# Patient Record
Sex: MALE | Race: WHITE | NOT HISPANIC OR LATINO | URBAN - METROPOLITAN AREA
[De-identification: names, ages, dates, MRNs, and addresses within clinical notes are randomized per-mention and may not be internally consistent; named-entity substitution may affect disease eponyms.]

---

## 2020-03-14 ENCOUNTER — EMERGENCY (EMERGENCY)
Facility: HOSPITAL | Age: 65
LOS: 0 days | Discharge: HOME | End: 2020-03-14
Attending: EMERGENCY MEDICINE | Admitting: EMERGENCY MEDICINE
Payer: MEDICARE

## 2020-03-14 VITALS
OXYGEN SATURATION: 97 % | WEIGHT: 199.96 LBS | DIASTOLIC BLOOD PRESSURE: 94 MMHG | HEIGHT: 69 IN | TEMPERATURE: 98 F | HEART RATE: 89 BPM | RESPIRATION RATE: 18 BRPM | SYSTOLIC BLOOD PRESSURE: 205 MMHG

## 2020-03-14 DIAGNOSIS — F17.200 NICOTINE DEPENDENCE, UNSPECIFIED, UNCOMPLICATED: ICD-10-CM

## 2020-03-14 DIAGNOSIS — I10 ESSENTIAL (PRIMARY) HYPERTENSION: ICD-10-CM

## 2020-03-14 DIAGNOSIS — I50.9 HEART FAILURE, UNSPECIFIED: ICD-10-CM

## 2020-03-14 DIAGNOSIS — F10.129 ALCOHOL ABUSE WITH INTOXICATION, UNSPECIFIED: ICD-10-CM

## 2020-03-14 DIAGNOSIS — I11.0 HYPERTENSIVE HEART DISEASE WITH HEART FAILURE: ICD-10-CM

## 2020-03-14 DIAGNOSIS — R74.8 ABNORMAL LEVELS OF OTHER SERUM ENZYMES: ICD-10-CM

## 2020-03-14 LAB
ALBUMIN SERPL ELPH-MCNC: 4.9 G/DL — SIGNIFICANT CHANGE UP (ref 3.5–5.2)
ALP SERPL-CCNC: 97 U/L — SIGNIFICANT CHANGE UP (ref 30–115)
ALT FLD-CCNC: 133 U/L — HIGH (ref 0–41)
ANION GAP SERPL CALC-SCNC: 18 MMOL/L — HIGH (ref 7–14)
APTT BLD: 31 SEC — SIGNIFICANT CHANGE UP (ref 27–39.2)
AST SERPL-CCNC: 152 U/L — HIGH (ref 0–41)
BASOPHILS # BLD AUTO: 0.06 K/UL — SIGNIFICANT CHANGE UP (ref 0–0.2)
BASOPHILS NFR BLD AUTO: 1 % — SIGNIFICANT CHANGE UP (ref 0–1)
BILIRUB SERPL-MCNC: 0.5 MG/DL — SIGNIFICANT CHANGE UP (ref 0.2–1.2)
BUN SERPL-MCNC: 9 MG/DL — LOW (ref 10–20)
CALCIUM SERPL-MCNC: 9.2 MG/DL — SIGNIFICANT CHANGE UP (ref 8.5–10.1)
CHLORIDE SERPL-SCNC: 93 MMOL/L — LOW (ref 98–110)
CO2 SERPL-SCNC: 27 MMOL/L — SIGNIFICANT CHANGE UP (ref 17–32)
CREAT SERPL-MCNC: 0.7 MG/DL — SIGNIFICANT CHANGE UP (ref 0.7–1.5)
EOSINOPHIL # BLD AUTO: 0.05 K/UL — SIGNIFICANT CHANGE UP (ref 0–0.7)
EOSINOPHIL NFR BLD AUTO: 0.8 % — SIGNIFICANT CHANGE UP (ref 0–8)
GLUCOSE SERPL-MCNC: 106 MG/DL — HIGH (ref 70–99)
HCT VFR BLD CALC: 46.5 % — SIGNIFICANT CHANGE UP (ref 42–52)
HGB BLD-MCNC: 15.8 G/DL — SIGNIFICANT CHANGE UP (ref 14–18)
IMM GRANULOCYTES NFR BLD AUTO: 0.8 % — HIGH (ref 0.1–0.3)
INR BLD: 0.97 RATIO — SIGNIFICANT CHANGE UP (ref 0.65–1.3)
LYMPHOCYTES # BLD AUTO: 1.34 K/UL — SIGNIFICANT CHANGE UP (ref 1.2–3.4)
LYMPHOCYTES # BLD AUTO: 22.3 % — SIGNIFICANT CHANGE UP (ref 20.5–51.1)
MCHC RBC-ENTMCNC: 31 PG — SIGNIFICANT CHANGE UP (ref 27–31)
MCHC RBC-ENTMCNC: 34 G/DL — SIGNIFICANT CHANGE UP (ref 32–37)
MCV RBC AUTO: 91.4 FL — SIGNIFICANT CHANGE UP (ref 80–94)
MONOCYTES # BLD AUTO: 0.46 K/UL — SIGNIFICANT CHANGE UP (ref 0.1–0.6)
MONOCYTES NFR BLD AUTO: 7.7 % — SIGNIFICANT CHANGE UP (ref 1.7–9.3)
NEUTROPHILS # BLD AUTO: 4.05 K/UL — SIGNIFICANT CHANGE UP (ref 1.4–6.5)
NEUTROPHILS NFR BLD AUTO: 67.4 % — SIGNIFICANT CHANGE UP (ref 42.2–75.2)
NRBC # BLD: 0 /100 WBCS — SIGNIFICANT CHANGE UP (ref 0–0)
NT-PROBNP SERPL-SCNC: 82 PG/ML — SIGNIFICANT CHANGE UP (ref 0–300)
PLATELET # BLD AUTO: 123 K/UL — LOW (ref 130–400)
POTASSIUM SERPL-MCNC: 3.4 MMOL/L — LOW (ref 3.5–5)
POTASSIUM SERPL-SCNC: 3.4 MMOL/L — LOW (ref 3.5–5)
PROT SERPL-MCNC: 7.9 G/DL — SIGNIFICANT CHANGE UP (ref 6–8)
PROTHROM AB SERPL-ACNC: 11.2 SEC — SIGNIFICANT CHANGE UP (ref 9.95–12.87)
RBC # BLD: 5.09 M/UL — SIGNIFICANT CHANGE UP (ref 4.7–6.1)
RBC # FLD: 13.1 % — SIGNIFICANT CHANGE UP (ref 11.5–14.5)
SODIUM SERPL-SCNC: 138 MMOL/L — SIGNIFICANT CHANGE UP (ref 135–146)
TROPONIN T SERPL-MCNC: 0.03 NG/ML — CRITICAL HIGH
WBC # BLD: 6.01 K/UL — SIGNIFICANT CHANGE UP (ref 4.8–10.8)
WBC # FLD AUTO: 6.01 K/UL — SIGNIFICANT CHANGE UP (ref 4.8–10.8)

## 2020-03-14 PROCEDURE — 71045 X-RAY EXAM CHEST 1 VIEW: CPT | Mod: 26

## 2020-03-14 PROCEDURE — 70450 CT HEAD/BRAIN W/O DYE: CPT | Mod: 26

## 2020-03-14 PROCEDURE — 99285 EMERGENCY DEPT VISIT HI MDM: CPT

## 2020-03-14 RX ORDER — LOSARTAN POTASSIUM 100 MG/1
50 TABLET, FILM COATED ORAL ONCE
Refills: 0 | Status: COMPLETED | OUTPATIENT
Start: 2020-03-14 | End: 2020-03-14

## 2020-03-14 RX ORDER — FUROSEMIDE 40 MG
20 TABLET ORAL ONCE
Refills: 0 | Status: COMPLETED | OUTPATIENT
Start: 2020-03-14 | End: 2020-03-14

## 2020-03-14 RX ORDER — METOPROLOL TARTRATE 50 MG
25 TABLET ORAL ONCE
Refills: 0 | Status: COMPLETED | OUTPATIENT
Start: 2020-03-14 | End: 2020-03-14

## 2020-03-14 RX ORDER — LISINOPRIL 2.5 MG/1
20 TABLET ORAL ONCE
Refills: 0 | Status: COMPLETED | OUTPATIENT
Start: 2020-03-14 | End: 2020-03-14

## 2020-03-14 RX ADMIN — LISINOPRIL 20 MILLIGRAM(S): 2.5 TABLET ORAL at 19:51

## 2020-03-14 RX ADMIN — Medication 20 MILLIGRAM(S): at 19:51

## 2020-03-14 RX ADMIN — Medication 25 MILLIGRAM(S): at 19:51

## 2020-03-14 RX ADMIN — LOSARTAN POTASSIUM 50 MILLIGRAM(S): 100 TABLET, FILM COATED ORAL at 19:51

## 2020-03-14 NOTE — ED PROVIDER NOTE - NSFOLLOWUPCLINICS_GEN_ALL_ED_FT
[facility]  Detox Mgmt  [address] [place] [address]  Phone: [phone]  Fax:   Follow Up Time: 1-3 Days

## 2020-03-14 NOTE — ED PROVIDER NOTE - PHYSICAL EXAMINATION
VITALS:  I have reviewed the initial vital signs.  GENERAL: Well-developed, well-nourished, in no acute distress. Nontoxic. Disheveled appearing, intoxicated.  HEENT: NC/AT. Sclera clear. No conjunctival pallor or injection. EOMI, PERRLA. Mucous membranes moist.  NECK: supple w FROM. No cervical adenopathy. No JVD.  CARDIO: RRR, nl S1 and S2. No murmurs, rubs, or gallops. 2+ b/l lower extremity pitting edema.2+ radial pulses bilaterally.  PULM: Normal effort. No tachypnea or retractions. CTA b/l without wheezes, rales, or rhonchi.  MSK: FROM to extremities x4.  GI: Normal bowel sounds. Abdomen soft and non-distended. +soft, easily reducible umbilical hernia. Nontender in all four quadrants without rebound or guarding. No pulsatile masses.  : No CVA tenderness b/l.  SKIN: Warm, dry. No pallor, jaundice, or rashes.  NEURO: A&Ox3. Speech clear. CN II-XII intact. 5/5 strength to upper and lower extremities b/l. Sensation intact and equal throughout. No pronator drift. Finger to nose intact. Normal heel to shin.   PSYCH: Calm and cooperative.

## 2020-03-14 NOTE — ED PROVIDER NOTE - NS ED ROS FT
CONSTITUTIONAL: (-) fevers, (-) chills, (-) decreased appetite, (-) diaphoresis  EYES: (-) vision changes, (-) blurry vision, (-) double vision  NECK: (-) neck pain, (-) neck stiffness  CARDIO: (-) chest pain, (-) palpitations, (+) b/l lower extremity edema  PULM: (-) cough, (-) sputum, (-) shortness of breath, (-) wheezing  GI: (-) nausea, (-) vomiting, (-) diarrhea, (-) constipation, (-) abdominal pain, (-) melena, (-) hematochezia, (-) rectal bleeding  : (-) dysuria, (-) hematuria, (-) frequency, (-) urgency, (-) incontinence, (-) difficulty urinating, (-) urinary retention, (-) flank pain  HEME: (-) easy bruising, (-) easy bleeding  MSK: (-) back pain,  (-) joint pain, (-) deformity, (-) joint swelling, (+) gait difficulty  SKIN: (-) rashes, (-) wounds, (-) pallor, (-) jaundice  NEURO: (-) headache, (-) head injury, (-) LOC, (-) dizziness, (-) lightheadedness, (-) syncope, (-) speech changes, (-) confusion, (-) numbness, (-) weakness, (-) paresthesias, (-) seizures  PSYCH: (-) suicidal ideation, (-) homicidal ideation, (-) depression, (-) anxiety, (-) visual hallucinations, (-) auditory hallucinations, (-) agitation    *all other systems negative except as documented above and in the HPI*

## 2020-03-14 NOTE — ED PROVIDER NOTE - PATIENT PORTAL LINK FT
You can access the FollowMyHealth Patient Portal offered by [facility] by registering at the following website: [email or web address]. By joining [employer]’s FollowMyHealth portal, you will also be able to view your health information using other applications (apps) compatible with our system.

## 2020-03-14 NOTE — ED PROVIDER NOTE - OBJECTIVE STATEMENT
64 year old male w hx of alcohol abuse, HTN, HLD, CHF noncompliant with meds presents to the ED for 64 year old male w hx of alcohol abuse, TIA, HTN, HLD, CHF noncompliant with meds presents to the ED for elevated blood pressure. History provided by patient's nephew as he is a poor historian. Pt recently relapsed after his wife passed away 2 months ago, admits to drinking 1-2 pints of vodka/day and has been noncompliant with his meds, not going to his doctor's appointments, not taking care of himself. Nephew states today he went to check on him and patient was too intoxicated to walk, BP was 200s/100s prompting him to call EMS. Pt denies falls, head trauma, headache, vision changes, chest pain, shortness of breath, syncope, abd pain, vomiting, black/bloody stools, decreased urination, numbness, paresthesias, weakness, SI/HI, seizures. States he takes 81 mg ASA but is unable to confirm if he is taking it.

## 2020-03-14 NOTE — ED PROVIDER NOTE - CARE PLAN
Principal Discharge DX:	Hypertension  Secondary Diagnosis:	Elevated troponin  Secondary Diagnosis:	Alcohol abuse

## 2020-03-14 NOTE — ED PROVIDER NOTE - CARE PROVIDER_API CALL
Hao Quigley)  Cardiovascular Disease; Internal Medicine  [address] [place] [id number]  Phone: [phone]  Fax: [phone]  Follow Up Time: 1-3 Days

## 2020-03-14 NOTE — ED PROVIDER NOTE - PROGRESS NOTE DETAILS
Patient's nephew Armando can be reached at [phone] The patient wishes to leave against medical advice.  I have discussed the risks, benefits and alternatives (including the possibility of worsening of disease, pain, permanent disability, and/or death) with the patient and his/her family (if available).  The patient voices understanding of these risks, benefits, and alternatives and still wishes to sign out against medical advice.  The patient is awake, alert, oriented  x 3 and has demonstrated capacity to refuse/direct care.  I have advised the patient that they can and should return immediately should they develop any worse/different/additional symptoms, or if they change their mind and want to continue their care.

## 2020-03-14 NOTE — ED PROVIDER NOTE - ATTENDING CONTRIBUTION TO CARE
I personally evaluated patient. I agree with the findings and plan with all documentation on chart except as documented  in my note.    63 y/o M PMHx ETOH Abuse and Dependence, TIA, HTN, HLD, CHF who is non-compliant with medications who presents to the ED for elevated blood pressure. History provided by patient's nephew as he is a poor historian. Pt recently relapsed after his wife passed away 2 months ago, admits to drinking 1-2 pints of vodka/day and has been noncompliant with his meds, not going to his doctor's appointments, not taking care of himself. Nephew states today he went to check on him and patient was too intoxicated to walk, BP was 200s/100s prompting him to call EMS. Pt denies falls, head trauma, headache, vision changes, chest pain, shortness of breath, syncope, abd pain, vomiting, black/bloody stools, decreased urination, numbness, paresthesias, weakness, SI/HI, seizures. States he takes 81 mg ASA but is unable to confirm if he is taking it.    On exam, patient appears mildly disheveled but denies HI or SI and is alert and oriented. Gross neuro exam normal. Patient answers questions appropriately. Patient talked into getting blood work and a CT scan done.  Work up discussed and results discussed and advised patient admission for further work up and BP control.  Patient electing to sign out AMA.    The patient wishes to leave against medical advice.  I have discussed the risks, benefits and alternatives (including the possibility of worsening of disease, pain, permanent disability, and/or death) with the patient and his/her family (if available).  The patient voices understanding of these risks, benefits, and alternatives and still wishes to sign out against medical advice.  The patient is awake, alert, oriented  x 3 and has demonstrated capacity to refuse/direct care.  I have advised the patient that they can and should return immediately should they develop any worse/different/additional symptoms, or if they change their mind and want to continue their care.    Patient has medications at home and compliance stressed.

## 2020-03-14 NOTE — ED PROVIDER NOTE - CLINICAL SUMMARY MEDICAL DECISION MAKING FREE TEXT BOX
On exam, patient appears mildly disheveled but denies HI or SI and is alert and oriented. Gross neuro exam normal. Patient answers questions appropriately. Patient talked into getting blood work and a CT scan done.  Work up discussed and results discussed and advised patient admission for further work up and BP control.  Patient electing to sign out AMA.    The patient wishes to leave against medical advice.  I have discussed the risks, benefits and alternatives (including the possibility of worsening of disease, pain, permanent disability, and/or death) with the patient and his/her family (if available).  The patient voices understanding of these risks, benefits, and alternatives and still wishes to sign out against medical advice.  The patient is awake, alert, oriented  x 3 and has demonstrated capacity to refuse/direct care.  I have advised the patient that they can and should return immediately should they develop any worse/different/additional symptoms, or if they change their mind and want to continue their care.    Patient has medications at home and compliance stressed.

## 2020-03-14 NOTE — ED ADULT TRIAGE NOTE - CHIEF COMPLAINT QUOTE
EMS was called by paula who states that he went to check on pt today and took his blood pressure and it was high. Pt has history of htn but has not been taking his medications for unknown amount of time. Nephew states that pt has hx of tia- Pt does not know names of meds. pt states "they are bull shit meds"Py has also been having a nose bleed the last 2 nights and pt also started drinking vodka after being sober for many years

## 2020-03-17 ENCOUNTER — EMERGENCY (EMERGENCY)
Facility: HOSPITAL | Age: 65
LOS: 0 days | Discharge: HOME | End: 2020-03-17
Attending: EMERGENCY MEDICINE | Admitting: EMERGENCY MEDICINE
Payer: MEDICARE

## 2020-03-17 VITALS
DIASTOLIC BLOOD PRESSURE: 66 MMHG | SYSTOLIC BLOOD PRESSURE: 136 MMHG | RESPIRATION RATE: 18 BRPM | HEIGHT: 69 IN | WEIGHT: 199.96 LBS | OXYGEN SATURATION: 96 % | TEMPERATURE: 98 F | HEART RATE: 89 BPM

## 2020-03-17 DIAGNOSIS — I11.0 HYPERTENSIVE HEART DISEASE WITH HEART FAILURE: ICD-10-CM

## 2020-03-17 DIAGNOSIS — E78.5 HYPERLIPIDEMIA, UNSPECIFIED: ICD-10-CM

## 2020-03-17 DIAGNOSIS — F17.210 NICOTINE DEPENDENCE, CIGARETTES, UNCOMPLICATED: ICD-10-CM

## 2020-03-17 DIAGNOSIS — F10.129 ALCOHOL ABUSE WITH INTOXICATION, UNSPECIFIED: ICD-10-CM

## 2020-03-17 DIAGNOSIS — I50.9 HEART FAILURE, UNSPECIFIED: ICD-10-CM

## 2020-03-17 PROCEDURE — 99284 EMERGENCY DEPT VISIT MOD MDM: CPT

## 2020-03-17 NOTE — ED PROVIDER NOTE - PROGRESS NOTE DETAILS
MQ: Patient does not know why he is here, was taken by , but he does not want to be here, is alert and oriented x 3, able to ambulate okay without loss of balance, will d/c with PMD and detox clinic f/u

## 2020-03-17 NOTE — ED PROVIDER NOTE - PHYSICAL EXAMINATION
CONSTITUTIONAL: Well-developed; well-nourished; in no acute distress.   SKIN: warm, dry  HEAD: Normocephalic; atraumatic.  EYES: no conjunctival injection. PERRL.   ENT: No nasal discharge; airway clear.  NECK: Supple; non tender.  CARD: S1, S2 normal; no murmurs, gallops, or rubs. Regular rate and rhythm.   RESP: No wheezes, rales or rhonchi.  ABD: soft ntnd, no rebound tender or guarding, no CVAT b/l  EXT: Normal ROM.  No clubbing, cyanosis or edema.   LYMPH: No acute cervical adenopathy.  NEURO: Alert, oriented, grossly unremarkable. Ambulates normally without loss of balance.  PSYCH: Cooperative, appropriate.

## 2020-03-17 NOTE — ED PROVIDER NOTE - PMH
CHF (congestive heart failure)    HLD (hyperlipidemia)    HTN (hypertension)    TIA (transient ischemic attack)

## 2020-03-17 NOTE — ED ADULT TRIAGE NOTE - CHIEF COMPLAINT QUOTE
BIBA from home.  As per EMS " we got call that there is somebody in a house who is suicidal and has gun, he is intoxicated" as per pt " I was watching TV in my house and then  and EMS showed up; I don't' know why and who called them, I don't have gun and I don't want to hurt myself"

## 2020-03-17 NOTE — ED PROVIDER NOTE - NSFOLLOWUPCLINICS_GEN_ALL_ED_FT
A Family Medicine Doctor  Family Medicine  .  [place]   Phone:   Fax:     [facility]  Detox Mgmt  [address] [place] [address]  Phone: [phone]  Fax:   Follow Up Time:

## 2020-03-17 NOTE — ED PROVIDER NOTE - OBJECTIVE STATEMENT
The patient is a 63 yo male with PMHx of alcohol abuse, TIA, HTN, HLD, CHF presenting after EMS took him to ED for alcohol detox. The patient is a 65 yo male with PMHx of alcohol abuse, TIA, HTN, HLD, CHF presenting after EMS took him to ED for alcohol detox. Patient was at home by himself, watching TV, did not call EMS or police, but police and EMS came to his house, bringing him to the hospital for alcohol detox. Patient drinks 2 to 3 shots of vodka a day, last drink at 10 AM today. Patient does have shakes when he stops drinking, but reports no hx of seizures or hospitalization for alcohol withdrawal. Denies fever, chills, chest pain, SOB, cough, abdominal pain, n/v/d. Patient does not want to stay for detox. No auditory/visual hallucations, no SI/HI.

## 2020-03-17 NOTE — ED PROVIDER NOTE - PATIENT PORTAL LINK FT
You can access the FollowMyHealth Patient Portal offered by [facility] by registering at the following website: [email or web address]. By joining [employer]’s FollowMyHealth portal, you will also be able to view your health information using other applications (apps) compatible with our system.

## 2020-03-17 NOTE — ED PROVIDER NOTE - NSFOLLOWUPINSTRUCTIONS_ED_ALL_ED_FT
Substance Use Disorder  Substance use disorder occurs when a person's repeated use of drugs or alcohol interferes with his or her ability to be productive. This disorder can cause problems with mental and physical health. It can affect your ability to have healthy relationships, and it can keep you from being able to meet your responsibilities at work, home, or school. It can also lead to addiction, which is a condition in which the person cannot stop using the substance consistently for a period of time.  Addiction changes the way the brain works. Because of these changes, addiction is a chronic condition. Substance use disorder can be mild, moderate, or severe.  The most commonly abused substances include:  Alcohol.Tobacco.Marijuana.Stimulants, such as cocaine and methamphetamine.Hallucinogens, such as LSD and PCP.Opioids, such as some prescription pain medicines and heroin.What are the causes?  This condition may develop due to many complex social, psychological, or physical reasons, such as:  Stress.Abuse.Peer pressure.Anxiety or depression.What increases the risk?  This condition is more likely to develop in people who:  Use substances to cope with stress.Have been abused.Have a mental health disorder, such as depression.Have a family history of substance use disorder.What are the signs or symptoms?  Symptoms of this condition include:  Using the substance for longer periods of time or at a higher dosage than what is normal or intended.Having a lasting desire to use the substance.Being unable to slow down or stop the use of the substance.Spending an abnormal amount of time getting the substance, using the substance, or recovering from using the substance.Using the substance in a way that interferes with work, school, social activities, and personal relationships.Using the substance even after having negative consequences, such as:  Health problems.Legal or financial troubles.Job loss.Relationship problems.Needing more and more of the substance to get the same effect (developing tolerance).Experiencing unpleasant symptoms if you do not use the substance (withdrawal).Using the substance to avoid withdrawal symptoms.How is this diagnosed?  This condition may be diagnosed based on:  A physical exam.Your history of substance use.Your symptoms. This includes:  How substance use affects your life.Changes in personality, behaviors, and mood.Having at least two symptoms of substance use disorder within a 12-month period.Health issues related to substance use, such as liver damage, shortness of breath, fatigue, cough, or heart problems.Blood or urine tests to screen for alcohol and drugs.How is this treated?  This condition may be treated by:  Stopping substance use safely. This may require taking medicines and being closely monitored for several days.Taking part in group and individual counseling from mental health providers who help people with substance use disorder.Staying at a live-in (residential) treatment center for several days or weeks.Attending daily counseling sessions at a treatment center.Taking medicine as told by your health care provider:  To ease symptoms and prevent complications during withdrawal.To treat other mental health issues, such as depression or anxiety.To block cravings by causing the same effects as the substance.To block the effects of the substance or replace good sensations with unpleasant ones.Participating in a support group to share your experience with others who are going through the same thing. These groups are an important part of long-term recovery for many people.Recovery can be a long process. Many people who undergo treatment start using the substance again after stopping (relapse). If you relapse, that does not mean that treatment will not work.  Follow these instructions at home:     Take over-the-counter and prescription medicines only as told by your health care provider.Do not use any drugs or alcohol.Avoid temptations or triggers that you associate with your use of the substance.Learn and practice techniques for managing stress.Have a plan for vulnerable moments. Get phone numbers of people who are willing to help and who are committed to your recovery.Attend support groups on a regular basis. These groups include 12-step programs like Alcoholics Anonymous and Narcotics Anonymous.Keep all follow-up visits as told by your health care providers. This is important. This includes continuing to work with therapists and support groups.Contact a health care provider if:  You cannot take your medicines as told.Your symptoms get worse.You have trouble resisting the urge to use drugs or alcohol.Get help right away if you:  Relapse.Think that you may have taken too much of a drug. The hotline of the National Poison Control Center is (358) 521-4554.Have signs of an overdose. Symptoms include:  Chest pain.Confusion.Sleepiness or difficulty staying awake.Slowed breathing.Nausea or vomiting.A seizure.Have serious thoughts about hurting yourself or someone else.Drug overdose is an emergency. Do not wait to see if the symptoms will go away. Get medical help right away. Call your local emergency services (911 in the [place] Do not drive yourself to the hospital.   If you ever feel like you may hurt yourself or others, or have thoughts about taking your own life, get help right away. You can go to your nearest emergency department or call:   Your local emergency services (911 in the [place] A suicide crisis helpline, such as the National Suicide Prevention Lifeline at 1-467.256.8702. This is open 24 hours a day. Summary  Substance use disorder occurs when a person's repeated use of drugs or alcohol interferes with his or her ability to be productive.Taking part in group and individual counseling from mental health providers is a common treatment for people with substance use disorder.Recovery can be a long process. Many people who undergo treatment start using the substance again after stopping (relapse). A relapse does not mean that treatment will not work.Attend support groups such as Alcoholics Anonymous and Narcotics Anonymous. These groups are an important part of long-term recovery for many people.This information is not intended to replace advice given to you by your health care provider. Make sure you discuss any questions you have with your health care provider.    Please follow up with your primary care doctor and detox clinic as routine.

## 2020-03-17 NOTE — ED PROVIDER NOTE - ATTENDING CONTRIBUTION TO CARE
63yo M history of ETOH abuse presenting initially for detox but now does not want detox. Last drink 10am. No complaints. No suicidal ideation, no homicidal ideation, no auditory or visual hallucinations   Constitutional: disheveled. Non toxic.   Eyes: PERRLA. Extraocular movements intact, no entrapment. Conjunctiva normal.   ENT: No nasal discharge. Moist mucus membranes.  Neck: Supple, non tender, full range of motion.  CV: RRR no murmurs, rubs, or gallops. +S1S2.   Pulm: Clear to auscultation bilaterally. Normal work of breathing.  Abd: soft NT ND +BS.   Ext: Warm and well perfused x4, moving all extremities, no edema.   Psy: Cooperative, appropriate.   Skin: Warm, dry, no rash  Neuro: CN2-12 grossly intact no sensory or motor deficits throughout, no drift, no ataxia

## 2020-03-17 NOTE — ED PROVIDER NOTE - CLINICAL SUMMARY MEDICAL DECISION MAKING FREE TEXT BOX
63yo M history of ETOH abuse presenting initially for detox but now does not want detox. Last drink 10am. No complaints. No suicidal ideation, no homicidal ideation, no auditory or visual hallucinations Pt awake, alert, no slurring of speech, ambulatory without difficulty, no ataxia, PERRLA, RRR no MRG, lungs CTA b/l, abd S NT ND. No complaints at this time, denies SI, HI, AV hallucinations, wishes to be discharged, pt clinically sober and displays capacity- no further questions or concerns at this time.

## 2020-03-19 ENCOUNTER — INPATIENT (INPATIENT)
Facility: HOSPITAL | Age: 65
LOS: 6 days | Discharge: NURSING, REHAB OR LONG-TERM CARE | End: 2020-03-26
Attending: INTERNAL MEDICINE | Admitting: INTERNAL MEDICINE
Payer: MEDICARE

## 2020-03-19 VITALS
DIASTOLIC BLOOD PRESSURE: 88 MMHG | RESPIRATION RATE: 20 BRPM | HEIGHT: 69 IN | OXYGEN SATURATION: 97 % | HEART RATE: 90 BPM | SYSTOLIC BLOOD PRESSURE: 167 MMHG | WEIGHT: 199.96 LBS | TEMPERATURE: 99 F

## 2020-03-19 DIAGNOSIS — I26.99 OTHER PULMONARY EMBOLISM WITHOUT ACUTE COR PULMONALE: ICD-10-CM

## 2020-03-19 DIAGNOSIS — Z90.49 ACQUIRED ABSENCE OF OTHER SPECIFIED PARTS OF DIGESTIVE TRACT: Chronic | ICD-10-CM

## 2020-03-19 DIAGNOSIS — I10 ESSENTIAL (PRIMARY) HYPERTENSION: ICD-10-CM
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
## 2020-03-19 DIAGNOSIS — F10.20 ALCOHOL DEPENDENCE, UNCOMPLICATED: ICD-10-CM

## 2020-03-19 DIAGNOSIS — R60.0 LOCALIZED EDEMA: ICD-10-CM

## 2020-03-19 PROBLEM — G45.9 TRANSIENT CEREBRAL ISCHEMIC ATTACK, UNSPECIFIED: Chronic | Status: ACTIVE | Noted: 2020-03-17

## 2020-03-19 PROBLEM — E78.5 HYPERLIPIDEMIA, UNSPECIFIED: Chronic | Status: ACTIVE | Noted: 2020-03-17

## 2020-03-19 PROBLEM — I50.9 HEART FAILURE, UNSPECIFIED: Chronic | Status: ACTIVE | Noted: 2020-03-17

## 2020-03-19 LAB
ANION GAP SERPL CALC-SCNC: 18 MMOL/L — HIGH (ref 7–14)
APAP SERPL-MCNC: <5 UG/ML — LOW (ref 10–30)
BASOPHILS # BLD AUTO: 0.03 K/UL — SIGNIFICANT CHANGE UP (ref 0–0.2)
BASOPHILS NFR BLD AUTO: 0.3 % — SIGNIFICANT CHANGE UP (ref 0–1)
BUN SERPL-MCNC: 26 MG/DL — HIGH (ref 10–20)
CALCIUM SERPL-MCNC: 9.1 MG/DL — SIGNIFICANT CHANGE UP (ref 8.5–10.1)
CHLORIDE SERPL-SCNC: 93 MMOL/L — LOW (ref 98–110)
CO2 SERPL-SCNC: 26 MMOL/L — SIGNIFICANT CHANGE UP (ref 17–32)
CREAT SERPL-MCNC: 1.1 MG/DL — SIGNIFICANT CHANGE UP (ref 0.7–1.5)
EOSINOPHIL # BLD AUTO: 0.01 K/UL — SIGNIFICANT CHANGE UP (ref 0–0.7)
EOSINOPHIL NFR BLD AUTO: 0.1 % — SIGNIFICANT CHANGE UP (ref 0–8)
GLUCOSE SERPL-MCNC: 161 MG/DL — HIGH (ref 70–99)
HCT VFR BLD CALC: 42.9 % — SIGNIFICANT CHANGE UP (ref 42–52)
HGB BLD-MCNC: 14.6 G/DL — SIGNIFICANT CHANGE UP (ref 14–18)
IMM GRANULOCYTES NFR BLD AUTO: 0.8 % — HIGH (ref 0.1–0.3)
LYMPHOCYTES # BLD AUTO: 0.76 K/UL — LOW (ref 1.2–3.4)
LYMPHOCYTES # BLD AUTO: 7.5 % — LOW (ref 20.5–51.1)
MCHC RBC-ENTMCNC: 31.4 PG — HIGH (ref 27–31)
MCHC RBC-ENTMCNC: 34 G/DL — SIGNIFICANT CHANGE UP (ref 32–37)
MCV RBC AUTO: 92.3 FL — SIGNIFICANT CHANGE UP (ref 80–94)
MONOCYTES # BLD AUTO: 0.63 K/UL — HIGH (ref 0.1–0.6)
MONOCYTES NFR BLD AUTO: 6.3 % — SIGNIFICANT CHANGE UP (ref 1.7–9.3)
NEUTROPHILS # BLD AUTO: 8.57 K/UL — HIGH (ref 1.4–6.5)
NEUTROPHILS NFR BLD AUTO: 85 % — HIGH (ref 42.2–75.2)
NRBC # BLD: 0 /100 WBCS — SIGNIFICANT CHANGE UP (ref 0–0)
PLATELET # BLD AUTO: 86 K/UL — LOW (ref 130–400)
POTASSIUM SERPL-MCNC: 4.1 MMOL/L — SIGNIFICANT CHANGE UP (ref 3.5–5)
POTASSIUM SERPL-SCNC: 4.1 MMOL/L — SIGNIFICANT CHANGE UP (ref 3.5–5)
RBC # BLD: 4.65 M/UL — LOW (ref 4.7–6.1)
RBC # FLD: 13.2 % — SIGNIFICANT CHANGE UP (ref 11.5–14.5)
SALICYLATES SERPL-MCNC: <0.3 MG/DL — LOW (ref 4–30)
SODIUM SERPL-SCNC: 137 MMOL/L — SIGNIFICANT CHANGE UP (ref 135–146)
WBC # BLD: 10.09 K/UL — SIGNIFICANT CHANGE UP (ref 4.8–10.8)
WBC # FLD AUTO: 10.09 K/UL — SIGNIFICANT CHANGE UP (ref 4.8–10.8)

## 2020-03-19 PROCEDURE — 99285 EMERGENCY DEPT VISIT HI MDM: CPT

## 2020-03-19 PROCEDURE — 99223 1ST HOSP IP/OBS HIGH 75: CPT

## 2020-03-19 PROCEDURE — 93970 EXTREMITY STUDY: CPT | Mod: 26

## 2020-03-19 PROCEDURE — 70450 CT HEAD/BRAIN W/O DYE: CPT | Mod: 26

## 2020-03-19 PROCEDURE — 71045 X-RAY EXAM CHEST 1 VIEW: CPT | Mod: 26

## 2020-03-19 RX ORDER — FUROSEMIDE 40 MG
1 TABLET ORAL
Qty: 0 | Refills: 0 | DISCHARGE

## 2020-03-19 RX ORDER — LOSARTAN POTASSIUM 100 MG/1
50 TABLET, FILM COATED ORAL ONCE
Refills: 0 | Status: COMPLETED | OUTPATIENT
Start: 2020-03-19 | End: 2020-03-19

## 2020-03-19 RX ORDER — METOPROLOL TARTRATE 50 MG
25 TABLET ORAL
Refills: 0 | Status: DISCONTINUED | OUTPATIENT
Start: 2020-03-19 | End: 2020-03-21

## 2020-03-19 RX ORDER — INFLUENZA VIRUS VACCINE 15; 15; 15; 15 UG/.5ML; UG/.5ML; UG/.5ML; UG/.5ML
0.5 SUSPENSION INTRAMUSCULAR ONCE
Refills: 0 | Status: COMPLETED | OUTPATIENT
Start: 2020-03-19 | End: 2020-03-19

## 2020-03-19 RX ORDER — ASPIRIN/CALCIUM CARB/MAGNESIUM 324 MG
81 TABLET ORAL DAILY
Refills: 0 | Status: DISCONTINUED | OUTPATIENT
Start: 2020-03-19 | End: 2020-03-26

## 2020-03-19 RX ORDER — CHLORHEXIDINE GLUCONATE 213 G/1000ML
1 SOLUTION TOPICAL
Refills: 0 | Status: DISCONTINUED | OUTPATIENT
Start: 2020-03-19 | End: 2020-03-26

## 2020-03-19 RX ORDER — APIXABAN 2.5 MG/1
5 TABLET, FILM COATED ORAL EVERY 12 HOURS
Refills: 0 | Status: DISCONTINUED | OUTPATIENT
Start: 2020-03-19 | End: 2020-03-26

## 2020-03-19 RX ORDER — LOSARTAN POTASSIUM 100 MG/1
50 TABLET, FILM COATED ORAL DAILY
Refills: 0 | Status: DISCONTINUED | OUTPATIENT
Start: 2020-03-19 | End: 2020-03-26

## 2020-03-19 RX ADMIN — LOSARTAN POTASSIUM 50 MILLIGRAM(S): 100 TABLET, FILM COATED ORAL at 17:55

## 2020-03-19 RX ADMIN — Medication 25 MILLIGRAM(S): at 17:54

## 2020-03-19 RX ADMIN — Medication 25 MILLIGRAM(S): at 10:59

## 2020-03-19 RX ADMIN — Medication 25 MILLIGRAM(S): at 17:24

## 2020-03-19 RX ADMIN — APIXABAN 5 MILLIGRAM(S): 2.5 TABLET, FILM COATED ORAL at 17:54

## 2020-03-19 NOTE — PATIENT PROFILE ADULT - HAS THE PATIENT RECEIVED THE INFLUENZA VACCINE THIS SEASON?
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  Plan:  Concurrent chemotherapy and radiation therapy  Discussion and summary:  Discuss treatment course with the patient and her daughter of 35 treatments total dose 59 4 Gy  We inform her of the side effects of skin reaction, fatigue, low blood count and discomfort or pain swallowing  Late sequela of lung scarring with minimal or no symptoms  We explained to the patient that medical oncologist Dr Mat Christensen may opt for induction chemotherapy followed by the chemoradiation therapy  He has appointment to see him August 16  We gave her appointment for CT simulation at [address] [place] [address] for August 22 and she can start treatment the following week should there be no induction chemotherapy  Patient expresses preference to be treated at [facility]  Physical examination:  Patient looks her stated age, alert and oriented, cooperative without any complaints  There are no palpable nodes  Lungs are clear  Heart tones are normal with regular rate and rhythm  Abdomen is soft without areas of tenderness and no masses  No edema lower extremities  Brief neurologic examination shows no focal findings  Cancer Staging  No matching staging information was found for the patient  Oncology History    Patient presents today for RT consult for adenocarcinoma of the left lung  Referred by Dr Onesimo Mcclellan  72year old female with a descending thoracic aorta and abdominal aorta aneurysm, maximum diameter is 6 1 cm at the supra celiac level  She also has large amount of atheroma in the aortic wall  CT also reveals a left upper lobe mass with some mediastinal adenopathy concerning for malignancy   She was referred to cardiothoracic surgery In June 2018 and she has an indication for surgery for her thoracic aorta aneurysm but in the light of a possible malignancy, should be worked up first   If she has a malignancy, surgery for the aneurysm is not recommended  8/1/18 she underwent endoscopic ultrasound by Dr Sonam Bianchi for evaluation of mediastinal lymphadenopathy  She was found to have a 22 mm x 19 mm hypoechoic lymph node in the aortopulmonary window  Fine-needle aspiration x3 was performed using a 25 gauge [employer] needle  Cytology was positive for non-small cell carcinoma  8/7/18 Dr Raheem Robins, cardiothoracic surgery  Patient has a pathologic stage IIIA lung cancer and going to order a PET-CT scan  Referral to Medical Oncology and Radiation Oncology for combined modality therapy  Future appointments:  8/16/18 Dr Juhi Mills, Med Onc consult  8/17/18 PET         Nonsquamous nonsmall cell neoplasm of lung ([facility] )    8/2018 Initial Diagnosis     Nonsquamous nonsmall cell neoplasm of lung ([facility] )         8/1/2018 Biopsy     Lymph node, mediastinal (fine needle aspiration with cell block preparation):     - Involvement by non-small cell carcinoma, most compatible with pulmonary adenocarcinoma     - Positive: Emmett-Ep4, MOC-31, TTF1, CKAE1/3     - Negative: Bronston-8, CDX2, synaptophysin, CD56, HMB-45, P40            Clinical Trial: No    Screening  Tobacco  Current tobacco user: no  If yes, brief counseling provided: No    Hypertension  Hypertension screening performed: yes  Normotensive:  no  If no, referred to PCP: yes    Depression Screening  Screened for depression using PHQ-2: yes    Screened for depression using PHQ-9:  no  Screening positive or negative:  negative  If score >4, was any of the following actions taken?    Additional evaluation for depression, suicide risk assesment, referral to PCP or psychiatry, medication started:  no    Advanced Care Planning for Patients >65 years  Advanced Care Planning Discussed:  yes  Patient named surrogate decision maker or care plan in chart: no        Health Maintenance   Topic Date Due    DTaP,Tdap,and Td Vaccines (1 - Tdap) 06/05/1968    PNEUMOCOCCAL POLYSACCHARIDE VACCINE AGE 72 AND OVER  06/05/2012    GLAUCOMA SCREENING 72 + YR  06/05/2014    MAMMOGRAM  06/28/2018    INFLUENZA VACCINE  09/01/2018    Fall Risk  05/31/2019    Urinary Incontinence Screening  05/31/2019    CRC Screening: Colonoscopy  09/27/2027    Hepatitis C Screening  Completed       Patient Active Problem List   Diagnosis    Chronic obstructive pulmonary disease ([facility] )    Hypothyroidism    Pure hypercholesterolemia    Hypertension, uncontrolled    Anxiety    ASCVD (arteriosclerotic cardiovascular disease)    Mediastinal adenopathy    Aneurysm of infrarenal abdominal aorta (HCC)    Aneurysm, thoracoabdominal aortic (HCC)    Atheroscler of native artery of right leg with intermit claudication (HCC)    Benign hypertension    Minor depression    Left breast lump    Muscle pain, myofacial    Chronic bilateral low back pain with right-sided sciatica    Spinal stenosis of lumbar region with neurogenic claudication    Lumbar radiculopathy    Chronic pain syndrome    PAD (peripheral artery disease) ([address] )    Bilateral carotid artery stenosis    Stage 3 chronic kidney disease    Preoperative cardiovascular examination    Nonsquamous nonsmall cell neoplasm of lung (HCC)     Past Medical History:   Diagnosis Date    Aortic aneurysm ([address] )     Arterial embolism of right leg (HCC)     ASCVD (arteriosclerotic cardiovascular disease)     Atheroscler of native artery of right leg with intermit claudication ([address] )     Back problem     Blood type B-     Cataract     COPD [facility]chronic obstructive pulmonary disease) ([facility])     Coronary artery disease     CVA (cerebral vascular accident) ([address] ) 2012    Depression     Disease of thyroid gland     History of cataract     Hyperlipidemia     Hypertension     Lung mass     Mediastinal adenopathy     Muscle pain     Prediabetes     PVD (peripheral vascular disease) ([facility] )     Thoracic aortic aneurysm (HCC)     Transient insomnia      Past Surgical History:   Procedure Laterality Date    CAROTID STENT Left     ME COLONOSCOPY FLX DX W/COLLJ SPEC WHEN PFRMD N/A 9/27/2017    Procedure: EGD AND COLONOSCOPY;  Surgeon: Morro Morales MD;  Location: [facility];  Service: Gastroenterology    ME EDG US EXAM SURGICAL ALTER STOM DUODENUM/JEJUNUM N/A 8/1/2018    Procedure: LINEAR ENDOSCOPIC U/S;  Surgeon: Nayla Jiang MD;  Location: [facility]; Service: Gastroenterology    TUBAL LIGATION       Family History   Problem Relation Age of Onset    Hypertension Mother     Heart disease Mother     Diabetes Father     Liver disease Father         hepatic failure     Breast cancer Daughter     Substance Abuse Neg Hx     Mental illness Neg Hx      Social History     Social History    Marital status:      Spouse name: N/A    Number of children: N/A    Years of education: N/A     Occupational History    Not on file  Social History Main Topics    Smoking status: Former Smoker     Packs/day: 0 50     Years: 45 00     Quit date: 3/6/2018    Smokeless tobacco: Never Used    Alcohol use No      Comment: rare    Drug use: No    Sexual activity: Not Currently     Other Topics Concern    Not on file     Social History Narrative    Lives with family  Feels safe at home  Sees dentist occas  No living will  Dental care, occasionally     Does not exercise     Living alone    Living situation: supportive and safe    No advance directives     No caffeine use        Current Outpatient Prescriptions:     aspirin (ECOTRIN) 325 mg EC tablet, Take 325 mg by mouth daily  , Disp: , Rfl:     atorvastatin (LIPITOR) 40 mg tablet, Take 1 tablet (40 mg total) by mouth daily, Disp: 90 tablet, Rfl: 3    co-enzyme Q-10 30 MG capsule, Take 30 mg by mouth 3 (three) times a day, Disp: , Rfl:     gabapentin (NEURONTIN) 100 mg capsule, Take 200 mg by mouth 3 (three) times a day, Disp: , Rfl:     levothyroxine 100 mcg tablet, Take 1 tablet (100 mcg total) by mouth daily, Disp: 30 tablet, Rfl: 3    lisinopril (ZESTRIL) 10 mg tablet, Take 1 tablet (10 mg total) by mouth daily, Disp: 90 tablet, Rfl: 1    Methylcobalamin (B12-ACTIVE PO), Take by mouth, Disp: , Rfl:     metoprolol succinate (TOPROL-XL) 50 mg 24 hr tablet, Take 1 tablet (50 mg total) by mouth 2 (two) times a day, Disp: 60 tablet, Rfl: 5    Omega-3 Fatty Acids (FISH OIL PO), Take by mouth, Disp: , Rfl:     prednisoLONE acetate (PRED FORTE) 1 % ophthalmic suspension, , Disp: , Rfl:     Allergies   Allergen Reactions    Penicillins Rash       Review of Systems:  Review of Systems   Constitutional: Positive for unexpected weight change (lost 15 lbs in early 2018 unintentionally, has gained this back )  HENT: Negative  Eyes:        Recent B/l cataract surgery June/July 2018   Respiratory: Negative  Cardiovascular: Negative  Gastrointestinal: Negative  Endocrine: Negative  Genitourinary: Negative  Musculoskeletal:        Right leg pain currently taking gabapentin with improvement    Skin: Negative  Allergic/Immunologic: Negative  Neurological: Negative  Hematological: Negative  Psychiatric/Behavioral: The patient is nervous/anxious (at times)          Vitals:    08/09/18 0900   BP: 134/88   BP Location: Right arm   Pulse: 93   Resp: 18   Temp: 97 5 °F (36 4 °C)   TempSrc: Temporal   SpO2: 93%   Weight: 59 1 kg (130 lb 3 2 oz)        Teaching RT education packet provided and discussed no...

## 2020-03-19 NOTE — ED PROVIDER NOTE - ATTENDING CONTRIBUTION TO CARE
I was present for and supervised the key and critical aspects of the procedures performed during the care of the patient. ATTENDING NOTE: I personally evaluated the patient. I reviewed the Physician Assistant’s note (as assigned above), and agree with the findings and plan except as documented in my note.   63 y/o M sent in for evaluation after drinking alcohol and being found to have confusion, which has since resolved. Pt denies trauma, f/c, CP, SOB, and cough. Additionally denies coingestants and SI/HI. Pt currently asymptomatic in ED.   On exam: NCAT. PERRLA, EOMI. OP clear. Lungs CTAB. RRR, S1S2 noted. Radial pulses 2+ and equal, pedal pulses 2+ and equal. Abdomen soft, NT/ND, no rebound or guarding. FROM x4 extremities. No focal neuro deficits.  Plan: Reobtain labs and, given alcohol HX, obtain CT head- negative. Pt symptomatic in ED, will offer detox.

## 2020-03-19 NOTE — H&P ADULT - PROBLEM SELECTOR PLAN 2
Hx of PE, restarted by PMD Dr Naga Patterson at patient's request due to concern over possible recurrence of PE, This patient is high risk for falls. Will need to reacess need for Rx if there is no current PE present.

## 2020-03-19 NOTE — ED PROVIDER NOTE - CLINICAL SUMMARY MEDICAL DECISION MAKING FREE TEXT BOX
patient unable to stand and walk with steady gait I will admit for further management. He denies any other co-ingestion. pateint agrees with the plan of care

## 2020-03-19 NOTE — ED PROVIDER NOTE - PHYSICAL EXAMINATION
GEN: Alert & Oriented x 3, No acute distress. Calm, appropriate. No tremors.  Head and Neck: Normocephalic, atraumatic.  Trachea midline.   Eyes: PERRL. EOMI. No conjunctival injection. No scleral icterus.   RESP:  (+) wheezes bilaterally, No rhonchi or rales. No retractions. Equal air entry.  CARDIO: regular rate and rhythm, no murmurs, rubs or gallops. Normal S1, S2. Radial pulses 2+ bilaterally. No lower extremity edema.  ABD: Soft, distended. No rebound tenderness/guarding. No organomegaly. No pulsatile mass. No tenderness with palpation  MS: No midline tenderness throughout. grossly moving all ext.   SKIN: no rashes/lesions, no petechiae, (+) small ecchymosis to left flank. Abrasion noted to top of head.  NEURO: CN II-XII grossly intact. grossly moving all ext. sensation intact. Speech and cognition normal.  PSYCH: Appropriate affect. No signs of depression, anxiety.

## 2020-03-19 NOTE — ED PROVIDER NOTE - NS ED ROS FT
GEN: (-) fever, (-) night sweats, (-) malaise  HEENT: (-) vision changes, (-) HA  CV: (-) chest pain, (-) palpitations, (-) edema  PULM: (-) cough, (+) wheezing, (-) dyspnea, (-) orthopnea, (-) hemoptysis   GI: (-) abdominal pain,(-) Nausea, (-) Vomiting, (+) Diarrhea, (-) Melena  NEURO: (+) weakness, (-) paresthesias, (-) syncope  : (-) dysuria, (-) frequency, (-) urgency, (-) incontinence   MS: (-) back pain, (-) joint pain, (-)myalgias, (-) swelling  SKIN: (-) rashes, (-) new lesions  HEME: (-) bleeding, (-) ecchymosis  PSYCH: (-) anxiety, (-) depression, (-) hallucinations, (-) suicidal ideation

## 2020-03-19 NOTE — ED PROVIDER NOTE - PROGRESS NOTE DETAILS
ATTENDING NOTE: I personally evaluated the patient. I reviewed the Physician Assistant’s note (as assigned above), and agree with the findings and plan except as documented in my note.   63 y/o M sent in for evaluation after drinking alcohol and being found to have confusion, which has since resolved. Pt denies trauma, f/c, CP, SOB, and cough. Additionally denies coingestants and SI/HI. Pt currently asymptomatic in ED.   On exam: NCAT. PERRLA, EOMI. OP clear. Lungs CTAB. RRR, S1S2 noted. Radial pulses 2+ and equal, pedal pulses 2+ and equal. Abdomen soft, NT/ND, no rebound or guarding. FROM x4 extremities. No focal neuro deficits.  Plan: Reobtain labs and, given alcohol HX, obtain CT head- negative. Pt symptomatic in ED, will offer detox. Offered pt detox. pt refusing detox and states he has family at home that can help take care of him. Spoke with pt about lab and imaging results. Pt endorses understanding.

## 2020-03-19 NOTE — H&P ADULT - HISTORY OF PRESENT ILLNESS
The pt is a 64y Male with PMH CHF, HLD, HTN, TIA, hx ETOH abuse is presenting to ED for fall last night. Pt states his legs gave out and he fell and hit the back of his head. He denies anticoagulant use, HA, LOC, neck pain, back pain, abd pain, paresthesias, saddle anesthesias, n/v/f, seizures, syncope, sob, chest pain, urinary changes. Pt states he has had nonbloody diarrhea x 2 days. Pt has hx of tobacco and etoh use. Pt has been noncompliant with meds >2mo. Pt endorses taking 81mg asa, but states he has been noncompliant. The patient is a 64y Male with PMH: CHF, HLD, HTN, TIA, PE, prior  ETOH abuse is presenting to ED for fall last night. Pt states his legs gave out and he fell and hit the back of his head. Claims he has fallen X 2 over the past week. Usually uses a walker but now is unable to ambulate even with the walker. Ambulatory ability is worsening. -Lives alone: wife passed away 1 year ago and his wife's friend will stop in to help him.   -not fully compliant with taking his meds regularly.

## 2020-03-19 NOTE — H&P ADULT - NSICDXPASTMEDICALHX_GEN_ALL_CORE_FT
PAST MEDICAL HISTORY:  Alcohol dependence     CHF (congestive heart failure)     Chronic back pain     HLD (hyperlipidemia)     HTN (hypertension)     Pulmonary embolism 2015    TIA (transient ischemic attack)

## 2020-03-19 NOTE — ED PROVIDER NOTE - OBJECTIVE STATEMENT
The pt is a 64y Male with PMH CHF, HLD, HTN, TIA, hx ETOH abuse is presenting to ED for fall last night. Pt states his legs gave out and he fell and hit the back of his head. He denies HA, LOC, neck pain, back pain, abd pain, paresthesias, saddle anesthesias, n/v. Pt states he has had diarrhea x 2 days. Pt has hx of tobacco and etoh use. Pt has been noncompliant with meds >2mo. The pt is a 64y Male with PMH CHF, HLD, HTN, TIA, hx ETOH abuse is presenting to ED for fall last night. Pt states his legs gave out and he fell and hit the back of his head. He denies anticoagulant use, HA, LOC, neck pain, back pain, abd pain, paresthesias, saddle anesthesias, n/v, seizures. Pt states he has had nonbloody diarrhea x 2 days. Pt has hx of tobacco and etoh use. Pt has been noncompliant with meds >2mo. Pt endorses taking 81mg asa, but states he has been noncompliant. The pt is a 64y Male with PMH CHF, HLD, HTN, TIA, hx ETOH abuse is presenting to ED for fall last night. Pt states his legs gave out and he fell and hit the back of his head. He denies anticoagulant use, HA, LOC, neck pain, back pain, abd pain, paresthesias, saddle anesthesias, n/v/f, seizures, sob, chest pain, urinary changes. Pt states he has had nonbloody diarrhea x 2 days. Pt has hx of tobacco and etoh use. Pt has been noncompliant with meds >2mo. Pt endorses taking 81mg asa, but states he has been noncompliant. The pt is a 64y Male with PMH CHF, HLD, HTN, TIA, hx ETOH abuse is presenting to ED for fall last night. Pt states his legs gave out and he fell and hit the back of his head. He denies anticoagulant use, HA, LOC, neck pain, back pain, abd pain, paresthesias, saddle anesthesias, n/v/f, seizures, syncope, sob, chest pain, urinary changes. Pt states he has had nonbloody diarrhea x 2 days. Pt has hx of tobacco and etoh use. Pt has been noncompliant with meds >2mo. Pt endorses taking 81mg asa, but states he has been noncompliant.

## 2020-03-19 NOTE — ED PROVIDER NOTE - PMH
CHF (congestive heart failure)    HLD (hyperlipidemia)    HTN (hypertension)    TIA (transient ischemic attack) Alcohol dependence    CHF (congestive heart failure)    Chronic back pain    HLD (hyperlipidemia)    HTN (hypertension)    Pulmonary embolism  2015  TIA (transient ischemic attack)

## 2020-03-19 NOTE — ED PROVIDER NOTE - PATIENT PORTAL LINK FT
You can access the FollowMyHealth Patient Portal offered by  by registering at the following website: [email or web address]. By joining [employer]’s FollowMyHealth portal, you will also be able to view your health information using other applications (apps) compatible with our system.

## 2020-03-19 NOTE — H&P ADULT - NSHPPHYSICALEXAM_GEN_ALL_CORE
Vital Signs Last 24 Hrs    T(F): 99 (03-19-20 @ 09:20), Max: 99 (03-19-20 @ 09:20)  HR: 93 (03-19-20 @ 09:20) (90 - 93)  BP: 190/100 (03-19-20 @ 09:20)  RR: 18 (03-19-20 @ 09:20) (18 - 20)  SpO2: 96% (03-19-20 @ 09:20) (96% - 97%)    PHYSICAL EXAM:      Constitutional: NAD, A&O x3    Eyes: PERRLA    Respiratory: +air entry, no rales, no rhonchi, no wheezes    Cardiovascular: +S1 and S2, regular rate and rhythm    Gastrointestinal: +BS, soft, non-tender, not distended    Extremities:  no edema, no calf tenderness    Vascular: +dorsal pedis and radial pulses, no extremity cyanosis    Neurological: sensation intact, ROM equal B/L, CN II-XII intact    Skin: no rashes, normal turgor Vital Signs Last 24 Hrs    T(F): 99 (03-19-20 @ 09:20), Max: 99 (03-19-20 @ 09:20)  HR: 93 (03-19-20 @ 09:20) (90 - 93)  BP: 190/100 (03-19-20 @ 09:20)  RR: 18 (03-19-20 @ 09:20) (18 - 20)  SpO2: 96% (03-19-20 @ 09:20) (96% - 97%)    PHYSICAL EXAM:      Constitutional: NAD, Alert, oriented, ** Patient appears unkept overall    Eyes: PERRLA    Respiratory: +air entry, no rales, no rhonchi, no wheezes    Cardiovascular: +S1 and S2, regular rate and rhythm    Gastrointestinal: ** Umbilical Hernia, +BS, soft, non-tender, not distended    Extremities:  ** + pitting pedal edema, no calf tenderness    Vascular: +dorsal pedis and radial pulses, no extremity cyanosis    Neurological: sensation intact, ROM equal B/L, CN II-XII intact    Skin: ** Multiple scattered ecchymosis, no rashes, normal turgor

## 2020-03-19 NOTE — ED PROVIDER NOTE - FAMILY HISTORY
Father  Still living? Unknown  FH: MI (myocardial infarction), Age at diagnosis: Age Unknown     Mother  Still living? Unknown  FH: MI (myocardial infarction), Age at diagnosis: Age Unknown

## 2020-03-19 NOTE — H&P ADULT - NSHPLABSRESULTS_GEN_ALL_CORE
14.6   10.09 )-----------( 86       ( 19 Mar 2020 10:18 )             42.9       03-19    137  |  93<L>  |  26<H>  ----------------------------<  161<H>  4.1   |  26  |  1.1    Ca    9.1      19 Mar 2020 10:18       Xray Chest 1 View- PORTABLE-Urgent (03.19.20 @ 10:30) >      Impression:      Left lower lobe focal atelectasis

## 2020-03-19 NOTE — ED PROVIDER NOTE - NSFOLLOWUPCLINICS_GEN_ALL_ED_FT
[facility]  Detox Mgmt  [address] [place] [address]  Phone: [phone]  Fax:   Follow Up Time: 1-3 Days

## 2020-03-19 NOTE — H&P ADULT - NSHPSOCIALHISTORY_GEN_ALL_CORE
Tobacco use:   EtOH use:  Illicit drug use:  Marital Status: Tobacco use: Yes X 20 yrs, 2 PPD  EtOH use: Drank many yrs, was sober 16 yrs: resumed 5 weeks ago: but sober again X 3 weeks.  Illicit drug use: No  Marital Status:

## 2020-03-20 LAB
HCT VFR BLD CALC: 40.1 % — LOW (ref 42–52)
HCV AB S/CO SERPL IA: 0.49 S/CO — SIGNIFICANT CHANGE UP (ref 0–0.99)
HCV AB SERPL-IMP: SIGNIFICANT CHANGE UP
HGB BLD-MCNC: 13.5 G/DL — LOW (ref 14–18)
MCHC RBC-ENTMCNC: 31.7 PG — HIGH (ref 27–31)
MCHC RBC-ENTMCNC: 33.7 G/DL — SIGNIFICANT CHANGE UP (ref 32–37)
MCV RBC AUTO: 94.1 FL — HIGH (ref 80–94)
NRBC # BLD: 0 /100 WBCS — SIGNIFICANT CHANGE UP (ref 0–0)
PLATELET # BLD AUTO: 82 K/UL — LOW (ref 130–400)
RBC # BLD: 4.26 M/UL — LOW (ref 4.7–6.1)
RBC # FLD: 13.2 % — SIGNIFICANT CHANGE UP (ref 11.5–14.5)
WBC # BLD: 8.18 K/UL — SIGNIFICANT CHANGE UP (ref 4.8–10.8)
WBC # FLD AUTO: 8.18 K/UL — SIGNIFICANT CHANGE UP (ref 4.8–10.8)

## 2020-03-20 PROCEDURE — 99233 SBSQ HOSP IP/OBS HIGH 50: CPT

## 2020-03-20 RX ORDER — GUAIFENESIN/DEXTROMETHORPHAN 600MG-30MG
10 TABLET, EXTENDED RELEASE 12 HR ORAL
Refills: 0 | Status: DISCONTINUED | OUTPATIENT
Start: 2020-03-20 | End: 2020-03-26

## 2020-03-20 RX ORDER — FOLIC ACID 0.8 MG
1 TABLET ORAL DAILY
Refills: 0 | Status: DISCONTINUED | OUTPATIENT
Start: 2020-03-20 | End: 2020-03-21

## 2020-03-20 RX ORDER — ONDANSETRON 8 MG/1
4 TABLET, FILM COATED ORAL EVERY 6 HOURS
Refills: 0 | Status: DISCONTINUED | OUTPATIENT
Start: 2020-03-20 | End: 2020-03-26

## 2020-03-20 RX ORDER — THIAMINE MONONITRATE (VIT B1) 100 MG
100 TABLET ORAL DAILY
Refills: 0 | Status: DISCONTINUED | OUTPATIENT
Start: 2020-03-20 | End: 2020-03-21

## 2020-03-20 RX ORDER — MULTIVIT-MIN/FERROUS GLUCONATE 9 MG/15 ML
1 LIQUID (ML) ORAL DAILY
Refills: 0 | Status: DISCONTINUED | OUTPATIENT
Start: 2020-03-20 | End: 2020-03-26

## 2020-03-20 RX ADMIN — Medication 100 MILLIGRAM(S): at 12:47

## 2020-03-20 RX ADMIN — APIXABAN 5 MILLIGRAM(S): 2.5 TABLET, FILM COATED ORAL at 17:11

## 2020-03-20 RX ADMIN — Medication 1 MILLIGRAM(S): at 12:47

## 2020-03-20 RX ADMIN — Medication 10 MILLILITER(S): at 13:15

## 2020-03-20 RX ADMIN — Medication 81 MILLIGRAM(S): at 12:48

## 2020-03-20 RX ADMIN — Medication 25 MILLIGRAM(S): at 17:11

## 2020-03-20 RX ADMIN — LOSARTAN POTASSIUM 50 MILLIGRAM(S): 100 TABLET, FILM COATED ORAL at 06:00

## 2020-03-20 RX ADMIN — CHLORHEXIDINE GLUCONATE 1 APPLICATION(S): 213 SOLUTION TOPICAL at 06:00

## 2020-03-20 RX ADMIN — Medication 25 MILLIGRAM(S): at 21:59

## 2020-03-20 RX ADMIN — Medication 25 MILLIGRAM(S): at 13:15

## 2020-03-20 RX ADMIN — APIXABAN 5 MILLIGRAM(S): 2.5 TABLET, FILM COATED ORAL at 06:00

## 2020-03-20 RX ADMIN — Medication 10 MILLILITER(S): at 06:32

## 2020-03-20 RX ADMIN — Medication 25 MILLIGRAM(S): at 06:00

## 2020-03-20 RX ADMIN — Medication 25 MILLIGRAM(S): at 06:32

## 2020-03-20 RX ADMIN — Medication 25 MILLIGRAM(S): at 18:18

## 2020-03-20 RX ADMIN — Medication 1 TABLET(S): at 12:47

## 2020-03-20 NOTE — PROGRESS NOTE ADULT - ASSESSMENT
The patient is a 64y Male with PMH: CHF, HLD, HTN, TIA, PE, prior  ETOH abuse is presenting to ED for fall last night. Pt states his legs gave out and he fell and hit the back of his head. Claims he has fallen X 2 over the past week. Usually uses a walker but now is unable to ambulate even with the walker. Ambulatory ability is worsening.  -Lives alone: wife passed away 1 year ago and his wife's friend will stop in to help him.     -not fully compliant with taking his meds regularly.      1) Mechanical Fall  -rehab/pt as tolerated  -seen by Physiatry -- STR recommended     2) Hx of chronic Pulmonary Embolism  -on eliquis   -LE duplex negative for DVT     3) ETOH dependence/withdrawal syndrome  -now on librium protocol  -MVI, thiamine, folic acid     4) Thrombocytopenia --- likely underlying ETOH hx   -hold ASA  -monitor CBC     5) Tobacco use/dependence  -smoking cessation and counseling done     6) folic acid and Thiamine def  -continue with the supplement     oob to chair with Assistance     # Progress Note Handoff  PENDING as follows  consults: rehab  Test: platelets  Family discussion: discussed with patient; agreeable for above plan of care   Disposition: STR once done with librium protocol     Attending Physician Dr. Leila Trujillo # [id number] .

## 2020-03-20 NOTE — PHYSICAL THERAPY INITIAL EVALUATION ADULT - GAIT DEVIATIONS NOTED, PT EVAL
decreased fish/decreased step length/incomplete foot clearance , forward flexed posture/increased time in double stance/decreased weight-shifting ability

## 2020-03-20 NOTE — CONSULT NOTE ADULT - SUBJECTIVE AND OBJECTIVE BOX
HPI: a 64 y /o  Male with PMH: CHF, HLD, HTN, TIA, PE, prior  ETOH abuse is presenting to ED for fall last night. Pt states his legs gave out and he fell and hit the back of his head. Claims he has fallen X 2 over the past week. Usually uses a walker but now is unable to ambulate even with the walker. Ambulatory ability is worsening. -Lives alone: wife passed away 1 year ago and his wife's friend will stop in to help him.   -not fully compliant with taking his meds regularly. ptn  seen and  examen  at  bed  side      PTN  REFERRED TO ACUTE  REHAB  FOR  EVAL AND  TX   PAST MEDICAL & SURGICAL HISTORY:  Pulmonary embolism: 2015  Chronic back pain  Alcohol dependence  HLD (hyperlipidemia)  HTN (hypertension)  CHF (congestive heart failure)  TIA (transient ischemic attack)  History of appendectomy      Hospital Course:    TODAY'S SUBJECTIVE & REVIEW OF SYMPTOMS:     Constitutional WNL   Cardio WNL   Resp WNL   GI WNL  Heme WNL  Endo WNL  Skin WNL  MSK WNL  Neuro WNL  Cognitive WNL  Psych WNL      MEDICATIONS  (STANDING):  apixaban 5 milliGRAM(s) Oral every 12 hours  aspirin  chewable 81 milliGRAM(s) Oral daily  chlorhexidine 4% Liquid 1 Application(s) Topical <User Schedule>  influenza   Vaccine 0.5 milliLiter(s) IntraMuscular once  losartan 50 milliGRAM(s) Oral daily  metoprolol tartrate 25 milliGRAM(s) Oral two times a day    MEDICATIONS  (PRN):  guaifenesin/dextromethorphan  Syrup 10 milliLiter(s) Oral four times a day PRN Cough      FAMILY HISTORY:  FH: MI (myocardial infarction) (Father, Mother): Dad, Mom      Allergies    No Known Allergies    Intolerances        SOCIAL HISTORY:    [  ] Etoh  [  ] Smoking  [  ] Substance abuse     Home Environment:  [ x ] Home Alone  [  ] Lives with Family  [  ] Home Health Aid    Dwelling:  [ x ] Apartment  [x  ] Private House  [  ] Adult Home  [  ] Skilled Nursing Facility      [  ] Short Term  [  ] Long Term  [  ] Stairs       Elevator [  ]    FUNCTIONAL STATUS PTA: (Check all that apply)  Ambulation: [x   ]Independent    [  ] Dependent     [  ] Non-Ambulatory  Assistive Device: [ x ] SA Cane  [  ]  Q Cane  [x  ] Walker  [  ]  Wheelchair  ADL : [  x] Independent  [  ]  Dependent       Vital Signs Last 24 Hrs  T(C): 36.1 (20 Mar 2020 05:33), Max: 37.3 (19 Mar 2020 17:14)  T(F): 96.9 (20 Mar 2020 05:33), Max: 99.1 (19 Mar 2020 17:14)  HR: 74 (20 Mar 2020 05:33) (72 - 93)  BP: 161/70 (20 Mar 2020 05:33) (161/70 - 190/101)  BP(mean): --  RR: 16 (20 Mar 2020 05:33) (16 - 18)  SpO2: 95% (19 Mar 2020 17:14) (95% - 96%)      PHYSICAL EXAM: Alert & Oriented X3  GENERAL: NAD, well-groomed, well-developed  HEAD:  Atraumatic, Normocephalic  EYES: EOMI, PERRLA, conjunctiva and sclera clear  NECK: Supple, No JVD, Normal thyroid  CHEST/LUNG: Clear to percussion bilaterally; No rales, rhonchi, wheezing, or rubs  HEART: Regular rate and rhythm; No murmurs, rubs, or gallops  ABDOMEN: Soft, Nontender, Nondistended; Bowel sounds present  EXTREMITIES:  2+ Peripheral Pulses, No clubbing, cyanosis, or edema    NERVOUS SYSTEM:  Cranial Nerves 2-12 intact [ x ] Abnormal  [  ]  ROM: WFL all extremities [  ]  Abnormal [passive  ]  Motor Strength: WFL all extremities  [  ]  Abnormal [4-5/5  all  ext   ]  Sensation: intact to light touch [x  ] Abnormal [  ]  Reflexes: Symmetric [x  ]  Abnormal [  ]    FUNCTIONAL STATUS:  Bed Mobility: Independent [  ]  Supervision [  ]  Needs Assistance [ x ]  N/A [  ]  Transfers: Independent [  ]  Supervision [  ]  Needs Assistance [ x ]  N/A [  ]   Ambulation: Independent [  ]  Supervision [  ]  Needs Assistance [x ]  N/A [  ]  ADL: Independent [  ] Requires Assistance [  ] N/A [x  ]  SEE PT/OT IE NOTES    LABS:                        13.5   8.18  )-----------( 82       ( 20 Mar 2020 05:37 )             40.1     03-19    137  |  93<L>  |  26<H>  ----------------------------<  161<H>  4.1   |  26  |  1.1    Ca    9.1      19 Mar 2020 10:18            RADIOLOGY & ADDITIONAL STUDIES:    Assesment:

## 2020-03-20 NOTE — PROGRESS NOTE ADULT - SUBJECTIVE AND OBJECTIVE BOX
CHAPARRO ROBISON  64y  Male      Patient is a 64y old  Male who presents with a chief complaint of S/P Fall X 2 over the past week (20 Mar 2020 09:10)      INTERVAL HPI/OVERNIGHT EVENTS:    pt seen and examined at bedside this morning  -shaking, prob in alcohol withdrawal -- added librium protocol   -seen by rehab/pt; would need STR  -fall risk  -LE duplex negative for DVT  -on long term anticoagulant as per his PMD (high risk to falls and bleeding; but pt insists continuing it as hx of pulmonary embolism)    REVIEW OF SYSTEMS:  unsteady gait       Vital Signs Last 24 Hrs  T(C): 36.1 (20 Mar 2020 05:33), Max: 37.3 (19 Mar 2020 17:14)  T(F): 96.9 (20 Mar 2020 05:33), Max: 99.1 (19 Mar 2020 17:14)  HR: 74 (20 Mar 2020 05:33) (72 - 81)  BP: 161/70 (20 Mar 2020 05:33) (161/70 - 190/101)  RR: 16 (20 Mar 2020 05:33) (16 - 18)  SpO2: 95% (19 Mar 2020 17:14) (95% - 95%)    PHYSICAL EXAM:  GENERAL: shaking, speaking in full sentences   HEAD:  Atraumatic, Normocephalic  EYES: EOMI, PERRLA, conjunctiva and sclera clear  NERVOUS SYSTEM: AAO x 3  CHEST/LUNG: Clear to percussion bilaterally; No rales, rhonchi, wheezing, or rubs  CV/HEART: Regular rate and rhythm; No murmurs, rubs, or gallops  GI/ABDOMEN: Soft, Nontender, Nondistended; Bowel sounds present  EXTREMITIES:  2+ Peripheral Pulses, No clubbing, cyanosis, or edema  SKIN: No rashes or lesions    LAB:                        13.5   8.18  )-----------( 82       ( 20 Mar 2020 05:37 )             40.1     03-19    137  |  93<L>  |  26<H>  ----------------------------<  161<H>  4.1   |  26  |  1.1    Ca    9.1      19 Mar 2020 10:18    Daily Height in cm: 175.26 (19 Mar 2020 20:01)    Daily   CAPILLARY BLOOD GLUCOSE    RADIOLOGY:    Imaging Personally Reviewed:  [ Y ] YES  [ ] NO    HEALTH ISSUES - PROBLEM Dx:  Alcohol dependence: Alcohol dependence  Pulmonary thromboembolism  Pedal edema: Pedal edema  HTN (hypertension): HTN (hypertension)  Pulmonary embolism: Pulmonary embolism  Fall: Fall    MEDS:  apixaban 5 milliGRAM(s) Oral every 12 hours  aspirin  chewable 81 milliGRAM(s) Oral daily  bismuth subsalicylate Liquid 30 milliLiter(s) Oral every 6 hours PRN  chlordiazePOXIDE   Oral   chlordiazePOXIDE 25 milliGRAM(s) Oral every 4 hours PRN  chlordiazePOXIDE 25 milliGRAM(s) Oral every 4 hours  chlorhexidine 4% Liquid 1 Application(s) Topical <User Schedule>  folic acid 1 milliGRAM(s) Oral daily  guaifenesin/dextromethorphan  Syrup 10 milliLiter(s) Oral four times a day PRN  influenza   Vaccine 0.5 milliLiter(s) IntraMuscular once  losartan 50 milliGRAM(s) Oral daily  metoprolol tartrate 25 milliGRAM(s) Oral two times a day  multivitamin/minerals 1 Tablet(s) Oral daily  ondansetron   Disintegrating Tablet 4 milliGRAM(s) Oral every 6 hours PRN  thiamine 100 milliGRAM(s) Oral daily

## 2020-03-20 NOTE — PHYSICAL THERAPY INITIAL EVALUATION ADULT - GENERAL OBSERVATIONS, REHAB EVAL
9:12 - 9:32. Chart reviewed. Patient available to be seen for physical therapy. Patient encountered semi-reclined in bed. Denies pain at rest, but says he has been falling frequently at home. Agreeable for PT evaluation.

## 2020-03-20 NOTE — PHYSICAL THERAPY INITIAL EVALUATION ADULT - PASSIVE RANGE OF MOTION EXAMINATION, REHAB EVAL
BLE AAROM / PROM WFL grossly throughout , assessed in sitting/no Passive ROM deficits were identified

## 2020-03-20 NOTE — CONSULT NOTE ADULT - ASSESSMENT
IMPRESSION: Rehab of64  y/o m  rehab  for  GD sp  fall  hx  of tia     PRECAUTIONS: [  ] Cardiac  [  ] Respiratory  [  ] Seizures [  ] Contact Isolation  [  ] Droplet Isolation  [ FALL ] Other    Weight Bearing Status:     RECOMMENDATION:    Out of Bed to Chair     DVT/Decubiti Prophylaxis    REHAB PLAN:     [xx   ] Bedside P/T 3-5 times a week   [   ]   Bedside O/T  2-3 times a week             [   ] No Rehab Therapy Indicated                   [   ]  Speech Therapy   Conditioning/ROM                                    ADL  Bed Mobility                                               Conditioning/ROM  Transfers                                                     Bed Mobility  Sitting /Standing Balance                         Transfers                                        Gait Training                                               Sitting/Standing Balance  Stair Training [   ]Applicable                    Home equipment Eval                                                                        Splinting  [   ] Only      GOALS:   ADL   [ x  ]   Independent                    Transfers  [ x  ] Independent                          Ambulation  [x   ] Independent     [x  ] With device                            [ x  ]  CG                                                         [x   ]  CG                                                                  [  x ] CG                            [    ] Min A                                                   [   ] Min A                                                              [   ] Min  A          DISCHARGE PLAN:   [   ]  Good candidate for Intensive Rehabilitation/Hospital based-[facility]                                             Will tolerate 3hrs Intensive Rehab Daily                                       [   xx ]  Short Term Rehab in Skilled Nursing Facility                                       [    ]  Home with Outpatient or VN services                                         [    ]  Possible Candidate for Intensive Hospital based Rehab

## 2020-03-21 LAB
ALBUMIN SERPL ELPH-MCNC: 3.9 G/DL — SIGNIFICANT CHANGE UP (ref 3.5–5.2)
ALBUMIN SERPL ELPH-MCNC: 4.5 G/DL — SIGNIFICANT CHANGE UP (ref 3.5–5.2)
ALP SERPL-CCNC: 65 U/L — SIGNIFICANT CHANGE UP (ref 30–115)
ALP SERPL-CCNC: 84 U/L — SIGNIFICANT CHANGE UP (ref 30–115)
ALT FLD-CCNC: 117 U/L — HIGH (ref 0–41)
ALT FLD-CCNC: 138 U/L — HIGH (ref 0–41)
ANION GAP SERPL CALC-SCNC: 12 MMOL/L — SIGNIFICANT CHANGE UP (ref 7–14)
ANION GAP SERPL CALC-SCNC: 17 MMOL/L — HIGH (ref 7–14)
APTT BLD: 28.8 SEC — SIGNIFICANT CHANGE UP (ref 27–39.2)
AST SERPL-CCNC: 100 U/L — HIGH (ref 0–41)
AST SERPL-CCNC: 143 U/L — HIGH (ref 0–41)
BASOPHILS # BLD AUTO: 0.03 K/UL — SIGNIFICANT CHANGE UP (ref 0–0.2)
BASOPHILS NFR BLD AUTO: 0.5 % — SIGNIFICANT CHANGE UP (ref 0–1)
BILIRUB DIRECT SERPL-MCNC: 0.4 MG/DL — HIGH (ref 0–0.2)
BILIRUB INDIRECT FLD-MCNC: 0.7 MG/DL — SIGNIFICANT CHANGE UP (ref 0.2–1.2)
BILIRUB SERPL-MCNC: 0.6 MG/DL — SIGNIFICANT CHANGE UP (ref 0.2–1.2)
BILIRUB SERPL-MCNC: 1.1 MG/DL — SIGNIFICANT CHANGE UP (ref 0.2–1.2)
BUN SERPL-MCNC: 24 MG/DL — HIGH (ref 10–20)
BUN SERPL-MCNC: 27 MG/DL — HIGH (ref 10–20)
CALCIUM SERPL-MCNC: 8.6 MG/DL — SIGNIFICANT CHANGE UP (ref 8.5–10.1)
CALCIUM SERPL-MCNC: 9.7 MG/DL — SIGNIFICANT CHANGE UP (ref 8.5–10.1)
CHLORIDE SERPL-SCNC: 106 MMOL/L — SIGNIFICANT CHANGE UP (ref 98–110)
CHLORIDE SERPL-SCNC: 99 MMOL/L — SIGNIFICANT CHANGE UP (ref 98–110)
CO2 SERPL-SCNC: 27 MMOL/L — SIGNIFICANT CHANGE UP (ref 17–32)
CO2 SERPL-SCNC: 28 MMOL/L — SIGNIFICANT CHANGE UP (ref 17–32)
CREAT SERPL-MCNC: 1.1 MG/DL — SIGNIFICANT CHANGE UP (ref 0.7–1.5)
CREAT SERPL-MCNC: 1.2 MG/DL — SIGNIFICANT CHANGE UP (ref 0.7–1.5)
EOSINOPHIL # BLD AUTO: 0.04 K/UL — SIGNIFICANT CHANGE UP (ref 0–0.7)
EOSINOPHIL NFR BLD AUTO: 0.6 % — SIGNIFICANT CHANGE UP (ref 0–8)
GLUCOSE SERPL-MCNC: 115 MG/DL — HIGH (ref 70–99)
GLUCOSE SERPL-MCNC: 146 MG/DL — HIGH (ref 70–99)
HCT VFR BLD CALC: 38.4 % — LOW (ref 42–52)
HCT VFR BLD CALC: 45.1 % — SIGNIFICANT CHANGE UP (ref 42–52)
HGB BLD-MCNC: 12.5 G/DL — LOW (ref 14–18)
HGB BLD-MCNC: 15 G/DL — SIGNIFICANT CHANGE UP (ref 14–18)
IMM GRANULOCYTES NFR BLD AUTO: 0.9 % — HIGH (ref 0.1–0.3)
INR BLD: 1.18 RATIO — SIGNIFICANT CHANGE UP (ref 0.65–1.3)
LACTATE SERPL-SCNC: 1.1 MMOL/L — SIGNIFICANT CHANGE UP (ref 0.7–2)
LIDOCAIN IGE QN: 48 U/L — SIGNIFICANT CHANGE UP (ref 7–60)
LYMPHOCYTES # BLD AUTO: 1.19 K/UL — LOW (ref 1.2–3.4)
LYMPHOCYTES # BLD AUTO: 18.4 % — LOW (ref 20.5–51.1)
MAGNESIUM SERPL-MCNC: 2.1 MG/DL — SIGNIFICANT CHANGE UP (ref 1.8–2.4)
MCHC RBC-ENTMCNC: 31.5 PG — HIGH (ref 27–31)
MCHC RBC-ENTMCNC: 31.6 PG — HIGH (ref 27–31)
MCHC RBC-ENTMCNC: 32.6 G/DL — SIGNIFICANT CHANGE UP (ref 32–37)
MCHC RBC-ENTMCNC: 33.3 G/DL — SIGNIFICANT CHANGE UP (ref 32–37)
MCV RBC AUTO: 95.1 FL — HIGH (ref 80–94)
MCV RBC AUTO: 96.7 FL — HIGH (ref 80–94)
MONOCYTES # BLD AUTO: 0.54 K/UL — SIGNIFICANT CHANGE UP (ref 0.1–0.6)
MONOCYTES NFR BLD AUTO: 8.4 % — SIGNIFICANT CHANGE UP (ref 1.7–9.3)
NEUTROPHILS # BLD AUTO: 4.6 K/UL — SIGNIFICANT CHANGE UP (ref 1.4–6.5)
NEUTROPHILS NFR BLD AUTO: 71.2 % — SIGNIFICANT CHANGE UP (ref 42.2–75.2)
NRBC # BLD: 0 /100 WBCS — SIGNIFICANT CHANGE UP (ref 0–0)
NRBC # BLD: 0 /100 WBCS — SIGNIFICANT CHANGE UP (ref 0–0)
PHOSPHATE SERPL-MCNC: 3.1 MG/DL — SIGNIFICANT CHANGE UP (ref 2.1–4.9)
PLATELET # BLD AUTO: 107 K/UL — LOW (ref 130–400)
PLATELET # BLD AUTO: 98 K/UL — LOW (ref 130–400)
POTASSIUM SERPL-MCNC: 3.5 MMOL/L — SIGNIFICANT CHANGE UP (ref 3.5–5)
POTASSIUM SERPL-MCNC: 3.7 MMOL/L — SIGNIFICANT CHANGE UP (ref 3.5–5)
POTASSIUM SERPL-SCNC: 3.5 MMOL/L — SIGNIFICANT CHANGE UP (ref 3.5–5)
POTASSIUM SERPL-SCNC: 3.7 MMOL/L — SIGNIFICANT CHANGE UP (ref 3.5–5)
PROT SERPL-MCNC: 6.3 G/DL — SIGNIFICANT CHANGE UP (ref 6–8)
PROT SERPL-MCNC: 7.2 G/DL — SIGNIFICANT CHANGE UP (ref 6–8)
PROTHROM AB SERPL-ACNC: 13.6 SEC — HIGH (ref 9.95–12.87)
RBC # BLD: 3.97 M/UL — LOW (ref 4.7–6.1)
RBC # BLD: 4.74 M/UL — SIGNIFICANT CHANGE UP (ref 4.7–6.1)
RBC # FLD: 13 % — SIGNIFICANT CHANGE UP (ref 11.5–14.5)
RBC # FLD: 13.2 % — SIGNIFICANT CHANGE UP (ref 11.5–14.5)
SODIUM SERPL-SCNC: 144 MMOL/L — SIGNIFICANT CHANGE UP (ref 135–146)
SODIUM SERPL-SCNC: 145 MMOL/L — SIGNIFICANT CHANGE UP (ref 135–146)
WBC # BLD: 6.46 K/UL — SIGNIFICANT CHANGE UP (ref 4.8–10.8)
WBC # BLD: 9.36 K/UL — SIGNIFICANT CHANGE UP (ref 4.8–10.8)
WBC # FLD AUTO: 6.46 K/UL — SIGNIFICANT CHANGE UP (ref 4.8–10.8)
WBC # FLD AUTO: 9.36 K/UL — SIGNIFICANT CHANGE UP (ref 4.8–10.8)

## 2020-03-21 PROCEDURE — 99233 SBSQ HOSP IP/OBS HIGH 50: CPT

## 2020-03-21 PROCEDURE — 71045 X-RAY EXAM CHEST 1 VIEW: CPT | Mod: 26

## 2020-03-21 PROCEDURE — 99291 CRITICAL CARE FIRST HOUR: CPT

## 2020-03-21 RX ORDER — DEXMEDETOMIDINE HYDROCHLORIDE IN 0.9% SODIUM CHLORIDE 4 UG/ML
0.2 INJECTION INTRAVENOUS
Qty: 200 | Refills: 0 | Status: DISCONTINUED | OUTPATIENT
Start: 2020-03-21 | End: 2020-03-22

## 2020-03-21 RX ORDER — THIAMINE MONONITRATE (VIT B1) 100 MG
100 TABLET ORAL DAILY
Refills: 0 | Status: DISCONTINUED | OUTPATIENT
Start: 2020-03-21 | End: 2020-03-24

## 2020-03-21 RX ORDER — AZITHROMYCIN 500 MG/1
TABLET, FILM COATED ORAL
Refills: 0 | Status: DISCONTINUED | OUTPATIENT
Start: 2020-03-21 | End: 2020-03-22

## 2020-03-21 RX ORDER — CEFTRIAXONE 500 MG/1
INJECTION, POWDER, FOR SOLUTION INTRAMUSCULAR; INTRAVENOUS
Refills: 0 | Status: DISCONTINUED | OUTPATIENT
Start: 2020-03-21 | End: 2020-03-22

## 2020-03-21 RX ORDER — POTASSIUM CHLORIDE 20 MEQ
20 PACKET (EA) ORAL
Refills: 0 | Status: COMPLETED | OUTPATIENT
Start: 2020-03-21 | End: 2020-03-21

## 2020-03-21 RX ORDER — HYDROXYCHLOROQUINE SULFATE 200 MG
400 TABLET ORAL EVERY 12 HOURS
Refills: 0 | Status: DISCONTINUED | OUTPATIENT
Start: 2020-03-21 | End: 2020-03-22

## 2020-03-21 RX ORDER — CEFTRIAXONE 500 MG/1
1000 INJECTION, POWDER, FOR SOLUTION INTRAMUSCULAR; INTRAVENOUS ONCE
Refills: 0 | Status: COMPLETED | OUTPATIENT
Start: 2020-03-21 | End: 2020-03-21

## 2020-03-21 RX ORDER — METOPROLOL TARTRATE 50 MG
5 TABLET ORAL EVERY 8 HOURS
Refills: 0 | Status: DISCONTINUED | OUTPATIENT
Start: 2020-03-21 | End: 2020-03-22

## 2020-03-21 RX ORDER — AZITHROMYCIN 500 MG/1
500 TABLET, FILM COATED ORAL ONCE
Refills: 0 | Status: COMPLETED | OUTPATIENT
Start: 2020-03-21 | End: 2020-03-21

## 2020-03-21 RX ORDER — HYDROXYCHLOROQUINE SULFATE 200 MG
200 TABLET ORAL EVERY 12 HOURS
Refills: 0 | Status: DISCONTINUED | OUTPATIENT
Start: 2020-03-22 | End: 2020-03-22

## 2020-03-21 RX ORDER — ACETAMINOPHEN 500 MG
650 TABLET ORAL EVERY 4 HOURS
Refills: 0 | Status: DISCONTINUED | OUTPATIENT
Start: 2020-03-21 | End: 2020-03-26

## 2020-03-21 RX ORDER — CEFTRIAXONE 500 MG/1
1000 INJECTION, POWDER, FOR SOLUTION INTRAMUSCULAR; INTRAVENOUS EVERY 24 HOURS
Refills: 0 | Status: DISCONTINUED | OUTPATIENT
Start: 2020-03-22 | End: 2020-03-22

## 2020-03-21 RX ORDER — AZITHROMYCIN 500 MG/1
500 TABLET, FILM COATED ORAL EVERY 24 HOURS
Refills: 0 | Status: DISCONTINUED | OUTPATIENT
Start: 2020-03-22 | End: 2020-03-22

## 2020-03-21 RX ORDER — SODIUM CHLORIDE 9 MG/ML
1000 INJECTION INTRAMUSCULAR; INTRAVENOUS; SUBCUTANEOUS
Refills: 0 | Status: DISCONTINUED | OUTPATIENT
Start: 2020-03-21 | End: 2020-03-22

## 2020-03-21 RX ORDER — FOLIC ACID 0.8 MG
1 TABLET ORAL DAILY
Refills: 0 | Status: DISCONTINUED | OUTPATIENT
Start: 2020-03-21 | End: 2020-03-26

## 2020-03-21 RX ADMIN — Medication 2 MILLIGRAM(S): at 04:35

## 2020-03-21 RX ADMIN — Medication 0.1 MILLIGRAM(S): at 04:26

## 2020-03-21 RX ADMIN — AZITHROMYCIN 255 MILLIGRAM(S): 500 TABLET, FILM COATED ORAL at 08:52

## 2020-03-21 RX ADMIN — Medication 25 MILLIGRAM(S): at 01:11

## 2020-03-21 RX ADMIN — Medication 2 MILLIGRAM(S): at 05:10

## 2020-03-21 RX ADMIN — SODIUM CHLORIDE 100 MILLILITER(S): 9 INJECTION INTRAMUSCULAR; INTRAVENOUS; SUBCUTANEOUS at 07:16

## 2020-03-21 RX ADMIN — DEXMEDETOMIDINE HYDROCHLORIDE IN 0.9% SODIUM CHLORIDE 4.72 MICROGRAM(S)/KG/HR: 4 INJECTION INTRAVENOUS at 11:36

## 2020-03-21 RX ADMIN — DEXMEDETOMIDINE HYDROCHLORIDE IN 0.9% SODIUM CHLORIDE 4.72 MICROGRAM(S)/KG/HR: 4 INJECTION INTRAVENOUS at 18:37

## 2020-03-21 RX ADMIN — CEFTRIAXONE 100 MILLIGRAM(S): 500 INJECTION, POWDER, FOR SOLUTION INTRAMUSCULAR; INTRAVENOUS at 08:53

## 2020-03-21 RX ADMIN — DEXMEDETOMIDINE HYDROCHLORIDE IN 0.9% SODIUM CHLORIDE 4.72 MICROGRAM(S)/KG/HR: 4 INJECTION INTRAVENOUS at 07:04

## 2020-03-21 RX ADMIN — Medication 25 MILLIGRAM(S): at 04:09

## 2020-03-21 RX ADMIN — Medication 50 MILLIEQUIVALENT(S): at 11:37

## 2020-03-21 RX ADMIN — Medication 50 MILLIEQUIVALENT(S): at 08:54

## 2020-03-21 NOTE — CONSULT NOTE ADULT - SUBJECTIVE AND OBJECTIVE BOX
Patient is a 64y old  Male who presents with a chief complaint of S/P Fall X 2 over the past week (20 Mar 2020 10:31)      HPI:  The patient is a 64y Male with PMH: CHF, HLD, HTN, TIA, PE, prior  ETOH abuse is presenting to ED for fall last night. Pt states his legs gave out and he fell and hit the back of his head. Claims he has fallen X 2 over the past week. Usually uses a walker but now is unable to ambulate even with the walker. Ambulatory ability is worsening. -Lives alone: wife passed away 1 year ago and his wife's friend will stop in to help him.   -not fully compliant with taking his meds regularly. (19 Mar 2020 16:29).  On floors pt became confused, tremulous, combatative . w/ fever 101. care advanced to ICU       PAST MEDICAL & SURGICAL HISTORY:  Pulmonary embolism: 2015  Chronic back pain  Alcohol dependence  HLD (hyperlipidemia)  HTN (hypertension)  CHF (congestive heart failure)  TIA (transient ischemic attack)  History of appendectomy    Allergies    No Known Allergies    Intolerances      FAMILY HISTORY:  FH: MI (myocardial infarction) (Father, Mother): Dad, Mom    Home Medications:  aspirin 81 mg oral tablet: 1 tab(s) orally once a day (19 Mar 2020 17:01)  Eliquis 5 mg oral tablet: 1 tab(s) orally 2 times a day (19 Mar 2020 17:01)  losartan 50 mg oral tablet: 1 tab(s) orally once a day (19 Mar 2020 17:01)  Metoprolol Tartrate 25 mg oral tablet: 1 tab(s) orally 2 times a day (19 Mar 2020 17:01)    Occupation:  Alochol: Denied  Smoking: Denied  Drug Use: Denied  Marital Status:         ROS: as in HPI; All other systems reviewed are negative    ICU Vital Signs Last 24 Hrs  T(C): 38.6 (21 Mar 2020 05:03), Max: 38.6 (21 Mar 2020 05:03)  T(F): 101.4 (21 Mar 2020 05:03), Max: 101.4 (21 Mar 2020 05:03)  HR: 77 (21 Mar 2020 05:03) (75 - 86)  BP: 145/75 (20 Mar 2020 21:49) (145/75 - 175/77)  BP(mean): --  ABP: --  ABP(mean): --  RR: 16 (20 Mar 2020 23:15) (16 - 16)  SpO2: --        Physical Examination:    General: elderly white male pt's confused, tremulous, combative     HEENT: Pupils equal, reactive to light.  Symmetric.    PULM: Clear to auscultation bilaterally, no significant sputum production    CVS: Regular rate and rhythm, no murmurs, rubs, or gallops    ABD: Soft obese w umbilical hernia , nondistended, nontender, normoactive bowel sounds, no masses    EXT: No edema, nontender    SKIN: Warm and well perfused, no rashes noted.          ABG - ( 21 Mar 2020 05:23 )  pH, Arterial: 7.45  pH, Blood: x     /  pCO2: 41    /  pO2: 85    / HCO3: 28    / Base Excess: 4.0   /  SaO2: 97                  I&O's Detail    19 Mar 2020 07:01  -  20 Mar 2020 07:00  --------------------------------------------------------  IN:  Total IN: 0 mL    OUT:    Voided: 175 mL  Total OUT: 175 mL    Total NET: -175 mL            LABS:                        15.0   9.36  )-----------( 107      ( 21 Mar 2020 05:00 )             45.1     21 Mar 2020 05:00    144    |  99     |  24     ----------------------------<  146    3.5     |  28     |  1.1      Ca    9.7        21 Mar 2020 05:00  Phos  3.1       21 Mar 2020 05:00  Mg     2.1       21 Mar 2020 05:00    TPro  7.2    /  Alb  4.5    /  TBili  1.1    /  DBili  0.4    /  AST  143    /  ALT  138    /  AlkPhos  84     21 Mar 2020 05:00  Amylase x     lipase x              CAPILLARY BLOOD GLUCOSE        PT/INR - ( 21 Mar 2020 05:00 )   PT: 13.60 sec;   INR: 1.18 ratio         PTT - ( 21 Mar 2020 05:00 )  PTT:28.8 sec    Culture        MEDICATIONS  (STANDING):  apixaban 5 milliGRAM(s) Oral every 12 hours  aspirin  chewable 81 milliGRAM(s) Oral daily  azithromycin  IVPB      azithromycin  IVPB 500 milliGRAM(s) IV Intermittent once  cefTRIAXone   IVPB      cefTRIAXone   IVPB 1000 milliGRAM(s) IV Intermittent once  chlordiazePOXIDE   Oral   chlordiazePOXIDE 25 milliGRAM(s) Oral every 4 hours  chlordiazePOXIDE 20 milliGRAM(s) Oral every 4 hours  chlorhexidine 4% Liquid 1 Application(s) Topical <User Schedule>  dexMEDEtomidine Infusion 0.2 MICROgram(s)/kG/Hr (4.72 mL/Hr) IV Continuous <Continuous>  folic acid 1 milliGRAM(s) Oral daily  influenza   Vaccine 0.5 milliLiter(s) IntraMuscular once  losartan 50 milliGRAM(s) Oral daily  metoprolol tartrate 25 milliGRAM(s) Oral two times a day  multivitamin/minerals 1 Tablet(s) Oral daily  potassium chloride  20 mEq/100 mL IVPB 20 milliEquivalent(s) IV Intermittent every 2 hours  sodium chloride 0.9%. 1000 milliLiter(s) (100 mL/Hr) IV Continuous <Continuous>  thiamine 100 milliGRAM(s) Oral daily    MEDICATIONS  (PRN):  acetaminophen   Tablet .. 650 milliGRAM(s) Oral every 4 hours PRN Temp greater or equal to 38C (100.4F), Mild Pain (1 - 3)  bismuth subsalicylate Liquid 30 milliLiter(s) Oral every 6 hours PRN Diarrhea  chlordiazePOXIDE 25 milliGRAM(s) Oral every 4 hours PRN Withdrawal  guaifenesin/dextromethorphan  Syrup 10 milliLiter(s) Oral four times a day PRN Cough  LORazepam   Injectable 2 milliGRAM(s) IV Push every 4 hours PRN Agitation/ s & s alcohol wd  ondansetron   Disintegrating Tablet 4 milliGRAM(s) Oral every 6 hours PRN Nausea and/or Vomiting        RADIOLOGY: ***     CXR:- on adm was clear  TLC:  OG:  ET tube:          ECHO:      IMPRESSION:  acute delerium tremins   alcohol abuser  Hx- CHF, HTN            PLAN:    as discussed w/ attending  adm to ICU  IV sedation w/ precedex, , iv ativan prn  supplement low electrolytes  swab for possible COVID-infection/ RVP  start- zithro. rocephin, plaquenil  1:1 observation          CRITICAL CARE TIME SPENT: *** Patient is a 64y old  Male who presents with a chief complaint of S/P Fall X 2 over the past week (20 Mar 2020 10:31)      HPI:  The patient is a 64y Male with PMH: CHF, HLD, HTN, TIA, PE, prior  ETOH abuse presented to ED for fall. Pt states his legs gave out and he fell and hit the back of his head. Claims he has fallen X 2 over the past week. Usually uses a walker but now is unable to ambulate even with the walker. Ambulatory ability is worsening. -Lives alone: wife passed away 1 year ago and his wife's friend will stop in to help him.   -not fully compliant with taking his meds regularly. (19 Mar 2020 16:29).  On floors pt became confused, tremulous, combatative . w/ fever 101. was given 4 mg of ativan ( already was on librium protocol), called for MICU, arrive at bedside, diffuse tremor, still following commands, cough +, fever      PAST MEDICAL & SURGICAL HISTORY:  Pulmonary embolism: 2015 chest ct reviewed  Chronic back pain  Alcohol dependence  HLD (hyperlipidemia)  HTN (hypertension)  CHF (congestive heart failure)  TIA (transient ischemic attack)  History of appendectomy    Allergies    No Known Allergies    Intolerances      FAMILY HISTORY:  FH: MI (myocardial infarction) (Father, Mother): Dad, Mom    Home Medications:  aspirin 81 mg oral tablet: 1 tab(s) orally once a day (19 Mar 2020 17:01)  Eliquis 5 mg oral tablet: 1 tab(s) orally 2 times a day (19 Mar 2020 17:01)  losartan 50 mg oral tablet: 1 tab(s) orally once a day (19 Mar 2020 17:01)  Metoprolol Tartrate 25 mg oral tablet: 1 tab(s) orally 2 times a day (19 Mar 2020 17:01)        ROS: as in HPI; All other systems reviewed are negative    ICU Vital Signs Last 24 Hrs  T(C): 38.6 (21 Mar 2020 05:03), Max: 38.6 (21 Mar 2020 05:03)  T(F): 101.4 (21 Mar 2020 05:03), Max: 101.4 (21 Mar 2020 05:03)  HR: 77 (21 Mar 2020 05:03) (75 - 86)  BP: 145/75 (20 Mar 2020 21:49) (145/75 - 175/77)-  RR: 16 (20 Mar 2020 23:15) (16 - 16)  SpO2: 94% on 3 L NC        Physical Examination:    General: elderly white male , confused    HEENT: Pupils equal, reactive to light.  Symmetric.    PULM: DEC BS BOTH BASES    CVS: S1S2 R IRASEMA 3/6    ABD: Soft obese w umbilical hernia , nondistended, nontender, normoactive bowel sounds, no masses    EXT: No edema, nontender    SKIN: Warm and well perfused, no rashes noted.          ABG - ( 21 Mar 2020 05:23 )  pH, Arterial: 7.45  pH, Blood: x     /  pCO2: 41    /  pO2: 85    / HCO3: 28    / Base Excess: 4.0   /  SaO2: 97                  I&O's Detail    19 Mar 2020 07:01  -  20 Mar 2020 07:00  --------------------------------------------------------  IN:  Total IN: 0 mL    OUT:    Voided: 175 mL  Total OUT: 175 mL    Total NET: -175 mL            LABS:                        15.0   9.36  )-----------( 107      ( 21 Mar 2020 05:00 )             45.1     21 Mar 2020 05:00    144    |  99     |  24     ----------------------------<  146    3.5     |  28     |  1.1      Ca    9.7        21 Mar 2020 05:00  Phos  3.1       21 Mar 2020 05:00  Mg     2.1       21 Mar 2020 05:00    TPro  7.2    /  Alb  4.5    /  TBili  1.1    /  DBili  0.4    /  AST  143    /  ALT  138    /  AlkPhos  84     21 Mar 2020 05:00  Amylase x     lipase x              CAPILLARY BLOOD GLUCOSE        PT/INR - ( 21 Mar 2020 05:00 )   PT: 13.60 sec;   INR: 1.18 ratio         PTT - ( 21 Mar 2020 05:00 )  PTT:28.8 sec    Culture        MEDICATIONS  (STANDING):  apixaban 5 milliGRAM(s) Oral every 12 hours  aspirin  chewable 81 milliGRAM(s) Oral daily  azithromycin  IVPB      azithromycin  IVPB 500 milliGRAM(s) IV Intermittent once  cefTRIAXone   IVPB      cefTRIAXone   IVPB 1000 milliGRAM(s) IV Intermittent once  chlordiazePOXIDE   Oral   chlordiazePOXIDE 25 milliGRAM(s) Oral every 4 hours  chlordiazePOXIDE 20 milliGRAM(s) Oral every 4 hours  chlorhexidine 4% Liquid 1 Application(s) Topical <User Schedule>  dexMEDEtomidine Infusion 0.2 MICROgram(s)/kG/Hr (4.72 mL/Hr) IV Continuous <Continuous>  folic acid 1 milliGRAM(s) Oral daily  influenza   Vaccine 0.5 milliLiter(s) IntraMuscular once  losartan 50 milliGRAM(s) Oral daily  metoprolol tartrate 25 milliGRAM(s) Oral two times a day  multivitamin/minerals 1 Tablet(s) Oral daily  potassium chloride  20 mEq/100 mL IVPB 20 milliEquivalent(s) IV Intermittent every 2 hours  sodium chloride 0.9%. 1000 milliLiter(s) (100 mL/Hr) IV Continuous <Continuous>  thiamine 100 milliGRAM(s) Oral daily    MEDICATIONS  (PRN):  acetaminophen   Tablet .. 650 milliGRAM(s) Oral every 4 hours PRN Temp greater or equal to 38C (100.4F), Mild Pain (1 - 3)  bismuth subsalicylate Liquid 30 milliLiter(s) Oral every 6 hours PRN Diarrhea  chlordiazePOXIDE 25 milliGRAM(s) Oral every 4 hours PRN Withdrawal  guaifenesin/dextromethorphan  Syrup 10 milliLiter(s) Oral four times a day PRN Cough  LORazepam   Injectable 2 milliGRAM(s) IV Push every 4 hours PRN Agitation/ s & s alcohol wd  ondansetron   Disintegrating Tablet 4 milliGRAM(s) Oral every 6 hours PRN Nausea and/or Vomiting        CXR/ HEAD CT reviewed

## 2020-03-21 NOTE — CONSULT NOTE ADULT - ATTENDING COMMENTS
Patient seen and examined, agree with above, alcohol withdrawal, DT, fever, A-a gradient, will treat for withdrawal and evaluate for infection/ covid 19

## 2020-03-21 NOTE — PROGRESS NOTE ADULT - ASSESSMENT
64y Male with PMH: CHF, HLD, HTN, TIA, PE, prior  ETOH abuse is presenting to ED for fall was upgraded to ICU due to worsening DTs.     # Delirium Tremens  ETOH dependence/withdrawal syndrome   Suspected thiamine/folate deficiency  -MVI, thiamine, folic acid   Remains at ICU level of care on O2 by NC, on CIWA protocol  on precedex gtt  and IVF    # Mechanical Fall  -rehab/pt as tolerated  -seen by Physiatry -- STR recommended     # Hx of chronic Pulmonary Embolism  -on eliquis   -LE duplex negative for DVT      #) Thrombocytopenia --- likely underlying ETOH hx   -hold ASA  -monitor CBC     # Tobacco use/dependence  -smoking cessation and counseling done           # Progress Note Handoff  PENDING:  improvement in DTs in coming days  consults: rehab  Test:    Family discussion: NA  Disposition: STR once medically ready  , still acute ICU level of care today

## 2020-03-21 NOTE — CONSULT NOTE ADULT - ASSESSMENT
IMPRESSION:    RO delirium tremens ( alcoholic however no prior hx of DT/ prior hx of detox) active drinker/ smoker  need to RO infection process ( high fever, nl WBC, increase A-a gradient)  PE  CHF  Not complaint    PLAN:    CNS: Ativan per CIWA protocol, thiamine, folic acid, precedex    HEENT:  Oral care    PULMONARY:  HOB @ 45 degrees, aspiration precaution, Neb as needed, CXR, ABG stat    CARDIOVASCULAR: IVF, CE, ECHO    GI: GI prophylaxis                                          Feeding as tolerated    RENAL:  F/u  lytes.  Correct as needed. accurate I/O    INFECTIOUS DISEASE: swab for RVP/ COVID 19/ empiric ABX, pancx    HEMATOLOGICAL:  DVT prophylaxis.    ENDOCRINE:  Follow up FS.  Insulin protocol if needed.    CODE STATUS: FULL CODE    DISPOSITION: Pt require monitoring in the CCU

## 2020-03-21 NOTE — CHART NOTE - NSCHARTNOTEFT_GEN_A_CORE
Accepted to unit. Accepted to unit. I called patient's spouse, Angela at [phone] but ringing didn't stop. I then called his "emergency contact", Armando Morin and left phone message for call back

## 2020-03-21 NOTE — PROGRESS NOTE ADULT - SUBJECTIVE AND OBJECTIVE BOX
INTERVAL HPI/OVERNIGHT EVENTS:   Patient was transferred to ICU overnight as he was exhibiting worsening delirium tremens.  He is currently on CIWA protocol and on supplement O2 for worsening respiratory status    pt seen and examined at bedside this morning  -shaking, patient felt to be in alcohol withdrawal    -LE duplex negative for DVT  -on long term anticoagulant as per his PMD (high risk to falls and bleeding; but pt insists continuing it as hx of pulmonary embolism)    REVIEW OF SYSTEMS:  unable to obtain as pt w delirium tremens      Vital Signs Last 24 Hrs  T(C): 36.6 (21 Mar 2020 07:00), Max: 38.6 (21 Mar 2020 05:03)  T(F): 97.8 (21 Mar 2020 07:00), Max: 101.4 (21 Mar 2020 05:03)  HR: 81 (21 Mar 2020 10:00) (71 - 94)  BP: 129/78 (21 Mar 2020 10:00) (74/48 - 175/77)  BP(mean): 96 (21 Mar 2020 10:00) (57 - 96)  RR: 33 (21 Mar 2020 10:00) (16 - 38)  SpO2: 98% (21 Mar 2020 10:00) (97% - 99%)        PHYSICAL EXAM:  GENERAL:  Sedated, tremulous  HEAD:  Atraumatic, Normocephalic  EYES: eyes closed, able to open to noxious stimuli or verbal stimuli  NERVOUS SYSTEM: AAO x 0  CHEST/LUNG: Clear to percussion bilaterally; No rales, rhonchi, wheezing, or rubs  CV/HEART: Regular rate and rhythm; No murmurs, rubs, or gallops  GI/ABDOMEN: Soft, Nontender, Nondistended; Bowel sounds present  EXTREMITIES:  2+ Peripheral Pulses, No clubbing, cyanosis, or edema  SKIN: No rashes or lesions        LAB:                15.0   9.36  )-----------( 107      ( 21 Mar 2020 05:00 )             45.1     03-21    144  |  99  |  24<H>  ----------------------------<  146<H>  3.5   |  28  |  1.1    Ca    9.7      21 Mar 2020 05:00  Phos  3.1     03-21  Mg     2.1     03-21    TPro  7.2  /  Alb  4.5  /  TBili  1.1  /  DBili  0.4<H>  /  AST  143<H>  /  ALT  138<H>  /  AlkPhos  84  03-21        < from: Xray Chest 1 View-PORTABLE IMMEDIATE (03.21.20 @ 07:11) >  Impression:      Left basilar opacity which may be secondary to technique.    Bones as above.    < end of copied text >        MEDICATIONS  (STANDING):  apixaban 5 milliGRAM(s) Oral every 12 hours  aspirin  chewable 81 milliGRAM(s) Oral daily  azithromycin  IVPB      cefTRIAXone   IVPB      chlordiazePOXIDE   Oral   chlordiazePOXIDE 25 milliGRAM(s) Oral every 4 hours  chlordiazePOXIDE 20 milliGRAM(s) Oral every 4 hours  chlorhexidine 4% Liquid 1 Application(s) Topical <User Schedule>  dexMEDEtomidine Infusion 0.2 MICROgram(s)/kG/Hr (4.72 mL/Hr) IV Continuous <Continuous>  folic acid 1 milliGRAM(s) Oral daily  hydroxychloroquine 400 milliGRAM(s) Oral every 12 hours  influenza   Vaccine 0.5 milliLiter(s) IntraMuscular once  losartan 50 milliGRAM(s) Oral daily  metoprolol tartrate 25 milliGRAM(s) Oral two times a day  multivitamin/minerals 1 Tablet(s) Oral daily  potassium chloride  20 mEq/100 mL IVPB 20 milliEquivalent(s) IV Intermittent every 2 hours  sodium chloride 0.9%. 1000 milliLiter(s) (100 mL/Hr) IV Continuous <Continuous>  thiamine 100 milliGRAM(s) Oral daily    MEDICATIONS  (PRN):  acetaminophen   Tablet .. 650 milliGRAM(s) Oral every 4 hours PRN Temp greater or equal to 38C (100.4F), Mild Pain (1 - 3)  bismuth subsalicylate Liquid 30 milliLiter(s) Oral every 6 hours PRN Diarrhea  chlordiazePOXIDE 25 milliGRAM(s) Oral every 4 hours PRN Withdrawal  guaifenesin/dextromethorphan  Syrup 10 milliLiter(s) Oral four times a day PRN Cough  LORazepam   Injectable 2 milliGRAM(s) IV Push every 4 hours PRN Agitation/ s & s alcohol wd  ondansetron   Disintegrating Tablet 4 milliGRAM(s) Oral every 6 hours PRN Nausea and/or Vomiting

## 2020-03-21 NOTE — CHART NOTE - NSCHARTNOTEFT_GEN_A_CORE
Patient has now become very agitated in bed, restless and hallucinating demanding that "the baby be moved away" Patient has now become very agitated in bed, restless and hallucinating demanding that "the baby be moved away" and "my bed is burning".

## 2020-03-22 LAB
ALBUMIN SERPL ELPH-MCNC: 3.3 G/DL — LOW (ref 3.5–5.2)
ALP SERPL-CCNC: 64 U/L — SIGNIFICANT CHANGE UP (ref 30–115)
ALT FLD-CCNC: 111 U/L — HIGH (ref 0–41)
ANION GAP SERPL CALC-SCNC: 14 MMOL/L — SIGNIFICANT CHANGE UP (ref 7–14)
AST SERPL-CCNC: 92 U/L — HIGH (ref 0–41)
BILIRUB SERPL-MCNC: 0.6 MG/DL — SIGNIFICANT CHANGE UP (ref 0.2–1.2)
BUN SERPL-MCNC: 25 MG/DL — HIGH (ref 10–20)
CALCIUM SERPL-MCNC: 7.9 MG/DL — LOW (ref 8.5–10.1)
CHLORIDE SERPL-SCNC: 105 MMOL/L — SIGNIFICANT CHANGE UP (ref 98–110)
CO2 SERPL-SCNC: 24 MMOL/L — SIGNIFICANT CHANGE UP (ref 17–32)
CREAT SERPL-MCNC: 1.1 MG/DL — SIGNIFICANT CHANGE UP (ref 0.7–1.5)
GLUCOSE SERPL-MCNC: 155 MG/DL — HIGH (ref 70–99)
HCT VFR BLD CALC: 36.6 % — LOW (ref 42–52)
HGB BLD-MCNC: 11.9 G/DL — LOW (ref 14–18)
MCHC RBC-ENTMCNC: 31.6 PG — HIGH (ref 27–31)
MCHC RBC-ENTMCNC: 32.5 G/DL — SIGNIFICANT CHANGE UP (ref 32–37)
MCV RBC AUTO: 97.1 FL — HIGH (ref 80–94)
NRBC # BLD: 0 /100 WBCS — SIGNIFICANT CHANGE UP (ref 0–0)
PLATELET # BLD AUTO: 91 K/UL — LOW (ref 130–400)
POTASSIUM SERPL-MCNC: 3.3 MMOL/L — LOW (ref 3.5–5)
POTASSIUM SERPL-SCNC: 3.3 MMOL/L — LOW (ref 3.5–5)
PROT SERPL-MCNC: 5.7 G/DL — LOW (ref 6–8)
RBC # BLD: 3.77 M/UL — LOW (ref 4.7–6.1)
RBC # FLD: 12.8 % — SIGNIFICANT CHANGE UP (ref 11.5–14.5)
SARS-COV-2 RNA SPEC QL NAA+PROBE: SIGNIFICANT CHANGE UP
SODIUM SERPL-SCNC: 143 MMOL/L — SIGNIFICANT CHANGE UP (ref 135–146)
WBC # BLD: 6.39 K/UL — SIGNIFICANT CHANGE UP (ref 4.8–10.8)
WBC # FLD AUTO: 6.39 K/UL — SIGNIFICANT CHANGE UP (ref 4.8–10.8)

## 2020-03-22 PROCEDURE — 99233 SBSQ HOSP IP/OBS HIGH 50: CPT

## 2020-03-22 RX ORDER — POTASSIUM CHLORIDE 20 MEQ
40 PACKET (EA) ORAL ONCE
Refills: 0 | Status: COMPLETED | OUTPATIENT
Start: 2020-03-22 | End: 2020-03-22

## 2020-03-22 RX ORDER — HYDROXYZINE HCL 10 MG
100 TABLET ORAL ONCE
Refills: 0 | Status: COMPLETED | OUTPATIENT
Start: 2020-03-22 | End: 2020-03-22

## 2020-03-22 RX ORDER — CEFTRIAXONE 500 MG/1
2000 INJECTION, POWDER, FOR SOLUTION INTRAMUSCULAR; INTRAVENOUS EVERY 24 HOURS
Refills: 0 | Status: DISCONTINUED | OUTPATIENT
Start: 2020-03-22 | End: 2020-03-24

## 2020-03-22 RX ORDER — HYDROXYCHLOROQUINE SULFATE 200 MG
400 TABLET ORAL ONCE
Refills: 0 | Status: COMPLETED | OUTPATIENT
Start: 2020-03-22 | End: 2020-03-22

## 2020-03-22 RX ORDER — METOPROLOL TARTRATE 50 MG
25 TABLET ORAL DAILY
Refills: 0 | Status: DISCONTINUED | OUTPATIENT
Start: 2020-03-22 | End: 2020-03-26

## 2020-03-22 RX ADMIN — Medication 1 MILLIGRAM(S): at 12:09

## 2020-03-22 RX ADMIN — LOSARTAN POTASSIUM 50 MILLIGRAM(S): 100 TABLET, FILM COATED ORAL at 05:04

## 2020-03-22 RX ADMIN — Medication 400 MILLIGRAM(S): at 05:04

## 2020-03-22 RX ADMIN — Medication 100 MILLIGRAM(S): at 13:38

## 2020-03-22 RX ADMIN — Medication 1 TABLET(S): at 12:09

## 2020-03-22 RX ADMIN — AZITHROMYCIN 255 MILLIGRAM(S): 500 TABLET, FILM COATED ORAL at 06:19

## 2020-03-22 RX ADMIN — APIXABAN 5 MILLIGRAM(S): 2.5 TABLET, FILM COATED ORAL at 17:52

## 2020-03-22 RX ADMIN — CHLORHEXIDINE GLUCONATE 1 APPLICATION(S): 213 SOLUTION TOPICAL at 05:07

## 2020-03-22 RX ADMIN — APIXABAN 5 MILLIGRAM(S): 2.5 TABLET, FILM COATED ORAL at 05:04

## 2020-03-22 RX ADMIN — Medication 100 MILLIGRAM(S): at 22:08

## 2020-03-22 RX ADMIN — CEFTRIAXONE 100 MILLIGRAM(S): 500 INJECTION, POWDER, FOR SOLUTION INTRAMUSCULAR; INTRAVENOUS at 06:20

## 2020-03-22 RX ADMIN — Medication 40 MILLIEQUIVALENT(S): at 17:51

## 2020-03-22 RX ADMIN — SODIUM CHLORIDE 100 MILLILITER(S): 9 INJECTION INTRAMUSCULAR; INTRAVENOUS; SUBCUTANEOUS at 05:26

## 2020-03-22 RX ADMIN — Medication 5 MILLIGRAM(S): at 05:05

## 2020-03-22 RX ADMIN — Medication 81 MILLIGRAM(S): at 12:09

## 2020-03-22 RX ADMIN — Medication 25 MILLIGRAM(S): at 22:09

## 2020-03-22 RX ADMIN — Medication 5 MILLIGRAM(S): at 13:38

## 2020-03-22 NOTE — PROGRESS NOTE ADULT - ASSESSMENT
64y Male with PMH: CHF, HLD, HTN, TIA, PE, prior  ETOH abuse is presenting to ED for fall was upgraded to ICU due to worsening DTs.     # Delirium Tremens  ETOH dependence/withdrawal syndrome   Suspected thiamine/folate deficiency  -MVI, thiamine, folic acid   Remains at ICU level of care on O2 by NC, on CIWA protocol  on precedex gtt  and IVF    # Mechanical Fall  -rehab/pt as tolerated  -seen by Physiatry -- STR recommended     # Hx of chronic Pulmonary Embolism  -on eliquis   -LE duplex negative for DVT      #) Thrombocytopenia --- likely underlying ETOH hx   -hold ASA  -monitor CBC     # Tobacco use/dependence  -smoking cessation and counseling done           # Progress Note Handoff  PENDING:  improvement in DTs in coming days  consults: rehab  Test:    Family discussion: NA  Disposition: STR once medically ready  , still acute ICU level of care today 64y Male with PMH: CHF, HLD, HTN, TIA, PE, prior  ETOH abuse is presenting to ED for fall was upgraded to ICU due to worsening DTs.     # Delirium Tremens  ETOH dependence/withdrawal syndrome   Suspected thiamine/folate deficiency  -MVI, thiamine, folic acid   no off precedex gtt, continues on CIWA protocol  currently on O2 by NC, doing well  transferred from CCU to medical floor today    # LLL PNA  off levo  ID consult following, indicates  cont  rocephin 2 gm iv q24h. Could change to po vantin 200 mg q12h for 6 days if BCx NG  f/u blood cx       # Mechanical Fall  -rehab/pt as tolerated  -seen by Physiatry -- STR recommended     # Hx of chronic Pulmonary Embolism  -on eliquis   -LE duplex negative for DVT      #) Thrombocytopenia --- likely underlying ETOH hx   -hold ASA  -monitor CBC     # Tobacco use/dependence  -smoking cessation and counseling done           # Progress Note Handoff  PENDING:  improvement in DTs in coming days, titrate off o2  consults: rehab    Family discussion: NA  Disposition: STR once medically ready,  possible transfer to detox if does well on medical floor in next 24hrs and taken off O2

## 2020-03-22 NOTE — CONSULT NOTE ADULT - ASSESSMENT
IMPRESSION:  # No clear indication of an infectious etiology  -possible PNA LLL  -no COVID19  -WBC 6.3  -fevers related to alcohol withdrawal. DTs    RECOMMENDATIONS:  -deep ET cultures  -BCX  -d/c levo  -rocephin 2 gm iv q24h. Could change to po vantin 200 mg q12h for 6 days if BCx NG

## 2020-03-22 NOTE — CONSULT NOTE ADULT - ASSESSMENT
ETOH dependency  Pneumonia  s/p DT's        On precedex, gradual taper  Ativan prn  Other management per CCU team  eventual rehab ETOH dependency  Pneumonia  s/p DT's        D/C precedex   Ativan prn/CIWA     Other management per CCU team  to detox ,  once all IV meds are discontinued.  eventual rehab ETOH dependency  Pneumonia  s/p DT's  Covid  ruled out      D/C precedex   Ativan prn/CIWA     Other management per CCU team  to detox ,  once all IV meds are discontinued.  eventual rehab

## 2020-03-22 NOTE — CONSULT NOTE ADULT - SUBJECTIVE AND OBJECTIVE BOX
Detox consult    Pt seen  chart reviewed  admitted with etoh  and DT's initially to floor, then to ICU  placed on Precedex drip  and Librium/ativan prn  Also had pneumonia on IV Abx r/o Covid 19        SOCIAL HISTORY: etoh      MEDICATIONS  (STANDING):  apixaban 5 milliGRAM(s) Oral every 12 hours  aspirin  chewable 81 milliGRAM(s) Oral daily  azithromycin  IVPB      azithromycin  IVPB 500 milliGRAM(s) IV Intermittent every 24 hours  cefTRIAXone   IVPB      cefTRIAXone   IVPB 1000 milliGRAM(s) IV Intermittent every 24 hours  chlorhexidine 4% Liquid 1 Application(s) Topical <User Schedule>  dexMEDEtomidine Infusion 0.2 MICROgram(s)/kG/Hr (4.72 mL/Hr) IV Continuous <Continuous>  folic acid 1 milliGRAM(s) Oral daily  influenza   Vaccine 0.5 milliLiter(s) IntraMuscular once  losartan 50 milliGRAM(s) Oral daily  metoprolol tartrate Injectable 5 milliGRAM(s) IV Push every 8 hours  multivitamin/minerals 1 Tablet(s) Oral daily  sodium chloride 0.9%. 1000 milliLiter(s) (100 mL/Hr) IV Continuous <Continuous>  thiamine Injectable 100 milliGRAM(s) IV Push daily    MEDICATIONS  (PRN):  acetaminophen   Tablet .. 650 milliGRAM(s) Oral every 4 hours PRN Temp greater or equal to 38C (100.4F), Mild Pain (1 - 3)  bismuth subsalicylate Liquid 30 milliLiter(s) Oral every 6 hours PRN Diarrhea  guaifenesin/dextromethorphan  Syrup 10 milliLiter(s) Oral four times a day PRN Cough  LORazepam   Injectable 2 milliGRAM(s) IV Push every 4 hours PRN Agitation/ s & s alcohol wd  LORazepam   Injectable 2 milliGRAM(s) IV Push every 1 hour PRN Symptom-triggered: each CIWA -Ar score 8 or GREATER  ondansetron   Disintegrating Tablet 4 milliGRAM(s) Oral every 6 hours PRN Nausea and/or Vomiting      Vital Signs Last 24 Hrs  T(C): 36.3 (22 Mar 2020 08:13), Max: 37.8 (21 Mar 2020 12:00)  T(F): 97.4 (22 Mar 2020 08:13), Max: 100 (21 Mar 2020 12:00)  HR: 56 (22 Mar 2020 07:00) (56 - 99)  BP: 127/64 (22 Mar 2020 07:00) (100/70 - 201/93)  BP(mean): 89 (22 Mar 2020 07:00) (81 - 133)  RR: 27 (22 Mar 2020 07:00) (24 - 46)  SpO2: 98% (22 Mar 2020 07:00) (97% - 99%)      LABS:                        11.9   6.39  )-----------( 91       ( 22 Mar 2020 06:24 )             36.6     03-22    143  |  105  |  25<H>  ----------------------------<  155<H>  3.3<L>   |  24  |  1.1    Ca    7.9<L>      22 Mar 2020 06:24  Phos  3.1     03-21  Mg     2.1     03-21    TPro  5.7<L>  /  Alb  3.3<L>  /  TBili  0.6  /  DBili  x   /  AST  92<H>  /  ALT  111<H>  /  AlkPhos  64  03-22    PT/INR - ( 21 Mar 2020 05:00 )   PT: 13.60 sec;   INR: 1.18 ratio         PTT - ( 21 Mar 2020 05:00 )  PTT:28.8 sec    Drug Screen Urine:  Alcohol Level etoh        RADIOLOGY & ADDITIONAL STUDIES:

## 2020-03-22 NOTE — PROGRESS NOTE ADULT - SUBJECTIVE AND OBJECTIVE BOX
INTERVAL HPI/OVERNIGHT EVENTS:   Patient was transferred out of ICU today and back to floor for ongoing CIWA monitoring and to titrate off NC oxygen.    pt seen and examined at bedside this morning.  He is improved.  no changes in complaints.  less shaky       ICU Vital Signs Last 24 Hrs  T(C): 36.4 (22 Mar 2020 11:43), Max: 37.6 (21 Mar 2020 16:00)  T(F): 97.5 (22 Mar 2020 11:43), Max: 99.7 (21 Mar 2020 16:00)  HR: 79 (22 Mar 2020 13:00) (56 - 84)  BP: 173/74 (22 Mar 2020 13:00) (100/70 - 175/74)  BP(mean): 107 (22 Mar 2020 13:00) (81 - 130)  ABP: --  ABP(mean): --  RR: 33 (22 Mar 2020 13:00) (24 - 42)  SpO2: 98% (22 Mar 2020 13:00) (97% - 99%)        PHYSICAL EXAM:  GENERAL:  Awake, able to answer Qs, NAD, less tremulous  HEAD:  Atraumatic, Normocephalic  EYES: EOMI  NERVOUS SYSTEM: AAO x 1/3  CHEST/LUNG:   BL rhonchi  CV/HEART: Regular rate and rhythm; No murmurs, rubs, or gallops  GI/ABDOMEN: Soft, Nontender, Nondistended; Bowel sounds present  EXTREMITIES:  2+ Peripheral Pulses, No clubbing, cyanosis, or edema  SKIN: No rashes or lesions        LAB:                         11.9   6.39  )-----------( 91       ( 22 Mar 2020 06:24 )             36.6     03-22    143  |  105  |  25<H>  ----------------------------<  155<H>  3.3<L>   |  24  |  1.1    Ca    7.9<L>      22 Mar 2020 06:24  Phos  3.1     03-21  Mg     2.1     03-21    TPro  5.7<L>  /  Alb  3.3<L>  /  TBili  0.6  /  DBili  x   /  AST  92<H>  /  ALT  111<H>  /  AlkPhos  64  03-22             < from: Xray Chest 1 View-PORTABLE IMMEDIATE (03.21.20 @ 07:11) >  Impression:      Left basilar opacity which may be secondary to technique.    Bones as above.    < end of copied text > INTERVAL HPI/OVERNIGHT EVENTS:   Patient was transferred out of ICU today and back to floor for ongoing CIWA monitoring and to titrate off NC oxygen.    pt seen and examined at bedside this morning.  He is improved.  no changes in complaints.  less shaky       ICU Vital Signs Last 24 Hrs  T(C): 36.4 (22 Mar 2020 11:43), Max: 37.6 (21 Mar 2020 16:00)  T(F): 97.5 (22 Mar 2020 11:43), Max: 99.7 (21 Mar 2020 16:00)  HR: 79 (22 Mar 2020 13:00) (56 - 84)  BP: 173/74 (22 Mar 2020 13:00) (100/70 - 175/74)  BP(mean): 107 (22 Mar 2020 13:00) (81 - 130)  ABP: --  ABP(mean): --  RR: 33 (22 Mar 2020 13:00) (24 - 42)  SpO2: 98% (22 Mar 2020 13:00) (97% - 99%)        PHYSICAL EXAM:  GENERAL:  Awake, able to answer Qs, NAD, less tremulous  HEAD:  Atraumatic, Normocephalic  EYES: EOMI  NERVOUS SYSTEM: AAO x 1/3  CHEST/LUNG:   BL rhonchi  CV/HEART: Regular rate and rhythm; No murmurs, rubs, or gallops  GI/ABDOMEN: Soft, Nontender, Nondistended; Bowel sounds present  EXTREMITIES:  2+ Peripheral Pulses, No clubbing, cyanosis, or edema  SKIN: No rashes or lesions        LAB:                         11.9   6.39  )-----------( 91       ( 22 Mar 2020 06:24 )             36.6     03-22    143  |  105  |  25<H>  ----------------------------<  155<H>  3.3<L>   |  24  |  1.1    Ca    7.9<L>      22 Mar 2020 06:24  Phos  3.1     03-21  Mg     2.1     03-21    TPro  5.7<L>  /  Alb  3.3<L>  /  TBili  0.6  /  DBili  x   /  AST  92<H>  /  ALT  111<H>  /  AlkPhos  64  03-22       COVID-19 PCR . (03.21.20 @ 07:30)    COVID-19 PCR: NotDetec: LDT - Laboratory Developed Test As with all clinical testing, results  should be correlated with clinical findings.  This test has been validated by [employer] [facility] to be accurate;  though it has not been FDA cleared/approved by the usual pathway.  As with all laboratory tests, results should be correlated with clinical  findings.           < from: Xray Chest 1 View-PORTABLE IMMEDIATE (03.21.20 @ 07:11) >  Impression:      Left basilar opacity which may be secondary to technique.    Bones as above.    < end of copied text >

## 2020-03-22 NOTE — CONSULT NOTE ADULT - SUBJECTIVE AND OBJECTIVE BOX
CHAPARRO ROBISON  64y, Male  Allergy: No Known Allergies      All historical available data reviewed.    HPI:  The patient is a 64y Male with PMH: CHF, HLD, HTN, TIA, PE, prior  ETOH abuse is presenting to ED for fall last night. Pt states his legs gave out and he fell and hit the back of his head. Claims he has fallen X 2 over the past week. Usually uses a walker but now is unable to ambulate even with the walker. Ambulatory ability is worsening. -Lives alone: wife passed away 1 year ago and his wife's friend will stop in to help him.   -not fully compliant with taking his meds regularly. (19 Mar 2020 16:29)  Upgraded to ICU due to worsening DTs.  FAMILY HISTORY:  FH: MI (myocardial infarction) (Father, Mother): Dad, Mom    PAST MEDICAL & SURGICAL HISTORY:  Pulmonary embolism: 2015  Chronic back pain  Alcohol dependence  HLD (hyperlipidemia)  HTN (hypertension)  CHF (congestive heart failure)  TIA (transient ischemic attack)  History of appendectomy        VITALS:  T(F): 97.5, Max: 99.7 (03-21-20 @ 16:00)  HR: 70  BP: 175/74  RR: 32Vital Signs Last 24 Hrs  T(C): 36.4 (22 Mar 2020 11:43), Max: 37.6 (21 Mar 2020 14:00)  T(F): 97.5 (22 Mar 2020 11:43), Max: 99.7 (21 Mar 2020 16:00)  HR: 70 (22 Mar 2020 11:00) (56 - 88)  BP: 175/74 (22 Mar 2020 11:00) (100/70 - 201/93)  BP(mean): 107 (22 Mar 2020 11:00) (81 - 133)  RR: 32 (22 Mar 2020 11:00) (24 - 46)  SpO2: 97% (22 Mar 2020 11:00) (97% - 99%)    TESTS & MEASUREMENTS:                        11.9   6.39  )-----------( 91       ( 22 Mar 2020 06:24 )             36.6     03-22    143  |  105  |  25<H>  ----------------------------<  155<H>  3.3<L>   |  24  |  1.1    Ca    7.9<L>      22 Mar 2020 06:24  Phos  3.1     03-21  Mg     2.1     03-21    TPro  5.7<L>  /  Alb  3.3<L>  /  TBili  0.6  /  DBili  x   /  AST  92<H>  /  ALT  111<H>  /  AlkPhos  64  03-22    LIVER FUNCTIONS - ( 22 Mar 2020 06:24 )  Alb: 3.3 g/dL / Pro: 5.7 g/dL / ALK PHOS: 64 U/L / ALT: 111 U/L / AST: 92 U/L / GGT: x                   RADIOLOGY & ADDITIONAL TESTS:  Personal review of radiological diagnostics performed  Echo and EKG results noted when applicable.     MEDICATIONS:  acetaminophen   Tablet .. 650 milliGRAM(s) Oral every 4 hours PRN  apixaban 5 milliGRAM(s) Oral every 12 hours  aspirin  chewable 81 milliGRAM(s) Oral daily  bismuth subsalicylate Liquid 30 milliLiter(s) Oral every 6 hours PRN  chlorhexidine 4% Liquid 1 Application(s) Topical <User Schedule>  folic acid 1 milliGRAM(s) Oral daily  guaifenesin/dextromethorphan  Syrup 10 milliLiter(s) Oral four times a day PRN  influenza   Vaccine 0.5 milliLiter(s) IntraMuscular once  LORazepam   Injectable 2 milliGRAM(s) IV Push every 1 hour PRN  LORazepam   Injectable 2 milliGRAM(s) IV Push every 4 hours PRN  losartan 50 milliGRAM(s) Oral daily  metoprolol tartrate Injectable 5 milliGRAM(s) IV Push every 8 hours  multivitamin/minerals 1 Tablet(s) Oral daily  ondansetron   Disintegrating Tablet 4 milliGRAM(s) Oral every 6 hours PRN  thiamine Injectable 100 milliGRAM(s) IV Push daily      ANTIBIOTICS:

## 2020-03-23 ENCOUNTER — TRANSCRIPTION ENCOUNTER (OUTPATIENT)
Age: 65
End: 2020-03-23

## 2020-03-23 LAB
ANION GAP SERPL CALC-SCNC: 13 MMOL/L — SIGNIFICANT CHANGE UP (ref 7–14)
BASOPHILS # BLD AUTO: 0.04 K/UL — SIGNIFICANT CHANGE UP (ref 0–0.2)
BASOPHILS NFR BLD AUTO: 0.6 % — SIGNIFICANT CHANGE UP (ref 0–1)
BUN SERPL-MCNC: 14 MG/DL — SIGNIFICANT CHANGE UP (ref 10–20)
CALCIUM SERPL-MCNC: 8.6 MG/DL — SIGNIFICANT CHANGE UP (ref 8.5–10.1)
CHLORIDE SERPL-SCNC: 104 MMOL/L — SIGNIFICANT CHANGE UP (ref 98–110)
CO2 SERPL-SCNC: 27 MMOL/L — SIGNIFICANT CHANGE UP (ref 17–32)
CREAT SERPL-MCNC: 0.8 MG/DL — SIGNIFICANT CHANGE UP (ref 0.7–1.5)
EOSINOPHIL # BLD AUTO: 0.17 K/UL — SIGNIFICANT CHANGE UP (ref 0–0.7)
EOSINOPHIL NFR BLD AUTO: 2.6 % — SIGNIFICANT CHANGE UP (ref 0–8)
GLUCOSE SERPL-MCNC: 91 MG/DL — SIGNIFICANT CHANGE UP (ref 70–99)
HCT VFR BLD CALC: 40 % — LOW (ref 42–52)
HGB BLD-MCNC: 13.4 G/DL — LOW (ref 14–18)
IMM GRANULOCYTES NFR BLD AUTO: 1.5 % — HIGH (ref 0.1–0.3)
LYMPHOCYTES # BLD AUTO: 1.2 K/UL — SIGNIFICANT CHANGE UP (ref 1.2–3.4)
LYMPHOCYTES # BLD AUTO: 18.4 % — LOW (ref 20.5–51.1)
MAGNESIUM SERPL-MCNC: 2 MG/DL — SIGNIFICANT CHANGE UP (ref 1.8–2.4)
MCHC RBC-ENTMCNC: 31.9 PG — HIGH (ref 27–31)
MCHC RBC-ENTMCNC: 33.5 G/DL — SIGNIFICANT CHANGE UP (ref 32–37)
MCV RBC AUTO: 95.2 FL — HIGH (ref 80–94)
MONOCYTES # BLD AUTO: 0.68 K/UL — HIGH (ref 0.1–0.6)
MONOCYTES NFR BLD AUTO: 10.4 % — HIGH (ref 1.7–9.3)
NEUTROPHILS # BLD AUTO: 4.32 K/UL — SIGNIFICANT CHANGE UP (ref 1.4–6.5)
NEUTROPHILS NFR BLD AUTO: 66.5 % — SIGNIFICANT CHANGE UP (ref 42.2–75.2)
NRBC # BLD: 0 /100 WBCS — SIGNIFICANT CHANGE UP (ref 0–0)
PLATELET # BLD AUTO: 108 K/UL — LOW (ref 130–400)
POTASSIUM SERPL-MCNC: 3.4 MMOL/L — LOW (ref 3.5–5)
POTASSIUM SERPL-SCNC: 3.4 MMOL/L — LOW (ref 3.5–5)
RBC # BLD: 4.2 M/UL — LOW (ref 4.7–6.1)
RBC # FLD: 12.4 % — SIGNIFICANT CHANGE UP (ref 11.5–14.5)
SODIUM SERPL-SCNC: 144 MMOL/L — SIGNIFICANT CHANGE UP (ref 135–146)
WBC # BLD: 6.51 K/UL — SIGNIFICANT CHANGE UP (ref 4.8–10.8)
WBC # FLD AUTO: 6.51 K/UL — SIGNIFICANT CHANGE UP (ref 4.8–10.8)

## 2020-03-23 PROCEDURE — 99233 SBSQ HOSP IP/OBS HIGH 50: CPT

## 2020-03-23 PROCEDURE — 99221 1ST HOSP IP/OBS SF/LOW 40: CPT

## 2020-03-23 RX ORDER — POTASSIUM CHLORIDE 20 MEQ
40 PACKET (EA) ORAL EVERY 4 HOURS
Refills: 0 | Status: COMPLETED | OUTPATIENT
Start: 2020-03-23 | End: 2020-03-23

## 2020-03-23 RX ADMIN — Medication 81 MILLIGRAM(S): at 12:27

## 2020-03-23 RX ADMIN — Medication 0.2 MILLIGRAM(S): at 07:07

## 2020-03-23 RX ADMIN — LOSARTAN POTASSIUM 50 MILLIGRAM(S): 100 TABLET, FILM COATED ORAL at 05:16

## 2020-03-23 RX ADMIN — APIXABAN 5 MILLIGRAM(S): 2.5 TABLET, FILM COATED ORAL at 17:19

## 2020-03-23 RX ADMIN — Medication 40 MILLIEQUIVALENT(S): at 21:28

## 2020-03-23 RX ADMIN — CEFTRIAXONE 100 MILLIGRAM(S): 500 INJECTION, POWDER, FOR SOLUTION INTRAMUSCULAR; INTRAVENOUS at 05:17

## 2020-03-23 RX ADMIN — APIXABAN 5 MILLIGRAM(S): 2.5 TABLET, FILM COATED ORAL at 05:17

## 2020-03-23 RX ADMIN — Medication 1 MILLIGRAM(S): at 12:27

## 2020-03-23 RX ADMIN — Medication 25 MILLIGRAM(S): at 17:20

## 2020-03-23 RX ADMIN — Medication 100 MILLIGRAM(S): at 15:17

## 2020-03-23 RX ADMIN — Medication 1 TABLET(S): at 12:28

## 2020-03-23 RX ADMIN — Medication 40 MILLIEQUIVALENT(S): at 15:18

## 2020-03-23 NOTE — PROGRESS NOTE ADULT - ASSESSMENT
IMPRESSION:  # No clear indication of an infectious etiology  -possible PNA LLL  -no COVID19  -WBC 6.3  -fevers related to alcohol withdrawal. DTs    RECOMMENDATIONS:  -deep ET cultures  -BCX  -d/c levo  -rocephin 2 gm iv q24h. Could change to po vantin 200 mg q12h for 6 days if BCx NG      Attending Attestation:   70 minutes spent on total encounter; more than 50% of the visit was spent counseling and/or coordinating care by the attending physician.     Plan discussed with team. IMPRESSION:  # No clear indication of an infectious etiology  -possible PNA LLL  -no COVID19  -WBC 6.3  -fevers related to alcohol withdrawal. DTs    would recommend:    1. Aspiration precaution  2. Continue current Abx to complete the course  3. Supplemental oxygenation as needed      Attending Attestation:     35 minutes spent on total encounter; more than 50% of the visit was spent counseling and/or coordinating care by the attending physician.

## 2020-03-23 NOTE — DISCHARGE NOTE PROVIDER - NSDCFUADDINST_GEN_ALL_CORE_FT
- Return to Emergency room if develop any persistent fever > 101, chills, tremors, persistent nausea/vomiting, severe pain not relieved by pain medication, inability to urinate, chest pains, difficulty breathing or any bleeding.  -Please follow up with your PCP in 1 week

## 2020-03-23 NOTE — DISCHARGE NOTE PROVIDER - NSDCCPCAREPLAN_GEN_ALL_CORE_FT
PRINCIPAL DISCHARGE DIAGNOSIS  Diagnosis: Fall  Assessment and Plan of Treatment: -rehab/pt as tolerated  -you were seen by Physiatry who recommended short term rehab      SECONDARY DISCHARGE DIAGNOSES  Diagnosis: Alcohol abuse  Assessment and Plan of Treatment: - you were treated with CIWA  - detox and rehab recommended

## 2020-03-23 NOTE — DISCHARGE NOTE PROVIDER - NSDCMRMEDTOKEN_GEN_ALL_CORE_FT
aspirin 81 mg oral tablet: 1 tab(s) orally once a day  Eliquis 5 mg oral tablet: 1 tab(s) orally 2 times a day  losartan 50 mg oral tablet: 1 tab(s) orally once a day  Metoprolol Tartrate 25 mg oral tablet: 1 tab(s) orally 2 times a day

## 2020-03-23 NOTE — DISCHARGE NOTE PROVIDER - HOSPITAL COURSE
The patient is a 64y Male with PMH: CHF, HLD, HTN, TIA, PE, prior  ETOH abuse is presenting to ED for fall last night. Pt states his legs gave out and he fell and hit the back of his head. Claims he has fallen X 2 over the past week. Usually uses a walker but now is unable to ambulate even with the walker. Ambulatory ability is worsening.     Pt was admitted to medicine for further management. Pt hospital course was complicated by worsening DTs, pt was upgraded to ICU and started on CIWA. Pt improved and he was  downgraded to the floor. Pt is medically stable to be discharged.

## 2020-03-23 NOTE — PROGRESS NOTE ADULT - SUBJECTIVE AND OBJECTIVE BOX
CHAPARRO ROBISON  64y  Male      Patient is a 64y old  Male who presents with a chief complaint of S/P Fall X 2 over the past week (23 Mar 2020 12:23)      INTERVAL HPI/OVERNIGHT EVENTS:  Patient seen and examined earlier this morning.  Sitting in his recliner.  Medically stable for d/c today.  Pt refusing to go to [facility]      REVIEW OF SYSTEMS:  CONSTITUTIONAL: No fever, weight loss, or fatigue  EYES: No eye pain, visual disturbances, or discharge  ENMT:  No difficulty hearing, tinnitus, vertigo; No sinus or throat pain  NECK: No pain or stiffness  RESPIRATORY: No cough, wheezing, chills or hemoptysis; No shortness of breath  CARDIOVASCULAR: No chest pain, palpitations, dizziness, or leg swelling  GASTROINTESTINAL: No abdominal or epigastric pain. No nausea, vomiting, or hematemesis; No diarrhea or constipation. No melena or hematochezia.  GENITOURINARY: No dysuria, frequency, hematuria, or incontinence  NEUROLOGICAL: No headaches, memory loss, loss of strength, numbness, or tremors  SKIN: No itching, burning, rashes, or lesions   LYMPH NODES: No enlarged glands  ENDOCRINE: No heat or cold intolerance; No hair loss  MUSCULOSKELETAL: No joint pain or swelling; No muscle, back, or extremity pain  PSYCHIATRIC: No depression, anxiety, mood swings, or difficulty sleeping  HEME/LYMPH: No easy bruising, or bleeding gums  ALLERY AND IMMUNOLOGIC: No hives or eczema      T(C): 35.9 (03-23-20 @ 05:00), Max: 36.1 (03-22-20 @ 21:00)  HR: 75 (03-23-20 @ 10:05) (64 - 77)  BP: 125/64 (03-23-20 @ 10:05) (125/64 - 196/86)  RR: 16 (03-23-20 @ 10:05) (16 - 22)  SpO2: 93% (03-23-20 @ 09:07) (93% - 100%) on ra      PHYSICAL EXAM:  GENERAL: NAD, well-groomed, well-developed  HEAD:  Atraumatic, Normocephalic  EYES: EOMI, PERRLA, conjunctiva and sclera clear  ENMT: No tonsillar erythema, exudates, or enlargement; Moist mucous membranes, Good dentition, No lesions  NECK: Supple, No JVD, Normal thyroid  NERVOUS SYSTEM:  Alert & Oriented X3, Good concentration; Motor Strength 5/5 B/L upper and lower extremities; DTRs 2+ intact and symmetric  CHEST/LUNG: Clear to percussion bilaterally; No rales, rhonchi, wheezing, or rubs  HEART: Regular rate and rhythm; No murmurs, rubs, or gallops  ABDOMEN: Soft, Nontender, Nondistended; Bowel sounds present; cherry  EXTREMITIES:  2+ Peripheral Pulses, No clubbing, cyanosis, or edema  LYMPH: No lymphadenopathy noted  SKIN: No rashes or lesions    Consultant(s) Notes Reviewed:  [x ] YES  [ ] NO  Care Discussed with Consultants/Other Providers [ x] YES  [ ] NO    LAB:                        13.4   6.51  )-----------( 108      ( 23 Mar 2020 07:18 )             40.0       03-23    144  |  104  |  14  ----------------------------<  91  3.4<L>   |  27  |  0.8    Ca    8.6      23 Mar 2020 07:18  Mg     2.0     03-23    TPro  5.7<L>  /  Alb  3.3<L>  /  TBili  0.6  /  DBili  x   /  AST  92<H>  /  ALT  111<H>  /  AlkPhos  64  03-22    LIVER FUNCTIONS - ( 22 Mar 2020 06:24 )  Alb: 3.3 g/dL / Pro: 5.7 g/dL / ALK PHOS: 64 U/L / ALT: 111 U/L / AST: 92 U/L / GGT: x                 Drug Dosing Weight  Height (cm): 175.3 (21 Mar 2020 05:56)  Weight (kg): 94.3 (19 Mar 2020 20:01)  BMI (kg/m2): 30.7 (21 Mar 2020 05:56)  BSA (m2): 2.1 (21 Mar 2020 05:56)        I&O's Summary  22 Mar 2020 07:01  -  23 Mar 2020 07:00  --------------------------------------------------------  IN: 487 mL / OUT: 1100 mL / NET: -613 mL          RADIOLOGY & ADDITIONAL TESTS:  Imaging Personally Reviewed:  [x] YES  [ ] NO    HEALTH ISSUES - PROBLEM Dx:  Pulmonary thromboembolism  Pedal edema: Pedal edema  HTN (hypertension): HTN (hypertension)  Pulmonary embolism: Pulmonary embolism  Fall: Fall          MEDS:  acetaminophen   Tablet .. 650 milliGRAM(s) Oral every 4 hours PRN  apixaban 5 milliGRAM(s) Oral every 12 hours  aspirin  chewable 81 milliGRAM(s) Oral daily  bismuth subsalicylate Liquid 30 milliLiter(s) Oral every 6 hours PRN  cefTRIAXone   IVPB 2000 milliGRAM(s) IV Intermittent every 24 hours  chlorhexidine 4% Liquid 1 Application(s) Topical <User Schedule>  folic acid 1 milliGRAM(s) Oral daily  guaifenesin/dextromethorphan  Syrup 10 milliLiter(s) Oral four times a day PRN  influenza   Vaccine 0.5 milliLiter(s) IntraMuscular once  LORazepam   Injectable 2 milliGRAM(s) IV Push every 1 hour PRN  LORazepam   Injectable 2 milliGRAM(s) IV Push every 4 hours PRN  losartan 50 milliGRAM(s) Oral daily  metoprolol succinate ER 25 milliGRAM(s) Oral daily  multivitamin/minerals 1 Tablet(s) Oral daily  ondansetron   Disintegrating Tablet 4 milliGRAM(s) Oral every 6 hours PRN  thiamine Injectable 100 milliGRAM(s) IV Push daily

## 2020-03-23 NOTE — BEHAVIORAL HEALTH ASSESSMENT NOTE - HPI (INCLUDE ILLNESS QUALITY, SEVERITY, DURATION, TIMING, CONTEXT, MODIFYING FACTORS, ASSOCIATED SIGNS AND SYMPTOMS)
Reviewed and Referred to chart    The patient is a 64y Male with PMH: CHF, HLD, HTN, TIA, PE, prior  ETOH abuse presented to ED for fall. Pt states his legs gave out and he fell and hit the back of his head. Claims he has fallen X 2 over the past week. Usually uses a walker but now is unable to ambulate even with the walker. Ambulatory ability is worsening. -Lives alone: wife passed away 1 year ago and his wife's friend will stop in to help him.   -not fully compliant with taking his meds regularly. (19 Mar 2020 16:29)    Upgraded to ICU due to worsening DTs.    Patient was transferred out of ICU today and back to floor.    Psych consult: Capacity regarding discharge    Patient states "Im going home". Patient states he does not want to go to a Rehabilitation Center. Patient states when his wife was sick she had home care and that's what he wants. Patient admits that he needs physical therapy but states he can get that at home. When NP questioned patients ability to get out of bed and ambulate on his own, patient states he has a walker and wheel chair at home that he can use. States he can call an ambulance if he needs too.  Patient denies depression and anxiety. Denies suicidal/homicidal ideation. Denies any s/s of bethany. Denies hallucinations. Patient admits to hx of ETOH use and smoking cigarette and states " I know I shouldn't do that". Denies all other drugs. States appetite is good and states no issues with sleeping.    Patient alert and oriented x 3, calm and cooperative. Mood euthymic. Speech coherent, rate and volume normal. Thought process logical and linear. No psychosis noted during visit. No evidence of delusional thinking noted during visit. No s/s of bethany noted during visit. Does not appear to be a danger to himself or others at this time. Insight/judgement fair.    Case Discussed with MD Hall  Case discussed with treatment team-PA, RN and RN manager

## 2020-03-23 NOTE — PROGRESS NOTE ADULT - SUBJECTIVE AND OBJECTIVE BOX
Patient is a 64y old  Male who presents with a chief complaint of S/P Fall X 2 over the past week (23 Mar 2020 13:03)          REVIEW OF SYSTEMS: All other review systems are negative        Vital Signs Last 24 Hrs  T(C): 36.6 (23 Mar 2020 13:36), Max: 36.6 (23 Mar 2020 13:36)  T(F): 97.8 (23 Mar 2020 13:36), Max: 97.8 (23 Mar 2020 13:36)  HR: 75 (23 Mar 2020 13:36) (64 - 77)  BP: 142/79 (23 Mar 2020 13:36) (125/64 - 196/86)  BP(mean): --  RR: 16 (23 Mar 2020 13:36) (16 - 22)  SpO2: 93% (23 Mar 2020 09:07) (93% - 100%)      PHYSICAL EXAM:  GENERAL: NAD, well-groomed, well-developed  HEAD:  Atraumatic, Normocephalic  EYES: EOMI, PERRLA, conjunctiva and sclera clear  ENMT: No tonsillar erythema, exudates, or enlargement; Moist mucous membranes, Good dentition, No lesions  NECK: Supple, No JVD, Normal thyroid  NERVOUS SYSTEM:  Alert & Oriented X3, Good concentration; Motor Strength 5/5 B/L upper and lower extremities; DTRs 2+ intact and symmetric  CHEST/LUNG: Clear to percussion bilaterally; No rales, rhonchi, wheezing, or rubs  HEART: Regular rate and rhythm; No murmurs, rubs, or gallops  ABDOMEN: Soft, Nontender, Nondistended; Bowel sounds present  EXTREMITIES:  2+ Peripheral Pulses, No clubbing, cyanosis, or edema  LYMPH: No lymphadenopathy noted  SKIN: No rashes or lesions      MEDICATIONS  (STANDING):  apixaban 5 milliGRAM(s) Oral every 12 hours  aspirin  chewable 81 milliGRAM(s) Oral daily  cefTRIAXone   IVPB 2000 milliGRAM(s) IV Intermittent every 24 hours  chlorhexidine 4% Liquid 1 Application(s) Topical <User Schedule>  folic acid 1 milliGRAM(s) Oral daily  influenza   Vaccine 0.5 milliLiter(s) IntraMuscular once  losartan 50 milliGRAM(s) Oral daily  metoprolol succinate ER 25 milliGRAM(s) Oral daily  multivitamin/minerals 1 Tablet(s) Oral daily  potassium chloride    Tablet ER 40 milliEquivalent(s) Oral every 4 hours  thiamine Injectable 100 milliGRAM(s) IV Push daily    MEDICATIONS  (PRN):  acetaminophen   Tablet .. 650 milliGRAM(s) Oral every 4 hours PRN Temp greater or equal to 38C (100.4F), Mild Pain (1 - 3)  bismuth subsalicylate Liquid 30 milliLiter(s) Oral every 6 hours PRN Diarrhea  guaifenesin/dextromethorphan  Syrup 10 milliLiter(s) Oral four times a day PRN Cough  LORazepam   Injectable 2 milliGRAM(s) IV Push every 1 hour PRN Symptom-triggered: each CIWA -Ar score 8 or GREATER  LORazepam   Injectable 2 milliGRAM(s) IV Push every 4 hours PRN Agitation/ s & s alcohol wd  ondansetron   Disintegrating Tablet 4 milliGRAM(s) Oral every 6 hours PRN Nausea and/or Vomiting        Allergies    No Known Allergies        LABS:                        13.4   6.51  )-----------( 108      ( 23 Mar 2020 07:18 )             40.0     03-23    144  |  104  |  14  ----------------------------<  91  3.4<L>   |  27  |  0.8    Ca    8.6      23 Mar 2020 07:18  Mg     2.0     03-23    TPro  5.7<L>  /  Alb  3.3<L>  /  TBili  0.6  /  DBili  x   /  AST  92<H>  /  ALT  111<H>  /  AlkPhos  64  03-22        RADIOLOGY & ADDITIONAL TESTS:    < from: Xray Chest 1 View-PORTABLE IMMEDIATE (03.21.20 @ 07:11) >    Left basilar opacity which may be secondary to technique.    Bones as above.    < end of copied text > Patient is a 64y old  Male who presents with a chief complaint of S/P Fall X 2 over the past week (23 Mar 2020 13:03)          REVIEW OF SYSTEMS: All other review systems are negative        Vital Signs Last 24 Hrs  T(C): 36.6 (23 Mar 2020 13:36), Max: 36.6 (23 Mar 2020 13:36)  T(F): 97.8 (23 Mar 2020 13:36), Max: 97.8 (23 Mar 2020 13:36)  HR: 75 (23 Mar 2020 13:36) (64 - 77)  BP: 142/79 (23 Mar 2020 13:36) (125/64 - 196/86)  BP(mean): --  RR: 16 (23 Mar 2020 13:36) (16 - 22)  SpO2: 93% (23 Mar 2020 09:07) (93% - 100%)      PHYSICAL EXAM:  GENERAL: Not in distress   CHEST/LUNG: Air entry B/L  HEART:s1 and s2 present  ABDOMEN: Nontender, Nondistended  EXTREMITIES: No pedal  edema  CNS: Awake and alert        MEDICATIONS  (STANDING):  apixaban 5 milliGRAM(s) Oral every 12 hours  aspirin  chewable 81 milliGRAM(s) Oral daily  cefTRIAXone   IVPB 2000 milliGRAM(s) IV Intermittent every 24 hours  chlorhexidine 4% Liquid 1 Application(s) Topical <User Schedule>  folic acid 1 milliGRAM(s) Oral daily  influenza   Vaccine 0.5 milliLiter(s) IntraMuscular once  losartan 50 milliGRAM(s) Oral daily  metoprolol succinate ER 25 milliGRAM(s) Oral daily  multivitamin/minerals 1 Tablet(s) Oral daily  potassium chloride    Tablet ER 40 milliEquivalent(s) Oral every 4 hours  thiamine Injectable 100 milliGRAM(s) IV Push daily    MEDICATIONS  (PRN):  acetaminophen   Tablet .. 650 milliGRAM(s) Oral every 4 hours PRN Temp greater or equal to 38C (100.4F), Mild Pain (1 - 3)  bismuth subsalicylate Liquid 30 milliLiter(s) Oral every 6 hours PRN Diarrhea  guaifenesin/dextromethorphan  Syrup 10 milliLiter(s) Oral four times a day PRN Cough  LORazepam   Injectable 2 milliGRAM(s) IV Push every 1 hour PRN Symptom-triggered: each CIWA -Ar score 8 or GREATER  LORazepam   Injectable 2 milliGRAM(s) IV Push every 4 hours PRN Agitation/ s & s alcohol wd  ondansetron   Disintegrating Tablet 4 milliGRAM(s) Oral every 6 hours PRN Nausea and/or Vomiting        Allergies    No Known Allergies        LABS:                        13.4   6.51  )-----------( 108      ( 23 Mar 2020 07:18 )             40.0     03-23    144  |  104  |  14  ----------------------------<  91  3.4<L>   |  27  |  0.8    Ca    8.6      23 Mar 2020 07:18  Mg     2.0     03-23    TPro  5.7<L>  /  Alb  3.3<L>  /  TBili  0.6  /  DBili  x   /  AST  92<H>  /  ALT  111<H>  /  AlkPhos  64  03-22        RADIOLOGY & ADDITIONAL TESTS:    < from: Xray Chest 1 View-PORTABLE IMMEDIATE (03.21.20 @ 07:11) >    Left basilar opacity which may be secondary to technique.    Bones as above.    < end of copied text >

## 2020-03-23 NOTE — BEHAVIORAL HEALTH ASSESSMENT NOTE - DIFFERENTIAL
Alcohol Use Disorder    In my opinion patient appears to be capable of making informed decisions regarding treatment and disposition at this time.  There does not appear to be any psychiatric contraindications to discharge at this time, once medically cleared for discharge and a safe discharge plan is put in place.

## 2020-03-23 NOTE — PROGRESS NOTE ADULT - ASSESSMENT
64y Male with PMH: CHF, HLD, HTN, TIA, PE, prior  ETOH abuse is presenting to ED for fall was upgraded to ICU due to worsening DTs.     # Delirium Tremens  #ETOH dependence/withdrawal syndrome   #Suspected thiamine/folate deficiency  -MVI, thiamine, folic acid   -completed ciwa protocol  -off oxygen  -transferred from CCU to medical floor on 3/22    # LLL PNA  off levo  ID consult following, indicates;    -cont rocephin 2 gm iv q24h. Could change to po vantin 200 mg q12h for 6 days if BCx NG  f/u blood cx       # Mechanical Fall  -rehab/pt as tolerated  -seen by Physiatry -- STR recommended but pt presently refusing    # Hx of chronic Pulmonary Embolism  -on eliquis   -LE duplex negative for DVT      #) Thrombocytopenia --- likely underlying ETOH hx   -hold ASA  -monitor CBC as outpt    # Tobacco use/dependence  -smoking cessation and counseling done   -spent over 15 min    #hypokalemia  -replete po     Progress Note Handoff  Pending Consults: none  Pending Tests: none  Pending Results: none  Family Discussion: d/c to snf when bed available - CM aware that pt is refusing - psych eval deemed pt competent to make medical decisions - pt aware   Disposition: Home_____/SNF______/Other_____/Unknown at this time_x____    Please call me with any questions at extension [phone]

## 2020-03-23 NOTE — BEHAVIORAL HEALTH ASSESSMENT NOTE - NSBHCONSULTFOLLOWAFTERCARE_PSY_A_CORE FT
In my opinion patient appears to be capable of making informed decisions regarding treatment and disposition at this time.  There does not appear to be any psychiatric contraindications to discharge at this time, once medically cleared for discharge and a safe discharge plan is put in place.

## 2020-03-24 DIAGNOSIS — F10.10 ALCOHOL ABUSE, UNCOMPLICATED: ICD-10-CM
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
## 2020-03-24 PROCEDURE — 99233 SBSQ HOSP IP/OBS HIGH 50: CPT

## 2020-03-24 RX ORDER — THIAMINE MONONITRATE (VIT B1) 100 MG
100 TABLET ORAL DAILY
Refills: 0 | Status: DISCONTINUED | OUTPATIENT
Start: 2020-03-24 | End: 2020-03-26

## 2020-03-24 RX ORDER — CEFPODOXIME PROXETIL 100 MG
200 TABLET ORAL EVERY 12 HOURS
Refills: 0 | Status: DISCONTINUED | OUTPATIENT
Start: 2020-03-24 | End: 2020-03-25

## 2020-03-24 RX ORDER — CEFPODOXIME PROXETIL 100 MG
200 TABLET ORAL EVERY 12 HOURS
Refills: 0 | Status: DISCONTINUED | OUTPATIENT
Start: 2020-03-24 | End: 2020-03-24

## 2020-03-24 RX ADMIN — Medication 1 TABLET(S): at 15:44

## 2020-03-24 RX ADMIN — Medication 200 MILLIGRAM(S): at 18:08

## 2020-03-24 RX ADMIN — Medication 100 MILLIGRAM(S): at 15:39

## 2020-03-24 RX ADMIN — CEFTRIAXONE 100 MILLIGRAM(S): 500 INJECTION, POWDER, FOR SOLUTION INTRAMUSCULAR; INTRAVENOUS at 05:53

## 2020-03-24 RX ADMIN — Medication 81 MILLIGRAM(S): at 15:44

## 2020-03-24 RX ADMIN — Medication 1 MILLIGRAM(S): at 15:44

## 2020-03-24 RX ADMIN — LOSARTAN POTASSIUM 50 MILLIGRAM(S): 100 TABLET, FILM COATED ORAL at 05:53

## 2020-03-24 RX ADMIN — APIXABAN 5 MILLIGRAM(S): 2.5 TABLET, FILM COATED ORAL at 17:33

## 2020-03-24 RX ADMIN — Medication 650 MILLIGRAM(S): at 15:38

## 2020-03-24 RX ADMIN — APIXABAN 5 MILLIGRAM(S): 2.5 TABLET, FILM COATED ORAL at 05:53

## 2020-03-24 RX ADMIN — Medication 25 MILLIGRAM(S): at 17:33

## 2020-03-24 RX ADMIN — CHLORHEXIDINE GLUCONATE 1 APPLICATION(S): 213 SOLUTION TOPICAL at 05:54

## 2020-03-24 NOTE — PROGRESS NOTE ADULT - SUBJECTIVE AND OBJECTIVE BOX
LENOALL CHAPARRO  64y  Male      Patient is a 64y old  Male who presents with a chief complaint of S/P Fall X 2 over the past week (23 Mar 2020 12:23)      INTERVAL HPI/OVERNIGHT EVENTS:  Patient seen and examined earlier this morning.  Sitting in his recliner.  Medically stable for d/c as of 3/23.  Pt refusing to go to SNF/4A/alcohol rehab.  He ambulated 2 feet with PT the last two days.  Unsafe to d/c home. He is very unstable, ETOH dependent and on apixaban.  CM working on safe d/c plan.       REVIEW OF SYSTEMS:  CONSTITUTIONAL: No fever, weight loss, or fatigue  EYES: No eye pain, visual disturbances, or discharge  ENMT:  No difficulty hearing, tinnitus, vertigo; No sinus or throat pain  NECK: No pain or stiffness  RESPIRATORY: No cough, wheezing, chills or hemoptysis; No shortness of breath  CARDIOVASCULAR: No chest pain, palpitations, dizziness, or leg swelling  GASTROINTESTINAL: No abdominal or epigastric pain. No nausea, vomiting, or hematemesis; No diarrhea or constipation. No melena or hematochezia.  GENITOURINARY: No dysuria, frequency, hematuria, or incontinence  NEUROLOGICAL: No headaches, memory loss, loss of strength, numbness, or tremors  SKIN: No itching, burning, rashes, or lesions   LYMPH NODES: No enlarged glands  ENDOCRINE: No heat or cold intolerance; No hair loss  MUSCULOSKELETAL: No joint pain or swelling; No muscle, back, or extremity pain  PSYCHIATRIC: No depression, anxiety, mood swings, or difficulty sleeping  HEME/LYMPH: No easy bruising, or bleeding gums  ALLERY AND IMMUNOLOGIC: No hives or eczema      Vital Signs Last 24 Hrs  T(C): 36.9 (24 Mar 2020 05:15), Max: 36.9 (24 Mar 2020 05:15)  T(F): 98.4 (24 Mar 2020 05:15), Max: 98.4 (24 Mar 2020 05:15)  HR: 68 (24 Mar 2020 05:15) (68 - 68)  BP: 186/95 (24 Mar 2020 05:15) (166/78 - 186/95)  BP(mean): --  RR: 16 (24 Mar 2020 05:15) (16 - 16)  SpO2: 95% (24 Mar 2020 03:20) (95% - 95%) on ra      PHYSICAL EXAM:  GENERAL: NAD, well-groomed, well-developed  HEAD:  Atraumatic, Normocephalic  EYES: EOMI, PERRLA, conjunctiva and sclera clear  ENMT: No tonsillar erythema, exudates, or enlargement; Moist mucous membranes, Good dentition, No lesions  NECK: Supple, No JVD, Normal thyroid  NERVOUS SYSTEM:  Alert & Oriented X3, Good concentration; Motor Strength 5/5 B/L upper and lower extremities; DTRs 2+ intact and symmetric  CHEST/LUNG: Clear to percussion bilaterally; No rales, rhonchi, wheezing, or rubs  HEART: Regular rate and rhythm; No murmurs, rubs, or gallops  ABDOMEN: Soft, Nontender, Nondistended; Bowel sounds present; obese  EXTREMITIES:  2+ Peripheral Pulses, No clubbing, cyanosis, or edema  LYMPH: No lymphadenopathy noted  SKIN: No rashes or lesions    Consultant(s) Notes Reviewed:  [x ] YES  [ ] NO  Care Discussed with Consultants/Other Providers [ x] YES  [ ] NO    LAB:                        13.4   6.51  )-----------( 108      ( 23 Mar 2020 07:18 )             40.0       03-23    144  |  104  |  14  ----------------------------<  91  3.4<L>   |  27  |  0.8    Ca    8.6      23 Mar 2020 07:18  Mg     2.0     03-23    TPro  5.7<L>  /  Alb  3.3<L>  /  TBili  0.6  /  DBili  x   /  AST  92<H>  /  ALT  111<H>  /  AlkPhos  64  03-22    LIVER FUNCTIONS - ( 22 Mar 2020 06:24 )  Alb: 3.3 g/dL / Pro: 5.7 g/dL / ALK PHOS: 64 U/L / ALT: 111 U/L / AST: 92 U/L / GGT: x                 Drug Dosing Weight  Height (cm): 175.3 (21 Mar 2020 05:56)  Weight (kg): 94.3 (19 Mar 2020 20:01)  BMI (kg/m2): 30.7 (21 Mar 2020 05:56)  BSA (m2): 2.1 (21 Mar 2020 05:56)        RADIOLOGY & ADDITIONAL TESTS:  Imaging Personally Reviewed:  [x] YES  [ ] NO    HEALTH ISSUES - PROBLEM Dx:  Pulmonary thromboembolism  Pedal edema: Pedal edema  HTN (hypertension): HTN (hypertension)  Pulmonary embolism: Pulmonary embolism  Fall: Fall          MEDICATIONS  (STANDING):  apixaban 5 milliGRAM(s) Oral every 12 hours  aspirin  chewable 81 milliGRAM(s) Oral daily  chlorhexidine 4% Liquid 1 Application(s) Topical <User Schedule>  folic acid 1 milliGRAM(s) Oral daily  influenza   Vaccine 0.5 milliLiter(s) IntraMuscular once  losartan 50 milliGRAM(s) Oral daily  metoprolol succinate ER 25 milliGRAM(s) Oral daily  multivitamin/minerals 1 Tablet(s) Oral daily  thiamine 100 milliGRAM(s) Oral daily    MEDICATIONS  (PRN):  acetaminophen   Tablet .. 650 milliGRAM(s) Oral every 4 hours PRN Temp greater or equal to 38C (100.4F), Mild Pain (1 - 3)  bismuth subsalicylate Liquid 30 milliLiter(s) Oral every 6 hours PRN Diarrhea  guaifenesin/dextromethorphan  Syrup 10 milliLiter(s) Oral four times a day PRN Cough  LORazepam   Injectable 2 milliGRAM(s) IV Push every 1 hour PRN Symptom-triggered: each CIWA -Ar score 8 or GREATER  LORazepam   Injectable 2 milliGRAM(s) IV Push every 4 hours PRN Agitation/ s & s alcohol wd  ondansetron   Disintegrating Tablet 4 milliGRAM(s) Oral every 6 hours PRN Nausea and/or Vomiting

## 2020-03-24 NOTE — PROGRESS NOTE ADULT - ASSESSMENT
64y Male with PMH: CHF, HLD, HTN, TIA, PE, prior  ETOH abuse is presenting to ED for fall was upgraded to ICU due to worsening DTs.     # Delirium Tremens  #ETOH dependence/withdrawal syndrome   #Suspected thiamine/folate deficiency  -MVI, thiamine, folic acid   -completed ciwa protocol  -off oxygen  -transferred from CCU to medical floor on 3/22  -medically stable for d/c    # LLL PNA  - po vantin 200 mg q12h for 5 more days    # Mechanical Fall  -rehab/pt as tolerated  -seen by Physiatry -- STR recommended but pt presently refusing - He ambulated 2 feet with PT the last two days.  Unsafe to d/c home. He is very unstable, ETOH dependent and on apixaban.  CM working on safe d/c plan.     # Hx of chronic Pulmonary Embolism  -on eliquis   -LE duplex negative for DVT      #) Thrombocytopenia --- likely underlying ETOH hx   -hold ASA  -monitor CBC as outpt    # Tobacco use/dependence  -smoking cessation and counseling done   -spent over 15 min    #hypokalemia  -replete po     Progress Note Handoff  Pending Consults: none  Pending Tests: none  Pending Results: none  Family Discussion: d/c to snf when bed available - CM aware that pt is refusing - psych eval deemed pt competent to make medical decisions - pt aware   Disposition: Home_____/SNF______/Other_____/Unknown at this time_x____    Please call me with any questions at extension [phone]

## 2020-03-24 NOTE — PROGRESS NOTE ADULT - SUBJECTIVE AND OBJECTIVE BOX
CHAPARRO ROBISON  64y, Male  Allergy: No Known Allergies      CHIEF COMPLAINT: S/P Fall X 2 over the past week (23 Mar 2020 14:39)      INTERVAL EVENTS/HPI  - No acute events overnight  - T(F): , Max: 98.4 (03-24-20 @ 05:15)  - Denies any worsening symptoms  - Tolerating medication    ROS  10 system review - neg     SOCIAL HISTORY    Substance Use (  ) never used  (  ) IVDU (  ) Other:  Tobacco Usage:  (   ) never smoked   (   ) former smoker   (   ) current smoker   Alcohol Usage: (   ) social  (  X ) daily use (   ) denies  Sexual History:       FH noncontributory     VITALS:  T(F): 98.4, Max: 98.4 (03-24-20 @ 05:15)  HR: 68  BP: 186/95  RR: 16Vital Signs Last 24 Hrs  T(C): 36.9 (24 Mar 2020 05:15), Max: 36.9 (24 Mar 2020 05:15)  T(F): 98.4 (24 Mar 2020 05:15), Max: 98.4 (24 Mar 2020 05:15)  HR: 68 (24 Mar 2020 05:15) (68 - 75)  BP: 186/95 (24 Mar 2020 05:15) (125/64 - 186/95)  BP(mean): --  RR: 16 (24 Mar 2020 05:15) (16 - 16)  SpO2: 95% (24 Mar 2020 03:20) (95% - 95%)    PHYSICAL EXAM:  Gen: NAD, resting in bed  HEENT: Normocephalic, atraumatic  Neck: supple, no lymphadenopathy  CV:s1  s 2 +   Lungs: decreased BS  Abdomen: Soft, BS present  Ext: Warm, well perfused  Neuro: non focal, awake  Skin: no rash, no erythema      TESTS & MEASUREMENTS:                        13.4   6.51  )-----------( 108      ( 23 Mar 2020 07:18 )             40.0     03-23    144  |  104  |  14  ----------------------------<  91  3.4<L>   |  27  |  0.8    Ca    8.6      23 Mar 2020 07:18  Mg     2.0     03-23              Culture - Blood (collected 03-21-20 @ 15:15)  Source: .Blood Blood  Preliminary Report (03-23-20 @ 03:02):    No growth to date.    Culture - Blood (collected 03-21-20 @ 15:15)  Source: .Blood Blood  Preliminary Report (03-23-20 @ 03:02):    No growth to date.        Lactate, Blood: 1.1 mmol/L (03-21-20 @ 15:15)      INFECTIOUS DISEASES TESTING  Hepatitis C Virus Interpretation: Nonreact (03-20-20 @ 05:37)      RADIOLOGY & ADDITIONAL TESTS:  I have personally reviewed the last Chest xray  CXR - L atelectasis    CARDIOLOGY TESTING  12 Lead ECG:   Ventricular Rate 71 BPM    Atrial Rate 71 BPM    P-R Interval 158 ms    QRS Duration 154 ms    Q-T Interval 432 ms    QTC Calculation(Bezet) 469 ms    P Axis 20 degrees    R Axis -66 degrees    T Axis 87 degrees    Diagnosis Line Normal sinus rhythm  Right bundle branch block  Left anterior fascicular block  *** Bifascicular block ***  Left ventricular hypertrophy with repolarization abnormality  Anteroseptal infarct , age undetermined  Abnormal ECG    Confirmed by ROBERT CAMPOS MD ([id number]) on3/21/2020 10:57:33 AM (03-21-20 @ 09:04)  12 Lead ECG:   Ventricular Rate 82 BPM    Atrial Rate 82 BPM    P-R Interval 158 ms    QRS Duration 150 ms    Q-T Interval 448 ms    QTC Calculation(Bezet) 523 ms    P Axis 49 degrees    R Axis 18 degrees    T Axis 94 degrees    Diagnosis Line Normal sinus rhythm  Right bundle branch block  Abnormal ECG    Confirmed by ROBERT CAMPOS MD ([id number]) on 3/19/2020 11:21:06 AM (03-19-20 @ 11:08)      MEDICATIONS  apixaban 5  aspirin  chewable 81  cefpodoxime 200  chlorhexidine 4% Liquid 1  folic acid 1  influenza   Vaccine 0.5  losartan 50  metoprolol succinate ER 25  multivitamin/minerals 1  thiamine Injectable 100      ANTIBIOTICS:  cefpodoxime 200 milliGRAM(s) Oral every 12 hours

## 2020-03-25 LAB
GLUCOSE BLDC GLUCOMTR-MCNC: 105 MG/DL — HIGH (ref 70–99)
RAPID RVP RESULT: SIGNIFICANT CHANGE UP

## 2020-03-25 PROCEDURE — 99233 SBSQ HOSP IP/OBS HIGH 50: CPT

## 2020-03-25 RX ADMIN — LOSARTAN POTASSIUM 50 MILLIGRAM(S): 100 TABLET, FILM COATED ORAL at 05:58

## 2020-03-25 RX ADMIN — Medication 650 MILLIGRAM(S): at 11:37

## 2020-03-25 RX ADMIN — Medication 10 MILLILITER(S): at 22:06

## 2020-03-25 RX ADMIN — APIXABAN 5 MILLIGRAM(S): 2.5 TABLET, FILM COATED ORAL at 22:07

## 2020-03-25 RX ADMIN — Medication 81 MILLIGRAM(S): at 11:37

## 2020-03-25 RX ADMIN — Medication 25 MILLIGRAM(S): at 22:07

## 2020-03-25 RX ADMIN — APIXABAN 5 MILLIGRAM(S): 2.5 TABLET, FILM COATED ORAL at 05:58

## 2020-03-25 RX ADMIN — Medication 100 MILLIGRAM(S): at 11:37

## 2020-03-25 RX ADMIN — Medication 200 MILLIGRAM(S): at 06:00

## 2020-03-25 RX ADMIN — CHLORHEXIDINE GLUCONATE 1 APPLICATION(S): 213 SOLUTION TOPICAL at 06:01

## 2020-03-25 RX ADMIN — Medication 1 MILLIGRAM(S): at 11:37

## 2020-03-25 RX ADMIN — Medication 1 TABLET(S): at 11:37

## 2020-03-25 NOTE — PROGRESS NOTE ADULT - ASSESSMENT
64y Male with PMH: CHF, HLD, HTN, TIA, PE, prior  ETOH abuse is presenting to ED for fall was upgraded to ICU due to worsening DTs.     # Delirium Tremens  #ETOH dependence/withdrawal syndrome   #Suspected thiamine/folate deficiency  -MVI, thiamine, folic acid   -completed ciwa protocol  -off oxygen  -transferred from CCU to medical floor on 3/22  -medically stable for d/c    # LLL PNA  - po vantin 200 mg q12h for 5 more days (End 03/29)    # Mechanical Fall  -rehab/pt as tolerated  -seen by Physiatry -- STR recommended but pt presently refusing - Pt would like to go home stating that he has a wheelchair he can use    # Hx of chronic Pulmonary Embolism  -on eliquis   -LE duplex negative for DVT      #) Thrombocytopenia --- likely underlying ETOH hx   -hold ASA  -monitor CBC as outpt    # Tobacco use/dependence  -smoking cessation and counseling done   -spent over 15 min    #hypokalemia  -replete po     #Progress Note Handoff  Pending (specify): DC planing  Family discussion: d/w pt regarding safe discharge home  Disposition: Home w/ services vs SNF (pt refusing) 64y Male with PMH: CHF, HLD, HTN, TIA, PE, prior  ETOH abuse is presenting to ED for fall was upgraded to ICU due to worsening DTs.     # Delirium Tremens  #ETOH dependence/withdrawal syndrome   #Suspected thiamine/folate deficiency  -MVI, thiamine, folic acid   -completed ciwa protocol  -off oxygen  -transferred from CCU to medical floor on 3/22  -medically stable for d/c    # LLL basilar opacity likely atelectasis  - PER ID, no need for abx as there is no evidence for pna. LLL basilar opacity seen on CXR likely due to atelectasis  - vantin discontinued    # Mechanical Fall  -rehab/pt as tolerated  -seen by Physiatry -- STR recommended but pt presently refusing - Pt would like to go home stating that he has a wheelchair he can use    # Hx of chronic Pulmonary Embolism  -on eliquis   -LE duplex negative for DVT      #) Thrombocytopenia --- likely underlying ETOH hx   -hold ASA  -monitor CBC as outpt    # Tobacco use/dependence  -smoking cessation and counseling done   -spent over 15 min    #hypokalemia  -replete po     #Progress Note Handoff  Pending (specify): DC planing  Family discussion: d/w pt regarding safe discharge home  Disposition: Home w/ services vs SNF (pt refusing)

## 2020-03-25 NOTE — PROGRESS NOTE ADULT - SUBJECTIVE AND OBJECTIVE BOX
CHAPARRO ROBISON  64y, Male  Allergy: No Known Allergies    Hospital Day: 6d    Patient seen and examined earlier today. No acute events overnight.    PMH/PSH:  PAST MEDICAL & SURGICAL HISTORY:  Pulmonary embolism: 2015  Chronic back pain  Alcohol dependence  HLD (hyperlipidemia)  HTN (hypertension)  CHF (congestive heart failure)  TIA (transient ischemic attack)  History of appendectomy    VITALS:  T(F): 96.6 (03-25-20 @ 06:46), Max: 96.9 (03-24-20 @ 21:33)  HR: 68 (03-25-20 @ 06:46)  BP: 176/101 (03-25-20 @ 06:46) (147/71 - 178/84)  RR: 16 (03-25-20 @ 06:46)  SpO2: --    TESTS & MEASUREMENTS:  Weight (Kg):     03-23-20 @ 07:01  -  03-24-20 @ 07:00  --------------------------------------------------------  IN: 0 mL / OUT: 400 mL / NET: -400 mL    03-24-20 @ 07:01  -  03-25-20 @ 07:00  --------------------------------------------------------  IN: 0 mL / OUT: 500 mL / NET: -500 mL    Culture - Blood (collected 03-21-20 @ 15:15)  Source: .Blood Blood  Preliminary Report (03-23-20 @ 03:02):    No growth to date.    Culture - Blood (collected 03-21-20 @ 15:15)  Source: .Blood Blood  Preliminary Report (03-23-20 @ 03:02):    No growth to date.    MEDICATIONS:  MEDICATIONS  (STANDING):  apixaban 5 milliGRAM(s) Oral every 12 hours  aspirin  chewable 81 milliGRAM(s) Oral daily  cefpodoxime 200 milliGRAM(s) Oral every 12 hours  chlorhexidine 4% Liquid 1 Application(s) Topical <User Schedule>  folic acid 1 milliGRAM(s) Oral daily  losartan 50 milliGRAM(s) Oral daily  metoprolol succinate ER 25 milliGRAM(s) Oral daily  multivitamin/minerals 1 Tablet(s) Oral daily  thiamine 100 milliGRAM(s) Oral daily    MEDICATIONS  (PRN):  acetaminophen   Tablet .. 650 milliGRAM(s) Oral every 4 hours PRN Temp greater or equal to 38C (100.4F), Mild Pain (1 - 3)  bismuth subsalicylate Liquid 30 milliLiter(s) Oral every 6 hours PRN Diarrhea  guaifenesin/dextromethorphan  Syrup 10 milliLiter(s) Oral four times a day PRN Cough  LORazepam   Injectable 2 milliGRAM(s) IV Push every 1 hour PRN Symptom-triggered: each CIWA -Ar score 8 or GREATER  LORazepam   Injectable 2 milliGRAM(s) IV Push every 4 hours PRN Agitation/ s & s alcohol wd  ondansetron   Disintegrating Tablet 4 milliGRAM(s) Oral every 6 hours PRN Nausea and/or Vomiting    HOME MEDICATIONS:  aspirin 81 mg oral tablet (03-19)  Eliquis 5 mg oral tablet (03-19)  losartan 50 mg oral tablet (03-19)  Metoprolol Tartrate 25 mg oral tablet (03-19)    REVIEW OF SYSTEMS:  All other review of systems is negative unless indicated above.     PHYSICAL EXAM:  GENERAL: NAD  HEENT: No Swelling  CHEST/LUNG: Good air entry, No wheezing  HEART: RRR, No murmurs  ABDOMEN: Soft, Bowel sounds present  EXTREMITIES:  No clubbing

## 2020-03-26 ENCOUNTER — TRANSCRIPTION ENCOUNTER (OUTPATIENT)
Age: 65
End: 2020-03-26

## 2020-03-26 VITALS — DIASTOLIC BLOOD PRESSURE: 88 MMHG | SYSTOLIC BLOOD PRESSURE: 170 MMHG | HEART RATE: 70 BPM

## 2020-03-26 LAB — GLUCOSE BLDC GLUCOMTR-MCNC: 119 MG/DL — HIGH (ref 70–99)

## 2020-03-26 PROCEDURE — 99238 HOSP IP/OBS DSCHRG MGMT 30/<: CPT

## 2020-03-26 RX ADMIN — Medication 100 MILLIGRAM(S): at 12:28

## 2020-03-26 RX ADMIN — Medication 81 MILLIGRAM(S): at 12:28

## 2020-03-26 RX ADMIN — LOSARTAN POTASSIUM 50 MILLIGRAM(S): 100 TABLET, FILM COATED ORAL at 05:32

## 2020-03-26 RX ADMIN — APIXABAN 5 MILLIGRAM(S): 2.5 TABLET, FILM COATED ORAL at 05:32

## 2020-03-26 RX ADMIN — CHLORHEXIDINE GLUCONATE 1 APPLICATION(S): 213 SOLUTION TOPICAL at 05:33

## 2020-03-26 RX ADMIN — Medication 1 TABLET(S): at 12:28

## 2020-03-26 RX ADMIN — Medication 1 MILLIGRAM(S): at 12:28

## 2020-03-26 NOTE — PROGRESS NOTE ADULT - SUBJECTIVE AND OBJECTIVE BOX
CHAPARRO ROBISON  64y, Male  Allergy: No Known Allergies    Hospital Day: 7d    Patient seen and examined earlier today. No acute events overnight.    PMH/PSH:  PAST MEDICAL & SURGICAL HISTORY:  Pulmonary embolism: 2015  Chronic back pain  Alcohol dependence  HLD (hyperlipidemia)  HTN (hypertension)  CHF (congestive heart failure)  TIA (transient ischemic attack)  History of appendectomy    VITALS:  T(F): 98.5 (03-26-20 @ 05:39), Max: 98.5 (03-26-20 @ 05:39)  HR: 70 (03-26-20 @ 06:51)  BP: 170/88 (03-26-20 @ 06:51) (150/81 - 186/90)  RR: 18 (03-26-20 @ 05:39)  SpO2: --    TESTS & MEASUREMENTS:  Weight (Kg):       03-24-20 @ 07:01  -  03-25-20 @ 07:00  --------------------------------------------------------  IN: 0 mL / OUT: 500 mL / NET: -500 mL    03-25-20 @ 07:01  -  03-26-20 @ 07:00  --------------------------------------------------------  IN: 0 mL / OUT: 200 mL / NET: -200 mL    Culture - Blood (collected 03-21-20 @ 15:15)  Source: .Blood Blood  Preliminary Report (03-23-20 @ 03:02):    No growth to date.    Culture - Blood (collected 03-21-20 @ 15:15)  Source: .Blood Blood  Preliminary Report (03-23-20 @ 03:02):    No growth to date.    MEDICATIONS:  MEDICATIONS  (STANDING):  apixaban 5 milliGRAM(s) Oral every 12 hours  aspirin  chewable 81 milliGRAM(s) Oral daily  chlorhexidine 4% Liquid 1 Application(s) Topical <User Schedule>  folic acid 1 milliGRAM(s) Oral daily  losartan 50 milliGRAM(s) Oral daily  metoprolol succinate ER 25 milliGRAM(s) Oral daily  multivitamin/minerals 1 Tablet(s) Oral daily  thiamine 100 milliGRAM(s) Oral daily    MEDICATIONS  (PRN):  acetaminophen   Tablet .. 650 milliGRAM(s) Oral every 4 hours PRN Temp greater or equal to 38C (100.4F), Mild Pain (1 - 3)  bismuth subsalicylate Liquid 30 milliLiter(s) Oral every 6 hours PRN Diarrhea  guaifenesin/dextromethorphan  Syrup 10 milliLiter(s) Oral four times a day PRN Cough  LORazepam   Injectable 2 milliGRAM(s) IV Push every 1 hour PRN Symptom-triggered: each CIWA -Ar score 8 or GREATER  LORazepam   Injectable 2 milliGRAM(s) IV Push every 4 hours PRN Agitation/ s & s alcohol wd  ondansetron   Disintegrating Tablet 4 milliGRAM(s) Oral every 6 hours PRN Nausea and/or Vomiting    HOME MEDICATIONS:  aspirin 81 mg oral tablet (03-19)  Eliquis 5 mg oral tablet (03-19)  losartan 50 mg oral tablet (03-19)  Metoprolol Tartrate 25 mg oral tablet (03-19)    REVIEW OF SYSTEMS:  All other review of systems is negative unless indicated above.     PHYSICAL EXAM:  GENERAL: NAD  HEENT: No Swelling  CHEST/LUNG: Good air entry, No wheezing  HEART: RRR, No murmurs  ABDOMEN: Soft, Bowel sounds present  EXTREMITIES:  No clubbing

## 2020-03-26 NOTE — PROGRESS NOTE ADULT - ASSESSMENT
64y Male with PMH: CHF, HLD, HTN, TIA, PE, prior  ETOH abuse is presenting to ED for fall was upgraded to ICU due to worsening DTs.     # Delirium Tremens  #ETOH dependence/withdrawal syndrome   #Suspected thiamine/folate deficiency  -MVI, thiamine, folic acid   -completed ciwa protocol  -off oxygen  -transferred from CCU to medical floor on 3/22  -medically stable for d/c    # LLL basilar opacity likely atelectasis  - PER ID, no need for abx as there is no evidence for pna. LLL basilar opacity seen on CXR likely due to atelectasis  - vantin discontinued    # Mechanical Fall  -rehab/pt as tolerated  -seen by Physiatry -- STR recommended but pt presently refusing - Pt would like to go home stating that he has a wheelchair he can use    # Hx of chronic Pulmonary Embolism  -on eliquis   -LE duplex negative for DVT      #) Thrombocytopenia --- likely underlying ETOH hx   -hold ASA  -monitor CBC as outpt    # Tobacco use/dependence  -smoking cessation and counseling done   -spent over 15 min    #hypokalemia  -replete po     #Progress Note Handoff  Pending (specify): DC planing  Family discussion: d/w pt regarding SNF placement  Disposition: SNF

## 2020-03-26 NOTE — DISCHARGE NOTE NURSING/CASE MANAGEMENT/SOCIAL WORK - PATIENT PORTAL LINK FT
You can access the FollowMyHealth Patient Portal offered by [facility] by registering at the following website: [email or web address]. By joining [employer]’s FollowMyHealth portal, you will also be able to view your health information using other applications (apps) compatible with our system.

## 2020-03-26 NOTE — PROGRESS NOTE ADULT - REASON FOR ADMISSION
S/P Fall X 2 over the past week

## 2020-03-27 LAB
CULTURE RESULTS: SIGNIFICANT CHANGE UP
CULTURE RESULTS: SIGNIFICANT CHANGE UP
SPECIMEN SOURCE: SIGNIFICANT CHANGE UP
SPECIMEN SOURCE: SIGNIFICANT CHANGE UP

## 2020-03-30 DIAGNOSIS — Z91.14 PATIENT'S OTHER NONCOMPLIANCE WITH MEDICATION REGIMEN: ICD-10-CM

## 2020-03-30 DIAGNOSIS — E53.8 DEFICIENCY OF OTHER SPECIFIED B GROUP VITAMINS: ICD-10-CM

## 2020-03-30 DIAGNOSIS — Z86.711 PERSONAL HISTORY OF PULMONARY EMBOLISM: ICD-10-CM

## 2020-03-30 DIAGNOSIS — Z91.81 HISTORY OF FALLING: ICD-10-CM

## 2020-03-30 DIAGNOSIS — Z79.82 LONG TERM (CURRENT) USE OF ASPIRIN: ICD-10-CM

## 2020-03-30 DIAGNOSIS — I11.0 HYPERTENSIVE HEART DISEASE WITH HEART FAILURE: ICD-10-CM

## 2020-03-30 DIAGNOSIS — G89.29 OTHER CHRONIC PAIN: ICD-10-CM

## 2020-03-30 DIAGNOSIS — M54.9 DORSALGIA, UNSPECIFIED: ICD-10-CM

## 2020-03-30 DIAGNOSIS — E78.5 HYPERLIPIDEMIA, UNSPECIFIED: ICD-10-CM

## 2020-03-30 DIAGNOSIS — Z86.73 PERSONAL HISTORY OF TRANSIENT ISCHEMIC ATTACK (TIA), AND CEREBRAL INFARCTION WITHOUT RESIDUAL DEFICITS: ICD-10-CM

## 2020-03-30 DIAGNOSIS — R50.9 FEVER, UNSPECIFIED: ICD-10-CM

## 2020-03-30 DIAGNOSIS — E51.9 THIAMINE DEFICIENCY, UNSPECIFIED: ICD-10-CM

## 2020-03-30 DIAGNOSIS — I50.9 HEART FAILURE, UNSPECIFIED: ICD-10-CM

## 2020-03-30 DIAGNOSIS — F10.231 ALCOHOL DEPENDENCE WITH WITHDRAWAL DELIRIUM: ICD-10-CM

## 2020-03-30 DIAGNOSIS — Z79.01 LONG TERM (CURRENT) USE OF ANTICOAGULANTS: ICD-10-CM

## 2020-03-30 DIAGNOSIS — F17.210 NICOTINE DEPENDENCE, CIGARETTES, UNCOMPLICATED: ICD-10-CM

## 2020-03-30 DIAGNOSIS — D69.59 OTHER SECONDARY THROMBOCYTOPENIA: ICD-10-CM

## 2020-03-30 DIAGNOSIS — E87.6 HYPOKALEMIA: ICD-10-CM

## 2020-04-25 ENCOUNTER — EMERGENCY (EMERGENCY)
Facility: HOSPITAL | Age: 65
LOS: 0 days | Discharge: HOME | End: 2020-04-25
Attending: EMERGENCY MEDICINE | Admitting: EMERGENCY MEDICINE
Payer: MEDICARE

## 2020-04-25 VITALS
RESPIRATION RATE: 20 BRPM | DIASTOLIC BLOOD PRESSURE: 73 MMHG | OXYGEN SATURATION: 99 % | HEART RATE: 95 BPM | SYSTOLIC BLOOD PRESSURE: 129 MMHG

## 2020-04-25 VITALS
HEIGHT: 69 IN | WEIGHT: 274.92 LBS | RESPIRATION RATE: 20 BRPM | TEMPERATURE: 98 F | DIASTOLIC BLOOD PRESSURE: 73 MMHG | SYSTOLIC BLOOD PRESSURE: 131 MMHG | OXYGEN SATURATION: 97 % | HEART RATE: 100 BPM

## 2020-04-25 DIAGNOSIS — Z79.01 LONG TERM (CURRENT) USE OF ANTICOAGULANTS: ICD-10-CM

## 2020-04-25 DIAGNOSIS — R04.0 EPISTAXIS: ICD-10-CM

## 2020-04-25 DIAGNOSIS — Z79.82 LONG TERM (CURRENT) USE OF ASPIRIN: ICD-10-CM

## 2020-04-25 DIAGNOSIS — E78.5 HYPERLIPIDEMIA, UNSPECIFIED: ICD-10-CM

## 2020-04-25 DIAGNOSIS — Z79.899 OTHER LONG TERM (CURRENT) DRUG THERAPY: ICD-10-CM

## 2020-04-25 DIAGNOSIS — I50.9 HEART FAILURE, UNSPECIFIED: ICD-10-CM

## 2020-04-25 DIAGNOSIS — Z90.49 ACQUIRED ABSENCE OF OTHER SPECIFIED PARTS OF DIGESTIVE TRACT: ICD-10-CM

## 2020-04-25 DIAGNOSIS — I11.0 HYPERTENSIVE HEART DISEASE WITH HEART FAILURE: ICD-10-CM

## 2020-04-25 DIAGNOSIS — Z90.49 ACQUIRED ABSENCE OF OTHER SPECIFIED PARTS OF DIGESTIVE TRACT: Chronic | ICD-10-CM

## 2020-04-25 PROBLEM — I26.99 OTHER PULMONARY EMBOLISM WITHOUT ACUTE COR PULMONALE: Chronic | Status: ACTIVE | Noted: 2020-03-19

## 2020-04-25 PROBLEM — F10.20 ALCOHOL DEPENDENCE, UNCOMPLICATED: Chronic | Status: ACTIVE | Noted: 2020-03-19

## 2020-04-25 PROBLEM — M54.9 DORSALGIA, UNSPECIFIED: Chronic | Status: ACTIVE | Noted: 2020-03-19

## 2020-04-25 LAB
ALBUMIN SERPL ELPH-MCNC: 3.4 G/DL — LOW (ref 3.5–5.2)
ALP SERPL-CCNC: 97 U/L — SIGNIFICANT CHANGE UP (ref 30–115)
ALT FLD-CCNC: 45 U/L — HIGH (ref 0–41)
ANION GAP SERPL CALC-SCNC: 12 MMOL/L — SIGNIFICANT CHANGE UP (ref 7–14)
AST SERPL-CCNC: 29 U/L — SIGNIFICANT CHANGE UP (ref 0–41)
BASOPHILS # BLD AUTO: 0.06 K/UL — SIGNIFICANT CHANGE UP (ref 0–0.2)
BASOPHILS NFR BLD AUTO: 0.5 % — SIGNIFICANT CHANGE UP (ref 0–1)
BILIRUB SERPL-MCNC: 0.3 MG/DL — SIGNIFICANT CHANGE UP (ref 0.2–1.2)
BUN SERPL-MCNC: 20 MG/DL — SIGNIFICANT CHANGE UP (ref 10–20)
CALCIUM SERPL-MCNC: 9.1 MG/DL — SIGNIFICANT CHANGE UP (ref 8.5–10.1)
CHLORIDE SERPL-SCNC: 102 MMOL/L — SIGNIFICANT CHANGE UP (ref 98–110)
CO2 SERPL-SCNC: 25 MMOL/L — SIGNIFICANT CHANGE UP (ref 17–32)
CREAT SERPL-MCNC: 1 MG/DL — SIGNIFICANT CHANGE UP (ref 0.7–1.5)
EOSINOPHIL # BLD AUTO: 0.16 K/UL — SIGNIFICANT CHANGE UP (ref 0–0.7)
EOSINOPHIL NFR BLD AUTO: 1.4 % — SIGNIFICANT CHANGE UP (ref 0–8)
GLUCOSE SERPL-MCNC: 200 MG/DL — HIGH (ref 70–99)
HCT VFR BLD CALC: 33 % — LOW (ref 42–52)
HGB BLD-MCNC: 10.9 G/DL — LOW (ref 14–18)
IMM GRANULOCYTES NFR BLD AUTO: 0.8 % — HIGH (ref 0.1–0.3)
LYMPHOCYTES # BLD AUTO: 18.5 % — LOW (ref 20.5–51.1)
LYMPHOCYTES # BLD AUTO: 2.17 K/UL — SIGNIFICANT CHANGE UP (ref 1.2–3.4)
MCHC RBC-ENTMCNC: 31.1 PG — HIGH (ref 27–31)
MCHC RBC-ENTMCNC: 33 G/DL — SIGNIFICANT CHANGE UP (ref 32–37)
MCV RBC AUTO: 94.3 FL — HIGH (ref 80–94)
MONOCYTES # BLD AUTO: 1.04 K/UL — HIGH (ref 0.1–0.6)
MONOCYTES NFR BLD AUTO: 8.9 % — SIGNIFICANT CHANGE UP (ref 1.7–9.3)
NEUTROPHILS # BLD AUTO: 8.23 K/UL — HIGH (ref 1.4–6.5)
NEUTROPHILS NFR BLD AUTO: 69.9 % — SIGNIFICANT CHANGE UP (ref 42.2–75.2)
NRBC # BLD: 0 /100 WBCS — SIGNIFICANT CHANGE UP (ref 0–0)
PLATELET # BLD AUTO: 216 K/UL — SIGNIFICANT CHANGE UP (ref 130–400)
POTASSIUM SERPL-MCNC: 4.5 MMOL/L — SIGNIFICANT CHANGE UP (ref 3.5–5)
POTASSIUM SERPL-SCNC: 4.5 MMOL/L — SIGNIFICANT CHANGE UP (ref 3.5–5)
PROT SERPL-MCNC: 6.2 G/DL — SIGNIFICANT CHANGE UP (ref 6–8)
RBC # BLD: 3.5 M/UL — LOW (ref 4.7–6.1)
RBC # FLD: 13.5 % — SIGNIFICANT CHANGE UP (ref 11.5–14.5)
SODIUM SERPL-SCNC: 139 MMOL/L — SIGNIFICANT CHANGE UP (ref 135–146)
WBC # BLD: 11.75 K/UL — HIGH (ref 4.8–10.8)
WBC # FLD AUTO: 11.75 K/UL — HIGH (ref 4.8–10.8)

## 2020-04-25 PROCEDURE — 99284 EMERGENCY DEPT VISIT MOD MDM: CPT

## 2020-04-25 NOTE — ED PROVIDER NOTE - NS ED ROS FT
GEN: (-) fever, (-) chills, (-) malaise  HEENT: (-) vision changes, (-) HA, (-) sore throat, (-) ear pain, (+) epistaxis   CV: (-) chest pain, (-) palpitations, (-) edema  PULM: (-) cough, (-) wheezing, (-) dyspnea  GI: (-) abdominal pain,(-) Nausea, (-) Vomiting, (-) Diarrhea  NEURO: (-) weakness, (-) paresthesias, (-) syncope  : (-) dysuria, (-) frequency, (-) urgency  MS: (-) back pain, (-) joint pain, (-)myalgias, (-) swelling  SKIN: (-) rashes, (-) new lesions  HEME: (+) bleeding, (-) ecchymosis

## 2020-04-25 NOTE — ED PROVIDER NOTE - PATIENT PORTAL LINK FT
You can access the FollowMyHealth Patient Portal offered by [facility] by registering at the following website: [email or web address]. By joining [employer]’s FollowMyHealth portal, you will also be able to view your health information using other applications (apps) compatible with our system.

## 2020-04-25 NOTE — ED PROVIDER NOTE - OBJECTIVE STATEMENT
The pt is a 64y M with PMH HTN, CHF, TIA, alcohol dependence, hx of PE no longer on eliquis, taking ASA is presenting to ED with nosebleed x 2hrs. pt states he awoke this early am with a mod nose bleed. no aggravating or relieving factors. He states he tried tipping head back and holding nose with paper towel without relief. He did not attempt to hold pressure. associated with slight lightheadedness. Pt denies trauma, fall, anticoagulant use, f/c, cough, cp, dizziness, syncope, hx of bleeding disorder.

## 2020-04-25 NOTE — ED ADULT NURSE NOTE - PMH
Alcohol dependence    CHF (congestive heart failure)    Chronic back pain    HLD (hyperlipidemia)    HTN (hypertension)    Pulmonary embolism  2015  TIA (transient ischemic attack)

## 2020-04-25 NOTE — ED PROVIDER NOTE - PHYSICAL EXAMINATION
GEN: Alert & Oriented x 3, No acute distress. Calm, appropriate.  Head and Neck: Normocephalic, atraumatic.  ENT: Oral mucosa pink, moist without lesions. No pharyngeal injection noted. No current bleeding down back of pharynx, no source of active bleeding coming from bilateral nares. site of bleeding not identified  Eyes: PERRL. No conjunctival injection. No scleral icterus.   RESP: Lungs clear to auscult bilat. no wheezes, rhonchi or rales. No retractions. Equal air entry.  CARDIO: tachycardic rate and regular rhythm, no murmurs, rubs or gallops. Normal S1, S2. Radial pulses 2+ bilaterally. No lower extremity edema.  MS: moving all ext.  SKIN: no rashes/lesions, no petechiae, no ecchymosis.  NEURO: CN II-XII grossly intact. Speech and cognition normal.

## 2020-04-25 NOTE — ED PROVIDER NOTE - CLINICAL SUMMARY MEDICAL DECISION MAKING FREE TEXT BOX
Labs noted for hgb 10.9, platelets normal. Epistaxis stopped with direst pressure. Will D/C home and refer to ENT.

## 2020-04-25 NOTE — ED PROVIDER NOTE - CARE PROVIDER_API CALL
Marcial Mahoney)  Otolaryngology  [address] [place] [address], [address] [place] [address]  Phone: [phone]  Fax: [phone]  Follow Up Time: 1-3 Days

## 2020-04-25 NOTE — ED PROVIDER NOTE - ATTENDING CONTRIBUTION TO CARE
I personally evaluated the patient. I reviewed the Resident’s or Physician Assistant’s note (as assigned above), and agree with the findings and plan except as documented in my note.  Chart reviewed. H/O PE, HTN, alcohol abuse, thrombocytopenia, presents with spontaneous nosebleed that started on left nostril 2 hours ago. On ASA and Losartan. Exam shows alert patient in no distress, HEENT minimal bleeding both nostrils, no active posterior bleeding, lungs clear, RR S1S2, abdomen soft NT +BS, no rash or petechiae.

## 2020-04-25 NOTE — ED PROVIDER NOTE - PROGRESS NOTE DETAILS
Had pt blow out clots and gargle. nose not currently bleeding. will observe, and follow up lab results. pt currently not bleeding will dc with ENT follow up. return precautions given. pt endorses understanding.

## 2020-04-25 NOTE — ED ADULT NURSE NOTE - NSIMPLEMENTINTERV_GEN_ALL_ED
Implemented All Universal Safety Interventions:  [place] to call system. Call bell, personal items and telephone within reach. Instruct patient to call for assistance. Room bathroom lighting operational. Non-slip footwear when patient is off stretcher. Physically safe environment: no spills, clutter or unnecessary equipment. Stretcher in lowest position, wheels locked, appropriate side rails in place.

## 2020-05-26 ENCOUNTER — INPATIENT (INPATIENT)
Facility: HOSPITAL | Age: 65
LOS: 4 days | Discharge: HOME | End: 2020-05-31
Attending: INTERNAL MEDICINE | Admitting: INTERNAL MEDICINE
Payer: MEDICARE

## 2020-05-26 VITALS
HEIGHT: 68 IN | WEIGHT: 199.96 LBS | HEART RATE: 109 BPM | TEMPERATURE: 98 F | RESPIRATION RATE: 18 BRPM | DIASTOLIC BLOOD PRESSURE: 103 MMHG | SYSTOLIC BLOOD PRESSURE: 213 MMHG | OXYGEN SATURATION: 99 %

## 2020-05-26 DIAGNOSIS — Z90.49 ACQUIRED ABSENCE OF OTHER SPECIFIED PARTS OF DIGESTIVE TRACT: Chronic | ICD-10-CM

## 2020-05-26 LAB
ALBUMIN SERPL ELPH-MCNC: 4.7 G/DL — SIGNIFICANT CHANGE UP (ref 3.5–5.2)
ALP SERPL-CCNC: 95 U/L — SIGNIFICANT CHANGE UP (ref 30–115)
ALT FLD-CCNC: 52 U/L — HIGH (ref 0–41)
ANION GAP SERPL CALC-SCNC: 23 MMOL/L — HIGH (ref 7–14)
APPEARANCE UR: CLEAR — SIGNIFICANT CHANGE UP
APTT BLD: 25.8 SEC — LOW (ref 27–39.2)
AST SERPL-CCNC: 72 U/L — HIGH (ref 0–41)
BACTERIA # UR AUTO: ABNORMAL
BASOPHILS # BLD AUTO: 0.05 K/UL — SIGNIFICANT CHANGE UP (ref 0–0.2)
BASOPHILS NFR BLD AUTO: 0.4 % — SIGNIFICANT CHANGE UP (ref 0–1)
BILIRUB SERPL-MCNC: 0.6 MG/DL — SIGNIFICANT CHANGE UP (ref 0.2–1.2)
BILIRUB UR-MCNC: NEGATIVE — SIGNIFICANT CHANGE UP
BUN SERPL-MCNC: 14 MG/DL — SIGNIFICANT CHANGE UP (ref 10–20)
CALCIUM SERPL-MCNC: 9.1 MG/DL — SIGNIFICANT CHANGE UP (ref 8.5–10.1)
CHLORIDE SERPL-SCNC: 99 MMOL/L — SIGNIFICANT CHANGE UP (ref 98–110)
CK SERPL-CCNC: 868 U/L — HIGH (ref 0–225)
CO2 SERPL-SCNC: 17 MMOL/L — SIGNIFICANT CHANGE UP (ref 17–32)
COD CRY URNS QL: NEGATIVE — SIGNIFICANT CHANGE UP
COLOR SPEC: YELLOW — SIGNIFICANT CHANGE UP
CREAT SERPL-MCNC: 1 MG/DL — SIGNIFICANT CHANGE UP (ref 0.7–1.5)
DIFF PNL FLD: ABNORMAL
EOSINOPHIL # BLD AUTO: 0.02 K/UL — SIGNIFICANT CHANGE UP (ref 0–0.7)
EOSINOPHIL NFR BLD AUTO: 0.2 % — SIGNIFICANT CHANGE UP (ref 0–8)
EPI CELLS # UR: ABNORMAL /HPF
ETHANOL SERPL-MCNC: 78 MG/DL — HIGH
GLUCOSE SERPL-MCNC: 79 MG/DL — SIGNIFICANT CHANGE UP (ref 70–99)
GLUCOSE UR QL: 100 MG/DL
GRAN CASTS # UR COMP ASSIST: ABNORMAL /LPF
HCT VFR BLD CALC: 42.4 % — SIGNIFICANT CHANGE UP (ref 42–52)
HGB BLD-MCNC: 13.9 G/DL — LOW (ref 14–18)
HYALINE CASTS # UR AUTO: ABNORMAL /LPF
IMM GRANULOCYTES NFR BLD AUTO: 0.8 % — HIGH (ref 0.1–0.3)
INR BLD: 1.1 RATIO — SIGNIFICANT CHANGE UP (ref 0.65–1.3)
KETONES UR-MCNC: 80 — SIGNIFICANT CHANGE UP
LEUKOCYTE ESTERASE UR-ACNC: NEGATIVE — SIGNIFICANT CHANGE UP
LYMPHOCYTES # BLD AUTO: 0.92 K/UL — LOW (ref 1.2–3.4)
LYMPHOCYTES # BLD AUTO: 7.8 % — LOW (ref 20.5–51.1)
MAGNESIUM SERPL-MCNC: 2.1 MG/DL — SIGNIFICANT CHANGE UP (ref 1.8–2.4)
MCHC RBC-ENTMCNC: 31.5 PG — HIGH (ref 27–31)
MCHC RBC-ENTMCNC: 32.8 G/DL — SIGNIFICANT CHANGE UP (ref 32–37)
MCV RBC AUTO: 96.1 FL — HIGH (ref 80–94)
MONOCYTES # BLD AUTO: 0.51 K/UL — SIGNIFICANT CHANGE UP (ref 0.1–0.6)
MONOCYTES NFR BLD AUTO: 4.3 % — SIGNIFICANT CHANGE UP (ref 1.7–9.3)
NEUTROPHILS # BLD AUTO: 10.26 K/UL — HIGH (ref 1.4–6.5)
NEUTROPHILS NFR BLD AUTO: 86.5 % — HIGH (ref 42.2–75.2)
NITRITE UR-MCNC: NEGATIVE — SIGNIFICANT CHANGE UP
NRBC # BLD: 0 /100 WBCS — SIGNIFICANT CHANGE UP (ref 0–0)
PH UR: 5.5 — SIGNIFICANT CHANGE UP (ref 5–8)
PLATELET # BLD AUTO: 194 K/UL — SIGNIFICANT CHANGE UP (ref 130–400)
POTASSIUM SERPL-MCNC: 4.4 MMOL/L — SIGNIFICANT CHANGE UP (ref 3.5–5)
POTASSIUM SERPL-SCNC: 4.4 MMOL/L — SIGNIFICANT CHANGE UP (ref 3.5–5)
PROT SERPL-MCNC: 7.7 G/DL — SIGNIFICANT CHANGE UP (ref 6–8)
PROT UR-MCNC: 30
PROTHROM AB SERPL-ACNC: 12.6 SEC — SIGNIFICANT CHANGE UP (ref 9.95–12.87)
RBC # BLD: 4.41 M/UL — LOW (ref 4.7–6.1)
RBC # FLD: 15 % — HIGH (ref 11.5–14.5)
RBC CASTS # UR COMP ASSIST: SIGNIFICANT CHANGE UP /HPF
SODIUM SERPL-SCNC: 139 MMOL/L — SIGNIFICANT CHANGE UP (ref 135–146)
SP GR SPEC: >=1.03 (ref 1.01–1.03)
TRI-PHOS CRY UR QL COMP ASSIST: NEGATIVE — SIGNIFICANT CHANGE UP
TROPONIN T SERPL-MCNC: 0.02 NG/ML — HIGH
URATE CRY FLD QL MICRO: NEGATIVE — SIGNIFICANT CHANGE UP
UROBILINOGEN FLD QL: 0.2 — SIGNIFICANT CHANGE UP (ref 0.2–0.2)
WBC # BLD: 11.85 K/UL — HIGH (ref 4.8–10.8)
WBC # FLD AUTO: 11.85 K/UL — HIGH (ref 4.8–10.8)
WBC UR QL: SIGNIFICANT CHANGE UP /HPF

## 2020-05-26 PROCEDURE — 99223 1ST HOSP IP/OBS HIGH 75: CPT

## 2020-05-26 PROCEDURE — 70450 CT HEAD/BRAIN W/O DYE: CPT | Mod: 26

## 2020-05-26 PROCEDURE — 71045 X-RAY EXAM CHEST 1 VIEW: CPT | Mod: 26

## 2020-05-26 PROCEDURE — 99285 EMERGENCY DEPT VISIT HI MDM: CPT | Mod: CS,GC

## 2020-05-26 RX ORDER — DIAZEPAM 5 MG
10 TABLET ORAL ONCE
Refills: 0 | Status: DISCONTINUED | OUTPATIENT
Start: 2020-05-26 | End: 2020-05-26

## 2020-05-26 RX ORDER — SODIUM CHLORIDE 9 MG/ML
1000 INJECTION, SOLUTION INTRAVENOUS
Refills: 0 | Status: DISCONTINUED | OUTPATIENT
Start: 2020-05-26 | End: 2020-05-27

## 2020-05-26 RX ORDER — SODIUM CHLORIDE 9 MG/ML
1000 INJECTION, SOLUTION INTRAVENOUS ONCE
Refills: 0 | Status: COMPLETED | OUTPATIENT
Start: 2020-05-26 | End: 2020-05-26

## 2020-05-26 RX ORDER — ASPIRIN/CALCIUM CARB/MAGNESIUM 324 MG
81 TABLET ORAL DAILY
Refills: 0 | Status: DISCONTINUED | OUTPATIENT
Start: 2020-05-26 | End: 2020-05-31

## 2020-05-26 RX ORDER — METOPROLOL TARTRATE 50 MG
25 TABLET ORAL
Refills: 0 | Status: DISCONTINUED | OUTPATIENT
Start: 2020-05-26 | End: 2020-05-31

## 2020-05-26 RX ORDER — APIXABAN 2.5 MG/1
5 TABLET, FILM COATED ORAL EVERY 12 HOURS
Refills: 0 | Status: DISCONTINUED | OUTPATIENT
Start: 2020-05-26 | End: 2020-05-31

## 2020-05-26 RX ORDER — LOSARTAN POTASSIUM 100 MG/1
50 TABLET, FILM COATED ORAL DAILY
Refills: 0 | Status: DISCONTINUED | OUTPATIENT
Start: 2020-05-26 | End: 2020-05-31

## 2020-05-26 RX ORDER — FOLIC ACID 0.8 MG
1 TABLET ORAL ONCE
Refills: 0 | Status: COMPLETED | OUTPATIENT
Start: 2020-05-26 | End: 2020-05-26

## 2020-05-26 RX ORDER — THIAMINE MONONITRATE (VIT B1) 100 MG
100 TABLET ORAL ONCE
Refills: 0 | Status: COMPLETED | OUTPATIENT
Start: 2020-05-26 | End: 2020-05-26

## 2020-05-26 RX ADMIN — Medication 1 MILLIGRAM(S): at 16:27

## 2020-05-26 RX ADMIN — Medication 10 MILLIGRAM(S): at 16:40

## 2020-05-26 RX ADMIN — Medication 50 MILLIGRAM(S): at 21:21

## 2020-05-26 RX ADMIN — Medication 100 MILLIGRAM(S): at 16:27

## 2020-05-26 RX ADMIN — APIXABAN 5 MILLIGRAM(S): 2.5 TABLET, FILM COATED ORAL at 17:43

## 2020-05-26 RX ADMIN — SODIUM CHLORIDE 1000 MILLILITER(S): 9 INJECTION, SOLUTION INTRAVENOUS at 16:26

## 2020-05-26 RX ADMIN — Medication 25 MILLIGRAM(S): at 17:43

## 2020-05-26 RX ADMIN — Medication 50 MILLIGRAM(S): at 17:42

## 2020-05-26 RX ADMIN — Medication 1 MILLIGRAM(S): at 13:22

## 2020-05-26 RX ADMIN — LOSARTAN POTASSIUM 50 MILLIGRAM(S): 100 TABLET, FILM COATED ORAL at 17:43

## 2020-05-26 RX ADMIN — Medication 10 MILLIGRAM(S): at 14:05

## 2020-05-26 RX ADMIN — SODIUM CHLORIDE 1000 MILLILITER(S): 9 INJECTION, SOLUTION INTRAVENOUS at 14:06

## 2020-05-26 NOTE — ED ADULT NURSE NOTE - CHIEF COMPLAINT QUOTE
BIBA from home for complaint of fall. Pt. states his legs gave in on him. Patient sister claimed that he has been confused and has not been taken care of himself.

## 2020-05-26 NOTE — ED PROVIDER NOTE - CLINICAL SUMMARY MEDICAL DECISION MAKING FREE TEXT BOX
64y male above PMH BIBEMS after welfare check found him on floor covered in feces and urine, pt admits to drinking and falling and chose to stay on ground, no pain, on exam vital signs appreciated, AAO x 2 (does know month and year but not day), +coarse tremors and tongue fasciculations, head nc/at, perrla, dry mm neck supple no ctls ttp cor tachy, reg, lungs cta abd snt calves nontender pelvis stable FROM x 4 neuro nonfocal, given benzo with improvement, labs and studies reviewed, mental status stable though still hypertensive/mildly tachycardic, no hallucinosis, d/w Dr Crews intensivist, not a unit candidate, will admit to monitored setting

## 2020-05-26 NOTE — H&P ADULT - ASSESSMENT
63 yo M With PMHx of etoh dependence, chronic diastolic CHF, chronic back pain, HLD, HTN, PE on eliquis, TIA who was BIBEMS for slurred speech and disorientation after sister was concerned. Admitted for management of alcohol withdrawal    #Alcohol withdrawal w HTN urgency  - Start librium protocol  - Detox consult  - Ativan PRN for severe withdrawal  - Thiamine  - IVF    #HTN, w HTN urgency  - Treat for withdrawal  - Cont losartan 50mg qD  - Cont Metoprolol succ 25mg qD  - Cont ASA 81mg qD    # Hx of chronic Pulmonary Embolism  -on eliquis   -LE duplex negative for DVT     #Chronic dCHF  - No signs of fluid overload on exam    DVT PPX, on Eliquis 63 yo M With PMHx of etoh dependence, chronic diastolic CHF, chronic back pain, HLD, HTN, PE on eliquis, TIA who was BIBEMS for slurred speech and disorientation after sister was concerned. Admitted for management of alcohol withdrawal    #Alcohol withdrawal w HTN urgency  - Recently admitted on 03/2020 for alcohol withdrawal complicated by delirium tremens  - Start librium protocol  - Detox consult  - Ativan PRN for severe withdrawal  - Thiamine  - IVF    #HTN, w HTN urgency  - Treat for withdrawal  - Cont losartan 50mg qD  - Cont Metoprolol succ 25mg qD  - Cont ASA 81mg qD    # Hx of chronic Pulmonary Embolism  -on eliquis   -LE duplex negative for DVT     #Chronic dCHF  - No signs of fluid overload on exam    DVT PPX, on Eliquis 65 yo M With PMHx of etoh dependence, chronic diastolic CHF, chronic back pain, HLD, HTN, PE on eliquis, TIA who was BIBEMS for slurred speech and disorientation after sister was concerned. Admitted for management of alcohol withdrawal    #Alcohol withdrawal w HTN urgency  - Recently admitted on 03/2020 for alcohol withdrawal complicated by delirium tremens  - Start librium protocol  - Detox consult  - Ativan PRN for severe withdrawal  - Thiamine  - IVF    #HTN, w HTN urgency  - Treat for withdrawal  - Cont losartan 50mg qD  - Cont Metoprolol tartrate 25mg BID  - Cont ASA 81mg qD    # Hx of chronic Pulmonary Embolism  -on eliquis   -LE duplex negative for DVT     #Chronic dCHF  - No signs of fluid overload on exam    DVT PPX, on Eliquis

## 2020-05-26 NOTE — ED ADULT NURSE NOTE - NSIMPLEMENTINTERV_GEN_ALL_ED
Implemented All Fall with Harm Risk Interventions:  [place] to call system. Call bell, personal items and telephone within reach. Instruct patient to call for assistance. Room bathroom lighting operational. Non-slip footwear when patient is off stretcher. Physically safe environment: no spills, clutter or unnecessary equipment. Stretcher in lowest position, wheels locked, appropriate side rails in place. Provide visual cue, wrist band, yellow gown, etc. Monitor gait and stability. Monitor for mental status changes and reorient to person, place, and time. Review medications for side effects contributing to fall risk. Reinforce activity limits and safety measures with patient and family. Provide visual clues: red socks.

## 2020-05-26 NOTE — ED PROVIDER NOTE - PROGRESS NOTE DETAILS
TC: 63 yo M TC: 63 yo M With PMHx of etoh dependence, CHF, chronic back pain, HLD, HTN, PE no longer on eliquis, TIA who was BIBEMS for slurred speech and disorientation after sister was concerned. Here in ED, tachy 100s, otherwise vitals wnl. A&Ox3 here, no slurred speech. Has tongue fasciculations and tremors c/w etoh withdrawal but no other FND. No external signs of trauma. Ordered labs, ekg, urine, xrays, CT. Given ativan for withdrawal. Will monitor. TC: Pt still tremulous, given valium. TC: 63 yo M With PMHx of etoh dependence, CHF, chronic back pain, HLD, HTN, PE no longer on eliquis, TIA who was BIBEMS for slurred speech and disorientation after sister was concerned. Here in ED, tachy 100s, hypertensive 210s/100s. A&Ox3 here, no slurred speech. Has tongue fasciculations and tremors c/w etoh withdrawal but no other FND. No external signs of trauma. Ordered labs, ekg, urine, xrays, CT. Given ativan for withdrawal. Will monitor. TC: Pt still tremulous, given valium. Labs notable for BUN/Cr at baseline, mildly elevated AST/ALT 70s/50s at baseline, CK 800s. CT head negative. Cxr negative. Will continue to monitor. TC: 63 yo M With PMHx of etoh dependence, CHF, chronic back pain, HLD, HTN, PE on eliquis, TIA who was BIBEMS for slurred speech and disorientation after sister was concerned. Here in ED, tachy 100s, hypertensive 210s/100s. A&Ox3 here, no slurred speech. Has tongue fasciculations and tremors c/w etoh withdrawal but no other FND. No external signs of trauma. Ordered labs, ekg, urine, xrays, CT. Given ativan for withdrawal. Will monitor. TC: Reassessed pt, no complaints at this time. HR 100s, BP initially improved but now back up to 210s/90s. TC: BP up again and pt tremulous, given valium. Trop 0.02. Not a candidate for ICU per Dr. Crews. Given IVF, thiamine, folate. Will admit.

## 2020-05-26 NOTE — ED PROVIDER NOTE - PHYSICAL EXAMINATION
CONSTITUTIONAL: well developed, well nourished, no acute distress, poor hygiene, clothes soaked in urine, vomitus on shoes  TRAUMA: ABC intact, GCS 15  HEAD: normocephalic, atraumatic  EYES: PERRL at 3 mm, EOMI, no raccoon eyes  ENT: no rossi sign, moist mucous membranes, no mandibular instability, no mandibular malalignment  NECK:  no midline tenderness, no stepoffs, no deformity  CV: tachycardic rate, regular rhythm, equal distal pulses  RESP: lungs clear to auscultation bilaterally, normal work of breathing, symmetric rise and fall of chest   CHEST: no chest wall tenderness, no crepitus, no clavicular deformity/tenting  ABD: soft, nondistended, nontender, no rebound, no guarding, no rigidity, no pelvic instability  BACK: no midline T/L/S-spine tenderness, no stepoffs, no deformity  EXT: no obvious deformity, no tenderness of extremities, full ROM, cap refill < 2 seconds  SKIN: no rashes, no lacerations, no abrasions  NEURO: A&Ox3, motor strength 5/5 in all extremities, sensation grossly intact throughout, +tongue fasciculations, tremors in BUE, GCS 15  PSYCH: normal mood, appropriate affect

## 2020-05-26 NOTE — ED PROVIDER NOTE - OBJECTIVE STATEMENT
65 yo M With PMHx of etoh dependence, CHF, chronic back pain, HLD, HTN, PE no longer on eliquis, TIA who was BIBEMS from home for slurred speech. Per pt, states that his legs gave out today which caused him to fall onto his L side. No head trauma or LOC. No preceding cp, sob, dizziness, palpitations prior to fall. Pt did not want to get up so lied on the ground. His sister called him today to check on him and noticed that he had slurred speech so she called EMS. Upon EMS arrival, pt was lying with his clothes soaked in urine/vomit and was not oriented to year. Currently pt has no complaints. No headache, neck pain, cp, sob, abd pain, cough, diarrhea, dysuria, leg swelling, pain in extremities, unilateral weakness, numbness. Drinks etoh daily, last drink this AM, unknown hx of withdrawal. 63 yo M With PMHx of etoh dependence, CHF, chronic back pain, HLD, HTN, PE on eliquis, TIA who was BIBEMS from home for slurred speech. Per pt, states that his legs gave out today which caused him to fall onto his L side. No head trauma or LOC. No preceding cp, sob, dizziness, palpitations prior to fall. Pt did not want to get up so lied on the ground. His sister called him today to check on him and noticed that he had slurred speech so she called EMS. Upon EMS arrival, pt was lying with his clothes soaked in urine/vomit and was not oriented to year. Currently pt has no complaints. No headache, neck pain, cp, sob, abd pain, cough, diarrhea, dysuria, leg swelling, pain in extremities, unilateral weakness, numbness. Drinks etoh daily, last drink this AM, +hx of ICU admission for DTs.

## 2020-05-26 NOTE — H&P ADULT - HISTORY OF PRESENT ILLNESS
65 yo M With PMHx of etoh dependence, CHF, chronic back pain, HLD, HTN, PE on eliquis, TIA who was BIBEMS for slurred speech and disorientation after sister was concerned. Here in ED, tachy 100s, hypertensive 210s/100s. A&Ox3 here, no slurred speech. Has tongue fasciculations and tremors c/w etoh withdrawal.

## 2020-05-26 NOTE — ED PROVIDER NOTE - CARE PLAN
Principal Discharge DX:	Alcohol withdrawal Principal Discharge DX:	Alcohol withdrawal  Secondary Diagnosis:	Non-traumatic rhabdomyolysis

## 2020-05-26 NOTE — ED ADULT TRIAGE NOTE - CHIEF COMPLAINT QUOTE
BIBA from home for complaint of fall. Pt. states his legs gave in on him. BIBA from home for complaint of fall. Pt. states his legs gave in on him. Patient sister claimed that he has been confused and has not been taken care of himself. BIBA from home for complaint of fall. Pt. states his legs gave in on him. Patient sister claimed that he has been confused and has not been taken care of himself. FS 96

## 2020-05-26 NOTE — ED PROVIDER NOTE - NS ED ROS FT
GEN:  no fever, no chills  NEURO:  no headache, no dizziness  ENT: no sore throat, no runny nose  CV:  no chest pain, no palpitations  RESP:  no sob, no cough  GI:  no nausea, no vomiting, no abdominal pain, no diarrhea  :  no dysuria, no urinary frequency, no hematuria  MSK:  no joint pain, no edema  SKIN:  no rash, no bruising  HEME: no hematochezia, no melena GEN:  no fever, no chills  NEURO:  no headache, no dizziness  ENT: no sore throat, no runny nose  CV:  no chest pain, no palpitations  RESP:  no sob, no cough  GI:  no nausea, + vomiting, no abdominal pain, no diarrhea  :  no dysuria, no urinary frequency, no hematuria  MSK:  no joint pain, no edema  SKIN:  no rash, no bruising  HEME: no hematochezia, no melena

## 2020-05-26 NOTE — ED ADULT NURSE NOTE - CHPI ED NUR SYMPTOMS NEG
no abrasion/no bleeding/no confusion/no numbness/no vomiting/no fever/no tingling/no loss of consciousness/no weakness/no deformity

## 2020-05-27 LAB
ANION GAP SERPL CALC-SCNC: 10 MMOL/L — SIGNIFICANT CHANGE UP (ref 7–14)
BUN SERPL-MCNC: 10 MG/DL — SIGNIFICANT CHANGE UP (ref 10–20)
CALCIUM SERPL-MCNC: 8.4 MG/DL — LOW (ref 8.5–10.1)
CHLORIDE SERPL-SCNC: 101 MMOL/L — SIGNIFICANT CHANGE UP (ref 98–110)
CK MB CFR SERPL CALC: 3.8 NG/ML — SIGNIFICANT CHANGE UP (ref 0.6–6.3)
CK SERPL-CCNC: 565 U/L — HIGH (ref 0–225)
CO2 SERPL-SCNC: 26 MMOL/L — SIGNIFICANT CHANGE UP (ref 17–32)
CREAT SERPL-MCNC: 0.8 MG/DL — SIGNIFICANT CHANGE UP (ref 0.7–1.5)
GLUCOSE SERPL-MCNC: 108 MG/DL — HIGH (ref 70–99)
HCT VFR BLD CALC: 40.8 % — LOW (ref 42–52)
HGB BLD-MCNC: 13.4 G/DL — LOW (ref 14–18)
MCHC RBC-ENTMCNC: 31.9 PG — HIGH (ref 27–31)
MCHC RBC-ENTMCNC: 32.8 G/DL — SIGNIFICANT CHANGE UP (ref 32–37)
MCV RBC AUTO: 97.1 FL — HIGH (ref 80–94)
MYOGLOBIN SERPL-MCNC: 492 NG/ML — HIGH (ref 0–70)
NRBC # BLD: 0 /100 WBCS — SIGNIFICANT CHANGE UP (ref 0–0)
PLATELET # BLD AUTO: 180 K/UL — SIGNIFICANT CHANGE UP (ref 130–400)
POTASSIUM SERPL-MCNC: 3.8 MMOL/L — SIGNIFICANT CHANGE UP (ref 3.5–5)
POTASSIUM SERPL-SCNC: 3.8 MMOL/L — SIGNIFICANT CHANGE UP (ref 3.5–5)
RBC # BLD: 4.2 M/UL — LOW (ref 4.7–6.1)
RBC # FLD: 14.9 % — HIGH (ref 11.5–14.5)
SARS-COV-2 RNA SPEC QL NAA+PROBE: SIGNIFICANT CHANGE UP
SODIUM SERPL-SCNC: 137 MMOL/L — SIGNIFICANT CHANGE UP (ref 135–146)
TROPONIN T SERPL-MCNC: <0.01 NG/ML — SIGNIFICANT CHANGE UP
WBC # BLD: 9.37 K/UL — SIGNIFICANT CHANGE UP (ref 4.8–10.8)
WBC # FLD AUTO: 9.37 K/UL — SIGNIFICANT CHANGE UP (ref 4.8–10.8)

## 2020-05-27 PROCEDURE — 99233 SBSQ HOSP IP/OBS HIGH 50: CPT

## 2020-05-27 RX ADMIN — LOSARTAN POTASSIUM 50 MILLIGRAM(S): 100 TABLET, FILM COATED ORAL at 05:24

## 2020-05-27 RX ADMIN — Medication 50 MILLIGRAM(S): at 05:24

## 2020-05-27 RX ADMIN — Medication 25 MILLIGRAM(S): at 17:04

## 2020-05-27 RX ADMIN — Medication 50 MILLIGRAM(S): at 09:55

## 2020-05-27 RX ADMIN — Medication 25 MILLIGRAM(S): at 05:24

## 2020-05-27 RX ADMIN — Medication 50 MILLIGRAM(S): at 13:58

## 2020-05-27 RX ADMIN — APIXABAN 5 MILLIGRAM(S): 2.5 TABLET, FILM COATED ORAL at 05:24

## 2020-05-27 RX ADMIN — Medication 81 MILLIGRAM(S): at 12:05

## 2020-05-27 RX ADMIN — APIXABAN 5 MILLIGRAM(S): 2.5 TABLET, FILM COATED ORAL at 21:15

## 2020-05-27 RX ADMIN — Medication 25 MILLIGRAM(S): at 21:15

## 2020-05-27 RX ADMIN — Medication 25 MILLIGRAM(S): at 17:05

## 2020-05-27 NOTE — PROGRESS NOTE ADULT - ASSESSMENT
63 yo M With PMHx of etoh dependence, chronic diastolic CHF, chronic back pain, HLD, HTN, PE on eliquis, TIA who was BIBEMS for slurred speech and disorientation after sister was concerned. Admitted for management of alcohol withdrawal.    #Alcohol withdrawal w HTN urgency  - Recently admitted on 03/2020 for alcohol withdrawal complicated by delirium tremens  -CIWA 2-3 upon my encounter; but per RN, patient was scoring higher earlier in AM  PLAN  -CIWA protocol  -librium protocol  -f/u Detox consult  -Ativan PRN for severe withdrawal  -Thiamine  -IVF    #HTN, w HTN urgency likely 2/2 Alcohol withdrawl  - Treat for withdrawal  PLAN  - Cont losartan 50mg qD  - Cont Metoprolol tartrate 25mg BID  - Cont ASA 81mg qD    # Hx of chronic Pulmonary Embolism-on eliquis   -LE duplex negative for DVT     #Chronic dCHF  - No signs of fluid overload on exam    DVT PPX, on Eliquis    Note: Patient expressed that he would like to go home today against medical advice. Patient is AAOX3 and reports he is being picked up in afternoon. He reported understanding of the risks of leaving against medical advice (alcoholol withdrawl worsening symptoms, deterium tremens, seizures, and not limited to death)    Progress Note Handoff  Pending Consults: Detox  Pending Tests: None  Pending Results: None  Family Discussion: Discussed with patient  Disposition: Home__X___/SNF______/Other_____/Unknown at this time_____    Please call me with any questions at extension [phone]

## 2020-05-27 NOTE — PROGRESS NOTE ADULT - SUBJECTIVE AND OBJECTIVE BOX
CHAPARRO ROBISON  64y  Male      Patient is a 64y old  Male who presents with a chief complaint of Alcohol withdrawal (26 May 2020 16:41)      INTERVAL HPI/OVERNIGHT EVENTS:  Patient seen and examined earlier this morning. Patient reports he had tremors and was hallucinating previously but denies these symptoms on my encounter. He reports he would like to go home and he is being picked up this afternoon. He was explained that he is not medically stable for discharge at this time. He reports that he understands it and would sign the AMA form so he can go home. He denies F/C, CP, palpitations, SOB, N/V, abd pain, tremors, headache, hallucinations at this time.      REVIEW OF SYSTEMS:  CONSTITUTIONAL: No fever, weight loss, or fatigue  EYES: No eye pain, visual disturbances, or discharge  ENMT:  No difficulty hearing, tinnitus, vertigo; No sinus or throat pain  NECK: No pain or stiffness  RESPIRATORY: No cough, wheezing, chills or hemoptysis; No shortness of breath  CARDIOVASCULAR: No chest pain, palpitations, dizziness, or leg swelling  GASTROINTESTINAL: No abdominal or epigastric pain. No nausea, vomiting, or hematemesis; No diarrhea or constipation. No melena or hematochezia.  GENITOURINARY: No dysuria, frequency, hematuria, or incontinence  NEUROLOGICAL: No headaches, memory loss, loss of strength, numbness, or tremors  SKIN: No itching, burning, rashes, or lesions   LYMPH NODES: No enlarged glands  ENDOCRINE: No heat or cold intolerance; No hair loss  MUSCULOSKELETAL: No joint pain or swelling; No muscle, back, or extremity pain  PSYCHIATRIC: No depression, anxiety, mood swings, or difficulty sleeping  HEME/LYMPH: No easy bruising, or bleeding gums  ALLERY AND IMMUNOLOGIC: No hives or eczema    T(C): 36.3 (05-27-20 @ 06:31), Max: 36.3 (05-27-20 @ 06:31)  HR: 70 (05-27-20 @ 06:31) (70 - 107)  BP: 137/96 (05-27-20 @ 06:31) (137/96 - 217/95)  RR: 18 (05-27-20 @ 06:31) (18 - 18)  SpO2: 98% (05-27-20 @ 06:31) (98% - 99%)    PHYSICAL EXAM:  GENERAL: NAD, well-groomed, well-developed, non-diaphoretic   HEAD:  Atraumatic, Normocephalic  EYES: EOMI, PERRLA, conjunctiva and sclera clear  ENMT: No tonsillar erythema, exudates, or enlargement; Moist mucous membranes, Good dentition, No lesions  NECK: Supple, No JVD, Normal thyroid  NERVOUS SYSTEM:  Alert & Oriented X3, Good concentration; Motor Strength 5/5 B/L upper and lower extremities; DTRs 2+ intact and symmetric, no tremors  CHEST/LUNG: Clear to percussion bilaterally; No rales, rhonchi, wheezing, or rubs  HEART: Regular rate and rhythm; No murmurs, rubs, or gallops  ABDOMEN: Soft, Nontender, Nondistended; Bowel sounds present  EXTREMITIES:  2+ Peripheral Pulses, No clubbing, cyanosis, or edema  LYMPH: No lymphadenopathy noted  SKIN: No rashes or lesions    Consultant(s) Notes Reviewed:  [x ] YES  [ ] NO  Care Discussed with Consultants/Other Providers [ x] YES  [ ] NO    LAB:                        13.4   9.37  )-----------( 180      ( 27 May 2020 06:32 )             40.8     05-27    137  |  101  |  10  ----------------------------<  108<H>  3.8   |  26  |  0.8    Ca    8.4<L>      27 May 2020 06:32  Mg     2.1     05-26    TPro  7.7  /  Alb  4.7  /  TBili  0.6  /  DBili  x   /  AST  72<H>  /  ALT  52<H>  /  AlkPhos  95  05-26    LIVER FUNCTIONS - ( 26 May 2020 13:22 )  Alb: 4.7 g/dL / Pro: 7.7 g/dL / ALK PHOS: 95 U/L / ALT: 52 U/L / AST: 72 U/L / GGT: x           CARDIAC MARKERS ( 27 May 2020 12:26 )  x     / <0.01 ng/mL / 565 U/L / x     / 3.8 ng/mL  CARDIAC MARKERS ( 26 May 2020 15:43 )  x     / 0.02 ng/mL / x     / x     / x      CARDIAC MARKERS ( 26 May 2020 13:22 )  x     / x     / 868 U/L / x     / x                  Drug Dosing Weight  Height (cm): 172.72 (26 May 2020 12:40)  Weight (kg): 90.7 (26 May 2020 12:40)  BMI (kg/m2): 30.4 (26 May 2020 12:40)  BSA (m2): 2.04 (26 May 2020 12:40)    CAPILLARY BLOOD GLUCOSE        I&O's Summary    26 May 2020 07:01  -  27 May 2020 07:00  --------------------------------------------------------  IN: 0 mL / OUT: 150 mL / NET: -150 mL      Urinalysis Basic - ( 26 May 2020 17:42 )    Color: Yellow / Appearance: Clear / SG: >=1.030 / pH: x  Gluc: x / Ketone: 80  / Bili: Negative / Urobili: 0.2   Blood: x / Protein: 30 / Nitrite: Negative   Leuk Esterase: Negative / RBC: 1-2 /HPF / WBC 3-5 /HPF   Sq Epi: x / Non Sq Epi: Occasional /HPF / Bacteria: Few        RADIOLOGY & ADDITIONAL TESTS:  Imaging Personally Reviewed:  [x] YES  [ ] NO    HEALTH ISSUES - PROBLEM Dx:          MEDS:  apixaban 5 milliGRAM(s) Oral every 12 hours  aspirin  chewable 81 milliGRAM(s) Oral daily  chlordiazePOXIDE   Oral   chlordiazePOXIDE 50 milliGRAM(s) Oral every 4 hours  chlordiazePOXIDE 25 milliGRAM(s) Oral every 4 hours  dextrose 5% + sodium chloride 0.9%. 1000 milliLiter(s) IV Continuous <Continuous>  LORazepam   Injectable 2 milliGRAM(s) IV Push four times a day PRN  losartan 50 milliGRAM(s) Oral daily  metoprolol tartrate 25 milliGRAM(s) Oral two times a day

## 2020-05-27 NOTE — CHART NOTE - NSCHARTNOTEFT_GEN_A_CORE
-pt signed AMA earlier  pt unable to ambulate, pt reports no  family member is coming to pick him  up   -pt consult placed   -pt does not want to pay for transportation   -pt now reports will stay until  tomorrow until is being seen by    -case discussed with the attending

## 2020-05-28 LAB
ALBUMIN SERPL ELPH-MCNC: 3.9 G/DL — SIGNIFICANT CHANGE UP (ref 3.5–5.2)
ALP SERPL-CCNC: 74 U/L — SIGNIFICANT CHANGE UP (ref 30–115)
ALT FLD-CCNC: 29 U/L — SIGNIFICANT CHANGE UP (ref 0–41)
ANION GAP SERPL CALC-SCNC: 8 MMOL/L — SIGNIFICANT CHANGE UP (ref 7–14)
AST SERPL-CCNC: 35 U/L — SIGNIFICANT CHANGE UP (ref 0–41)
BILIRUB SERPL-MCNC: 0.7 MG/DL — SIGNIFICANT CHANGE UP (ref 0.2–1.2)
BUN SERPL-MCNC: 10 MG/DL — SIGNIFICANT CHANGE UP (ref 10–20)
CALCIUM SERPL-MCNC: 8.7 MG/DL — SIGNIFICANT CHANGE UP (ref 8.5–10.1)
CHLORIDE SERPL-SCNC: 104 MMOL/L — SIGNIFICANT CHANGE UP (ref 98–110)
CO2 SERPL-SCNC: 26 MMOL/L — SIGNIFICANT CHANGE UP (ref 17–32)
CREAT SERPL-MCNC: 0.7 MG/DL — SIGNIFICANT CHANGE UP (ref 0.7–1.5)
GLUCOSE SERPL-MCNC: 117 MG/DL — HIGH (ref 70–99)
HCT VFR BLD CALC: 38.2 % — LOW (ref 42–52)
HGB BLD-MCNC: 12.2 G/DL — LOW (ref 14–18)
MCHC RBC-ENTMCNC: 31.2 PG — HIGH (ref 27–31)
MCHC RBC-ENTMCNC: 31.9 G/DL — LOW (ref 32–37)
MCV RBC AUTO: 97.7 FL — HIGH (ref 80–94)
NRBC # BLD: 0 /100 WBCS — SIGNIFICANT CHANGE UP (ref 0–0)
PLATELET # BLD AUTO: 134 K/UL — SIGNIFICANT CHANGE UP (ref 130–400)
POTASSIUM SERPL-MCNC: 3.4 MMOL/L — LOW (ref 3.5–5)
POTASSIUM SERPL-SCNC: 3.4 MMOL/L — LOW (ref 3.5–5)
PROT SERPL-MCNC: 6.1 G/DL — SIGNIFICANT CHANGE UP (ref 6–8)
RBC # BLD: 3.91 M/UL — LOW (ref 4.7–6.1)
RBC # FLD: 14.7 % — HIGH (ref 11.5–14.5)
SODIUM SERPL-SCNC: 138 MMOL/L — SIGNIFICANT CHANGE UP (ref 135–146)
WBC # BLD: 6.1 K/UL — SIGNIFICANT CHANGE UP (ref 4.8–10.8)
WBC # FLD AUTO: 6.1 K/UL — SIGNIFICANT CHANGE UP (ref 4.8–10.8)

## 2020-05-28 PROCEDURE — 99233 SBSQ HOSP IP/OBS HIGH 50: CPT

## 2020-05-28 RX ORDER — POTASSIUM CHLORIDE 20 MEQ
20 PACKET (EA) ORAL
Refills: 0 | Status: COMPLETED | OUTPATIENT
Start: 2020-05-28 | End: 2020-05-28

## 2020-05-28 RX ADMIN — Medication 25 MILLIGRAM(S): at 10:27

## 2020-05-28 RX ADMIN — Medication 25 MILLIGRAM(S): at 17:45

## 2020-05-28 RX ADMIN — APIXABAN 5 MILLIGRAM(S): 2.5 TABLET, FILM COATED ORAL at 17:45

## 2020-05-28 RX ADMIN — Medication 20 MILLIGRAM(S): at 21:34

## 2020-05-28 RX ADMIN — Medication 25 MILLIGRAM(S): at 14:15

## 2020-05-28 RX ADMIN — Medication 81 MILLIGRAM(S): at 11:51

## 2020-05-28 RX ADMIN — Medication 25 MILLIGRAM(S): at 05:23

## 2020-05-28 RX ADMIN — Medication 20 MILLIEQUIVALENT(S): at 14:15

## 2020-05-28 RX ADMIN — Medication 25 MILLIGRAM(S): at 01:00

## 2020-05-28 RX ADMIN — Medication 25 MILLIGRAM(S): at 05:24

## 2020-05-28 RX ADMIN — Medication 20 MILLIEQUIVALENT(S): at 17:47

## 2020-05-28 RX ADMIN — LOSARTAN POTASSIUM 50 MILLIGRAM(S): 100 TABLET, FILM COATED ORAL at 05:24

## 2020-05-28 RX ADMIN — Medication 20 MILLIGRAM(S): at 17:45

## 2020-05-28 RX ADMIN — APIXABAN 5 MILLIGRAM(S): 2.5 TABLET, FILM COATED ORAL at 05:23

## 2020-05-28 NOTE — PROGRESS NOTE ADULT - ASSESSMENT
65 yo M With PMHx of etoh dependence, chronic diastolic CHF, chronic back pain, HLD, HTN, PE on eliquis, TIA who was BIBEMS for slurred speech and disorientation after sister was concerned. Admitted for management of alcohol withdrawal.    #Alcohol withdrawal w HTN urgency  - Recently admitted on 03/2020 for alcohol withdrawal complicated by delirium tremens  -CIWA 1-2 upon my encounter  -patient wanted to leave AMA yesterday  PLAN  -CIWA protocol  -librium protocol  -f/u Detox consult  -Ativan PRN for severe withdrawal  -Thiamine  -IVF    #weakness in lower extremity 2/2 debility  -patient was deemed not a safe discharge  -patient initially attempted to leave AMA (AAOX3) but was not able to get up and ambulate on his own  PLAN  -social work consult  -physical therapy on board (patient requiring 2 person assist)    #HTN, w HTN urgency likely 2/2 Alcohol withdrawl  - Treat for withdrawal  - Cont losartan 50mg qD  - Cont Metoprolol tartrate 25mg BID  - Cont ASA 81mg qD    # Hx of chronic Pulmonary Embolism-on eliquis   -LE duplex negative for DVT     #Chronic dCHF  - No signs of fluid overload on exam    DVT PPX, on Eliquis    Progress Note Handoff  Pending Consults: Detox, physical therapy final recs for dispo  Pending Tests: None  Pending Results: None  Family Discussion: Discussed with patient  Disposition: Home__X___/SNF______/Other_____/Unknown at this time_____

## 2020-05-28 NOTE — PHYSICAL THERAPY INITIAL EVALUATION ADULT - GAIT DEVIATIONS NOTED, PT EVAL
forward flexed trunk, shuffling steps, leaning backwards/decreased fish/decreased weight-shifting ability

## 2020-05-28 NOTE — PROGRESS NOTE ADULT - SUBJECTIVE AND OBJECTIVE BOX
CHAPARRO ROBISON  64y  Male      Patient is a 64y old  Male who presents with a chief complaint of Alcohol withdrawal (26 May 2020 16:41)      INTERVAL HPI/OVERNIGHT EVENTS:  Patient seen and examined earlier this morning. Patient denies any tremors, headache, hallucinations, N/V, abd pain, CP, palpitations, SOB. He was working with physical therapy this AM and required 2 person assist to get up. Had a conversation with patient that it would be against medical advice to leave AMA since he is not able to get up and walk without any assist. He reported understanding and is willing to work with physical therapy.    REVIEW OF SYSTEMS:  CONSTITUTIONAL: No fever, weight loss, +weakness  EYES: No eye pain, visual disturbances, or discharge  ENMT:  No difficulty hearing, tinnitus, vertigo; No sinus or throat pain  NECK: No pain or stiffness  RESPIRATORY: No cough, wheezing, chills or hemoptysis; No shortness of breath  CARDIOVASCULAR: No chest pain, palpitations, dizziness, or leg swelling  GASTROINTESTINAL: No abdominal or epigastric pain. No nausea, vomiting, or hematemesis; No diarrhea or constipation. No melena or hematochezia.  GENITOURINARY: No dysuria, frequency, hematuria, or incontinence  NEUROLOGICAL: No headaches, memory loss, loss of strength, numbness, or tremors, no confusion  MUSCULOSKELETAL: No joint pain or swelling; No muscle, back, or extremity pain +weakness in lower extremity  PSYCHIATRIC: No depression, anxiety, mood swings, or difficulty sleeping, no hallucination    Vital Signs Last 24 Hrs  T(C): 35.7 (28 May 2020 05:26), Max: 36.2 (27 May 2020 18:46)  T(F): 96.2 (28 May 2020 05:26), Max: 97.1 (27 May 2020 18:46)  HR: 74 (28 May 2020 05:26) (65 - 101)  BP: 178/88 (28 May 2020 05:26) (169/84 - 190/103)  BP(mean): --  RR: 18 (28 May 2020 05:26) (18 - 18)  SpO2: 98% (27 May 2020 18:46) (98% - 98%)    PHYSICAL EXAM:  GENERAL: NAD, well-developed, non-diaphoretic   HEAD:  Atraumatic, Normocephalic  EYES: EOMI, PERRLA, conjunctiva and sclera clear  ENMT: No tonsillar erythema, exudates, or enlargement; Moist mucous membranes, Good dentition, No lesions  NECK: Supple, No JVD, Normal thyroid  NERVOUS SYSTEM:  Alert & Oriented X3, Good concentration; DTRs 2+ intact and symmetric, no tremors; +weakness in lower extremity, not able to get up without any assist  CHEST/LUNG: Clear to percussion bilaterally; No rales, rhonchi, wheezing, or rubs  HEART: Regular rate and rhythm; No murmurs, rubs, or gallops  ABDOMEN: Soft, Nontender, Nondistended; Bowel sounds present  EXTREMITIES:  2+ Peripheral Pulses, No clubbing, cyanosis, or edema  PSYCH: cooperative    Consultant(s) Notes Reviewed:  [x ] YES  [ ] NO  Care Discussed with Consultants/Other Providers [ x] YES  [ ] NO                            12.2   6.10  )-----------( 134      ( 28 May 2020 07:07 )             38.2   05-28    138  |  104  |  10  ----------------------------<  117<H>  3.4<L>   |  26  |  0.7    Ca    8.7      28 May 2020 07:07  Mg     2.1     05-26    TPro  6.1  /  Alb  3.9  /  TBili  0.7  /  DBili  x   /  AST  35  /  ALT  29  /  AlkPhos  74  05-28        Drug Dosing Weight  Height (cm): 172.72 (26 May 2020 12:40)  Weight (kg): 90.7 (26 May 2020 12:40)  BMI (kg/m2): 30.4 (26 May 2020 12:40)  BSA (m2): 2.04 (26 May 2020 12:40)    CAPILLARY BLOOD GLUCOSE        I&O's Summary    26 May 2020 07:01  -  27 May 2020 07:00  --------------------------------------------------------  IN: 0 mL / OUT: 150 mL / NET: -150 mL      Urinalysis Basic - ( 26 May 2020 17:42 )    Color: Yellow / Appearance: Clear / SG: >=1.030 / pH: x  Gluc: x / Ketone: 80  / Bili: Negative / Urobili: 0.2   Blood: x / Protein: 30 / Nitrite: Negative   Leuk Esterase: Negative / RBC: 1-2 /HPF / WBC 3-5 /HPF   Sq Epi: x / Non Sq Epi: Occasional /HPF / Bacteria: Few        RADIOLOGY & ADDITIONAL TESTS:  Imaging Personally Reviewed:  [x] YES  [ ] NO    HEALTH ISSUES - PROBLEM Dx:      MEDICATIONS  (STANDING):  apixaban 5 milliGRAM(s) Oral every 12 hours  aspirin  chewable 81 milliGRAM(s) Oral daily  chlordiazePOXIDE   Oral   chlordiazePOXIDE 25 milliGRAM(s) Oral every 4 hours  chlordiazePOXIDE 20 milliGRAM(s) Oral every 4 hours  losartan 50 milliGRAM(s) Oral daily  metoprolol tartrate 25 milliGRAM(s) Oral two times a day  potassium chloride    Tablet ER 20 milliEquivalent(s) Oral every 2 hours    MEDICATIONS  (PRN):  LORazepam   Injectable 2 milliGRAM(s) IV Push four times a day PRN Alcohol withdrawal

## 2020-05-28 NOTE — PHYSICAL THERAPY INITIAL EVALUATION ADULT - GENERAL OBSERVATIONS, REHAB EVAL
Pt encountered semi-reclined in bed, NAD, ok to be seen by PT as confirmed by RN and pt agreeable to PT.

## 2020-05-28 NOTE — PHYSICAL THERAPY INITIAL EVALUATION ADULT - ADDITIONAL COMMENTS
Pt reports living alone in private house - unclear if there are stairs; pt has RW and rollator at home; states he has friends/neighbors that help him at home.

## 2020-05-28 NOTE — PHYSICAL THERAPY INITIAL EVALUATION ADULT - PLANNED THERAPY INTERVENTIONS, PT EVAL
balance training/gait training/strengthening/postural re-education/bed mobility training/transfer training

## 2020-05-28 NOTE — PHYSICAL THERAPY INITIAL EVALUATION ADULT - PHYSICAL ASSIST/NONPHYSICAL ASSIST: SIT/STAND, REHAB EVAL
2 person assist/1 person assist/assistance level varies - initially 2 person then improved to 1 person

## 2020-05-29 LAB
ALBUMIN SERPL ELPH-MCNC: 3.8 G/DL — SIGNIFICANT CHANGE UP (ref 3.5–5.2)
ALP SERPL-CCNC: 69 U/L — SIGNIFICANT CHANGE UP (ref 30–115)
ALT FLD-CCNC: 43 U/L — HIGH (ref 0–41)
ANION GAP SERPL CALC-SCNC: 11 MMOL/L — SIGNIFICANT CHANGE UP (ref 7–14)
AST SERPL-CCNC: 54 U/L — HIGH (ref 0–41)
BILIRUB SERPL-MCNC: 0.5 MG/DL — SIGNIFICANT CHANGE UP (ref 0.2–1.2)
BUN SERPL-MCNC: 12 MG/DL — SIGNIFICANT CHANGE UP (ref 10–20)
CALCIUM SERPL-MCNC: 8.6 MG/DL — SIGNIFICANT CHANGE UP (ref 8.5–10.1)
CHLORIDE SERPL-SCNC: 105 MMOL/L — SIGNIFICANT CHANGE UP (ref 98–110)
CO2 SERPL-SCNC: 25 MMOL/L — SIGNIFICANT CHANGE UP (ref 17–32)
CREAT SERPL-MCNC: 0.9 MG/DL — SIGNIFICANT CHANGE UP (ref 0.7–1.5)
GLUCOSE SERPL-MCNC: 104 MG/DL — HIGH (ref 70–99)
HCT VFR BLD CALC: 36.5 % — LOW (ref 42–52)
HGB BLD-MCNC: 11.6 G/DL — LOW (ref 14–18)
MAGNESIUM SERPL-MCNC: 2.1 MG/DL — SIGNIFICANT CHANGE UP (ref 1.8–2.4)
MCHC RBC-ENTMCNC: 31.4 PG — HIGH (ref 27–31)
MCHC RBC-ENTMCNC: 31.8 G/DL — LOW (ref 32–37)
MCV RBC AUTO: 98.9 FL — HIGH (ref 80–94)
NRBC # BLD: 0 /100 WBCS — SIGNIFICANT CHANGE UP (ref 0–0)
PLATELET # BLD AUTO: 112 K/UL — LOW (ref 130–400)
POTASSIUM SERPL-MCNC: 3.7 MMOL/L — SIGNIFICANT CHANGE UP (ref 3.5–5)
POTASSIUM SERPL-SCNC: 3.7 MMOL/L — SIGNIFICANT CHANGE UP (ref 3.5–5)
PROT SERPL-MCNC: 6 G/DL — SIGNIFICANT CHANGE UP (ref 6–8)
RBC # BLD: 3.69 M/UL — LOW (ref 4.7–6.1)
RBC # FLD: 14.6 % — HIGH (ref 11.5–14.5)
SODIUM SERPL-SCNC: 141 MMOL/L — SIGNIFICANT CHANGE UP (ref 135–146)
WBC # BLD: 6.41 K/UL — SIGNIFICANT CHANGE UP (ref 4.8–10.8)
WBC # FLD AUTO: 6.41 K/UL — SIGNIFICANT CHANGE UP (ref 4.8–10.8)

## 2020-05-29 PROCEDURE — 99233 SBSQ HOSP IP/OBS HIGH 50: CPT

## 2020-05-29 RX ADMIN — Medication 25 MILLIGRAM(S): at 17:24

## 2020-05-29 RX ADMIN — APIXABAN 5 MILLIGRAM(S): 2.5 TABLET, FILM COATED ORAL at 17:24

## 2020-05-29 RX ADMIN — Medication 81 MILLIGRAM(S): at 11:14

## 2020-05-29 RX ADMIN — Medication 10 MILLIGRAM(S): at 17:24

## 2020-05-29 RX ADMIN — APIXABAN 5 MILLIGRAM(S): 2.5 TABLET, FILM COATED ORAL at 05:00

## 2020-05-29 RX ADMIN — LOSARTAN POTASSIUM 50 MILLIGRAM(S): 100 TABLET, FILM COATED ORAL at 05:00

## 2020-05-29 RX ADMIN — Medication 20 MILLIGRAM(S): at 05:00

## 2020-05-29 RX ADMIN — Medication 20 MILLIGRAM(S): at 01:47

## 2020-05-29 RX ADMIN — Medication 10 MILLIGRAM(S): at 21:41

## 2020-05-29 RX ADMIN — Medication 20 MILLIGRAM(S): at 14:30

## 2020-05-29 RX ADMIN — Medication 20 MILLIGRAM(S): at 11:14

## 2020-05-29 RX ADMIN — Medication 25 MILLIGRAM(S): at 05:00

## 2020-05-29 NOTE — PROGRESS NOTE ADULT - SUBJECTIVE AND OBJECTIVE BOX
CHAPARRO ROBISON  64y  Male      Patient is a 64y old  Male who presents with a chief complaint of Alcohol withdrawal (26 May 2020 16:41)      INTERVAL HPI/OVERNIGHT EVENTS:  Patient seen and examined earlier this morning. Patient denies any tremors, headache, hallucinations, N/V, abd pain, CP, palpitations, SOB. He denies any alcohol withdrawl symptoms; he reports that he worked with PT this morning.     REVIEW OF SYSTEMS:  CONSTITUTIONAL: No fever, weight loss, +weakness  EYES: No eye pain, visual disturbances, or discharge  ENMT:  No difficulty hearing, tinnitus, vertigo; No sinus or throat pain  NECK: No pain or stiffness  RESPIRATORY: No cough, wheezing, chills or hemoptysis; No shortness of breath  CARDIOVASCULAR: No chest pain, palpitations, dizziness, or leg swelling  GASTROINTESTINAL: No abdominal or epigastric pain. No nausea, vomiting, or hematemesis; No diarrhea or constipation. No melena or hematochezia.  GENITOURINARY: No dysuria, frequency, hematuria, or incontinence  NEUROLOGICAL: No headaches, memory loss, loss of strength, numbness, or tremors, no confusion  MUSCULOSKELETAL: No joint pain or swelling; No muscle, back, or extremity pain +weakness in lower extremity  PSYCHIATRIC: No depression, anxiety, mood swings, or difficulty sleeping, no hallucination    Vital Signs Last 24 Hrs  T(C): 36.4 (29 May 2020 05:00), Max: 36.9 (28 May 2020 21:00)  T(F): 97.5 (29 May 2020 05:00), Max: 98.4 (28 May 2020 21:00)  HR: 65 (29 May 2020 05:00) (60 - 71)  BP: 148/79 (29 May 2020 05:00) (127/63 - 148/79)  BP(mean): --  RR: 18 (29 May 2020 05:00) (17 - 18)  SpO2: --    PHYSICAL EXAM:  GENERAL: NAD, well-developed, non-diaphoretic   HEAD:  Atraumatic, Normocephalic  EYES: EOMI, PERRLA, conjunctiva and sclera clear  ENMT: No tonsillar erythema, exudates, or enlargement; Moist mucous membranes, Good dentition, No lesions  NECK: Supple, No JVD, Normal thyroid  NERVOUS SYSTEM:  Alert & Oriented X3, Good concentration; DTRs 2+ intact and symmetric, no tremors; +weakness in lower extremity, not able to get up without any assist  CHEST/LUNG: Clear to percussion bilaterally; No rales, rhonchi, wheezing, or rubs  HEART: Regular rate and rhythm; No murmurs, rubs, or gallops  ABDOMEN: Soft, Nontender, Nondistended; Bowel sounds present  EXTREMITIES:  2+ Peripheral Pulses, No clubbing, cyanosis, or edema  PSYCH: cooperative    Consultant(s) Notes Reviewed:  [x ] YES  [ ] NO  Care Discussed with Consultants/Other Providers [ x] YES  [ ] NO                          11.6   6.41  )-----------( 112      ( 29 May 2020 06:25 )             36.5   05-29    141  |  105  |  12  ----------------------------<  104<H>  3.7   |  25  |  0.9    Ca    8.6      29 May 2020 06:25  Mg     2.1     05-29    TPro  6.0  /  Alb  3.8  /  TBili  0.5  /  DBili  x   /  AST  54<H>  /  ALT  43<H>  /  AlkPhos  69  05-29        Drug Dosing Weight  Height (cm): 172.72 (26 May 2020 12:40)  Weight (kg): 90.7 (26 May 2020 12:40)  BMI (kg/m2): 30.4 (26 May 2020 12:40)  BSA (m2): 2.04 (26 May 2020 12:40)    CAPILLARY BLOOD GLUCOSE        I&O's Summary    26 May 2020 07:01  -  27 May 2020 07:00  --------------------------------------------------------  IN: 0 mL / OUT: 150 mL / NET: -150 mL      Urinalysis Basic - ( 26 May 2020 17:42 )    Color: Yellow / Appearance: Clear / SG: >=1.030 / pH: x  Gluc: x / Ketone: 80  / Bili: Negative / Urobili: 0.2   Blood: x / Protein: 30 / Nitrite: Negative   Leuk Esterase: Negative / RBC: 1-2 /HPF / WBC 3-5 /HPF   Sq Epi: x / Non Sq Epi: Occasional /HPF / Bacteria: Few        RADIOLOGY & ADDITIONAL TESTS:  Imaging Personally Reviewed:  [x] YES  [ ] NO    HEALTH ISSUES - PROBLEM Dx:    MEDICATIONS  (STANDING):  apixaban 5 milliGRAM(s) Oral every 12 hours  aspirin  chewable 81 milliGRAM(s) Oral daily  chlordiazePOXIDE   Oral   chlordiazePOXIDE 20 milliGRAM(s) Oral every 4 hours  chlordiazePOXIDE 10 milliGRAM(s) Oral every 4 hours  losartan 50 milliGRAM(s) Oral daily  metoprolol tartrate 25 milliGRAM(s) Oral two times a day    MEDICATIONS  (PRN):  LORazepam   Injectable 2 milliGRAM(s) IV Push four times a day PRN Alcohol withdrawal

## 2020-05-29 NOTE — PROGRESS NOTE ADULT - ASSESSMENT
65 yo M With PMHx of etoh dependence, chronic diastolic CHF, chronic back pain, HLD, HTN, PE on eliquis, TIA who was BIBEMS for slurred speech and disorientation after sister was concerned. Admitted for management of alcohol withdrawal.    #Alcohol withdrawal w HTN urgency- improving  -Recently admitted on 03/2020 for alcohol withdrawal complicated by delirium tremens  -CIWA 0 upon my encounter  -patient wanted to leave AMA initially but agreed to stay after he was found to be debilitated   PLAN  -CIWA protocol  -librium protocol  -will monitor patient for one more day under the protocol  -Ativan PRN for severe withdrawal  -Thiamine  -IVF    #weakness in lower extremity 2/2 debility- improving  -patient was deemed not a safe discharge  -patient initially attempted to leave AMA (AAOX3) but was not able to get up and ambulate on his own  PLAN  -social work consult  -physical therapy on board   -aggressive PT    #HTN, w HTN urgency likely 2/2 Alcohol withdrawl  - Treat for withdrawal  - Cont losartan 50mg qD  - Cont Metoprolol tartrate 25mg BID  - Cont ASA 81mg qD    # Hx of chronic Pulmonary Embolism-on eliquis   -LE duplex negative for DVT     #Chronic dCHF  - No signs of fluid overload on exam    DVT PPX, on Eliquis    Progress Note Handoff  Pending Consults: Detox, physical therapy final recs for dispo  Pending Tests: None  Pending Results: None  Family Discussion: Discussed with patient  Disposition: Home__X___/SNF______/Other_____/Unknown at this time_____

## 2020-05-30 ENCOUNTER — TRANSCRIPTION ENCOUNTER (OUTPATIENT)
Age: 65
End: 2020-05-30

## 2020-05-30 LAB
ALBUMIN SERPL ELPH-MCNC: 3.9 G/DL — SIGNIFICANT CHANGE UP (ref 3.5–5.2)
ALP SERPL-CCNC: 72 U/L — SIGNIFICANT CHANGE UP (ref 30–115)
ALT FLD-CCNC: 52 U/L — HIGH (ref 0–41)
ANION GAP SERPL CALC-SCNC: 10 MMOL/L — SIGNIFICANT CHANGE UP (ref 7–14)
AST SERPL-CCNC: 46 U/L — HIGH (ref 0–41)
BILIRUB SERPL-MCNC: 0.5 MG/DL — SIGNIFICANT CHANGE UP (ref 0.2–1.2)
BUN SERPL-MCNC: 13 MG/DL — SIGNIFICANT CHANGE UP (ref 10–20)
CALCIUM SERPL-MCNC: 8.8 MG/DL — SIGNIFICANT CHANGE UP (ref 8.5–10.1)
CHLORIDE SERPL-SCNC: 107 MMOL/L — SIGNIFICANT CHANGE UP (ref 98–110)
CO2 SERPL-SCNC: 26 MMOL/L — SIGNIFICANT CHANGE UP (ref 17–32)
CREAT SERPL-MCNC: 0.9 MG/DL — SIGNIFICANT CHANGE UP (ref 0.7–1.5)
GLUCOSE SERPL-MCNC: 117 MG/DL — HIGH (ref 70–99)
HCT VFR BLD CALC: 36.2 % — LOW (ref 42–52)
HGB BLD-MCNC: 11.8 G/DL — LOW (ref 14–18)
MAGNESIUM SERPL-MCNC: 1.9 MG/DL — SIGNIFICANT CHANGE UP (ref 1.8–2.4)
MCHC RBC-ENTMCNC: 31.6 PG — HIGH (ref 27–31)
MCHC RBC-ENTMCNC: 32.6 G/DL — SIGNIFICANT CHANGE UP (ref 32–37)
MCV RBC AUTO: 97.1 FL — HIGH (ref 80–94)
NRBC # BLD: 0 /100 WBCS — SIGNIFICANT CHANGE UP (ref 0–0)
PLATELET # BLD AUTO: 122 K/UL — LOW (ref 130–400)
POTASSIUM SERPL-MCNC: 3.9 MMOL/L — SIGNIFICANT CHANGE UP (ref 3.5–5)
POTASSIUM SERPL-SCNC: 3.9 MMOL/L — SIGNIFICANT CHANGE UP (ref 3.5–5)
PROT SERPL-MCNC: 6 G/DL — SIGNIFICANT CHANGE UP (ref 6–8)
RBC # BLD: 3.73 M/UL — LOW (ref 4.7–6.1)
RBC # FLD: 14.4 % — SIGNIFICANT CHANGE UP (ref 11.5–14.5)
SODIUM SERPL-SCNC: 143 MMOL/L — SIGNIFICANT CHANGE UP (ref 135–146)
WBC # BLD: 8.32 K/UL — SIGNIFICANT CHANGE UP (ref 4.8–10.8)
WBC # FLD AUTO: 8.32 K/UL — SIGNIFICANT CHANGE UP (ref 4.8–10.8)

## 2020-05-30 PROCEDURE — 99232 SBSQ HOSP IP/OBS MODERATE 35: CPT

## 2020-05-30 RX ORDER — THIAMINE MONONITRATE (VIT B1) 100 MG
1 TABLET ORAL
Qty: 30 | Refills: 0
Start: 2020-05-30 | End: 2020-06-28

## 2020-05-30 RX ORDER — MAGNESIUM OXIDE 400 MG ORAL TABLET 241.3 MG
800 TABLET ORAL ONCE
Refills: 0 | Status: COMPLETED | OUTPATIENT
Start: 2020-05-30 | End: 2020-05-30

## 2020-05-30 RX ORDER — FOLIC ACID 0.8 MG
1 TABLET ORAL
Qty: 30 | Refills: 0
Start: 2020-05-30

## 2020-05-30 RX ADMIN — APIXABAN 5 MILLIGRAM(S): 2.5 TABLET, FILM COATED ORAL at 05:16

## 2020-05-30 RX ADMIN — Medication 25 MILLIGRAM(S): at 05:16

## 2020-05-30 RX ADMIN — Medication 10 MILLIGRAM(S): at 02:50

## 2020-05-30 RX ADMIN — APIXABAN 5 MILLIGRAM(S): 2.5 TABLET, FILM COATED ORAL at 16:52

## 2020-05-30 RX ADMIN — Medication 81 MILLIGRAM(S): at 11:33

## 2020-05-30 RX ADMIN — Medication 25 MILLIGRAM(S): at 16:52

## 2020-05-30 RX ADMIN — MAGNESIUM OXIDE 400 MG ORAL TABLET 800 MILLIGRAM(S): 241.3 TABLET ORAL at 10:04

## 2020-05-30 RX ADMIN — Medication 10 MILLIGRAM(S): at 10:04

## 2020-05-30 RX ADMIN — Medication 10 MILLIGRAM(S): at 05:15

## 2020-05-30 RX ADMIN — LOSARTAN POTASSIUM 50 MILLIGRAM(S): 100 TABLET, FILM COATED ORAL at 05:16

## 2020-05-30 NOTE — DISCHARGE NOTE PROVIDER - NSDCMRMEDTOKEN_GEN_ALL_CORE_FT
aspirin 81 mg oral tablet: 1 tab(s) orally once a day  Eliquis 5 mg oral tablet: 1 tab(s) orally 2 times a day  Folacin-800 oral tablet: 1 tab(s) orally once a day MDD:1 tab  losartan 50 mg oral tablet: 1 tab(s) orally once a day  Metoprolol Tartrate 25 mg oral tablet: 1 tab(s) orally 2 times a day  thiamine 50 mg oral tablet: 1 tab(s) orally once a day MDD:1 tab

## 2020-05-30 NOTE — PROGRESS NOTE ADULT - SUBJECTIVE AND OBJECTIVE BOX
CHAPARRO ROBISON  64y  Male      Patient is a 64y old  Male who presents with a chief complaint of Alcohol withdrawal (26 May 2020 16:41)      INTERVAL HPI/OVERNIGHT EVENTS:  Patient seen and examined earlier this morning. Patient denies any tremors, headache, hallucinations, N/V, abd pain, CP, palpitations, SOB. He denies any alcohol withdrawl symptoms; he reports that he worked with PT yesterday and was able to walk by himself and get up; reports he would like to go home. Alcohol cessation counseling was provided to him once again and he reports that he "will not go back to drinking again and that he was able to quit drinking completely on his own in past."    REVIEW OF SYSTEMS:  CONSTITUTIONAL: No fever, weight loss, +weakness  EYES: No eye pain, visual disturbances, or discharge  ENMT:  No difficulty hearing, tinnitus, vertigo; No sinus or throat pain  NECK: No pain or stiffness  RESPIRATORY: No cough, wheezing, chills or hemoptysis; No shortness of breath  CARDIOVASCULAR: No chest pain, palpitations, dizziness, or leg swelling  GASTROINTESTINAL: No abdominal or epigastric pain. No nausea, vomiting, or hematemesis; No diarrhea or constipation. No melena or hematochezia.  GENITOURINARY: No dysuria, frequency, hematuria, or incontinence  NEUROLOGICAL: No headaches, memory loss, loss of strength, numbness, or tremors, no confusion  MUSCULOSKELETAL: No joint pain or swelling; No muscle, back, or extremity pain +weakness in lower extremity  PSYCHIATRIC: No depression, anxiety, mood swings, or difficulty sleeping, no hallucination    Vital Signs Last 24 Hrs  T(C): 36.4 (29 May 2020 05:00), Max: 36.9 (28 May 2020 21:00)  T(F): 97.5 (29 May 2020 05:00), Max: 98.4 (28 May 2020 21:00)  HR: 65 (29 May 2020 05:00) (60 - 71)  BP: 148/79 (29 May 2020 05:00) (127/63 - 148/79)  BP(mean): --  RR: 18 (29 May 2020 05:00) (17 - 18)  SpO2: --    PHYSICAL EXAM:  GENERAL: NAD, well-developed, non-diaphoretic   HEAD:  Atraumatic, Normocephalic  EYES: EOMI, PERRLA, conjunctiva and sclera clear  ENMT: No tonsillar erythema, exudates, or enlargement; Moist mucous membranes, Good dentition, No lesions  NECK: Supple, No JVD, Normal thyroid  NERVOUS SYSTEM:  Alert & Oriented X3, Good concentration; DTRs 2+ intact and symmetric, no tremors; +weakness in lower extremity, not able to get up without any assist  CHEST/LUNG: Clear to percussion bilaterally; No rales, rhonchi, wheezing, or rubs  HEART: Regular rate and rhythm; No murmurs, rubs, or gallops  ABDOMEN: Soft, Nontender, Nondistended; Bowel sounds present  EXTREMITIES:  2+ Peripheral Pulses, No clubbing, cyanosis, or edema  PSYCH: cooperative    Consultant(s) Notes Reviewed:  [x ] YES  [ ] NO  Care Discussed with Consultants/Other Providers [ x] YES  [ ] NO                          11.6   6.41  )-----------( 112      ( 29 May 2020 06:25 )             36.5   05-29    141  |  105  |  12  ----------------------------<  104<H>  3.7   |  25  |  0.9    Ca    8.6      29 May 2020 06:25  Mg     2.1     05-29    TPro  6.0  /  Alb  3.8  /  TBili  0.5  /  DBili  x   /  AST  54<H>  /  ALT  43<H>  /  AlkPhos  69  05-29        Drug Dosing Weight  Height (cm): 172.72 (26 May 2020 12:40)  Weight (kg): 90.7 (26 May 2020 12:40)  BMI (kg/m2): 30.4 (26 May 2020 12:40)  BSA (m2): 2.04 (26 May 2020 12:40)    CAPILLARY BLOOD GLUCOSE        I&O's Summary    26 May 2020 07:01  -  27 May 2020 07:00  --------------------------------------------------------  IN: 0 mL / OUT: 150 mL / NET: -150 mL      Urinalysis Basic - ( 26 May 2020 17:42 )    Color: Yellow / Appearance: Clear / SG: >=1.030 / pH: x  Gluc: x / Ketone: 80  / Bili: Negative / Urobili: 0.2   Blood: x / Protein: 30 / Nitrite: Negative   Leuk Esterase: Negative / RBC: 1-2 /HPF / WBC 3-5 /HPF   Sq Epi: x / Non Sq Epi: Occasional /HPF / Bacteria: Few        RADIOLOGY & ADDITIONAL TESTS:  Imaging Personally Reviewed:  [x] YES  [ ] NO    HEALTH ISSUES - PROBLEM Dx:    MEDICATIONS  (STANDING):  apixaban 5 milliGRAM(s) Oral every 12 hours  aspirin  chewable 81 milliGRAM(s) Oral daily  chlordiazePOXIDE   Oral   chlordiazePOXIDE 20 milliGRAM(s) Oral every 4 hours  chlordiazePOXIDE 10 milliGRAM(s) Oral every 4 hours  losartan 50 milliGRAM(s) Oral daily  metoprolol tartrate 25 milliGRAM(s) Oral two times a day    MEDICATIONS  (PRN):  LORazepam   Injectable 2 milliGRAM(s) IV Push four times a day PRN Alcohol withdrawal

## 2020-05-30 NOTE — DISCHARGE NOTE NURSING/CASE MANAGEMENT/SOCIAL WORK - PATIENT PORTAL LINK FT
You can access the FollowMyHealth Patient Portal offered by [facility] by registering at the following website: [email or web address]. By joining [facility]’s FollowMyHealth portal, you will also be able to view your health information using other applications (apps) compatible with our system.

## 2020-05-30 NOTE — DISCHARGE NOTE PROVIDER - NSDCCPCAREPLAN_GEN_ALL_CORE_FT
PRINCIPAL DISCHARGE DIAGNOSIS  Diagnosis: Alcohol withdrawal  Assessment and Plan of Treatment: Alcohol cessatoin counseling was provided to you. Please stop drinking alcohol. Please take folic acid and thiamine supplements daily  Please follow up with your PMD within 1-2 weeks      SECONDARY DISCHARGE DIAGNOSES  Diagnosis: Non-traumatic rhabdomyolysis  Assessment and Plan of Treatment:

## 2020-05-30 NOTE — DISCHARGE NOTE PROVIDER - [facility]SD[facility]_MEDRECSTATUS_GEN_ALL_CORE
-Pt pulsatile on exam. Follow MAPs only. Nursing only to check BP sitting or standing.  -MAPs still elevated (as high as 98 overnight). Doxazosin was increased to 4 mg qd this morning - may need 4 mg bid WIll have to allow for some permissive hypertension with goal MAP 90 or less given h/o orthostatic hypotension  -Continue amlodipine 10mg daily, hydralazine 100mg TID, isosorbide 40mg TID, Lisinopril 20mg BID.   - Caution for possible syncope experienced before     Admission Reconciliation is Completed  Discharge Reconciliation is Completed Cephalexin Pregnancy And Lactation Text: This medication is Pregnancy Category B and considered safe during pregnancy.  It is also excreted in breast milk but can be used safely for shorter doses.

## 2020-05-30 NOTE — DISCHARGE NOTE PROVIDER - HOSPITAL COURSE
65 yo M With PMHx of etoh dependence, CHF, chronic back pain, HLD, HTN, PE on eliquis, TIA who was BIBEMS for slurred speech and disorientation after sister was concerned. Here in ED, tachy 100s, hypertensive 210s/100s. A&Ox3 here, no slurred speech. Has tongue fasciculations and tremors c/w etoh withdrawal.        Pt was admitted for alcohol withdrawl and hypertensive urgency and started on the CIWA and librium protocol. He was monitored on telemetry and was monitored for withdrawl symptoms. Initally he wanted to leave AMA but he was not able to ambulate on his own and later agreed to stay in the hospital.  and physical therapy evaluated him; aggressive physical therapy was provided to him. Alcohol cessation counseling was provided to him; he agreed to quit drinking; reported that he does not need resources and he will quit on him own like he did in past. He is currently medically stable for a hospital discharge. 65 yo M With PMHx of etoh dependence, CHF, chronic back pain, HLD, HTN, PE on eliquis, TIA who was BIBEMS for slurred speech and disorientation after sister was concerned. Here in ED, tachy 100s, hypertensive 210s/100s. A&Ox3 here, no slurred speech. Has tongue fasciculations and tremors c/w etoh withdrawal.        Pt was admitted for alcohol withdrawl and hypertensive urgency and started on the CIWA and librium protocol. He was monitored on telemetry and was monitored for withdrawl symptoms. Initally he wanted to leave AMA but he was not able to ambulate on his own and later agreed to stay in the hospital.  and physical therapy evaluated him; aggressive physical therapy was provided to him. Alcohol cessation counseling was provided to him; he agreed to quit drinking; reported that he does not need resources and he will quit on him own like he did in past. He reported that he had stopped drinking in past for 16 years and was able to do it by himself. This morning, he has no complaints and wants to go home. He reports that his neighbor helps him. He is currently medically stable for a hospital discharge.

## 2020-05-30 NOTE — PROGRESS NOTE ADULT - ASSESSMENT
65 yo M With PMHx of etoh dependence, chronic diastolic CHF, chronic back pain, HLD, HTN, PE on eliquis, TIA who was BIBEMS for slurred speech and disorientation after sister was concerned. Admitted for management of alcohol withdrawal.    #Alcohol withdrawal w HTN urgency- improving  -Recently admitted on 03/2020 for alcohol withdrawal complicated by delirium tremens  -CIWA 0 upon my encounter this AM  -patient wanted to leave AMA initially but agreed to stay after he was found to be debilitated   PLAN  -CIWA protocol- not scoring  -librium protocol  -Ativan PRN for severe withdrawal  -Thiamine  -Alcohol cessation counseling was provided to him    #weakness in lower extremity 2/2 debility- improving  -patient was deemed not a safe discharge  -patient initially attempted to leave AMA (AAOX3) but was not able to get up and ambulate on his own  PLAN  -social work consult appreciated  -physical therapy on board   -aggressive PT appreciated  -spoke with PT this AM; will give a script for walker    #HTN, w HTN urgency likely 2/2 Alcohol withdrawl  - Treat for withdrawal  - Cont losartan 50mg qD  - Cont Metoprolol tartrate 25mg BID  - Cont ASA 81mg qD    # Hx of chronic Pulmonary Embolism-on eliquis   -LE duplex negative for DVT     #Chronic dCHF  - No signs of fluid overload on exam    DVT PPX, on Eliquis    Progress Note Handoff  Pending Consults: None  Pending Tests: None  Pending Results: None  Family Discussion: Discussed with patient  Disposition: Home__X___/SNF______/Other_____/Unknown at this time_____

## 2020-05-31 VITALS — DIASTOLIC BLOOD PRESSURE: 83 MMHG | SYSTOLIC BLOOD PRESSURE: 154 MMHG

## 2020-05-31 PROCEDURE — 99239 HOSP IP/OBS DSCHRG MGMT >30: CPT

## 2020-05-31 RX ADMIN — LOSARTAN POTASSIUM 50 MILLIGRAM(S): 100 TABLET, FILM COATED ORAL at 05:48

## 2020-05-31 RX ADMIN — Medication 25 MILLIGRAM(S): at 05:48

## 2020-05-31 RX ADMIN — APIXABAN 5 MILLIGRAM(S): 2.5 TABLET, FILM COATED ORAL at 05:48

## 2020-05-31 NOTE — PROGRESS NOTE ADULT - SUBJECTIVE AND OBJECTIVE BOX
CHAPARRO ROBISON  64y  Male      Patient is a 64y old  Male who presents with a chief complaint of Alcohol withdrawal (26 May 2020 16:41)      INTERVAL HPI/OVERNIGHT EVENTS:  Patient seen and examined earlier this morning. Patient denies any symptoms; denies tremors, headache, hallucinations, N/V, abd pain, CP, palpitations, SOB. He denies any alcohol withdrawl symptoms; reports his neighbor will be there today to receive him and help him at home.      REVIEW OF SYSTEMS:  CONSTITUTIONAL: No fever, weight loss, +weakness  EYES: No eye pain, visual disturbances, or discharge  ENMT:  No difficulty hearing, tinnitus, vertigo; No sinus or throat pain  NECK: No pain or stiffness  RESPIRATORY: No cough, wheezing, chills or hemoptysis; No shortness of breath  CARDIOVASCULAR: No chest pain, palpitations, dizziness, or leg swelling  GASTROINTESTINAL: No abdominal or epigastric pain. No nausea, vomiting, or hematemesis; No diarrhea or constipation. No melena or hematochezia.  GENITOURINARY: No dysuria, frequency, hematuria, or incontinence  NEUROLOGICAL: No headaches, memory loss, loss of strength, numbness, or tremors, no confusion  MUSCULOSKELETAL: No joint pain or swelling; No muscle, back, or extremity pain +weakness in lower extremity  PSYCHIATRIC: No depression, anxiety, mood swings, or difficulty sleeping, no hallucination    Vital Signs Last 24 Hrs  T(C): 36.4 (29 May 2020 05:00), Max: 36.9 (28 May 2020 21:00)  T(F): 97.5 (29 May 2020 05:00), Max: 98.4 (28 May 2020 21:00)  HR: 65 (29 May 2020 05:00) (60 - 71)  BP: 148/79 (29 May 2020 05:00) (127/63 - 148/79)  BP(mean): --  RR: 18 (29 May 2020 05:00) (17 - 18)  SpO2: --    PHYSICAL EXAM:  GENERAL: NAD, well-developed, non-diaphoretic   HEAD:  Atraumatic, Normocephalic  EYES: EOMI, PERRLA, conjunctiva and sclera clear  ENMT: No tonsillar erythema, exudates, or enlargement; Moist mucous membranes, Good dentition, No lesions  NECK: Supple, No JVD, Normal thyroid  NERVOUS SYSTEM:  Alert & Oriented X3, Good concentration; DTRs 2+ intact and symmetric, no tremors  CHEST/LUNG: Clear to percussion bilaterally; No rales, rhonchi, wheezing, or rubs  HEART: Regular rate and rhythm; No murmurs, rubs, or gallops  ABDOMEN: Soft, Nontender, Nondistended; Bowel sounds present  EXTREMITIES:  2+ Peripheral Pulses, No clubbing, cyanosis, or edema  PSYCH: cooperative    Consultant(s) Notes Reviewed:  [x ] YES  [ ] NO  Care Discussed with Consultants/Other Providers [ x] YES  [ ] NO                          11.6   6.41  )-----------( 112      ( 29 May 2020 06:25 )             36.5   05-29    141  |  105  |  12  ----------------------------<  104<H>  3.7   |  25  |  0.9    Ca    8.6      29 May 2020 06:25  Mg     2.1     05-29    TPro  6.0  /  Alb  3.8  /  TBili  0.5  /  DBili  x   /  AST  54<H>  /  ALT  43<H>  /  AlkPhos  69  05-29        Drug Dosing Weight  Height (cm): 172.72 (26 May 2020 12:40)  Weight (kg): 90.7 (26 May 2020 12:40)  BMI (kg/m2): 30.4 (26 May 2020 12:40)  BSA (m2): 2.04 (26 May 2020 12:40)    CAPILLARY BLOOD GLUCOSE        I&O's Summary    26 May 2020 07:01  -  27 May 2020 07:00  --------------------------------------------------------  IN: 0 mL / OUT: 150 mL / NET: -150 mL      Urinalysis Basic - ( 26 May 2020 17:42 )    Color: Yellow / Appearance: Clear / SG: >=1.030 / pH: x  Gluc: x / Ketone: 80  / Bili: Negative / Urobili: 0.2   Blood: x / Protein: 30 / Nitrite: Negative   Leuk Esterase: Negative / RBC: 1-2 /HPF / WBC 3-5 /HPF   Sq Epi: x / Non Sq Epi: Occasional /HPF / Bacteria: Few        RADIOLOGY & ADDITIONAL TESTS:  Imaging Personally Reviewed:  [x] YES  [ ] NO    HEALTH ISSUES - PROBLEM Dx:    MEDICATIONS  (STANDING):  apixaban 5 milliGRAM(s) Oral every 12 hours  aspirin  chewable 81 milliGRAM(s) Oral daily  chlordiazePOXIDE   Oral   chlordiazePOXIDE 20 milliGRAM(s) Oral every 4 hours  chlordiazePOXIDE 10 milliGRAM(s) Oral every 4 hours  losartan 50 milliGRAM(s) Oral daily  metoprolol tartrate 25 milliGRAM(s) Oral two times a day    MEDICATIONS  (PRN):  LORazepam   Injectable 2 milliGRAM(s) IV Push four times a day PRN Alcohol withdrawal

## 2020-05-31 NOTE — PROGRESS NOTE ADULT - ASSESSMENT
65 yo M With PMHx of etoh dependence, chronic diastolic CHF, chronic back pain, HLD, HTN, PE on eliquis, TIA who was BIBEMS for slurred speech and disorientation after sister was concerned. Admitted for management of alcohol withdrawal.    #Alcohol withdrawal w HTN urgency- improving  -Recently admitted on 03/2020 for alcohol withdrawal complicated by delirium tremens  -CIWA 0 upon my encounter this AM  -patient wanted to leave AMA initially but agreed to stay after he was found to be debilitated   PLAN  -CIWA protocol- not scoring  -librium protocol  -Ativan PRN for severe withdrawal  -Thiamine  -Alcohol cessation counseling was provided to him    #weakness in lower extremity 2/2 debility- improving  -patient was deemed not a safe discharge  -patient initially attempted to leave AMA (AAOX3) but was not able to get up and ambulate on his own  -Patient was evaluated by , PT  -Patient refused home services, rehab; patient has rollator walker at home and reports that his neighbor helps him  PLAN  -social work consult appreciated  -physical therapy eval appreciated    #HTN, w HTN urgency likely 2/2 Alcohol withdrawl  - Treat for withdrawal  - Cont losartan 50mg qD  - Cont Metoprolol tartrate 25mg BID  - Cont ASA 81mg qD    # Hx of chronic Pulmonary Embolism-on eliquis   -LE duplex negative for DVT     #Chronic dCHF  - No signs of fluid overload on exam    DVT PPX, on Eliquis    Progress Note Handoff  Pending Consults: None  Pending Tests: None  Pending Results: None  Family Discussion: Discussed with patient  Disposition: Home__X___/SNF______/Other_____/Unknown at this time_____

## 2020-06-02 DIAGNOSIS — M62.82 RHABDOMYOLYSIS: ICD-10-CM

## 2020-06-02 DIAGNOSIS — I11.0 HYPERTENSIVE HEART DISEASE WITH HEART FAILURE: ICD-10-CM

## 2020-06-02 DIAGNOSIS — Z79.82 LONG TERM (CURRENT) USE OF ASPIRIN: ICD-10-CM

## 2020-06-02 DIAGNOSIS — G89.29 OTHER CHRONIC PAIN: ICD-10-CM

## 2020-06-02 DIAGNOSIS — I50.32 CHRONIC DIASTOLIC (CONGESTIVE) HEART FAILURE: ICD-10-CM

## 2020-06-02 DIAGNOSIS — Z86.711 PERSONAL HISTORY OF PULMONARY EMBOLISM: ICD-10-CM

## 2020-06-02 DIAGNOSIS — Z79.899 OTHER LONG TERM (CURRENT) DRUG THERAPY: ICD-10-CM

## 2020-06-02 DIAGNOSIS — F10.231 ALCOHOL DEPENDENCE WITH WITHDRAWAL DELIRIUM: ICD-10-CM

## 2020-06-02 DIAGNOSIS — E78.5 HYPERLIPIDEMIA, UNSPECIFIED: ICD-10-CM

## 2020-06-02 DIAGNOSIS — I16.0 HYPERTENSIVE URGENCY: ICD-10-CM

## 2020-06-02 DIAGNOSIS — R53.81 OTHER MALAISE: ICD-10-CM

## 2020-06-02 DIAGNOSIS — Z79.01 LONG TERM (CURRENT) USE OF ANTICOAGULANTS: ICD-10-CM

## 2020-06-02 DIAGNOSIS — M54.9 DORSALGIA, UNSPECIFIED: ICD-10-CM

## 2020-06-02 DIAGNOSIS — Z86.73 PERSONAL HISTORY OF TRANSIENT ISCHEMIC ATTACK (TIA), AND CEREBRAL INFARCTION WITHOUT RESIDUAL DEFICITS: ICD-10-CM

## 2020-06-15 ENCOUNTER — INPATIENT (INPATIENT)
Facility: HOSPITAL | Age: 65
LOS: 1 days | Discharge: LEFT BEFORE DISCHARGE | End: 2020-06-17
Attending: HOSPITALIST | Admitting: HOSPITALIST
Payer: MEDICARE

## 2020-06-15 VITALS
TEMPERATURE: 96 F | SYSTOLIC BLOOD PRESSURE: 194 MMHG | DIASTOLIC BLOOD PRESSURE: 97 MMHG | WEIGHT: 179.9 LBS | HEIGHT: 69 IN | HEART RATE: 78 BPM | OXYGEN SATURATION: 97 % | RESPIRATION RATE: 18 BRPM

## 2020-06-15 DIAGNOSIS — Z79.01 LONG TERM (CURRENT) USE OF ANTICOAGULANTS: ICD-10-CM

## 2020-06-15 DIAGNOSIS — Z90.49 ACQUIRED ABSENCE OF OTHER SPECIFIED PARTS OF DIGESTIVE TRACT: Chronic | ICD-10-CM

## 2020-06-15 DIAGNOSIS — R79.89 OTHER SPECIFIED ABNORMAL FINDINGS OF BLOOD CHEMISTRY: ICD-10-CM

## 2020-06-15 DIAGNOSIS — I50.9 HEART FAILURE, UNSPECIFIED: ICD-10-CM

## 2020-06-15 DIAGNOSIS — I10 ESSENTIAL (PRIMARY) HYPERTENSION: ICD-10-CM

## 2020-06-15 DIAGNOSIS — R29.6 REPEATED FALLS: ICD-10-CM

## 2020-06-15 DIAGNOSIS — F10.929 ALCOHOL USE, UNSPECIFIED WITH INTOXICATION, UNSPECIFIED: ICD-10-CM

## 2020-06-15 DIAGNOSIS — R93.89 ABNORMAL FINDINGS ON DIAGNOSTIC IMAGING OF OTHER SPECIFIED BODY STRUCTURES: ICD-10-CM

## 2020-06-15 LAB
ALBUMIN SERPL ELPH-MCNC: 4.5 G/DL — SIGNIFICANT CHANGE UP (ref 3.5–5.2)
ALBUMIN SERPL ELPH-MCNC: 4.6 G/DL — SIGNIFICANT CHANGE UP (ref 3.5–5.2)
ALP SERPL-CCNC: 75 U/L — SIGNIFICANT CHANGE UP (ref 30–115)
ALP SERPL-CCNC: 77 U/L — SIGNIFICANT CHANGE UP (ref 30–115)
ALT FLD-CCNC: 50 U/L — HIGH (ref 0–41)
ALT FLD-CCNC: 57 U/L — HIGH (ref 0–41)
ANION GAP SERPL CALC-SCNC: 11 MMOL/L — SIGNIFICANT CHANGE UP (ref 7–14)
ANION GAP SERPL CALC-SCNC: 14 MMOL/L — SIGNIFICANT CHANGE UP (ref 7–14)
APTT BLD: 33.1 SEC — SIGNIFICANT CHANGE UP (ref 27–39.2)
AST SERPL-CCNC: 51 U/L — HIGH (ref 0–41)
AST SERPL-CCNC: 61 U/L — HIGH (ref 0–41)
BASOPHILS # BLD AUTO: 0.05 K/UL — SIGNIFICANT CHANGE UP (ref 0–0.2)
BASOPHILS NFR BLD AUTO: 0.7 % — SIGNIFICANT CHANGE UP (ref 0–1)
BILIRUB SERPL-MCNC: 0.4 MG/DL — SIGNIFICANT CHANGE UP (ref 0.2–1.2)
BILIRUB SERPL-MCNC: 0.4 MG/DL — SIGNIFICANT CHANGE UP (ref 0.2–1.2)
BUN SERPL-MCNC: 10 MG/DL — SIGNIFICANT CHANGE UP (ref 10–20)
BUN SERPL-MCNC: 9 MG/DL — LOW (ref 10–20)
CALCIUM SERPL-MCNC: 8.9 MG/DL — SIGNIFICANT CHANGE UP (ref 8.5–10.1)
CALCIUM SERPL-MCNC: 9.4 MG/DL — SIGNIFICANT CHANGE UP (ref 8.5–10.1)
CHLORIDE SERPL-SCNC: 101 MMOL/L — SIGNIFICANT CHANGE UP (ref 98–110)
CHLORIDE SERPL-SCNC: 102 MMOL/L — SIGNIFICANT CHANGE UP (ref 98–110)
CK MB CFR SERPL CALC: 3.5 NG/ML — SIGNIFICANT CHANGE UP (ref 0.6–6.3)
CK MB CFR SERPL CALC: 5.4 NG/ML — SIGNIFICANT CHANGE UP (ref 0.6–6.3)
CK SERPL-CCNC: 203 U/L — SIGNIFICANT CHANGE UP (ref 0–225)
CK SERPL-CCNC: 268 U/L — HIGH (ref 0–225)
CO2 SERPL-SCNC: 25 MMOL/L — SIGNIFICANT CHANGE UP (ref 17–32)
CO2 SERPL-SCNC: 25 MMOL/L — SIGNIFICANT CHANGE UP (ref 17–32)
CREAT SERPL-MCNC: 0.9 MG/DL — SIGNIFICANT CHANGE UP (ref 0.7–1.5)
CREAT SERPL-MCNC: 1.1 MG/DL — SIGNIFICANT CHANGE UP (ref 0.7–1.5)
EOSINOPHIL # BLD AUTO: 0.25 K/UL — SIGNIFICANT CHANGE UP (ref 0–0.7)
EOSINOPHIL NFR BLD AUTO: 3.6 % — SIGNIFICANT CHANGE UP (ref 0–8)
ETHANOL SERPL-MCNC: 227 MG/DL — HIGH
ETHANOL SERPL-MCNC: 79 MG/DL — HIGH
GLUCOSE SERPL-MCNC: 119 MG/DL — HIGH (ref 70–99)
GLUCOSE SERPL-MCNC: 94 MG/DL — SIGNIFICANT CHANGE UP (ref 70–99)
HCT VFR BLD CALC: 44.4 % — SIGNIFICANT CHANGE UP (ref 42–52)
HCT VFR BLD CALC: 45.9 % — SIGNIFICANT CHANGE UP (ref 42–52)
HGB BLD-MCNC: 14.4 G/DL — SIGNIFICANT CHANGE UP (ref 14–18)
HGB BLD-MCNC: 14.9 G/DL — SIGNIFICANT CHANGE UP (ref 14–18)
IMM GRANULOCYTES NFR BLD AUTO: 0.6 % — HIGH (ref 0.1–0.3)
INR BLD: 1.15 RATIO — SIGNIFICANT CHANGE UP (ref 0.65–1.3)
LACTATE SERPL-SCNC: 2.7 MMOL/L — HIGH (ref 0.7–2)
LACTATE SERPL-SCNC: 3.7 MMOL/L — HIGH (ref 0.7–2)
LIDOCAIN IGE QN: 23 U/L — SIGNIFICANT CHANGE UP (ref 7–60)
LYMPHOCYTES # BLD AUTO: 2.35 K/UL — SIGNIFICANT CHANGE UP (ref 1.2–3.4)
LYMPHOCYTES # BLD AUTO: 33.7 % — SIGNIFICANT CHANGE UP (ref 20.5–51.1)
MCHC RBC-ENTMCNC: 31 PG — SIGNIFICANT CHANGE UP (ref 27–31)
MCHC RBC-ENTMCNC: 31 PG — SIGNIFICANT CHANGE UP (ref 27–31)
MCHC RBC-ENTMCNC: 32.4 G/DL — SIGNIFICANT CHANGE UP (ref 32–37)
MCHC RBC-ENTMCNC: 32.5 G/DL — SIGNIFICANT CHANGE UP (ref 32–37)
MCV RBC AUTO: 95.4 FL — HIGH (ref 80–94)
MCV RBC AUTO: 95.7 FL — HIGH (ref 80–94)
MONOCYTES # BLD AUTO: 0.55 K/UL — SIGNIFICANT CHANGE UP (ref 0.1–0.6)
MONOCYTES NFR BLD AUTO: 7.9 % — SIGNIFICANT CHANGE UP (ref 1.7–9.3)
NEUTROPHILS # BLD AUTO: 3.73 K/UL — SIGNIFICANT CHANGE UP (ref 1.4–6.5)
NEUTROPHILS NFR BLD AUTO: 53.5 % — SIGNIFICANT CHANGE UP (ref 42.2–75.2)
NRBC # BLD: 0 /100 WBCS — SIGNIFICANT CHANGE UP (ref 0–0)
NRBC # BLD: 0 /100 WBCS — SIGNIFICANT CHANGE UP (ref 0–0)
PLATELET # BLD AUTO: 244 K/UL — SIGNIFICANT CHANGE UP (ref 130–400)
PLATELET # BLD AUTO: 252 K/UL — SIGNIFICANT CHANGE UP (ref 130–400)
POTASSIUM SERPL-MCNC: 4.1 MMOL/L — SIGNIFICANT CHANGE UP (ref 3.5–5)
POTASSIUM SERPL-MCNC: 4.3 MMOL/L — SIGNIFICANT CHANGE UP (ref 3.5–5)
POTASSIUM SERPL-SCNC: 4.1 MMOL/L — SIGNIFICANT CHANGE UP (ref 3.5–5)
POTASSIUM SERPL-SCNC: 4.3 MMOL/L — SIGNIFICANT CHANGE UP (ref 3.5–5)
PROT SERPL-MCNC: 6.5 G/DL — SIGNIFICANT CHANGE UP (ref 6–8)
PROT SERPL-MCNC: 7.6 G/DL — SIGNIFICANT CHANGE UP (ref 6–8)
PROTHROM AB SERPL-ACNC: 13.2 SEC — HIGH (ref 9.95–12.87)
RBC # BLD: 4.64 M/UL — LOW (ref 4.7–6.1)
RBC # BLD: 4.81 M/UL — SIGNIFICANT CHANGE UP (ref 4.7–6.1)
RBC # FLD: 13.8 % — SIGNIFICANT CHANGE UP (ref 11.5–14.5)
RBC # FLD: 13.9 % — SIGNIFICANT CHANGE UP (ref 11.5–14.5)
SARS-COV-2 RNA SPEC QL NAA+PROBE: SIGNIFICANT CHANGE UP
SODIUM SERPL-SCNC: 138 MMOL/L — SIGNIFICANT CHANGE UP (ref 135–146)
SODIUM SERPL-SCNC: 140 MMOL/L — SIGNIFICANT CHANGE UP (ref 135–146)
TROPONIN T SERPL-MCNC: 0.03 NG/ML — CRITICAL HIGH
TROPONIN T SERPL-MCNC: <0.01 NG/ML — SIGNIFICANT CHANGE UP
TROPONIN T SERPL-MCNC: <0.01 NG/ML — SIGNIFICANT CHANGE UP
WBC # BLD: 5.28 K/UL — SIGNIFICANT CHANGE UP (ref 4.8–10.8)
WBC # BLD: 6.97 K/UL — SIGNIFICANT CHANGE UP (ref 4.8–10.8)
WBC # FLD AUTO: 5.28 K/UL — SIGNIFICANT CHANGE UP (ref 4.8–10.8)
WBC # FLD AUTO: 6.97 K/UL — SIGNIFICANT CHANGE UP (ref 4.8–10.8)

## 2020-06-15 PROCEDURE — 74177 CT ABD & PELVIS W/CONTRAST: CPT | Mod: 26

## 2020-06-15 PROCEDURE — 72125 CT NECK SPINE W/O DYE: CPT | Mod: 26

## 2020-06-15 PROCEDURE — 71260 CT THORAX DX C+: CPT | Mod: 26

## 2020-06-15 PROCEDURE — 71045 X-RAY EXAM CHEST 1 VIEW: CPT | Mod: 26

## 2020-06-15 PROCEDURE — 99285 EMERGENCY DEPT VISIT HI MDM: CPT | Mod: CS

## 2020-06-15 PROCEDURE — 99223 1ST HOSP IP/OBS HIGH 75: CPT

## 2020-06-15 PROCEDURE — 99222 1ST HOSP IP/OBS MODERATE 55: CPT

## 2020-06-15 PROCEDURE — 72170 X-RAY EXAM OF PELVIS: CPT | Mod: 26

## 2020-06-15 PROCEDURE — 70450 CT HEAD/BRAIN W/O DYE: CPT | Mod: 26

## 2020-06-15 RX ORDER — SODIUM CHLORIDE 9 MG/ML
1000 INJECTION, SOLUTION INTRAVENOUS ONCE
Refills: 0 | Status: COMPLETED | OUTPATIENT
Start: 2020-06-15 | End: 2020-06-15

## 2020-06-15 RX ORDER — METOPROLOL TARTRATE 50 MG
25 TABLET ORAL
Refills: 0 | Status: DISCONTINUED | OUTPATIENT
Start: 2020-06-15 | End: 2020-06-17

## 2020-06-15 RX ORDER — LOSARTAN POTASSIUM 100 MG/1
50 TABLET, FILM COATED ORAL DAILY
Refills: 0 | Status: DISCONTINUED | OUTPATIENT
Start: 2020-06-15 | End: 2020-06-17

## 2020-06-15 RX ORDER — FOLIC ACID 0.8 MG
1 TABLET ORAL DAILY
Refills: 0 | Status: DISCONTINUED | OUTPATIENT
Start: 2020-06-15 | End: 2020-06-17

## 2020-06-15 RX ORDER — APIXABAN 2.5 MG/1
5 TABLET, FILM COATED ORAL
Refills: 0 | Status: DISCONTINUED | OUTPATIENT
Start: 2020-06-15 | End: 2020-06-17

## 2020-06-15 RX ORDER — THIAMINE MONONITRATE (VIT B1) 100 MG
100 TABLET ORAL ONCE
Refills: 0 | Status: COMPLETED | OUTPATIENT
Start: 2020-06-15 | End: 2020-06-15

## 2020-06-15 RX ORDER — THIAMINE MONONITRATE (VIT B1) 100 MG
100 TABLET ORAL DAILY
Refills: 0 | Status: DISCONTINUED | OUTPATIENT
Start: 2020-06-15 | End: 2020-06-17

## 2020-06-15 RX ORDER — ASPIRIN/CALCIUM CARB/MAGNESIUM 324 MG
81 TABLET ORAL DAILY
Refills: 0 | Status: DISCONTINUED | OUTPATIENT
Start: 2020-06-15 | End: 2020-06-17

## 2020-06-15 RX ORDER — FOLIC ACID 0.8 MG
1 TABLET ORAL ONCE
Refills: 0 | Status: DISCONTINUED | OUTPATIENT
Start: 2020-06-15 | End: 2020-06-17

## 2020-06-15 RX ADMIN — Medication 25 MILLIGRAM(S): at 17:36

## 2020-06-15 RX ADMIN — Medication 4 MILLIGRAM(S): at 15:34

## 2020-06-15 RX ADMIN — Medication 100 MILLIGRAM(S): at 03:01

## 2020-06-15 RX ADMIN — Medication 1 TABLET(S): at 11:25

## 2020-06-15 RX ADMIN — Medication 4 MILLIGRAM(S): at 11:16

## 2020-06-15 RX ADMIN — Medication 4 MILLIGRAM(S): at 07:44

## 2020-06-15 RX ADMIN — Medication 25 MILLIGRAM(S): at 07:43

## 2020-06-15 RX ADMIN — Medication 1 MILLIGRAM(S): at 11:26

## 2020-06-15 RX ADMIN — APIXABAN 5 MILLIGRAM(S): 2.5 TABLET, FILM COATED ORAL at 17:36

## 2020-06-15 RX ADMIN — Medication 4 MILLIGRAM(S): at 18:57

## 2020-06-15 RX ADMIN — SODIUM CHLORIDE 1000 MILLILITER(S): 9 INJECTION, SOLUTION INTRAVENOUS at 03:01

## 2020-06-15 RX ADMIN — Medication 4 MILLIGRAM(S): at 22:58

## 2020-06-15 RX ADMIN — APIXABAN 5 MILLIGRAM(S): 2.5 TABLET, FILM COATED ORAL at 07:54

## 2020-06-15 RX ADMIN — Medication 100 MILLIGRAM(S): at 11:25

## 2020-06-15 RX ADMIN — LOSARTAN POTASSIUM 50 MILLIGRAM(S): 100 TABLET, FILM COATED ORAL at 07:54

## 2020-06-15 RX ADMIN — Medication 81 MILLIGRAM(S): at 11:25

## 2020-06-15 NOTE — CONSULT NOTE ADULT - ASSESSMENT
IMPRESSION: Rehab of 65 y/o  m  rehab  for  gd  sp  fall  neuropathy      PRECAUTIONS: [  ] Cardiac  [  ] Respiratory  [  ] Seizures [  ] Contact Isolation  [  ] Droplet Isolation  [ FALL ] Other    Weight Bearing Status:     RECOMMENDATION:    Out of Bed to Chair     DVT/Decubiti Prophylaxis    REHAB PLAN:     [  xx ] Bedside P/T 3-5 times a week   [   ]   Bedside O/T  2-3 times a week             [   ] No Rehab Therapy Indicated                   [   ]  Speech Therapy   Conditioning/ROM                                    ADL  Bed Mobility                                               Conditioning/ROM  Transfers                                                     Bed Mobility  Sitting /Standing Balance                         Transfers                                        Gait Training                                               Sitting/Standing Balance  Stair Training [   ]Applicable                    Home equipment Eval                                                                        Splinting  [   ] Only      GOALS:   ADL   [ x  ]   Independent                    Transfers  [  x ] Independent                          Ambulation  [x   ] Independent     [ x  ] With device                            [  x ]  CG                                                         [x  ]  CG                                                                  [ x  ] CG                            [    ] Min A                                                   [   ] Min A                                                              [   ] Min  A          DISCHARGE PLAN:   [   ]  Good candidate for Intensive Rehabilitation/Hospital based-4A [facility]                                             Will tolerate 3hrs Intensive Rehab Daily                                       [ xx   ]  Short Term Rehab in Skilled Nursing Facility ptn  refused   str  however  i  recommand  str  for  pt/ot  1-2  wks  then  dc  home  with  care  and  cont current care                                       [    ]  Home with Outpatient or VN services                                         [    ]  Possible Candidate for Intensive Hospital based Rehab

## 2020-06-15 NOTE — CONSULT NOTE ADULT - SUBJECTIVE AND OBJECTIVE BOX
This is a 65 yo W male w/ PMH-chronic alcohol dependence, HTN, Pulmonary emboli (3yrs ago as per pt.), TIA, CHF.    Pt called 911 for frequent falls @ home. Pt states he drinks daily , last drink was yesterday in pm. pt was recently discharged 5/31 for alcohol withdrawal.  ICU consult called by ER MD for troponin 0.03. pt denies CP, SOB, n/v, dizziness.               PAST MEDICAL & SURGICAL HISTORY:  Pulmonary embolism: 2015  Chronic back pain  Alcohol dependence  HLD (hyperlipidemia)  HTN (hypertension)  CHF (congestive heart failure)  TIA (transient ischemic attack)  History of appendectomy    Allergies    No Known Allergies    Intolerances      FAMILY HISTORY:  FH: MI (myocardial infarction) (Father, Mother): Dad, Mom    Home Medications:  aspirin 81 mg oral tablet: 1 tab(s) orally once a day (26 May 2020 16:50)  Eliquis 5 mg oral tablet: 1 tab(s) orally 2 times a day (26 May 2020 16:50)  losartan 50 mg oral tablet: 1 tab(s) orally once a day (26 May 2020 16:50)  Metoprolol Tartrate 25 mg oral tablet: 1 tab(s) orally 2 times a day (26 May 2020 16:50)    Occupation:  Alochol: Denied  Smoking: Denied  Drug Use: Denied  Marital Status:         ROS: as in HPI; All other systems reviewed are negative    ICU Vital Signs Last 24 Hrs  T(C): 35.6 (15 Rohit 2020 01:57), Max: 35.6 (15 Rohit 2020 01:57)  T(F): 96 (15 Rohit 2020 01:57), Max: 96 (15 Rohit 2020 01:57)  HR: 70 (15 Rohit 2020 02:08) (70 - 78)  BP: 153/72 (15 Rohit 2020 02:08) (153/72 - 194/97)  BP(mean): --  ABP: --  ABP(mean): --  RR: 18 (15 Rohit 2020 02:08) (18 - 18)  SpO2: 97% (15 Rohit 2020 01:57) (97% - 97%)        Physical Examination:    General:  unkept elderly W male . pt's A & O x 3 in NAD, pt answers questions appropriately    HEENT: Pupils equal, reactive to light.  Symmetric.    PULM: Clear to auscultation bilaterally, no significant sputum production    CVS: Regular rate and rhythm, no murmurs, rubs, or gallops    ABD: Soft, nondistended, nontender, normoactive bowel sounds, no masses    EXT: No edema, nontender    SKIN: Warm and well perfused, no rashes noted.              I&O's Detail        LABS:                        14.9   6.97  )-----------( 252      ( 15 Rohit 2020 02:00 )             45.9     15 Rohit 2020 02:00    140    |  101    |  10     ----------------------------<  94     4.3     |  25     |  1.1      Ca    9.4        15 Rohit 2020 02:00    TPro  7.6    /  Alb  4.6    /  TBili  0.4    /  DBili  x      /  AST  61     /  ALT  57     /  AlkPhos  77     15 Rohit 2020 02:00  Amylase x     lipase 23         CARDIAC MARKERS ( 15 Rohit 2020 02:00 )  x     / 0.03 ng/mL / x     / x     / x          CAPILLARY BLOOD GLUCOSE      POCT Blood Glucose.: 93 mg/dL (15 Rohit 2020 02:14)    PT/INR - ( 15 Rohit 2020 02:00 )   PT: 13.20 sec;   INR: 1.15 ratio         PTT - ( 15 Rohit 2020 02:00 )  PTT:33.1 sec    Culture    Lactate, Blood: 3.7 mmol/L (06-15-20 @ 02:00)      MEDICATIONS  (STANDING):  folic acid 1 milliGRAM(s) Oral Once    MEDICATIONS  (PRN):        RADIOLOGY: ***     CXR:  TLC:  OG:  ET tube:          ECHO:      IMPRESSION:        PLAN:    CNS:    HEENT:    PULMONARY:    CARDIOVASCULAR:    GI: GI prophylaxis.  Feeding     RENAL:    INFECTIOUS DISEASE:    HEMATOLOGICAL:  DVT prophylaxis.    ENDOCRINE:  Follow up FS.  Insulin protocol if needed.    MUSCULOSKELETAL:        CRITICAL CARE TIME SPENT: *** This is a 63 yo W male w/ PMH-chronic alcohol dependence, HTN, Pulmonary emboli (3yrs ago as per pt.), TIA, CHF.    Pt called 911 for frequent falls @ home. Pt states he drinks daily , last drink was yesterday in pm. pt was recently discharged 5/31 for alcohol withdrawal.  ICU consult called by ER MD for troponin 0.03. pt denies CP, SOB, n/v, dizziness.               PAST MEDICAL & SURGICAL HISTORY:  Pulmonary embolism: 2015  Chronic back pain  Alcohol dependence  HLD (hyperlipidemia)  HTN (hypertension)  CHF (congestive heart failure)  TIA (transient ischemic attack)  History of appendectomy    Allergies    No Known Allergies    Intolerances      FAMILY HISTORY:  FH: MI (myocardial infarction) (Father, Mother): Dad, Mom    Home Medications:  aspirin 81 mg oral tablet: 1 tab(s) orally once a day (26 May 2020 16:50)  Eliquis 5 mg oral tablet: 1 tab(s) orally 2 times a day (26 May 2020 16:50)  losartan 50 mg oral tablet: 1 tab(s) orally once a day (26 May 2020 16:50)  Metoprolol Tartrate 25 mg oral tablet: 1 tab(s) orally 2 times a day (26 May 2020 16:50)    Occupation:  Alochol: Denied  Smoking: Denied  Drug Use: Denied  Marital Status:         ROS: as in HPI; All other systems reviewed are negative    ICU Vital Signs Last 24 Hrs  T(C): 35.6 (15 Rohit 2020 01:57), Max: 35.6 (15 Rohit 2020 01:57)  T(F): 96 (15 Rohit 2020 01:57), Max: 96 (15 Rohit 2020 01:57)  HR: 70 (15 Rohit 2020 02:08) (70 - 78)  BP: 153/72 (15 Rohit 2020 02:08) (153/72 - 194/97)  BP(mean): --  ABP: --  ABP(mean): --  RR: 18 (15 Rohit 2020 02:08) (18 - 18)  SpO2: 97% (15 Rohit 2020 01:57) (97% - 97%)        Physical Examination:    General:  unkept elderly W male . pt's A & O x 3 in NAD, pt answers questions appropriately    HEENT: Pupils equal, reactive to light.  Symmetric.    PULM: Clear to auscultation bilaterally, no significant sputum production    CVS: Regular rate and rhythm, no murmurs, rubs, or gallops    ABD: Soft, nondistended, nontender, normoactive bowel sounds, no masses    EXT: No edema, nontender    SKIN: Warm and well perfused, no rashes noted.              I&O's Detail        LABS:                        14.9   6.97  )-----------( 252      ( 15 Rohit 2020 02:00 )             45.9     15 Rohit 2020 02:00    140    |  101    |  10     ----------------------------<  94     4.3     |  25     |  1.1      Ca    9.4        15 Rohit 2020 02:00    TPro  7.6    /  Alb  4.6    /  TBili  0.4    /  DBili  x      /  AST  61     /  ALT  57     /  AlkPhos  77     15 Rohit 2020 02:00  Amylase x     lipase 23         CARDIAC MARKERS ( 15 Rohit 2020 02:00 )  x     / 0.03 ng/mL / x     / x     / x          CAPILLARY BLOOD GLUCOSE      POCT Blood Glucose.: 93 mg/dL (15 Rohit 2020 02:14)    PT/INR - ( 15 Rohit 2020 02:00 )   PT: 13.20 sec;   INR: 1.15 ratio         PTT - ( 15 Rohit 2020 02:00 )  PTT:33.1 sec    Culture    Lactate, Blood: 3.7 mmol/L (06-15-20 @ 02:00)      MEDICATIONS  (STANDING):  folic acid 1 milliGRAM(s) Oral Once    MEDICATIONS  (PRN):        RADIOLOGY: ***     CXR:clear  CT head-< from: CT Head No Cont (05.26.20 @ 13:42) >    1.  No evidence of acute intracranial pathology. Stable exam since 3/19/2020.    2.  Stable moderate-severe chronic microvascular changes and chronic lacunar infarcts.    < end of copied text >               IMPRESSION:  1. elevated troponin r/o cardiac event   2. chronic alcohol dependence  3. HTN urgency  4. hx- CHF, TIA          PLAN:    discussed w/ ICU Eleonora BRADEN, pt asymptomatic w/ mild elevation in troponin which may be secondary to chronic alcohol use and or hypertensive urgency  ecg show nsr w/ anterior lat wall ischemia , which  was also seen on prior ecg.   cardio eval  pt claims to be compliant to meds   f/u subsequent troponins to r/o acute cardiac event  cont: eliquis , for now . But w/ history of frequent falls and pt w/ PE 4 yrs ago, consider stopping eliquis . pt is high risk for bleeding ( as pt denied: hx of more than 1 PE or any hx- afib , )   treat HTN urgency (BP was 194/97)  consider psyche eval for depression  consider detox eval  CIWA benzo protocol for impending withdrawal  supplement thiamine, folic  acid              CRITICAL CARE TIME SPENT: *** This is a 65 yo W male w/ PMH-chronic alcohol dependence, HTN, Pulmonary emboli (3yrs ago as per pt.), TIA, CHF.    Pt called 911 for frequent falls @ home. Pt states he drinks daily , last drink was yesterday in pm. pt was recently discharged 5/31 for alcohol withdrawal.  ICU consult called by ER MD for troponin 0.03. pt denies CP, SOB, n/v, dizziness.               PAST MEDICAL & SURGICAL HISTORY:  Pulmonary embolism: 2015  Chronic back pain  Alcohol dependence  HLD (hyperlipidemia)  HTN (hypertension)  CHF (congestive heart failure)  TIA (transient ischemic attack)  History of appendectomy    Allergies    No Known Allergies    Intolerances      FAMILY HISTORY:  FH: MI (myocardial infarction) (Father, Mother): Dad, Mom    Home Medications:  aspirin 81 mg oral tablet: 1 tab(s) orally once a day (26 May 2020 16:50)  Eliquis 5 mg oral tablet: 1 tab(s) orally 2 times a day (26 May 2020 16:50)  losartan 50 mg oral tablet: 1 tab(s) orally once a day (26 May 2020 16:50)  Metoprolol Tartrate 25 mg oral tablet: 1 tab(s) orally 2 times a day (26 May 2020 16:50)    Occupation:  Alochol: Denied  Smoking: Denied  Drug Use: Denied  Marital Status:         ROS: as in HPI; All other systems reviewed are negative    ICU Vital Signs Last 24 Hrs  T(C): 35.6 (15 Rohit 2020 01:57), Max: 35.6 (15 Rohit 2020 01:57)  T(F): 96 (15 Rohit 2020 01:57), Max: 96 (15 Rohit 2020 01:57)  HR: 70 (15 Rohit 2020 02:08) (70 - 78)  BP: 153/72 (15 Rohit 2020 02:08) (153/72 - 194/97)  BP(mean): --  ABP: --  ABP(mean): --  RR: 18 (15 Rohit 2020 02:08) (18 - 18)  SpO2: 97% (15 Rohit 2020 01:57) (97% - 97%)        Physical Examination:    General:  unkept elderly W male . pt's A & O x 3 in NAD, pt answers questions appropriately    HEENT: Pupils equal, reactive to light.  Symmetric.    PULM: Clear to auscultation bilaterally, no significant sputum production    CVS: Regular rate and rhythm, no murmurs, rubs, or gallops    ABD: Soft, nondistended, nontender, normoactive bowel sounds, no masses    EXT: No edema, nontender    SKIN: Warm and well perfused, no rashes noted.              I&O's Detail        LABS:                        14.9   6.97  )-----------( 252      ( 15 Rohit 2020 02:00 )             45.9     15 Rohit 2020 02:00    140    |  101    |  10     ----------------------------<  94     4.3     |  25     |  1.1      Ca    9.4        15 Rohit 2020 02:00    TPro  7.6    /  Alb  4.6    /  TBili  0.4    /  DBili  x      /  AST  61     /  ALT  57     /  AlkPhos  77     15 Rohit 2020 02:00  Amylase x     lipase 23         CARDIAC MARKERS ( 15 Rohit 2020 02:00 )  x     / 0.03 ng/mL / x     / x     / x          CAPILLARY BLOOD GLUCOSE      POCT Blood Glucose.: 93 mg/dL (15 Rohit 2020 02:14)    PT/INR - ( 15 Rohit 2020 02:00 )   PT: 13.20 sec;   INR: 1.15 ratio         PTT - ( 15 Rohit 2020 02:00 )  PTT:33.1 sec    Culture    Lactate, Blood: 3.7 mmol/L (06-15-20 @ 02:00)      MEDICATIONS  (STANDING):  folic acid 1 milliGRAM(s) Oral Once    MEDICATIONS  (PRN):        RADIOLOGY: ***     CXR:clear  CT head-< from: CT Head No Cont (05.26.20 @ 13:42) >    1.  No evidence of acute intracranial pathology. Stable exam since 3/19/2020.    2.  Stable moderate-severe chronic microvascular changes and chronic lacunar infarcts.    < end of copied text >               IMPRESSION:  1. elevated troponin r/o cardiac event   2. chronic alcohol dependence  3. HTN urgency  4. hx- CHF, TIA          PLAN:    discussed w/ ICU Eleonora BRADEN, pt asymptomatic w/ mild elevation in troponin which may be secondary to chronic alcohol use and or hypertensive urgency  ecg show nsr w/ anterior lat wall ischemia , which  was also seen on prior ecg.   cardio eval  pt claims to be compliant to meds , but w/ given hx highly unlikely   f/u subsequent troponins to r/o acute cardiac event  cont: eliquis , for now . But w/ history of frequent falls and pt w/ PE 4 yrs ago, consider stopping eliquis . pt is high risk for bleeding ( as pt denied: hx of more than 1 PE or any hx- afib , )   treat HTN urgency (BP was 194/97) test dose labetalol 10mg ivp1 if still elevated  restart metoprolol and ACEi  consider psyche eval for depression  consider detox eval  CIWA benzo protocol for impending withdrawal  supplement thiamine, folic  acid              CRITICAL CARE TIME SPENT: ***

## 2020-06-15 NOTE — H&P ADULT - PROBLEM SELECTOR PLAN 3
Patient comes to clinic for follow up anticoagulation visit.   Last INR  1/28/20 was 3.3.  Dose maintained per protocol. Goal is 2.5-3.5  Today's INR is 3.7 and is above goal range.    Current warfarin dosing verified with patient.  Patient was informed that their INR result was above therapeutic range and instructed to decrease current dose to 2.5 mg tues,fri. 5 mg other days per protocol. Discussed return date for next INR in 2 weeks.   Due to the new Coumadin clinic protocols established for COVID 19 on 3/17/20. The patient does not qualify for DOAC. He is agreeable to coming into the clinic in 2 weeks for an INR. The patient was informed he would receive a call prior to the appointment time to review any signs or symptoms of illness. The patient is instructed to call the clinic if he develops any signs of COVID prior to the appointment. Patient verbalized understanding.    Calista Gipson  is in the office today supervising the treatment.    Call your physician or seek medical care immediately if you notice any of the following symptoms of a bleed:   · Red, dark, coffee or cola colored urine  · Red or tar like stools  · Excessive bleeding from gums or nose  · Vomiting coffee colored or bright red material  · Coughing up red tinged sputum  · Severe or unprovoked pain (ex: severe Headache or Abdominal pain)  · Sudden, spontaneous bruising for no reason  · Excessive menstrual bleeding  · A cut that will not stop bleeding within 10-15 mins  · Symptoms associated with abnormal bleeding/high INR reviewed.    Encouraged to avoid activities that may result in a serious fall or injury and verbalizes understanding.    Patient was instructed to contact the clinic with any unusual bleeding or bruising, any changes in medications, diet, health status, lifestyle, or any other changes, questions or concerns. Patient verbalized understanding of all discussed.    repeat (concern to give IV fluids at this time pending ECHO) repeat (concern to give IV fluids at this time pending ECHO) due to report of CHF

## 2020-06-15 NOTE — H&P ADULT - NSICDXFAMILYHX_GEN_ALL_CORE_FT
FAMILY HISTORY:  Father  Still living? Unknown  FH: MI (myocardial infarction), Age at diagnosis: Age Unknown    Mother  Still living? Unknown  FH: MI (myocardial infarction), Age at diagnosis: Age Unknown

## 2020-06-15 NOTE — PHYSICAL THERAPY INITIAL EVALUATION ADULT - ADDITIONAL COMMENTS
Patient states he has 9 steps to enter home, 1 flight of stairs inside, however remains on 1st level

## 2020-06-15 NOTE — ED PROVIDER NOTE - CARE PLAN
Principal Discharge DX:	Weakness  Secondary Diagnosis:	Elevated troponin  Secondary Diagnosis:	Elevated lactic acid level  Secondary Diagnosis:	Alcohol intoxication  Secondary Diagnosis:	Frequent falls

## 2020-06-15 NOTE — CONSULT NOTE ADULT - SUBJECTIVE AND OBJECTIVE BOX
HPI: Patient is  65yo male with known history of alcohol abuse along with history of pulmonary embolism (still on Eliquis?) is brought to the ER after he had called EMS 3 separate times due to falls (he told staff his legs gave out). ER staff further tells me that his last alcoholic drink was last night and he had stated he wanted to leave hospital AMA. His alcohol level is high and ER documents he has been intoxicated and he is not able to make informed decisions at this time. Patient had denied any chest pains, shortness of breath, fevers or chills. Lives alone since his wife  ,  ptn  seen and  examed  at  bed  side  nad  aox3  wish  to  go  home  .    PTN  REFERRED TO ACUTE  REHAB  FOR  EVAL AND  TX   PAST MEDICAL & SURGICAL HISTORY:  Pulmonary embolism: 2015  Chronic back pain  Alcohol dependence  HLD (hyperlipidemia)  HTN (hypertension)  CHF (congestive heart failure)  TIA (transient ischemic attack)  History of appendectomy      Hospital Course:    TODAY'S SUBJECTIVE & REVIEW OF SYMPTOMS:     Constitutional WNL   Cardio WNL   Resp WNL   GI WNL  Heme WNL  Endo WNL  Skin WNL  MSK WNL  Neuro WNL  Cognitive WNL  Psych WNL      MEDICATIONS  (STANDING):  apixaban 5 milliGRAM(s) Oral two times a day  aspirin  chewable 81 milliGRAM(s) Oral daily  folic acid 1 milliGRAM(s) Oral Once  folic acid 1 milliGRAM(s) Oral daily  LORazepam     Tablet 4 milliGRAM(s) Oral every 4 hours  LORazepam     Tablet   Oral   losartan 50 milliGRAM(s) Oral daily  metoprolol tartrate 25 milliGRAM(s) Oral two times a day  multivitamin 1 Tablet(s) Oral daily  thiamine 100 milliGRAM(s) Oral daily    MEDICATIONS  (PRN):      FAMILY HISTORY:  FH: MI (myocardial infarction) (Father, Mother): Dad, Mom      Allergies    No Known Allergies    Intolerances        SOCIAL HISTORY:    [  ] Etoh  [  ] Smoking  [  ] Substance abuse     Home Environment:  [ xx ] Home Alone  [  ] Lives with Family  [  ] Home Health Aid    Dwelling:  [  ] Apartment  [ x ] Private House  [  ] Adult Home  [  ] Skilled Nursing Facility      [  ] Short Term  [  ] Long Term  [x ] Stairs       Elevator [  ]    FUNCTIONAL STATUS PTA: (Check all that apply)  Ambulation: [  x ]Independent    [  ] Dependent     [  ] Non-Ambulatory  Assistive Device: [ x ] SA Cane  [  ]  Q Cane  [x  ] Walker  [  ]  Wheelchair  ADL : [x  ] Independent  [  ]  Dependent       Vital Signs Last 24 Hrs  T(C): 35.6 (15 Rohit 2020 07:47), Max: 35.6 (15 Rohit 2020 01:57)  T(F): 96 (15 Rohit 2020 07:47), Max: 96 (15 Rohit 2020 01:57)  HR: 72 (15 Rohit 2020 07:47) (70 - 78)  BP: 184/85 (15 Rohit 2020 07:47) (153/72 - 194/97)  BP(mean): --  RR: 18 (15 Rohit 2020 07:47) (18 - 18)  SpO2: 98% (15 Rohit 2020 07:47) (97% - 98%)      PHYSICAL EXAM: Alert & Oriented X3  GENERAL: NAD, well-groomed, well-developed  HEAD:  Atraumatic, Normocephalic  EYES: EOMI, PERRLA, conjunctiva and sclera clear  NECK: Supple, No JVD, Normal thyroid  CHEST/LUNG: Clear to percussion bilaterally; No rales, rhonchi, wheezing, or rubs  HEART: Regular rate and rhythm; No murmurs, rubs, or gallops  ABDOMEN: Soft, Nontender, Nondistended; Bowel sounds present  EXTREMITIES:  2+ Peripheral Pulses, No clubbing, cyanosis, or edema    NERVOUS SYSTEM:  Cranial Nerves 2-12 intact [ x ] Abnormal  [  ]  ROM: WFL all extremities [  ]  Abnormal [x  ]  Motor Strength: WFL all extremities  [  ]  Abnormal [4/5  bl  le  5/5  bl  ue    ]  Sensation: intact to light touch [  ] Abnormal [ x ]  Reflexes: Symmetric [ x ]  Abnormal [  ]    FUNCTIONAL STATUS:  Bed Mobility: Independent [  ]  Supervision [  ]  Needs Assistance [ x ]  N/A [x  ]  Transfers: Independent [  ]  Supervision [  ]  Needs Assistance [  ]  N/A [ x ]   Ambulation: Independent [  ]  Supervision [  ]  Needs Assistance [  ]  N/A [ x ]  ADL: Independent [  ] Requires Assistance [  ] N/A [  x]  SEE PT/OT IE NOTES    LABS:                        14.9   6.97  )-----------( 252      ( 15 Rohit 2020 02:00 )             45.9     06-15    140  |  101  |  10  ----------------------------<  94  4.3   |  25  |  1.1    Ca    9.4      15 Rohit 2020 02:00    TPro  7.6  /  Alb  4.6  /  TBili  0.4  /  DBili  x   /  AST  61<H>  /  ALT  57<H>  /  AlkPhos  77  06-15    PT/INR - ( 15 Rohit 2020 02:00 )   PT: 13.20 sec;   INR: 1.15 ratio         PTT - ( 15 Rohit 2020 02:00 )  PTT:33.1 sec      RADIOLOGY & ADDITIONAL STUDIES:    Assesment:

## 2020-06-15 NOTE — PHYSICAL THERAPY INITIAL EVALUATION ADULT - TRANSFER SAFETY CONCERNS NOTED: SIT/STAND, REHAB EVAL
decreased balance during turns/decreased safety awareness/losing balance/decreased step length/decreased weight-shifting ability

## 2020-06-15 NOTE — ED PROVIDER NOTE - CLINICAL SUMMARY MEDICAL DECISION MAKING FREE TEXT BOX
Labs noted for trop 0.03, etoh 227, lactate 3.7.  EKG NSR no stemi.  CXR negative.  Pan CT negative. Given IVF, thiamine and folate. Will admit to low risk tele.

## 2020-06-15 NOTE — H&P ADULT - PROBLEM SELECTOR PLAN 5
Patient unable to clarify but suspect chronic diastolic. Will order ECHO and depending on this and next am lab results, consider IV fluids

## 2020-06-15 NOTE — PHYSICAL THERAPY INITIAL EVALUATION ADULT - GAIT DEVIATIONS NOTED, PT EVAL
increased time in double stance/decreased heel strike / push off, stooped posture, incomplete foot clearance/decreased weight-shifting ability/decreased fish/decreased step length

## 2020-06-15 NOTE — H&P ADULT - PROBLEM SELECTOR PLAN 2
rehab (concern for possible use of DOAC0 rehab (with additional concern due to  possible use of DOAC)

## 2020-06-15 NOTE — PROGRESS NOTE ADULT - ASSESSMENT
65 yo M With PMHx of etoh dependence, chronic diastolic CHF, chronic back pain, HLD, HTN, PE on eliquis, TIA who was brought to the ER after he had called EMS 3 separate times due to falls (he told staff his legs gave out).  Admitted for management of alcohol withdrawal.    #Alcohol intoxication with concern for alcohol withdrawal  -multiple hospital admissions in 2020 for alcohol withdrawl; (03/2020 alcohol withdrawal complicated by delirium tremens)  -CHAPO 3-4 upon my encounter this AM  -patient wants to leave AMA    PLAN  -CIWA protocol  -librium protocol  -Ativan PRN for severe withdrawal  -Thiamine and folic acid  -Alcohol cessation counseling was provided to him at bedside    #Elevated lactic acid, possibly Type B due to thiamine deficiency in the setting of EtOH abuse, less likely infectious  -no leukocytosis, no fever, no signs of infection clinically  -s/p IVF  -will treat the underlying cause    #weakness in lower extremity 2/2 debility  -Rehab consulted  -social work consult  -physical therapy eval    #HTN  -cont losartan 50mg qD  -Cont Metoprolol tartrate 25mg BID  -Cont ASA 81mg qD    #Elevated troponins less likely ACS  -will stop trending trops    #Hx of chronic Pulmonary Embolism  -on eliquis     #Chronic dCHF  -No signs of fluid overload on exam  -f/u ECHO    DVT PPX, on Eliquis    Progress Note Handoff  Pending Consults: None  Pending Tests: None  Pending Results: None  Family Discussion: Discussed with patient  Disposition: Home__X___/SNF______/Other_____/Unknown at this time_____

## 2020-06-15 NOTE — ED PROVIDER NOTE - ATTENDING CONTRIBUTION TO CARE
I personally evaluated the patient. I reviewed the Resident’s or Physician Assistant’s note (as assigned above), and agree with the findings and plan except as documented in my note.  Chart reviewed. H/O alcohol abuse, PE on Eliquis, presents with frequent falls. No chest pain or SOB. Exam shows alert patient in no distress, HEENT NCAT, lungs clear, RR S1S2, abdomen soft NT +BS, FROM, neuro A&OX3 no deficits.

## 2020-06-15 NOTE — ED ADULT TRIAGE NOTE - CHIEF COMPLAINT QUOTE
fell 3 times today, "my legs just gave out and I'm not staying" pt is unkempt and lives alone, as per ems house is very messy

## 2020-06-15 NOTE — CONSULT NOTE ADULT - ASSESSMENT
ETOH dependency  H/O PE        Continue protocol  may be better to switch to Librium protocol since LFT's are acceptable  May benefit from central intake outpt program  [phone]  other management per primary team

## 2020-06-15 NOTE — CONSULT NOTE ADULT - SUBJECTIVE AND OBJECTIVE BOX
Detox Consult  1:1 in place  Pt  seen and chart reviewed  admitted post falls and etoh abuse  H/O PE  was recently here too 2 wks ago, left AMA      Pt interviewed, examined and EMR chart reviewed.  Hx of ETOH abuse, has been drinking for __many___ years/months  variable periods of sobriety in the past.  Has been in detox before _____yes,   __x___No  No Sz hx, Tremors (+)  SOCIAL HISTORY: etoh    REVIEW OF SYSTEMS:    Constitutional: No fever, weight loss or fatigue  ENT:  No difficulty hearing, tinnitus, vertigo; No sinus or throat pain  Neck: No pain or stiffness  Respiratory: No cough, wheezing, chills or hemoptysis  Cardiovascular: No chest pain, palpitations, shortness of breath, dizziness or leg swelling  Gastrointestinal: No abdominal or epigastric pain. No nausea, vomiting or hematemesis; No diarrhea or constipation. No melena or hematochezia.  Neurological: No headaches, memory loss, loss of strength, numbness or tremors  Musculoskeletal:   (++)  joint pain or swelling; No muscle, back or extremity pain  Psychiatric: No depression, anxiety, mood swings or difficulty sleeping    MEDICATIONS  (STANDING):  apixaban 5 milliGRAM(s) Oral two times a day  aspirin  chewable 81 milliGRAM(s) Oral daily  folic acid 1 milliGRAM(s) Oral Once  folic acid 1 milliGRAM(s) Oral daily  LORazepam     Tablet 4 milliGRAM(s) Oral every 4 hours  LORazepam     Tablet   Oral   losartan 50 milliGRAM(s) Oral daily  metoprolol tartrate 25 milliGRAM(s) Oral two times a day  multivitamin 1 Tablet(s) Oral daily  thiamine 100 milliGRAM(s) Oral daily    MEDICATIONS  (PRN):      Vital Signs Last 24 Hrs  T(C): 35.6 (15 Rohit 2020 07:47), Max: 35.6 (15 Rohit 2020 01:57)  T(F): 96 (15 Rohit 2020 07:47), Max: 96 (15 Rohit 2020 01:57)  HR: 72 (15 Rohit 2020 07:47) (70 - 78)  BP: 184/85 (15 Rohit 2020 07:47) (153/72 - 194/97)  BP(mean): --  RR: 18 (15 Rohit 2020 07:47) (18 - 18)  SpO2: 98% (15 Rohit 2020 07:47) (97% - 98%)    PHYSICAL EXAM:      HEENT: PERRLA, EOMI, Normal Hearing, MMM  Neck: No LAD, No JVD  Back: Normal spine flexure, No CVA tenderness  Respiratory: CTAB/L  Cardiovascular: S1 and S2, RRR, no M/G/R  Gastrointestinal: BS+, soft, NT/ND  Extremities: No peripheral edema  Vascular: 2+ peripheral pulses  Neurological:  AO x 2 , no focal deficits    LABS:                        14.9   6.97  )-----------( 252      ( 15 Rohit 2020 02:00 )             45.9     06-15    140  |  101  |  10  ----------------------------<  94  4.3   |  25  |  1.1    Ca    9.4      15 Rohit 2020 02:00    TPro  7.6  /  Alb  4.6  /  TBili  0.4  /  DBili  x   /  AST  61<H>  /  ALT  57<H>  /  AlkPhos  77  06-15    PT/INR - ( 15 Rohit 2020 02:00 )   PT: 13.20 sec;   INR: 1.15 ratio         PTT - ( 15 Rohit 2020 02:00 )  PTT:33.1 sec    Drug Screen Urine:  Alcohol Level  Alcohol, Blood: 227 mg/dL (06-15-20 @ 02:00)        RADIOLOGY & ADDITIONAL STUDIES: noted in PACS

## 2020-06-15 NOTE — H&P ADULT - NSHPLABSRESULTS_GEN_ALL_CORE
14.9   6.97  )-----------( 252      ( 15 Rohit 2020 02:00 )             45.9     06-15    140  |  101  |  10  ----------------------------<  94  4.3   |  25  |  1.1    Ca    9.4      15 Rohit 2020 02:00    TPro  7.6  /  Alb  4.6  /  TBili  0.4  /  DBili  x   /  AST  61<H>  /  ALT  57<H>  /  AlkPhos  77  06-15            PT/INR - ( 15 Rohit 2020 02:00 )   PT: 13.20 sec;   INR: 1.15 ratio         PTT - ( 15 Rohit 2020 02:00 )  PTT:33.1 sec  Lactate Trend  06-15 @ 02:00 Lactate:3.7     CARDIAC MARKERS ( 15 Rohit 2020 02:00 )  x     / 0.03 ng/mL / x     / x     / x          CAPILLARY BLOOD GLUCOSE      POCT Blood Glucose.: 93 mg/dL (15 Rohit 2020 02:14)    < from: CT Abdomen and Pelvis w/ IV Cont (06.15.20 @ 04:22) >      EXAM:  CT ABDOMEN AND PELVIS IC        EXAM:  CT CHEST IC            PROCEDURE DATE:  06/15/2020      < from: CT Abdomen and Pelvis w/ IV Cont (06.15.20 @ 04:22) >      IMPRESSION:    1.  No CT evidence of acute traumatic injury to the chest, abdomen, or pelvis.  2.  1.4 cm filling defect within the distal trachea and right mainstem bronchus; correlate for aspiration.      RUSSELL MELENDEZ M.D., RESIDENT RADIOLOGIST  This document has been electronically signed.  HEYDI BOWERS M.D., ATTENDING RADIOLOGIST  This document has been electronically signed. Rohit 15 2020  4:50AM      < end of copied text >    < from: CT Head No Cont (06.15.20 @ 04:17) >      EXAM:  CT BRAIN            PROCEDURE DATE:  06/15/2020          < end of copied text >    < from: CT Head No Cont (06.15.20 @ 04:17) >      Impression:  No evidence of acute intracranial abnormality.          HEYDI BOWERS M.D., ATTENDING RADIOLOGIST  This document has been electronically signed. Rohit 15 2020  4:22AM        < end of copied text >

## 2020-06-15 NOTE — H&P ADULT - PROBLEM SELECTOR PLAN 8
with concern for aspiration. Further investigate when patient more awake with concern for aspiration (especially with intoxication). Further investigate when patient more awake

## 2020-06-15 NOTE — ED PROVIDER NOTE - OBJECTIVE STATEMENT
63 yo male, pmh of etoh abuse, htn, hld, presents to ed for frequent falls. Pt states generalized weakness, unable to stand at home, pt states lives alone since wife passed. Denies fever, chills, cp, sob, le swelling back pain neck pain.

## 2020-06-15 NOTE — H&P ADULT - PROBLEM SELECTOR PLAN 4
Apparently history of pulmonary embolism in past (more then 4 years ago?) for now continue DOAC but need to clarify with patient and usual clinical providers when possible (is patient compliant?) Apparently history of pulmonary embolism in past (3- 4 years ago?) for now continue DOAC but need to clarify with patient and usual clinical providers when possible (is patient compliant?)

## 2020-06-15 NOTE — H&P ADULT - HISTORY OF PRESENT ILLNESS
Patient is currently sedated, information from chart) 63yo male with known history of alcohol abuse is brought to the ER after he had called EMS 3 separate times due to falls. ER staff further tells me that his last alcoholic drink was last night and he had stated he wanted to leave hospital AMA. His alcohol level is high and ER documents he has been intoxicated and he is not able to make informed decisions at this time Patient is currently sedated, information from chart) 65yo male with known history of alcohol abuse along with history of pulmonary embolism (still on Eliquis?) is brought to the ER after he had called EMS 3 separate times due to falls (he told staff his legs gave out). ER staff further tells me that his last alcoholic drink was last night and he had stated he wanted to leave hospital AMA. His alcohol level is high and ER documents he has been intoxicated and he is not able to make informed decisions at this time Patient is currently sedated, information from chart) 63yo male with known history of alcohol abuse along with history of pulmonary embolism (still on Eliquis?) is brought to the ER after he had called EMS 3 separate times due to falls (he told staff his legs gave out). ER staff further tells me that his last alcoholic drink was last night and he had stated he wanted to leave hospital AMA. His alcohol level is high and ER documents he has been intoxicated and he is not able to make informed decisions at this time. Patient had denied any chest pains, shortness of breath, fevers or chills. Lives alone since his wife

## 2020-06-15 NOTE — PHYSICAL THERAPY INITIAL EVALUATION ADULT - GENERAL OBSERVATIONS, REHAB EVAL
10:30 - 11:05. Chart reviewed. Patient available to be seen for physical therapy, confirmed with nurse. Patient encountered semi-reclined in bed, PCA at bedside. Denies pain at rest, agreeable for PT evaluation. 10:30 - 11:05. Chart reviewed. Patient available to be seen for physical therapy, confirmed with nurse. X-ray revealed questionable (R) inferior and superior pubic ramus fx ; CT scan recommended and negative for acute fracture; okay to be seen by PT, as discussed with LETICIA Piña. Patient encountered semi-reclined in bed, PCA at bedside. Denies pain at rest, agreeable for PT evaluation.

## 2020-06-15 NOTE — PROGRESS NOTE ADULT - SUBJECTIVE AND OBJECTIVE BOX
CHAPARRO ROBISON  64y  Male      Patient is a 64y old  Male who presents with a chief complaint of falls (15 Rohit 2020 08:31)      INTERVAL HPI/OVERNIGHT EVENTS:  Patient seen and examined earlier this morning. Patient reports that he wants to go home. He reports that he hadnt drank alcohol in 15 years and yesterday he saw a picture of his wife and it triggered his EtOH abuse; reports he had 2 shots of vodka yesterday. He denies F/C, CP, palpitations, SOB, N/V, abd pain, diarrhea, constipation. He denies any withdrawl symptoms; denies hallucinations, tremors, N/V, diaphoresis.      REVIEW OF SYSTEMS:  CONSTITUTIONAL: No fever, weight loss, +weakness  EYES: No eye pain, visual disturbances, or discharge  ENMT:  No difficulty hearing, tinnitus, vertigo; No sinus or throat pain  NECK: No pain or stiffness  RESPIRATORY: No cough, wheezing, chills or hemoptysis; No shortness of breath  CARDIOVASCULAR: No chest pain, palpitations, dizziness, or leg swelling  GASTROINTESTINAL: No abdominal or epigastric pain. No nausea, vomiting, or hematemesis; No diarrhea or constipation. No melena or hematochezia.  GENITOURINARY: No dysuria, frequency, hematuria, or incontinence  NEUROLOGICAL: No headaches, memory loss, loss of strength, numbness, or tremors, no confusion  MUSCULOSKELETAL: No joint pain or swelling; No muscle, back, or extremity pain +weakness in lower extremity  PSYCHIATRIC: No depression, anxiety, mood swings, or difficulty sleeping, no hallucination    T(C): 36.3 (06-15-20 @ 13:21), Max: 36.3 (06-15-20 @ 13:21)  HR: 73 (06-15-20 @ 13:21) (57 - 78)  BP: 175/80 (06-15-20 @ 13:21) (153/72 - 194/97)  RR: 18 (06-15-20 @ 13:21) (18 - 18)  SpO2: 98% (06-15-20 @ 07:47) (97% - 98%)    PHYSICAL EXAM:  GENERAL: NAD, well-developed, non-diaphoretic   HEAD:  Atraumatic, Normocephalic  EYES: EOMI, PERRLA, conjunctiva and sclera clear  ENMT: No tonsillar erythema, exudates, or enlargement; Moist mucous membranes, Good dentition, No lesions  NECK: Supple, No JVD, Normal thyroid  NERVOUS SYSTEM:  Alert & Oriented X3, Good concentration; DTRs 2+ intact and symmetric, no tremors  CHEST/LUNG: Clear to percussion bilaterally; No rales, rhonchi, wheezing, or rubs  HEART: Regular rate and rhythm; No murmurs, rubs, or gallops  ABDOMEN: Soft, Nontender, Nondistended; Bowel sounds present  EXTREMITIES:  2+ Peripheral Pulses, No clubbing, cyanosis, or edema  PSYCH: cooperative    Consultant(s) Notes Reviewed:  [x ] YES  [ ] NO  Care Discussed with Consultants/Other Providers [ x] YES  [ ] NO    LAB:                        14.4   5.28  )-----------( 244      ( 15 Rohit 2020 11:14 )             44.4     06-15    138  |  102  |  9<L>  ----------------------------<  119<H>  4.1   |  25  |  0.9    Ca    8.9      15 Rohit 2020 11:14    TPro  6.5  /  Alb  4.5  /  TBili  0.4  /  DBili  x   /  AST  51<H>  /  ALT  50<H>  /  AlkPhos  75  06-15    LIVER FUNCTIONS - ( 15 Rohit 2020 11:14 )  Alb: 4.5 g/dL / Pro: 6.5 g/dL / ALK PHOS: 75 U/L / ALT: 50 U/L / AST: 51 U/L / GGT: x           CARDIAC MARKERS ( 15 Rohit 2020 11:14 )  x     / <0.01 ng/mL / 268 U/L / x     / 5.4 ng/mL  CARDIAC MARKERS ( 15 Rohit 2020 02:00 )  x     / 0.03 ng/mL / x     / x     / x                  Drug Dosing Weight  Height (cm): 175.26 (15 Rohit 2020 01:57)  Weight (kg): 81.6 (15 Rohit 2020 01:57)  BMI (kg/m2): 26.6 (15 Rohit 2020 01:57)  BSA (m2): 1.97 (15 Rohit 2020 01:57)  Height (cm): 175.26 (06-15-20 @ 01:57)  Weight (kg): 81.6 (06-15-20 @ 01:57)  BMI (kg/m2): 26.6 (06-15-20 @ 01:57)  BSA (m2): 1.97 (06-15-20 @ 01:57)  CAPILLARY BLOOD GLUCOSE      POCT Blood Glucose.: 93 mg/dL (15 Rohit 2020 02:14)    I&O's Summary        RADIOLOGY & ADDITIONAL TESTS:  Imaging Personally Reviewed:  [x] YES  [ ] NO    HEALTH ISSUES - PROBLEM Dx:  Abnormal CT of the chest: Abnormal CT of the chest  Elevated troponin: Elevated troponin  HTN (hypertension): HTN (hypertension)  CHF (congestive heart failure): CHF (congestive heart failure)  Anticoagulant long-term use: Anticoagulant long-term use  Elevated lactic acid level: Elevated lactic acid level  Frequent falls: Frequent falls  Alcohol intoxication: Alcohol intoxication          MEDS:  apixaban 5 milliGRAM(s) Oral two times a day  aspirin  chewable 81 milliGRAM(s) Oral daily  folic acid 1 milliGRAM(s) Oral Once  folic acid 1 milliGRAM(s) Oral daily  LORazepam     Tablet 4 milliGRAM(s) Oral every 4 hours  LORazepam     Tablet   Oral   losartan 50 milliGRAM(s) Oral daily  metoprolol tartrate 25 milliGRAM(s) Oral two times a day  multivitamin 1 Tablet(s) Oral daily  thiamine 100 milliGRAM(s) Oral daily

## 2020-06-15 NOTE — H&P ADULT - PROBLEM SELECTOR PLAN 1
(resulting in thiamine and folic acid deficiencies) for now ativan detox protocol with thiamine and folic acid supplements- detox consult

## 2020-06-15 NOTE — ED PROVIDER NOTE - PROGRESS NOTE DETAILS
as per dr. cabrera, admit to [facility] aware. pt is acutely intoxicated, requesting to leave ama, however, due to acute intoxication will proceed with admission, despite pt wanting to leave, pt does not possess capacity to make decision to ama due to intoxication.

## 2020-06-15 NOTE — ED ADULT NURSE NOTE - NSIMPLEMENTINTERV_GEN_ALL_ED
Implemented All Universal Safety Interventions:  [place] to call system. Call bell, personal items and telephone within reach. Instruct patient to call for assistance. Room bathroom lighting operational. Non-slip footwear when patient is off stretcher. Physically safe environment: no spills, clutter or unnecessary equipment. Stretcher in lowest position, wheels locked, appropriate side rails in place.

## 2020-06-15 NOTE — ED PROVIDER NOTE - PHYSICAL EXAMINATION
Physical Exam    Vital Signs: I have reviewed the initial vital signs.  Constitutional: unkept, no acute distress  Eyes: Conjunctiva pink, Sclera clear,  Cardiovascular: S1 and S2, regular rate, regular rhythm, well-perfused extremities, radial pulses equal and 2+, pedal pulses 2+ and equal   Respiratory: unlabored respiratory effort, clear to auscultation bilaterally no wheezing, rales and rhonchi  Gastrointestinal: soft, non-tender abdomen, no pulsatile mass, normal bowl sounds  Musculoskeletal: supple neck, no lower extremity edema, no midline tenderness  Integumentary: warm, dry, no rash  Neurologic: awake, alert, nvi

## 2020-06-16 LAB
ALBUMIN SERPL ELPH-MCNC: 3.9 G/DL — SIGNIFICANT CHANGE UP (ref 3.5–5.2)
ALP SERPL-CCNC: 74 U/L — SIGNIFICANT CHANGE UP (ref 30–115)
ALT FLD-CCNC: 38 U/L — SIGNIFICANT CHANGE UP (ref 0–41)
ANION GAP SERPL CALC-SCNC: 10 MMOL/L — SIGNIFICANT CHANGE UP (ref 7–14)
AST SERPL-CCNC: 37 U/L — SIGNIFICANT CHANGE UP (ref 0–41)
BILIRUB SERPL-MCNC: 0.6 MG/DL — SIGNIFICANT CHANGE UP (ref 0.2–1.2)
BUN SERPL-MCNC: 11 MG/DL — SIGNIFICANT CHANGE UP (ref 10–20)
CALCIUM SERPL-MCNC: 8.9 MG/DL — SIGNIFICANT CHANGE UP (ref 8.5–10.1)
CHLORIDE SERPL-SCNC: 102 MMOL/L — SIGNIFICANT CHANGE UP (ref 98–110)
CO2 SERPL-SCNC: 26 MMOL/L — SIGNIFICANT CHANGE UP (ref 17–32)
CREAT SERPL-MCNC: 0.9 MG/DL — SIGNIFICANT CHANGE UP (ref 0.7–1.5)
GLUCOSE SERPL-MCNC: 92 MG/DL — SIGNIFICANT CHANGE UP (ref 70–99)
HCT VFR BLD CALC: 39.6 % — LOW (ref 42–52)
HGB BLD-MCNC: 13.2 G/DL — LOW (ref 14–18)
MCHC RBC-ENTMCNC: 31.4 PG — HIGH (ref 27–31)
MCHC RBC-ENTMCNC: 33.3 G/DL — SIGNIFICANT CHANGE UP (ref 32–37)
MCV RBC AUTO: 94.1 FL — HIGH (ref 80–94)
NRBC # BLD: 0 /100 WBCS — SIGNIFICANT CHANGE UP (ref 0–0)
PLATELET # BLD AUTO: 186 K/UL — SIGNIFICANT CHANGE UP (ref 130–400)
POTASSIUM SERPL-MCNC: 3.6 MMOL/L — SIGNIFICANT CHANGE UP (ref 3.5–5)
POTASSIUM SERPL-SCNC: 3.6 MMOL/L — SIGNIFICANT CHANGE UP (ref 3.5–5)
PROT SERPL-MCNC: 5.8 G/DL — LOW (ref 6–8)
RBC # BLD: 4.21 M/UL — LOW (ref 4.7–6.1)
RBC # FLD: 13.6 % — SIGNIFICANT CHANGE UP (ref 11.5–14.5)
SODIUM SERPL-SCNC: 138 MMOL/L — SIGNIFICANT CHANGE UP (ref 135–146)
WBC # BLD: 6.6 K/UL — SIGNIFICANT CHANGE UP (ref 4.8–10.8)
WBC # FLD AUTO: 6.6 K/UL — SIGNIFICANT CHANGE UP (ref 4.8–10.8)

## 2020-06-16 PROCEDURE — 99233 SBSQ HOSP IP/OBS HIGH 50: CPT

## 2020-06-16 RX ORDER — LABETALOL HCL 100 MG
10 TABLET ORAL ONCE
Refills: 0 | Status: COMPLETED | OUTPATIENT
Start: 2020-06-16 | End: 2020-06-16

## 2020-06-16 RX ADMIN — APIXABAN 5 MILLIGRAM(S): 2.5 TABLET, FILM COATED ORAL at 17:21

## 2020-06-16 RX ADMIN — Medication 10 MILLIGRAM(S): at 23:35

## 2020-06-16 RX ADMIN — Medication 4 MILLIGRAM(S): at 02:53

## 2020-06-16 RX ADMIN — Medication 25 MILLIGRAM(S): at 21:11

## 2020-06-16 RX ADMIN — APIXABAN 5 MILLIGRAM(S): 2.5 TABLET, FILM COATED ORAL at 05:11

## 2020-06-16 RX ADMIN — Medication 1 MILLIGRAM(S): at 13:36

## 2020-06-16 RX ADMIN — Medication 3 MILLIGRAM(S): at 09:58

## 2020-06-16 RX ADMIN — Medication 100 MILLIGRAM(S): at 13:36

## 2020-06-16 RX ADMIN — Medication 25 MILLIGRAM(S): at 17:21

## 2020-06-16 RX ADMIN — LOSARTAN POTASSIUM 50 MILLIGRAM(S): 100 TABLET, FILM COATED ORAL at 05:11

## 2020-06-16 RX ADMIN — Medication 25 MILLIGRAM(S): at 05:11

## 2020-06-16 RX ADMIN — Medication 1 TABLET(S): at 13:36

## 2020-06-16 RX ADMIN — Medication 3 MILLIGRAM(S): at 05:11

## 2020-06-16 RX ADMIN — Medication 25 MILLIGRAM(S): at 13:36

## 2020-06-16 RX ADMIN — Medication 10 MILLIGRAM(S): at 17:53

## 2020-06-16 RX ADMIN — Medication 81 MILLIGRAM(S): at 13:35

## 2020-06-16 NOTE — CHART NOTE - NSCHARTNOTEFT_GEN_A_CORE
d/c ativan protocol     started on librium taper protocol as per detox consult and   asked by Dr. GUILLAUME - hospitalist     Pt told about change and states he wants to leave     As discussed in rounds this am - If pt would leave it will be AMA    rn aware

## 2020-06-16 NOTE — PROGRESS NOTE ADULT - SUBJECTIVE AND OBJECTIVE BOX
CHAPARRO ROBISON  64y  Male      Patient is a 64y old  Male who presents with a chief complaint of falls (15 Rohit 2020 08:31)      INTERVAL HPI/OVERNIGHT EVENTS:  Patient seen and examined earlier this morning. Patient reports that he wants to go home; reports he will sign AMA so that he can go home; he reports that he wants to speak to SW; CM was made aware. He denies F/C, CP, palpitations, SOB, N/V, abd pain, diarrhea, constipation. He denies any withdrawl symptoms; denies hallucinations, tremors, N/V, diaphoresis.      REVIEW OF SYSTEMS:  CONSTITUTIONAL: No fever, weight loss, +weakness  EYES: No eye pain, visual disturbances, or discharge  ENMT:  No difficulty hearing, tinnitus, vertigo; No sinus or throat pain  NECK: No pain or stiffness  RESPIRATORY: No cough, wheezing, chills or hemoptysis; No shortness of breath  CARDIOVASCULAR: No chest pain, palpitations, dizziness, or leg swelling  GASTROINTESTINAL: No abdominal or epigastric pain. No nausea, vomiting, or hematemesis; No diarrhea or constipation. No melena or hematochezia.  GENITOURINARY: No dysuria, frequency, hematuria, or incontinence  NEUROLOGICAL: No headaches, memory loss, loss of strength, numbness, or tremors, no confusion  MUSCULOSKELETAL: No joint pain or swelling; No muscle, back, or extremity pain +weakness in lower extremity  PSYCHIATRIC: No depression, anxiety, mood swings, or difficulty sleeping, no hallucination    Vital Signs Last 24 Hrs  T(C): 36.2 (16 Jun 2020 04:30), Max: 36.9 (15 Rohit 2020 21:40)  T(F): 97.2 (16 Jun 2020 04:30), Max: 98.5 (15 Rohit 2020 21:40)  HR: 60 (16 Jun 2020 06:56) (60 - 85)  BP: 179/85 (16 Jun 2020 06:56) (152/71 - 189/83)  BP(mean): --  RR: 17 (15 Rohit 2020 21:40) (17 - 18)  SpO2: --    PHYSICAL EXAM:  GENERAL: NAD, well-developed, non-diaphoretic   HEAD:  Atraumatic, Normocephalic  EYES: EOMI, PERRLA, conjunctiva and sclera clear  ENMT: No tonsillar erythema, exudates, or enlargement; Moist mucous membranes, Good dentition, No lesions  NECK: Supple, No JVD, Normal thyroid  NERVOUS SYSTEM:  Alert & Oriented X3, Good concentration; DTRs 2+ intact and symmetric, no tremors  CHEST/LUNG: Clear to percussion bilaterally; No rales, rhonchi, wheezing, or rubs  HEART: Regular rate and rhythm; No murmurs, rubs, or gallops  ABDOMEN: Soft, Nontender, Nondistended; Bowel sounds present  EXTREMITIES:  2+ Peripheral Pulses, No clubbing, cyanosis, or edema  PSYCH: cooperative    Consultant(s) Notes Reviewed:  [x ] YES  [ ] NO  Care Discussed with Consultants/Other Providers [ x] YES  [ ] NO                                 13.2   6.60  )-----------( 186      ( 16 Jun 2020 05:33 )             39.6   06-16    138  |  102  |  11  ----------------------------<  92  3.6   |  26  |  0.9    Ca    8.9      16 Jun 2020 05:33    TPro  5.8<L>  /  Alb  3.9  /  TBili  0.6  /  DBili  x   /  AST  37  /  ALT  38  /  AlkPhos  74  06-16      Drug Dosing Weight  Height (cm): 175.26 (15 Rohit 2020 01:57)  Weight (kg): 81.6 (15 Rohit 2020 01:57)  BMI (kg/m2): 26.6 (15 Rohit 2020 01:57)  BSA (m2): 1.97 (15 Rohit 2020 01:57)  Height (cm): 175.26 (06-15-20 @ 01:57)  Weight (kg): 81.6 (06-15-20 @ 01:57)  BMI (kg/m2): 26.6 (06-15-20 @ 01:57)  BSA (m2): 1.97 (06-15-20 @ 01:57)  CAPILLARY BLOOD GLUCOSE      POCT Blood Glucose.: 93 mg/dL (15 Rohit 2020 02:14)    I&O's Summary        RADIOLOGY & ADDITIONAL TESTS:  Imaging Personally Reviewed:  [x] YES  [ ] NO    HEALTH ISSUES - PROBLEM Dx:  Abnormal CT of the chest: Abnormal CT of the chest  Elevated troponin: Elevated troponin  HTN (hypertension): HTN (hypertension)  CHF (congestive heart failure): CHF (congestive heart failure)  Anticoagulant long-term use: Anticoagulant long-term use  Elevated lactic acid level: Elevated lactic acid level  Frequent falls: Frequent falls  Alcohol intoxication: Alcohol intoxication      MEDICATIONS  (STANDING):  apixaban 5 milliGRAM(s) Oral two times a day  aspirin  chewable 81 milliGRAM(s) Oral daily  folic acid 1 milliGRAM(s) Oral Once  folic acid 1 milliGRAM(s) Oral daily  LORazepam     Tablet 3 milliGRAM(s) Oral every 4 hours  LORazepam     Tablet   Oral   losartan 50 milliGRAM(s) Oral daily  metoprolol tartrate 25 milliGRAM(s) Oral two times a day  multivitamin 1 Tablet(s) Oral daily  thiamine 100 milliGRAM(s) Oral daily    MEDICATIONS  (PRN):

## 2020-06-16 NOTE — PROGRESS NOTE ADULT - ASSESSMENT
Hospital course:  65 yo M With PMHx of etoh dependence, chronic diastolic CHF, chronic back pain, HLD, HTN, PE on eliquis, TIA who was brought to the ER after he had called EMS 3 separate times due to falls (he told staff his legs gave out).  Admitted for management of alcohol withdrawal. Patient has a history of frequent hospitalization where he comes in for alcohol abuse/withdrawl and he wants to sign himself out AMA even though he is debilitated. During the previous admission, patient was discharge home after aggressive physical therapy. Patient currently remains on librium protocol and currently not medically cleared for hospital discharge.     #Alcohol intoxication with concern for alcohol withdrawal  #Suspected Thiamine and folic acid deficiency in the setting of alcohol abuse  -multiple hospital admissions in 2020 for alcohol withdrawl; (03/2020 alcohol withdrawal complicated by delirium tremens)  -CIWA 3-4 upon my encounter this AM  -patient wants to leave AMA    PLAN  -SW evaluation  -CIWA protocol  -librium protocol  -Detox consult appreciated; recommended to cont protocol and will switch to librium protocol  -Patient will be given referral to central intake outpt program  upon discharge    -Ativan PRN for severe withdrawal  -Thiamine and folic acid  -Alcohol cessation counseling was provided to him at bedside    #Elevated lactic acid, possibly Type B due to thiamine deficiency in the setting of EtOH abuse, less likely infectious  -no leukocytosis, no fever, no signs of infection clinically  -s/p IVF  -will treat the underlying cause    #weakness in lower extremity 2/2 debility  -Rehab consulted  -social work consult  -physical therapy eval    #HTN  -cont losartan 50mg qD  -Cont Metoprolol tartrate 25mg BID  -Cont ASA 81mg qD    #Elevated troponins less likely ACS  -will stop trending trops    #Hx of chronic Pulmonary Embolism  -on eliquis     #Chronic dCHF  -No signs of fluid overload on exam    DVT PPX, on Eliquis    Progress Note Handoff  Pending Consults: None  Pending Tests: None  Pending Results: None  Family Discussion: Discussed with patient  Disposition: Home__X___/SNF______/Other_____/Unknown at this time_____ Hospital course:  63 yo M With PMHx of etoh dependence, chronic diastolic CHF, chronic back pain, HLD, HTN, PE on eliquis, TIA who was brought to the ER after he had called EMS 3 separate times due to falls (he told staff his legs gave out).  Admitted for management of alcohol withdrawal. Patient has a history of frequent hospitalization where he comes in for alcohol abuse/withdrawl and he wants to sign himself out AMA even though he is debilitated. During the previous admission, patient was discharge home after aggressive physical therapy. Patient currently remains on librium protocol and currently not medically cleared for hospital discharge.     #Alcohol intoxication with concern for alcohol withdrawal  #Suspected Thiamine and folic acid deficiency in the setting of alcohol abuse  -multiple hospital admissions in 2020 for alcohol withdrawl; (03/2020 alcohol withdrawal complicated by delirium tremens)  -CIWA 3-4 upon my encounter this AM  -patient wants to leave AMA    PLAN  -SW evaluation  -CIWA protocol  -librium protocol  -Detox consult appreciated; recommended to cont protocol and will switch to librium protocol  -Patient will be given referral to central intake outpt program  upon discharge    -Ativan PRN for severe withdrawal  -Thiamine and folic acid  -Alcohol cessation counseling was provided to him at bedside    #Elevated lactic acid, possibly Type B due to thiamine deficiency in the setting of EtOH abuse, less likely infectious  -no leukocytosis, no fever, no signs of infection clinically  -s/p IVF  -will treat the underlying cause    #weakness in lower extremity 2/2 debility  -Rehab consulted  -social work consult  -physical therapy eval    #HTN  -cont losartan 50mg qD  -Cont Metoprolol tartrate 25mg BID  -Cont ASA 81mg qD    #Elevated troponins less likely ACS  -will stop trending trops    #Hx of chronic Pulmonary Embolism  -on eliquis     #Chronic diastolic CHF- no in acute exacerbation  -No signs of fluid overload on exam    DVT PPX, on Eliquis    Progress Note Handoff  Pending Consults: None  Pending Tests: None  Pending Results: None  Family Discussion: Discussed with patient  Disposition: Home__X___/SNF______/Other_____/Unknown at this time_____

## 2020-06-17 ENCOUNTER — TRANSCRIPTION ENCOUNTER (OUTPATIENT)
Age: 65
End: 2020-06-17

## 2020-06-17 VITALS — OXYGEN SATURATION: 98 %

## 2020-06-17 LAB
ALBUMIN SERPL ELPH-MCNC: 4 G/DL — SIGNIFICANT CHANGE UP (ref 3.5–5.2)
ALP SERPL-CCNC: 71 U/L — SIGNIFICANT CHANGE UP (ref 30–115)
ALT FLD-CCNC: 34 U/L — SIGNIFICANT CHANGE UP (ref 0–41)
ANION GAP SERPL CALC-SCNC: 11 MMOL/L — SIGNIFICANT CHANGE UP (ref 7–14)
AST SERPL-CCNC: 32 U/L — SIGNIFICANT CHANGE UP (ref 0–41)
BILIRUB SERPL-MCNC: 0.5 MG/DL — SIGNIFICANT CHANGE UP (ref 0.2–1.2)
BUN SERPL-MCNC: 7 MG/DL — LOW (ref 10–20)
CALCIUM SERPL-MCNC: 9.2 MG/DL — SIGNIFICANT CHANGE UP (ref 8.5–10.1)
CHLORIDE SERPL-SCNC: 101 MMOL/L — SIGNIFICANT CHANGE UP (ref 98–110)
CO2 SERPL-SCNC: 28 MMOL/L — SIGNIFICANT CHANGE UP (ref 17–32)
CREAT SERPL-MCNC: 0.9 MG/DL — SIGNIFICANT CHANGE UP (ref 0.7–1.5)
GLUCOSE SERPL-MCNC: 100 MG/DL — HIGH (ref 70–99)
HCT VFR BLD CALC: 40.6 % — LOW (ref 42–52)
HGB BLD-MCNC: 13.5 G/DL — LOW (ref 14–18)
MCHC RBC-ENTMCNC: 31.3 PG — HIGH (ref 27–31)
MCHC RBC-ENTMCNC: 33.3 G/DL — SIGNIFICANT CHANGE UP (ref 32–37)
MCV RBC AUTO: 94 FL — SIGNIFICANT CHANGE UP (ref 80–94)
NRBC # BLD: 0 /100 WBCS — SIGNIFICANT CHANGE UP (ref 0–0)
PLATELET # BLD AUTO: 175 K/UL — SIGNIFICANT CHANGE UP (ref 130–400)
POTASSIUM SERPL-MCNC: 3.4 MMOL/L — LOW (ref 3.5–5)
POTASSIUM SERPL-SCNC: 3.4 MMOL/L — LOW (ref 3.5–5)
PROT SERPL-MCNC: 6 G/DL — SIGNIFICANT CHANGE UP (ref 6–8)
RBC # BLD: 4.32 M/UL — LOW (ref 4.7–6.1)
RBC # FLD: 13.4 % — SIGNIFICANT CHANGE UP (ref 11.5–14.5)
SODIUM SERPL-SCNC: 140 MMOL/L — SIGNIFICANT CHANGE UP (ref 135–146)
WBC # BLD: 6.21 K/UL — SIGNIFICANT CHANGE UP (ref 4.8–10.8)
WBC # FLD AUTO: 6.21 K/UL — SIGNIFICANT CHANGE UP (ref 4.8–10.8)

## 2020-06-17 PROCEDURE — 99238 HOSP IP/OBS DSCHRG MGMT 30/<: CPT

## 2020-06-17 RX ADMIN — Medication 25 MILLIGRAM(S): at 05:25

## 2020-06-17 RX ADMIN — APIXABAN 5 MILLIGRAM(S): 2.5 TABLET, FILM COATED ORAL at 05:25

## 2020-06-17 RX ADMIN — LOSARTAN POTASSIUM 50 MILLIGRAM(S): 100 TABLET, FILM COATED ORAL at 05:25

## 2020-06-17 NOTE — DISCHARGE NOTE PROVIDER - HOSPITAL COURSE
Patient is currently sedated, information from chart) 63yo male with known history of alcohol abuse along with history of pulmonary embolism (still on Eliquis?) is brought to the ER after he had called EMS 3 separate times due to falls (he told staff his legs gave out). ER staff further tells me that his last alcoholic drink was last night and he had stated he wanted to leave hospital AMA. His alcohol level is high and ER documents he has been intoxicated and he is not able to make informed decisions at this time. Patient had denied any chest pains, shortness of breath, fevers or chills. Lives alone since his wife         Patient was admitted for alcohol withdrawal and debility. Was started on librium protocol recommended by detox consult. Patient signed out AMA before completing librium protocol. Debility improved throughout admission. Patient is currently sedated, information from chart) 65yo male with known history of alcohol abuse along with history of pulmonary embolism (still on Eliquis?) is brought to the ER after he had called EMS 3 separate times due to falls (he told staff his legs gave out). ER staff further tells me that his last alcoholic drink was last night and he had stated he wanted to leave hospital AMA. His alcohol level is high and ER documents he has been intoxicated and he is not able to make informed decisions at this time. Patient had denied any chest pains, shortness of breath, fevers or chills. Lives alone since his wife         Patient was admitted for alcohol withdrawal and debility. Was started on librium protocol recommended by detox consult. Patient signed out AMA before completing librium protocol. Debility did not improve throughout admission.

## 2020-06-17 NOTE — CHART NOTE - NSCHARTNOTEFT_GEN_A_CORE
Pt signing out against medical advice. Informed him that he still has several days of librium protocol left and alcohol withdrawal may return. Also informed him that due to debility, he is at high risk of falls. Informed him of the risks of leaving against medical advice which include, but not limited to, recurrent falls, worsening of alcohol withdrawal and even death. Pt refusing intervention and physical therapy. he verbalized understanding and signed out AMA.

## 2020-06-17 NOTE — PROGRESS NOTE ADULT - ASSESSMENT
Hospital course:  65 yo M With PMHx of etoh dependence, chronic diastolic CHF, chronic back pain, HLD, HTN, PE on eliquis, TIA who was brought to the ER after he had called EMS 3 separate times due to falls (he told staff his legs gave out).  Admitted for management of alcohol withdrawal. Patient has a history of frequent hospitalization where he comes in for alcohol abuse/withdrawl and he wants to sign himself out AMA even though he is debilitated. During the previous admission, patient was discharge home after aggressive physical therapy. Patient currently remains on librium protocol and currently not medically cleared for hospital discharge.     #Alcohol intoxication with concern for alcohol withdrawal  #Suspected Thiamine and folic acid deficiency in the setting of alcohol abuse  -multiple hospital admissions in 2020 for alcohol withdrawl; (03/2020 alcohol withdrawal complicated by delirium tremens)  -WA 1-2 upon my encounter this AM  -patient has been wanting to leave AMA, however, due to debility patient not able to ambulate    PLAN  -SW evaluation  -CIWA protocol  -librium protocol  -Detox consult appreciated  -PT  -Patient will be given referral to central intake outpt program  upon discharge    #Elevated lactic acid, possibly Type B due to thiamine deficiency in the setting of EtOH abuse, less likely infectious  -no leukocytosis, no fever, no signs of infection clinically  -s/p IVF  -will treat the underlying cause    #weakness in lower extremity 2/2 debility  -Rehab consulted  -social work consult  -physical therapy eval    #HTN  -cont losartan 50mg qD  -Cont Metoprolol tartrate 25mg BID  -Cont ASA 81mg qD    #Elevated troponins less likely ACS  -will stop trending trops    #Hx of chronic Pulmonary Embolism  -on eliquis     #Chronic diastolic CHF- not in acute exacerbation  -No signs of fluid overload on exam    DVT PPX, on Eliquis    #Progress Note Handoff  Pending (specify): PT eval  Family discussion: d/w pt regarding his debility, DC planning  Disposition: Home with homecare vs SNF

## 2020-06-17 NOTE — DISCHARGE NOTE NURSING/CASE MANAGEMENT/SOCIAL WORK - PATIENT PORTAL LINK FT
You can access the FollowMyHealth Patient Portal offered by [facility] by registering at the following website: [email or web address]. By joining [facility]’s FollowMyHealth portal, you will also be able to view your health information using other applications (apps) compatible with our system.

## 2020-06-17 NOTE — PROGRESS NOTE ADULT - SUBJECTIVE AND OBJECTIVE BOX
CHAPARRO ROBISON  64y, Male  Allergy: No Known Allergies    Hospital Day: 2d    Patient seen and examined earlier today. No acute events overnight.    PMH/PSH:  PAST MEDICAL & SURGICAL HISTORY:  Pulmonary embolism: 2015  Chronic back pain  Alcohol dependence  HLD (hyperlipidemia)  HTN (hypertension)  CHF (congestive heart failure)  TIA (transient ischemic attack)  History of appendectomy    VITALS:  T(F): 96 (06-17-20 @ 05:23), Max: 97.1 (06-16-20 @ 13:41)  HR: 69 (06-17-20 @ 05:23)  BP: 148/78 (06-17-20 @ 05:23) (122/80 - 212/101)  RR: 16 (06-17-20 @ 05:23)  SpO2: --    TESTS & MEASUREMENTS:  Weight (Kg):   BMI (kg/m2): 26.6 (06-15)                        13.5   6.21  )-----------( 175      ( 17 Jun 2020 05:50 )             40.6     06-17    140  |  101  |  7<L>  ----------------------------<  100<H>  3.4<L>   |  28  |  0.9    Ca    9.2      17 Jun 2020 05:50    TPro  6.0  /  Alb  4.0  /  TBili  0.5  /  DBili  x   /  AST  32  /  ALT  34  /  AlkPhos  71  06-17    LIVER FUNCTIONS - ( 17 Jun 2020 05:50 )  Alb: 4.0 g/dL / Pro: 6.0 g/dL / ALK PHOS: 71 U/L / ALT: 34 U/L / AST: 32 U/L / GGT: x           CARDIAC MARKERS ( 15 Rohit 2020 18:50 )  x     / <0.01 ng/mL / 203 U/L / x     / 3.5 ng/mL  CARDIAC MARKERS ( 15 Rohit 2020 11:14 )  x     / <0.01 ng/mL / 268 U/L / x     / 5.4 ng/mL    MEDICATIONS:  MEDICATIONS  (STANDING):  apixaban 5 milliGRAM(s) Oral two times a day  aspirin  chewable 81 milliGRAM(s) Oral daily  chlordiazePOXIDE   Oral   chlordiazePOXIDE 25 milliGRAM(s) Oral every 4 hours  chlordiazePOXIDE 20 milliGRAM(s) Oral every 4 hours  folic acid 1 milliGRAM(s) Oral Once  folic acid 1 milliGRAM(s) Oral daily  losartan 50 milliGRAM(s) Oral daily  metoprolol tartrate 25 milliGRAM(s) Oral two times a day  multivitamin 1 Tablet(s) Oral daily  thiamine 100 milliGRAM(s) Oral daily    MEDICATIONS  (PRN):  chlordiazePOXIDE 25 milliGRAM(s) Oral every 4 hours PRN Withdrawal    HOME MEDICATIONS:  aspirin 81 mg oral tablet (05-26)  Eliquis 5 mg oral tablet (05-26)  losartan 50 mg oral tablet (05-26)  Metoprolol Tartrate 25 mg oral tablet (05-26)      REVIEW OF SYSTEMS:  All other review of systems is negative unless indicated above.     PHYSICAL EXAM:  GENERAL: NAD  HEENT: No Swelling  CHEST/LUNG: Good air entry, No wheezing  HEART: RRR, No murmurs  ABDOMEN: Soft, Bowel sounds present  EXTREMITIES:  No clubbing

## 2020-06-17 NOTE — DISCHARGE NOTE PROVIDER - NSDCCPCAREPLAN_GEN_ALL_CORE_FT
PRINCIPAL DISCHARGE DIAGNOSIS  Diagnosis: Alcohol intoxication  Assessment and Plan of Treatment: Avoid alcohol use, follow up with central intake outpt program for alcoholism       SECONDARY DISCHARGE DIAGNOSES  Diagnosis: Frequent falls  Assessment and Plan of Treatment:     Diagnosis: Elevated lactic acid level  Assessment and Plan of Treatment:     Diagnosis: Elevated troponin  Assessment and Plan of Treatment:     Diagnosis: Alcohol intoxication  Assessment and Plan of Treatment:

## 2020-06-19 DIAGNOSIS — I27.82 CHRONIC PULMONARY EMBOLISM: ICD-10-CM

## 2020-06-19 DIAGNOSIS — E51.9 THIAMINE DEFICIENCY, UNSPECIFIED: ICD-10-CM

## 2020-06-19 DIAGNOSIS — I50.32 CHRONIC DIASTOLIC (CONGESTIVE) HEART FAILURE: ICD-10-CM

## 2020-06-19 DIAGNOSIS — E78.5 HYPERLIPIDEMIA, UNSPECIFIED: ICD-10-CM

## 2020-06-19 DIAGNOSIS — F10.239 ALCOHOL DEPENDENCE WITH WITHDRAWAL, UNSPECIFIED: ICD-10-CM

## 2020-06-19 DIAGNOSIS — Z91.81 HISTORY OF FALLING: ICD-10-CM

## 2020-06-19 DIAGNOSIS — E53.8 DEFICIENCY OF OTHER SPECIFIED B GROUP VITAMINS: ICD-10-CM

## 2020-06-19 DIAGNOSIS — R53.81 OTHER MALAISE: ICD-10-CM
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
## 2020-06-19 DIAGNOSIS — F10.229 ALCOHOL DEPENDENCE WITH INTOXICATION, UNSPECIFIED: ICD-10-CM

## 2020-06-19 DIAGNOSIS — I11.0 HYPERTENSIVE HEART DISEASE WITH HEART FAILURE: ICD-10-CM

## 2020-06-19 DIAGNOSIS — Z86.73 PERSONAL HISTORY OF TRANSIENT ISCHEMIC ATTACK (TIA), AND CEREBRAL INFARCTION WITHOUT RESIDUAL DEFICITS: ICD-10-CM

## 2020-06-19 DIAGNOSIS — R29.6 REPEATED FALLS: ICD-10-CM

## 2020-06-19 DIAGNOSIS — I16.0 HYPERTENSIVE URGENCY: ICD-10-CM

## 2020-06-19 DIAGNOSIS — R53.1 WEAKNESS: ICD-10-CM

## 2020-06-19 DIAGNOSIS — Z82.49 FAMILY HISTORY OF ISCHEMIC HEART DISEASE AND OTHER DISEASES OF THE CIRCULATORY SYSTEM: ICD-10-CM

## 2020-06-19 DIAGNOSIS — Z79.01 LONG TERM (CURRENT) USE OF ANTICOAGULANTS: ICD-10-CM

## 2020-06-19 DIAGNOSIS — Z79.82 LONG TERM (CURRENT) USE OF ASPIRIN: ICD-10-CM

## 2020-06-20 ENCOUNTER — INPATIENT (INPATIENT)
Facility: HOSPITAL | Age: 65
LOS: 9 days | Discharge: NURSING, REHAB OR LONG-TERM CARE | End: 2020-06-30
Attending: HOSPITALIST | Admitting: HOSPITALIST
Payer: MEDICARE

## 2020-06-20 VITALS
OXYGEN SATURATION: 98 % | DIASTOLIC BLOOD PRESSURE: 95 MMHG | TEMPERATURE: 99 F | WEIGHT: 179.9 LBS | SYSTOLIC BLOOD PRESSURE: 193 MMHG | HEART RATE: 88 BPM | RESPIRATION RATE: 18 BRPM | HEIGHT: 69 IN

## 2020-06-20 DIAGNOSIS — Z90.49 ACQUIRED ABSENCE OF OTHER SPECIFIED PARTS OF DIGESTIVE TRACT: Chronic | ICD-10-CM

## 2020-06-20 LAB
ALBUMIN SERPL ELPH-MCNC: 4.5 G/DL — SIGNIFICANT CHANGE UP (ref 3.5–5.2)
ALP SERPL-CCNC: 70 U/L — SIGNIFICANT CHANGE UP (ref 30–115)
ALT FLD-CCNC: 49 U/L — HIGH (ref 0–41)
ANION GAP SERPL CALC-SCNC: 20 MMOL/L — HIGH (ref 7–14)
AST SERPL-CCNC: 50 U/L — HIGH (ref 0–41)
BASE EXCESS BLDV CALC-SCNC: -1.1 MMOL/L — SIGNIFICANT CHANGE UP (ref -2–2)
BASOPHILS # BLD AUTO: 0.05 K/UL — SIGNIFICANT CHANGE UP (ref 0–0.2)
BASOPHILS NFR BLD AUTO: 0.5 % — SIGNIFICANT CHANGE UP (ref 0–1)
BILIRUB SERPL-MCNC: 0.5 MG/DL — SIGNIFICANT CHANGE UP (ref 0.2–1.2)
BUN SERPL-MCNC: 16 MG/DL — SIGNIFICANT CHANGE UP (ref 10–20)
CA-I SERPL-SCNC: 1.18 MMOL/L — SIGNIFICANT CHANGE UP (ref 1.12–1.3)
CALCIUM SERPL-MCNC: 9 MG/DL — SIGNIFICANT CHANGE UP (ref 8.5–10.1)
CHLORIDE SERPL-SCNC: 106 MMOL/L — SIGNIFICANT CHANGE UP (ref 98–110)
CK SERPL-CCNC: 261 U/L — HIGH (ref 0–225)
CO2 SERPL-SCNC: 20 MMOL/L — SIGNIFICANT CHANGE UP (ref 17–32)
CREAT SERPL-MCNC: 1.1 MG/DL — SIGNIFICANT CHANGE UP (ref 0.7–1.5)
EOSINOPHIL # BLD AUTO: 0.09 K/UL — SIGNIFICANT CHANGE UP (ref 0–0.7)
EOSINOPHIL NFR BLD AUTO: 0.8 % — SIGNIFICANT CHANGE UP (ref 0–8)
ETHANOL SERPL-MCNC: <10 MG/DL — SIGNIFICANT CHANGE UP
GAS PNL BLDV: 146 MMOL/L — HIGH (ref 136–145)
GAS PNL BLDV: SIGNIFICANT CHANGE UP
GLUCOSE SERPL-MCNC: 98 MG/DL — SIGNIFICANT CHANGE UP (ref 70–99)
HCO3 BLDV-SCNC: 23 MMOL/L — SIGNIFICANT CHANGE UP (ref 22–29)
HCT VFR BLD CALC: 43.3 % — SIGNIFICANT CHANGE UP (ref 42–52)
HCT VFR BLDA CALC: 44.9 % — HIGH (ref 34–44)
HGB BLD CALC-MCNC: 14.6 G/DL — SIGNIFICANT CHANGE UP (ref 14–18)
HGB BLD-MCNC: 14.1 G/DL — SIGNIFICANT CHANGE UP (ref 14–18)
HOROWITZ INDEX BLDV+IHG-RTO: 21 — SIGNIFICANT CHANGE UP
IMM GRANULOCYTES NFR BLD AUTO: 0.5 % — HIGH (ref 0.1–0.3)
LACTATE BLDV-MCNC: 1.4 MMOL/L — SIGNIFICANT CHANGE UP (ref 0.5–1.6)
LYMPHOCYTES # BLD AUTO: 1.4 K/UL — SIGNIFICANT CHANGE UP (ref 1.2–3.4)
LYMPHOCYTES # BLD AUTO: 12.6 % — LOW (ref 20.5–51.1)
MCHC RBC-ENTMCNC: 31.2 PG — HIGH (ref 27–31)
MCHC RBC-ENTMCNC: 32.6 G/DL — SIGNIFICANT CHANGE UP (ref 32–37)
MCV RBC AUTO: 95.8 FL — HIGH (ref 80–94)
MONOCYTES # BLD AUTO: 0.63 K/UL — HIGH (ref 0.1–0.6)
MONOCYTES NFR BLD AUTO: 5.7 % — SIGNIFICANT CHANGE UP (ref 1.7–9.3)
NEUTROPHILS # BLD AUTO: 8.88 K/UL — HIGH (ref 1.4–6.5)
NEUTROPHILS NFR BLD AUTO: 79.9 % — HIGH (ref 42.2–75.2)
NRBC # BLD: 0 /100 WBCS — SIGNIFICANT CHANGE UP (ref 0–0)
NT-PROBNP SERPL-SCNC: 1051 PG/ML — HIGH (ref 0–300)
PCO2 BLDV: 37 MMHG — LOW (ref 41–51)
PH BLDV: 7.41 — SIGNIFICANT CHANGE UP (ref 7.26–7.43)
PLATELET # BLD AUTO: 188 K/UL — SIGNIFICANT CHANGE UP (ref 130–400)
PO2 BLDV: 38 MMHG — SIGNIFICANT CHANGE UP (ref 20–40)
POTASSIUM BLDV-SCNC: 3.6 MMOL/L — SIGNIFICANT CHANGE UP (ref 3.3–5.6)
POTASSIUM SERPL-MCNC: 3.9 MMOL/L — SIGNIFICANT CHANGE UP (ref 3.5–5)
POTASSIUM SERPL-SCNC: 3.9 MMOL/L — SIGNIFICANT CHANGE UP (ref 3.5–5)
PROT SERPL-MCNC: 7.3 G/DL — SIGNIFICANT CHANGE UP (ref 6–8)
RBC # BLD: 4.52 M/UL — LOW (ref 4.7–6.1)
RBC # FLD: 13.7 % — SIGNIFICANT CHANGE UP (ref 11.5–14.5)
SAO2 % BLDV: 67 % — SIGNIFICANT CHANGE UP
SARS-COV-2 RNA SPEC QL NAA+PROBE: SIGNIFICANT CHANGE UP
SODIUM SERPL-SCNC: 146 MMOL/L — SIGNIFICANT CHANGE UP (ref 135–146)
TROPONIN T SERPL-MCNC: 0.01 NG/ML — SIGNIFICANT CHANGE UP
WBC # BLD: 11.1 K/UL — HIGH (ref 4.8–10.8)
WBC # FLD AUTO: 11.1 K/UL — HIGH (ref 4.8–10.8)

## 2020-06-20 PROCEDURE — 70450 CT HEAD/BRAIN W/O DYE: CPT | Mod: 26

## 2020-06-20 PROCEDURE — 71045 X-RAY EXAM CHEST 1 VIEW: CPT | Mod: 26

## 2020-06-20 PROCEDURE — 99285 EMERGENCY DEPT VISIT HI MDM: CPT | Mod: CS

## 2020-06-20 PROCEDURE — 99223 1ST HOSP IP/OBS HIGH 75: CPT

## 2020-06-20 RX ORDER — ASPIRIN/CALCIUM CARB/MAGNESIUM 324 MG
81 TABLET ORAL DAILY
Refills: 0 | Status: DISCONTINUED | OUTPATIENT
Start: 2020-06-21 | End: 2020-06-30

## 2020-06-20 RX ORDER — NICOTINE POLACRILEX 2 MG
1 GUM BUCCAL DAILY
Refills: 0 | Status: DISCONTINUED | OUTPATIENT
Start: 2020-06-20 | End: 2020-06-30

## 2020-06-20 RX ORDER — METOPROLOL TARTRATE 50 MG
25 TABLET ORAL
Refills: 0 | Status: DISCONTINUED | OUTPATIENT
Start: 2020-06-20 | End: 2020-06-30

## 2020-06-20 RX ORDER — THIAMINE MONONITRATE (VIT B1) 100 MG
100 TABLET ORAL ONCE
Refills: 0 | Status: COMPLETED | OUTPATIENT
Start: 2020-06-20 | End: 2020-06-20

## 2020-06-20 RX ORDER — APIXABAN 2.5 MG/1
5 TABLET, FILM COATED ORAL EVERY 12 HOURS
Refills: 0 | Status: DISCONTINUED | OUTPATIENT
Start: 2020-06-21 | End: 2020-06-30

## 2020-06-20 RX ORDER — HYDRALAZINE HCL 50 MG
25 TABLET ORAL EVERY 4 HOURS
Refills: 0 | Status: DISCONTINUED | OUTPATIENT
Start: 2020-06-20 | End: 2020-06-30

## 2020-06-20 RX ORDER — ONDANSETRON 8 MG/1
4 TABLET, FILM COATED ORAL EVERY 6 HOURS
Refills: 0 | Status: DISCONTINUED | OUTPATIENT
Start: 2020-06-20 | End: 2020-06-30

## 2020-06-20 RX ORDER — MULTIVIT-MIN/FERROUS GLUCONATE 9 MG/15 ML
1 LIQUID (ML) ORAL DAILY
Refills: 0 | Status: DISCONTINUED | OUTPATIENT
Start: 2020-06-20 | End: 2020-06-30

## 2020-06-20 RX ORDER — FOLIC ACID 0.8 MG
1 TABLET ORAL DAILY
Refills: 0 | Status: DISCONTINUED | OUTPATIENT
Start: 2020-06-20 | End: 2020-06-30

## 2020-06-20 RX ORDER — ENOXAPARIN SODIUM 100 MG/ML
80 INJECTION SUBCUTANEOUS ONCE
Refills: 0 | Status: DISCONTINUED | OUTPATIENT
Start: 2020-06-20 | End: 2020-06-20

## 2020-06-20 RX ORDER — THIAMINE MONONITRATE (VIT B1) 100 MG
100 TABLET ORAL DAILY
Refills: 0 | Status: DISCONTINUED | OUTPATIENT
Start: 2020-06-20 | End: 2020-06-30

## 2020-06-20 RX ORDER — SENNA PLUS 8.6 MG/1
2 TABLET ORAL AT BEDTIME
Refills: 0 | Status: DISCONTINUED | OUTPATIENT
Start: 2020-06-20 | End: 2020-06-30

## 2020-06-20 RX ADMIN — Medication 100 MILLIGRAM(S): at 18:25

## 2020-06-20 RX ADMIN — Medication 25 MILLIGRAM(S): at 18:26

## 2020-06-20 RX ADMIN — Medication 50 MILLIGRAM(S): at 15:14

## 2020-06-20 RX ADMIN — Medication 25 MILLIGRAM(S): at 18:31

## 2020-06-20 RX ADMIN — Medication 1 MILLIGRAM(S): at 18:31

## 2020-06-20 NOTE — ED PROVIDER NOTE - PHYSICAL EXAMINATION
Physical Exam    Vital Signs: I have reviewed the initial vital signs.  Constitutional: well-nourished, appears stated age, no acute distress  Eyes: Conjunctiva pink, Sclera clear.  Cardiovascular: S1 and S2, regular rate, regular rhythm, well-perfused extremities, radial pulses equal and 2+, pedal pulses 2+ and equal   Respiratory: unlabored respiratory effort, clear to auscultation bilaterally no wheezing, rales and rhonchi  Gastrointestinal: soft, non-tender abdomen, no pulsatile mass, normal bowl sounds  Musculoskeletal: supple neck, no lower extremity edema, no midline tenderness  Integumentary: warm, dry, no rash  Neurologic: awake, alert, nvi

## 2020-06-20 NOTE — H&P ADULT - HISTORY OF PRESENT ILLNESS
64 year old male with PMHx of ETOH dependence, CHF, chronic back pain, HLD, HTN, PE on Eliquis, TIA was brought in to ED by family who believed he was missing but pt states he was out shopping in [place]. Pt left AMA on 6/17 after being admitted for fall and ETOH withdrawal on 6/15. Pt reports last drink on Thursday 6/18. Pt denies CP, SOB, fever, chills, HA, N/V/D/C, lightheadedness.   In ED, pt hypertensive 193/ 95. EKG showed Afib. 64 year old male with PMHx of ETOH dependence, CHF, chronic back pain, HLD, HTN, PE on Eliquis, TIA was brought in to ED by family who believed he was missing but pt states he was out shopping in [place]. Pt left AMA on 6/17 after being admitted for fall and ETOH withdrawal on 6/15. Pt reports last drink on Thursday 6/18. Pt denies CP, SOB, fever, chills, HA, N/V/D/C, lightheadedness.   In ED, pt hypertensive 193/ 95. EKG showed Afib.    Patient reports generalized weakness

## 2020-06-20 NOTE — ED PROVIDER NOTE - NS_ATTENDINGSCRIBE_ED_ALL_ED
I personally performed the service described in the documentation recorded by the scribe in my presence, and it accurately and completely records my words and actions. IV discontinued, cath removed intact

## 2020-06-20 NOTE — ED PROVIDER NOTE - CHPI ED SYMPTOMS NEG
no chills/no numbness/no vomiting/no weakness/no nausea/no tingling/no fever/no pain/no decreased eating/drinking

## 2020-06-20 NOTE — ED PROVIDER NOTE - CARE PLAN
Principal Discharge DX:	Afib  Secondary Diagnosis:	Weakness  Secondary Diagnosis:	Alcohol dependence

## 2020-06-20 NOTE — H&P ADULT - ASSESSMENT
64 year old male with PMHx of ETOH dependence, CHF, chronic back pain, HLD, HTN, PE on Eliquis, TIA was brought in to ED by family who believed he was missing but pt states he was out shopping in [place]. Pt left AMA on 6/17 after being admitted for fall and ETOH withdrawal on 6/15. Pt reports last drink on Thursday 6/18. Pt denies CP, SOB, fever, chills, HA, N/V/D/C, lightheadedness.   In ED, pt hypertensive 193/ 95. EKG showed Afib.    1. New onset Afib  - admit to soft tele  - cardiology consult  - ECHO  - CBC, CMP in AM   - TSH, CE    2. Weakness  - f/u Head CT  - PT consult    3. ETOH abuse  - librium protocol  - monitor for signs of withdrawal    4. HTN  - c/w home medications  - monitor vitals      plan d/w Dr. Campbell 64 year old male with PMHx of ETOH dependence, CHF, chronic back pain, HLD, HTN, PE on Eliquis, TIA was brought in to ED by family who believed he was missing but pt states he was out shopping in [place]. Pt left AMA on 6/17 after being admitted for fall and ETOH withdrawal on 6/15. Pt reports last drink on Thursday 6/18. Pt denies CP, SOB, fever, chills, HA, N/V/D/C, lightheadedness.   In ED, pt hypertensive 193/ 95. EKG showed Afib.    1. New onset Afib  - admit to soft tele  - cardiology consult  - ECHO  - CBC, CMP in AM   - TSH, CE  -Continue with metoprolol for rate control  -Patient is on eliquis for PE    2. Weakness  - f/u Head CT  - PT consult    3. ETOH abuse  - librium protocol  - monitor for signs of withdrawal    4. HTN  - c/w home medications  - monitor vitals    5.  Hx of Chronic PE  Continue with Eliquis if CT scan is negative            #Progress Note Handoff  Pending (specify):  as above  Family discussion:  plan of care was discussed with patient   in details.  all questions were answered.  seems to understand, and in agreement  Disposition:  unknown 64 year old male with PMHx of ETOH dependence, CHF, chronic back pain, HLD, HTN, PE on Eliquis, TIA was brought in to ED by family who believed he was missing but pt states he was out shopping in [place]. Pt left AMA on 6/17 after being admitted for fall and ETOH withdrawal on 6/15. Pt reports last drink on Thursday 6/18. Pt denies CP, SOB, fever, chills, HA, N/V/D/C, lightheadedness.   In ED, pt hypertensive 193/ 95. EKG showed Afib.    1. New onset Afib  - admit to soft tele  - cardiology consult  - ECHO  - CBC, CMP in AM   - TSH, CE  -Continue with metoprolol for rate control  -Patient is on eliquis for PE    2. Weakness  - f/u Head CT  - PT consult    3. ETOH abuse  - librium protocol  - monitor for signs of withdrawal    4. HTN  -BP elevated  - c/w home medications  - monitor vitals  -Add hydralazine PRN    5.  Hx of Chronic PE  Continue with Eliquis if CT scan is negative            #Progress Note Handoff  Pending (specify):  as above  Family discussion:  plan of care was discussed with patient   in details.  all questions were answered.  seems to understand, and in agreement  Disposition:  unknown

## 2020-06-20 NOTE — ED ADULT NURSE NOTE - OBJECTIVE STATEMENT
poor hygiene, neighbor called EMS for unknown reason, maybe a fall? patient left admission AMA a few days ago, asking to leave now, daily drinker with last drink yesterday afternoon, refusing to put on gown, resps even nonlabored, alert but irritable

## 2020-06-20 NOTE — ED ADULT NURSE NOTE - CHPI ED NUR SYMPTOMS NEG
no confusion/no blurred vision/no dizziness/no change in level of consciousness/no fever/no loss of consciousness

## 2020-06-20 NOTE — H&P ADULT - NSHPPHYSICALEXAM_GEN_ALL_CORE
VITALS:   T(C): 37.1 (06-20-20 @ 14:40), Max: 37.1 (06-20-20 @ 14:40)  HR: 88 (06-20-20 @ 14:40) (88 - 88)  BP: 193/95 (06-20-20 @ 14:40) (193/95 - 193/95)  RR: 18 (06-20-20 @ 14:40) (18 - 18)  SpO2: 98% (06-20-20 @ 14:40) (98% - 98%)    GENERAL: unkempt appearing, lying in bed comfortably  HEAD:  Atraumatic, Normocephalic  EYES: EOMI  ENT: Moist mucous membranes  NECK: Supple  CHEST/LUNG: Clear to auscultation bilaterally, no wheezing. Unlabored respirations  HEART: irregularly irregular, no murmurs  ABDOMEN: Bowel sounds present; Soft, Nontender, Nondistended  EXTREMITIES:  no LE edema b/l  NERVOUS SYSTEM:  Alert & Oriented X3, speech clear   SKIN: multiple bruises throughout

## 2020-06-20 NOTE — ED PROVIDER NOTE - PROGRESS NOTE DETAILS
ATTENDING NOTE:   63 y/o M brought to ED by family who believed he was missing but pt states he was out shopping. Pt recently . Pt is known to ED, last admitted for EtOH abuse. HPI and ROS limited, however pt denies prior medical problems. States he does live by himself.  AVSS, exam as noted, unkempt appearing, CTAB, IRRIRR, abdomen soft NTND, (+) bowel sounds, neuro nonfocal, pt falls asleep during exam.

## 2020-06-20 NOTE — H&P ADULT - NSHPLABSRESULTS_GEN_ALL_CORE
14.1   11.10 )-----------( 188      ( 20 Jun 2020 15:14 )             43.3       06-20    146  |  106  |  16  ----------------------------<  98  3.9   |  20  |  1.1    Ca    9.0      20 Jun 2020 15:14    TPro  7.3  /  Alb  4.5  /  TBili  0.5  /  DBili  x   /  AST  50<H>  /  ALT  49<H>  /  AlkPhos  70  06-20    CARDIAC MARKERS ( 20 Jun 2020 15:14 )  x     / 0.01 ng/mL / 261 U/L / x     / x                RADIOLOGY:    Xray Chest 1 View-PORTABLE IMMEDIATE (06.20.20 @ 15:23) >      Impression:      No radiographic evidence of acute cardiopulmonary disease.    MERLENE NIX M.D., ATTENDING RADIOLOGIST  This document has been electronically signed. Jun 20 2020  3:28PM        Head CT pending

## 2020-06-20 NOTE — H&P ADULT - NEGATIVE NEUROLOGICAL SYMPTOMS
no focal seizures/no difficulty walking/no transient paralysis/no generalized seizures/no vertigo/no loss of sensation/no loss of consciousness/no paresthesias/no syncope/no confusion/no tremors/no headache/no facial palsy

## 2020-06-20 NOTE — ED ADULT NURSE REASSESSMENT NOTE - NS ED NURSE REASSESS COMMENT FT1
pt refusing CT scan of head, says he's had too many lately, denies falling, MD at bedside explaining indications risks and benefits, pt still refuses

## 2020-06-20 NOTE — ED PROVIDER NOTE - OBJECTIVE STATEMENT
63 yo male, pmh of etoh abuse, prior pes no longer on ac, chf, htn, hld, presents to ed for weakness. pt's neighbor called ems, pt was found on floor, pt brought into ed in past for same situation. pt drinks everyday, wife passed away last year. Pt without specific pain or radiation. Denies fever, chills, cp, sob, le swelling, nvd, nvd.

## 2020-06-20 NOTE — H&P ADULT - NSHPSOCIALHISTORY_GEN_ALL_CORE
Tobacco use: active smoker  EtOH use: admits to prolong hx of ETOH abuse. Last drink 1.5 days ago  Illicit drug use: denies

## 2020-06-21 DIAGNOSIS — F10.27 ALCOHOL DEPENDENCE WITH ALCOHOL-INDUCED PERSISTING DEMENTIA: ICD-10-CM

## 2020-06-21 LAB
ALBUMIN SERPL ELPH-MCNC: 3.9 G/DL — SIGNIFICANT CHANGE UP (ref 3.5–5.2)
ALP SERPL-CCNC: 63 U/L — SIGNIFICANT CHANGE UP (ref 30–115)
ALT FLD-CCNC: 43 U/L — HIGH (ref 0–41)
ANION GAP SERPL CALC-SCNC: 12 MMOL/L — SIGNIFICANT CHANGE UP (ref 7–14)
AST SERPL-CCNC: 40 U/L — SIGNIFICANT CHANGE UP (ref 0–41)
BILIRUB SERPL-MCNC: 0.7 MG/DL — SIGNIFICANT CHANGE UP (ref 0.2–1.2)
BUN SERPL-MCNC: 16 MG/DL — SIGNIFICANT CHANGE UP (ref 10–20)
CALCIUM SERPL-MCNC: 9 MG/DL — SIGNIFICANT CHANGE UP (ref 8.5–10.1)
CHLORIDE SERPL-SCNC: 103 MMOL/L — SIGNIFICANT CHANGE UP (ref 98–110)
CO2 SERPL-SCNC: 24 MMOL/L — SIGNIFICANT CHANGE UP (ref 17–32)
CREAT SERPL-MCNC: 0.9 MG/DL — SIGNIFICANT CHANGE UP (ref 0.7–1.5)
GLUCOSE SERPL-MCNC: 113 MG/DL — HIGH (ref 70–99)
HCT VFR BLD CALC: 40 % — LOW (ref 42–52)
HGB BLD-MCNC: 13.1 G/DL — LOW (ref 14–18)
MCHC RBC-ENTMCNC: 31.3 PG — HIGH (ref 27–31)
MCHC RBC-ENTMCNC: 32.8 G/DL — SIGNIFICANT CHANGE UP (ref 32–37)
MCV RBC AUTO: 95.5 FL — HIGH (ref 80–94)
NRBC # BLD: 0 /100 WBCS — SIGNIFICANT CHANGE UP (ref 0–0)
PLATELET # BLD AUTO: 157 K/UL — SIGNIFICANT CHANGE UP (ref 130–400)
POTASSIUM SERPL-MCNC: 3.3 MMOL/L — LOW (ref 3.5–5)
POTASSIUM SERPL-SCNC: 3.3 MMOL/L — LOW (ref 3.5–5)
PROT SERPL-MCNC: 6.3 G/DL — SIGNIFICANT CHANGE UP (ref 6–8)
RBC # BLD: 4.19 M/UL — LOW (ref 4.7–6.1)
RBC # FLD: 13.8 % — SIGNIFICANT CHANGE UP (ref 11.5–14.5)
SARS-COV-2 IGG SERPL QL IA: POSITIVE
SARS-COV-2 IGM SERPL IA-ACNC: 3.07 INDEX — HIGH
SODIUM SERPL-SCNC: 139 MMOL/L — SIGNIFICANT CHANGE UP (ref 135–146)
TROPONIN T SERPL-MCNC: 0.01 NG/ML — SIGNIFICANT CHANGE UP
TSH SERPL-MCNC: 1.75 UIU/ML — SIGNIFICANT CHANGE UP (ref 0.27–4.2)
WBC # BLD: 11.27 K/UL — HIGH (ref 4.8–10.8)
WBC # FLD AUTO: 11.27 K/UL — HIGH (ref 4.8–10.8)

## 2020-06-21 PROCEDURE — 90792 PSYCH DIAG EVAL W/MED SRVCS: CPT

## 2020-06-21 PROCEDURE — 99233 SBSQ HOSP IP/OBS HIGH 50: CPT

## 2020-06-21 RX ORDER — POTASSIUM CHLORIDE 20 MEQ
20 PACKET (EA) ORAL ONCE
Refills: 0 | Status: COMPLETED | OUTPATIENT
Start: 2020-06-21 | End: 2020-06-21

## 2020-06-21 RX ADMIN — Medication 25 MILLIGRAM(S): at 05:53

## 2020-06-21 RX ADMIN — Medication 20 MILLIEQUIVALENT(S): at 15:16

## 2020-06-21 RX ADMIN — Medication 10 MILLIGRAM(S): at 22:05

## 2020-06-21 RX ADMIN — Medication 1 TABLET(S): at 11:55

## 2020-06-21 RX ADMIN — Medication 25 MILLIGRAM(S): at 17:46

## 2020-06-21 RX ADMIN — Medication 81 MILLIGRAM(S): at 11:55

## 2020-06-21 RX ADMIN — APIXABAN 5 MILLIGRAM(S): 2.5 TABLET, FILM COATED ORAL at 17:46

## 2020-06-21 RX ADMIN — APIXABAN 5 MILLIGRAM(S): 2.5 TABLET, FILM COATED ORAL at 06:47

## 2020-06-21 RX ADMIN — Medication 100 MILLIGRAM(S): at 11:55

## 2020-06-21 RX ADMIN — Medication 25 MILLIGRAM(S): at 09:50

## 2020-06-21 RX ADMIN — Medication 1 MILLIGRAM(S): at 11:55

## 2020-06-21 RX ADMIN — Medication 1 PATCH: at 11:54

## 2020-06-21 RX ADMIN — Medication 10 MILLIGRAM(S): at 15:10

## 2020-06-21 NOTE — PHYSICAL THERAPY INITIAL EVALUATION ADULT - GAIT DEVIATIONS NOTED, PT EVAL
decreased step length/decreased fish/decreased weight-shifting ability/stooped posture, dec heel strike/pushoff /stance on RLE, dec AISHWARYA

## 2020-06-21 NOTE — PHYSICAL THERAPY INITIAL EVALUATION ADULT - GENERAL OBSERVATIONS, REHAB EVAL
08:26-08:49 Chart reviewed. Pt encountered semireclined in bed, may be seen by Physical Therapist as confirmed with Nurse. Patient denied pain at rest and ready to get up now; +tele

## 2020-06-21 NOTE — PROGRESS NOTE ADULT - ASSESSMENT
Patient is 64 year old male with PMHx of ETOH dependence, CHF, chronic back pain, HLD, HTN, PE on Eliquis, TIA was brought in to ED by family who believed he was missing but pt states he was out shopping in [place]. Pt left AMA on 6/17 after being admitted for fall and ETOH withdrawal on 6/15. Pt reports last drink on Thursday 6/18. Pt denies CP, SOB, fever, chills, HA, N/V/D/C, lightheadedness.   In ED, pt hypertensive 193/ 95. EKG showed Afib.    1. PVC  -EKG shows sinus rhythem with PVC as per cardiology.  no evidence of A. fib  - ECHO and TSH level pending  -Trop negative x 3  -Continue with metoprolol for rate control  -Patient is on eliquis for PE    2. Weakness  -Head CT scan shows no acute process  - PT consult    3. ETOH abuse  - librium protocol  - monitor for signs of withdrawal  -Thiamine, and folate def, replaced    4. HTN  -BP stable now  - c/w home medications  - monitor vitals  -Add hydralazine PRN    5.  Hx of Chronic PE  Continue with Eliquis               #Progress Note Handoff  Pending (specify): needs placed.  detox consult  Family discussion:  plan of care was discussed with patient   in details.  all questions were answered.  seems to understand, and in agreement  Disposition:  unknown  left a message to family for call back Patient is 64 year old male with PMHx of ETOH dependence, CHF with Preserved EF, chronic back pain, HLD, HTN, PE on Eliquis, TIA was brought in to ED by family who believed he was missing but pt states he was out shopping in [place]. Pt left AMA on 6/17 after being admitted for fall and ETOH withdrawal on 6/15. Pt reports last drink on Thursday 6/18. Pt denies CP, SOB, fever, chills, HA, N/V/D/C, lightheadedness.   In ED, pt hypertensive 193/ 95. EKG showed Afib.    1. PVC  -EKG shows sinus rhythem with PVC as per cardiology.  no evidence of A. fib  - ECHO and TSH level pending  -Trop negative x 3  -Continue with metoprolol for rate control  -Patient is on eliquis for PE    2. Weakness  -Head CT scan shows no acute process  - PT consult    3. ETOH abuse  - librium protocol  - monitor for signs of withdrawal  -Thiamine, and folate def, replaced    4. HTN  -BP stable now  - c/w home medications  - monitor vitals  -Add hydralazine PRN    5.  Hx of Chronic PE  Continue with Eliquis               #Progress Note Handoff  Pending (specify): needs placed.  detox consult  Family discussion:  plan of care was discussed with patient   in details.  all questions were answered.  seems to understand, and in agreement  Disposition:  unknown  left a message to family for call back Patient is 64 year old male with PMHx of ETOH dependence, CHF with Preserved EF, chronic back pain, HLD, HTN, PE on Eliquis, TIA was brought in to ED by family who believed he was missing but pt states he was out shopping in [place]. Pt left AMA on 6/17 after being admitted for fall and ETOH withdrawal on 6/15. Pt reports last drink on Thursday 6/18. Pt denies CP, SOB, fever, chills, HA, N/V/D/C, lightheadedness.   In ED, pt hypertensive 193/ 95. EKG showed Afib.    1. PVC  -EKG shows sinus rhythem with PVC as per cardiology.  no evidence of A. fib  - ECHO and TSH level pending  -Trop negative x 3  -Continue with metoprolol for rate control  -Patient is on eliquis for PE    2. Weakness  -Head CT scan shows no acute process  - PT consult    3. ETOH abuse  - librium protocol  - monitor for signs of withdrawal  -Thiamine, and folate def, replaced    4. HTN  -BP stable now  - c/w home medications  - monitor vitals  -Add hydralazine PRN    5.  Hx of Chronic PE  Continue with Eliquis               #Progress Note Handoff  Pending (specify): needs placed.  detox consult  Family discussion:  plan of care was discussed with patient   in details.  all questions were answered.  seems to understand, and in agreement  Disposition:  unknown  spoke to Jeanne ([phone], [phone]) (next of kind)  in details about his care, and to encourage him to go to rehab

## 2020-06-21 NOTE — PHYSICAL THERAPY INITIAL EVALUATION ADULT - ACTIVE RANGE OF MOTION EXAMINATION, REHAB EVAL
Both upper extremities/Both lower extremities joints within functional limits and painfree AAROM with occasional tremors noted

## 2020-06-21 NOTE — CONSULT NOTE ADULT - SUBJECTIVE AND OBJECTIVE BOX
Pt was reported having repeated falls April 2020, and has had several ED visits due to falls, in April, May, June, 2020, the last one was on June 16, 2020. Pt signed AMA then and was discharged. Pt was brought back again yesterday. Pt was unable to walk steadily, care of himself, comply with medication recommendation, who was admitted yesterday with HTN and Afib, after fall again. Pt reported feeling weak in his legs.

## 2020-06-21 NOTE — PROGRESS NOTE ADULT - SUBJECTIVE AND OBJECTIVE BOX
CC.  weakness  HPI.   Patient reports that he feels better.  Wants to sign AMA, and after long discussion decided to stay              Constitutional: No fever, fatigue or weight loss.  Skin: No rash.  Eyes: No recent vision problems or eye pain.  ENT: No congestion, ear pain, or sore throat.  Endocrine: No thyroid problems.  Cardiovascular: No chest pain or palpation.  Respiratory: No cough, shortness of breath, congestion, or wheezing.  Gastrointestinal: No abdominal pain, nausea, vomiting, or diarrhea.  Genitourinary: No dysuria.  Musculoskeletal: No joint swelling.  Neurologic: No headache.      Vital Signs Last 24 Hrs  T(C): 35.7 (06-21-20 @ 05:15), Max: 37.1 (06-20-20 @ 14:40)  T(F): 96.2 (06-21-20 @ 05:15), Max: 98.7 (06-20-20 @ 14:40)  HR: 60 (06-21-20 @ 05:15) (60 - 88)  BP: 122/63 (06-21-20 @ 05:15) (122/63 - 193/95)  BP(mean): --  RR: 17 (06-21-20 @ 05:15) (17 - 18)  SpO2: 99% (06-20-20 @ 20:07) (98% - 99%)        PHYSICAL EXAM-  GENERAL: NAD, chronic ill appearing male  HEAD:  Atraumatic, Normocephalic  EYES: EOMI, PERRLA, conjunctiva and sclera clear  NECK: Supple, No JVD, Normal thyroid  NERVOUS SYSTEM:  Alert & Oriented X3, Moving all extremities  CHEST/LUNG: Clear to percussion bilaterally; No rales, rhonchi, wheezing, or rubs  HEART: Regular rate and rhythm; No murmurs, rubs, or gallops  ABDOMEN: Soft, Nontender, Nondistended; Bowel sounds present  EXTREMITIES:    No clubbing, cyanosis, or edema  SKIN: No rashes or lesions                                  13.1   11.27 )-----------( 157      ( 21 Jun 2020 07:32 )             40.0     06-21    139  |  103  |  16  ----------------------------<  113<H>  3.3<L>   |  24  |  0.9    Ca    9.0      21 Jun 2020 07:32    TPro  6.3  /  Alb  3.9  /  TBili  0.7  /  DBili  x   /  AST  40  /  ALT  43<H>  /  AlkPhos  63  06-21    CARDIAC MARKERS ( 21 Jun 2020 07:32 )  x     / 0.01 ng/mL / x     / x     / x      CARDIAC MARKERS ( 20 Jun 2020 19:33 )  x     / 0.01 ng/mL / x     / x     / x      CARDIAC MARKERS ( 20 Jun 2020 18:54 )  x     / 0.01 ng/mL / x     / x     / x      CARDIAC MARKERS ( 20 Jun 2020 15:14 )  x     / 0.01 ng/mL / 261 U/L / x     / x                      MEDICATIONS  (STANDING):  apixaban 5 milliGRAM(s) Oral every 12 hours  aspirin enteric coated 81 milliGRAM(s) Oral daily  chlordiazePOXIDE   Oral   chlordiazePOXIDE 25 milliGRAM(s) Oral every 4 hours  chlordiazePOXIDE 20 milliGRAM(s) Oral every 4 hours  folic acid 1 milliGRAM(s) Oral daily  metoprolol tartrate 25 milliGRAM(s) Oral two times a day  multivitamin/minerals 1 Tablet(s) Oral daily  nicotine - 21 mG/24Hr(s) Patch 1 Patch Transdermal daily  thiamine 100 milliGRAM(s) Oral daily    MEDICATIONS  (PRN):  chlordiazePOXIDE 25 milliGRAM(s) Oral every 4 hours PRN Withdrawal  hydrALAZINE 25 milliGRAM(s) Oral every 4 hours PRN SBP > 160  ondansetron Injectable 4 milliGRAM(s) IV Push every 6 hours PRN Nausea  senna 2 Tablet(s) Oral at bedtime PRN Constipation      Imaging Personally Reviewed:     [x ] YES  [ ] NO    Consultant(s) Notes Reviewed:  [x ] YES  [ ] NO    Care Discussed with Consultants/Other Providers [x ] YES  [ ] No medical contraindication for discharge

## 2020-06-22 LAB
ANION GAP SERPL CALC-SCNC: 13 MMOL/L — SIGNIFICANT CHANGE UP (ref 7–14)
BUN SERPL-MCNC: 25 MG/DL — HIGH (ref 10–20)
CALCIUM SERPL-MCNC: 9.1 MG/DL — SIGNIFICANT CHANGE UP (ref 8.5–10.1)
CHLORIDE SERPL-SCNC: 100 MMOL/L — SIGNIFICANT CHANGE UP (ref 98–110)
CO2 SERPL-SCNC: 22 MMOL/L — SIGNIFICANT CHANGE UP (ref 17–32)
CREAT SERPL-MCNC: 1.2 MG/DL — SIGNIFICANT CHANGE UP (ref 0.7–1.5)
GLUCOSE SERPL-MCNC: 138 MG/DL — HIGH (ref 70–99)
HCT VFR BLD CALC: 41 % — LOW (ref 42–52)
HGB BLD-MCNC: 13.5 G/DL — LOW (ref 14–18)
MCHC RBC-ENTMCNC: 31 PG — SIGNIFICANT CHANGE UP (ref 27–31)
MCHC RBC-ENTMCNC: 32.9 G/DL — SIGNIFICANT CHANGE UP (ref 32–37)
MCV RBC AUTO: 94 FL — SIGNIFICANT CHANGE UP (ref 80–94)
NRBC # BLD: 0 /100 WBCS — SIGNIFICANT CHANGE UP (ref 0–0)
PLATELET # BLD AUTO: 153 K/UL — SIGNIFICANT CHANGE UP (ref 130–400)
POTASSIUM SERPL-MCNC: 3.3 MMOL/L — LOW (ref 3.5–5)
POTASSIUM SERPL-SCNC: 3.3 MMOL/L — LOW (ref 3.5–5)
RBC # BLD: 4.36 M/UL — LOW (ref 4.7–6.1)
RBC # FLD: 13.6 % — SIGNIFICANT CHANGE UP (ref 11.5–14.5)
SODIUM SERPL-SCNC: 135 MMOL/L — SIGNIFICANT CHANGE UP (ref 135–146)
WBC # BLD: 9.24 K/UL — SIGNIFICANT CHANGE UP (ref 4.8–10.8)
WBC # FLD AUTO: 9.24 K/UL — SIGNIFICANT CHANGE UP (ref 4.8–10.8)

## 2020-06-22 PROCEDURE — 99233 SBSQ HOSP IP/OBS HIGH 50: CPT

## 2020-06-22 RX ORDER — POTASSIUM CHLORIDE 20 MEQ
20 PACKET (EA) ORAL
Refills: 0 | Status: COMPLETED | OUTPATIENT
Start: 2020-06-22 | End: 2020-06-22

## 2020-06-22 RX ADMIN — Medication 0.5 MILLIGRAM(S): at 12:57

## 2020-06-22 RX ADMIN — APIXABAN 5 MILLIGRAM(S): 2.5 TABLET, FILM COATED ORAL at 17:15

## 2020-06-22 RX ADMIN — Medication 10 MILLIGRAM(S): at 05:11

## 2020-06-22 RX ADMIN — Medication 1 PATCH: at 11:42

## 2020-06-22 RX ADMIN — Medication 1 PATCH: at 11:36

## 2020-06-22 RX ADMIN — Medication 20 MILLIEQUIVALENT(S): at 11:36

## 2020-06-22 RX ADMIN — Medication 25 MILLIGRAM(S): at 05:11

## 2020-06-22 RX ADMIN — Medication 20 MILLIEQUIVALENT(S): at 14:30

## 2020-06-22 RX ADMIN — Medication 20 MILLIEQUIVALENT(S): at 11:37

## 2020-06-22 RX ADMIN — Medication 25 MILLIGRAM(S): at 17:15

## 2020-06-22 RX ADMIN — Medication 1 PATCH: at 08:36

## 2020-06-22 RX ADMIN — APIXABAN 5 MILLIGRAM(S): 2.5 TABLET, FILM COATED ORAL at 05:11

## 2020-06-22 RX ADMIN — Medication 1 TABLET(S): at 11:36

## 2020-06-22 RX ADMIN — Medication 100 MILLIGRAM(S): at 11:36

## 2020-06-22 RX ADMIN — Medication 81 MILLIGRAM(S): at 11:36

## 2020-06-22 RX ADMIN — Medication 1 MILLIGRAM(S): at 11:36

## 2020-06-22 RX ADMIN — Medication 1 PATCH: at 19:24

## 2020-06-22 NOTE — CONSULT NOTE ADULT - SUBJECTIVE AND OBJECTIVE BOX
Detox consult      Pt interviewed, examined and EMR chart reviewed.  Hx of ETOH abuse, has been drinking for __many___ /months  variable periods of sobriety in the past.  Has been in detox before _____yes,   __x___No  No Sz hx  last drink 6/18  Memory impairment and some dementia possibly    SOCIAL HISTORY: ETOH    REVIEW OF SYSTEMS:    MEDICATIONS  (STANDING):  apixaban 5 milliGRAM(s) Oral every 12 hours  aspirin enteric coated 81 milliGRAM(s) Oral daily  chlordiazePOXIDE 10 milliGRAM(s) Oral three times a day  folic acid 1 milliGRAM(s) Oral daily  metoprolol tartrate 25 milliGRAM(s) Oral two times a day  multivitamin/minerals 1 Tablet(s) Oral daily  nicotine - 21 mG/24Hr(s) Patch 1 Patch Transdermal daily  thiamine 100 milliGRAM(s) Oral daily    MEDICATIONS  (PRN):  hydrALAZINE 25 milliGRAM(s) Oral every 4 hours PRN SBP > 160  ondansetron Injectable 4 milliGRAM(s) IV Push every 6 hours PRN Nausea  senna 2 Tablet(s) Oral at bedtime PRN Constipation      Vital Signs Last 24 Hrs  T(C): 36.5 (22 Jun 2020 05:15), Max: 37 (21 Jun 2020 13:50)  T(F): 97.7 (22 Jun 2020 05:15), Max: 98.6 (21 Jun 2020 13:50)  HR: 79 (22 Jun 2020 08:46) (61 - 80)  BP: 132/80 (22 Jun 2020 05:15) (115/56 - 132/80)  BP(mean): --  RR: 17 (22 Jun 2020 05:15) (17 - 18)  SpO2: 98% (22 Jun 2020 08:46) (98% - 98%)    PHYSICAL EXAM:    Constitutional: NAD, well-groomed, well-developed  HEENT: PERRLA, EOMI, Normal Hearing, MMM  Neck: No LAD, No JVD  Back: Normal spine flexure, No CVA tenderness  Respiratory: CTAB/L  Cardiovascular: S1 and S2, RRR, no M/G/R  Gastrointestinal: BS+, soft, NT/ND  Extremities: No peripheral edema  Vascular: 2+ peripheral pulses  Neurological: A/O x 1, no focal deficits    LABS:                        13.5   9.24  )-----------( 153      ( 22 Jun 2020 06:05 )             41.0     06-22    135  |  100  |  25<H>  ----------------------------<  138<H>  3.3<L>   |  22  |  1.2    Ca    9.1      22 Jun 2020 06:05    TPro  6.3  /  Alb  3.9  /  TBili  0.7  /  DBili  x   /  AST  40  /  ALT  43<H>  /  AlkPhos  63  06-21        Drug Screen Urine:  Alcohol Level  Alcohol, Blood: <10 mg/dL (06-20-20 @ 15:14)        RADIOLOGY & ADDITIONAL STUDIES: noted in PACS

## 2020-06-22 NOTE — PROGRESS NOTE ADULT - SUBJECTIVE AND OBJECTIVE BOX
CC.  weakness  HPI.   Patient appears to be confused.  offers no new complaints      Constitutional: No fever, fatigue or weight loss.  Skin: No rash.  Eyes: No recent vision problems or eye pain.  ENT: No congestion, ear pain, or sore throat.  Endocrine: No thyroid problems.  Cardiovascular: No chest pain or palpation.  Respiratory: No cough, shortness of breath, congestion, or wheezing.  Gastrointestinal: No abdominal pain, nausea, vomiting, or diarrhea.  Genitourinary: No dysuria.  Musculoskeletal: No joint swelling.  Neurologic: No headache.      Vital Signs Last 24 Hrs  T(C): 36.5 (22 Jun 2020 05:15), Max: 37 (21 Jun 2020 13:50)  T(F): 97.7 (22 Jun 2020 05:15), Max: 98.6 (21 Jun 2020 13:50)  HR: 79 (22 Jun 2020 08:46) (61 - 80)  BP: 132/80 (22 Jun 2020 05:15) (115/56 - 132/80)  BP(mean): --  RR: 17 (22 Jun 2020 05:15) (17 - 18)  SpO2: 98% (22 Jun 2020 08:46) (98% - 98%)        PHYSICAL EXAM-  GENERAL: NAD, chronic ill appearing male  HEAD:  Atraumatic, Normocephalic  EYES: EOMI, PERRLA, conjunctiva and sclera clear  NECK: Supple, No JVD, Normal thyroid  NERVOUS SYSTEM:  Alert & Oriented X1, Moving all extremities  CHEST/LUNG: Clear to percussion bilaterally; No rales, rhonchi, wheezing, or rubs  HEART: Regular rate and rhythm; No murmurs, rubs, or gallops  ABDOMEN: Soft, Nontender, Nondistended; Bowel sounds present  EXTREMITIES:    No clubbing, cyanosis, or edema  SKIN: No rashes or lesions                              13.5   9.24  )-----------( 153      ( 22 Jun 2020 06:05 )             41.0     06-22    135  |  100  |  25<H>  ----------------------------<  138<H>  3.3<L>   |  22  |  1.2    Ca    9.1      22 Jun 2020 06:05    TPro  6.3  /  Alb  3.9  /  TBili  0.7  /  DBili  x   /  AST  40  /  ALT  43<H>  /  AlkPhos  63  06-21    CARDIAC MARKERS ( 21 Jun 2020 07:32 )  x     / 0.01 ng/mL / x     / x     / x      CARDIAC MARKERS ( 20 Jun 2020 19:33 )  x     / 0.01 ng/mL / x     / x     / x      CARDIAC MARKERS ( 20 Jun 2020 18:54 )  x     / 0.01 ng/mL / x     / x     / x      CARDIAC MARKERS ( 20 Jun 2020 15:14 )  x     / 0.01 ng/mL / 261 U/L / x     / x              MEDICATIONS  (STANDING):  apixaban 5 milliGRAM(s) Oral every 12 hours  aspirin enteric coated 81 milliGRAM(s) Oral daily  chlordiazePOXIDE 10 milliGRAM(s) Oral three times a day  folic acid 1 milliGRAM(s) Oral daily  metoprolol tartrate 25 milliGRAM(s) Oral two times a day  multivitamin/minerals 1 Tablet(s) Oral daily  nicotine - 21 mG/24Hr(s) Patch 1 Patch Transdermal daily  potassium chloride    Tablet ER 20 milliEquivalent(s) Oral every 2 hours  thiamine 100 milliGRAM(s) Oral daily    MEDICATIONS  (PRN):  hydrALAZINE 25 milliGRAM(s) Oral every 4 hours PRN SBP > 160  ondansetron Injectable 4 milliGRAM(s) IV Push every 6 hours PRN Nausea  senna 2 Tablet(s) Oral at bedtime PRN Constipation           Imaging Personally Reviewed:     [x ] YES  [ ] NO    Consultant(s) Notes Reviewed:  [x ] YES  [ ] NO    Care Discussed with Consultants/Other Providers [x ] YES  [ ] No medical contraindication for discharge

## 2020-06-22 NOTE — PROGRESS NOTE ADULT - ASSESSMENT
Patient is 64 year old male with PMHx of ETOH dependence, CHF with Preserved EF, chronic back pain, HLD, HTN, PE on Eliquis, TIA was brought in to ED by family who believed he was missing but pt states he was out shopping in [place]. Pt left AMA on 6/17 after being admitted for fall and ETOH withdrawal on 6/15. Pt reports last drink on Thursday 6/18. Pt denies CP, SOB, fever, chills, HA, N/V/D/C, lightheadedness.   In ED, pt hypertensive 193/ 95. EKG showed Afib.    1. PVC  -EKG shows sinus rhythem with PVC as per cardiology.  no evidence of A. fib  - ECHO pending.  TSH level WNL  -Trop negative x 3  -Continue with metoprolol for rate control  -Patient is on eliquis for PE    2. Weakness  -Head CT scan shows no acute process  - PT consult    3. ETOH abuse  - librium protocol with taper  - monitor for signs of withdrawal  -Thiamine, and folate def, replaced  -detox consult noticed    4. HTN  -BP stable now  - c/w home medications  - monitor vitals  -Add hydralazine PRN    5.  Hx of Chronic PE  Continue with Eliquis       6.   AMS  -Head CT scan shows no acute process  -Patient does not have the capacity to make his decision or leave AMA as per psych  -no focal neurological def  -Neurology consult        #Progress Note Handoff  Pending (specify): needs placed.  neurology consult  Family discussion:  plan of care was discussed with patient   in details.  all questions were answered.  seems to understand, and in agreement  Disposition:  unknown  spoke to Jeanne ([phone], [phone]) (next of kind)  in details about his care, and to encourage him to go to rehab

## 2020-06-23 LAB
AMMONIA BLD-MCNC: 34 UMOL/L — SIGNIFICANT CHANGE UP (ref 11–55)
ANION GAP SERPL CALC-SCNC: 9 MMOL/L — SIGNIFICANT CHANGE UP (ref 7–14)
BUN SERPL-MCNC: 15 MG/DL — SIGNIFICANT CHANGE UP (ref 10–20)
CALCIUM SERPL-MCNC: 8.8 MG/DL — SIGNIFICANT CHANGE UP (ref 8.5–10.1)
CHLORIDE SERPL-SCNC: 104 MMOL/L — SIGNIFICANT CHANGE UP (ref 98–110)
CO2 SERPL-SCNC: 25 MMOL/L — SIGNIFICANT CHANGE UP (ref 17–32)
CREAT SERPL-MCNC: 0.9 MG/DL — SIGNIFICANT CHANGE UP (ref 0.7–1.5)
GLUCOSE SERPL-MCNC: 102 MG/DL — HIGH (ref 70–99)
HCT VFR BLD CALC: 40.3 % — LOW (ref 42–52)
HGB BLD-MCNC: 13.3 G/DL — LOW (ref 14–18)
MCHC RBC-ENTMCNC: 31 PG — SIGNIFICANT CHANGE UP (ref 27–31)
MCHC RBC-ENTMCNC: 33 G/DL — SIGNIFICANT CHANGE UP (ref 32–37)
MCV RBC AUTO: 93.9 FL — SIGNIFICANT CHANGE UP (ref 80–94)
NRBC # BLD: 0 /100 WBCS — SIGNIFICANT CHANGE UP (ref 0–0)
PLATELET # BLD AUTO: 123 K/UL — LOW (ref 130–400)
POTASSIUM SERPL-MCNC: 3.3 MMOL/L — LOW (ref 3.5–5)
POTASSIUM SERPL-SCNC: 3.3 MMOL/L — LOW (ref 3.5–5)
RBC # BLD: 4.29 M/UL — LOW (ref 4.7–6.1)
RBC # FLD: 13.5 % — SIGNIFICANT CHANGE UP (ref 11.5–14.5)
SODIUM SERPL-SCNC: 138 MMOL/L — SIGNIFICANT CHANGE UP (ref 135–146)
WBC # BLD: 7.11 K/UL — SIGNIFICANT CHANGE UP (ref 4.8–10.8)
WBC # FLD AUTO: 7.11 K/UL — SIGNIFICANT CHANGE UP (ref 4.8–10.8)

## 2020-06-23 PROCEDURE — 99222 1ST HOSP IP/OBS MODERATE 55: CPT

## 2020-06-23 PROCEDURE — 95819 EEG AWAKE AND ASLEEP: CPT | Mod: 26

## 2020-06-23 PROCEDURE — 99233 SBSQ HOSP IP/OBS HIGH 50: CPT

## 2020-06-23 RX ORDER — BACITRACIN ZINC 500 UNIT/G
1 OINTMENT IN PACKET (EA) TOPICAL
Refills: 0 | Status: DISCONTINUED | OUTPATIENT
Start: 2020-06-23 | End: 2020-06-30

## 2020-06-23 RX ORDER — POTASSIUM CHLORIDE 20 MEQ
20 PACKET (EA) ORAL ONCE
Refills: 0 | Status: DISCONTINUED | OUTPATIENT
Start: 2020-06-23 | End: 2020-06-23

## 2020-06-23 RX ORDER — POTASSIUM CHLORIDE 20 MEQ
20 PACKET (EA) ORAL ONCE
Refills: 0 | Status: COMPLETED | OUTPATIENT
Start: 2020-06-23 | End: 2020-06-23

## 2020-06-23 RX ADMIN — APIXABAN 5 MILLIGRAM(S): 2.5 TABLET, FILM COATED ORAL at 08:17

## 2020-06-23 RX ADMIN — Medication 1 APPLICATION(S): at 17:52

## 2020-06-23 RX ADMIN — Medication 25 MILLIGRAM(S): at 08:17

## 2020-06-23 RX ADMIN — Medication 1 TABLET(S): at 12:45

## 2020-06-23 RX ADMIN — Medication 1 MILLIGRAM(S): at 12:45

## 2020-06-23 RX ADMIN — Medication 25 MILLIGRAM(S): at 17:52

## 2020-06-23 RX ADMIN — Medication 1 PATCH: at 12:14

## 2020-06-23 RX ADMIN — Medication 1 PATCH: at 12:12

## 2020-06-23 RX ADMIN — Medication 100 MILLIGRAM(S): at 12:45

## 2020-06-23 RX ADMIN — Medication 81 MILLIGRAM(S): at 12:46

## 2020-06-23 RX ADMIN — APIXABAN 5 MILLIGRAM(S): 2.5 TABLET, FILM COATED ORAL at 17:52

## 2020-06-23 RX ADMIN — Medication 20 MILLIEQUIVALENT(S): at 18:01

## 2020-06-23 RX ADMIN — Medication 1 PATCH: at 08:33

## 2020-06-23 NOTE — SWALLOW BEDSIDE ASSESSMENT ADULT - SWALLOW EVAL: FUNCTIONAL LEVEL AT TIME OF EVAL
initially asleep, arousable but lethargic, low vocal intensity, opening mouth to anticipate PO, 1:1 sit at bedside

## 2020-06-23 NOTE — CONSULT NOTE ADULT - SUBJECTIVE AND OBJECTIVE BOX
Neurology Consultation note    Name  CHAPARRO ROBISON    HPI:  64 year old male with PMHx of ETOH dependence, CHF, chronic back pain, HLD, HTN, PE on Eliquis, TIA was brought in to ED by family who believed he was missing but pt states he was out shopping in [place]. Pt left AMA on 6/17 after being admitted for fall and ETOH withdrawal on 6/15. Pt reports last drink on Thursday 6/18. Pt denies CP, SOB, fever, chills, HA, N/V/D/C, lightheadedness.   In ED, pt hypertensive 193/ 95. EKG showed Afib.    Patient reports generalized weakness (20 Jun 2020 16:57)      NEURO  Patient is a 65 yo man w/ hx as above brought in by family for ams. pt with recent adm for etoh w/d and fall.  patient is now unable to give hx   he was last given ativan last night      Vital Signs Last 24 Hrs  T(C): 36.3 (23 Jun 2020 05:36), Max: 36.3 (22 Jun 2020 21:00)  T(F): 97.4 (23 Jun 2020 05:36), Max: 97.4 (23 Jun 2020 05:36)  HR: 63 (23 Jun 2020 08:11) (57 - 97)  BP: 143/71 (23 Jun 2020 05:36) (122/62 - 170/88)  BP(mean): --  RR: 18 (23 Jun 2020 05:36) (17 - 18)  SpO2: 96% (23 Jun 2020 08:11) (96% - 96%)    Neurological Exam:   ms: arouses to tactile stim, non verbal, opens his eyes to command, squeezes my hand to command  eyes midline  perrl, face symmetric  w/d to nb pressure    Medications  apixaban 5 milliGRAM(s) Oral every 12 hours  aspirin enteric coated 81 milliGRAM(s) Oral daily  BACItracin   Ointment 1 Application(s) Topical two times a day  folic acid 1 milliGRAM(s) Oral daily  hydrALAZINE 25 milliGRAM(s) Oral every 4 hours PRN  LORazepam   Injectable 0.5 milliGRAM(s) IV Push every 4 hours PRN  metoprolol tartrate 25 milliGRAM(s) Oral two times a day  multivitamin/minerals 1 Tablet(s) Oral daily  nicotine - 21 mG/24Hr(s) Patch 1 Patch Transdermal daily  ondansetron Injectable 4 milliGRAM(s) IV Push every 6 hours PRN  senna 2 Tablet(s) Oral at bedtime PRN  thiamine 100 milliGRAM(s) Oral daily      Lab  06-23    138  |  104  |  15  ----------------------------<  102<H>  3.3<L>   |  25  |  0.9    Ca    8.8      23 Jun 2020 06:30                            13.3   7.11  )-----------( 123      ( 23 Jun 2020 06:30 )             40.3               Radiology      Assessment:  65 yo man with hx of etoh abuse and hx of recent w/d adm with ams  patient remains obtunded on my exam this am  also with hx of afib on a/c and was hypertensive on adm  adm cth w/ chronic mv changes but no acute changes  patient also + COVID abs but unclear if he was symptomatic at any point  ddx: seizure/etoh w/d, cva, encephalopathy/itis ? 2/2 prior covid infection  Plan:  start with REEG and MRI brain nc  detox f/u  will follow

## 2020-06-23 NOTE — SWALLOW BEDSIDE ASSESSMENT ADULT - SWALLOW EVAL: RECOMMENDED FEEDING/EATING TECHNIQUES
position upright (90 degrees)/slow rate of presentation/maintain upright posture during/after eating for 30 mins

## 2020-06-23 NOTE — PROGRESS NOTE ADULT - SUBJECTIVE AND OBJECTIVE BOX
CC.  weakness  HPI.   Patient appears to be confused.  offers no new complaints  unable to obtain HX/ROS  appears to be lethargic    .    Vital Signs Last 24 Hrs  T(C): 36.3 (23 Jun 2020 05:36), Max: 36.3 (22 Jun 2020 21:00)  T(F): 97.4 (23 Jun 2020 05:36), Max: 97.4 (23 Jun 2020 05:36)  HR: 63 (23 Jun 2020 08:11) (57 - 97)  BP: 143/71 (23 Jun 2020 05:36) (122/62 - 170/88)  BP(mean): --  RR: 18 (23 Jun 2020 05:36) (17 - 18)  SpO2: 96% (23 Jun 2020 08:11) (96% - 96%)          PHYSICAL EXAM-  GENERAL: NAD, chronic ill appearing male  HEAD:  Atraumatic, Normocephalic  EYES: EOMI, PERRLA, conjunctiva and sclera clear  NECK: Supple, No JVD, Normal thyroid  NERVOUS SYSTEM:  Alert & Oriented X1, Moving all extremities  CHEST/LUNG: Clear to percussion bilaterally; No rales, rhonchi, wheezing, or rubs  HEART: Regular rate and rhythm; No murmurs, rubs, or gallops  ABDOMEN: Soft, Nontender, Nondistended; Bowel sounds present  EXTREMITIES:    No clubbing, cyanosis, or edema  SKIN: No rashes or lesions                              13.3   7.11  )-----------( 123      ( 23 Jun 2020 06:30 )             40.3     06-23    138  |  104  |  15  ----------------------------<  102<H>  3.3<L>   |  25  |  0.9    Ca    8.8      23 Jun 2020 06:30    MEDICATIONS  (STANDING):  apixaban 5 milliGRAM(s) Oral every 12 hours  aspirin enteric coated 81 milliGRAM(s) Oral daily  BACItracin   Ointment 1 Application(s) Topical two times a day  folic acid 1 milliGRAM(s) Oral daily  metoprolol tartrate 25 milliGRAM(s) Oral two times a day  multivitamin/minerals 1 Tablet(s) Oral daily  nicotine - 21 mG/24Hr(s) Patch 1 Patch Transdermal daily  thiamine 100 milliGRAM(s) Oral daily    MEDICATIONS  (PRN):  hydrALAZINE 25 milliGRAM(s) Oral every 4 hours PRN SBP > 160  LORazepam   Injectable 0.5 milliGRAM(s) IV Push every 4 hours PRN Agitation  ondansetron Injectable 4 milliGRAM(s) IV Push every 6 hours PRN Nausea  senna 2 Tablet(s) Oral at bedtime PRN Constipation                         [x ] YES  [ ] NO    Consultant(s) Notes Reviewed:  [x ] YES  [ ] NO    Care Discussed with Consultants/Other Providers [x ] YES  [ ] No medical contraindication for discharge

## 2020-06-23 NOTE — PROGRESS NOTE ADULT - ASSESSMENT
Patient is 64 year old male with PMHx of ETOH dependence, CHF with Preserved EF, chronic back pain, HLD, HTN, PE on Eliquis, TIA was brought in to ED by family who believed he was missing but pt states he was out shopping in [place]. Pt left AMA on 6/17 after being admitted for fall and ETOH withdrawal on 6/15. Pt reports last drink on Thursday 6/18. Pt denies CP, SOB, fever, chills, HA, N/V/D/C, lightheadedness.   In ED, pt hypertensive 193/ 95. EKG showed Afib.    1. PVC  -EKG shows sinus rhythem with PVC as per cardiology.  no evidence of A. fib  - ECHO pending.  TSH level WNL  -Trop negative x 3  -Continue with metoprolol for rate control  -Patient is on eliquis for PE    2. Weakness  -Head CT scan shows no acute process  - PT consult    3. ETOH abuse  - finished a librium taper  - monitor for signs of withdrawal  -Thiamine, and folate def, replaced  -detox consult noticed    4. HTN  -BP stable now  - c/w home medications  - monitor vitals  -Add hydralazine PRN    5.  Hx of Chronic PE  Continue with Eliquis       6.   AMS  -Head CT scan shows no acute process  -Patient does not have the capacity to make his decision or leave AMA as per psych  -no focal neurological def  -Will obtain EEG, and MRI as per neurology  -Continue with neuro check  -NPO pending speech therapy evaluation  -Neurology to follow up         #Progress Note Handoff  Pending (specify): needs placement.  mentation   Family discussion:  plan of care was discussed with patient and sister in details.  all questions were answered.  seems to understand, and in agreement  Disposition:  unknown  spoke to Jeanne ([phone], [phone]) (next of kind)  in details about his care, and to encourage him to go to rehab

## 2020-06-24 LAB
ALBUMIN SERPL ELPH-MCNC: 3.8 G/DL — SIGNIFICANT CHANGE UP (ref 3.5–5.2)
ALP SERPL-CCNC: 63 U/L — SIGNIFICANT CHANGE UP (ref 30–115)
ALT FLD-CCNC: 63 U/L — HIGH (ref 0–41)
ANION GAP SERPL CALC-SCNC: 10 MMOL/L — SIGNIFICANT CHANGE UP (ref 7–14)
AST SERPL-CCNC: 49 U/L — HIGH (ref 0–41)
BILIRUB SERPL-MCNC: 0.3 MG/DL — SIGNIFICANT CHANGE UP (ref 0.2–1.2)
BUN SERPL-MCNC: 13 MG/DL — SIGNIFICANT CHANGE UP (ref 10–20)
CALCIUM SERPL-MCNC: 9.2 MG/DL — SIGNIFICANT CHANGE UP (ref 8.5–10.1)
CHLORIDE SERPL-SCNC: 106 MMOL/L — SIGNIFICANT CHANGE UP (ref 98–110)
CO2 SERPL-SCNC: 24 MMOL/L — SIGNIFICANT CHANGE UP (ref 17–32)
CREAT SERPL-MCNC: 1 MG/DL — SIGNIFICANT CHANGE UP (ref 0.7–1.5)
GLUCOSE SERPL-MCNC: 91 MG/DL — SIGNIFICANT CHANGE UP (ref 70–99)
HCT VFR BLD CALC: 45.4 % — SIGNIFICANT CHANGE UP (ref 42–52)
HGB BLD-MCNC: 14.5 G/DL — SIGNIFICANT CHANGE UP (ref 14–18)
MAGNESIUM SERPL-MCNC: 2.1 MG/DL — SIGNIFICANT CHANGE UP (ref 1.8–2.4)
MCHC RBC-ENTMCNC: 30.7 PG — SIGNIFICANT CHANGE UP (ref 27–31)
MCHC RBC-ENTMCNC: 31.9 G/DL — LOW (ref 32–37)
MCV RBC AUTO: 96.2 FL — HIGH (ref 80–94)
NRBC # BLD: 0 /100 WBCS — SIGNIFICANT CHANGE UP (ref 0–0)
PHOSPHATE SERPL-MCNC: 3.4 MG/DL — SIGNIFICANT CHANGE UP (ref 2.1–4.9)
PLATELET # BLD AUTO: 137 K/UL — SIGNIFICANT CHANGE UP (ref 130–400)
POTASSIUM SERPL-MCNC: 3.7 MMOL/L — SIGNIFICANT CHANGE UP (ref 3.5–5)
POTASSIUM SERPL-SCNC: 3.7 MMOL/L — SIGNIFICANT CHANGE UP (ref 3.5–5)
PROT SERPL-MCNC: 6.5 G/DL — SIGNIFICANT CHANGE UP (ref 6–8)
RBC # BLD: 4.72 M/UL — SIGNIFICANT CHANGE UP (ref 4.7–6.1)
RBC # FLD: 13.5 % — SIGNIFICANT CHANGE UP (ref 11.5–14.5)
SODIUM SERPL-SCNC: 140 MMOL/L — SIGNIFICANT CHANGE UP (ref 135–146)
WBC # BLD: 7.1 K/UL — SIGNIFICANT CHANGE UP (ref 4.8–10.8)
WBC # FLD AUTO: 7.1 K/UL — SIGNIFICANT CHANGE UP (ref 4.8–10.8)

## 2020-06-24 PROCEDURE — 99232 SBSQ HOSP IP/OBS MODERATE 35: CPT

## 2020-06-24 PROCEDURE — 99233 SBSQ HOSP IP/OBS HIGH 50: CPT

## 2020-06-24 PROCEDURE — 70551 MRI BRAIN STEM W/O DYE: CPT | Mod: 26

## 2020-06-24 RX ADMIN — Medication 1 PATCH: at 07:45

## 2020-06-24 RX ADMIN — Medication 25 MILLIGRAM(S): at 17:50

## 2020-06-24 RX ADMIN — Medication 1 PATCH: at 18:02

## 2020-06-24 RX ADMIN — Medication 1 PATCH: at 14:08

## 2020-06-24 RX ADMIN — Medication 1 APPLICATION(S): at 14:07

## 2020-06-24 RX ADMIN — Medication 0.5 MILLIGRAM(S): at 07:41

## 2020-06-24 RX ADMIN — APIXABAN 5 MILLIGRAM(S): 2.5 TABLET, FILM COATED ORAL at 05:22

## 2020-06-24 RX ADMIN — Medication 1 PATCH: at 14:00

## 2020-06-24 RX ADMIN — APIXABAN 5 MILLIGRAM(S): 2.5 TABLET, FILM COATED ORAL at 17:50

## 2020-06-24 RX ADMIN — Medication 81 MILLIGRAM(S): at 14:08

## 2020-06-24 RX ADMIN — Medication 100 MILLIGRAM(S): at 14:07

## 2020-06-24 RX ADMIN — Medication 1 TABLET(S): at 14:07

## 2020-06-24 RX ADMIN — Medication 25 MILLIGRAM(S): at 05:22

## 2020-06-24 RX ADMIN — Medication 1 MILLIGRAM(S): at 14:07

## 2020-06-24 RX ADMIN — Medication 0.5 MILLIGRAM(S): at 18:03

## 2020-06-24 NOTE — PROGRESS NOTE ADULT - ASSESSMENT
63 y/o M with PMHx of ETOH dependence, CHF with Preserved EF, chronic back pain, HLD, HTN, PE on Eliquis, TIA, multiple admissions to hosptital for EtOH intoxication and debility was brought in to ED by family who believed he was missing but pt states he was out shopping in [place]. Pt had left AMA on 6/17 after being admitted for fall and ETOH withdrawal on 6/15. Pt reports last drink on Thursday 6/18.     #AMS in the setting of EtOH intoxication  -Head CT scan shows no acute process  -no focal neurological def  -EEG wnl  PLAN  -Psych on board; Patient does not have the capacity to make his decision or leave AMA as per psych  -will get psych re-eval regarding capacity  -spoke to CM/SW; adult protective service is also involved   -will get MRI and if wnl; no further neurological w/u  -Continue with neuro check  -speech therapy evaluation appreciated  -Neurology to follow up     #Alcohol intoxication with concern for alcohol withdrawal  #Suspected Thiamine and folic acid deficiency in the setting of alcohol abuse  -multiple hospital admissions in 2020 for alcohol withdrawl; (03/2020 alcohol withdrawal complicated by delirium tremens)  -patient wants to leave AMA    PLAN  -SW evaluation  -finished the librium protocol  -Detox consult appreciated  -will monitor for signs of withdrawl  -Ativan PRN for severe withdrawal  -Thiamine and folic acid  -Alcohol cessation counseling was provided to him at bedside    #PVC  -EKG shows sinus rhythm with PVC as per cardiology.  no evidence of A. fib  -ECHO pending.  TSH level WNL  -Trop negative x 3  -Continue with metoprolol for rate control  -Patient is on eliquis for PE    #weakness in lower extremity 2/2 debility  -Rehab consulted  -social work consult  -physical therapy eval    #HTN/ HTN urgency  -BP stable now  -c/w home medications  -monitor vitals  -Add hydralazine PRN    #Hx of Chronic PE  -Continue with Eliquis     #Chronic diastolic CHF- no in acute exacerbation  -No signs of fluid overload on exam    DVT PPX, on Eliquis    Progress Note Handoff  Pending Consults: None  Pending Tests: None  Pending Results: None  Family Discussion: Discussed with patient; spoke to Jeanne ([phone], [phone]) (next of kin) to encourage him to go to rehab  Disposition: Home__X___/SNF______/Other_____/Unknown at this time_____

## 2020-06-24 NOTE — PROGRESS NOTE ADULT - SUBJECTIVE AND OBJECTIVE BOX
CHAPARRO ROBISON  64y  Male      Patient is a 64y old  Male who presents with a chief complaint of weakness (24 Jun 2020 09:32)      INTERVAL HPI/OVERNIGHT EVENTS:  Patient seen and examined earlier this morning. Patient reports that he wants to go home. HE reports that he doesn't want any of the tests (MRI, EEG); he denies any symptoms currently; patient is AAOX3. He denies F/C, CP, palpitations, SOB, N/V, hallucinations, headache, new focal weakness, confusion. No acute events overnight.       REVIEW OF SYSTEMS:  CONSTITUTIONAL: No fever, weight loss, +weakness  EYES: No eye pain, visual disturbances, or discharge  ENMT:  No difficulty hearing, tinnitus, vertigo; No sinus or throat pain  NECK: No pain or stiffness  RESPIRATORY: No cough, wheezing, chills or hemoptysis; No shortness of breath  CARDIOVASCULAR: No chest pain, palpitations, dizziness, or leg swelling  GASTROINTESTINAL: No abdominal or epigastric pain. No nausea, vomiting, or hematemesis; No diarrhea or constipation. No melena or hematochezia.  GENITOURINARY: No dysuria, frequency, hematuria, or incontinence  NEUROLOGICAL: No headaches, memory loss, loss of strength, numbness, or tremors, no confusion  MUSCULOSKELETAL: No joint pain or swelling; No muscle, back, or extremity pain +weakness in lower extremity  PSYCHIATRIC: No depression, anxiety, mood swings, or difficulty sleeping, no hallucination      T(C): 35.8 (06-24-20 @ 13:06), Max: 36.2 (06-24-20 @ 05:31)  HR: 60 (06-24-20 @ 13:06) (52 - 70)  BP: 193/98 (06-24-20 @ 13:06) (113/58 - 193/98)  RR: 18 (06-24-20 @ 13:06) (16 - 18)  SpO2: 95% (06-24-20 @ 08:38) (95% - 95%)    PHYSICAL EXAM:  GENERAL: NAD, well-developed, non-diaphoretic   HEAD:  Atraumatic, Normocephalic  EYES: EOMI, PERRLA, conjunctiva and sclera clear  ENMT: No tonsillar erythema, exudates, or enlargement; Moist mucous membranes, Good dentition, No lesions  NECK: Supple, No JVD, Normal thyroid  NERVOUS SYSTEM:  Alert & Oriented X3, Good concentration; DTRs 2+ intact and symmetric, no tremors  CHEST/LUNG: Clear to percussion bilaterally; No rales, rhonchi, wheezing, or rubs  HEART: Regular rate and rhythm; No murmurs, rubs, or gallops  ABDOMEN: Soft, Nontender, Nondistended; Bowel sounds present  EXTREMITIES:  2+ Peripheral Pulses, No clubbing, cyanosis, or edema  PSYCH: cooperative      Consultant(s) Notes Reviewed:  [x ] YES  [ ] NO  Care Discussed with Consultants/Other Providers [ x] YES  [ ] NO    LAB:                        14.5   7.10  )-----------( 137      ( 24 Jun 2020 06:05 )             45.4     06-24    140  |  106  |  13  ----------------------------<  91  3.7   |  24  |  1.0    Ca    9.2      24 Jun 2020 06:05  Phos  3.4     06-24  Mg     2.1     06-24    TPro  6.5  /  Alb  3.8  /  TBili  0.3  /  DBili  x   /  AST  49<H>  /  ALT  63<H>  /  AlkPhos  63  06-24    LIVER FUNCTIONS - ( 24 Jun 2020 06:05 )  Alb: 3.8 g/dL / Pro: 6.5 g/dL / ALK PHOS: 63 U/L / ALT: 63 U/L / AST: 49 U/L / GGT: x                       Drug Dosing Weight  Height (cm): 172.7 (20 Jun 2020 20:41)  Weight (kg): 80.7 (20 Jun 2020 20:41)  BMI (kg/m2): 27.1 (20 Jun 2020 20:41)  BSA (m2): 1.94 (20 Jun 2020 20:41)    CAPILLARY BLOOD GLUCOSE        I&O's Summary        RADIOLOGY & ADDITIONAL TESTS:  Imaging Personally Reviewed:  [x] YES  [ ] NO    HEALTH ISSUES - PROBLEM Dx:  Dementia associated with alcoholism: Dementia associated with alcoholism          MEDS:  apixaban 5 milliGRAM(s) Oral every 12 hours  aspirin enteric coated 81 milliGRAM(s) Oral daily  BACItracin   Ointment 1 Application(s) Topical two times a day  folic acid 1 milliGRAM(s) Oral daily  hydrALAZINE 25 milliGRAM(s) Oral every 4 hours PRN  LORazepam   Injectable 0.5 milliGRAM(s) IV Push every 4 hours PRN  metoprolol tartrate 25 milliGRAM(s) Oral two times a day  multivitamin/minerals 1 Tablet(s) Oral daily  nicotine - 21 mG/24Hr(s) Patch 1 Patch Transdermal daily  ondansetron Injectable 4 milliGRAM(s) IV Push every 6 hours PRN  senna 2 Tablet(s) Oral at bedtime PRN  thiamine 100 milliGRAM(s) Oral daily

## 2020-06-24 NOTE — PROGRESS NOTE ADULT - SUBJECTIVE AND OBJECTIVE BOX
Neurology Follow up note    Name  CHAPARRO ROBISON    HPI:  64 year old male with PMHx of ETOH dependence, CHF, chronic back pain, HLD, HTN, PE on Eliquis, TIA was brought in to ED by family who believed he was missing but pt states he was out shopping in [place]. Pt left AMA on 6/17 after being admitted for fall and ETOH withdrawal on 6/15. Pt reports last drink on Thursday 6/18. Pt denies CP, SOB, fever, chills, HA, N/V/D/C, lightheadedness.   In ED, pt hypertensive 193/ 95. EKG showed Afib.    Patient reports generalized weakness (20 Jun 2020 16:57)      Interval History:    patient is much better today  lethargy yesterday likely 2/2 ativan  REEG was normal  pt still asking to go home but agrees to mri  he remains confused      Vital Signs Last 24 Hrs  T(C): 36.2 (24 Jun 2020 05:31), Max: 36.2 (24 Jun 2020 05:31)  T(F): 97.2 (24 Jun 2020 05:31), Max: 97.2 (24 Jun 2020 05:31)  HR: 70 (24 Jun 2020 08:38) (52 - 70)  BP: 174/82 (24 Jun 2020 08:38) (113/58 - 189/88)  BP(mean): --  RR: 16 (24 Jun 2020 08:38) (16 - 18)  SpO2: 95% (24 Jun 2020 08:38) (95% - 95%)    Neurological Exam:   Mental status: Awake, alert and oriented x3.  does not have clear insight into visit  Cranial nerves: Pupils equally round and reactive to light, visual fields full, no nystagmus, extraocular muscles intact, V1 through V3 intact bilaterally and symmetric, face symmetric, hearing intact to finger rub, palate elevation symmetric, tongue was midline.  Motor:  MRC grading 5/5 b/l UE/LE.   strength 5/5.  Normal tone and bulk.  No abnormal movements.    Sensation: Intact to light touch, proprioception, and pinprick.   Coordination: No dysmetria on finger-to-nose and heel-to-shin.  No dysdiadokinesia.  Reflexes: 2+ in bilateral UE/LE, downgoing toes bilaterally. (-) Reese.      Medications  apixaban 5 milliGRAM(s) Oral every 12 hours  aspirin enteric coated 81 milliGRAM(s) Oral daily  BACItracin   Ointment 1 Application(s) Topical two times a day  folic acid 1 milliGRAM(s) Oral daily  hydrALAZINE 25 milliGRAM(s) Oral every 4 hours PRN  LORazepam   Injectable 0.5 milliGRAM(s) IV Push every 4 hours PRN  metoprolol tartrate 25 milliGRAM(s) Oral two times a day  multivitamin/minerals 1 Tablet(s) Oral daily  nicotine - 21 mG/24Hr(s) Patch 1 Patch Transdermal daily  ondansetron Injectable 4 milliGRAM(s) IV Push every 6 hours PRN  senna 2 Tablet(s) Oral at bedtime PRN  thiamine 100 milliGRAM(s) Oral daily      Lab  06-24    140  |  106  |  13  ----------------------------<  91  3.7   |  24  |  1.0    Ca    9.2      24 Jun 2020 06:05  Phos  3.4     06-24  Mg     2.1     06-24    TPro  6.5  /  Alb  3.8  /  TBili  0.3  /  DBili  x   /  AST  49<H>  /  ALT  63<H>  /  AlkPhos  63  06-24                          14.5   7.10  )-----------( 137      ( 24 Jun 2020 06:05 )             45.4     LIVER FUNCTIONS - ( 24 Jun 2020 06:05 )  Alb: 3.8 g/dL / Pro: 6.5 g/dL / ALK PHOS: 63 U/L / ALT: 63 U/L / AST: 49 U/L / GGT: x             2.1        Radiology      Assessment:  patient adm with ams, ? 2/2 etoh w/d  patient is now much more alert today  eeg was normal  mri nc brain is pending. if normal, there is no further w/u needed from neuro standpoint  please call with any questions  Plan:

## 2020-06-25 LAB
ALBUMIN SERPL ELPH-MCNC: 4.1 G/DL — SIGNIFICANT CHANGE UP (ref 3.5–5.2)
ALP SERPL-CCNC: 69 U/L — SIGNIFICANT CHANGE UP (ref 30–115)
ALT FLD-CCNC: 75 U/L — HIGH (ref 0–41)
ANION GAP SERPL CALC-SCNC: 10 MMOL/L — SIGNIFICANT CHANGE UP (ref 7–14)
AST SERPL-CCNC: 52 U/L — HIGH (ref 0–41)
BILIRUB SERPL-MCNC: 0.3 MG/DL — SIGNIFICANT CHANGE UP (ref 0.2–1.2)
BUN SERPL-MCNC: 11 MG/DL — SIGNIFICANT CHANGE UP (ref 10–20)
CALCIUM SERPL-MCNC: 9.4 MG/DL — SIGNIFICANT CHANGE UP (ref 8.5–10.1)
CHLORIDE SERPL-SCNC: 102 MMOL/L — SIGNIFICANT CHANGE UP (ref 98–110)
CO2 SERPL-SCNC: 27 MMOL/L — SIGNIFICANT CHANGE UP (ref 17–32)
CREAT SERPL-MCNC: 1 MG/DL — SIGNIFICANT CHANGE UP (ref 0.7–1.5)
GLUCOSE SERPL-MCNC: 137 MG/DL — HIGH (ref 70–99)
HCT VFR BLD CALC: 42.9 % — SIGNIFICANT CHANGE UP (ref 42–52)
HGB BLD-MCNC: 14.2 G/DL — SIGNIFICANT CHANGE UP (ref 14–18)
MCHC RBC-ENTMCNC: 31.1 PG — HIGH (ref 27–31)
MCHC RBC-ENTMCNC: 33.1 G/DL — SIGNIFICANT CHANGE UP (ref 32–37)
MCV RBC AUTO: 93.9 FL — SIGNIFICANT CHANGE UP (ref 80–94)
NRBC # BLD: 0 /100 WBCS — SIGNIFICANT CHANGE UP (ref 0–0)
PLATELET # BLD AUTO: 143 K/UL — SIGNIFICANT CHANGE UP (ref 130–400)
POTASSIUM SERPL-MCNC: 4.2 MMOL/L — SIGNIFICANT CHANGE UP (ref 3.5–5)
POTASSIUM SERPL-SCNC: 4.2 MMOL/L — SIGNIFICANT CHANGE UP (ref 3.5–5)
PROT SERPL-MCNC: 6.7 G/DL — SIGNIFICANT CHANGE UP (ref 6–8)
RBC # BLD: 4.57 M/UL — LOW (ref 4.7–6.1)
RBC # FLD: 13.6 % — SIGNIFICANT CHANGE UP (ref 11.5–14.5)
SODIUM SERPL-SCNC: 139 MMOL/L — SIGNIFICANT CHANGE UP (ref 135–146)
WBC # BLD: 8.02 K/UL — SIGNIFICANT CHANGE UP (ref 4.8–10.8)
WBC # FLD AUTO: 8.02 K/UL — SIGNIFICANT CHANGE UP (ref 4.8–10.8)

## 2020-06-25 PROCEDURE — 99231 SBSQ HOSP IP/OBS SF/LOW 25: CPT

## 2020-06-25 PROCEDURE — 99233 SBSQ HOSP IP/OBS HIGH 50: CPT

## 2020-06-25 RX ORDER — METHOCARBAMOL 500 MG/1
500 TABLET, FILM COATED ORAL EVERY 8 HOURS
Refills: 0 | Status: DISCONTINUED | OUTPATIENT
Start: 2020-06-25 | End: 2020-06-30

## 2020-06-25 RX ORDER — ACETAMINOPHEN 500 MG
650 TABLET ORAL EVERY 6 HOURS
Refills: 0 | Status: DISCONTINUED | OUTPATIENT
Start: 2020-06-25 | End: 2020-06-30

## 2020-06-25 RX ADMIN — Medication 25 MILLIGRAM(S): at 00:03

## 2020-06-25 RX ADMIN — Medication 1 PATCH: at 08:29

## 2020-06-25 RX ADMIN — Medication 650 MILLIGRAM(S): at 20:02

## 2020-06-25 RX ADMIN — METHOCARBAMOL 500 MILLIGRAM(S): 500 TABLET, FILM COATED ORAL at 23:08

## 2020-06-25 RX ADMIN — Medication 100 MILLIGRAM(S): at 11:57

## 2020-06-25 RX ADMIN — Medication 1 APPLICATION(S): at 18:15

## 2020-06-25 RX ADMIN — Medication 1 PATCH: at 20:04

## 2020-06-25 RX ADMIN — Medication 1 MILLIGRAM(S): at 11:57

## 2020-06-25 RX ADMIN — Medication 81 MILLIGRAM(S): at 11:57

## 2020-06-25 RX ADMIN — APIXABAN 5 MILLIGRAM(S): 2.5 TABLET, FILM COATED ORAL at 05:56

## 2020-06-25 RX ADMIN — Medication 25 MILLIGRAM(S): at 18:15

## 2020-06-25 RX ADMIN — Medication 25 MILLIGRAM(S): at 05:56

## 2020-06-25 RX ADMIN — Medication 1 PATCH: at 14:08

## 2020-06-25 RX ADMIN — APIXABAN 5 MILLIGRAM(S): 2.5 TABLET, FILM COATED ORAL at 18:15

## 2020-06-25 RX ADMIN — Medication 1 TABLET(S): at 11:57

## 2020-06-25 RX ADMIN — Medication 1 PATCH: at 11:57

## 2020-06-25 RX ADMIN — Medication 1 APPLICATION(S): at 05:56

## 2020-06-25 NOTE — DIETITIAN INITIAL EVALUATION ADULT. - OTHER INFO
Pt admitted d/t Afib, weakness, and alcohol dependence. AMS noted in the setting of alcohol dependence. CT head showed no acute process + MRI brain showed no evidence of acute abnormality. Pt followed by psych-- determined that pt has impaired ability to perceive immediate risks and associated complications of repeated falls/refusing medical workup and treatment. Per SLP on 6/23 "tolerating puree consistency & nectar thick liquids w/ mild-mod oral impairment" and recommended puree/NTL, unsure why pt is on HTL at this time despite SLP recommendation for nectar thickened liquids.

## 2020-06-25 NOTE — DIETITIAN INITIAL EVALUATION ADULT. - PHYSICAL APPEARANCE
BMI 27.1 using dosing/lowest recorded wt/overweight/other (specify) (6/23) +1 edema (L/R ankles); skin assessment on 6/24 shows skin tear (R hand) otherwise WDL

## 2020-06-25 NOTE — DIETITIAN INITIAL EVALUATION ADULT. - NS FNS REASON FOR WEIGHT CHANG
other (specify)/pt unable to specifiy reason other than multiple hospital admissions over the last 3 months

## 2020-06-25 NOTE — DIETITIAN INITIAL EVALUATION ADULT. - ADD RECOMMEND
diet texture/consistency per SLP (recommended Nectar rather than Honey thickened liquids), add ensure enlive BID (thickened to the appropriate consistency), add ensure pudding BID, encourage po intake, provide assistance with meals PRN, obtain bi-weekly wts. diet texture/consistency per SLP (recommended Nectar rather than Honey thickened liquids), add ensure enlive BID (thickened to the appropriate consistency), add ensure pudding BID, encourage po intake, provide assistance with meals PRN, obtain bi-weekly wts. Recs discussed with covering PA [phone].

## 2020-06-25 NOTE — DIETITIAN INITIAL EVALUATION ADULT. - FACTORS AFF FOOD INTAKE
change in mental status/Pt likely a poor historian, provides minimal information for RD assessment. He states that he is eating well and that he has a good appetite, however per EMR pt is not eating and po intake is consistently recorded at 0% throughout LOS. Pt reports that he does not consume pureed/honey thickened liquids pta. He states that he eat regular foods. Pt is agreeable to try oral nutrition supplements such as ensure enlive (thickened to the appropriate consistency). Pt denies nausea/abdominal pain. NKFA/intolerances. No food preferences r/t culture/[place]. Pt takes a daily MVI pta. Pt denies diarrhea/constipation and states that his last BM was this morning (6/25). Pt reports that he has had significant wt loss and states that his UBW is 220 lbs, he does not specify a time frame of the wt loss and states that he was not eating poorly pta./difficulty swallowing/difficulty feeding self Pt likely a poor historian, provides minimal information for RD assessment. He states that he is eating well and that he has a good appetite, however per EMR pt is not eating and po intake is consistently recorded at 0% throughout LOS. Pt reports that he does not consume pureed/honey thickened liquids pta. He states that he eat regular foods. Pt is agreeable to try oral nutrition supplements such as ensure enlive (thickened to the appropriate consistency). Pt denies nausea/abdominal pain. NKFA/intolerances. No food preferences r/t culture/[employer]. Pt takes a daily MVI pta. Pt denies diarrhea/constipation and states that his last BM was this morning (6/25). Pt reports that he has had significant wt loss and states that his UBW is 220 lbs, he does not specify a time frame of the wt loss and states that he was not eating poorly pta. Pt has multiple recent previous admissions, with downtrending wts noted. March 2020 pt wt 94.3 kg/207 lbs vs May 2020 pt wt 90.7 kg/200 lbs vs current dosing wt (6/20) 80.7 kg/178 lbs (daily wt is increased-- likely edema related). 29 lbs/14% wt loss within 3 months is considered clinically significant given the malnutrition criteria. Pt does not display physical signs of muscle wasting/fat loss upon RD observation. Pt meets PCM criteria (see below)./change in mental status/difficulty feeding self/difficulty swallowing

## 2020-06-25 NOTE — PROGRESS NOTE ADULT - SUBJECTIVE AND OBJECTIVE BOX
CHAPARRO ROBISON  64y  Male      Patient is a 64y old  Male who presents with a chief complaint of weakness (24 Jun 2020 09:32)      INTERVAL HPI/OVERNIGHT EVENTS:  Patient seen and examined earlier this morning. Patient reports that he wants to go home. he denies any symptoms currently; patient is AAOX3. He denies F/C, CP, palpitations, SOB, N/V, hallucinations, headache, new focal weakness, confusion. No acute events overnight.       REVIEW OF SYSTEMS:  CONSTITUTIONAL: No fever, weight loss, +weakness  EYES: No eye pain, visual disturbances, or discharge  ENMT:  No difficulty hearing, tinnitus, vertigo; No sinus or throat pain  NECK: No pain or stiffness  RESPIRATORY: No cough, wheezing, chills or hemoptysis; No shortness of breath  CARDIOVASCULAR: No chest pain, palpitations, dizziness, or leg swelling  GASTROINTESTINAL: No abdominal or epigastric pain. No nausea, vomiting, or hematemesis; No diarrhea or constipation. No melena or hematochezia.  GENITOURINARY: No dysuria, frequency, hematuria, or incontinence  NEUROLOGICAL: No headaches, memory loss, loss of strength, numbness, or tremors, no confusion  MUSCULOSKELETAL: No joint pain or swelling; No muscle, back, or extremity pain +weakness in lower extremity  PSYCHIATRIC: No depression, anxiety, mood swings, or difficulty sleeping, no hallucination    Vital Signs Last 24 Hrs  T(C): 35.5 (25 Jun 2020 10:53), Max: 35.8 (24 Jun 2020 13:06)  T(F): 95.9 (25 Jun 2020 10:53), Max: 96.5 (24 Jun 2020 13:06)  HR: 59 (25 Jun 2020 10:53) (52 - 65)  BP: 157/97 (25 Jun 2020 10:53) (150/81 - 193/98)  BP(mean): --  RR: 18 (25 Jun 2020 05:40) (18 - 18)  SpO2: --    PHYSICAL EXAM:  GENERAL: NAD, well-developed, non-diaphoretic   HEAD:  Atraumatic, Normocephalic  EYES: EOMI, PERRLA, conjunctiva and sclera clear  ENMT: No tonsillar erythema, exudates, or enlargement; Moist mucous membranes, Good dentition, No lesions  NECK: Supple, No JVD, Normal thyroid  NERVOUS SYSTEM:  Alert & Oriented X3, Good concentration; DTRs 2+ intact and symmetric, no tremors  CHEST/LUNG: Clear to percussion bilaterally; No rales, rhonchi, wheezing, or rubs  HEART: Regular rate and rhythm; No murmurs, rubs, or gallops  ABDOMEN: Soft, Nontender, Nondistended; Bowel sounds present  EXTREMITIES:  2+ Peripheral Pulses, No clubbing, cyanosis, or edema  PSYCH: cooperative      Consultant(s) Notes Reviewed:  [x ] YES  [ ] NO  Care Discussed with Consultants/Other Providers [ x] YES  [ ] NO                          14.2   8.02  )-----------( 143      ( 25 Jun 2020 09:20 )             42.9   06-25    139  |  102  |  11  ----------------------------<  137<H>  4.2   |  27  |  1.0    Ca    9.4      25 Jun 2020 09:20  Phos  3.4     06-24  Mg     2.1     06-24    TPro  6.7  /  Alb  4.1  /  TBili  0.3  /  DBili  x   /  AST  52<H>  /  ALT  75<H>  /  AlkPhos  69  06-25      Drug Dosing Weight  Height (cm): 172.7 (20 Jun 2020 20:41)  Weight (kg): 80.7 (20 Jun 2020 20:41)  BMI (kg/m2): 27.1 (20 Jun 2020 20:41)  BSA (m2): 1.94 (20 Jun 2020 20:41)    CAPILLARY BLOOD GLUCOSE        I&O's Summary        RADIOLOGY & ADDITIONAL TESTS:  Imaging Personally Reviewed:  [x] YES  [ ] NO    HEALTH ISSUES - PROBLEM Dx:  Dementia associated with alcoholism: Dementia associated with alcoholism        MEDICATIONS  (STANDING):  apixaban 5 milliGRAM(s) Oral every 12 hours  aspirin enteric coated 81 milliGRAM(s) Oral daily  BACItracin   Ointment 1 Application(s) Topical two times a day  folic acid 1 milliGRAM(s) Oral daily  metoprolol tartrate 25 milliGRAM(s) Oral two times a day  multivitamin/minerals 1 Tablet(s) Oral daily  nicotine - 21 mG/24Hr(s) Patch 1 Patch Transdermal daily  thiamine 100 milliGRAM(s) Oral daily    MEDICATIONS  (PRN):  hydrALAZINE 25 milliGRAM(s) Oral every 4 hours PRN SBP > 160  LORazepam   Injectable 0.5 milliGRAM(s) IV Push every 4 hours PRN Agitation  ondansetron Injectable 4 milliGRAM(s) IV Push every 6 hours PRN Nausea  senna 2 Tablet(s) Oral at bedtime PRN Constipation

## 2020-06-25 NOTE — CHART NOTE - NSCHARTNOTEFT_GEN_A_CORE
Was called by dietician about pt's diet recommendation.   Will change diet to Dysphagia 1 puree, nectar thickened liquids, add ensure enlive BID, add ensure pudding BID.  Pt meets criteria severe protein calorie malnutrition.     Case d/w Dr. Sky

## 2020-06-25 NOTE — PROGRESS NOTE ADULT - ASSESSMENT
65 y/o M with PMHx of ETOH dependence, CHF with Preserved EF, chronic back pain, HLD, HTN, PE on Eliquis, TIA, multiple admissions to hosptital for EtOH intoxication and debility was brought in to ED by family who believed he was missing but pt states he was out shopping in [place]. Pt had left AMA on 6/17 after being admitted for fall and ETOH withdrawal on 6/15. Pt reports last drink on Thursday 6/18.     #AMS in the setting of EtOH intoxication  -Head CT scan shows no acute process  -no focal neurological def  -EEG wnl  -MRI brain:Equivocal punctiform infarct in the left subinsular region; Otherwise, no evidence for acute abnormality; Severe microvascular ischemic change; Widespread foci of microhemorrhage. These are usually clinically silent. Can be seen with underlying angiopathy (i.e. hypertension)  PLAN  -Psych on board; follow-up appreciated; patient has impaired ability to perceive immediate risks and associated complications of repeated falls, refusing medical w/u, tx, and treatment recommendations; also has impaired ability for self care  -spoke to CM/SW; adult protective service is also involved   -Neurology consult appreciated; no further neuro w/u  -speech therapy evaluation appreciated    #Alcohol intoxication with concern for alcohol withdrawal  #Suspected Thiamine and folic acid deficiency in the setting of alcohol abuse  -multiple hospital admissions in 2020 for alcohol withdrawl; (03/2020 alcohol withdrawal complicated by delirium tremens)  -patient wants to leave AMA    PLAN  -SW evaluation  -finished the librium protocol  -Detox consult appreciated  -will monitor for signs of withdrawl  -Ativan PRN for severe withdrawal  -Thiamine and folic acid  -Alcohol cessation counseling was provided to him at bedside    #PVC  -EKG shows sinus rhythm with PVC as per cardiology.  no evidence of A. fib  -ECHO pending.  TSH level WNL  -Trop negative x 3  -Continue with metoprolol for rate control  -Patient is on eliquis for PE    #weakness in lower extremity 2/2 debility  -Rehab consulted  -social work consult  -physical therapy eval    #HTN/ HTN urgency  -BP stable now  -c/w home medications  -monitor vitals  -Add hydralazine PRN    #Hx of Chronic PE  -Continue with Eliquis     #Chronic diastolic CHF- no in acute exacerbation  -No signs of fluid overload on exam    DVT PPX, on Eliquis    Progress Note Handoff  Pending Consults: None  Pending Tests: None  Pending Results: None  Family Discussion: Discussed with patient; spoke to Jeanne ([phone], [phone]) (next of kin) to encourage him to go to rehab  Disposition: Home_____/SNF______/Other_____/Unknown at this time___X__

## 2020-06-25 NOTE — DIETITIAN INITIAL EVALUATION ADULT. - ENERGY NEEDS
kcal: 9743-6512 (MSJ x 1.2-1.4 AF) PCM considered  protein:  g (1.2-1.4 g/kg) same as above  fluid: 1mL/kcal or per LIP

## 2020-06-25 NOTE — PROGRESS NOTE ADULT - SUBJECTIVE AND OBJECTIVE BOX
Reviewed prior psych evaluation for capacity.    63 yo  male, with PMHx of ETOH dependence, CHF, chronic back pain, HLD, HTN, PE on eliquis, TIA who was BIBEMS for falls called by his neighbout. His blood pressure was 190s/90s. A&Ox3 here, no slurred speech. He had AFib on admission. Psychiatry reconsult called as pt requests to leave AMA, refusing further safe discharge planning and treatment.    Pt said he lives on himself since his wife passed away four years ago. He has repeated admissions to ED in the past three months due to falls. During interview  Pt displays significant impaired attention and short-term memory, and is unable to to explain his needs and support for community discharge and independent living. he demonstrate poor insight and lapse in judgement.

## 2020-06-25 NOTE — DIETITIAN INITIAL EVALUATION ADULT. - RD TO REMAIN AVAILABLE
INTERVENTION: meals and snacks, medical food supplements, coordination of care. ME: RD to monitor diet order, energy intake, body composition, NFPF, glucose profile/yes

## 2020-06-25 NOTE — CHART NOTE - NSCHARTNOTEFT_GEN_A_CORE
Upon Nutritional Assessment by the Registered Dietitian your patient was determined to meet criteria / has evidence of the following diagnosis/diagnoses:          [ ]  Mild Protein Calorie Malnutrition        [ ]  Moderate Protein Calorie Malnutrition        [x] Severe Protein Calorie Malnutrition        [ ] Unspecified Protein Calorie Malnutrition        [ ] Underweight / BMI <19        [ ] Morbid Obesity / BMI > 40      Findings as based on:  •  Comprehensive nutrition assessment and consultation    Severe PCM in the context of acute illness RT decreased po intake in the setting of dysphagia  1. <50% estimated energy requirements for >5 days  2. 14% wt loss within 3 months (downtrending wts througout multiple recent hospital admissions)      Treatment:      diet texture/consistency per SLP (recommended Nectar rather than Honey thickened liquids), add ensure enlive BID (thickened to the appropriate consistency), add ensure pudding BID, encourage po intake, provide assistance with meals PRN, obtain bi-weekly wts.    The following diet has been recommended: Dysphagia 1 puree, nectar thickened liquids (per SLP), add ensure enlive BID, add ensure pudding BID      PROVIDER Section:     By signing this assessment you are acknowledging and agree with the diagnosis/diagnoses assigned by the Registered Dietitian    Comments:

## 2020-06-25 NOTE — PROGRESS NOTE ADULT - ASSESSMENT
Impression:  1. Impaired ability to perceive immediate risks and associated complication of repeated falls, refusing medical workup and treatment recommendation  2. Impaired ability for self-care, including medical treatment to manage his HTN

## 2020-06-25 NOTE — DIETITIAN INITIAL EVALUATION ADULT. - MALNUTRITION
Severe PCM in the context of acute illness 1. <50% estimated energy requirements for >5 days, 2. >7.5% wt loss within 3 months Severe PCM in the context of acute illness

## 2020-06-25 NOTE — CHART NOTE - NSCHARTNOTEFT_GEN_A_CORE
MRI REVIEW    NO ACUTE CHANGES  PATIENT WITH MULTIPLE, INCIDENTAL MICROHEMORRHAGES  PATIENT IS ON ELIQUIS FOR PE  WOULD CONT THIS TX FOR THE FULL RECOMMENDED DURATION AS RISK OF DISCONTINUATION OUTWEIGHS BENEFIT IN THIS CASE  THERE IS NO FURTHER W/U INDICATED AT THIS TIME FROM NEURO STANDPOINT.  PLEASE CALL WITH ANY QUESTIONS

## 2020-06-26 PROCEDURE — 99232 SBSQ HOSP IP/OBS MODERATE 35: CPT

## 2020-06-26 RX ADMIN — Medication 650 MILLIGRAM(S): at 03:44

## 2020-06-26 RX ADMIN — Medication 100 MILLIGRAM(S): at 13:13

## 2020-06-26 RX ADMIN — Medication 25 MILLIGRAM(S): at 05:45

## 2020-06-26 RX ADMIN — Medication 1 PATCH: at 13:14

## 2020-06-26 RX ADMIN — Medication 25 MILLIGRAM(S): at 17:54

## 2020-06-26 RX ADMIN — APIXABAN 5 MILLIGRAM(S): 2.5 TABLET, FILM COATED ORAL at 17:54

## 2020-06-26 RX ADMIN — Medication 0.5 MILLIGRAM(S): at 09:14

## 2020-06-26 RX ADMIN — Medication 1 PATCH: at 09:12

## 2020-06-26 RX ADMIN — Medication 1 MILLIGRAM(S): at 13:12

## 2020-06-26 RX ADMIN — APIXABAN 5 MILLIGRAM(S): 2.5 TABLET, FILM COATED ORAL at 05:45

## 2020-06-26 RX ADMIN — METHOCARBAMOL 500 MILLIGRAM(S): 500 TABLET, FILM COATED ORAL at 07:59

## 2020-06-26 RX ADMIN — Medication 81 MILLIGRAM(S): at 13:14

## 2020-06-26 RX ADMIN — Medication 1 APPLICATION(S): at 17:55

## 2020-06-26 RX ADMIN — Medication 1 TABLET(S): at 13:13

## 2020-06-26 RX ADMIN — Medication 1 PATCH: at 10:00

## 2020-06-26 RX ADMIN — Medication 0.5 MILLIGRAM(S): at 12:53

## 2020-06-26 NOTE — SWALLOW BEDSIDE ASSESSMENT ADULT - SWALLOW EVAL: RECOMMENDED DIET
continue Dysphagia 1 puree consistency w/ nectar thick liquids
Dysphagia Diet III Mechanical soft consistency with cut up meats thin liquids.

## 2020-06-26 NOTE — SWALLOW BEDSIDE ASSESSMENT ADULT - SWALLOW EVAL: ORAL MUSCULATURE
unable to assess due to poor participation/comprehension/anomalies present
unable to assess due to poor participation/comprehension
anomalies present/unable to assess due to poor participation/comprehension

## 2020-06-26 NOTE — PROGRESS NOTE ADULT - SUBJECTIVE AND OBJECTIVE BOX
CHAPARRO ROBISON  64y  Male      Patient is a 64y old  Male who presents with a chief complaint of weakness (24 Jun 2020 09:32)      INTERVAL HPI/OVERNIGHT EVENTS:  Patient seen and examined earlier this morning. Patient reports that he wants to go home; he was explained that currently we are pending a safe discharge plan for him. He denies any symptoms currently; patient is AAOX3. He denies F/C, CP, palpitations, SOB, N/V, hallucinations, headache, new focal weakness, confusion. No acute events overnight.       REVIEW OF SYSTEMS:  CONSTITUTIONAL: No fever, weight loss, +weakness  EYES: No eye pain, visual disturbances, or discharge  ENMT:  No difficulty hearing, tinnitus, vertigo; No sinus or throat pain  NECK: No pain or stiffness  RESPIRATORY: No cough, wheezing, chills or hemoptysis; No shortness of breath  CARDIOVASCULAR: No chest pain, palpitations, dizziness, or leg swelling  GASTROINTESTINAL: No abdominal or epigastric pain. No nausea, vomiting, or hematemesis; No diarrhea or constipation. No melena or hematochezia.  GENITOURINARY: No dysuria, frequency, hematuria, or incontinence  NEUROLOGICAL: No headaches, memory loss, loss of strength, numbness, or tremors, no confusion  MUSCULOSKELETAL: No joint pain or swelling; No muscle, back, or extremity pain +weakness in lower extremity  PSYCHIATRIC: No depression, anxiety, mood swings, or difficulty sleeping, no hallucination    Vital Signs Last 24 Hrs  T(C): 36.2 (26 Jun 2020 05:18), Max: 36.2 (25 Jun 2020 13:00)  T(F): 97.2 (26 Jun 2020 05:18), Max: 97.2 (25 Jun 2020 13:00)  HR: 65 (26 Jun 2020 05:18) (59 - 71)  BP: 194/93 (26 Jun 2020 05:18) (151/74 - 194/93)  BP(mean): --  RR: 18 (26 Jun 2020 05:18) (18 - 18)  SpO2: --    PHYSICAL EXAM:  GENERAL: NAD, well-developed, non-diaphoretic   HEAD:  Atraumatic, Normocephalic  EYES: EOMI, PERRLA, conjunctiva and sclera clear  ENMT: No tonsillar erythema, exudates, or enlargement; Moist mucous membranes, Good dentition, No lesions  NECK: Supple, No JVD, Normal thyroid  NERVOUS SYSTEM:  Alert & Oriented X3, Good concentration; DTRs 2+ intact and symmetric, no tremors  CHEST/LUNG: Clear to percussion bilaterally; No rales, rhonchi, wheezing, or rubs  HEART: Regular rate and rhythm; No murmurs, rubs, or gallops  ABDOMEN: Soft, Nontender, Nondistended; Bowel sounds present  EXTREMITIES:  2+ Peripheral Pulses, No clubbing, cyanosis, or edema  PSYCH: cooperative      Consultant(s) Notes Reviewed:  [x ] YES  [ ] NO  Care Discussed with Consultants/Other Providers [ x] YES  [ ] NO                          14.2   8.02  )-----------( 143      ( 25 Jun 2020 09:20 )             42.9   06-25    139  |  102  |  11  ----------------------------<  137<H>  4.2   |  27  |  1.0    Ca    9.4      25 Jun 2020 09:20    TPro  6.7  /  Alb  4.1  /  TBili  0.3  /  DBili  x   /  AST  52<H>  /  ALT  75<H>  /  AlkPhos  69  06-25      Drug Dosing Weight  Height (cm): 172.7 (20 Jun 2020 20:41)  Weight (kg): 80.7 (20 Jun 2020 20:41)  BMI (kg/m2): 27.1 (20 Jun 2020 20:41)  BSA (m2): 1.94 (20 Jun 2020 20:41)    CAPILLARY BLOOD GLUCOSE    I&O's Summary        RADIOLOGY & ADDITIONAL TESTS:  Imaging Personally Reviewed:  [x] YES  [ ] NO    HEALTH ISSUES - PROBLEM Dx:  Dementia associated with alcoholism: Dementia associated with alcoholism      MEDICATIONS  (STANDING):  apixaban 5 milliGRAM(s) Oral every 12 hours  aspirin enteric coated 81 milliGRAM(s) Oral daily  BACItracin   Ointment 1 Application(s) Topical two times a day  folic acid 1 milliGRAM(s) Oral daily  metoprolol tartrate 25 milliGRAM(s) Oral two times a day  multivitamin/minerals 1 Tablet(s) Oral daily  nicotine - 21 mG/24Hr(s) Patch 1 Patch Transdermal daily  thiamine 100 milliGRAM(s) Oral daily    MEDICATIONS  (PRN):  acetaminophen   Tablet .. 650 milliGRAM(s) Oral every 6 hours PRN Temp greater or equal to 38C (100.4F), Mild Pain (1 - 3)  hydrALAZINE 25 milliGRAM(s) Oral every 4 hours PRN SBP > 160  LORazepam   Injectable 0.5 milliGRAM(s) IV Push every 4 hours PRN Agitation  methocarbamol 500 milliGRAM(s) Oral every 8 hours PRN Muscle Spasm  ondansetron Injectable 4 milliGRAM(s) IV Push every 6 hours PRN Nausea  senna 2 Tablet(s) Oral at bedtime PRN Constipation

## 2020-06-26 NOTE — SWALLOW BEDSIDE ASSESSMENT ADULT - ASR SWALLOW ASPIRATION MONITOR
fever/throat clearing/change of breathing pattern/gurgly voice/upper respiratory infection/cough/pneumonia
gurgly voice/upper respiratory infection/oral hygiene/fever/pneumonia/throat clearing/change of breathing pattern/position upright (90Y)/cough

## 2020-06-26 NOTE — SWALLOW BEDSIDE ASSESSMENT ADULT - COMMENTS
in bed 1:1 sit for safety/.
Pt was placed on Dysphagia 1 puree consistency w/ nectar thick liquids by MD on 6/21 & RN reports +toleration this AM; MD changed to NPO later this AM & placed reconsult for SLP assessment.
as per discussion

## 2020-06-26 NOTE — SWALLOW BEDSIDE ASSESSMENT ADULT - SLP GENERAL OBSERVATIONS
in bed repositionedby SLP and PCA
initially asleep, arousable but lethargic, low vocal intensity, opening mouth to anticipate PO, 1:1 sit at bedside
in room restless and agitated wants to leave. 1:1 sit for safety

## 2020-06-26 NOTE — SWALLOW BEDSIDE ASSESSMENT ADULT - SWALLOW EVAL: DIAGNOSIS
tolerating puree consistency & nectar thick liquids w/ mild-mod oral impairment
mild oral dysphagia no signs of pharyngeal impairment.
patient is currently paranoid and saying SLP is attempting to poison him

## 2020-06-26 NOTE — SWALLOW BEDSIDE ASSESSMENT ADULT - SLP PERTINENT HISTORY OF CURRENT PROBLEM
admit with ETOH withdrawl, was thought to be missing, but brought in by family. Patient was previously here s/p fall & ETOH withdrawl and signed out AMA:
admit with AMS - ETOH withdrawal
admit with ETOH withdrawl. was thought to be missing, but brought in by family. Patient was previously here and signed out AMA:

## 2020-06-26 NOTE — SWALLOW BEDSIDE ASSESSMENT ADULT - NS SPL SWALLOW CLINIC TRIAL FT
no trials accepted due to fear of poison
tolerating tested puree & nectar thick liquids w/ mild-mod oral impairment, chewables & thin liquids not tested at this time 2' lethargy & slow oral transit
+ toleration observed without overt symptoms of penetration/aspiration

## 2020-06-26 NOTE — SWALLOW BEDSIDE ASSESSMENT ADULT - PHARYNGEAL PHASE
initially required some cues to swallow, no overt symptoms of penetration/aspiration noted at bedside
Within functional limits

## 2020-06-26 NOTE — PROGRESS NOTE ADULT - ASSESSMENT
63 y/o M with PMHx of ETOH dependence, CHF with Preserved EF, chronic back pain, HLD, HTN, PE on Eliquis, TIA, multiple admissions to hosptital for EtOH intoxication and debility was brought in to ED by family who believed he was missing but pt states he was out shopping in [place]. Pt had left AMA on 6/17 after being admitted for fall and ETOH withdrawal on 6/15. Pt reports last drink on Thursday 6/18.     #AMS in the setting of EtOH intoxication  #Alcohol intoxication with concern for alcohol withdrawal  #Suspected Thiamine and folic acid deficiency in the setting of alcohol abuse  #Multiple hospitalization for EtOH intoxication, debility; concern for unsafe environment at home  -multiple hospital admissions in 2020 for alcohol withdrawl; (03/2020 alcohol withdrawal complicated by delirium tremens)  -MRI brain:Equivocal punctiform infarct in the left subinsular region; Otherwise, no evidence for acute abnormality; Severe microvascular ischemic change; Widespread foci of microhemorrhage. These are usually clinically silent. Can be seen with underlying angiopathy (i.e. hypertension)  -patient wants to leave AMA    PLAN  -Psych on board; follow-up appreciated; patient has impaired ability to perceive immediate risks and associated complications of repeated falls, refusing medical w/u, tx, and treatment recommendations; also has impaired ability for self care  -spoke to CM/SW; patient likely needs guardianship  -Neurology consult appreciated; no further neuro w/u  -speech therapy evaluation appreciated  -finished the librium protocol; Detox consult appreciated  -Ativan PRN for severe withdrawal/ agitation  -Thiamine and folic acid  -Alcohol cessation counseling was provided to him at bedside    #PVC  -EKG shows sinus rhythm with PVC as per cardiology.  no evidence of A. fib  -Continue with metoprolol for rate control  -Patient is on eliquis for PE    #weakness in lower extremity 2/2 debility  -Rehab consulted  -social work consult  -physical therapy eval    #HTN/ HTN urgency  -BP stable now  -c/w home medications  -monitor vitals  -Add hydralazine PRN    #Hx of Chronic PE  -Continue with Eliquis     #Chronic diastolic CHF- no in acute exacerbation  -No signs of fluid overload on exam    DVT PPX, on Eliquis    Progress Note Handoff  Pending Consults: None  Pending Tests: None  Pending Results: None  Family Discussion: Discussed with patient; spoke to Jeanne ([phone], [phone]) (next of kin) to encourage him to go to rehab  Disposition: Home_____/SNF______/Other_____/Unknown at this time___X__

## 2020-06-27 PROCEDURE — 99232 SBSQ HOSP IP/OBS MODERATE 35: CPT

## 2020-06-27 RX ADMIN — Medication 1 TABLET(S): at 11:58

## 2020-06-27 RX ADMIN — Medication 1 APPLICATION(S): at 17:05

## 2020-06-27 RX ADMIN — Medication 1 APPLICATION(S): at 05:54

## 2020-06-27 RX ADMIN — Medication 100 MILLIGRAM(S): at 11:58

## 2020-06-27 RX ADMIN — Medication 81 MILLIGRAM(S): at 11:58

## 2020-06-27 RX ADMIN — Medication 1 PATCH: at 11:57

## 2020-06-27 RX ADMIN — Medication 1 MILLIGRAM(S): at 11:58

## 2020-06-27 RX ADMIN — APIXABAN 5 MILLIGRAM(S): 2.5 TABLET, FILM COATED ORAL at 05:54

## 2020-06-27 RX ADMIN — Medication 25 MILLIGRAM(S): at 05:54

## 2020-06-27 RX ADMIN — Medication 1 PATCH: at 07:54

## 2020-06-27 RX ADMIN — Medication 650 MILLIGRAM(S): at 17:05

## 2020-06-27 RX ADMIN — APIXABAN 5 MILLIGRAM(S): 2.5 TABLET, FILM COATED ORAL at 17:05

## 2020-06-27 RX ADMIN — Medication 25 MILLIGRAM(S): at 17:05

## 2020-06-27 NOTE — PROGRESS NOTE ADULT - ASSESSMENT
65 y/o M with PMHx of ETOH dependence, CHF with Preserved EF, chronic back pain, HLD, HTN, PE on Eliquis, TIA, multiple admissions to hosptital for EtOH intoxication and debility was brought in to ED by family who believed he was missing but pt states he was out shopping in [place]. Pt had left AMA on 6/17 after being admitted for fall and ETOH withdrawal on 6/15. Pt reports last drink on Thursday 6/18.     #AMS in the setting of EtOH intoxication  #Alcohol intoxication with concern for alcohol withdrawal  #Suspected Thiamine and folic acid deficiency in the setting of alcohol abuse  #Multiple hospitalization for EtOH intoxication, debility; concern for unsafe environment at home  -multiple hospital admissions in 2020 for alcohol withdrawl; (03/2020 alcohol withdrawal complicated by delirium tremens)  -MRI brain:Equivocal punctiform infarct in the left subinsular region; Otherwise, no evidence for acute abnormality; Severe microvascular ischemic change; Widespread foci of microhemorrhage. These are usually clinically silent. Can be seen with underlying angiopathy (i.e. hypertension)  -patient wants to leave AMA    PLAN  -Psych on board; follow-up appreciated; patient has impaired ability to perceive immediate risks and associated complications of repeated falls, refusing medical w/u, tx, and treatment recommendations; also has impaired ability for self care  -spoke to CM/SW; patient likely needs guardianship  -Neurology consult appreciated; no further neuro w/u  -finished the librium protocol; Detox consult appreciated  -Ativan PRN for severe withdrawal/ agitation  -Thiamine and folic acid  -Alcohol cessation counseling was provided to him at bedside    #PVC- resolved  -EKG shows sinus rhythm with PVC as per cardiology.  no evidence of A. fib  -Continue with metoprolol for rate control    #weakness in lower extremity 2/2 debility  -Rehab consult appreciated  -social work on board  -physical therapy on board    #HTN/ HTN urgency  -BP stable now  -c/w home medications  -monitor vitals  -Add hydralazine PRN    #Hx of Chronic PE  -Continue with Eliquis     #Chronic diastolic CHF- no in acute exacerbation  -No signs of fluid overload on exam    DVT PPX, on Eliquis    Progress Note Handoff  Pending Consults: None  Pending Tests: None  Pending Results: None  Family Discussion: Discussed with patient; spoke to Jeanne ([phone], [phone]) (next of kin) to encourage him to go to rehab  Disposition: Home_____/SNF______/Other_____/Unknown at this time___X__

## 2020-06-27 NOTE — PROGRESS NOTE ADULT - SUBJECTIVE AND OBJECTIVE BOX
CHAPARRO ROBISON  64y  Male      Patient is a 64y old  Male who presents with a chief complaint of weakness (24 Jun 2020 09:32)      INTERVAL HPI/OVERNIGHT EVENTS:  Patient seen and examined earlier this morning. Patient appears comfortable and resting; no acute events overnight. was explained to the patient that currently we are pending a safe discharge plan for him. He denies any symptoms currently; patient is AAOX3. He denies F/C, CP, palpitations, SOB, N/V, hallucinations, headache, new focal weakness, confusion.       REVIEW OF SYSTEMS:  CONSTITUTIONAL: No fever, weight loss, +weakness  EYES: No eye pain, visual disturbances, or discharge  ENMT:  No difficulty hearing, tinnitus, vertigo; No sinus or throat pain  RESPIRATORY: No cough, wheezing, chills or hemoptysis; No shortness of breath  CARDIOVASCULAR: No chest pain, palpitations, dizziness, or leg swelling  GASTROINTESTINAL: No abdominal or epigastric pain. No nausea, vomiting, or hematemesis; No diarrhea or constipation. No melena or hematochezia.  GENITOURINARY: No dysuria, frequency, hematuria, or incontinence  NEUROLOGICAL: No headaches, memory loss, loss of strength, numbness, or tremors, no confusion  MUSCULOSKELETAL: No joint pain or swelling; No muscle, back, or extremity pain +weakness in lower extremity  PSYCHIATRIC: No depression, anxiety, mood swings, or difficulty sleeping, no hallucination    Vital Signs Last 24 Hrs  T(C): 35.8 (27 Jun 2020 05:22), Max: 36.9 (26 Jun 2020 14:25)  T(F): 96.4 (27 Jun 2020 05:22), Max: 98.5 (26 Jun 2020 14:25)  HR: 56 (27 Jun 2020 05:22) (56 - 87)  BP: 191/64 (27 Jun 2020 05:22) (110/60 - 191/64)  BP(mean): --  RR: 18 (27 Jun 2020 05:22) (18 - 18)  SpO2: --    PHYSICAL EXAM:  GENERAL: NAD, well-developed, non-diaphoretic   HEAD:  Atraumatic, Normocephalic  EYES: EOMI, PERRLA, conjunctiva and sclera clear  ENMT: No tonsillar erythema, exudates, or enlargement; Moist mucous membranes, Good dentition, No lesions  NECK: Supple, No JVD, Normal thyroid  NERVOUS SYSTEM:  Alert & Oriented X3, Good concentration; DTRs 2+ intact and symmetric, no tremors  CHEST/LUNG: Clear to percussion bilaterally; No rales, rhonchi, wheezing, or rubs  HEART: Regular rate and rhythm; No murmurs, rubs, or gallops  ABDOMEN: Soft, Nontender, Nondistended; Bowel sounds present  EXTREMITIES:  2+ Peripheral Pulses, No clubbing, cyanosis, or edema  PSYCH: cooperative      Consultant(s) Notes Reviewed:  [x ] YES  [ ] NO  Care Discussed with Consultants/Other Providers [ x] YES  [ ] NO           Drug Dosing Weight  Height (cm): 172.7 (20 Jun 2020 20:41)  Weight (kg): 80.7 (20 Jun 2020 20:41)  BMI (kg/m2): 27.1 (20 Jun 2020 20:41)  BSA (m2): 1.94 (20 Jun 2020 20:41)      I&O's Summary        RADIOLOGY & ADDITIONAL TESTS:  Imaging Personally Reviewed:  [x] YES  [ ] NO    HEALTH ISSUES - PROBLEM Dx:  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 650 milliGRAM(s) Oral every 6 hours PRN Temp greater or equal to 38C (100.4F), Mild Pain (1 - 3)  hydrALAZINE 25 milliGRAM(s) Oral every 4 hours PRN SBP > 160  LORazepam   Injectable 0.5 milliGRAM(s) IV Push every 4 hours PRN Agitation  methocarbamol 500 milliGRAM(s) Oral every 8 hours PRN Muscle Spasm  ondansetron Injectable 4 milliGRAM(s) IV Push every 6 hours PRN Nausea  senna 2 Tablet(s) Oral at bedtime PRN Constipation

## 2020-06-28 PROCEDURE — 99232 SBSQ HOSP IP/OBS MODERATE 35: CPT

## 2020-06-28 RX ADMIN — Medication 81 MILLIGRAM(S): at 11:43

## 2020-06-28 RX ADMIN — Medication 650 MILLIGRAM(S): at 02:39

## 2020-06-28 RX ADMIN — Medication 1 PATCH: at 19:00

## 2020-06-28 RX ADMIN — Medication 25 MILLIGRAM(S): at 17:14

## 2020-06-28 RX ADMIN — Medication 1 APPLICATION(S): at 17:14

## 2020-06-28 RX ADMIN — Medication 100 MILLIGRAM(S): at 11:43

## 2020-06-28 RX ADMIN — APIXABAN 5 MILLIGRAM(S): 2.5 TABLET, FILM COATED ORAL at 05:28

## 2020-06-28 RX ADMIN — Medication 1 TABLET(S): at 11:43

## 2020-06-28 RX ADMIN — APIXABAN 5 MILLIGRAM(S): 2.5 TABLET, FILM COATED ORAL at 17:14

## 2020-06-28 RX ADMIN — Medication 650 MILLIGRAM(S): at 18:03

## 2020-06-28 RX ADMIN — Medication 1 PATCH: at 11:43

## 2020-06-28 RX ADMIN — Medication 1 APPLICATION(S): at 05:28

## 2020-06-28 RX ADMIN — Medication 1 MILLIGRAM(S): at 11:43

## 2020-06-28 RX ADMIN — Medication 25 MILLIGRAM(S): at 05:28

## 2020-06-28 RX ADMIN — Medication 1 PATCH: at 17:15

## 2020-06-28 NOTE — PROGRESS NOTE ADULT - SUBJECTIVE AND OBJECTIVE BOX
CHAPARRO ROBISON  64y  Male      Patient is a 64y old  Male who presents with a chief complaint of weakness (24 Jun 2020 09:32)      INTERVAL HPI/OVERNIGHT EVENTS:  Patient seen and examined earlier this morning. Patient appears comfortable and resting; no acute events overnight. He reports that he wants to go home now; it was explained to him that we are pending a safe discharge for him.. He denies F/C, CP, palpitations, SOB, N/V, hallucinations, headache, new focal weakness, confusion.       REVIEW OF SYSTEMS:  CONSTITUTIONAL: No fever, weight loss, +weakness  EYES: No eye pain, visual disturbances, or discharge  ENMT:  No difficulty hearing, tinnitus, vertigo; No sinus or throat pain  RESPIRATORY: No cough, wheezing, chills or hemoptysis; No shortness of breath  CARDIOVASCULAR: No chest pain, palpitations, dizziness, or leg swelling  GASTROINTESTINAL: No abdominal or epigastric pain. No nausea, vomiting, or hematemesis; No diarrhea or constipation. No melena or hematochezia.  GENITOURINARY: No dysuria, frequency, hematuria, or incontinence  NEUROLOGICAL: No headaches, memory loss, loss of strength, numbness, or tremors, no confusion  MUSCULOSKELETAL: No joint pain or swelling; No muscle, back, or extremity pain +weakness in lower extremity  PSYCHIATRIC: No depression, anxiety, mood swings, or difficulty sleeping, no hallucination    Vital Signs Last 24 Hrs  T(C): 35.8 (28 Jun 2020 05:27), Max: 36.4 (27 Jun 2020 21:10)  T(F): 96.5 (28 Jun 2020 05:27), Max: 97.6 (27 Jun 2020 21:10)  HR: 61 (28 Jun 2020 05:27) (55 - 62)  BP: 193/91 (28 Jun 2020 05:27) (139/82 - 193/91)  BP(mean): --  RR: 18 (28 Jun 2020 05:27) (18 - 18)  SpO2: 99% (28 Jun 2020 05:27) (99% - 99%)    PHYSICAL EXAM:  GENERAL: NAD, well-developed, non-diaphoretic   HEAD:  Atraumatic, Normocephalic  EYES: EOMI, PERRLA, conjunctiva and sclera clear  ENMT: No tonsillar erythema, exudates, or enlargement; Moist mucous membranes, Good dentition, No lesions  NECK: Supple, No JVD, Normal thyroid  NERVOUS SYSTEM:  Alert & Oriented X3, Good concentration; DTRs 2+ intact and symmetric, no tremors  CHEST/LUNG: Clear to percussion bilaterally; No rales, rhonchi, wheezing, or rubs  HEART: Regular rate and rhythm; No murmurs, rubs, or gallops  ABDOMEN: Soft, Nontender, Nondistended; Bowel sounds present  EXTREMITIES:  2+ Peripheral Pulses, No clubbing, cyanosis, or edema  PSYCH: cooperative      Consultant(s) Notes Reviewed:  [x ] YES  [ ] NO  Care Discussed with Consultants/Other Providers [ x] YES  [ ] NO           Drug Dosing Weight  Height (cm): 172.7 (20 Jun 2020 20:41)  Weight (kg): 80.7 (20 Jun 2020 20:41)  BMI (kg/m2): 27.1 (20 Jun 2020 20:41)  BSA (m2): 1.94 (20 Jun 2020 20:41)      I&O's Summary        RADIOLOGY & ADDITIONAL TESTS:  Imaging Personally Reviewed:  [x] YES  [ ] NO    HEALTH ISSUES - PROBLEM Dx:  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 650 milliGRAM(s) Oral every 6 hours PRN Temp greater or equal to 38C (100.4F), Mild Pain (1 - 3)  hydrALAZINE 25 milliGRAM(s) Oral every 4 hours PRN SBP > 160  LORazepam   Injectable 0.5 milliGRAM(s) IV Push every 4 hours PRN Agitation  methocarbamol 500 milliGRAM(s) Oral every 8 hours PRN Muscle Spasm  ondansetron Injectable 4 milliGRAM(s) IV Push every 6 hours PRN Nausea  senna 2 Tablet(s) Oral at bedtime PRN Constipation

## 2020-06-28 NOTE — PROGRESS NOTE ADULT - ASSESSMENT
63 y/o M with PMHx of ETOH dependence, CHF with Preserved EF, chronic back pain, HLD, HTN, PE on Eliquis, TIA, multiple admissions to hosptital for EtOH intoxication and debility was brought in to ED by family who believed he was missing but pt states he was out shopping in [place]. Pt had left AMA on 6/17 after being admitted for fall and ETOH withdrawal on 6/15. Pt reports last drink on Thursday 6/18.     #AMS in the setting of EtOH intoxication  #Alcohol intoxication with concern for alcohol withdrawal  #Suspected Thiamine and folic acid deficiency in the setting of alcohol abuse  #Multiple hospitalization for EtOH intoxication, debility; concern for unsafe environment at home  -multiple hospital admissions in 2020 for alcohol withdrawl; (03/2020 alcohol withdrawal complicated by delirium tremens)  -MRI brain:Equivocal punctiform infarct in the left subinsular region; Otherwise, no evidence for acute abnormality; Severe microvascular ischemic change; Widespread foci of microhemorrhage. These are usually clinically silent. Can be seen with underlying angiopathy (i.e. hypertension)  -patient wants to leave AMA    -patient's neighbor wrote a letter recommending that patient's home is not a safe place for him; letter placed in the chart  PLAN  -Psych on board; follow-up appreciated; patient has impaired ability to perceive immediate risks and associated complications of repeated falls, refusing medical w/u, tx, and treatment recommendations; also has impaired ability for self care  -spoke to CM/SW; patient likely needs guardianship  -Neurology consult appreciated; no further neuro w/u  -finished the librium protocol; Detox consult appreciated  -Ativan PRN for severe withdrawal/ agitation  -Thiamine and folic acid  -Alcohol cessation counseling was provided to him at bedside    #PVC- resolved  -EKG shows sinus rhythm with PVC as per cardiology.  no evidence of A. fib  -Continue with metoprolol for rate control    #weakness in lower extremity 2/2 debility- improving  -Rehab consult appreciated  -social work on board  -physical therapy on board    #HTN/ HTN urgency- resolved  -BP stable now  -c/w home medications  -monitor vitals  -Add hydralazine PRN    #Hx of Chronic PE  -Continue with Eliquis     #Chronic diastolic CHF- no in acute exacerbation  -No signs of fluid overload on exam    DVT PPX, on Eliquis    Progress Note Handoff  Pending Consults: None  Pending Tests: None  Pending Results: None  Family Discussion: Discussed with patient; Oldest sister: Jeanne ([phone], [phone]) (next of kin)  Disposition: Home_____/SNF______/Other_____/Unknown at this time___X__

## 2020-06-29 ENCOUNTER — TRANSCRIPTION ENCOUNTER (OUTPATIENT)
Age: 65
End: 2020-06-29

## 2020-06-29 LAB — SARS-COV-2 RNA SPEC QL NAA+PROBE: SIGNIFICANT CHANGE UP

## 2020-06-29 PROCEDURE — 99232 SBSQ HOSP IP/OBS MODERATE 35: CPT

## 2020-06-29 RX ORDER — ACETAMINOPHEN 500 MG
2 TABLET ORAL
Qty: 0 | Refills: 0 | DISCHARGE
Start: 2020-06-29

## 2020-06-29 RX ORDER — METHOCARBAMOL 500 MG/1
1 TABLET, FILM COATED ORAL
Qty: 0 | Refills: 0 | DISCHARGE
Start: 2020-06-29

## 2020-06-29 RX ORDER — MULTIVIT-MIN/FERROUS GLUCONATE 9 MG/15 ML
1 LIQUID (ML) ORAL
Qty: 0 | Refills: 0 | DISCHARGE
Start: 2020-06-29

## 2020-06-29 RX ORDER — NICOTINE POLACRILEX 2 MG
21 GUM BUCCAL
Qty: 0 | Refills: 0 | DISCHARGE
Start: 2020-06-29

## 2020-06-29 RX ORDER — SENNA PLUS 8.6 MG/1
2 TABLET ORAL
Qty: 0 | Refills: 0 | DISCHARGE
Start: 2020-06-29

## 2020-06-29 RX ADMIN — APIXABAN 5 MILLIGRAM(S): 2.5 TABLET, FILM COATED ORAL at 17:16

## 2020-06-29 RX ADMIN — Medication 100 MILLIGRAM(S): at 10:13

## 2020-06-29 RX ADMIN — Medication 1 MILLIGRAM(S): at 10:13

## 2020-06-29 RX ADMIN — Medication 1 TABLET(S): at 10:13

## 2020-06-29 RX ADMIN — Medication 1 PATCH: at 10:22

## 2020-06-29 RX ADMIN — Medication 81 MILLIGRAM(S): at 10:12

## 2020-06-29 RX ADMIN — Medication 1 PATCH: at 10:14

## 2020-06-29 RX ADMIN — Medication 25 MILLIGRAM(S): at 05:31

## 2020-06-29 RX ADMIN — Medication 25 MILLIGRAM(S): at 17:16

## 2020-06-29 RX ADMIN — Medication 1 PATCH: at 21:08

## 2020-06-29 RX ADMIN — APIXABAN 5 MILLIGRAM(S): 2.5 TABLET, FILM COATED ORAL at 05:31

## 2020-06-29 RX ADMIN — Medication 1 APPLICATION(S): at 17:16

## 2020-06-29 RX ADMIN — Medication 1 APPLICATION(S): at 05:33

## 2020-06-29 NOTE — PROGRESS NOTE ADULT - ASSESSMENT
65 y/o M with PMHx of ETOH dependence, CHF with Preserved EF, chronic back pain, HLD, HTN, PE on Eliquis, TIA, multiple admissions to hosptital for EtOH intoxication and debility was brought in to ED by family who believed he was missing but pt states he was out shopping in [place]. Pt had left AMA on 6/17 after being admitted for fall and ETOH withdrawal on 6/15. Pt reports last drink on Thursday 6/18.     #AMS in the setting of EtOH intoxication  #Alcohol intoxication with concern for alcohol withdrawal  #Suspected Thiamine and folic acid deficiency in the setting of alcohol abuse  #Multiple hospitalization for EtOH intoxication, debility; concern for unsafe environment at home  -multiple hospital admissions in 2020 for alcohol withdrawl; (03/2020 alcohol withdrawal complicated by delirium tremens)  -MRI brain:Equivocal punctiform infarct in the left subinsular region; Otherwise, no evidence for acute abnormality; Severe microvascular ischemic change; Widespread foci of microhemorrhage. These are usually clinically silent. Can be seen with underlying angiopathy (i.e. hypertension)  -patient wants to leave AMA    -patient's neighbor wrote a letter recommending that patient's home is not a safe place for him; letter placed in the chart  PLAN  -Psych on board; follow-up appreciated; patient has impaired ability to perceive immediate risks and associated complications of repeated falls, refusing medical w/u, tx, and treatment recommendations; also has impaired ability for self care  -spoke to CM/SW; patient likely needs guardianship; will f/u with SW  -Neurology consult appreciated; no further neuro w/u  -finished the librium protocol; Detox consult appreciated  -Ativan PRN for severe withdrawal/ agitation  -Thiamine and folic acid  -Alcohol cessation counseling was provided to him at bedside    #PVC- resolved  -EKG shows sinus rhythm with PVC as per cardiology.  no evidence of A. fib  -Continue with metoprolol for rate control    #weakness in lower extremity 2/2 debility- improving  -Rehab consult appreciated  -social work on board  -physical therapy on board    #HTN/ HTN urgency- resolved  -BP stable now  -c/w home medications  -monitor vitals  -Add hydralazine PRN    #Hx of Chronic PE  -Continue with Eliquis     #Chronic diastolic CHF- no in acute exacerbation  -No signs of fluid overload on exam    DVT PPX, on Eliquis    Progress Note Handoff  Pending Consults: None  Pending Tests: None  Pending Results: None  Family Discussion: Discussed with patient; Oldest sister: Jeanne ([phone], [phone]) (next of kin)  Disposition: Home_____/SNF______/Other_____/Unknown at this time___X__

## 2020-06-29 NOTE — DISCHARGE NOTE PROVIDER - NSDCMRMEDTOKEN_GEN_ALL_CORE_FT
acetaminophen 325 mg oral tablet: 2 tab(s) orally every 6 hours, As needed, Temp greater or equal to 38C (100.4F), Mild Pain (1 - 3)  aspirin 81 mg oral tablet: 1 tab(s) orally once a day  Eliquis 5 mg oral tablet: 1 tab(s) orally 2 times a day  Folacin-800 oral tablet: 1 tab(s) orally once a day MDD:1 tab  losartan 50 mg oral tablet: 1 tab(s) orally once a day  methocarbamol 500 mg oral tablet: 1 tab(s) orally every 8 hours, As needed, Muscle Spasm  Metoprolol Tartrate 25 mg oral tablet: 1 tab(s) orally 2 times a day  Multiple Vitamins with Minerals oral tablet: 1 tab(s) orally once a day  nicotine 21 mg/24 hr transdermal film, extended release: 21 mg/24h transdermal  senna oral tablet: 2 tab(s) orally once a day (at bedtime), As needed, Constipation  thiamine 50 mg oral tablet: 1 tab(s) orally once a day MDD:1 tab

## 2020-06-29 NOTE — CHART NOTE - NSCHARTNOTEFT_GEN_A_CORE
Registered Dietitian Follow-Up     Patient Profile Reviewed                           Yes [x]   No []     Nutrition History Previously Obtained        Yes [x]  No []       Pertinent Subjective Information: Pt diet upgraded per SLP (see below) and thus po intake/appetite have improved. Po intake varies % since previously assessed. When pt was on puree diet, he was not eating at all, at this was not his baseline diet. No nausea/abdominal pain with meals.      Pertinent Medical Interventions: Per / pt needs guardianship. Per neurology, there is no need for further neurological workup. Weakness in LE 2/2 debility is improving, working with PT and rehab.      Diet order: DASH/TLC     Anthropometrics:  - Ht. 68"  - Wt. dosing 80.7 kg/178 lbs vs (6/23) 84.2 kg/186 lbs  - %wt change downtrending wts from previous admissions noted per initial RD assessment (6/25)  - BMI 27.1 using dosing/lowest wt this admission  - IBW      Pertinent Lab Data:     Pertinent Meds:     Physical Findings:  - Appearance:  - GI function:  - Tubes:  - Oral/Mouth cavity:  - Skin:     Nutrition Requirements  Weight Used:     Estimated Energy Needs    Continue []  Adjust []  Adjusted Energy Recommendations:   kcal/day        Estimated Protein Needs    Continue []  Adjust []  Adjusted Protein Recommendations:   gm/day        Estimated Fluid Needs        Continue []  Adjust []  Adjusted Fluid Recommendations:   mL/day     Nutrient Intake:        [] Previous Nutrition Diagnosis:            [] Ongoing          [] Resolved    [] No active nutrition diagnosis identified at this time     Nutrition Diagnostic #1  Problem:  Etiology:  Statement:     Nutrition Diagnostic #2  Problem:  Etiology:  Statement:     Nutrition Intervention      Goal/Expected Outcome:     Indicator/Monitoring:    Recommendation: Registered Dietitian Follow-Up     Patient Profile Reviewed                           Yes [x]   No []     Nutrition History Previously Obtained        Yes [x]  No []       Pertinent Subjective Information: Pt diet upgraded per SLP (see below) and thus po intake/appetite have improved. Po intake varies % since previously assessed. When pt was on puree diet, he was not eating at all, at this was not his baseline diet. No nausea/abdominal pain with meals.      Pertinent Medical Interventions: Per / pt needs guardianship. Per neurology, there is no need for further neurological workup. Weakness in LE 2/2 debility is improving, working with PT and rehab.      Diet order: DASH/TLC     Anthropometrics:  - Ht. 68"  - Wt. dosing 80.7 kg/178 lbs vs (6/23) 84.2 kg/186 lbs  - %wt change downtrending wts from previous admissions noted per initial RD assessment (6/25)  - BMI 27.1 using dosing/lowest wt this admission  - IBW 70 kg/154 lbs     Pertinent Lab Data: (6/25) RBC 4.57, glucose 137     Pertinent Meds: eliquis, robaxin, folic acid, apresoline, lopressor, MVI/minerals, zofran, senna, thiamine     Physical Findings:  - Appearance: overweight; (6/23) +1 edema (L/R ankles)  - GI function: no GI s/s, unsure last BM. Per EMR 6/21-- likely inaccurate. Pt is on a bowel regimen  - Tubes: NA   - Oral/Mouth cavity: per SLP on 6/26 "mild oral dysphagia no signs of pharyngeal impairment." recommended dysphagia 3 with thin liquids. Per current diet order, pt is not on any dysphagia restrictions.  - Skin: R hand skin tear     Nutrition Requirements  Weight Used: dosing  80.7 kg/178 lbs (continued from initial assessment on 6/25)     Estimated Energy Needs    Continue [x]  6798-8165 (MSJ x 1.2-1.4 AF) PCM considered     Estimated Protein Needs    Continue [x]   g (1.2-1.4 g/kg) same as above     Estimated Fluid Needs        Continue [x]  1mL/kcal or per LIP     Nutrient Intake: >50% (fair)        [x] Previous Nutrition Diagnosis: Severe PCM in the context of acute illness            [x] Ongoing               Nutrition Intervention meals and snacks, medical food supplements, coordination of care     Goal/Expected Outcome: pt to maintain po intake >75% over the next 3 days     Indicator/Monitoring: RD to monitor diet order, energy intake, body composition, NFPF, glucose profile    Recommendation: diet order per SLP, add ensure enlive BID, encourage po intake, provide assistance with meals PRN Registered Dietitian Follow-Up     Patient Profile Reviewed                           Yes [x]   No []     Nutrition History Previously Obtained        Yes [x]  No []       Pertinent Subjective Information: Pt diet upgraded per SLP (see below) and thus po intake/appetite have improved. Po intake varies % since previously assessed. When pt was on puree diet, he was not eating at all, at this was not his baseline diet. No nausea/abdominal pain with meals.      Pertinent Medical Interventions: Per / pt needs guardianship. Per neurology, there is no need for further neurological workup. Weakness in LE 2/2 debility is improving, working with PT and rehab.      Diet order: DASH/TLC     Anthropometrics:  - Ht. 68"  - Wt. dosing 80.7 kg/178 lbs vs (6/23) 84.2 kg/186 lbs  - %wt change downtrending wts from previous admissions noted per initial RD assessment (6/25)  - BMI 27.1 using dosing/lowest wt this admission  - IBW 70 kg/154 lbs     Pertinent Lab Data: (6/25) RBC 4.57, glucose 137     Pertinent Meds: eliquis, robaxin, folic acid, apresoline, lopressor, MVI/minerals, zofran, senna, thiamine     Physical Findings:  - Appearance: overweight; (6/23) +1 edema (L/R ankles)  - GI function: no GI s/s, unsure last BM. Per EMR 6/21-- likely inaccurate. Pt is on a bowel regimen  - Tubes: NA   - Oral/Mouth cavity: per SLP on 6/26 "mild oral dysphagia no signs of pharyngeal impairment." recommended dysphagia 3 with thin liquids. Per current diet order, pt is not on any dysphagia restrictions.  - Skin: R hand skin tear     Nutrition Requirements  Weight Used: dosing  80.7 kg/178 lbs (continued from initial assessment on 6/25)     Estimated Energy Needs    Continue [x]  [id number] (MSJ x 1.2-1.4 AF) PCM considered     Estimated Protein Needs    Continue [x]   g (1.2-1.4 g/kg) same as above     Estimated Fluid Needs        Continue [x]  1mL/kcal or per LIP     Nutrient Intake: >50% (fair)        [x] Previous Nutrition Diagnosis: Severe PCM in the context of acute illness            [x] Ongoing               Nutrition Intervention meals and snacks, medical food supplements, coordination of care     Goal/Expected Outcome: pt to maintain po intake >75% over the next 3 days     Indicator/Monitoring: RD to monitor diet order, energy intake, body composition, NFPF, glucose profile    Recommendation: diet order per SLP, add ensure enlive BID, encourage po intake, provide assistance with meals PRN. Recs discussed with covering PA [id number]

## 2020-06-29 NOTE — DISCHARGE NOTE PROVIDER - HOSPITAL COURSE
64 year old male with PMHx of ETOH dependence, CHF, chronic back pain, HLD, HTN, PE on Eliquis, TIA was brought in to ED by family who believed he was missing but pt states he was out shopping in [place]. Pt left AMA on 6/17 after being admitted for fall and ETOH withdrawal on 6/15. Pt reports last drink on Thursday 6/18. Pt denies CP, SOB, fever, chills, HA, N/V/D/C, lightheadedness.     In ED, pt hypertensive 193/ 95. EKG showed Afib.    Patient reports generalized weakness            Hospital Course:    Patient is a 63 y/o M with PMHx of ETOH dependence, CHF with Preserved EF, chronic back pain, HLD, HTN, PE on Eliquis, TIA, multiple admissions to hospital for EtOH intoxication and debility was brought in to ED by family who believed he was missing but pt states he was out shopping in [place]. Pt had left AMA on 6/17 after being admitted for fall and ETOH withdrawal on 6/15. Pt reported his last drink on Thursday 6/18. Patient was also found to have AMS in the setting of EtOH intoxication with some concern for alcohol withdrawal. Patient was found to have suspected Thiamine and folic acid deficiency in the setting of alcohol abuse and was treated for it. Neurology was also consulted due to AMS; Patient had MRI brain performed which did not show any acute findings. Patient was also seen by detox and completed a librium course. Due to multiple  hospitalization for EtOH intoxication, debility; there was concern for unsafe environment at home. Psychiatry also evaluated the patient and it was determined that patient has impaired ability to perceive immediate risks and associated complications of repeated falls, refusing medical w/u, tx, and treatment recommendations; also has impaired ability for self care. Patient was seen by SW and CM multiple times; patient's family (sister) was also contacted. Patient was counseled several time for alcohol cessation. Patient was evaluated by physical therapy and aggressive physical therapy was performed to help patient get his strength back. At this time patient is currently medically stable for a safe hospital discharge to SNF/NH.

## 2020-06-29 NOTE — DISCHARGE NOTE PROVIDER - NSDCCPCAREPLAN_GEN_ALL_CORE_FT
PRINCIPAL DISCHARGE DIAGNOSIS  Diagnosis: Afib  Assessment and Plan of Treatment: Take your home medications      SECONDARY DISCHARGE DIAGNOSES  Diagnosis: Alcohol dependence  Assessment and Plan of Treatment: You are encouraged to follow through with current medical care and encouraged to seek out support, network on alcohol use such as attending AA meetings upon discharge.  Please stop drinking alochol. Resources and information was provided to you by our SW and detox.    Diagnosis: Weakness  Assessment and Plan of Treatment:

## 2020-06-29 NOTE — DISCHARGE NOTE PROVIDER - NSDCFUADDAPPT_GEN_ALL_CORE_FT
You are encouraged to follow through with current medical care and encouraged  to seek out support, network on alcohol use such as attending AA meetings upon  discharge.

## 2020-06-29 NOTE — DISCHARGE NOTE PROVIDER - CARE PROVIDER_API CALL
Naga Deal  INTERNAL MEDICINE  [address] [place] [address]  Phone: [phone]  Fax: [phone]  Follow Up Time: 2 weeks

## 2020-06-29 NOTE — PROGRESS NOTE ADULT - SUBJECTIVE AND OBJECTIVE BOX
CHAPARRO ROBISON  64y  Male      Patient is a 64y old  Male who presents with a chief complaint of weakness (24 Jun 2020 09:32)      INTERVAL HPI/OVERNIGHT EVENTS:  Patient seen and examined earlier this morning. Patient appears comfortable and resting; no acute events overnight. He reports that he wants to go home now; it was explained to him that we are pending a safe discharge for him. He denies F/C, CP, palpitations, SOB, N/V, hallucinations, headache, new focal weakness, confusion. He reports that he would like to speak to SW.      REVIEW OF SYSTEMS:  CONSTITUTIONAL: No fever, weight loss, +weakness  EYES: No eye pain, visual disturbances, or discharge  ENMT:  No difficulty hearing, tinnitus, vertigo; No sinus or throat pain  RESPIRATORY: No cough, wheezing, chills or hemoptysis; No shortness of breath  CARDIOVASCULAR: No chest pain, palpitations, dizziness, or leg swelling  GASTROINTESTINAL: No abdominal or epigastric pain. No nausea, vomiting, or hematemesis; No diarrhea or constipation. No melena or hematochezia.  GENITOURINARY: No dysuria, frequency, hematuria, or incontinence  NEUROLOGICAL: No headaches, memory loss, loss of strength, numbness, or tremors, no confusion  MUSCULOSKELETAL: No joint pain or swelling; No muscle, back, or extremity pain +weakness in lower extremity  PSYCHIATRIC: No depression, anxiety, mood swings, or difficulty sleeping, no hallucination    Vital Signs Last 24 Hrs  T(C): 36.7 (29 Jun 2020 05:19), Max: 36.7 (29 Jun 2020 05:19)  T(F): 98 (29 Jun 2020 05:19), Max: 98 (29 Jun 2020 05:19)  HR: 48 (29 Jun 2020 05:19) (48 - 69)  BP: 174/85 (29 Jun 2020 05:19) (156/83 - 174/85)  BP(mean): --  RR: 17 (29 Jun 2020 05:19) (16 - 17)  SpO2: --    PHYSICAL EXAM:  GENERAL: NAD, well-developed, non-diaphoretic   HEAD:  Atraumatic, Normocephalic  EYES: EOMI, PERRLA, conjunctiva and sclera clear  ENMT: No tonsillar erythema, exudates, or enlargement; Moist mucous membranes, Good dentition, No lesions  NECK: Supple, No JVD, Normal thyroid  NERVOUS SYSTEM:  Alert & Oriented X3, Good concentration; DTRs 2+ intact and symmetric, no tremors  CHEST/LUNG: Clear to percussion bilaterally; No rales, rhonchi, wheezing, or rubs  HEART: Regular rate and rhythm; No murmurs, rubs, or gallops  ABDOMEN: Soft, Nontender, Nondistended; Bowel sounds present  EXTREMITIES:  2+ Peripheral Pulses, No clubbing, cyanosis, or edema  PSYCH: cooperative      Consultant(s) Notes Reviewed:  [x ] YES  [ ] NO  Care Discussed with Consultants/Other Providers [ x] YES  [ ] NO           Drug Dosing Weight  Height (cm): 172.7 (20 Jun 2020 20:41)  Weight (kg): 80.7 (20 Jun 2020 20:41)  BMI (kg/m2): 27.1 (20 Jun 2020 20:41)  BSA (m2): 1.94 (20 Jun 2020 20:41)      I&O's Summary        RADIOLOGY & ADDITIONAL TESTS:  Imaging Personally Reviewed:  [x] YES  [ ] NO    HEALTH ISSUES - PROBLEM Dx:  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 650 milliGRAM(s) Oral every 6 hours PRN Temp greater or equal to 38C (100.4F), Mild Pain (1 - 3)  hydrALAZINE 25 milliGRAM(s) Oral every 4 hours PRN SBP > 160  LORazepam   Injectable 0.5 milliGRAM(s) IV Push every 4 hours PRN Agitation  methocarbamol 500 milliGRAM(s) Oral every 8 hours PRN Muscle Spasm  ondansetron Injectable 4 milliGRAM(s) IV Push every 6 hours PRN Nausea  senna 2 Tablet(s) Oral at bedtime PRN Constipation

## 2020-06-30 ENCOUNTER — TRANSCRIPTION ENCOUNTER (OUTPATIENT)
Age: 65
End: 2020-06-30

## 2020-06-30 VITALS
SYSTOLIC BLOOD PRESSURE: 176 MMHG | RESPIRATION RATE: 18 BRPM | TEMPERATURE: 97 F | DIASTOLIC BLOOD PRESSURE: 86 MMHG | HEART RATE: 49 BPM

## 2020-06-30 PROCEDURE — 99239 HOSP IP/OBS DSCHRG MGMT >30: CPT

## 2020-06-30 PROCEDURE — 99221 1ST HOSP IP/OBS SF/LOW 40: CPT

## 2020-06-30 RX ADMIN — Medication 25 MILLIGRAM(S): at 05:23

## 2020-06-30 RX ADMIN — Medication 1 APPLICATION(S): at 05:25

## 2020-06-30 RX ADMIN — Medication 1 MILLIGRAM(S): at 10:04

## 2020-06-30 RX ADMIN — Medication 1 TABLET(S): at 10:03

## 2020-06-30 RX ADMIN — APIXABAN 5 MILLIGRAM(S): 2.5 TABLET, FILM COATED ORAL at 05:23

## 2020-06-30 RX ADMIN — Medication 100 MILLIGRAM(S): at 10:04

## 2020-06-30 RX ADMIN — Medication 1 PATCH: at 10:04

## 2020-06-30 RX ADMIN — Medication 81 MILLIGRAM(S): at 10:03

## 2020-06-30 NOTE — CONSULT NOTE ADULT - SUBJECTIVE AND OBJECTIVE BOX
S :   64 y year old Male seen at bedside for with a chief complaint of painful thickened, dystrophic, ingrowing and long toenails digits 1-5 bilaterally  and preventative foot examination. Patient is medically managed  by Medicine/Hospitalists.  Patient denies any history of trauma to both feet.  Patient has no other pedal complaints.  Patient is experiencing pain while standing, walking and in shoe gear.       Patient admits to  (-) Fevers, (-) Chills, (-) Nausea, (-) Vomiting, (-) Shortness of Breath (-) calf pain (-) chest pain       PMH: Pulmonary embolism  Chronic back pain  Alcohol dependence  HLD (hyperlipidemia)  HTN (hypertension)  CHF (congestive heart failure)  TIA (transient ischemic attack)    PSH:History of appendectomy  No significant past surgical history      Allergies:No Known Allergies      Labs:      WBC Trend  8.02 Date (06-25 @ 09:20)  7.10 Date (06-24 @ 06:05)  7.11 Date (06-23 @ 06:30)  9.24 Date (06-22 @ 06:05)  11.27<H> Date (06-21 @ 07:32)  11.10<H> Date (06-20 @ 15:14)  6.21 Date (06-17 @ 05:50)  6.60 Date (06-16 @ 05:33)  5.28 Date (06-15 @ 11:14)  6.97 Date (06-15 @ 02:00)      Chem              T(F): 96.6 (06-30-20 @ 05:00), Max: 97.8 (06-29-20 @ 21:42)  HR: 49 (06-30-20 @ 05:00) (49 - 80)  BP: 176/86 (06-30-20 @ 05:00) (138/90 - 176/86)  RR: 18 (06-30-20 @ 05:00) (16 - 18)  SpO2: --  Wt(kg): --    O:   Integument:  Skin warm, dry and supple bilateral.  No open lesions or inter-digital macerations noted bilateral.   Toenails 1-5 Right and Left feet thickened, elongated, discolored, and dystrophic with subungual debris. There is pain upon palpation of all fungal and ingrowing nails 1-5 bilaterally.   Vascular: Dorsalis Pedis and Posterior Tibial pulses are mildly diminished.  Capillary re-fill time greater than 3 seconds digits 1-5 bilateral.   Neuro: Protective sensation intact to the level of the digits bilateral.  MSK: Muscle strength +5/5 all major muscle groups bilateral. No structural abnormality, bilaterally      A:   1. Bilateral hypertrohic Onychomycosis digits 1-5   2. Pain from Elongated nails, ingrowing  and dystrophic nails    P:   Pt. seen and evaluated w/ Attending, Dr. Fagan  Discussed diagnosis and treatment with patient  Aseptic debridement and curretage of all fungal and ingrowing  nails 1-5 bilateral with sterile nail pack  Discussed importance of daily foot examinations and proper shoe gear  Pt. can f/u w/ Dr. Fagan in clinic at [address] [place] [address]. [address] post d/c for periodic debridement  Please re-consult as needed. S :   64 y year old Male seen at bedside for with a chief complaint of painful thickened, dystrophic, ingrowing and long toenails digits 1-5 bilaterally  and preventative foot examination. Patient is medically managed  by Medicine/Hospitalists.  Patient denies any history of trauma to both feet.  Patient has no other pedal complaints.  Patient is experiencing pain while standing, walking and in shoe gear.       Patient admits to  (-) Fevers, (-) Chills, (-) Nausea, (-) Vomiting, (-) Shortness of Breath (-) calf pain (-) chest pain     [] ROS was performed and is negative, except as stated above    PMH: Pulmonary embolism  Chronic back pain  Alcohol dependence  HLD (hyperlipidemia)  HTN (hypertension)  CHF (congestive heart failure)  TIA (transient ischemic attack)    PSH:History of appendectomy  No significant past surgical history      Allergies:No Known Allergies      Labs:      WBC Trend  8.02 Date (06-25 @ 09:20)  7.10 Date (06-24 @ 06:05)  7.11 Date (06-23 @ 06:30)  9.24 Date (06-22 @ 06:05)  11.27<H> Date (06-21 @ 07:32)  11.10<H> Date (06-20 @ 15:14)  6.21 Date (06-17 @ 05:50)  6.60 Date (06-16 @ 05:33)  5.28 Date (06-15 @ 11:14)  6.97 Date (06-15 @ 02:00)      Chem              T(F): 96.6 (06-30-20 @ 05:00), Max: 97.8 (06-29-20 @ 21:42)  HR: 49 (06-30-20 @ 05:00) (49 - 80)  BP: 176/86 (06-30-20 @ 05:00) (138/90 - 176/86)  RR: 18 (06-30-20 @ 05:00) (16 - 18)  SpO2: --  Wt(kg): --    O:   Integument:  Skin warm, dry and supple bilateral.  No open lesions or inter-digital macerations noted bilateral.   Toenails 1-5 Right and Left feet thickened, elongated, discolored, and dystrophic with subungual debris. There is pain upon palpation of all fungal and ingrowing nails 1-5 bilaterally.   Vascular: Dorsalis Pedis and Posterior Tibial pulses are mildly diminished.  Capillary re-fill time greater than 3 seconds digits 1-5 bilateral.   Neuro: Protective sensation intact to the level of the digits bilateral.  MSK: Muscle strength +5/5 all major muscle groups bilateral. No structural abnormality, bilaterally      A:   1. Bilateral hypertrohic Onychomycosis digits 1-5   2. Pain from Elongated nails, ingrowing  and dystrophic nails    P:   Pt. seen and evaluated w/ Attending, Dr. Fagan  Discussed diagnosis and treatment with patient  Aseptic debridement and curretage of all fungal and ingrowing  nails 1-5 bilateral with sterile nail pack  Discussed importance of daily foot examinations and proper shoe gear  Pt. can f/u w/ Dr. Fagan in clinic at [address] [place] [address]. [address] post d/c for periodic debridement  Please re-consult as needed.

## 2020-06-30 NOTE — DISCHARGE NOTE NURSING/CASE MANAGEMENT/SOCIAL WORK - PATIENT PORTAL LINK FT
You can access the FollowMyHealth Patient Portal offered by [facility] by registering at the following website: [email or web address]. By joining [facility]’s FollowMyHealth portal, you will also be able to view your health information using other applications (apps) compatible with our system.

## 2020-06-30 NOTE — PROGRESS NOTE ADULT - ASSESSMENT
65 y/o M with PMHx of ETOH dependence, CHF with Preserved EF, chronic back pain, HLD, HTN, PE on Eliquis, TIA, multiple admissions to hosptital for EtOH intoxication and debility was brought in to ED by family who believed he was missing but pt states he was out shopping in [place]. Pt had left AMA on 6/17 after being admitted for fall and ETOH withdrawal on 6/15. Pt reports last drink on Thursday 6/18.     #AMS in the setting of EtOH intoxication  #Alcohol intoxication with concern for alcohol withdrawal  #Suspected Thiamine and folic acid deficiency in the setting of alcohol abuse  #Multiple hospitalization for EtOH intoxication, debility; concern for unsafe environment at home  -multiple hospital admissions in 2020 for alcohol withdrawl; (03/2020 alcohol withdrawal complicated by delirium tremens)  -MRI brain:Equivocal punctiform infarct in the left subinsular region; Otherwise, no evidence for acute abnormality; Severe microvascular ischemic change; Widespread foci of microhemorrhage. These are usually clinically silent. Can be seen with underlying angiopathy (i.e. hypertension)  -patient wants to leave AMA    -patient's neighbor wrote a letter recommending that patient's home is not a safe place for him; letter placed in the chart  -6/29/2020: COVID-19 test negative (prior to discharge to facility)  PLAN  -Psych on board; follow-up appreciated; patient has impaired ability to perceive immediate risks and associated complications of repeated falls, refusing medical w/u, tx, and treatment recommendations; also has impaired ability for self care  -discharge planned for today  -Neurology consult appreciated; no further neuro w/u  -finished the librium protocol; Detox consult appreciated  -Ativan PRN for severe withdrawal/ agitation  -Thiamine and folic acid  -Alcohol cessation counseling was provided to him at bedside    #PVC- resolved  -EKG shows sinus rhythm with PVC as per cardiology.  no evidence of A. fib  -Continue with metoprolol for rate control    #weakness in lower extremity 2/2 debility- improving  -Rehab consult appreciated  -social work on board  -physical therapy on board    #HTN/ HTN urgency- resolved  -BP stable now  -c/w home medications  -monitor vitals  -Add hydralazine PRN    #Hx of Chronic PE  -Continue with Eliquis     #Chronic diastolic CHF- no in acute exacerbation  -No signs of fluid overload on exam    DVT PPX, on Eliquis    Progress Note Handoff  Pending Consults: None  Pending Tests: None  Pending Results: None  Family Discussion: Discussed with patient; Oldest sister: Jeanne ([phone], [phone]) (next of kin)  Disposition: Home_____/SNF__X____/Other_____/Unknown at this time_____

## 2020-06-30 NOTE — PROGRESS NOTE ADULT - SUBJECTIVE AND OBJECTIVE BOX
CHAPARRO ROBISON  64y  Male      Patient is a 64y old  Male who presents with a chief complaint of weakness (24 Jun 2020 09:32)      INTERVAL HPI/OVERNIGHT EVENTS:  Patient seen and examined earlier this morning. Patient appears comfortable and resting; no acute events overnight. He reports that he wants to go home now; it was explained to him that we are discharging him to a skilled facility. He denies F/C, CP, palpitations, SOB, N/V, hallucinations, headache, new focal weakness, confusion. He reports that he would like to speak to SW.      REVIEW OF SYSTEMS:  CONSTITUTIONAL: No fever, weight loss, +weakness  EYES: No eye pain, visual disturbances, or discharge  ENMT:  No difficulty hearing, tinnitus, vertigo; No sinus or throat pain  RESPIRATORY: No cough, wheezing, chills or hemoptysis; No shortness of breath  CARDIOVASCULAR: No chest pain, palpitations, dizziness, or leg swelling  GASTROINTESTINAL: No abdominal or epigastric pain. No nausea, vomiting, or hematemesis; No diarrhea or constipation. No melena or hematochezia.  GENITOURINARY: No dysuria, frequency, hematuria, or incontinence  NEUROLOGICAL: No headaches, memory loss, loss of strength, numbness, or tremors, no confusion  MUSCULOSKELETAL: No joint pain or swelling; No muscle, back, or extremity pain +weakness in lower extremity  PSYCHIATRIC: No depression, anxiety, mood swings, or difficulty sleeping, no hallucination    Vital Signs Last 24 Hrs  T(C): 35.9 (30 Jun 2020 05:00), Max: 36.6 (29 Jun 2020 21:42)  T(F): 96.6 (30 Jun 2020 05:00), Max: 97.8 (29 Jun 2020 21:42)  HR: 49 (30 Jun 2020 05:00) (49 - 80)  BP: 176/86 (30 Jun 2020 05:00) (138/90 - 176/86)  BP(mean): --  RR: 18 (30 Jun 2020 05:00) (16 - 18)  SpO2: --    PHYSICAL EXAM:  GENERAL: NAD, well-developed, non-diaphoretic   HEAD:  Atraumatic, Normocephalic  EYES: EOMI, PERRLA, conjunctiva and sclera clear  ENMT: No tonsillar erythema, exudates, or enlargement; Moist mucous membranes, Good dentition, No lesions  NECK: Supple, No JVD, Normal thyroid  NERVOUS SYSTEM:  Alert & Oriented X3, Good concentration; DTRs 2+ intact and symmetric, no tremors  CHEST/LUNG: Clear to percussion bilaterally; No rales, rhonchi, wheezing, or rubs  HEART: Regular rate and rhythm; No murmurs, rubs, or gallops  ABDOMEN: Soft, Nontender, Nondistended; Bowel sounds present  EXTREMITIES:  2+ Peripheral Pulses, No clubbing, cyanosis, or edema  PSYCH: cooperative      Consultant(s) Notes Reviewed:  [x ] YES  [ ] NO  Care Discussed with Consultants/Other Providers [ x] YES  [ ] NO           Drug Dosing Weight  Height (cm): 172.7 (20 Jun 2020 20:41)  Weight (kg): 80.7 (20 Jun 2020 20:41)  BMI (kg/m2): 27.1 (20 Jun 2020 20:41)  BSA (m2): 1.94 (20 Jun 2020 20:41)      I&O's Summary        RADIOLOGY & ADDITIONAL TESTS:  Imaging Personally Reviewed:  [x] YES  [ ] NO    HEALTH ISSUES - PROBLEM Dx:  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 650 milliGRAM(s) Oral every 6 hours PRN Temp greater or equal to 38C (100.4F), Mild Pain (1 - 3)  hydrALAZINE 25 milliGRAM(s) Oral every 4 hours PRN SBP > 160  LORazepam   Injectable 0.5 milliGRAM(s) IV Push every 4 hours PRN Agitation  methocarbamol 500 milliGRAM(s) Oral every 8 hours PRN Muscle Spasm  ondansetron Injectable 4 milliGRAM(s) IV Push every 6 hours PRN Nausea  senna 2 Tablet(s) Oral at bedtime PRN Constipation

## 2020-07-02 NOTE — CDI QUERY NOTE - NSCDIOTHERTXTBX_GEN_ALL_CORE_HH
Clinical Indicators: 64M  found to have AMS in the setting of EtOH intoxication with some concern for alcohol withdrawal.    Admitted for AMS in the setting of EtOH intoxication    6/21 > Psych consult reflect > disheveled, maldorous. poor hygiene, not dress up, and expose his body inappropriately, significant impaired attention and short-term memory, Impaired ability to perceive immediate risks and associated complication of repeated falls, refusing medical workup and treatment recommendation. Dementia associated with alcoholism.     6/23> Patient appears to be confused, appears to be lethargic    6/24 Neurology consult for AMS.    6/27> #AMS in the setting of EtOH intoxication    Discharge to SNF/NH   	   Based on above clinical findings and your professional judgment can Altered mental Status be further specified such as    > Toxic Encephalopathy related to alcohol intoxication  > Other please specify  >Clinically unable to determine

## 2020-07-13 DIAGNOSIS — E53.8 DEFICIENCY OF OTHER SPECIFIED B GROUP VITAMINS: ICD-10-CM

## 2020-07-13 DIAGNOSIS — G89.29 OTHER CHRONIC PAIN: ICD-10-CM

## 2020-07-13 DIAGNOSIS — F17.210 NICOTINE DEPENDENCE, CIGARETTES, UNCOMPLICATED: ICD-10-CM

## 2020-07-13 DIAGNOSIS — M54.9 DORSALGIA, UNSPECIFIED: ICD-10-CM

## 2020-07-13 DIAGNOSIS — F10.288 ALCOHOL DEPENDENCE WITH OTHER ALCOHOL-INDUCED DISORDER: ICD-10-CM

## 2020-07-13 DIAGNOSIS — R29.6 REPEATED FALLS: ICD-10-CM

## 2020-07-13 DIAGNOSIS — G92 TOXIC ENCEPHALOPATHY: ICD-10-CM

## 2020-07-13 DIAGNOSIS — I27.82 CHRONIC PULMONARY EMBOLISM: ICD-10-CM

## 2020-07-13 DIAGNOSIS — Z86.19 PERSONAL HISTORY OF OTHER INFECTIOUS AND PARASITIC DISEASES: ICD-10-CM

## 2020-07-13 DIAGNOSIS — I49.3 VENTRICULAR PREMATURE DEPOLARIZATION: ICD-10-CM

## 2020-07-13 DIAGNOSIS — L60.3 NAIL DYSTROPHY: ICD-10-CM

## 2020-07-13 DIAGNOSIS — F10.220 ALCOHOL DEPENDENCE WITH INTOXICATION, UNCOMPLICATED: ICD-10-CM

## 2020-07-13 DIAGNOSIS — I11.0 HYPERTENSIVE HEART DISEASE WITH HEART FAILURE: ICD-10-CM

## 2020-07-13 DIAGNOSIS — R53.81 OTHER MALAISE: ICD-10-CM

## 2020-07-13 DIAGNOSIS — F10.230 ALCOHOL DEPENDENCE WITH WITHDRAWAL, UNCOMPLICATED: ICD-10-CM

## 2020-07-13 DIAGNOSIS — E78.5 HYPERLIPIDEMIA, UNSPECIFIED: ICD-10-CM

## 2020-07-13 DIAGNOSIS — Z79.01 LONG TERM (CURRENT) USE OF ANTICOAGULANTS: ICD-10-CM

## 2020-07-13 DIAGNOSIS — F02.80 DEMENTIA IN OTHER DISEASES CLASSIFIED ELSEWHERE, UNSPECIFIED SEVERITY, WITHOUT BEHAVIORAL DISTURBANCE, PSYCHOTIC DISTURBANCE, MOOD DISTURBANCE, AND ANXIETY: ICD-10-CM

## 2020-07-13 DIAGNOSIS — E51.9 THIAMINE DEFICIENCY, UNSPECIFIED: ICD-10-CM

## 2020-07-13 DIAGNOSIS — E43 UNSPECIFIED SEVERE PROTEIN-CALORIE MALNUTRITION: ICD-10-CM

## 2020-07-13 DIAGNOSIS — I48.91 UNSPECIFIED ATRIAL FIBRILLATION: ICD-10-CM

## 2020-07-13 DIAGNOSIS — I50.32 CHRONIC DIASTOLIC (CONGESTIVE) HEART FAILURE: ICD-10-CM

## 2020-07-13 DIAGNOSIS — I16.0 HYPERTENSIVE URGENCY: ICD-10-CM

## 2020-12-22 ENCOUNTER — EMERGENCY (EMERGENCY)
Facility: HOSPITAL | Age: 65
LOS: 0 days | Discharge: HOME | End: 2020-12-23
Attending: EMERGENCY MEDICINE | Admitting: EMERGENCY MEDICINE
Payer: MEDICARE

## 2020-12-22 VITALS
HEART RATE: 88 BPM | DIASTOLIC BLOOD PRESSURE: 85 MMHG | OXYGEN SATURATION: 96 % | RESPIRATION RATE: 18 BRPM | SYSTOLIC BLOOD PRESSURE: 175 MMHG | TEMPERATURE: 97 F

## 2020-12-22 VITALS
RESPIRATION RATE: 18 BRPM | HEART RATE: 69 BPM | HEIGHT: 69 IN | TEMPERATURE: 97 F | WEIGHT: 199.96 LBS | OXYGEN SATURATION: 96 % | SYSTOLIC BLOOD PRESSURE: 140 MMHG | DIASTOLIC BLOOD PRESSURE: 76 MMHG

## 2020-12-22 DIAGNOSIS — Z90.49 ACQUIRED ABSENCE OF OTHER SPECIFIED PARTS OF DIGESTIVE TRACT: Chronic | ICD-10-CM

## 2020-12-22 DIAGNOSIS — R41.82 ALTERED MENTAL STATUS, UNSPECIFIED: ICD-10-CM

## 2020-12-22 DIAGNOSIS — I10 ESSENTIAL (PRIMARY) HYPERTENSION: ICD-10-CM

## 2020-12-22 DIAGNOSIS — F10.129 ALCOHOL ABUSE WITH INTOXICATION, UNSPECIFIED: ICD-10-CM

## 2020-12-22 DIAGNOSIS — F17.200 NICOTINE DEPENDENCE, UNSPECIFIED, UNCOMPLICATED: ICD-10-CM

## 2020-12-22 PROCEDURE — 99284 EMERGENCY DEPT VISIT MOD MDM: CPT

## 2020-12-22 NOTE — ED PROVIDER NOTE - CLINICAL SUMMARY MEDICAL DECISION MAKING FREE TEXT BOX
Pt BIBEMS intoxicated, observed, speech clear nonambulatory at his baseline refuses admission for detox will d/c. Patient counseled regarding conditions which should prompt return.

## 2020-12-22 NOTE — ED PROVIDER NOTE - ATTENDING CONTRIBUTION TO CARE
alcoholic bibems after friend called concerned for his drinking, has been drinking heavily since death of wife, on exam vital signs appreciated, +aob, speech slurred, no trauma neuro nonfocal, will check glucose, observe

## 2020-12-22 NOTE — ED PROVIDER NOTE - PHYSICAL EXAMINATION
VITALS: Reviewed  CONSTITUTIONAL: well developed, well nourished, in no acute distress, slurring of speech  SKIN: warm, dry, no rash  HEAD: normocephalic, atraumatic  EYES: PERRL, EOMI, no conjunctival erythema, sclera clear  ENT: patent airway, moist mucous membranes  NECK: supple, no masses  CV:  regular rate, regular rhythm, 2+ radial pulses bilaterally  RESP: no wheezes, no rales, no rhonchi, normal work of breathing  ABD: soft, nontender, nondistended, no rebound, no guarding  MSK: normal ROM, no cyanosis, no edema  NEURO: alert, oriented x3  PSYCH: cooperative, appropriate

## 2020-12-22 NOTE — ED PROVIDER NOTE - PROGRESS NOTE DETAILS
PP: patient sitting in bed, tolerating PO intake. FS WNL. Pending clinical sobriety. pt AAO x 3, wants to be discharged, refuses attempt at ambulation, requesting discharge home with ambulance as he has been wheelchair bound for the last 2 months due to chronic debilitation PP: Patient AAOx3, has decision making capacity, refuses to attempt to ambulate, but weight bearing, ambulates with wheelchair at home and has home health aides and friends help according to patient.

## 2020-12-22 NOTE — ED ADULT NURSE NOTE - CHIEF COMPLAINT QUOTE
BIBA, as per EMS, friend called saying "he drinks everyday since his wife passed away"; pt came in requesting detox. pt states "I drink a pint a day". Last drink "a couple of hours ago".

## 2020-12-22 NOTE — ED PROVIDER NOTE - PATIENT PORTAL LINK FT
You can access the FollowMyHealth Patient Portal offered by [facility] by registering at the following website: [email or web address]. By joining [facility]’s FollowMyHealth portal, you will also be able to view your health information using other applications (apps) compatible with our system.

## 2020-12-22 NOTE — ED ADULT NURSE NOTE - NSIMPLEMENTINTERV_GEN_ALL_ED
Implemented All Fall Risk Interventions:  [place] to call system. Call bell, personal items and telephone within reach. Instruct patient to call for assistance. Room bathroom lighting operational. Non-slip footwear when patient is off stretcher. Physically safe environment: no spills, clutter or unnecessary equipment. Stretcher in lowest position, wheels locked, appropriate side rails in place. Provide visual cue, wrist band, yellow gown, etc. Monitor gait and stability. Monitor for mental status changes and reorient to person, place, and time. Review medications for side effects contributing to fall risk. Reinforce activity limits and safety measures with patient and family.

## 2020-12-22 NOTE — ED ADULT TRIAGE NOTE - CHIEF COMPLAINT QUOTE
BIBA, as per EMS, friend called saying "he drinks everyday since his wife passed away"; pt came in requesting detox. pt states "I drink a pint a day". BIBA, as per EMS, friend called saying "he drinks everyday since his wife passed away"; pt came in requesting detox. pt states "I drink a pint a day". Last drink "a couple of hours ago".

## 2020-12-22 NOTE — ED PROVIDER NOTE - NS ED ROS FT
Review of Systems:  CONSTITUTIONAL - No fever  SKIN - No rash  HEMATOLOGIC - No abnormal bleeding or bruising  RESPIRATORY - No shortness of breath, No cough  CARDIAC -No chest pain, No palpitations  GI - No abdominal pain, No nausea, No vomiting, No diarrhea  MUSCULOSKELETAL - No joint paint, No swelling, No back pain  NEUROLOGIC - No numbness, No focal weakness  All other systems negative, unless specified in HPI

## 2020-12-22 NOTE — ED PROVIDER NOTE - OBJECTIVE STATEMENT
65Y M PMH of CHF, HLD, HTN, PE, TIA, reports to drinking alcohol today, comes in after intoxication. Patient denies head trauma, falls. Slurring of speech on exam. Denies chest pain, SOB, abdominal pain.

## 2020-12-26 ENCOUNTER — INPATIENT (INPATIENT)
Facility: HOSPITAL | Age: 65
LOS: 9 days | Discharge: NURSING, REHAB OR LONG-TERM CARE | End: 2021-01-05
Attending: INTERNAL MEDICINE | Admitting: INTERNAL MEDICINE
Payer: MEDICARE

## 2020-12-26 VITALS
HEIGHT: 69 IN | OXYGEN SATURATION: 98 % | TEMPERATURE: 99 F | DIASTOLIC BLOOD PRESSURE: 100 MMHG | HEART RATE: 83 BPM | SYSTOLIC BLOOD PRESSURE: 180 MMHG | WEIGHT: 220.02 LBS | RESPIRATION RATE: 19 BRPM

## 2020-12-26 DIAGNOSIS — Z90.49 ACQUIRED ABSENCE OF OTHER SPECIFIED PARTS OF DIGESTIVE TRACT: Chronic | ICD-10-CM

## 2020-12-26 LAB
ALBUMIN SERPL ELPH-MCNC: 4.6 G/DL — SIGNIFICANT CHANGE UP (ref 3.5–5.2)
ALP SERPL-CCNC: 108 U/L — SIGNIFICANT CHANGE UP (ref 30–115)
ALT FLD-CCNC: 90 U/L — HIGH (ref 0–41)
ANION GAP SERPL CALC-SCNC: 16 MMOL/L — HIGH (ref 7–14)
APAP SERPL-MCNC: <5 UG/ML — LOW (ref 10–30)
AST SERPL-CCNC: 70 U/L — HIGH (ref 0–41)
BASOPHILS # BLD AUTO: 0.06 K/UL — SIGNIFICANT CHANGE UP (ref 0–0.2)
BASOPHILS NFR BLD AUTO: 0.8 % — SIGNIFICANT CHANGE UP (ref 0–1)
BILIRUB SERPL-MCNC: 0.4 MG/DL — SIGNIFICANT CHANGE UP (ref 0.2–1.2)
BUN SERPL-MCNC: 12 MG/DL — SIGNIFICANT CHANGE UP (ref 10–20)
CALCIUM SERPL-MCNC: 9.4 MG/DL — SIGNIFICANT CHANGE UP (ref 8.5–10.1)
CHLORIDE SERPL-SCNC: 100 MMOL/L — SIGNIFICANT CHANGE UP (ref 98–110)
CK SERPL-CCNC: 81 U/L — SIGNIFICANT CHANGE UP (ref 0–225)
CO2 SERPL-SCNC: 24 MMOL/L — SIGNIFICANT CHANGE UP (ref 17–32)
CREAT SERPL-MCNC: 1 MG/DL — SIGNIFICANT CHANGE UP (ref 0.7–1.5)
EOSINOPHIL # BLD AUTO: 0.2 K/UL — SIGNIFICANT CHANGE UP (ref 0–0.7)
EOSINOPHIL NFR BLD AUTO: 2.6 % — SIGNIFICANT CHANGE UP (ref 0–8)
ETHANOL SERPL-MCNC: 94 MG/DL — HIGH
GLUCOSE SERPL-MCNC: 131 MG/DL — HIGH (ref 70–99)
HCT VFR BLD CALC: 45.3 % — SIGNIFICANT CHANGE UP (ref 42–52)
HGB BLD-MCNC: 15.5 G/DL — SIGNIFICANT CHANGE UP (ref 14–18)
IMM GRANULOCYTES NFR BLD AUTO: 1.1 % — HIGH (ref 0.1–0.3)
LYMPHOCYTES # BLD AUTO: 1.28 K/UL — SIGNIFICANT CHANGE UP (ref 1.2–3.4)
LYMPHOCYTES # BLD AUTO: 16.3 % — LOW (ref 20.5–51.1)
MAGNESIUM SERPL-MCNC: 2.1 MG/DL — SIGNIFICANT CHANGE UP (ref 1.8–2.4)
MCHC RBC-ENTMCNC: 31.6 PG — HIGH (ref 27–31)
MCHC RBC-ENTMCNC: 34.2 G/DL — SIGNIFICANT CHANGE UP (ref 32–37)
MCV RBC AUTO: 92.3 FL — SIGNIFICANT CHANGE UP (ref 80–94)
MONOCYTES # BLD AUTO: 0.52 K/UL — SIGNIFICANT CHANGE UP (ref 0.1–0.6)
MONOCYTES NFR BLD AUTO: 6.6 % — SIGNIFICANT CHANGE UP (ref 1.7–9.3)
NEUTROPHILS # BLD AUTO: 5.68 K/UL — SIGNIFICANT CHANGE UP (ref 1.4–6.5)
NEUTROPHILS NFR BLD AUTO: 72.6 % — SIGNIFICANT CHANGE UP (ref 42.2–75.2)
NRBC # BLD: 0 /100 WBCS — SIGNIFICANT CHANGE UP (ref 0–0)
PLATELET # BLD AUTO: 183 K/UL — SIGNIFICANT CHANGE UP (ref 130–400)
POTASSIUM SERPL-MCNC: 4 MMOL/L — SIGNIFICANT CHANGE UP (ref 3.5–5)
POTASSIUM SERPL-SCNC: 4 MMOL/L — SIGNIFICANT CHANGE UP (ref 3.5–5)
PROT SERPL-MCNC: 7.4 G/DL — SIGNIFICANT CHANGE UP (ref 6–8)
RAPID RVP RESULT: SIGNIFICANT CHANGE UP
RBC # BLD: 4.91 M/UL — SIGNIFICANT CHANGE UP (ref 4.7–6.1)
RBC # FLD: 13 % — SIGNIFICANT CHANGE UP (ref 11.5–14.5)
SALICYLATES SERPL-MCNC: <0.3 MG/DL — LOW (ref 4–30)
SARS-COV-2 RNA SPEC QL NAA+PROBE: SIGNIFICANT CHANGE UP
SODIUM SERPL-SCNC: 140 MMOL/L — SIGNIFICANT CHANGE UP (ref 135–146)
TROPONIN T SERPL-MCNC: <0.01 NG/ML — SIGNIFICANT CHANGE UP
WBC # BLD: 7.83 K/UL — SIGNIFICANT CHANGE UP (ref 4.8–10.8)
WBC # FLD AUTO: 7.83 K/UL — SIGNIFICANT CHANGE UP (ref 4.8–10.8)

## 2020-12-26 PROCEDURE — 99283 EMERGENCY DEPT VISIT LOW MDM: CPT

## 2020-12-26 PROCEDURE — 71045 X-RAY EXAM CHEST 1 VIEW: CPT | Mod: 26

## 2020-12-26 PROCEDURE — 70450 CT HEAD/BRAIN W/O DYE: CPT | Mod: 26

## 2020-12-26 PROCEDURE — 99285 EMERGENCY DEPT VISIT HI MDM: CPT

## 2020-12-26 RX ORDER — THIAMINE MONONITRATE (VIT B1) 100 MG
500 TABLET ORAL ONCE
Refills: 0 | Status: COMPLETED | OUTPATIENT
Start: 2020-12-26 | End: 2020-12-26

## 2020-12-26 RX ORDER — LOSARTAN POTASSIUM 100 MG/1
50 TABLET, FILM COATED ORAL ONCE
Refills: 0 | Status: COMPLETED | OUTPATIENT
Start: 2020-12-26 | End: 2020-12-26

## 2020-12-26 RX ORDER — METOPROLOL TARTRATE 50 MG
50 TABLET ORAL ONCE
Refills: 0 | Status: COMPLETED | OUTPATIENT
Start: 2020-12-26 | End: 2020-12-26

## 2020-12-26 RX ORDER — FOLIC ACID 0.8 MG
1 TABLET ORAL ONCE
Refills: 0 | Status: COMPLETED | OUTPATIENT
Start: 2020-12-26 | End: 2020-12-26

## 2020-12-26 RX ADMIN — Medication 105 MILLIGRAM(S): at 22:25

## 2020-12-26 RX ADMIN — Medication 1 MILLIGRAM(S): at 22:26

## 2020-12-26 NOTE — ED PROVIDER NOTE - PROGRESS NOTE DETAILS
Patient is difficult and poor judgement. states he wants to go home but caretaker states he is having difficulty to continue his care at home. Patient can't walk. will admit for ssw and rehab evaluation

## 2020-12-26 NOTE — ED ADULT NURSE NOTE - EXTENSIONS OF SELF_ADULT
Pt. received lab results in the mail and she has questions regarding those results.    Pt. Is requesting a call back from Dr. Mcdonough [phone].    Thank you.   None

## 2020-12-26 NOTE — ED PROVIDER NOTE - CLINICAL SUMMARY MEDICAL DECISION MAKING FREE TEXT BOX
alcoholic BIBEMS after caretaker called, unable to care for patient who has had progressive decline, pt still drinking but tapering under supervision of friends, has no other complaints aside from leg weakness which is chronic for which he was admitted to snf over the summer, is noncompliant with his BP meds, denies withdrawal symptoms or hallucinosis, on exam vital signs appreciated, nontoxic, AAO x 3 speech clear, ill kempt smells of urine, head nc/at, perrla, EOMI, neck supple cor reg with extrasystoles lungs cta abd snt with dry flaky red skin over lower abd, neuro nonfocal (can move legs but won't stand), labs and studies reviewed, will admit for rehab, stable for floor

## 2020-12-26 NOTE — ED PROVIDER NOTE - OBJECTIVE STATEMENT
64 yo male hx HTN/PE in 2015/ CHF/ Alcohol abuse BIBA from home for medical evaluation. patient only complaint is "I am not able to walk.". as per old chart, patient hasn't been able to walk for a while. As per caretaker Demetrius ([phone]) over the phone, patient's condition deteriorate over the past 3 weeks. He tried to help patient to stop drinking so he has been slowly tapering him off from alcohol. Caretaker reports patient has been urinating and defecating on himself. Patient had an episode of slurred speech yesterday and his weakness is getting worst. Patient denies any complaints in ED except he is not able to walk.

## 2020-12-26 NOTE — H&P ADULT - NSICDXPASTMEDICALHX_GEN_ALL_CORE_FT
Otc Regimen: Silicon scar get sheets\\n\\nCortisone 10 - apply a thin  layer to affected areas twice a day x 2 weeks then stop ( if itchy) Samples Given: Luxamend- qd Detail Level: Zone PAST MEDICAL HISTORY:  Alcohol dependence     CHF (congestive heart failure)     Chronic back pain     HLD (hyperlipidemia)     HTN (hypertension)     Pulmonary embolism 2015    TIA (transient ischemic attack)

## 2020-12-26 NOTE — H&P ADULT - PROBLEM SELECTOR PLAN 2
will order thiamine, Folic acid (suspect deficiencies) will order thiamine, Folic acid (suspect deficiencies), ativan prn and detox cpnsult

## 2020-12-26 NOTE — ED PROVIDER NOTE - NS ED ROS FT
Constitutional: no fever, chills, no recent weight loss, change in appetite or malaise  Eyes: no redness/discharge/pain/vision changes  ENT: no rhinorrhea/ear pain/sore throat  Cardiac: No chest pain, SOB or edema.  Respiratory: No cough or respiratory distress  GI: No nausea, vomiting, diarrhea or abdominal pain.  : No dysuria, frequency, urgency or hematuria  MS: + unable to ambulate  Neuro: No headache or weakness. No LOC.  Skin: No skin rash.  Endocrine: No history of thyroid disease or diabetes.

## 2020-12-26 NOTE — ED ADULT TRIAGE NOTE - CHIEF COMPLAINT QUOTE
pt has no complaints, neighbor called saying pt has a cough for 1 month and unable to walk. pt daily drinker last drink yesterday. usually drinks 1 pint  day.

## 2020-12-26 NOTE — ED PROVIDER NOTE - PHYSICAL EXAMINATION
CONSTITUTIONAL: Well-appearing; well-nourished; in no apparent distress.   EYES: PERRL; EOM intact.   CARDIOVASCULAR: Normal S1, S2; no murmurs, rubs, or gallops.   RESPIRATORY: Normal chest excursion with respiration; breath sounds clear and equal bilaterally; no wheezes, rhonchi, or rales.  GI/: Normal bowel sounds; non-distended; non-tender; no palpable organomegaly.   MS: No evidence of trauma or deformity to extremities. no midline tenderness to neck/back  SKIN: + erythematous maculopapular rash to lower abdomen c/w diaper rash. + urine soiled clothing and patient refused to be changed.   NEURO/PSYCH: A & O x 3; grossly unremarkable. poor insight and judgement. move all extremities. speech clear. + mild tremors to b/l hand

## 2020-12-26 NOTE — ED ADULT NURSE NOTE - NSIMPLEMENTINTERV_GEN_ALL_ED
Implemented All Fall with Harm Risk Interventions:  [place] to call system. Call bell, personal items and telephone within reach. Instruct patient to call for assistance. Room bathroom lighting operational. Non-slip footwear when patient is off stretcher. Physically safe environment: no spills, clutter or unnecessary equipment. Stretcher in lowest position, wheels locked, appropriate side rails in place. Provide visual cue, wrist band, yellow gown, etc. Monitor gait and stability. Monitor for mental status changes and reorient to person, place, and time. Review medications for side effects contributing to fall risk. Reinforce activity limits and safety measures with patient and family. Provide visual clues: red socks.

## 2020-12-26 NOTE — H&P ADULT - NSHPLABSRESULTS_GEN_ALL_CORE
15.5   7.83  )-----------( 183      ( 26 Dec 2020 22:40 )             45.3     12-26    140  |  100  |  12  ----------------------------<  131<H>  4.0   |  24  |  1.0    Ca    9.4      26 Dec 2020 22:40  Mg     2.1     12-26    TPro  7.4  /  Alb  4.6  /  TBili  0.4  /  DBili  x   /  AST  70<H>  /  ALT  90<H>  /  AlkPhos  108  12-26              Lactate Trend    CARDIAC MARKERS ( 26 Dec 2020 22:48 )  x     / x     / 81 U/L / x     / x      CARDIAC MARKERS ( 26 Dec 2020 22:40 )  x     / <0.01 ng/mL / x     / x     / x          CAPILLARY BLOOD GLUCOSE

## 2020-12-26 NOTE — H&P ADULT - HISTORY OF PRESENT ILLNESS
66yo male whose PMH includes CHF. pulmonary embolus  66yo male whose PMH includes CHF. pulmonary embolus and alcohol abuse presents to the ER due to progressive inability to ambulate. Separately still uses alcohol but not interested in a detox program 66yo male whose PMH includes CHF. pulmonary embolus and alcohol abuse presents to the ER due to progressive inability to ambulate. Separately still uses alcohol but not interested in a detox program. Also unwilling to change out of his street clothes

## 2020-12-27 DIAGNOSIS — I50.9 HEART FAILURE, UNSPECIFIED: ICD-10-CM

## 2020-12-27 DIAGNOSIS — I10 ESSENTIAL (PRIMARY) HYPERTENSION: ICD-10-CM

## 2020-12-27 DIAGNOSIS — F10.10 ALCOHOL ABUSE, UNCOMPLICATED: ICD-10-CM

## 2020-12-27 DIAGNOSIS — I26.99 OTHER PULMONARY EMBOLISM WITHOUT ACUTE COR PULMONALE: ICD-10-CM

## 2020-12-27 DIAGNOSIS — R26.2 DIFFICULTY IN WALKING, NOT ELSEWHERE CLASSIFIED: ICD-10-CM

## 2020-12-27 DIAGNOSIS — Z79.899 OTHER LONG TERM (CURRENT) DRUG THERAPY: ICD-10-CM

## 2020-12-27 LAB
ALBUMIN SERPL ELPH-MCNC: 4.4 G/DL — SIGNIFICANT CHANGE UP (ref 3.5–5.2)
ALP SERPL-CCNC: 88 U/L — SIGNIFICANT CHANGE UP (ref 30–115)
ALT FLD-CCNC: 73 U/L — HIGH (ref 0–41)
ANION GAP SERPL CALC-SCNC: 16 MMOL/L — HIGH (ref 7–14)
AST SERPL-CCNC: 60 U/L — HIGH (ref 0–41)
BILIRUB SERPL-MCNC: 0.9 MG/DL — SIGNIFICANT CHANGE UP (ref 0.2–1.2)
BUN SERPL-MCNC: 12 MG/DL — SIGNIFICANT CHANGE UP (ref 10–20)
CALCIUM SERPL-MCNC: 9.2 MG/DL — SIGNIFICANT CHANGE UP (ref 8.5–10.1)
CHLORIDE SERPL-SCNC: 99 MMOL/L — SIGNIFICANT CHANGE UP (ref 98–110)
CO2 SERPL-SCNC: 22 MMOL/L — SIGNIFICANT CHANGE UP (ref 17–32)
CREAT SERPL-MCNC: 0.9 MG/DL — SIGNIFICANT CHANGE UP (ref 0.7–1.5)
GLUCOSE SERPL-MCNC: 114 MG/DL — HIGH (ref 70–99)
HCT VFR BLD CALC: 44.9 % — SIGNIFICANT CHANGE UP (ref 42–52)
HGB BLD-MCNC: 15 G/DL — SIGNIFICANT CHANGE UP (ref 14–18)
MCHC RBC-ENTMCNC: 31.5 PG — HIGH (ref 27–31)
MCHC RBC-ENTMCNC: 33.4 G/DL — SIGNIFICANT CHANGE UP (ref 32–37)
MCV RBC AUTO: 94.3 FL — HIGH (ref 80–94)
NRBC # BLD: 0 /100 WBCS — SIGNIFICANT CHANGE UP (ref 0–0)
PLATELET # BLD AUTO: 135 K/UL — SIGNIFICANT CHANGE UP (ref 130–400)
POTASSIUM SERPL-MCNC: 4.5 MMOL/L — SIGNIFICANT CHANGE UP (ref 3.5–5)
POTASSIUM SERPL-SCNC: 4.5 MMOL/L — SIGNIFICANT CHANGE UP (ref 3.5–5)
PROT SERPL-MCNC: 6.9 G/DL — SIGNIFICANT CHANGE UP (ref 6–8)
RBC # BLD: 4.76 M/UL — SIGNIFICANT CHANGE UP (ref 4.7–6.1)
RBC # FLD: 12.9 % — SIGNIFICANT CHANGE UP (ref 11.5–14.5)
SODIUM SERPL-SCNC: 137 MMOL/L — SIGNIFICANT CHANGE UP (ref 135–146)
WBC # BLD: 8.24 K/UL — SIGNIFICANT CHANGE UP (ref 4.8–10.8)
WBC # FLD AUTO: 8.24 K/UL — SIGNIFICANT CHANGE UP (ref 4.8–10.8)

## 2020-12-27 PROCEDURE — 99233 SBSQ HOSP IP/OBS HIGH 50: CPT

## 2020-12-27 RX ORDER — METOPROLOL TARTRATE 50 MG
25 TABLET ORAL
Refills: 0 | Status: DISCONTINUED | OUTPATIENT
Start: 2020-12-27 | End: 2021-01-05

## 2020-12-27 RX ORDER — ACETAMINOPHEN 500 MG
650 TABLET ORAL ONCE
Refills: 0 | Status: DISCONTINUED | OUTPATIENT
Start: 2020-12-27 | End: 2020-12-27

## 2020-12-27 RX ORDER — APIXABAN 2.5 MG/1
5 TABLET, FILM COATED ORAL
Refills: 0 | Status: DISCONTINUED | OUTPATIENT
Start: 2020-12-27 | End: 2021-01-05

## 2020-12-27 RX ORDER — MULTIVIT-MIN/FERROUS GLUCONATE 9 MG/15 ML
1 LIQUID (ML) ORAL DAILY
Refills: 0 | Status: DISCONTINUED | OUTPATIENT
Start: 2020-12-27 | End: 2021-01-05

## 2020-12-27 RX ORDER — THIAMINE MONONITRATE (VIT B1) 100 MG
100 TABLET ORAL DAILY
Refills: 0 | Status: DISCONTINUED | OUTPATIENT
Start: 2020-12-27 | End: 2021-01-05

## 2020-12-27 RX ORDER — ASPIRIN/CALCIUM CARB/MAGNESIUM 324 MG
81 TABLET ORAL DAILY
Refills: 0 | Status: DISCONTINUED | OUTPATIENT
Start: 2020-12-27 | End: 2021-01-05

## 2020-12-27 RX ORDER — NICOTINE POLACRILEX 2 MG
1 GUM BUCCAL DAILY
Refills: 0 | Status: DISCONTINUED | OUTPATIENT
Start: 2020-12-27 | End: 2021-01-05

## 2020-12-27 RX ORDER — SENNA PLUS 8.6 MG/1
2 TABLET ORAL AT BEDTIME
Refills: 0 | Status: DISCONTINUED | OUTPATIENT
Start: 2020-12-27 | End: 2021-01-05

## 2020-12-27 RX ORDER — LOSARTAN POTASSIUM 100 MG/1
50 TABLET, FILM COATED ORAL DAILY
Refills: 0 | Status: DISCONTINUED | OUTPATIENT
Start: 2020-12-27 | End: 2021-01-05

## 2020-12-27 RX ORDER — FOLIC ACID 0.8 MG
1 TABLET ORAL DAILY
Refills: 0 | Status: DISCONTINUED | OUTPATIENT
Start: 2020-12-27 | End: 2021-01-05

## 2020-12-27 RX ORDER — METHOCARBAMOL 500 MG/1
500 TABLET, FILM COATED ORAL EVERY 8 HOURS
Refills: 0 | Status: DISCONTINUED | OUTPATIENT
Start: 2020-12-27 | End: 2021-01-05

## 2020-12-27 RX ADMIN — Medication 25 MILLIGRAM(S): at 21:16

## 2020-12-27 RX ADMIN — Medication 1 MILLIGRAM(S): at 11:46

## 2020-12-27 RX ADMIN — Medication 81 MILLIGRAM(S): at 11:47

## 2020-12-27 RX ADMIN — Medication 0.2 MILLIGRAM(S): at 04:50

## 2020-12-27 RX ADMIN — APIXABAN 5 MILLIGRAM(S): 2.5 TABLET, FILM COATED ORAL at 17:27

## 2020-12-27 RX ADMIN — Medication 1 PATCH: at 11:42

## 2020-12-27 RX ADMIN — Medication 2 MILLIGRAM(S): at 09:45

## 2020-12-27 RX ADMIN — APIXABAN 5 MILLIGRAM(S): 2.5 TABLET, FILM COATED ORAL at 05:57

## 2020-12-27 RX ADMIN — Medication 1 TABLET(S): at 11:46

## 2020-12-27 RX ADMIN — Medication 25 MILLIGRAM(S): at 05:07

## 2020-12-27 RX ADMIN — Medication 25 MILLIGRAM(S): at 17:27

## 2020-12-27 RX ADMIN — Medication 50 MILLIGRAM(S): at 00:29

## 2020-12-27 RX ADMIN — LOSARTAN POTASSIUM 50 MILLIGRAM(S): 100 TABLET, FILM COATED ORAL at 05:07

## 2020-12-27 RX ADMIN — LOSARTAN POTASSIUM 50 MILLIGRAM(S): 100 TABLET, FILM COATED ORAL at 00:29

## 2020-12-27 RX ADMIN — Medication 100 MILLIGRAM(S): at 11:42

## 2020-12-27 NOTE — PHYSICAL THERAPY INITIAL EVALUATION ADULT - ADDITIONAL COMMENTS
Pt reports he lives in a private house with friends. HE reports there are 8 steps with a rail on the outside to enter his home, but then he stays/lives on the first floor of the house. Pt reports he has a RW, rollator, and transport W/C. Friends assist with ADLs as needed.

## 2020-12-27 NOTE — PROGRESS NOTE ADULT - SUBJECTIVE AND OBJECTIVE BOX
Name: CHAPARRO ROBISON  Age: 65y  Gender: Male    Patient is a 65y old  Male who presents with a chief complaint of ambulatory dysfunction    Interval events: patient presented to ED with etoh level 94. on prn ativan, has received one dose this AM  Pt was seen and examined. Denies any complaints. No dizziness, headache, n/v, fever, AVH, tremors    Allergies:  No Known Allergies      PHYSICAL EXAM:    Vitals:  Vital Signs Last 24 Hrs  T(C): 36.3 (27 Dec 2020 05:00), Max: 37.2 (26 Dec 2020 20:51)  T(F): 97.3 (27 Dec 2020 05:00), Max: 98.9 (26 Dec 2020 20:51)  HR: 56 (27 Dec 2020 05:00) (56 - 83)  BP: 194/93 (27 Dec 2020 05:00) (180/100 - 199/98)  BP(mean): --  RR: 18 (27 Dec 2020 05:00) (18 - 19)  SpO2: 99% (27 Dec 2020 00:22) (98% - 99%)    GENERAL: NAD, A&Ox3  CHEST/LUNG: CTAB  HEART: S1,S2 RRR  ABDOMEN: Soft, NT/ND, +BS  EXTREMITIES:  2+ DP, no LE edema      LABS:                        15.0   8.24  )-----------( 135      ( 27 Dec 2020 08:21 )             44.9     12-27    137  |  99  |  12  ----------------------------<  114<H>  4.5   |  22  |  0.9    Ca    9.2      27 Dec 2020 08:21  Mg     2.1     12-26    TPro  6.9  /  Alb  4.4  /  TBili  0.9  /  DBili  x   /  AST  60<H>  /  ALT  73<H>  /  AlkPhos  88  12-27    LIVER FUNCTIONS - ( 27 Dec 2020 08:21 )  Alb: 4.4 g/dL / Pro: 6.9 g/dL / ALK PHOS: 88 U/L / ALT: 73 U/L / AST: 60 U/L / GGT: x           CARDIAC MARKERS ( 26 Dec 2020 22:48 )  x     / x     / 81 U/L / x     / x      CARDIAC MARKERS ( 26 Dec 2020 22:40 )  x     / <0.01 ng/mL / x     / x     / x            MEDICATIONS  (STANDING):  apixaban 5 milliGRAM(s) Oral two times a day  aspirin  chewable 81 milliGRAM(s) Oral daily  folic acid 1 milliGRAM(s) Oral daily  losartan 50 milliGRAM(s) Oral daily  metoprolol tartrate 25 milliGRAM(s) Oral two times a day  multivitamin/minerals 1 Tablet(s) Oral daily  nicotine - 21 mG/24Hr(s) Patch 1 patch Transdermal daily  thiamine 100 milliGRAM(s) Oral daily        RADIOLOGY & ADDITIONAL TESTS:    Imaging Personally Reviewed:  [x] YES  [ ] NO Name: CHAPARRO ROBISON  Age: 65y  Gender: Male    Patient is a 65y old  Male who presents with a chief complaint of ambulatory dysfunction    Interval events: patient presented to ED with etoh level 94. on prn ativan, has received one dose this AM  Pt was seen and examined. Denies any complaints. No dizziness, headache, n/v, fever, AVH, tremors    Allergies:  No Known Allergies      PHYSICAL EXAM:    Vital Signs Last 24 Hrs  T(C): 36.4 (27 Dec 2020 12:30), Max: 37.2 (26 Dec 2020 20:51)  T(F): 97.5 (27 Dec 2020 12:30), Max: 98.9 (26 Dec 2020 20:51)  HR: 62 (27 Dec 2020 12:30) (56 - 83)  BP: 130/73 (27 Dec 2020 12:30) (130/73 - 199/98)  BP(mean): --  RR: 16 (27 Dec 2020 12:30) (16 - 19)  SpO2: 99% (27 Dec 2020 00:22) (98% - 99%)      PHYSICAL EXAM:  GENERAL: NAD, well-groomed, well-developed  HEAD:  Atraumatic, Normocephalic  EYES: EOMI, PERRLA, conjunctiva and sclera clear  NERVOUS SYSTEM:  Alert & Oriented X 4, Good concentration; Motor Strength 5/5 B/L upper and lower extremities; DTRs 2+ intact and symmetric  CHEST/LUNG: Clear to percussion bilaterally; No rales, rhonchi, wheezing, or rubs  HEART: Regular rate and rhythm; No murmurs, rubs, or gallops  ABDOMEN: Soft, Nontender, Nondistended; Bowel sounds present  EXTREMITIES:  2+ Peripheral Pulses, No clubbing, cyanosis, or edema  SKIN: No rashes or lesions      LABS:                        15.0   8.24  )-----------( 135      ( 27 Dec 2020 08:21 )             44.9     12-27    137  |  99  |  12  ----------------------------<  114<H>  4.5   |  22  |  0.9    Ca    9.2      27 Dec 2020 08:21  Mg     2.1     12-26    TPro  6.9  /  Alb  4.4  /  TBili  0.9  /  DBili  x   /  AST  60<H>  /  ALT  73<H>  /  AlkPhos  88  12-27    LIVER FUNCTIONS - ( 27 Dec 2020 08:21 )  Alb: 4.4 g/dL / Pro: 6.9 g/dL / ALK PHOS: 88 U/L / ALT: 73 U/L / AST: 60 U/L / GGT: x           CARDIAC MARKERS ( 26 Dec 2020 22:48 )  x     / x     / 81 U/L / x     / x      CARDIAC MARKERS ( 26 Dec 2020 22:40 )  x     / <0.01 ng/mL / x     / x     / x              RADIOLOGY & ADDITIONAL TESTS:    Imaging Personally Reviewed:  [x] YES  [ ] NO    MEDICATIONS  (STANDING):  apixaban 5 milliGRAM(s) Oral two times a day  aspirin  chewable 81 milliGRAM(s) Oral daily  chlordiazePOXIDE   Oral   chlordiazePOXIDE 25 milliGRAM(s) Oral every 4 hours  folic acid 1 milliGRAM(s) Oral daily  losartan 50 milliGRAM(s) Oral daily  metoprolol tartrate 25 milliGRAM(s) Oral two times a day  multivitamin/minerals 1 Tablet(s) Oral daily  nicotine - 21 mG/24Hr(s) Patch 1 patch Transdermal daily  thiamine 100 milliGRAM(s) Oral daily    MEDICATIONS  (PRN):  chlordiazePOXIDE 25 milliGRAM(s) Oral every 4 hours PRN Withdrawal  methocarbamol 500 milliGRAM(s) Oral every 8 hours PRN Muscle Spasm  senna 2 Tablet(s) Oral at bedtime PRN Constipation

## 2020-12-27 NOTE — PROGRESS NOTE ADULT - ASSESSMENT
66yo male whose PMH includes CHF, pulmonary embolus and alcohol abuse presents to the ER due to progressive inability to ambulate. Separately still uses alcohol but not interested in a detox program.     # Unable to ambulate  - physiatry consult  - PT consult  -      # Acute alcohol abuse.   - thiamine, Folic acid  - ativan prn   -detox appreciated, continue ativan prn  - CATCH team to follow    # CHF (congestive heart failure)  - Per EMR, HFpEF (chronic)  - monitor    # pulmonary embolism  - continue home eliquis    # HTN (hypertension)  - on losartan, lopressor  - monitor bp    Provider Handoff: awaiting PT/PM&R eval    Diet: dash  Activity: OOB with pt  DVT PPX: eliquis from home  GI PPX: none needed   Code status: FULL CODE  Dispo: acute   66yo male whose PMH includes CHF, pulmonary embolus and alcohol abuse presents to the ER due to progressive inability to ambulate. Separately still uses alcohol but not interested in a detox program.     # Unable to ambulate  - physiatry consult  - PT consult  -      # Acute alcohol abuse.   - thiamine, Folic acid  - ativan protocol   -detox appreciated  - CATCH team to follow    # CHF (congestive heart failure)  - Per EMR, HFpEF (chronic)  - monitor    # pulmonary embolism  - continue home eliquis    # HTN (hypertension)  - on losartan, lopressor  - monitor bp    Provider Handoff: awaiting PT/PM&R eval    Diet: dash  Activity: OOB with pt  DVT PPX: eliquis from home  GI PPX: none needed   Code status: FULL CODE    Progress Note Handoff  Pending Consults:  riky team  Pending Tests:  none  Pending Results: none  Family Discussion: discussed protocol and detox consult with patient - in agreement with plan of care  Disposition: Home___x__/SNF______/Other_____/Unknown at this time_____

## 2020-12-27 NOTE — PHYSICAL THERAPY INITIAL EVALUATION ADULT - GENERAL OBSERVATIONS, REHAB EVAL
Pt encountered semi-reclined in bed, NAD, ok to be seen by PT as confirmed by RN and pt agreeable. +chart reviewed

## 2020-12-27 NOTE — PHYSICAL THERAPY INITIAL EVALUATION ADULT - LEVEL OF INDEPENDENCE: SIT/STAND, REHAB EVAL
despite max A x1 with RW, bed elevated - pt unable to push through quads to stand, dec LE strength/endurance/balance/unable to perform

## 2020-12-28 LAB
ALBUMIN SERPL ELPH-MCNC: 3.8 G/DL — SIGNIFICANT CHANGE UP (ref 3.5–5.2)
ALP SERPL-CCNC: 83 U/L — SIGNIFICANT CHANGE UP (ref 30–115)
ALT FLD-CCNC: 65 U/L — HIGH (ref 0–41)
ANION GAP SERPL CALC-SCNC: 11 MMOL/L — SIGNIFICANT CHANGE UP (ref 7–14)
AST SERPL-CCNC: 56 U/L — HIGH (ref 0–41)
BASOPHILS # BLD AUTO: 0.06 K/UL — SIGNIFICANT CHANGE UP (ref 0–0.2)
BASOPHILS NFR BLD AUTO: 0.9 % — SIGNIFICANT CHANGE UP (ref 0–1)
BILIRUB SERPL-MCNC: 0.6 MG/DL — SIGNIFICANT CHANGE UP (ref 0.2–1.2)
BUN SERPL-MCNC: 15 MG/DL — SIGNIFICANT CHANGE UP (ref 10–20)
CALCIUM SERPL-MCNC: 9 MG/DL — SIGNIFICANT CHANGE UP (ref 8.5–10.1)
CHLORIDE SERPL-SCNC: 103 MMOL/L — SIGNIFICANT CHANGE UP (ref 98–110)
CO2 SERPL-SCNC: 26 MMOL/L — SIGNIFICANT CHANGE UP (ref 17–32)
CREAT SERPL-MCNC: 0.8 MG/DL — SIGNIFICANT CHANGE UP (ref 0.7–1.5)
EOSINOPHIL # BLD AUTO: 0.19 K/UL — SIGNIFICANT CHANGE UP (ref 0–0.7)
EOSINOPHIL NFR BLD AUTO: 2.8 % — SIGNIFICANT CHANGE UP (ref 0–8)
GLUCOSE SERPL-MCNC: 111 MG/DL — HIGH (ref 70–99)
HCT VFR BLD CALC: 40.7 % — LOW (ref 42–52)
HGB BLD-MCNC: 13.7 G/DL — LOW (ref 14–18)
IMM GRANULOCYTES NFR BLD AUTO: 1.2 % — HIGH (ref 0.1–0.3)
LYMPHOCYTES # BLD AUTO: 1.6 K/UL — SIGNIFICANT CHANGE UP (ref 1.2–3.4)
LYMPHOCYTES # BLD AUTO: 23.7 % — SIGNIFICANT CHANGE UP (ref 20.5–51.1)
MAGNESIUM SERPL-MCNC: 2 MG/DL — SIGNIFICANT CHANGE UP (ref 1.8–2.4)
MCHC RBC-ENTMCNC: 31.6 PG — HIGH (ref 27–31)
MCHC RBC-ENTMCNC: 33.7 G/DL — SIGNIFICANT CHANGE UP (ref 32–37)
MCV RBC AUTO: 93.8 FL — SIGNIFICANT CHANGE UP (ref 80–94)
MONOCYTES # BLD AUTO: 0.55 K/UL — SIGNIFICANT CHANGE UP (ref 0.1–0.6)
MONOCYTES NFR BLD AUTO: 8.2 % — SIGNIFICANT CHANGE UP (ref 1.7–9.3)
NEUTROPHILS # BLD AUTO: 4.26 K/UL — SIGNIFICANT CHANGE UP (ref 1.4–6.5)
NEUTROPHILS NFR BLD AUTO: 63.2 % — SIGNIFICANT CHANGE UP (ref 42.2–75.2)
NRBC # BLD: 0 /100 WBCS — SIGNIFICANT CHANGE UP (ref 0–0)
PLATELET # BLD AUTO: 152 K/UL — SIGNIFICANT CHANGE UP (ref 130–400)
POTASSIUM SERPL-MCNC: 3.7 MMOL/L — SIGNIFICANT CHANGE UP (ref 3.5–5)
POTASSIUM SERPL-SCNC: 3.7 MMOL/L — SIGNIFICANT CHANGE UP (ref 3.5–5)
PROT SERPL-MCNC: 6.1 G/DL — SIGNIFICANT CHANGE UP (ref 6–8)
RBC # BLD: 4.34 M/UL — LOW (ref 4.7–6.1)
RBC # FLD: 13.2 % — SIGNIFICANT CHANGE UP (ref 11.5–14.5)
SARS-COV-2 IGG SERPL QL IA: NEGATIVE — SIGNIFICANT CHANGE UP
SARS-COV-2 IGM SERPL IA-ACNC: 0.52 INDEX — SIGNIFICANT CHANGE UP
SODIUM SERPL-SCNC: 140 MMOL/L — SIGNIFICANT CHANGE UP (ref 135–146)
WBC # BLD: 6.74 K/UL — SIGNIFICANT CHANGE UP (ref 4.8–10.8)
WBC # FLD AUTO: 6.74 K/UL — SIGNIFICANT CHANGE UP (ref 4.8–10.8)

## 2020-12-28 PROCEDURE — 99233 SBSQ HOSP IP/OBS HIGH 50: CPT

## 2020-12-28 RX ADMIN — Medication 1 TABLET(S): at 11:18

## 2020-12-28 RX ADMIN — APIXABAN 5 MILLIGRAM(S): 2.5 TABLET, FILM COATED ORAL at 05:04

## 2020-12-28 RX ADMIN — Medication 81 MILLIGRAM(S): at 11:18

## 2020-12-28 RX ADMIN — Medication 25 MILLIGRAM(S): at 18:25

## 2020-12-28 RX ADMIN — Medication 20 MILLIGRAM(S): at 21:04

## 2020-12-28 RX ADMIN — LOSARTAN POTASSIUM 50 MILLIGRAM(S): 100 TABLET, FILM COATED ORAL at 05:04

## 2020-12-28 RX ADMIN — Medication 25 MILLIGRAM(S): at 10:29

## 2020-12-28 RX ADMIN — Medication 1 PATCH: at 11:00

## 2020-12-28 RX ADMIN — Medication 1 MILLIGRAM(S): at 11:18

## 2020-12-28 RX ADMIN — Medication 25 MILLIGRAM(S): at 05:04

## 2020-12-28 RX ADMIN — Medication 20 MILLIGRAM(S): at 18:24

## 2020-12-28 RX ADMIN — APIXABAN 5 MILLIGRAM(S): 2.5 TABLET, FILM COATED ORAL at 18:25

## 2020-12-28 RX ADMIN — Medication 1 PATCH: at 19:40

## 2020-12-28 RX ADMIN — Medication 1 PATCH: at 06:29

## 2020-12-28 RX ADMIN — Medication 20 MILLIGRAM(S): at 14:03

## 2020-12-28 RX ADMIN — Medication 1 PATCH: at 11:19

## 2020-12-28 RX ADMIN — Medication 100 MILLIGRAM(S): at 11:18

## 2020-12-28 NOTE — PROGRESS NOTE ADULT - SUBJECTIVE AND OBJECTIVE BOX
Name: CHAPARRO ROBISON  Age: 65y  Gender: Male    Patient is a 65y old male who presents with a chief complaint of ambulatory dysfunction.    Interval events: patient started on librium taper yesterday with onset of tremor  Pt was seen and examined. Feels well. Eating breakfast.    Allergies:  No Known Allergies      PHYSICAL EXAM:    Vitals:  Vital Signs Last 24 Hrs  T(C): 36.3 (28 Dec 2020 05:10), Max: 36.7 (27 Dec 2020 21:08)  T(F): 97.4 (28 Dec 2020 05:10), Max: 98 (27 Dec 2020 21:08)  HR: 64 (28 Dec 2020 05:10) (64 - 94)  BP: 160/84 (28 Dec 2020 05:10) (105/52 - 160/84)  BP(mean): --  RR: 16 (28 Dec 2020 05:10) (16 - 16)  SpO2: --    GENERAL: NAD, no tremor  CHEST/LUNG: CTAB, no wheeze  HEART: S1,S2 RRR  ABDOMEN: Soft, NT/ND, +BS  EXTREMITIES:  2+ DP, no LE edema      LABS:                        13.7   6.74  )-----------( 152      ( 28 Dec 2020 06:23 )             40.7     12-28    140  |  103  |  15  ----------------------------<  111<H>  3.7   |  26  |  0.8    Ca    9.0      28 Dec 2020 06:23  Mg     2.0     12-28    TPro  6.1  /  Alb  3.8  /  TBili  0.6  /  DBili  x   /  AST  56<H>  /  ALT  65<H>  /  AlkPhos  83  12-28    LIVER FUNCTIONS - ( 28 Dec 2020 06:23 )  Alb: 3.8 g/dL / Pro: 6.1 g/dL / ALK PHOS: 83 U/L / ALT: 65 U/L / AST: 56 U/L / GGT: x           CARDIAC MARKERS ( 26 Dec 2020 22:48 )  x     / x     / 81 U/L / x     / x      CARDIAC MARKERS ( 26 Dec 2020 22:40 )  x     / <0.01 ng/mL / x     / x     / x            MEDICATIONS  (STANDING):  apixaban 5 milliGRAM(s) Oral two times a day  aspirin  chewable 81 milliGRAM(s) Oral daily  chlordiazePOXIDE   Oral   chlordiazePOXIDE 20 milliGRAM(s) Oral every 4 hours  folic acid 1 milliGRAM(s) Oral daily  losartan 50 milliGRAM(s) Oral daily  metoprolol tartrate 25 milliGRAM(s) Oral two times a day  multivitamin/minerals 1 Tablet(s) Oral daily  nicotine - 21 mG/24Hr(s) Patch 1 patch Transdermal daily  thiamine 100 milliGRAM(s) Oral daily        RADIOLOGY & ADDITIONAL TESTS:    Imaging Personally Reviewed:  [x] YES  [ ] NO

## 2020-12-28 NOTE — PROGRESS NOTE ADULT - ASSESSMENT
66yo male whose PMH includes CHF, pulmonary embolus and alcohol abuse presents to the ER due to progressive inability to ambulate. Separately still uses alcohol but not interested in a detox program.     # Unable to ambulate  - physiatry recommending STR  - PT open  -      # Acute alcohol abuse.   - thiamine, Folic acid  - librium taper   -detox appreciated  - CATCH team to follow    # CHF (congestive heart failure)  - Per EMR, HFpEF (chronic)  - monitor    # pulmonary embolism  - continue home eliquis    # HTN (hypertension)  - on losartan, lopressor  - monitor bp    Provider Handoff: librium taper, PMR recommending STR    Diet: dash  Activity: OOB with pt  DVT PPX: eliquis from home  GI PPX: none needed   Code status: FULL CODE    Progress Note Handoff  Pending Consults:  catch team  Pending Tests:  none  Pending Results: none  Family Discussion: discussed protocol and detox consult with patient - in agreement with plan of care  Disposition: Home____/SNF______/Other_____/Unknown at this time___x__ 64yo male whose PMH includes CHF, pulmonary embolus and alcohol abuse presents to the ER due to progressive inability to ambulate. Separately still uses alcohol but not interested in a detox program.     # Unable to ambulate  - physiatry recommending STR  - PT open  -      # Acute alcohol abuse.   - thiamine, Folic acid and MV   - librium taper   -detox appreciated  - CATCH team to follow    # CHF (congestive heart failure)  - Per EMR, HFpEF (chronic)  - monitor    # pulmonary embolism  - continue home eliquis    # HTN (hypertension)  - on losartan, lopressor  - monitor bp    # possible thiamine and folate deficiency   continue supplement     Provider Handoff: librium taper, PMR recommending STR    Diet: dash  Activity: OOB with pt  DVT PPX: eliquis from home  GI PPX: none needed   Code status: FULL CODE    Progress Note Handoff  Pending Consults:  catch team  Pending Tests:  none  Pending Results: none  Family Discussion: discussed protocol and detox consult with patient - in agreement with plan of care  Disposition: Home____/SNF______/Other_____/Unknown at this time___x__ 64yo male whose PMH includes CHF, pulmonary embolus and alcohol abuse presents to the ER due to progressive inability to ambulate. Separately still uses alcohol but not interested in a detox program.     # Ambulatory dysfunction   - physiatry recommending STR  - PT open  -      # Acute alcohol abuse.   - thiamine, Folic acid and MV   - librium taper   -detox appreciated  - CATCH team to follow    # CHF (congestive heart failure)  - Per EMR, HFpEF (chronic)  - monitor    # pulmonary embolism  - continue home eliquis    # HTN (hypertension)  - on losartan, lopressor  - monitor bp    # possible thiamine and folate deficiency   continue supplement     Provider Handoff: librium taper, PMR recommending STR    Diet: dash  Activity: OOB with pt  DVT PPX: eliquis from home  GI PPX: none needed   Code status: FULL CODE    Progress Note Handoff  Pending Consults:  catch team  Pending Tests:  none  Pending Results: none  Family Discussion: discussed protocol and detox consult with patient - in agreement with plan of care  Disposition: Home____/SNF______/Other_____/Unknown at this time___x__

## 2020-12-29 LAB
ANION GAP SERPL CALC-SCNC: 11 MMOL/L — SIGNIFICANT CHANGE UP (ref 7–14)
BASOPHILS # BLD AUTO: 0.07 K/UL — SIGNIFICANT CHANGE UP (ref 0–0.2)
BASOPHILS NFR BLD AUTO: 0.8 % — SIGNIFICANT CHANGE UP (ref 0–1)
BUN SERPL-MCNC: 16 MG/DL — SIGNIFICANT CHANGE UP (ref 10–20)
CALCIUM SERPL-MCNC: 9.1 MG/DL — SIGNIFICANT CHANGE UP (ref 8.5–10.1)
CHLORIDE SERPL-SCNC: 103 MMOL/L — SIGNIFICANT CHANGE UP (ref 98–110)
CO2 SERPL-SCNC: 25 MMOL/L — SIGNIFICANT CHANGE UP (ref 17–32)
CREAT SERPL-MCNC: 0.9 MG/DL — SIGNIFICANT CHANGE UP (ref 0.7–1.5)
EOSINOPHIL # BLD AUTO: 0.22 K/UL — SIGNIFICANT CHANGE UP (ref 0–0.7)
EOSINOPHIL NFR BLD AUTO: 2.7 % — SIGNIFICANT CHANGE UP (ref 0–8)
GLUCOSE SERPL-MCNC: 107 MG/DL — HIGH (ref 70–99)
HCT VFR BLD CALC: 42.2 % — SIGNIFICANT CHANGE UP (ref 42–52)
HGB BLD-MCNC: 13.9 G/DL — LOW (ref 14–18)
IMM GRANULOCYTES NFR BLD AUTO: 1.4 % — HIGH (ref 0.1–0.3)
LYMPHOCYTES # BLD AUTO: 1.76 K/UL — SIGNIFICANT CHANGE UP (ref 1.2–3.4)
LYMPHOCYTES # BLD AUTO: 21.2 % — SIGNIFICANT CHANGE UP (ref 20.5–51.1)
MAGNESIUM SERPL-MCNC: 2 MG/DL — SIGNIFICANT CHANGE UP (ref 1.8–2.4)
MCHC RBC-ENTMCNC: 31.5 PG — HIGH (ref 27–31)
MCHC RBC-ENTMCNC: 32.9 G/DL — SIGNIFICANT CHANGE UP (ref 32–37)
MCV RBC AUTO: 95.7 FL — HIGH (ref 80–94)
MONOCYTES # BLD AUTO: 0.52 K/UL — SIGNIFICANT CHANGE UP (ref 0.1–0.6)
MONOCYTES NFR BLD AUTO: 6.3 % — SIGNIFICANT CHANGE UP (ref 1.7–9.3)
NEUTROPHILS # BLD AUTO: 5.6 K/UL — SIGNIFICANT CHANGE UP (ref 1.4–6.5)
NEUTROPHILS NFR BLD AUTO: 67.6 % — SIGNIFICANT CHANGE UP (ref 42.2–75.2)
NRBC # BLD: 0 /100 WBCS — SIGNIFICANT CHANGE UP (ref 0–0)
PHOSPHATE SERPL-MCNC: 4.1 MG/DL — SIGNIFICANT CHANGE UP (ref 2.1–4.9)
PLATELET # BLD AUTO: 144 K/UL — SIGNIFICANT CHANGE UP (ref 130–400)
POTASSIUM SERPL-MCNC: 3.8 MMOL/L — SIGNIFICANT CHANGE UP (ref 3.5–5)
POTASSIUM SERPL-SCNC: 3.8 MMOL/L — SIGNIFICANT CHANGE UP (ref 3.5–5)
RBC # BLD: 4.41 M/UL — LOW (ref 4.7–6.1)
RBC # FLD: 13.4 % — SIGNIFICANT CHANGE UP (ref 11.5–14.5)
SODIUM SERPL-SCNC: 139 MMOL/L — SIGNIFICANT CHANGE UP (ref 135–146)
WBC # BLD: 8.29 K/UL — SIGNIFICANT CHANGE UP (ref 4.8–10.8)
WBC # FLD AUTO: 8.29 K/UL — SIGNIFICANT CHANGE UP (ref 4.8–10.8)

## 2020-12-29 PROCEDURE — 99232 SBSQ HOSP IP/OBS MODERATE 35: CPT

## 2020-12-29 RX ORDER — ONDANSETRON 8 MG/1
4 TABLET, FILM COATED ORAL EVERY 8 HOURS
Refills: 0 | Status: DISCONTINUED | OUTPATIENT
Start: 2020-12-29 | End: 2021-01-05

## 2020-12-29 RX ORDER — PANTOPRAZOLE SODIUM 20 MG/1
40 TABLET, DELAYED RELEASE ORAL DAILY
Refills: 0 | Status: DISCONTINUED | OUTPATIENT
Start: 2020-12-29 | End: 2021-01-05

## 2020-12-29 RX ADMIN — ONDANSETRON 4 MILLIGRAM(S): 8 TABLET, FILM COATED ORAL at 12:30

## 2020-12-29 RX ADMIN — Medication 15 MILLIGRAM(S): at 18:29

## 2020-12-29 RX ADMIN — Medication 1 PATCH: at 11:39

## 2020-12-29 RX ADMIN — Medication 20 MILLIGRAM(S): at 05:48

## 2020-12-29 RX ADMIN — PANTOPRAZOLE SODIUM 40 MILLIGRAM(S): 20 TABLET, DELAYED RELEASE ORAL at 14:25

## 2020-12-29 RX ADMIN — APIXABAN 5 MILLIGRAM(S): 2.5 TABLET, FILM COATED ORAL at 05:48

## 2020-12-29 RX ADMIN — Medication 15 MILLIGRAM(S): at 14:07

## 2020-12-29 RX ADMIN — Medication 1 PATCH: at 05:34

## 2020-12-29 RX ADMIN — Medication 1 PATCH: at 19:11

## 2020-12-29 RX ADMIN — Medication 25 MILLIGRAM(S): at 05:48

## 2020-12-29 RX ADMIN — Medication 100 MILLIGRAM(S): at 11:39

## 2020-12-29 RX ADMIN — Medication 25 MILLIGRAM(S): at 18:29

## 2020-12-29 RX ADMIN — APIXABAN 5 MILLIGRAM(S): 2.5 TABLET, FILM COATED ORAL at 18:29

## 2020-12-29 RX ADMIN — Medication 81 MILLIGRAM(S): at 11:39

## 2020-12-29 RX ADMIN — Medication 1 TABLET(S): at 11:39

## 2020-12-29 RX ADMIN — LOSARTAN POTASSIUM 50 MILLIGRAM(S): 100 TABLET, FILM COATED ORAL at 05:48

## 2020-12-29 RX ADMIN — Medication 1 PATCH: at 11:02

## 2020-12-29 RX ADMIN — Medication 20 MILLIGRAM(S): at 10:13

## 2020-12-29 RX ADMIN — Medication 1 MILLIGRAM(S): at 11:39

## 2020-12-29 NOTE — PROGRESS NOTE ADULT - SUBJECTIVE AND OBJECTIVE BOX
Name: CHAPARRO ROBISON  Age: 65y  Gender: Male    Patient is a 65y old  Male who presents with a chief complaint of      Pt was seen and examined.   he is complaining of nausea VOMITING   pending Pt EVAL     Allergies:  No Known Allergies      PHYSICAL EXAM:    Vitals:  Vital Signs Last 24 Hrs  T(C): 35.5 (29 Dec 2020 06:06), Max: 36.6 (28 Dec 2020 21:07)  T(F): 95.9 (29 Dec 2020 06:06), Max: 97.9 (28 Dec 2020 21:07)  HR: 62 (29 Dec 2020 06:06) (59 - 74)  BP: 130/80 (29 Dec 2020 06:06) (117/72 - 144/80)  BP(mean): --  RR: 16 (29 Dec 2020 06:06) (16 - 16)  SpO2: 95% (28 Dec 2020 14:20) (95% - 95%)    GENERAL: NAD  CHEST/LUNG: CTAB  HEART: S1,S2 RRR  ABDOMEN: Soft, NT/ND, +BS  EXTREMITIES:  2+ DP, no LE edema      LABS:                        13.9   8.29  )-----------( 144      ( 29 Dec 2020 06:25 )             42.2     12-29    139  |  103  |  16  ----------------------------<  107<H>  3.8   |  25  |  0.9    Ca    9.1      29 Dec 2020 06:25  Phos  4.1     12-29  Mg     2.0     12-29    TPro  6.1  /  Alb  3.8  /  TBili  0.6  /  DBili  x   /  AST  56<H>  /  ALT  65<H>  /  AlkPhos  83  12-28    LIVER FUNCTIONS - ( 28 Dec 2020 06:23 )  Alb: 3.8 g/dL / Pro: 6.1 g/dL / ALK PHOS: 83 U/L / ALT: 65 U/L / AST: 56 U/L / GGT: x                 MEDICATIONS  (STANDING):  apixaban 5 milliGRAM(s) Oral two times a day  aspirin  chewable 81 milliGRAM(s) Oral daily  chlordiazePOXIDE   Oral   chlordiazePOXIDE 15 milliGRAM(s) Oral every 4 hours  folic acid 1 milliGRAM(s) Oral daily  losartan 50 milliGRAM(s) Oral daily  metoprolol tartrate 25 milliGRAM(s) Oral two times a day  multivitamin/minerals 1 Tablet(s) Oral daily  nicotine - 21 mG/24Hr(s) Patch 1 patch Transdermal daily  thiamine 100 milliGRAM(s) Oral daily        RADIOLOGY & ADDITIONAL TESTS:    Imaging Personally Reviewed:  [x] YES  [ ] NO

## 2020-12-30 LAB
24R-OH-CALCIDIOL SERPL-MCNC: 19 NG/ML — LOW (ref 30–80)
ANION GAP SERPL CALC-SCNC: 9 MMOL/L — SIGNIFICANT CHANGE UP (ref 7–14)
BUN SERPL-MCNC: 14 MG/DL — SIGNIFICANT CHANGE UP (ref 10–20)
CALCIUM SERPL-MCNC: 9.2 MG/DL — SIGNIFICANT CHANGE UP (ref 8.5–10.1)
CHLORIDE SERPL-SCNC: 103 MMOL/L — SIGNIFICANT CHANGE UP (ref 98–110)
CO2 SERPL-SCNC: 25 MMOL/L — SIGNIFICANT CHANGE UP (ref 17–32)
CREAT SERPL-MCNC: 0.8 MG/DL — SIGNIFICANT CHANGE UP (ref 0.7–1.5)
GLUCOSE SERPL-MCNC: 122 MG/DL — HIGH (ref 70–99)
HCT VFR BLD CALC: 41.2 % — LOW (ref 42–52)
HGB BLD-MCNC: 13.6 G/DL — LOW (ref 14–18)
MCHC RBC-ENTMCNC: 31.4 PG — HIGH (ref 27–31)
MCHC RBC-ENTMCNC: 33 G/DL — SIGNIFICANT CHANGE UP (ref 32–37)
MCV RBC AUTO: 95.2 FL — HIGH (ref 80–94)
NRBC # BLD: 0 /100 WBCS — SIGNIFICANT CHANGE UP (ref 0–0)
PLATELET # BLD AUTO: 157 K/UL — SIGNIFICANT CHANGE UP (ref 130–400)
POTASSIUM SERPL-MCNC: 4 MMOL/L — SIGNIFICANT CHANGE UP (ref 3.5–5)
POTASSIUM SERPL-SCNC: 4 MMOL/L — SIGNIFICANT CHANGE UP (ref 3.5–5)
RBC # BLD: 4.33 M/UL — LOW (ref 4.7–6.1)
RBC # FLD: 13.2 % — SIGNIFICANT CHANGE UP (ref 11.5–14.5)
SODIUM SERPL-SCNC: 137 MMOL/L — SIGNIFICANT CHANGE UP (ref 135–146)
WBC # BLD: 9.77 K/UL — SIGNIFICANT CHANGE UP (ref 4.8–10.8)
WBC # FLD AUTO: 9.77 K/UL — SIGNIFICANT CHANGE UP (ref 4.8–10.8)

## 2020-12-30 PROCEDURE — 99232 SBSQ HOSP IP/OBS MODERATE 35: CPT

## 2020-12-30 PROCEDURE — 70450 CT HEAD/BRAIN W/O DYE: CPT | Mod: 26

## 2020-12-30 RX ORDER — ERGOCALCIFEROL 1.25 MG/1
50000 CAPSULE ORAL ONCE
Refills: 0 | Status: DISCONTINUED | OUTPATIENT
Start: 2020-12-30 | End: 2021-01-05

## 2020-12-30 RX ORDER — HYDRALAZINE HCL 50 MG
10 TABLET ORAL ONCE
Refills: 0 | Status: COMPLETED | OUTPATIENT
Start: 2020-12-30 | End: 2020-12-30

## 2020-12-30 RX ORDER — FUROSEMIDE 40 MG
20 TABLET ORAL ONCE
Refills: 0 | Status: COMPLETED | OUTPATIENT
Start: 2020-12-30 | End: 2020-12-30

## 2020-12-30 RX ADMIN — LOSARTAN POTASSIUM 50 MILLIGRAM(S): 100 TABLET, FILM COATED ORAL at 06:11

## 2020-12-30 RX ADMIN — Medication 1 PATCH: at 13:07

## 2020-12-30 RX ADMIN — Medication 25 MILLIGRAM(S): at 06:11

## 2020-12-30 RX ADMIN — Medication 10 MILLIGRAM(S): at 22:17

## 2020-12-30 RX ADMIN — Medication 15 MILLIGRAM(S): at 06:10

## 2020-12-30 RX ADMIN — PANTOPRAZOLE SODIUM 40 MILLIGRAM(S): 20 TABLET, DELAYED RELEASE ORAL at 13:07

## 2020-12-30 RX ADMIN — Medication 1 PATCH: at 20:00

## 2020-12-30 RX ADMIN — APIXABAN 5 MILLIGRAM(S): 2.5 TABLET, FILM COATED ORAL at 06:11

## 2020-12-30 NOTE — CHART NOTE - NSCHARTNOTEFT_GEN_A_CORE
Evaluated pt at bed side with the attending  PT drooling, unable to swallow saliva   Pt more alert and awake compared to am and yesterday   pt AO x 3 ,no slurred speech  -pt  follows command there is slight asymmetry when pt keeps arms straight his right arm progressively falls  -sensation, radial pulse, hand  equal in upper arms  -Lower extremity pt able to press with feet, sensation intact.  -pt had CT head on admission  -was negative for occlusion or hemorrhage   -will repeat CT head to r/o stroke , pt on blood thinners

## 2020-12-30 NOTE — SWALLOW BEDSIDE ASSESSMENT ADULT - SLP PERTINENT HISTORY OF CURRENT PROBLEM
This is a 64yo male whose PMH includes CHF, pulmonary embolus and alcohol abuse presents to the ER due to progressive inability to ambulate. Separately still uses alcohol but not interested in a detox program. AS of yesterday ~around lunch that patietn started choking at meals. consult initiated diet not changed. Patient continued to cough with meals and noted to drool.

## 2020-12-30 NOTE — PROGRESS NOTE ADULT - ASSESSMENT
This is a 66yo male whose PMH includes CHF, pulmonary embolus and alcohol abuse presents to the ER due to progressive inability to ambulate. Separately still uses alcohol but not interested in a detox program.         12/30 /20  pt is seen and examined at bed side   AAOX3,he is noted to have mild facial deviation on the left side ? time   CT of the brain ordered   pt is already on ASA, Lipitor   pending speech eval   will hold librium as pt seems lethargic at times       NAUSEA vomiting possibly related to gastritis resolved   started on Zofran  will add Protonix     dysphagia  etiology unknown?CVA  ct of brain ordered   speech consulted      sever Ambulatory dysfunction    - physiatry recommending STR  - PT open  pending TSH,B12,VIT D     Acute alcohol abuse.   - thiamine, Folic acid and MV   - librium taper/hold for now        # CHF (congestive heart failure)  - Per EMR, HFpEF (chronic)  - monitor i and o     # pulmonary embolism  - continue home eliquis    # HTN (hypertension)  - on losartan, lopressor  - monitor bp    # possible thiamine and folate deficiency   continue supplement        DVT PPX: eliquis from home  GI PPX:protonix   Code status: FULL CODE

## 2020-12-30 NOTE — PROGRESS NOTE ADULT - SUBJECTIVE AND OBJECTIVE BOX
Name: CHAPARRO ROBISON  Age: 65y  Gender: Male    Patient is a 65y old  Male who presents with a chief complaint of     HOD  Interval events:  Pt was seen and examined.     Allergies:  No Known Allergies      PHYSICAL EXAM:    Vitals:  Vital Signs Last 24 Hrs  T(C): 36.7 (30 Dec 2020 12:54), Max: 37.3 (29 Dec 2020 16:30)  T(F): 98 (30 Dec 2020 12:54), Max: 99.2 (29 Dec 2020 16:30)  HR: 62 (30 Dec 2020 12:54) (62 - 88)  BP: 148/76 (30 Dec 2020 12:54) (122/60 - 157/72)  BP(mean): --  RR: 18 (30 Dec 2020 12:54) (16 - 18)  SpO2: 95% (29 Dec 2020 17:00) (95% - 95%)    GENERAL: NAD  CHEST/LUNG: CTAB  HEART: S1,S2 RRR  ABDOMEN: Soft, NT/ND, +BS  EXTREMITIES:  2+ DP, no LE edema      LABS:                        13.6   9.77  )-----------( 157      ( 30 Dec 2020 11:32 )             41.2     12-30    137  |  103  |  14  ----------------------------<  122<H>  4.0   |  25  |  0.8    Ca    9.2      30 Dec 2020 11:32  Phos  4.1     12-29  Mg     2.0     12-29              MEDICATIONS  (STANDING):  apixaban 5 milliGRAM(s) Oral two times a day  aspirin  chewable 81 milliGRAM(s) Oral daily  chlordiazePOXIDE   Oral   chlordiazePOXIDE 10 milliGRAM(s) Oral every 4 hours  ergocalciferol 55649 Unit(s) Oral once  folic acid 1 milliGRAM(s) Oral daily  losartan 50 milliGRAM(s) Oral daily  metoprolol tartrate 25 milliGRAM(s) Oral two times a day  multivitamin/minerals 1 Tablet(s) Oral daily  nicotine - 21 mG/24Hr(s) Patch 1 patch Transdermal daily  pantoprazole  Injectable 40 milliGRAM(s) IV Push daily  thiamine 100 milliGRAM(s) Oral daily        RADIOLOGY & ADDITIONAL TESTS:    Imaging Personally Reviewed:  [x] YES  [ ] NO

## 2020-12-30 NOTE — CHART NOTE - NSCHARTNOTEFT_GEN_A_CORE
Patient with right sided weakness with facial droop since morning --> Neurology consult placed Patient with right sided weakness with facial droop since morning  Unable to swallow  Holding po meds for now   pt on Eliquis --> will wait for neurology clearance before starting A/C  BP:190s --> stat dose on hydralazine 10mg --> will allow permissive htn for now     Neurology consult placed

## 2020-12-31 LAB
ANION GAP SERPL CALC-SCNC: 11 MMOL/L — SIGNIFICANT CHANGE UP (ref 7–14)
BUN SERPL-MCNC: 12 MG/DL — SIGNIFICANT CHANGE UP (ref 10–20)
CALCIUM SERPL-MCNC: 9.3 MG/DL — SIGNIFICANT CHANGE UP (ref 8.5–10.1)
CHLORIDE SERPL-SCNC: 103 MMOL/L — SIGNIFICANT CHANGE UP (ref 98–110)
CO2 SERPL-SCNC: 24 MMOL/L — SIGNIFICANT CHANGE UP (ref 17–32)
CREAT SERPL-MCNC: 0.9 MG/DL — SIGNIFICANT CHANGE UP (ref 0.7–1.5)
FOLATE SERPL-MCNC: 9.1 NG/ML — SIGNIFICANT CHANGE UP
GLUCOSE SERPL-MCNC: 115 MG/DL — HIGH (ref 70–99)
HCT VFR BLD CALC: 41.5 % — LOW (ref 42–52)
HGB BLD-MCNC: 13.7 G/DL — LOW (ref 14–18)
MCHC RBC-ENTMCNC: 31.8 PG — HIGH (ref 27–31)
MCHC RBC-ENTMCNC: 33 G/DL — SIGNIFICANT CHANGE UP (ref 32–37)
MCV RBC AUTO: 96.3 FL — HIGH (ref 80–94)
NRBC # BLD: 0 /100 WBCS — SIGNIFICANT CHANGE UP (ref 0–0)
PLATELET # BLD AUTO: 135 K/UL — SIGNIFICANT CHANGE UP (ref 130–400)
POTASSIUM SERPL-MCNC: 4.1 MMOL/L — SIGNIFICANT CHANGE UP (ref 3.5–5)
POTASSIUM SERPL-SCNC: 4.1 MMOL/L — SIGNIFICANT CHANGE UP (ref 3.5–5)
RBC # BLD: 4.31 M/UL — LOW (ref 4.7–6.1)
RBC # FLD: 13.2 % — SIGNIFICANT CHANGE UP (ref 11.5–14.5)
SARS-COV-2 RNA SPEC QL NAA+PROBE: SIGNIFICANT CHANGE UP
SODIUM SERPL-SCNC: 138 MMOL/L — SIGNIFICANT CHANGE UP (ref 135–146)
TSH SERPL-MCNC: 0.63 UIU/ML — SIGNIFICANT CHANGE UP (ref 0.27–4.2)
VIT B12 SERPL-MCNC: 650 PG/ML — SIGNIFICANT CHANGE UP (ref 232–1245)
WBC # BLD: 7.25 K/UL — SIGNIFICANT CHANGE UP (ref 4.8–10.8)
WBC # FLD AUTO: 7.25 K/UL — SIGNIFICANT CHANGE UP (ref 4.8–10.8)

## 2020-12-31 PROCEDURE — 99232 SBSQ HOSP IP/OBS MODERATE 35: CPT

## 2020-12-31 PROCEDURE — 99221 1ST HOSP IP/OBS SF/LOW 40: CPT

## 2020-12-31 RX ORDER — FLUTICASONE PROPIONATE 50 MCG
1 SPRAY, SUSPENSION NASAL
Refills: 0 | Status: DISCONTINUED | OUTPATIENT
Start: 2020-12-31 | End: 2021-01-05

## 2020-12-31 RX ORDER — SODIUM CHLORIDE 9 MG/ML
1000 INJECTION, SOLUTION INTRAVENOUS
Refills: 0 | Status: DISCONTINUED | OUTPATIENT
Start: 2020-12-31 | End: 2021-01-05

## 2020-12-31 RX ORDER — ATORVASTATIN CALCIUM 80 MG/1
80 TABLET, FILM COATED ORAL AT BEDTIME
Refills: 0 | Status: DISCONTINUED | OUTPATIENT
Start: 2020-12-31 | End: 2021-01-05

## 2020-12-31 RX ORDER — PSEUDOEPHEDRINE HCL 30 MG
30 TABLET ORAL
Refills: 0 | Status: DISCONTINUED | OUTPATIENT
Start: 2020-12-31 | End: 2021-01-05

## 2020-12-31 RX ADMIN — Medication 30 MILLIGRAM(S): at 18:04

## 2020-12-31 RX ADMIN — Medication 1 PATCH: at 13:09

## 2020-12-31 RX ADMIN — Medication 1 SPRAY(S): at 18:04

## 2020-12-31 RX ADMIN — Medication 1 MILLIGRAM(S): at 13:09

## 2020-12-31 RX ADMIN — Medication 100 MILLIGRAM(S): at 13:09

## 2020-12-31 RX ADMIN — SODIUM CHLORIDE 100 MILLILITER(S): 9 INJECTION, SOLUTION INTRAVENOUS at 18:06

## 2020-12-31 RX ADMIN — Medication 1 PATCH: at 13:28

## 2020-12-31 RX ADMIN — PANTOPRAZOLE SODIUM 40 MILLIGRAM(S): 20 TABLET, DELAYED RELEASE ORAL at 13:09

## 2020-12-31 RX ADMIN — Medication 25 MILLIGRAM(S): at 18:04

## 2020-12-31 RX ADMIN — APIXABAN 5 MILLIGRAM(S): 2.5 TABLET, FILM COATED ORAL at 18:04

## 2020-12-31 RX ADMIN — Medication 81 MILLIGRAM(S): at 13:09

## 2020-12-31 RX ADMIN — Medication 1 PATCH: at 19:05

## 2020-12-31 RX ADMIN — Medication 1 TABLET(S): at 13:09

## 2020-12-31 RX ADMIN — ATORVASTATIN CALCIUM 80 MILLIGRAM(S): 80 TABLET, FILM COATED ORAL at 22:00

## 2020-12-31 RX ADMIN — Medication 1 PATCH: at 07:46

## 2020-12-31 NOTE — PROGRESS NOTE ADULT - ASSESSMENT
This is a 66yo male whose PMH includes CHF, pulmonary embolus and alcohol abuse presents to the ER due to progressive inability to ambulate. Separately still uses alcohol but not interested in a detox program.         12/31/20  pt is seen and examined at bed side   AAOX3,he is noted to have mild facial deviation on the left side ? time   CT of the brain did not show acute CVA  PT also has persistent  cough possibly related to post nasal drip cough rhiannon give him  cough medication Flonase and sudphide      acute left side facial deviation ?new CVA  ct of the brain negative for acute pathology   on ASA, lipitor  NO moter  sensory deficit   has been on Eliquis not candidate for tPA  NEUROLOGY consulted   may need MRI of the brain ,carotid doppler ,echo                NAUSEA vomiting possibly related to gastritis resolved   started on Zofran  will add Protonix     dysphagia  etiology unknown?CVA  ct of brain ordered   speech consulted        cough possibly related to post nasal drip cough ?aspiration   started on Flonase cough medication  and decongestant     sever Ambulatory dysfunction    - physiatry recommending STR  - PT open  pending TSH,B12,VIT D     Acute alcohol abuse.   - thiamine, Folic acid and MV   - librium taper/hold for now        # CHF (congestive heart failure)  - Per EMR, HFpEF (chronic)  - monitor i and o     # pulmonary embolism  - continue home eliquis    # HTN (hypertension)  - on losartan, lopressor  - monitor bp    # possible thiamine and folate deficiency   continue supplement        DVT PPX: eliquis from home  GI PPX:protonix   Code status: FULL CODE

## 2020-12-31 NOTE — PROVIDER CONTACT NOTE (OTHER) - BACKGROUND
ordered.  Hydralazine and Lasix to be administered.  Rechecked /81, P 67.  Dr. Trujillo notified.  Hydralazine to be administered as ordered, Lasix held at this time.  Will monitor

## 2020-12-31 NOTE — PROVIDER CONTACT NOTE (OTHER) - SITUATION
Pt 's /91, P62.  Pt calm, with no signs/symptoms of withdrawal.   Dr. Trujillo notified of BP and patient's Rt sided weakness and facial asymetry.  Dr. Trujillo at bedside to assess.  Neuro consult

## 2020-12-31 NOTE — CONSULT NOTE ADULT - ASSESSMENT
66yo male whose PMH includes CHF. pulmonary embolus and alcohol abuse presents to the ER due to progressive inability to ambulate since 12/26. He was noted to have right sided weakness on 12/30 morning. CT head showed no acute changes.     # Most likely acute left MCA infarct.   Unknown last known well but likely since 12/30 in the morning   Outside of the window for intervention and tpa  CTA h/n  Net[facility] MRI   ASA and Lipitor 80  Could continue Eliquis for PE  DEXTER   Neuro check q4h for tonight 
ETOH dependency  Gait abnormality          Counseling done  Continue ativan prn  Thiamine and folate supplements  Mg levels  F/U with CATCH team Monday to arrange outpt programs  
IMPRESSION: Rehab of 64 y/o  m  rehab  for  debility  gd      PRECAUTIONS: [  ] Cardiac  [  ] Respiratory  [  ] Seizures [  ] Contact Isolation  [  ] Droplet Isolation  [ FALL ] Other    Weight Bearing Status:     RECOMMENDATION:    Out of Bed to Chair     DVT/Decubiti Prophylaxis    REHAB PLAN:     [   xx] Bedside P/T 3-5 times a week   [   ]   Bedside O/T  2-3 times a week             [   ] No Rehab Therapy Indicated                   [   ]  Speech Therapy   Conditioning/ROM                                    ADL  Bed Mobility                                               Conditioning/ROM  Transfers                                                     Bed Mobility  Sitting /Standing Balance                         Transfers                                        Gait Training                                               Sitting/Standing Balance  Stair Training [   ]Applicable                    Home equipment Eval                                                                        Splinting  [   ] Only      GOALS:   ADL   [ x  ]   Independent                    Transfers  [   ] Independent                          Ambulation  [ xx  ] Independent     [    x With device                            [  x ]  CG                                                         [x   ]  CG                                                                  [ x] CG                            [    ] Min A                                                   [   ] Min A                                                              [   ] Min  A          DISCHARGE PLAN:   [   ]  Good candidate for Intensive Rehabilitation/Hospital based-4A [facility]                                             Will tolerate 3hrs Intensive Rehab Daily                                       [  xxx  ]  Short Term Rehab in Skilled Nursing Facility cont  current care                                       [    ]  Home with Outpatient or VN services                                         [    ]  Possible Candidate for Intensive Hospital based Rehab

## 2020-12-31 NOTE — PROGRESS NOTE ADULT - SUBJECTIVE AND OBJECTIVE BOX
Name: CHAPARRO ROBISON  Age: 65y  Gender: Male    Patient is a 65y old  Male who presents with a chief complaint of        Pt was seen and examined.     Allergies:  No Known Allergies      PHYSICAL EXAM:    Vitals:  Vital Signs Last 24 Hrs  T(C): 36.4 (31 Dec 2020 05:00), Max: 36.4 (31 Dec 2020 05:00)  T(F): 97.6 (31 Dec 2020 05:00), Max: 97.6 (31 Dec 2020 05:00)  HR: 67 (31 Dec 2020 05:00) (62 - 73)  BP: 145/76 (31 Dec 2020 05:00) (140/65 - 196/91)  BP(mean): --  RR: 18 (31 Dec 2020 05:00) (16 - 18)  SpO2: --    GENERAL: NAD  CHEST/LUNG: CTAB  HEART: S1,S2 RRR  ABDOMEN: Soft, NT/ND, +BS  EXTREMITIES:  2+ DP, no LE edema      LABS:                        13.7   7.25  )-----------( 135      ( 31 Dec 2020 11:01 )             41.5     12-31    138  |  103  |  12  ----------------------------<  115<H>  4.1   |  24  |  0.9    Ca    9.3      31 Dec 2020 11:01              MEDICATIONS  (STANDING):  apixaban 5 milliGRAM(s) Oral two times a day  aspirin  chewable 81 milliGRAM(s) Oral daily  ergocalciferol 73272 Unit(s) Oral once  fluticasone propionate 50 MICROgram(s)/spray Nasal Spray 1 Spray(s) Both Nostrils two times a day  folic acid 1 milliGRAM(s) Oral daily  losartan 50 milliGRAM(s) Oral daily  metoprolol tartrate 25 milliGRAM(s) Oral two times a day  multivitamin/minerals 1 Tablet(s) Oral daily  nicotine - 21 mG/24Hr(s) Patch 1 patch Transdermal daily  pantoprazole  Injectable 40 milliGRAM(s) IV Push daily  pseudoephedrine 30 milliGRAM(s) Oral two times a day  thiamine 100 milliGRAM(s) Oral daily        RADIOLOGY & ADDITIONAL TESTS:    Imaging Personally Reviewed:  [x] YES  [ ] NO

## 2020-12-31 NOTE — CHART NOTE - NSCHARTNOTEFT_GEN_A_CORE
Discussed with Attending   Awaiting MRI results   Neuro recommendations appreciated   Ordered statin, continue with ASA and Eliquis   No need for q4 neuro checks, if needed patient needs to be on ICU, Discussed with the attending and no need to upgrade to ICU/tele

## 2020-12-31 NOTE — CONSULT NOTE ADULT - SUBJECTIVE AND OBJECTIVE BOX
HPI:  64yo male whose PMH includes CHF. pulmonary embolus and alcohol abuse presents to the ER due to progressive inability to ambulate. Separately still uses alcohol but not interested in a detox program. Also unwilling to change out of his street clothes ptn  seen and exam  at  bed  side  aox3  nad  fu  command  c/o general  weakness     PTN  REFERRED TO ACUTE  REHAB  FOR  EVAL AND  TX   PAST MEDICAL & SURGICAL HISTORY:  Pulmonary embolism  2015    Chronic back pain    Alcohol dependence    HLD (hyperlipidemia)    HTN (hypertension)    CHF (congestive heart failure)    TIA (transient ischemic attack)    History of appendectomy        Hospital Course:    TODAY'S SUBJECTIVE & REVIEW OF SYMPTOMS:     Constitutional WNL   Cardio WNL   Resp WNL   GI WNL  Heme WNL  Endo WNL  Skin WNL  MSK WNL  Neuro WNL  Cognitive WNL  Psych WNL      MEDICATIONS  (STANDING):  apixaban 5 milliGRAM(s) Oral two times a day  aspirin  chewable 81 milliGRAM(s) Oral daily  chlordiazePOXIDE   Oral   chlordiazePOXIDE 25 milliGRAM(s) Oral every 4 hours  chlordiazePOXIDE 20 milliGRAM(s) Oral every 4 hours  folic acid 1 milliGRAM(s) Oral daily  losartan 50 milliGRAM(s) Oral daily  metoprolol tartrate 25 milliGRAM(s) Oral two times a day  multivitamin/minerals 1 Tablet(s) Oral daily  nicotine - 21 mG/24Hr(s) Patch 1 patch Transdermal daily  thiamine 100 milliGRAM(s) Oral daily    MEDICATIONS  (PRN):  chlordiazePOXIDE 25 milliGRAM(s) Oral every 4 hours PRN Withdrawal  methocarbamol 500 milliGRAM(s) Oral every 8 hours PRN Muscle Spasm  senna 2 Tablet(s) Oral at bedtime PRN Constipation      FAMILY HISTORY:  FH: MI (myocardial infarction) (Father, Mother)  Dad, Mom        Allergies    No Known Allergies    Intolerances        SOCIAL HISTORY:    [  ] Etoh  [  ] Smoking  [  ] Substance abuse     Home Environment:  [ x ] Home Alone with  friends  [  ] Lives with Family  [  ] Home Health Aid    Dwelling:  [  ] Apartment  [ x ] Private House  [  ] Adult Home  [  ] Skilled Nursing Facility      [  ] Short Term  [  ] Long Term  [x  ] Stairs       Elevator [  ]    FUNCTIONAL STATUS PTA: (Check all that apply)  Ambulation: [ x  ]Independent    [  ] Dependent     [  ] Non-Ambulatory  Assistive Device: [ x ] SA Cane  [  ]  Q Cane  [  ] Walker  [  ]  Wheelchair  ADL : [x  ] Independent  [  ]  Dependent       Vital Signs Last 24 Hrs  T(C): 36.3 (28 Dec 2020 05:10), Max: 36.7 (27 Dec 2020 21:08)  T(F): 97.4 (28 Dec 2020 05:10), Max: 98 (27 Dec 2020 21:08)  HR: 64 (28 Dec 2020 05:10) (62 - 94)  BP: 160/84 (28 Dec 2020 05:10) (105/52 - 160/84)  BP(mean): --  RR: 16 (28 Dec 2020 05:10) (16 - 16)  SpO2: --      PHYSICAL EXAM: Alert & Oriented X3  GENERAL: NAD, well-groomed, well-developed  HEAD:  Atraumatic, Normocephalic  EYES: EOMI, PERRLA, conjunctiva and sclera clear  NECK: Supple, No JVD, Normal thyroid  CHEST/LUNG: Clear to percussion bilaterally; No rales, rhonchi, wheezing, or rubs  HEART: Regular rate and rhythm; No murmurs, rubs, or gallops  ABDOMEN: Soft, Nontender, Nondistended; Bowel sounds present  EXTREMITIES:  2+ Peripheral Pulses, No clubbing, cyanosis, or edema    NERVOUS SYSTEM:  Cranial Nerves 2-12 intact [x  ] Abnormal  [  ]  ROM: WFL all extremities [  ]  Abnormal [x  ]  Motor Strength: WFL all extremities  [  ]  Abnormal [x  ]  Sensation: intact to light touch [  ] Abnormal [ x ]  Reflexes: Symmetric [  ]  Abnormal [ x ]    FUNCTIONAL STATUS:  Bed Mobility: Independent [  ]  Supervision [  ]  Needs Assistance [  ]  N/A [ x ]  Transfers: Independent [  ]  Supervision [  ]  Needs Assistance [  ]  N/A [ x ]   Ambulation: Independent [  ]  Supervision [  ]  Needs Assistance [  ]  N/A [ x]  ADL: Independent [  ] Requires Assistance [  ] N/A [  ]  SEE PT/OT IE NOTES    LABS:                        13.7   6.74  )-----------( 152      ( 28 Dec 2020 06:23 )             40.7     12-27    137  |  99  |  12  ----------------------------<  114<H>  4.5   |  22  |  0.9    Ca    9.2      27 Dec 2020 08:21  Mg     2.1     12-26    TPro  6.9  /  Alb  4.4  /  TBili  0.9  /  DBili  x   /  AST  60<H>  /  ALT  73<H>  /  AlkPhos  88  12-27          RADIOLOGY & ADDITIONAL STUDIES:    Assesment:
Neurology  Consult Note  20    Name:  CHAPARRO ROBISON; 65y (1955)    Reason for Admission: UNABLE TO AMBULATE ACUTE ALCOHOL ABUSE        Chief Complaint:  HPI:  64yo male whose PMH includes CHF. pulmonary embolus and alcohol abuse presents to the ER due to progressive inability to ambulate. Separately still uses alcohol but not interested in a detox program. Also unwilling to change out of his street clothes (26 Dec 2020 23:59)  Neurology consulted on  for right facial weakness. Per report he was noted to haveright sided weakness with facial droop and dysphagia since morning of .   Unable to swallow    Review of Systems:  As states in HPI.        PMHx:   Pulmonary embolism    Chronic back pain    Alcohol dependence    HLD (hyperlipidemia)    HTN (hypertension)    CHF (congestive heart failure)    TIA (transient ischemic attack)      PFHx:   FH: MI (myocardial infarction) (Father, Mother)      PSuHx:   History of appendectomy    No significant past surgical history      PSoHx:     History of alcohol abuse       Medications:  MEDICATIONS  (STANDING):  apixaban 5 milliGRAM(s) Oral two times a day  aspirin  chewable 81 milliGRAM(s) Oral daily  ergocalciferol 90886 Unit(s) Oral once  fluticasone propionate 50 MICROgram(s)/spray Nasal Spray 1 Spray(s) Both Nostrils two times a day  folic acid 1 milliGRAM(s) Oral daily  losartan 50 milliGRAM(s) Oral daily  metoprolol tartrate 25 milliGRAM(s) Oral two times a day  multivitamin/minerals 1 Tablet(s) Oral daily  nicotine - 21 mG/24Hr(s) Patch 1 patch Transdermal daily  pantoprazole  Injectable 40 milliGRAM(s) IV Push daily  pseudoephedrine 30 milliGRAM(s) Oral two times a day  thiamine 100 milliGRAM(s) Oral daily    MEDICATIONS  (PRN):  chlordiazePOXIDE 25 milliGRAM(s) Oral every 4 hours PRN Withdrawal  guaiFENesin   Syrup  (Sugar-Free) 200 milliGRAM(s) Oral every 6 hours PRN Cough  methocarbamol 500 milliGRAM(s) Oral every 8 hours PRN Muscle Spasm  ondansetron Injectable 4 milliGRAM(s) IV Push every 8 hours PRN Nausea and/or Vomiting  senna 2 Tablet(s) Oral at bedtime PRN Constipation    Vitals:  T(C): 36 (20 @ 15:25), Max: 36.4 (20 @ 05:00)  HR: 93 (20 @ 15:25) (62 - 93)  BP: 146/79 (20 @ 15:25) (140/65 - 196/91)  RR: 16 (20 @ 15:25) (16 - 18)  SpO2: 95% (20 @ 15:25) (95% - 95%)    Labs:                        13.7   7.25  )-----------( 135      ( 31 Dec 2020 11:01 )             41.5         138  |  103  |  12  ----------------------------<  115<H>  4.1   |  24  |  0.9    Ca    9.3      31 Dec 2020 11:01      CAPILLARY BLOOD GLUCOS          PHYSICAL EXAMINATION:  General: no acute distress.  Eyes: Conjunctiva and sclera clear.  Neurologic:  - Mental Status:  Alert, awake, oriented to person, place, and time. Slurred speech. No aphasia   II:  Visual fields are full to confrontation; Pupils are equal, round, and reactive to light  III, IV, VI:  Extraocular movements are intact without nystagmus.  VII:  Right UMN facial weakness   XII:  Tongue protudes in the midline.  - Motor:  Right arm strength: 4/5. Right le/5. Left is 5/5    Reflexes:  2+ and symmetric   - Sensory:  Intactlight touch,  - Coordination:  Finger-nose-finger intact   - Gait: Deferred     Ct head:   IMPRESSION:  No significant change since recent head CT of 2020.    No acute intracranial pathology. No evidence of midline shift, mass effect or intracranial hemorrhage.    Moderate chronic microvascular-type changes.  
Detox consult      Pt interviewed, examined and EMR chart reviewed.  Hx of ETOH abuse, has been drinking for __many___ years/months  variable periods of sobriety in the past.  16 yrs clean. relapsed 6 mo ago  Has been in detox before _____yes,   __x___No  No Sz hx NO DT hx  SOCIAL HISTORY:etoh    REVIEW OF SYSTEMS:    Constitutional: No fever, weight loss or fatigue  ENT:  No difficulty hearing, tinnitus, vertigo; No sinus or throat pain  Neck: No pain or stiffness  Respiratory: No cough, wheezing, chills or hemoptysis  Cardiovascular: No chest pain, palpitations, shortness of breath, dizziness or leg swelling  Gastrointestinal: No abdominal or epigastric pain. No nausea, vomiting or hematemesis; No diarrhea or constipation. No melena or hematochezia.  Neurological: No headaches, memory loss, loss of strength, (++) numbness or tremors  Musculoskeletal:  (++)  joint pain or swelling; No muscle, back or extremity pain  Psychiatric: No depression, anxiety, mood swings or difficulty sleeping    MEDICATIONS  (STANDING):  apixaban 5 milliGRAM(s) Oral two times a day  aspirin  chewable 81 milliGRAM(s) Oral daily  folic acid 1 milliGRAM(s) Oral daily  losartan 50 milliGRAM(s) Oral daily  metoprolol tartrate 25 milliGRAM(s) Oral two times a day  multivitamin/minerals 1 Tablet(s) Oral daily  nicotine - 21 mG/24Hr(s) Patch 1 patch Transdermal daily  thiamine 100 milliGRAM(s) Oral daily    MEDICATIONS  (PRN):  LORazepam     Tablet 2 milliGRAM(s) Oral every 4 hours PRN alcohol withdrawal  methocarbamol 500 milliGRAM(s) Oral every 8 hours PRN Muscle Spasm  senna 2 Tablet(s) Oral at bedtime PRN Constipation      Vital Signs Last 24 Hrs  T(C): 36.3 (27 Dec 2020 05:00), Max: 37.2 (26 Dec 2020 20:51)  T(F): 97.3 (27 Dec 2020 05:00), Max: 98.9 (26 Dec 2020 20:51)  HR: 56 (27 Dec 2020 05:00) (56 - 83)  BP: 194/93 (27 Dec 2020 05:00) (180/100 - 199/98)  BP(mean): --  RR: 18 (27 Dec 2020 05:00) (18 - 19)  SpO2: 99% (27 Dec 2020 00:22) (98% - 99%)    PHYSICAL EXAM:    Constitutional: NAD, well-groomed, well-developed  HEENT: PERRLA, EOMI, Normal Hearing, MMM  Neck: No LAD, No JVD  Back: Normal spine flexure, No CVA tenderness  Respiratory: CTAB/L  Cardiovascular: S1 and S2, RRR, no M/G/R  Gastrointestinal: BS+, soft, NT/ND  Extremities: No peripheral edema  Vascular: 2+ peripheral pulses  Neurological: A/O x 3, no focal deficits    LABS:                        15.5   7.83  )-----------( 183      ( 26 Dec 2020 22:40 )             45.3     12-26    140  |  100  |  12  ----------------------------<  131<H>  4.0   |  24  |  1.0    Ca    9.4      26 Dec 2020 22:40  Mg     2.1     12-26    TPro  7.4  /  Alb  4.6  /  TBili  0.4  /  DBili  x   /  AST  70<H>  /  ALT  90<H>  /  AlkPhos  108  12-26        Drug Screen Urine:  Alcohol Level  Alcohol, Blood: 94 mg/dL (12-26-20 @ 22:40)        RADIOLOGY & ADDITIONAL STUDIES: noted in PACS

## 2021-01-01 LAB
ANION GAP SERPL CALC-SCNC: 10 MMOL/L — SIGNIFICANT CHANGE UP (ref 7–14)
BUN SERPL-MCNC: 13 MG/DL — SIGNIFICANT CHANGE UP (ref 10–20)
CALCIUM SERPL-MCNC: 8.8 MG/DL — SIGNIFICANT CHANGE UP (ref 8.5–10.1)
CHLORIDE SERPL-SCNC: 107 MMOL/L — SIGNIFICANT CHANGE UP (ref 98–110)
CO2 SERPL-SCNC: 24 MMOL/L — SIGNIFICANT CHANGE UP (ref 17–32)
CREAT SERPL-MCNC: 0.8 MG/DL — SIGNIFICANT CHANGE UP (ref 0.7–1.5)
GLUCOSE SERPL-MCNC: 150 MG/DL — HIGH (ref 70–99)
HCT VFR BLD CALC: 39.4 % — LOW (ref 42–52)
HGB BLD-MCNC: 12.9 G/DL — LOW (ref 14–18)
MCHC RBC-ENTMCNC: 31.5 PG — HIGH (ref 27–31)
MCHC RBC-ENTMCNC: 32.7 G/DL — SIGNIFICANT CHANGE UP (ref 32–37)
MCV RBC AUTO: 96.1 FL — HIGH (ref 80–94)
NRBC # BLD: 0 /100 WBCS — SIGNIFICANT CHANGE UP (ref 0–0)
PLATELET # BLD AUTO: 151 K/UL — SIGNIFICANT CHANGE UP (ref 130–400)
POTASSIUM SERPL-MCNC: 3.8 MMOL/L — SIGNIFICANT CHANGE UP (ref 3.5–5)
POTASSIUM SERPL-SCNC: 3.8 MMOL/L — SIGNIFICANT CHANGE UP (ref 3.5–5)
RBC # BLD: 4.1 M/UL — LOW (ref 4.7–6.1)
RBC # FLD: 13.2 % — SIGNIFICANT CHANGE UP (ref 11.5–14.5)
SODIUM SERPL-SCNC: 141 MMOL/L — SIGNIFICANT CHANGE UP (ref 135–146)
WBC # BLD: 7.71 K/UL — SIGNIFICANT CHANGE UP (ref 4.8–10.8)
WBC # FLD AUTO: 7.71 K/UL — SIGNIFICANT CHANGE UP (ref 4.8–10.8)

## 2021-01-01 PROCEDURE — 72148 MRI LUMBAR SPINE W/O DYE: CPT | Mod: 26

## 2021-01-01 PROCEDURE — 93880 EXTRACRANIAL BILAT STUDY: CPT | Mod: 26

## 2021-01-01 PROCEDURE — 99232 SBSQ HOSP IP/OBS MODERATE 35: CPT

## 2021-01-01 PROCEDURE — 70551 MRI BRAIN STEM W/O DYE: CPT | Mod: 26

## 2021-01-01 RX ADMIN — Medication 1 SPRAY(S): at 06:22

## 2021-01-01 RX ADMIN — APIXABAN 5 MILLIGRAM(S): 2.5 TABLET, FILM COATED ORAL at 17:34

## 2021-01-01 RX ADMIN — LOSARTAN POTASSIUM 50 MILLIGRAM(S): 100 TABLET, FILM COATED ORAL at 06:22

## 2021-01-01 RX ADMIN — Medication 1 PATCH: at 08:19

## 2021-01-01 RX ADMIN — Medication 25 MILLIGRAM(S): at 06:22

## 2021-01-01 RX ADMIN — Medication 1 SPRAY(S): at 17:34

## 2021-01-01 RX ADMIN — SODIUM CHLORIDE 100 MILLILITER(S): 9 INJECTION, SOLUTION INTRAVENOUS at 04:24

## 2021-01-01 RX ADMIN — Medication 30 MILLIGRAM(S): at 17:34

## 2021-01-01 RX ADMIN — SODIUM CHLORIDE 100 MILLILITER(S): 9 INJECTION, SOLUTION INTRAVENOUS at 07:56

## 2021-01-01 RX ADMIN — ATORVASTATIN CALCIUM 80 MILLIGRAM(S): 80 TABLET, FILM COATED ORAL at 22:45

## 2021-01-01 RX ADMIN — Medication 25 MILLIGRAM(S): at 17:34

## 2021-01-01 RX ADMIN — Medication 30 MILLIGRAM(S): at 06:22

## 2021-01-01 RX ADMIN — Medication 1 PATCH: at 11:12

## 2021-01-01 RX ADMIN — APIXABAN 5 MILLIGRAM(S): 2.5 TABLET, FILM COATED ORAL at 06:22

## 2021-01-01 NOTE — CHART NOTE - NSCHARTNOTEFT_GEN_A_CORE
.   Dr. Dill asked if could advance patient's diet to Dyspahgia 3 soft nectar.  RN aware if patient has difficulty tolerating she will hold and alert Dr. Dill.

## 2021-01-01 NOTE — PROGRESS NOTE ADULT - ASSESSMENT
[facility] wouldn't take [place] script-said it was .   This is a 66yo male whose PMH includes CHF, pulmonary embolus and alcohol abuse presents to the ER due to progressive inability to ambulate. Separately still uses alcohol but not interested in a detox program.         1/1/20  pt is seen and examined at bed side   AAOX3,still experiencing dysphagia,  pending MRI/echo/doppler    CT of the brain did not show acute CVA  PT also has persistent  cough possibly related aspiration   neurology on board       acute left side facial deviation ?new CVA  ct of the brain negative for acute pathology   on ASA, lipitor  NO moter  sensory deficit   has been on Eliquis not candidate for tPA  NEUROLOGY consulted   may need MRI of the brain ,carotid doppler ,echo         NAUSEA vomiting possibly related to gastritis resolved   started on Zofran  will add Protonix     dysphagia  etiology  most likely related to ?CVA  ct of brain ordered   speech consulted        cough possibly related to post nasal drip cough ?aspiration   started on Flonase cough medication  and decongestant     sever Ambulatory dysfunction    - physiatry recommending STR  - PT open  pending TSH,B12,VIT D     Acute alcohol abuse.   - thiamine, Folic acid and MV   - librium taper/hold for now        # CHF (congestive heart failure)  - Per EMR, HFpEF (chronic)  - monitor i and o     # pulmonary embolism  - continue home eliquis    # HTN (hypertension)  - on losartan, lopressor  - monitor bp    # possible thiamine and folate deficiency   continue supplement        DVT PPX: eliquis from home  GI PPX:protonix   Code status: FULL CODE

## 2021-01-01 NOTE — PROGRESS NOTE ADULT - SUBJECTIVE AND OBJECTIVE BOX
Name: CHAPARRO ROBISON  Age: 65y  Gender: Male    Patient is a 65y old  Male who presents with a chief complaint of       Pt was seen and examined at bed side .     Allergies:  No Known Allergies      PHYSICAL EXAM:    Vitals:  Vital Signs Last 24 Hrs  T(C): 36.4 (01 Jan 2021 06:02), Max: 36.4 (01 Jan 2021 06:02)  T(F): 97.5 (01 Jan 2021 06:02), Max: 97.5 (01 Jan 2021 06:02)  HR: 63 (01 Jan 2021 06:02) (62 - 93)  BP: 153/74 (01 Jan 2021 06:02) (146/79 - 154/78)  BP(mean): --  RR: 18 (01 Jan 2021 06:02) (16 - 18)  SpO2: 96% (01 Jan 2021 06:02) (95% - 97%)    GENERAL: NAD  CHEST/LUNG: CTAB  HEART: S1,S2 RRR  ABDOMEN: Soft, NT/ND, +BS  EXTREMITIES:  2+ DP, no LE edema      LABS:                        12.9   7.71  )-----------( 151      ( 01 Jan 2021 08:33 )             39.4     01-01    141  |  107  |  13  ----------------------------<  150<H>  3.8   |  24  |  0.8    Ca    8.8      01 Jan 2021 08:33              MEDICATIONS  (STANDING):  apixaban 5 milliGRAM(s) Oral two times a day  aspirin  chewable 81 milliGRAM(s) Oral daily  atorvastatin 80 milliGRAM(s) Oral at bedtime  dextrose 5% + sodium chloride 0.9%. 1000 milliLiter(s) (100 mL/Hr) IV Continuous <Continuous>  ergocalciferol 53738 Unit(s) Oral once  fluticasone propionate 50 MICROgram(s)/spray Nasal Spray 1 Spray(s) Both Nostrils two times a day  folic acid 1 milliGRAM(s) Oral daily  losartan 50 milliGRAM(s) Oral daily  metoprolol tartrate 25 milliGRAM(s) Oral two times a day  multivitamin/minerals 1 Tablet(s) Oral daily  nicotine - 21 mG/24Hr(s) Patch 1 patch Transdermal daily  pantoprazole  Injectable 40 milliGRAM(s) IV Push daily  pseudoephedrine 30 milliGRAM(s) Oral two times a day  thiamine 100 milliGRAM(s) Oral daily        RADIOLOGY & ADDITIONAL TESTS:    Imaging Personally Reviewed:  [x] YES  [ ] NO

## 2021-01-02 PROCEDURE — 99232 SBSQ HOSP IP/OBS MODERATE 35: CPT

## 2021-01-02 RX ORDER — METOPROLOL TARTRATE 50 MG
5 TABLET ORAL EVERY 6 HOURS
Refills: 0 | Status: DISCONTINUED | OUTPATIENT
Start: 2021-01-02 | End: 2021-01-05

## 2021-01-02 RX ADMIN — Medication 30 MILLIGRAM(S): at 06:16

## 2021-01-02 RX ADMIN — Medication 1 PATCH: at 11:43

## 2021-01-02 RX ADMIN — METHOCARBAMOL 500 MILLIGRAM(S): 500 TABLET, FILM COATED ORAL at 13:51

## 2021-01-02 RX ADMIN — APIXABAN 5 MILLIGRAM(S): 2.5 TABLET, FILM COATED ORAL at 18:13

## 2021-01-02 RX ADMIN — LOSARTAN POTASSIUM 50 MILLIGRAM(S): 100 TABLET, FILM COATED ORAL at 06:16

## 2021-01-02 RX ADMIN — Medication 1 SPRAY(S): at 18:13

## 2021-01-02 RX ADMIN — Medication 81 MILLIGRAM(S): at 11:43

## 2021-01-02 RX ADMIN — Medication 1 PATCH: at 19:05

## 2021-01-02 RX ADMIN — APIXABAN 5 MILLIGRAM(S): 2.5 TABLET, FILM COATED ORAL at 06:16

## 2021-01-02 RX ADMIN — Medication 1 MILLIGRAM(S): at 11:43

## 2021-01-02 RX ADMIN — Medication 1 SPRAY(S): at 06:16

## 2021-01-02 RX ADMIN — Medication 30 MILLIGRAM(S): at 18:13

## 2021-01-02 RX ADMIN — Medication 25 MILLIGRAM(S): at 06:16

## 2021-01-02 RX ADMIN — Medication 100 MILLIGRAM(S): at 11:43

## 2021-01-02 RX ADMIN — PANTOPRAZOLE SODIUM 40 MILLIGRAM(S): 20 TABLET, DELAYED RELEASE ORAL at 11:45

## 2021-01-02 RX ADMIN — Medication 1 TABLET(S): at 11:43

## 2021-01-02 RX ADMIN — ATORVASTATIN CALCIUM 80 MILLIGRAM(S): 80 TABLET, FILM COATED ORAL at 21:08

## 2021-01-02 RX ADMIN — Medication 25 MILLIGRAM(S): at 18:13

## 2021-01-02 NOTE — PROGRESS NOTE ADULT - ASSESSMENT
This is a 64yo male whose PMH includes CHF, pulmonary embolus and alcohol abuse presents to the ER due to progressive inability to ambulate most likely related to new CVA        1/2/20  pt is seen and examined at bed side   AAOX3,still experiencing dysphagia, still coughing when swallow   will keep him NPO for now until speech will evaluate him   HE said 'I want to go home today.' i told him he is not ready today    CT of the brain did not show acute CVA  MRI showed right pone CVA  PT also has persistent  cough possibly related aspiration   neurology on board       facial deviation ,dysphagia ,lower extremity weakness most likely related new CVA   MRI of the brin showed acute left vent rosalie   pt was on Eliquis and asa hence did not get tPA  on ASA 81, Lipitor 80  PT/OT/speech on board  carotid doppler ,echo normal   PT/OT/Speech on board  continue with fall/aspiration precaution         NAUSEA vomiting possibly related to gastritis resolved   started on Zofran  will add Protonix     dysphagia  etiology  most likely related to new CVA  speech consulted        cough possibly related to Dysphagias/ASPIRATION          sever Ambulatory dysfunction  possibly related to new CVA/physical deconditing   - physiatry recommending STR  B12,FOLATE normal  vit d2 19 will be replaced with vit d     Acute alcohol abuse.   monitor for Withdrawal signs   - thiamine, Folic acid and MV        # CHF (congestive heart failure)  - Per EMR, HFpEF (chronic)  - monitor i and o     # pulmonary embolism  - continue home eliquis    # HTN (hypertension)  - on losartan, lopressor  - monitor bp    # possible thiamine and folate deficiency   continue supplement        DVT PPX: eliquis from home  GI PPX:protonix   Code status: FULL CODE

## 2021-01-02 NOTE — PROGRESS NOTE ADULT - SUBJECTIVE AND OBJECTIVE BOX
Name: CHAPARRO ROBISON  Age: 65y  Gender: Male    Patient is a 65y old  Male who presents with a chief complaint of        Pt was seen and examined. stable overnight    Allergies:  No Known Allergies      PHYSICAL EXAM:    Vitals:  Vital Signs Last 24 Hrs  T(C): 36.2 (02 Jan 2021 05:35), Max: 36.4 (01 Jan 2021 21:08)  T(F): 97.1 (02 Jan 2021 05:35), Max: 97.6 (01 Jan 2021 21:08)  HR: 62 (02 Jan 2021 05:35) (60 - 66)  BP: 138/89 (02 Jan 2021 05:35) (138/89 - 167/77)  BP(mean): --  RR: 18 (02 Jan 2021 05:35) (16 - 18)  SpO2: 97% (01 Jan 2021 14:08) (97% - 97%)    GENERAL: NAD  CHEST/LUNG: CTAB  HEART: S1,S2 RRR  ABDOMEN: Soft, NT/ND, +BS  EXTREMITIES:  2+ DP, no LE edema      LABS:                        12.9   7.71  )-----------( 151      ( 01 Jan 2021 08:33 )             39.4     01-01    141  |  107  |  13  ----------------------------<  150<H>  3.8   |  24  |  0.8    Ca    8.8      01 Jan 2021 08:33              MEDICATIONS  (STANDING):  apixaban 5 milliGRAM(s) Oral two times a day  aspirin  chewable 81 milliGRAM(s) Oral daily  atorvastatin 80 milliGRAM(s) Oral at bedtime  dextrose 5% + sodium chloride 0.9%. 1000 milliLiter(s) (100 mL/Hr) IV Continuous <Continuous>  ergocalciferol 95404 Unit(s) Oral once  fluticasone propionate 50 MICROgram(s)/spray Nasal Spray 1 Spray(s) Both Nostrils two times a day  folic acid 1 milliGRAM(s) Oral daily  losartan 50 milliGRAM(s) Oral daily  metoprolol tartrate 25 milliGRAM(s) Oral two times a day  multivitamin/minerals 1 Tablet(s) Oral daily  nicotine - 21 mG/24Hr(s) Patch 1 patch Transdermal daily  pantoprazole  Injectable 40 milliGRAM(s) IV Push daily  pseudoephedrine 30 milliGRAM(s) Oral two times a day  thiamine 100 milliGRAM(s) Oral daily        RADIOLOGY & ADDITIONAL TESTS:    Imaging Personally Reviewed:  [x] YES  [ ] NO

## 2021-01-03 PROCEDURE — 71045 X-RAY EXAM CHEST 1 VIEW: CPT | Mod: 26

## 2021-01-03 PROCEDURE — 99232 SBSQ HOSP IP/OBS MODERATE 35: CPT

## 2021-01-03 PROCEDURE — 70498 CT ANGIOGRAPHY NECK: CPT | Mod: 26

## 2021-01-03 PROCEDURE — 70496 CT ANGIOGRAPHY HEAD: CPT | Mod: 26

## 2021-01-03 RX ADMIN — Medication 1 PATCH: at 11:34

## 2021-01-03 RX ADMIN — Medication 1 PATCH: at 12:19

## 2021-01-03 RX ADMIN — Medication 25 MILLIGRAM(S): at 18:21

## 2021-01-03 RX ADMIN — Medication 100 MILLIGRAM(S): at 12:19

## 2021-01-03 RX ADMIN — PANTOPRAZOLE SODIUM 40 MILLIGRAM(S): 20 TABLET, DELAYED RELEASE ORAL at 12:20

## 2021-01-03 RX ADMIN — Medication 30 MILLIGRAM(S): at 06:13

## 2021-01-03 RX ADMIN — Medication 1 MILLIGRAM(S): at 12:19

## 2021-01-03 RX ADMIN — APIXABAN 5 MILLIGRAM(S): 2.5 TABLET, FILM COATED ORAL at 06:25

## 2021-01-03 RX ADMIN — Medication 1 PATCH: at 07:34

## 2021-01-03 RX ADMIN — LOSARTAN POTASSIUM 50 MILLIGRAM(S): 100 TABLET, FILM COATED ORAL at 06:25

## 2021-01-03 RX ADMIN — SODIUM CHLORIDE 100 MILLILITER(S): 9 INJECTION, SOLUTION INTRAVENOUS at 18:21

## 2021-01-03 RX ADMIN — ATORVASTATIN CALCIUM 80 MILLIGRAM(S): 80 TABLET, FILM COATED ORAL at 21:42

## 2021-01-03 RX ADMIN — Medication 25 MILLIGRAM(S): at 06:25

## 2021-01-03 RX ADMIN — SODIUM CHLORIDE 100 MILLILITER(S): 9 INJECTION, SOLUTION INTRAVENOUS at 06:25

## 2021-01-03 RX ADMIN — APIXABAN 5 MILLIGRAM(S): 2.5 TABLET, FILM COATED ORAL at 18:20

## 2021-01-03 RX ADMIN — Medication 1 TABLET(S): at 12:19

## 2021-01-03 RX ADMIN — Medication 1 SPRAY(S): at 06:25

## 2021-01-03 RX ADMIN — Medication 81 MILLIGRAM(S): at 12:19

## 2021-01-03 RX ADMIN — Medication 30 MILLIGRAM(S): at 18:20

## 2021-01-03 RX ADMIN — Medication 1 SPRAY(S): at 18:21

## 2021-01-03 NOTE — PROGRESS NOTE ADULT - SUBJECTIVE AND OBJECTIVE BOX
Neurology Follow up note    Name  CHAPARRO ROBISON    HPI:  66yo male whose PMH includes CHF. pulmonary embolus and alcohol abuse presents to the ER due to progressive inability to ambulate. Separately still uses alcohol but not interested in a detox program. Also unwilling to change out of his street clothes (26 Dec 2020 23:59)      Interval History - Patient seen and examined and found to have left pontine stroke on MRI brain.  CTA not complete.  Patient states he is able to eat and drink without coughing.  Not tremulous no obvioous signs with ETOH withdrawal            Vital Signs Last 24 Hrs  T(C): 36.3 (03 Jan 2021 13:00), Max: 36.7 (03 Jan 2021 06:26)  T(F): 97.4 (03 Jan 2021 13:00), Max: 98 (03 Jan 2021 06:26)  HR: 89 (03 Jan 2021 13:00) (61 - 89)  BP: 140/72 (03 Jan 2021 13:00) (139/81 - 198/93)  BP(mean): --  RR: 17 (03 Jan 2021 13:00) (17 - 18)  SpO2: 96% (03 Jan 2021 06:26) (96% - 96%)  ICU Vital Signs Last 24 Hrs  T(C): 36.3 (03 Jan 2021 13:00), Max: 36.7 (03 Jan 2021 06:26)  T(F): 97.4 (03 Jan 2021 13:00), Max: 98 (03 Jan 2021 06:26)  HR: 89 (03 Jan 2021 13:00) (61 - 89)  BP: 140/72 (03 Jan 2021 13:00) (139/81 - 198/93)  BP(mean): --  ABP: --  ABP(mean): --  RR: 17 (03 Jan 2021 13:00) (17 - 18)  SpO2: 96% (03 Jan 2021 06:26) (96% - 96%)          Neurological Exam:   a+Ox2  moderate to severe dysarhtria  Right facial mild  RUE drift  RLE drift  Sensory symmetric  FTN slow on right slightly clumsy    NIHSS 7  mrankin 3    Medications  apixaban 5 milliGRAM(s) Oral two times a day  aspirin  chewable 81 milliGRAM(s) Oral daily  atorvastatin 80 milliGRAM(s) Oral at bedtime  chlordiazePOXIDE 25 milliGRAM(s) Oral every 4 hours PRN  dextrose 5% + sodium chloride 0.9%. 1000 milliLiter(s) IV Continuous <Continuous>  ergocalciferol 95956 Unit(s) Oral once  fluticasone propionate 50 MICROgram(s)/spray Nasal Spray 1 Spray(s) Both Nostrils two times a day  folic acid 1 milliGRAM(s) Oral daily  guaiFENesin   Syrup  (Sugar-Free) 200 milliGRAM(s) Oral every 6 hours PRN  losartan 50 milliGRAM(s) Oral daily  methocarbamol 500 milliGRAM(s) Oral every 8 hours PRN  metoprolol tartrate 25 milliGRAM(s) Oral two times a day  metoprolol tartrate Injectable 5 milliGRAM(s) IV Push every 6 hours PRN  multivitamin/minerals 1 Tablet(s) Oral daily  nicotine - 21 mG/24Hr(s) Patch 1 patch Transdermal daily  ondansetron Injectable 4 milliGRAM(s) IV Push every 8 hours PRN  pantoprazole  Injectable 40 milliGRAM(s) IV Push daily  pseudoephedrine 30 milliGRAM(s) Oral two times a day  senna 2 Tablet(s) Oral at bedtime PRN  thiamine 100 milliGRAM(s) Oral daily      Lab            CAPILLARY BLOOD GLUCOSE              Radiology  < from: MR Head No Cont (01.01.21 @ 11:38) >  IMPRESSION:    Small acute infarct in the left ventral rosalie.    Stable moderate-severe chronic microvascular ischemic changes.    < end of copied text >    < from: [facility] Duplex Carotid, Bilat (01.01.21 @ 00:03) >  IMPRESSION: No significant hemodynamic stenosis of either carotid artery.    < end of copied text >      Other studies:     Assessment- This is a 65y year old Male presenting with PE on Eliquis and difficulty ambulating.  Found to have an acute left pontine infarct.  Etiology not clear and DDX: embolic vs thrombotic.  Will need vascular imaging which includes  eposterior circulation to look for potential etiologies    Plan  1. CTA H+N  2. Continue aspirin 81mg and eliquis and statin  3. PT/OT, speech  4. -140

## 2021-01-03 NOTE — PROGRESS NOTE ADULT - ASSESSMENT
This is a 64yo male whose PMH includes CHF, pulmonary embolus and alcohol abuse presents to the ER due to progressive inability to ambulate ,dysphagia most likely related to new CVA; MRI showed new left pone CVA, not given tPA ( pt was on Eliquis and asa) ,neurology on board ;pending speech,PT/OT         1/3/20  pt is seen and examined at bed side   AAOX3,still experiencing dysphagia, still coughing when swallow   will keep him NPO for now until speech will evaluate him   MRI showed right pone CVA  PT also has persistent  cough possibly related aspiration   neurology on board       facial deviation ,dysphagia ,lower extremity weakness most likely related new CVA   MRI of the brin showed acute left vent rosalie   pt was on Eliquis and asa hence did not get tPA  on ASA 81, Lipitor 80  PT/OT/speech on board  carotid doppler ,echo normal   PT/OT/Speech on board  continue with fall/aspiration precaution         NAUSEA vomiting possibly related to gastritis resolved   started on Zofran  will add Protonix     dysphagia  etiology  most likely related to new CVA  speech consulted        cough possibly related to Dysphagias/ASPIRATION          sever Ambulatory dysfunction  possibly related to new CVA/physical deconditions   - physiatry recommending STR  B12,FOLATE normal  vit d2 19 will be replaced with vit d   Mri OF LUMBAR SPINE negative for acute pathology/cord compression   Acute alcohol abuse.   monitor for Withdrawal signs   - thiamine, Folic acid and MV        # CHF (congestive heart failure)  - Per EMR, HFpEF (chronic)  - monitor i and o     # pulmonary embolism  - continue home eliquis    # HTN (hypertension)  - on losartan, lopressor  - monitor bp    # possible thiamine and folate deficiency   continue supplement        DVT PPX: eliquis from home  GI PPX:protonix   Code status: FULL CODE

## 2021-01-03 NOTE — SWALLOW BEDSIDE ASSESSMENT ADULT - SPECIFY REASON(S)
Failed dysphagia screen
coughing and choking at meals starting yesterday persisting to today with drooling.

## 2021-01-03 NOTE — PROGRESS NOTE ADULT - SUBJECTIVE AND OBJECTIVE BOX
Name: CHAPARRO ROBISON  Age: 65y  Gender: Male    Patient is a 65y old  Male who presents with a chief complaint of     HOD  Interval events:  Pt was seen and examined.     Allergies:  No Known Allergies      PHYSICAL EXAM:    Vitals:  Vital Signs Last 24 Hrs  T(C): 35.9 (03 Jan 2021 07:00), Max: 36.7 (03 Jan 2021 06:26)  T(F): 96.7 (03 Jan 2021 07:00), Max: 98 (03 Jan 2021 06:26)  HR: 76 (03 Jan 2021 07:00) (58 - 80)  BP: 172/85 (03 Jan 2021 07:00) (139/81 - 198/93)  BP(mean): --  RR: 18 (03 Jan 2021 07:00) (18 - 18)  SpO2: 96% (03 Jan 2021 06:26) (96% - 96%)    GENERAL: NAD  CHEST/LUNG: CTAB  HEART: S1,S2 RRR  ABDOMEN: Soft, NT/ND, +BS  EXTREMITIES:  2+ DP, no LE edema      LABS:                  MEDICATIONS  (STANDING):  apixaban 5 milliGRAM(s) Oral two times a day  aspirin  chewable 81 milliGRAM(s) Oral daily  atorvastatin 80 milliGRAM(s) Oral at bedtime  dextrose 5% + sodium chloride 0.9%. 1000 milliLiter(s) (100 mL/Hr) IV Continuous <Continuous>  ergocalciferol 62977 Unit(s) Oral once  fluticasone propionate 50 MICROgram(s)/spray Nasal Spray 1 Spray(s) Both Nostrils two times a day  folic acid 1 milliGRAM(s) Oral daily  losartan 50 milliGRAM(s) Oral daily  metoprolol tartrate 25 milliGRAM(s) Oral two times a day  multivitamin/minerals 1 Tablet(s) Oral daily  nicotine - 21 mG/24Hr(s) Patch 1 patch Transdermal daily  pantoprazole  Injectable 40 milliGRAM(s) IV Push daily  pseudoephedrine 30 milliGRAM(s) Oral two times a day  thiamine 100 milliGRAM(s) Oral daily        RADIOLOGY & ADDITIONAL TESTS:    Imaging Personally Reviewed:  [x] YES  [ ] NO

## 2021-01-03 NOTE — SWALLOW BEDSIDE ASSESSMENT ADULT - ORAL PREPARATORY PHASE
Within functional limits Prolonged bolus formation/Reduced oral grading/Decreased mastication ability

## 2021-01-03 NOTE — SWALLOW BEDSIDE ASSESSMENT ADULT - SWALLOW EVAL: DIAGNOSIS
suspect neuro change pending evaluation by MD did not provide trials pending work up
Mild oral impairment with no suspected pharyngeal impairment at bedside for textures presented.

## 2021-01-03 NOTE — SWALLOW BEDSIDE ASSESSMENT ADULT - SWALLOW EVAL: RECOMMENDED DIET
.d3 with nectar thickened liquids Dysphagia Diet III Mechanical soft consistency with cut up meats  with nectar thickened liquids

## 2021-01-03 NOTE — SWALLOW BEDSIDE ASSESSMENT ADULT - SLP GENERAL OBSERVATIONS
in bed, slurred speech, facial weakness, UE and LE weakness
Patient received awake, AOx2. Patient is able to follow commands. Patient lacks insight into reasoning hhe is in the hospital. Education was provided. Patient endorses occasional cough with thin liquids.

## 2021-01-03 NOTE — SWALLOW BEDSIDE ASSESSMENT ADULT - H & P REVIEW
yes
66yo male whose PMH includes CHF. pulmonary embolus and alcohol abuse presents to the ER due to progressive inability to ambulate. Separately still uses alcohol but not interested in a detox program. Also unwilling to change out of his street clothes MRI 1/1/2020 significant for Small acute infarct in the left ventral rosalie./yes

## 2021-01-03 NOTE — SWALLOW BEDSIDE ASSESSMENT ADULT - COMMENTS
Discussed case with SUPRIYA Contreras if patient is symptomatic NPO with alternate means of nutrition/hydration should be considered and schedule Modified Barium Swallow (MBS) recommended to objectively evaluate oropharyngeal swallow function as patient was found to have new CVA in ventral Joe. SLP will follow up, likely unable to make recommendations without MBS which would be scheduled for Monday - if patient agrees.
patient known to dept, usually returns to regular diet prior to d.c however no presentation documented like today and yesterday. Patient with facial weakness,. and UE and LE weakness Right side. Patient with no awareness of drooling.
Per RN ([facility]), patient with coughing/choking on thin liquids. Advised not to trial thin liquids at this time.

## 2021-01-03 NOTE — SWALLOW BEDSIDE ASSESSMENT ADULT - ASR SWALLOW RECOMMEND DIAG
Given significant findings of MRI for Small acute infarct in the left ventral rosalie. patient would benefit from MBSS to r/o silent aspiration./VFSS/MBS

## 2021-01-03 NOTE — SWALLOW BEDSIDE ASSESSMENT ADULT - SWALLOW EVAL: RECOMMENDED FEEDING/EATING TECHNIQUES
oral hygiene
allow for swallow between intakes/maintain upright posture during/after eating for 30 mins/no straws/position upright (90 degrees)/small sips/bites

## 2021-01-04 ENCOUNTER — TRANSCRIPTION ENCOUNTER (OUTPATIENT)
Age: 66
End: 2021-01-04

## 2021-01-04 LAB
ANION GAP SERPL CALC-SCNC: 11 MMOL/L — SIGNIFICANT CHANGE UP (ref 7–14)
BASOPHILS # BLD AUTO: 0.06 K/UL — SIGNIFICANT CHANGE UP (ref 0–0.2)
BASOPHILS NFR BLD AUTO: 0.7 % — SIGNIFICANT CHANGE UP (ref 0–1)
BUN SERPL-MCNC: 8 MG/DL — LOW (ref 10–20)
CALCIUM SERPL-MCNC: 8.9 MG/DL — SIGNIFICANT CHANGE UP (ref 8.5–10.1)
CHLORIDE SERPL-SCNC: 106 MMOL/L — SIGNIFICANT CHANGE UP (ref 98–110)
CO2 SERPL-SCNC: 22 MMOL/L — SIGNIFICANT CHANGE UP (ref 17–32)
CREAT SERPL-MCNC: 0.8 MG/DL — SIGNIFICANT CHANGE UP (ref 0.7–1.5)
EOSINOPHIL # BLD AUTO: 0.19 K/UL — SIGNIFICANT CHANGE UP (ref 0–0.7)
EOSINOPHIL NFR BLD AUTO: 2.3 % — SIGNIFICANT CHANGE UP (ref 0–8)
GLUCOSE SERPL-MCNC: 118 MG/DL — HIGH (ref 70–99)
HCT VFR BLD CALC: 41 % — LOW (ref 42–52)
HGB BLD-MCNC: 13.6 G/DL — LOW (ref 14–18)
IMM GRANULOCYTES NFR BLD AUTO: 1.2 % — HIGH (ref 0.1–0.3)
LYMPHOCYTES # BLD AUTO: 1.4 K/UL — SIGNIFICANT CHANGE UP (ref 1.2–3.4)
LYMPHOCYTES # BLD AUTO: 17.1 % — LOW (ref 20.5–51.1)
MCHC RBC-ENTMCNC: 31.4 PG — HIGH (ref 27–31)
MCHC RBC-ENTMCNC: 33.2 G/DL — SIGNIFICANT CHANGE UP (ref 32–37)
MCV RBC AUTO: 94.7 FL — HIGH (ref 80–94)
MONOCYTES # BLD AUTO: 0.7 K/UL — HIGH (ref 0.1–0.6)
MONOCYTES NFR BLD AUTO: 8.5 % — SIGNIFICANT CHANGE UP (ref 1.7–9.3)
NEUTROPHILS # BLD AUTO: 5.76 K/UL — SIGNIFICANT CHANGE UP (ref 1.4–6.5)
NEUTROPHILS NFR BLD AUTO: 70.2 % — SIGNIFICANT CHANGE UP (ref 42.2–75.2)
NRBC # BLD: 0 /100 WBCS — SIGNIFICANT CHANGE UP (ref 0–0)
PLATELET # BLD AUTO: 162 K/UL — SIGNIFICANT CHANGE UP (ref 130–400)
POTASSIUM SERPL-MCNC: 4 MMOL/L — SIGNIFICANT CHANGE UP (ref 3.5–5)
POTASSIUM SERPL-SCNC: 4 MMOL/L — SIGNIFICANT CHANGE UP (ref 3.5–5)
RBC # BLD: 4.33 M/UL — LOW (ref 4.7–6.1)
RBC # FLD: 13.1 % — SIGNIFICANT CHANGE UP (ref 11.5–14.5)
SODIUM SERPL-SCNC: 139 MMOL/L — SIGNIFICANT CHANGE UP (ref 135–146)
WBC # BLD: 8.21 K/UL — SIGNIFICANT CHANGE UP (ref 4.8–10.8)
WBC # FLD AUTO: 8.21 K/UL — SIGNIFICANT CHANGE UP (ref 4.8–10.8)

## 2021-01-04 PROCEDURE — 99233 SBSQ HOSP IP/OBS HIGH 50: CPT

## 2021-01-04 PROCEDURE — 74230 X-RAY XM SWLNG FUNCJ C+: CPT | Mod: 26

## 2021-01-04 RX ORDER — LOSARTAN POTASSIUM 100 MG/1
1 TABLET, FILM COATED ORAL
Qty: 0 | Refills: 0 | DISCHARGE

## 2021-01-04 RX ORDER — THIAMINE MONONITRATE (VIT B1) 100 MG
1 TABLET ORAL
Qty: 0 | Refills: 0 | DISCHARGE
Start: 2021-01-04

## 2021-01-04 RX ORDER — ASPIRIN/CALCIUM CARB/MAGNESIUM 324 MG
1 TABLET ORAL
Qty: 0 | Refills: 0 | DISCHARGE

## 2021-01-04 RX ORDER — FOLIC ACID 0.8 MG
1 TABLET ORAL
Qty: 0 | Refills: 0 | DISCHARGE
Start: 2021-01-04

## 2021-01-04 RX ORDER — SENNA PLUS 8.6 MG/1
2 TABLET ORAL
Qty: 0 | Refills: 0 | DISCHARGE
Start: 2021-01-04

## 2021-01-04 RX ORDER — ATORVASTATIN CALCIUM 80 MG/1
1 TABLET, FILM COATED ORAL
Qty: 14 | Refills: 0
Start: 2021-01-04

## 2021-01-04 RX ORDER — ASPIRIN/CALCIUM CARB/MAGNESIUM 324 MG
1 TABLET ORAL
Qty: 0 | Refills: 0 | DISCHARGE
Start: 2021-01-04

## 2021-01-04 RX ORDER — MULTIVIT-MIN/FERROUS GLUCONATE 9 MG/15 ML
1 LIQUID (ML) ORAL
Qty: 0 | Refills: 0 | DISCHARGE
Start: 2021-01-04

## 2021-01-04 RX ORDER — APIXABAN 2.5 MG/1
1 TABLET, FILM COATED ORAL
Qty: 0 | Refills: 0 | DISCHARGE
Start: 2021-01-04

## 2021-01-04 RX ORDER — LOSARTAN POTASSIUM 100 MG/1
1 TABLET, FILM COATED ORAL
Qty: 0 | Refills: 0 | DISCHARGE
Start: 2021-01-04

## 2021-01-04 RX ORDER — METHOCARBAMOL 500 MG/1
1 TABLET, FILM COATED ORAL
Qty: 0 | Refills: 0 | DISCHARGE
Start: 2021-01-04

## 2021-01-04 RX ORDER — APIXABAN 2.5 MG/1
1 TABLET, FILM COATED ORAL
Qty: 0 | Refills: 0 | DISCHARGE

## 2021-01-04 RX ADMIN — LOSARTAN POTASSIUM 50 MILLIGRAM(S): 100 TABLET, FILM COATED ORAL at 05:42

## 2021-01-04 RX ADMIN — Medication 30 MILLIGRAM(S): at 21:13

## 2021-01-04 RX ADMIN — Medication 30 MILLIGRAM(S): at 05:42

## 2021-01-04 RX ADMIN — Medication 1 MILLIGRAM(S): at 11:24

## 2021-01-04 RX ADMIN — PANTOPRAZOLE SODIUM 40 MILLIGRAM(S): 20 TABLET, DELAYED RELEASE ORAL at 11:26

## 2021-01-04 RX ADMIN — Medication 81 MILLIGRAM(S): at 11:24

## 2021-01-04 RX ADMIN — Medication 1 PATCH: at 11:24

## 2021-01-04 RX ADMIN — APIXABAN 5 MILLIGRAM(S): 2.5 TABLET, FILM COATED ORAL at 05:42

## 2021-01-04 RX ADMIN — Medication 1 PATCH: at 19:28

## 2021-01-04 RX ADMIN — Medication 100 MILLIGRAM(S): at 11:24

## 2021-01-04 RX ADMIN — Medication 1 TABLET(S): at 11:24

## 2021-01-04 RX ADMIN — Medication 25 MILLIGRAM(S): at 17:55

## 2021-01-04 RX ADMIN — ATORVASTATIN CALCIUM 80 MILLIGRAM(S): 80 TABLET, FILM COATED ORAL at 21:13

## 2021-01-04 RX ADMIN — Medication 25 MILLIGRAM(S): at 05:42

## 2021-01-04 RX ADMIN — APIXABAN 5 MILLIGRAM(S): 2.5 TABLET, FILM COATED ORAL at 17:55

## 2021-01-04 RX ADMIN — SODIUM CHLORIDE 100 MILLILITER(S): 9 INJECTION, SOLUTION INTRAVENOUS at 14:23

## 2021-01-04 NOTE — DISCHARGE NOTE PROVIDER - INSTRUCTIONS
dysphagia 3 with nectar thick liquids, 1:1 feeds, small sips/bites, swallow 2x with each drink or bite dysphagia 3 with thin liquids, 1:1 feeds, small sips/bites, swallow 2x with each drink or bite

## 2021-01-04 NOTE — DISCHARGE NOTE PROVIDER - NSDCMRMEDTOKEN_GEN_ALL_CORE_FT
apixaban 5 mg oral tablet: 1 tab(s) orally 2 times a day  aspirin 81 mg oral tablet, chewable: 1 tab(s) orally once a day  atorvastatin 80 mg oral tablet: 1 tab(s) orally once a day (at bedtime)  folic acid 1 mg oral tablet: 1 tab(s) orally once a day  losartan 50 mg oral tablet: 1 tab(s) orally once a day  methocarbamol 500 mg oral tablet: 1 tab(s) orally every 8 hours, As needed, Muscle Spasm  Metoprolol Tartrate 25 mg oral tablet: 1 tab(s) orally 2 times a day  Multiple Vitamins with Minerals oral tablet: 1 tab(s) orally once a day  nicotine 21 mg/24 hr transdermal film, extended release: 21 mg/24h transdermal  senna oral tablet: 2 tab(s) orally once a day (at bedtime), As needed, Constipation  thiamine 100 mg oral tablet: 1 tab(s) orally once a day

## 2021-01-04 NOTE — PROGRESS NOTE ADULT - ASSESSMENT
This is a 64yo male whose PMH includes CHF, pulmonary embolus and alcohol abuse presents to the ER due to progressive inability to ambulate ,dysphagia most likely related to new CVA; MRI showed new left pone CVA, not given tPA ( pt was on Eliquis and asa) ,neurology on board      #Acute pontine CVA  on ASA 81, Lipitor 80  PT/OT/speech on board  carotid doppler ,echo normal   PT/OT/Speech on board  continue with fall/aspiration precaution     #Dysphagia  etiology  most likely related to new CVA  speech consulted and recommendations noted        #severe ambulatory dysfunction  possibly related to new CVA/physical deconditions   Physiatry recommending STR  B12,FOLATE normal  vit d2 19 will be replaced with vit d   Mri OF LUMBAR SPINE negative for acute pathology/cord compression   Acute alcohol abuse.   monitor for Withdrawal signs   - thiamine, Folic acid and MV      # Chronic systolic CHF (congestive heart failure)  - Per EMR, HFpEF (chronic)  - monitor I and O    # pulmonary embolism  - continue home Eliquis    # HTN (hypertension)  - on losartan, lopressor  - monitor bp    # possible thiamine and folate deficiency   continue supplement      DVT PPX: eliquis from home  GI PPX:protonix   Code status: FULL CODE    Discharge to STR

## 2021-01-04 NOTE — PROGRESS NOTE ADULT - SUBJECTIVE AND OBJECTIVE BOX
Name: CHAPARRO ROBISON  Age: 65y  Gender: Male      Interval events:  Pt was seen and examined at bedside, insisting on being discharged and declining STR.     Allergies:  No Known Allergies      PHYSICAL EXAM:    ICU Vital Signs Last 24 Hrs  T(C): 36 (04 Jan 2021 14:14), Max: 36.1 (03 Jan 2021 21:15)  T(F): 96.8 (04 Jan 2021 14:14), Max: 97 (03 Jan 2021 21:15)  HR: 68 (04 Jan 2021 14:14) (56 - 68)  BP: 177/86 (04 Jan 2021 14:14) (160/77 - 177/86)  RR: 16 (04 Jan 2021 14:14) (16 - 18)    General: WN/WD NAD  Neurology: A&Ox3  Eyes: PERRLA/ EOMI, Gross vision intact  ENT/Neck: Neck supple, trachea midline, No JVD, Gross hearing intact  Respiratory: CTA B/L, No wheezing, rales, rhonchi  CV: RRR, S1S2, no murmurs, rubs or gallops  Abdominal: Soft, NT, ND +BS,   Extremities: No edema, + peripheral pulses  Skin: No Rashes, Hematoma, Ecchymosis      LABS:    None       MEDICATIONS  (STANDING):  apixaban 5 milliGRAM(s) Oral two times a day  aspirin  chewable 81 milliGRAM(s) Oral daily  atorvastatin 80 milliGRAM(s) Oral at bedtime  dextrose 5% + sodium chloride 0.9%. 1000 milliLiter(s) (100 mL/Hr) IV Continuous <Continuous>  ergocalciferol 37678 Unit(s) Oral once  fluticasone propionate 50 MICROgram(s)/spray Nasal Spray 1 Spray(s) Both Nostrils two times a day  folic acid 1 milliGRAM(s) Oral daily  losartan 50 milliGRAM(s) Oral daily  metoprolol tartrate 25 milliGRAM(s) Oral two times a day  multivitamin/minerals 1 Tablet(s) Oral daily  nicotine - 21 mG/24Hr(s) Patch 1 patch Transdermal daily  pantoprazole  Injectable 40 milliGRAM(s) IV Push daily  pseudoephedrine 30 milliGRAM(s) Oral two times a day  thiamine 100 milliGRAM(s) Oral daily        RADIOLOGY & ADDITIONAL TESTS:    Imaging Personally Reviewed:  [x] YES  [ ] NO

## 2021-01-04 NOTE — DISCHARGE NOTE PROVIDER - HOSPITAL COURSE
66yo male whose PMH includes CHF. pulmonary embolus and alcohol abuse presents to the ER due to progressive inability to ambulate. Separately still uses alcohol but not interested in a detox program. (26 Dec 2020 23)     Patient seen and examined and found to have left pontine stroke on MRI brain. Pt was already on Eliquis so no TPA done.

## 2021-01-04 NOTE — DISCHARGE NOTE PROVIDER - CARE PROVIDER_API CALL
medical, clinic  Phone: (   )    -  Fax: (   )    -  Follow Up Time: 1 week    Cuco Mendoza)  Neurology  [address] [place] [address]  Phone: [phone]  Fax: [phone]  Follow Up Time: 1 week

## 2021-01-04 NOTE — DISCHARGE NOTE PROVIDER - NSDCCPCAREPLAN_GEN_ALL_CORE_FT
PRINCIPAL DISCHARGE DIAGNOSIS  Diagnosis: CVA (cerebrovascular accident)  Assessment and Plan of Treatment: - cont Eliquis, ASA and high dose Lipitor  - fup Neurology  - cont dysphagia 3 diet with 1:1 feeds and small sips and bites with double swallowing  - FUP speech eval      SECONDARY DISCHARGE DIAGNOSES  Diagnosis: Acute alcohol abuse  Assessment and Plan of Treatment: abstain from ETOH     PRINCIPAL DISCHARGE DIAGNOSIS  Diagnosis: CVA (cerebrovascular accident)  Assessment and Plan of Treatment: - cont Eliquis, ASA and high dose Lipitor  - fup Neurology  - cont dysphagia 3 diet, thin liquids with 1:1 feeds and small sips and bites with double swallowing  - FUP speech eval      SECONDARY DISCHARGE DIAGNOSES  Diagnosis: Acute alcohol abuse  Assessment and Plan of Treatment: abstain from ETOH

## 2021-01-05 ENCOUNTER — TRANSCRIPTION ENCOUNTER (OUTPATIENT)
Age: 66
End: 2021-01-05

## 2021-01-05 VITALS — WEIGHT: 220.02 LBS

## 2021-01-05 PROCEDURE — 99232 SBSQ HOSP IP/OBS MODERATE 35: CPT

## 2021-01-05 PROCEDURE — 99239 HOSP IP/OBS DSCHRG MGMT >30: CPT

## 2021-01-05 RX ORDER — ACETAMINOPHEN 500 MG
650 TABLET ORAL ONCE
Refills: 0 | Status: COMPLETED | OUTPATIENT
Start: 2021-01-05 | End: 2021-01-05

## 2021-01-05 RX ADMIN — Medication 1 PATCH: at 11:19

## 2021-01-05 RX ADMIN — PANTOPRAZOLE SODIUM 40 MILLIGRAM(S): 20 TABLET, DELAYED RELEASE ORAL at 11:21

## 2021-01-05 RX ADMIN — Medication 100 MILLIGRAM(S): at 11:22

## 2021-01-05 RX ADMIN — APIXABAN 5 MILLIGRAM(S): 2.5 TABLET, FILM COATED ORAL at 05:31

## 2021-01-05 RX ADMIN — Medication 30 MILLIGRAM(S): at 05:31

## 2021-01-05 RX ADMIN — Medication 1 MILLIGRAM(S): at 11:22

## 2021-01-05 RX ADMIN — Medication 1 PATCH: at 05:31

## 2021-01-05 RX ADMIN — LOSARTAN POTASSIUM 50 MILLIGRAM(S): 100 TABLET, FILM COATED ORAL at 05:31

## 2021-01-05 RX ADMIN — Medication 81 MILLIGRAM(S): at 11:22

## 2021-01-05 RX ADMIN — Medication 1 TABLET(S): at 11:22

## 2021-01-05 RX ADMIN — Medication 650 MILLIGRAM(S): at 13:45

## 2021-01-05 RX ADMIN — Medication 1 SPRAY(S): at 05:31

## 2021-01-05 RX ADMIN — Medication 1 PATCH: at 11:22

## 2021-01-05 RX ADMIN — Medication 25 MILLIGRAM(S): at 05:31

## 2021-01-05 NOTE — DIETITIAN INITIAL EVALUATION ADULT. - PERSON TAUGHT/METHOD
Patient is confused, was unable to reach emergency contact. will f/u/verbal instruction/patient instructed

## 2021-01-05 NOTE — PROGRESS NOTE ADULT - ASSESSMENT
This is a 64yo male whose PMH includes CHF, pulmonary embolus and alcohol abuse presents to the ER due to progressive inability to ambulate ,dysphagia most likely related to new CVA; MRI showed new left pone CVA, not given tPA ( pt was on Eliquis and asa) ,neurology on board      #Acute pontine CVA  on ASA 81, Lipitor 80  PT/OT/speech on board  carotid doppler ,echo normal   PT/OT/Speech on board  continue with fall/aspiration precaution     #Dysphagia  etiology  most likely related to new CVA  speech consulted and recommendations noted        #Severe ambulatory dysfunction  possibly related to new CVA/physical deconditions   Physiatry recommending STR  B12,FOLATE normal  vit d2 19 will be replaced with vit d   Mri OF LUMBAR SPINE negative for acute pathology/cord compression   Acute alcohol abuse.   monitor for Withdrawal signs   - thiamine, Folic acid and MV      # Chronic systolic CHF (congestive heart failure)  - Per EMR, HFpEF (chronic)  - monitor I and O    # pulmonary embolism  - continue home Eliquis    # HTN (hypertension)  - on losartan, lopressor  - monitor bp    # possible thiamine and folate deficiency   continue supplement      DVT PPX: eliquis from home  GI PPX:protonix   Code status: FULL CODE    Discharge to STR today

## 2021-01-05 NOTE — DIETITIAN INITIAL EVALUATION ADULT. - OTHER INFO
Pertinent medical information: Pt. with PMH includes CHF, pulmonary embolus and alcohol abuse presents to the ER due to progressive inability to ambulate ,dysphagia most likely related to new CVA. Patient noted with Acute pontine CVA. PT/OT/Speech following. Dysphagia  etiology  most likely related to new CVA. Per SLP; Dysphagia Diet III Mechanical soft consistency with cut up meats  with nectar thickened liquids. Severe ambulatory dysfunction  possibly related to new CVA/physical deconditions noted. possible thiamine and folate deficiency ; supplemented.     Pertinent nutrition hx: Patient is confused and disoriented. Patient consumes % of meals with no factors affecting appetite. No complaints of N/V/C/D per RN. LBM: 1/4 per RN. Fecal incontinence noted per RN flow sheets. No chewing/swallowing difficulties per RN. Will attempt to obtain nutrition and weight history at f/u.

## 2021-01-05 NOTE — DISCHARGE NOTE NURSING/CASE MANAGEMENT/SOCIAL WORK - PATIENT PORTAL LINK FT
You can access the FollowMyHealth Patient Portal offered by [facility] by registering at the following website: [email or web address]. By joining [employer]’s FollowMyHealth portal, you will also be able to view your health information using other applications (apps) compatible with our system.

## 2021-01-05 NOTE — PROGRESS NOTE ADULT - PROVIDER SPECIALTY LIST ADULT
Hospitalist
Neurology
Hospitalist
Neurology
Hospitalist

## 2021-01-05 NOTE — PROGRESS NOTE ADULT - SUBJECTIVE AND OBJECTIVE BOX
Name: CHAPARRO ROBISON  Age: 65y  Gender: Male      Interval events:  Pt was seen and examined at bedside, accepts discharge to [facility]  No acute event overnight    Allergies:  No Known Allergies      PHYSICAL EXAM:  ICU Vital Signs Last 24 Hrs  T(C): 36.2 (05 Jan 2021 05:28), Max: 36.2 (05 Jan 2021 05:28)  T(F): 97.1 (05 Jan 2021 05:28), Max: 97.1 (05 Jan 2021 05:28)  HR: 58 (05 Jan 2021 05:28) (58 - 76)  BP: 162/93 (05 Jan 2021 05:28) (162/93 - 177/86)  RR: 16 (05 Jan 2021 05:28) (16 - 16)        General: WN/WD NAD  Neurology: A&Ox3  Eyes: PERRLA/ EOMI,  ENT/Neck: Neck supple, trachea midline, No JVD, Gross hearing intact  Respiratory: CTA B/L, No wheezing, rales, rhonchi  CV: RRR, S1S2, no murmurs, rubs or gallops  Abdominal: Soft, NT, ND +BS,   Extremities: No edema, + peripheral pulses  Skin: No Rashes, Hematoma, Ecchymosis      LABS:    None       MEDICATIONS  (STANDING):  apixaban 5 milliGRAM(s) Oral two times a day  aspirin  chewable 81 milliGRAM(s) Oral daily  atorvastatin 80 milliGRAM(s) Oral at bedtime  dextrose 5% + sodium chloride 0.9%. 1000 milliLiter(s) (100 mL/Hr) IV Continuous <Continuous>  ergocalciferol 24452 Unit(s) Oral once  fluticasone propionate 50 MICROgram(s)/spray Nasal Spray 1 Spray(s) Both Nostrils two times a day  folic acid 1 milliGRAM(s) Oral daily  losartan 50 milliGRAM(s) Oral daily  metoprolol tartrate 25 milliGRAM(s) Oral two times a day  multivitamin/minerals 1 Tablet(s) Oral daily  nicotine - 21 mG/24Hr(s) Patch 1 patch Transdermal daily  pantoprazole  Injectable 40 milliGRAM(s) IV Push daily  pseudoephedrine 30 milliGRAM(s) Oral two times a day  thiamine 100 milliGRAM(s) Oral daily        RADIOLOGY & ADDITIONAL TESTS:    Imaging Personally Reviewed:  [x] YES  [ ] NO

## 2021-01-05 NOTE — DIETITIAN INITIAL EVALUATION ADULT. - PHYSCIAL ASSESSMENT
Skin: intact per RN flow sheets  edema: right hand; left knee edema +1 per RN flow sheets  No nutrition focused physical finding at this time Confused and disoriented/obese

## 2021-01-05 NOTE — DIETITIAN INITIAL EVALUATION ADULT. - CONTINUE CURRENT NUTRITION CARE PLAN
Goal: Patient to continue to consume ~% of meals  in the next 7 days. Intervention: meals and snacks

## 2021-01-05 NOTE — DIETITIAN INITIAL EVALUATION ADULT. - ORAL INTAKE PTA/DIET HISTORY
Attempted to call all 3 emergency contact numbers @ 1:06,1:08, 1:10; no answer. Will attempt to obtain nutrition hx at f/u

## 2021-01-05 NOTE — DIETITIAN INITIAL EVALUATION ADULT. - NAME AND PHONE
RD to monitor: diet order, body composition, energy intake, nutrition focused physical finding. not at risk f/u in 7 days. Discussed with LIP

## 2021-01-05 NOTE — PROGRESS NOTE ADULT - SUBJECTIVE AND OBJECTIVE BOX
Neurology Progress Note    Interval History:    Pt s/p left pontine stroke.  Pt has mild right sided weakness but more significant truncal ataxia.      Vital Signs Last 24 Hrs  T(C): 36.2 (05 Jan 2021 05:28), Max: 36.2 (05 Jan 2021 05:28)  T(F): 97.1 (05 Jan 2021 05:28), Max: 97.1 (05 Jan 2021 05:28)  HR: 58 (05 Jan 2021 05:28) (58 - 76)  BP: 162/93 (05 Jan 2021 05:28) (162/93 - 174/94)  BP(mean): --  RR: 16 (05 Jan 2021 05:28) (16 - 16)  SpO2: --    Neurological Exam:   Mental status: Awake, alert and oriented x3.  Mild dysarthria present.  Patient alert and oriented x 3.  Recent recall intact, mild difficulty with attention and concentration.  Right facial droop present.  Mild right sided weakness present but more notable difficulty was trouble sitting and standing due to truncal instability.  Sensory intact to LT face, arms, and legs.    MEDICATIONS  (STANDING):  apixaban 5 milliGRAM(s) Oral two times a day  aspirin  chewable 81 milliGRAM(s) Oral daily  atorvastatin 80 milliGRAM(s) Oral at bedtime  dextrose 5% + sodium chloride 0.9%. 1000 milliLiter(s) (100 mL/Hr) IV Continuous <Continuous>  ergocalciferol 97567 Unit(s) Oral once  fluticasone propionate 50 MICROgram(s)/spray Nasal Spray 1 Spray(s) Both Nostrils two times a day  folic acid 1 milliGRAM(s) Oral daily  losartan 50 milliGRAM(s) Oral daily  metoprolol tartrate 25 milliGRAM(s) Oral two times a day  multivitamin/minerals 1 Tablet(s) Oral daily  nicotine - 21 mG/24Hr(s) Patch 1 patch Transdermal daily  pantoprazole  Injectable 40 milliGRAM(s) IV Push daily  pseudoephedrine 30 milliGRAM(s) Oral two times a day  thiamine 100 milliGRAM(s) Oral daily      Labs:  CBC Full  -  ( 04 Jan 2021 15:58 )  WBC Count : 8.21 K/uL  RBC Count : 4.33 M/uL  Hemoglobin : 13.6 g/dL  Hematocrit : 41.0 %  Platelet Count - Automated : 162 K/uL  Mean Cell Volume : 94.7 fL  Mean Cell Hemoglobin : 31.4 pg  Mean Cell Hemoglobin Concentration : 33.2 g/dL  Auto Neutrophil # : 5.76 K/uL  Auto Lymphocyte # : 1.40 K/uL  Auto Monocyte # : 0.70 K/uL  Auto Eosinophil # : 0.19 K/uL  Auto Basophil # : 0.06 K/uL  Auto Neutrophil % : 70.2 %  Auto Lymphocyte % : 17.1 %  Auto Monocyte % : 8.5 %  Auto Eosinophil % : 2.3 %  Auto Basophil % : 0.7 %    01-04    139  |  106  |  8<L>  ----------------------------<  118<H>  4.0   |  22  |  0.8    Ca    8.9      04 Jan 2021 15:58        Assessment:  This is a 65y Male w/ h/o left pontine stroke.  He has significant difficulty with postural instability and will benefit from inpatient rehabilitation.    Plan:  1.  Continue eliquis and aspirin 81 mg/day.  2.  High intensity statin atorvastatin 80 mg/day.  3.  DISCONTINUE / AVOID pseudoephedrine use as this may increase risk for small vessel stroke recurrence.  4.  Stroke clinic f/u at [facility] ([facility]) in 2 weeks.

## 2021-01-13 DIAGNOSIS — R11.2 NAUSEA WITH VOMITING, UNSPECIFIED: ICD-10-CM

## 2021-01-13 DIAGNOSIS — Z86.711 PERSONAL HISTORY OF PULMONARY EMBOLISM: ICD-10-CM

## 2021-01-13 DIAGNOSIS — R25.1 TREMOR, UNSPECIFIED: ICD-10-CM

## 2021-01-13 DIAGNOSIS — I11.0 HYPERTENSIVE HEART DISEASE WITH HEART FAILURE: ICD-10-CM

## 2021-01-13 DIAGNOSIS — Z86.73 PERSONAL HISTORY OF TRANSIENT ISCHEMIC ATTACK (TIA), AND CEREBRAL INFARCTION WITHOUT RESIDUAL DEFICITS: ICD-10-CM

## 2021-01-13 DIAGNOSIS — G89.29 OTHER CHRONIC PAIN: ICD-10-CM

## 2021-01-13 DIAGNOSIS — R09.82 POSTNASAL DRIP: ICD-10-CM

## 2021-01-13 DIAGNOSIS — G81.93 HEMIPLEGIA, UNSPECIFIED AFFECTING RIGHT NONDOMINANT SIDE: ICD-10-CM

## 2021-01-13 DIAGNOSIS — Z79.82 LONG TERM (CURRENT) USE OF ASPIRIN: ICD-10-CM

## 2021-01-13 DIAGNOSIS — Z91.14 PATIENT'S OTHER NONCOMPLIANCE WITH MEDICATION REGIMEN: ICD-10-CM

## 2021-01-13 DIAGNOSIS — E53.8 DEFICIENCY OF OTHER SPECIFIED B GROUP VITAMINS: ICD-10-CM

## 2021-01-13 DIAGNOSIS — I50.32 CHRONIC DIASTOLIC (CONGESTIVE) HEART FAILURE: ICD-10-CM

## 2021-01-13 DIAGNOSIS — Z79.01 LONG TERM (CURRENT) USE OF ANTICOAGULANTS: ICD-10-CM
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
## 2021-01-13 DIAGNOSIS — E78.5 HYPERLIPIDEMIA, UNSPECIFIED: ICD-10-CM

## 2021-01-13 DIAGNOSIS — R29.810 FACIAL WEAKNESS: ICD-10-CM

## 2021-01-13 DIAGNOSIS — Z82.49 FAMILY HISTORY OF ISCHEMIC HEART DISEASE AND OTHER DISEASES OF THE CIRCULATORY SYSTEM: ICD-10-CM

## 2021-01-13 DIAGNOSIS — I63.89 OTHER CEREBRAL INFARCTION: ICD-10-CM

## 2021-01-13 DIAGNOSIS — R13.10 DYSPHAGIA, UNSPECIFIED: ICD-10-CM

## 2021-01-13 DIAGNOSIS — F10.20 ALCOHOL DEPENDENCE, UNCOMPLICATED: ICD-10-CM

## 2021-01-13 DIAGNOSIS — R26.2 DIFFICULTY IN WALKING, NOT ELSEWHERE CLASSIFIED: ICD-10-CM

## 2021-01-13 DIAGNOSIS — K29.70 GASTRITIS, UNSPECIFIED, WITHOUT BLEEDING: ICD-10-CM

## 2021-01-13 DIAGNOSIS — F17.210 NICOTINE DEPENDENCE, CIGARETTES, UNCOMPLICATED: ICD-10-CM

## 2021-01-13 DIAGNOSIS — M54.9 DORSALGIA, UNSPECIFIED: ICD-10-CM

## 2021-01-13 DIAGNOSIS — E51.9 THIAMINE DEFICIENCY, UNSPECIFIED: ICD-10-CM

## 2021-02-07 ENCOUNTER — INPATIENT (INPATIENT)
Facility: HOSPITAL | Age: 66
LOS: 8 days | Discharge: NURSING, REHAB OR LONG-TERM CARE | End: 2021-02-16
Attending: HOSPITALIST | Admitting: HOSPITALIST
Payer: MEDICARE

## 2021-02-07 VITALS
RESPIRATION RATE: 20 BRPM | WEIGHT: 188.94 LBS | DIASTOLIC BLOOD PRESSURE: 104 MMHG | HEART RATE: 95 BPM | SYSTOLIC BLOOD PRESSURE: 185 MMHG | HEIGHT: 69 IN | TEMPERATURE: 96 F | OXYGEN SATURATION: 98 %

## 2021-02-07 DIAGNOSIS — Z91.14 PATIENT'S OTHER NONCOMPLIANCE WITH MEDICATION REGIMEN: ICD-10-CM

## 2021-02-07 DIAGNOSIS — I11.0 HYPERTENSIVE HEART DISEASE WITH HEART FAILURE: ICD-10-CM

## 2021-02-07 DIAGNOSIS — I50.32 CHRONIC DIASTOLIC (CONGESTIVE) HEART FAILURE: ICD-10-CM

## 2021-02-07 DIAGNOSIS — M48.061 SPINAL STENOSIS, LUMBAR REGION WITHOUT NEUROGENIC CLAUDICATION: ICD-10-CM

## 2021-02-07 DIAGNOSIS — Z90.49 ACQUIRED ABSENCE OF OTHER SPECIFIED PARTS OF DIGESTIVE TRACT: Chronic | ICD-10-CM

## 2021-02-07 DIAGNOSIS — R26.2 DIFFICULTY IN WALKING, NOT ELSEWHERE CLASSIFIED: ICD-10-CM

## 2021-02-07 DIAGNOSIS — Z79.82 LONG TERM (CURRENT) USE OF ASPIRIN: ICD-10-CM

## 2021-02-07 DIAGNOSIS — F10.20 ALCOHOL DEPENDENCE, UNCOMPLICATED: ICD-10-CM

## 2021-02-07 DIAGNOSIS — E78.5 HYPERLIPIDEMIA, UNSPECIFIED: ICD-10-CM

## 2021-02-07 DIAGNOSIS — D72.829 ELEVATED WHITE BLOOD CELL COUNT, UNSPECIFIED: ICD-10-CM

## 2021-02-07 DIAGNOSIS — F17.210 NICOTINE DEPENDENCE, CIGARETTES, UNCOMPLICATED: ICD-10-CM

## 2021-02-07 DIAGNOSIS — R19.7 DIARRHEA, UNSPECIFIED: ICD-10-CM

## 2021-02-07 DIAGNOSIS — Z86.711 PERSONAL HISTORY OF PULMONARY EMBOLISM: ICD-10-CM

## 2021-02-07 DIAGNOSIS — Z79.01 LONG TERM (CURRENT) USE OF ANTICOAGULANTS: ICD-10-CM

## 2021-02-07 DIAGNOSIS — R53.1 WEAKNESS: ICD-10-CM

## 2021-02-07 DIAGNOSIS — I69.391 DYSPHAGIA FOLLOWING CEREBRAL INFARCTION: ICD-10-CM

## 2021-02-07 LAB
ALBUMIN SERPL ELPH-MCNC: 4.9 G/DL — SIGNIFICANT CHANGE UP (ref 3.5–5.2)
ALP SERPL-CCNC: 109 U/L — SIGNIFICANT CHANGE UP (ref 30–115)
ALT FLD-CCNC: 38 U/L — SIGNIFICANT CHANGE UP (ref 0–41)
ANION GAP SERPL CALC-SCNC: 11 MMOL/L — SIGNIFICANT CHANGE UP (ref 7–14)
APTT BLD: 29.9 SEC — SIGNIFICANT CHANGE UP (ref 27–39.2)
AST SERPL-CCNC: 31 U/L — SIGNIFICANT CHANGE UP (ref 0–41)
BASOPHILS # BLD AUTO: 0.03 K/UL — SIGNIFICANT CHANGE UP (ref 0–0.2)
BASOPHILS NFR BLD AUTO: 0.3 % — SIGNIFICANT CHANGE UP (ref 0–1)
BILIRUB SERPL-MCNC: 1.4 MG/DL — HIGH (ref 0.2–1.2)
BUN SERPL-MCNC: 13 MG/DL — SIGNIFICANT CHANGE UP (ref 10–20)
CALCIUM SERPL-MCNC: 10 MG/DL — SIGNIFICANT CHANGE UP (ref 8.5–10.1)
CHLORIDE SERPL-SCNC: 100 MMOL/L — SIGNIFICANT CHANGE UP (ref 98–110)
CK SERPL-CCNC: 208 U/L — SIGNIFICANT CHANGE UP (ref 0–225)
CO2 SERPL-SCNC: 31 MMOL/L — SIGNIFICANT CHANGE UP (ref 17–32)
CREAT SERPL-MCNC: 0.8 MG/DL — SIGNIFICANT CHANGE UP (ref 0.7–1.5)
EOSINOPHIL # BLD AUTO: 0.04 K/UL — SIGNIFICANT CHANGE UP (ref 0–0.7)
EOSINOPHIL NFR BLD AUTO: 0.4 % — SIGNIFICANT CHANGE UP (ref 0–8)
GLUCOSE SERPL-MCNC: 133 MG/DL — HIGH (ref 70–99)
HCT VFR BLD CALC: 44.9 % — SIGNIFICANT CHANGE UP (ref 42–52)
HGB BLD-MCNC: 15 G/DL — SIGNIFICANT CHANGE UP (ref 14–18)
IMM GRANULOCYTES NFR BLD AUTO: 0.3 % — SIGNIFICANT CHANGE UP (ref 0.1–0.3)
INR BLD: 1.06 RATIO — SIGNIFICANT CHANGE UP (ref 0.65–1.3)
LYMPHOCYTES # BLD AUTO: 0.95 K/UL — LOW (ref 1.2–3.4)
LYMPHOCYTES # BLD AUTO: 8.5 % — LOW (ref 20.5–51.1)
MCHC RBC-ENTMCNC: 30.2 PG — SIGNIFICANT CHANGE UP (ref 27–31)
MCHC RBC-ENTMCNC: 33.4 G/DL — SIGNIFICANT CHANGE UP (ref 32–37)
MCV RBC AUTO: 90.3 FL — SIGNIFICANT CHANGE UP (ref 80–94)
MONOCYTES # BLD AUTO: 0.4 K/UL — SIGNIFICANT CHANGE UP (ref 0.1–0.6)
MONOCYTES NFR BLD AUTO: 3.6 % — SIGNIFICANT CHANGE UP (ref 1.7–9.3)
NEUTROPHILS # BLD AUTO: 9.72 K/UL — HIGH (ref 1.4–6.5)
NEUTROPHILS NFR BLD AUTO: 86.9 % — HIGH (ref 42.2–75.2)
NRBC # BLD: 0 /100 WBCS — SIGNIFICANT CHANGE UP (ref 0–0)
PLATELET # BLD AUTO: 180 K/UL — SIGNIFICANT CHANGE UP (ref 130–400)
POTASSIUM SERPL-MCNC: 4 MMOL/L — SIGNIFICANT CHANGE UP (ref 3.5–5)
POTASSIUM SERPL-SCNC: 4 MMOL/L — SIGNIFICANT CHANGE UP (ref 3.5–5)
PROT SERPL-MCNC: 7.9 G/DL — SIGNIFICANT CHANGE UP (ref 6–8)
PROTHROM AB SERPL-ACNC: 12.2 SEC — SIGNIFICANT CHANGE UP (ref 9.95–12.87)
RAPID RVP RESULT: SIGNIFICANT CHANGE UP
RBC # BLD: 4.97 M/UL — SIGNIFICANT CHANGE UP (ref 4.7–6.1)
RBC # FLD: 12.9 % — SIGNIFICANT CHANGE UP (ref 11.5–14.5)
SARS-COV-2 RNA SPEC QL NAA+PROBE: SIGNIFICANT CHANGE UP
SODIUM SERPL-SCNC: 142 MMOL/L — SIGNIFICANT CHANGE UP (ref 135–146)
WBC # BLD: 11.17 K/UL — HIGH (ref 4.8–10.8)
WBC # FLD AUTO: 11.17 K/UL — HIGH (ref 4.8–10.8)

## 2021-02-07 PROCEDURE — 93970 EXTREMITY STUDY: CPT | Mod: 26

## 2021-02-07 PROCEDURE — 99223 1ST HOSP IP/OBS HIGH 75: CPT

## 2021-02-07 PROCEDURE — 70450 CT HEAD/BRAIN W/O DYE: CPT | Mod: 26

## 2021-02-07 PROCEDURE — 99285 EMERGENCY DEPT VISIT HI MDM: CPT

## 2021-02-07 PROCEDURE — 71045 X-RAY EXAM CHEST 1 VIEW: CPT | Mod: 26

## 2021-02-07 RX ORDER — ASPIRIN/CALCIUM CARB/MAGNESIUM 324 MG
81 TABLET ORAL DAILY
Refills: 0 | Status: DISCONTINUED | OUTPATIENT
Start: 2021-02-07 | End: 2021-02-16

## 2021-02-07 RX ORDER — LOSARTAN POTASSIUM 100 MG/1
50 TABLET, FILM COATED ORAL DAILY
Refills: 0 | Status: DISCONTINUED | OUTPATIENT
Start: 2021-02-07 | End: 2021-02-07

## 2021-02-07 RX ORDER — THIAMINE MONONITRATE (VIT B1) 100 MG
100 TABLET ORAL DAILY
Refills: 0 | Status: DISCONTINUED | OUTPATIENT
Start: 2021-02-07 | End: 2021-02-16

## 2021-02-07 RX ORDER — METHOCARBAMOL 500 MG/1
500 TABLET, FILM COATED ORAL EVERY 8 HOURS
Refills: 0 | Status: DISCONTINUED | OUTPATIENT
Start: 2021-02-07 | End: 2021-02-16

## 2021-02-07 RX ORDER — SENNA PLUS 8.6 MG/1
2 TABLET ORAL AT BEDTIME
Refills: 0 | Status: DISCONTINUED | OUTPATIENT
Start: 2021-02-07 | End: 2021-02-16

## 2021-02-07 RX ORDER — METOPROLOL TARTRATE 50 MG
25 TABLET ORAL
Refills: 0 | Status: DISCONTINUED | OUTPATIENT
Start: 2021-02-07 | End: 2021-02-07

## 2021-02-07 RX ORDER — NICOTINE POLACRILEX 2 MG
1 GUM BUCCAL DAILY
Refills: 0 | Status: DISCONTINUED | OUTPATIENT
Start: 2021-02-07 | End: 2021-02-16

## 2021-02-07 RX ORDER — APIXABAN 2.5 MG/1
5 TABLET, FILM COATED ORAL EVERY 12 HOURS
Refills: 0 | Status: DISCONTINUED | OUTPATIENT
Start: 2021-02-07 | End: 2021-02-16

## 2021-02-07 RX ORDER — LOSARTAN POTASSIUM 100 MG/1
50 TABLET, FILM COATED ORAL DAILY
Refills: 0 | Status: DISCONTINUED | OUTPATIENT
Start: 2021-02-07 | End: 2021-02-14

## 2021-02-07 RX ORDER — ACETAMINOPHEN 500 MG
650 TABLET ORAL EVERY 4 HOURS
Refills: 0 | Status: DISCONTINUED | OUTPATIENT
Start: 2021-02-07 | End: 2021-02-16

## 2021-02-07 RX ORDER — CHLORHEXIDINE GLUCONATE 213 G/1000ML
1 SOLUTION TOPICAL
Refills: 0 | Status: DISCONTINUED | OUTPATIENT
Start: 2021-02-07 | End: 2021-02-16

## 2021-02-07 RX ORDER — METOPROLOL TARTRATE 50 MG
25 TABLET ORAL
Refills: 0 | Status: DISCONTINUED | OUTPATIENT
Start: 2021-02-07 | End: 2021-02-16

## 2021-02-07 RX ORDER — FOLIC ACID 0.8 MG
1 TABLET ORAL DAILY
Refills: 0 | Status: DISCONTINUED | OUTPATIENT
Start: 2021-02-07 | End: 2021-02-16

## 2021-02-07 RX ORDER — MULTIVIT-MIN/FERROUS GLUCONATE 9 MG/15 ML
1 LIQUID (ML) ORAL DAILY
Refills: 0 | Status: DISCONTINUED | OUTPATIENT
Start: 2021-02-07 | End: 2021-02-16

## 2021-02-07 RX ORDER — ATORVASTATIN CALCIUM 80 MG/1
80 TABLET, FILM COATED ORAL AT BEDTIME
Refills: 0 | Status: DISCONTINUED | OUTPATIENT
Start: 2021-02-07 | End: 2021-02-16

## 2021-02-07 RX ORDER — PANTOPRAZOLE SODIUM 20 MG/1
40 TABLET, DELAYED RELEASE ORAL
Refills: 0 | Status: DISCONTINUED | OUTPATIENT
Start: 2021-02-07 | End: 2021-02-16

## 2021-02-07 RX ADMIN — Medication 25 MILLIGRAM(S): at 18:13

## 2021-02-07 RX ADMIN — APIXABAN 5 MILLIGRAM(S): 2.5 TABLET, FILM COATED ORAL at 18:13

## 2021-02-07 RX ADMIN — ATORVASTATIN CALCIUM 80 MILLIGRAM(S): 80 TABLET, FILM COATED ORAL at 21:10

## 2021-02-07 NOTE — ED PROVIDER NOTE - CLINICAL SUMMARY MEDICAL DECISION MAKING FREE TEXT BOX
Patient presented with worsening LE weakness s/p discharge for pontine stroke. Otherwise afebrile, HD stable, neurovascularly intact but states he is too weak to ambulate. Obtained labs which were grossly unremarkable including no significant leukocytosis, anemia, signs of dehydration/DAYDAY, transaminitis or significant electrolyte abnormalities. Chest xray negative for pneumothorax, pneumonia, widened mediastinum, evidence of rib fractures, enlarged cardiac silhouette or any other emergent pathologies. CT head negative for acute emergent pathologies. Patient unable to care for himself or ambulate 2/2 weakness and therefore will require admission for further management. Patient agreeable with plan. HD stable at time of admission.

## 2021-02-07 NOTE — ED ADULT TRIAGE NOTE - CHIEF COMPLAINT QUOTE
pt states "its my Legs" I couldn't get off the couch today and wife  recently. Pt refuses to get undressed- pt states he just put these clothes on

## 2021-02-07 NOTE — H&P ADULT - HISTORY OF PRESENT ILLNESS
65M w/PMHx HFpEF, PE on DOAC, EtOH dependence, Hx left pontine CVA w/Dysphagia presents to the ER    65M w/PMHx HFpEF, PE on DOAC, EtOH dependence, Hx left pontine CVA w/Dysphagia presents to the ER with complaints of BLE pain and inability to ambulate.  Patient discharged home after hospital stay for new onset CVA, not receiving PT/OT at home, has no help.  Reports has fallen approximately 5x since discharge, no injuries, last fall a few days ago.  Reports worsening BLE pain, calves and thighs, x 1 week.  Reports came to ED for evaluation.  Reports has not taken any medications x 1 week, "sometimes I just forget".  Has no HHA, no family intervention.  No CP/SOB.  No abdominal complaints including pain n/v or change in bowel habits.  No urinary complaints.

## 2021-02-07 NOTE — H&P ADULT - NSICDXPASTMEDICALHX_GEN_ALL_CORE_FT
PAST MEDICAL HISTORY:  Alcohol dependence     Cerebrovascular accident (CVA) left pontine with dysphagia    CHF (congestive heart failure)     Chronic back pain     HLD (hyperlipidemia)     HTN (hypertension)     Pulmonary embolism 2015    TIA (transient ischemic attack)

## 2021-02-07 NOTE — ED PROVIDER NOTE - PMH
Alcohol dependence    Cerebrovascular accident (CVA)  left pontine with dysphagia  CHF (congestive heart failure)    Chronic back pain    HLD (hyperlipidemia)    HTN (hypertension)    Pulmonary embolism  2015  TIA (transient ischemic attack)

## 2021-02-07 NOTE — ED PROVIDER NOTE - NS ED ROS FT
Constitutional: no fever, chills, no recent weight loss, change in appetite or malaise  Eyes: no redness/discharge/pain/vision changes  ENT: no rhinorrhea/ear pain/sore throat  Cardiac: No chest pain, SOB or edema.  Respiratory: No cough or respiratory distress  GI: No nausea, vomiting, diarrhea or abdominal pain.  : No dysuria, frequency, urgency or hematuria  MS: + weakness to b/l LE. no pain to back or extremities, no loss of ROM  Neuro: No headache. No LOC.  Skin: No skin rash.  Endocrine: No history of thyroid disease or diabetes.  Except as documented in the HPI, all other systems are negative.

## 2021-02-07 NOTE — H&P ADULT - NSHPSOCIALHISTORY_GEN_ALL_CORE
Tobacco use: active smoker  EtOH use: reports intermittent EToH usage, drank 3 days ago and 1 month prior to that  Illicit drug use: Denies

## 2021-02-07 NOTE — ED PROVIDER NOTE - OBJECTIVE STATEMENT
65 year old M with hx of CHF, PE on Eliquis, recently admitted 12/27 found to have L pontine CVA presenting to ED c/o weakness to b/l LE x last few days. Pt sts having difficulty ambulating due to weakness. Pt also notes swelling to LLE. Pt non compliant with meds. Denies any trauma/injuries, chest pain, sob, fever, calf pain, urinary symptoms, diarrhea, n/v/abd pain.

## 2021-02-07 NOTE — H&P ADULT - ASSESSMENT
65M w/PMHx HFpEF, PE on DOAC, EtOH dependence, Hx left pontine CVA w/Dysphagia presents to the ER       #Severe ambulatory dysfunction w/BLE weakness and pain  - admit to medicine service  - Neurology c/s  - check CTH  - BLE Venous doppler negative  - Fall precautions  - PT/PMR c/s  - s/p MRI L-spine 1/1/21 for similar symptoms (BLE weakness), negative for acute pathology/cord compression     #Leukocytosis  - WBC 11.17, Afebrile  - FU UA/UCx, CXR  - monitor off ABX for now  - recheck CBC in am    #Hx pontine Left CVA  - c/w ASA 81/Lipitor 80  - PT/OT as above  - Fall precautions    #Dysphagia s/p CVA  - Dysphagia 3 with thin liquids  - SLP evaluation     #Hx EtOH Dependence.   - monitor for Withdrawal signs   - Librium 25mg PO q4h PRN, consider protocol if frequent use  - thiamine, Folic acid and MV      #HFpEF  - EF >55%, mild LVH 6/20  - euvolemic, monitor off diuretics   - weights QD  - monitor I and O    #HX PE  - c/w Eliquis    # HTN  - c/w Losartan/Metoprolol  - monitor bp, adjust regimen PRN  - Low Na+ diet   65M w/PMHx HFpEF, PE on DOAC, EtOH dependence, Hx left pontine CVA w/Dysphagia presents to the ER with BLE pain, frequent falls, and inability to ambulate, admitted to medicine service for further management      #Severe ambulatory dysfunction w/BLE weakness and pain  - admit to medicine service  - Neurology c/s  - check CTH  - BLE Venous doppler negative  - check CPK (added to ED labs)  - Fall precautions  - PT/PMR c/s  - s/p MRI L-spine 1/1/21 for similar symptoms (BLE weakness), negative for acute pathology/cord compression   - social work c/s for safe discharge planning  - DVT/GI PPX (Eliquis/Protonix)  - CHG 4% QD and PRN     #Leukocytosis  - WBC 11.17, Afebrile  - FU UA/UCx, CXR  - monitor off ABX for now  - recheck CBC in am    #Hx pontine Left CVA  - c/w ASA 81/Lipitor 80  - PT/OT as above  - Fall precautions    #Dysphagia s/p CVA  - Dysphagia 3 with thin liquids  - SLP evaluation PRN     #Hx EtOH Dependence.   - monitor for Withdrawal signs, not likely based on social Hx (only drinking infreqently, last 3 days ago and prior to that was 1 month)  - thiamine, Folic acid and MV      #HFpEF  - EF >55%, mild LVH 6/20  - euvolemic, monitor off diuretics   - weights QD  - monitor I and O    #HX PE  - c/w Eliquis    # HTN  - c/w Losartan/Metoprolol, doses now as hypertensive in ED  - monitor bp, adjust regimen PRN  - Low Na+ diet

## 2021-02-07 NOTE — ED PROVIDER NOTE - PHYSICAL EXAMINATION
CONSTITUTIONAL: Unkempt Male in no apparent distress.   NECK: Supple; non-tender; no cervical lymphadenopathy.   CARDIOVASCULAR: Normal S1, S2; no murmurs, rubs, or gallops.   RESPIRATORY: Normal chest excursion with respiration; breath sounds clear and equal bilaterally; no wheezes, rhonchi, or rales.  GI/: Normal bowel sounds; non-distended; non-tender; no palpable organomegaly.   MS: No evidence of trauma or deformity. Normal ROM in all four extremities; Strength 4/5 in b/l LE. non-tender to palpation; distal pulses are normal.   Extrem: + b/l LE edema L > R. No calf ttp  SKIN: Normal for age and race; warm; dry; good turgor; no apparent lesions or exudate.   NEURO/PSYCH: A & O x 4; grossly unremarkable. mood and manner are appropriate. no focal deficits. Sensation intact

## 2021-02-07 NOTE — H&P ADULT - NSHPLABSRESULTS_GEN_ALL_CORE
15.0   11.17 )-----------( 180      ( 07 Feb 2021 10:26 )             44.9       02-07    142  |  100  |  13  ----------------------------<  133<H>  4.0   |  31  |  0.8    Ca    10.0      07 Feb 2021 10:26    TPro  7.9  /  Alb  4.9  /  TBili  1.4<H>  /  DBili  x   /  AST  31  /  ALT  38  /  AlkPhos  109  02-07          PT/INR - ( 07 Feb 2021 10:26 )   PT: 12.20 sec;   INR: 1.06 ratio         PTT - ( 07 Feb 2021 10:26 )  PTT:29.9 sec    Lactate Trend    CXR    Impression:    Improving left basilar opacity.    BLE Venous Doppler    Impression:    No evidence of deep venous thrombosis or superficial thrombophlebitis in the bilateral lower extremities.

## 2021-02-07 NOTE — H&P ADULT - NSHPPHYSICALEXAM_GEN_ALL_CORE
Vital Signs (24 Hrs):  T(C): 35.9 (02-07-21 @ 13:42), Max: 35.9 (02-07-21 @ 13:42)  HR: 95 (02-07-21 @ 13:42) (95 - 95)  BP: 166/83 (02-07-21 @ 13:42) (166/83 - 185/104)  RR: 18 (02-07-21 @ 13:42) (18 - 20)  SpO2: 99% (02-07-21 @ 13:42) (98% - 99%)    PHYSICAL EXAM:  GENERAL: NAD, well-developed  SKIN: No rashes or lesions  HEAD:  Atraumatic, Normocephalic  EYES: EOMI, PERRLA, conjunctiva and sclera clear  NECK: Supple, No JVD  CHEST/LUNG: Clear to auscultation bilaterally; No wheeze  HEART: Regular rate and rhythm; No murmurs, rubs, or gallops  ABDOMEN: Soft, Nontender, Nondistended; Bowel sounds present  EXTREMITIES:  + b/l LE edema L > R.   CNS: AAOx3; Str 4/5 BLE 5/5 BUE

## 2021-02-07 NOTE — ED PROVIDER NOTE - ATTENDING CONTRIBUTION TO CARE
65 year old male, pmhx as documented, presenting with worsening b/l LE weakness x several days. Patient has had difficulty ambulating 2/2 weakness in his legs. Patient was recently admitted for L pontine CVA and was discharged but states even before discharge his symptoms were persistent and he is unable to care for himself at home. Otherwise denies new trauma, fevers, N/V/D, incontinence/retention, LE numbness or any other symptoms.    Vital Signs: I have reviewed the initial vital signs.  Constitutional: NAD, well-nourished, appears stated age, no acute distress.  HEENT: Airway patent, moist MM, no erythema/swelling/deformity of oral structures. EOMI, PERRLA.  CV: regular rate, regular rhythm, well-perfused extremities, 2+ b/l DP and radial pulses equal.  Lungs: BCTA, no increased WOB.  ABD: NTND, no guarding or rebound, no pulsatile mass, no hernias.   MSK: Neck supple, nontender, nl ROM, no stepoff. Chest nontender. Back nontender in TLS spine or to b/l bony structures or flanks. Ext nontender, nl rom, no deformity.   INTEG: Skin warm, dry, no rash.  NEURO: A&Ox3, normal strength, nl sensation throughout, normal speech.   PSYCH: Calm, cooperative, normal affect and interaction.    Able to move his legs, neuro intact but too weak to ambulate. Will obtain labs, CT head, CXR, likely will require admission.

## 2021-02-07 NOTE — H&P ADULT - ATTENDING COMMENTS
#Inability to ambulate, in setting of spinal stenosis lumbar, deconditioning  mri l spine noted from 1/2021  PT  pain control  va duplex neg  f/u cpk, check esr

## 2021-02-08 ENCOUNTER — TRANSCRIPTION ENCOUNTER (OUTPATIENT)
Age: 66
End: 2021-02-08

## 2021-02-08 LAB
ALBUMIN SERPL ELPH-MCNC: 3.6 G/DL — SIGNIFICANT CHANGE UP (ref 3.5–5.2)
ALP SERPL-CCNC: 82 U/L — SIGNIFICANT CHANGE UP (ref 30–115)
ALT FLD-CCNC: 25 U/L — SIGNIFICANT CHANGE UP (ref 0–41)
ANION GAP SERPL CALC-SCNC: 8 MMOL/L — SIGNIFICANT CHANGE UP (ref 7–14)
APPEARANCE UR: CLEAR — SIGNIFICANT CHANGE UP
AST SERPL-CCNC: 23 U/L — SIGNIFICANT CHANGE UP (ref 0–41)
BACTERIA # UR AUTO: ABNORMAL
BASOPHILS # BLD AUTO: 0.03 K/UL — SIGNIFICANT CHANGE UP (ref 0–0.2)
BASOPHILS NFR BLD AUTO: 0.3 % — SIGNIFICANT CHANGE UP (ref 0–1)
BILIRUB SERPL-MCNC: 1.3 MG/DL — HIGH (ref 0.2–1.2)
BILIRUB UR-MCNC: ABNORMAL
BUN SERPL-MCNC: 14 MG/DL — SIGNIFICANT CHANGE UP (ref 10–20)
CALCIUM SERPL-MCNC: 8.8 MG/DL — SIGNIFICANT CHANGE UP (ref 8.5–10.1)
CHLORIDE SERPL-SCNC: 101 MMOL/L — SIGNIFICANT CHANGE UP (ref 98–110)
CO2 SERPL-SCNC: 28 MMOL/L — SIGNIFICANT CHANGE UP (ref 17–32)
COLOR SPEC: YELLOW — SIGNIFICANT CHANGE UP
CREAT SERPL-MCNC: 0.7 MG/DL — SIGNIFICANT CHANGE UP (ref 0.7–1.5)
DIFF PNL FLD: NEGATIVE — SIGNIFICANT CHANGE UP
EOSINOPHIL # BLD AUTO: 0.23 K/UL — SIGNIFICANT CHANGE UP (ref 0–0.7)
EOSINOPHIL NFR BLD AUTO: 2.6 % — SIGNIFICANT CHANGE UP (ref 0–8)
EPI CELLS # UR: ABNORMAL /HPF
ERYTHROCYTE [SEDIMENTATION RATE] IN BLOOD: 32 MM/HR — HIGH (ref 0–10)
GLUCOSE SERPL-MCNC: 101 MG/DL — HIGH (ref 70–99)
GLUCOSE UR QL: NEGATIVE MG/DL — SIGNIFICANT CHANGE UP
HCT VFR BLD CALC: 34.3 % — LOW (ref 42–52)
HGB BLD-MCNC: 11.6 G/DL — LOW (ref 14–18)
IMM GRANULOCYTES NFR BLD AUTO: 0.6 % — HIGH (ref 0.1–0.3)
KETONES UR-MCNC: ABNORMAL
LEUKOCYTE ESTERASE UR-ACNC: NEGATIVE — SIGNIFICANT CHANGE UP
LYMPHOCYTES # BLD AUTO: 1.87 K/UL — SIGNIFICANT CHANGE UP (ref 1.2–3.4)
LYMPHOCYTES # BLD AUTO: 20.9 % — SIGNIFICANT CHANGE UP (ref 20.5–51.1)
MCHC RBC-ENTMCNC: 30.9 PG — SIGNIFICANT CHANGE UP (ref 27–31)
MCHC RBC-ENTMCNC: 33.8 G/DL — SIGNIFICANT CHANGE UP (ref 32–37)
MCV RBC AUTO: 91.5 FL — SIGNIFICANT CHANGE UP (ref 80–94)
MONOCYTES # BLD AUTO: 0.52 K/UL — SIGNIFICANT CHANGE UP (ref 0.1–0.6)
MONOCYTES NFR BLD AUTO: 5.8 % — SIGNIFICANT CHANGE UP (ref 1.7–9.3)
NEUTROPHILS # BLD AUTO: 6.25 K/UL — SIGNIFICANT CHANGE UP (ref 1.4–6.5)
NEUTROPHILS NFR BLD AUTO: 69.8 % — SIGNIFICANT CHANGE UP (ref 42.2–75.2)
NITRITE UR-MCNC: NEGATIVE — SIGNIFICANT CHANGE UP
NRBC # BLD: 0 /100 WBCS — SIGNIFICANT CHANGE UP (ref 0–0)
PH UR: 6.5 — SIGNIFICANT CHANGE UP (ref 5–8)
PLATELET # BLD AUTO: 146 K/UL — SIGNIFICANT CHANGE UP (ref 130–400)
POTASSIUM SERPL-MCNC: 3.1 MMOL/L — LOW (ref 3.5–5)
POTASSIUM SERPL-SCNC: 3.1 MMOL/L — LOW (ref 3.5–5)
PROT SERPL-MCNC: 6.2 G/DL — SIGNIFICANT CHANGE UP (ref 6–8)
PROT UR-MCNC: 30 MG/DL
RBC # BLD: 3.75 M/UL — LOW (ref 4.7–6.1)
RBC # FLD: 13.1 % — SIGNIFICANT CHANGE UP (ref 11.5–14.5)
RBC CASTS # UR COMP ASSIST: SIGNIFICANT CHANGE UP /HPF
SODIUM SERPL-SCNC: 137 MMOL/L — SIGNIFICANT CHANGE UP (ref 135–146)
SP GR SPEC: 1.02 — SIGNIFICANT CHANGE UP (ref 1.01–1.03)
UROBILINOGEN FLD QL: 2 MG/DL (ref 0.2–0.2)
WBC # BLD: 8.95 K/UL — SIGNIFICANT CHANGE UP (ref 4.8–10.8)
WBC # FLD AUTO: 8.95 K/UL — SIGNIFICANT CHANGE UP (ref 4.8–10.8)
WBC UR QL: SIGNIFICANT CHANGE UP /HPF

## 2021-02-08 PROCEDURE — 99222 1ST HOSP IP/OBS MODERATE 55: CPT

## 2021-02-08 RX ORDER — METOPROLOL TARTRATE 50 MG
1 TABLET ORAL
Qty: 0 | Refills: 0 | DISCHARGE
Start: 2021-02-08

## 2021-02-08 RX ORDER — ATORVASTATIN CALCIUM 80 MG/1
1 TABLET, FILM COATED ORAL
Qty: 0 | Refills: 0 | DISCHARGE
Start: 2021-02-08

## 2021-02-08 RX ORDER — FOLIC ACID 0.8 MG
1 TABLET ORAL
Qty: 0 | Refills: 0 | DISCHARGE
Start: 2021-02-08

## 2021-02-08 RX ORDER — ASPIRIN/CALCIUM CARB/MAGNESIUM 324 MG
1 TABLET ORAL
Qty: 0 | Refills: 0 | DISCHARGE
Start: 2021-02-08

## 2021-02-08 RX ORDER — METHOCARBAMOL 500 MG/1
1 TABLET, FILM COATED ORAL
Qty: 0 | Refills: 0 | DISCHARGE
Start: 2021-02-08

## 2021-02-08 RX ORDER — APIXABAN 2.5 MG/1
1 TABLET, FILM COATED ORAL
Qty: 0 | Refills: 0 | DISCHARGE
Start: 2021-02-08

## 2021-02-08 RX ORDER — ACETAMINOPHEN 500 MG
2 TABLET ORAL
Qty: 0 | Refills: 0 | DISCHARGE
Start: 2021-02-08

## 2021-02-08 RX ORDER — LOSARTAN POTASSIUM 100 MG/1
1 TABLET, FILM COATED ORAL
Qty: 0 | Refills: 0 | DISCHARGE
Start: 2021-02-08

## 2021-02-08 RX ORDER — PANTOPRAZOLE SODIUM 20 MG/1
1 TABLET, DELAYED RELEASE ORAL
Qty: 0 | Refills: 0 | DISCHARGE
Start: 2021-02-08

## 2021-02-08 RX ORDER — METOPROLOL TARTRATE 50 MG
1 TABLET ORAL
Qty: 0 | Refills: 0 | DISCHARGE

## 2021-02-08 RX ORDER — THIAMINE MONONITRATE (VIT B1) 100 MG
1 TABLET ORAL
Qty: 0 | Refills: 0 | DISCHARGE
Start: 2021-02-08

## 2021-02-08 RX ORDER — SENNA PLUS 8.6 MG/1
2 TABLET ORAL
Qty: 0 | Refills: 0 | DISCHARGE
Start: 2021-02-08

## 2021-02-08 RX ORDER — POTASSIUM CHLORIDE 20 MEQ
20 PACKET (EA) ORAL ONCE
Refills: 0 | Status: COMPLETED | OUTPATIENT
Start: 2021-02-08 | End: 2021-02-09

## 2021-02-08 RX ADMIN — Medication 1 PATCH: at 10:45

## 2021-02-08 RX ADMIN — Medication 25 MILLIGRAM(S): at 17:56

## 2021-02-08 RX ADMIN — APIXABAN 5 MILLIGRAM(S): 2.5 TABLET, FILM COATED ORAL at 04:57

## 2021-02-08 RX ADMIN — LOSARTAN POTASSIUM 50 MILLIGRAM(S): 100 TABLET, FILM COATED ORAL at 04:57

## 2021-02-08 RX ADMIN — Medication 81 MILLIGRAM(S): at 10:45

## 2021-02-08 RX ADMIN — ATORVASTATIN CALCIUM 80 MILLIGRAM(S): 80 TABLET, FILM COATED ORAL at 22:01

## 2021-02-08 RX ADMIN — PANTOPRAZOLE SODIUM 40 MILLIGRAM(S): 20 TABLET, DELAYED RELEASE ORAL at 04:58

## 2021-02-08 RX ADMIN — Medication 100 MILLIGRAM(S): at 10:45

## 2021-02-08 RX ADMIN — Medication 1 MILLIGRAM(S): at 10:45

## 2021-02-08 RX ADMIN — Medication 1 TABLET(S): at 10:45

## 2021-02-08 RX ADMIN — APIXABAN 5 MILLIGRAM(S): 2.5 TABLET, FILM COATED ORAL at 17:56

## 2021-02-08 RX ADMIN — Medication 25 MILLIGRAM(S): at 04:57

## 2021-02-08 RX ADMIN — Medication 1 PATCH: at 22:11

## 2021-02-08 NOTE — DISCHARGE NOTE PROVIDER - NSDCMRMEDTOKEN_GEN_ALL_CORE_FT
acetaminophen 325 mg oral tablet: 2 tab(s) orally every 8 hours, As Needed -  for pain relief  apixaban 5 mg oral tablet: 1 tab(s) orally every 12 hours  aspirin 81 mg oral tablet, chewable: 1 tab(s) orally once a day  atorvastatin 80 mg oral tablet: 1 tab(s) orally once a day (at bedtime)  folic acid 1 mg oral tablet: 1 tab(s) orally once a day  losartan 50 mg oral tablet: 1 tab(s) orally once a day  methocarbamol 500 mg oral tablet: 1 tab(s) orally every 8 hours, As needed, Muscle Spasm  metoprolol tartrate 25 mg oral tablet: 1 tab(s) orally 2 times a day  Multiple Vitamins with Minerals oral tablet: 1 tab(s) orally once a day  nicotine 21 mg/24 hr transdermal film, extended release: 21 mg/24h transdermal  pantoprazole 40 mg oral delayed release tablet: 1 tab(s) orally once a day (before a meal)  senna oral tablet: 2 tab(s) orally once a day (at bedtime), As needed, Constipation  thiamine 100 mg oral tablet: 1 tab(s) orally once a day   acetaminophen 325 mg oral tablet: 2 tab(s) orally every 8 hours, As Needed -  for pain relief  apixaban 5 mg oral tablet: 1 tab(s) orally every 12 hours  aspirin 81 mg oral tablet, chewable: 1 tab(s) orally once a day  atorvastatin 80 mg oral tablet: 1 tab(s) orally once a day (at bedtime)  folic acid 1 mg oral tablet: 1 tab(s) orally once a day  losartan 100 mg oral tablet: 1 tab(s) orally once a day  methocarbamol 500 mg oral tablet: 1 tab(s) orally every 8 hours, As needed, Muscle Spasm  metoprolol tartrate 25 mg oral tablet: 1 tab(s) orally 2 times a day  Multiple Vitamins with Minerals oral tablet: 1 tab(s) orally once a day  nicotine 21 mg/24 hr transdermal film, extended release: 21 mg/24h transdermal  pantoprazole 40 mg oral delayed release tablet: 1 tab(s) orally once a day (before a meal)  senna oral tablet: 2 tab(s) orally once a day (at bedtime), As needed, Constipation  thiamine 100 mg oral tablet: 1 tab(s) orally once a day

## 2021-02-08 NOTE — DISCHARGE NOTE PROVIDER - CARE PROVIDER_API CALL
Dr Canales,   Office: [facility]  -schedule appoint after discharge from Rehab center  Phone: (   )    -  Fax: (   )    -  Follow Up Time:    Dr Canales,   Office: [facility]  -schedule appoint after discharge from Rehab center  Phone: (   )    -  Fax: (   )    -  Follow Up Time:     Ryan Maldonado)  EEGEpilepsy; Neurology  [address] [place] [address], [address]  [place] [address]  Phone: [phone]  Fax: [phone]  Follow Up Time: 1 week

## 2021-02-08 NOTE — PHYSICAL THERAPY INITIAL EVALUATION ADULT - GENERAL OBSERVATIONS, REHAB EVAL
8:40-9:10 Chart reviewed. Patient available to be seen for physical therapy, denies pain, confirmed with RN. Pt rec'd in bed in NAD.

## 2021-02-08 NOTE — DISCHARGE NOTE PROVIDER - PROVIDER TOKENS
FREE:[LAST:[Dr Canales],PHONE:[(   )    -],FAX:[(   )    -],ADDRESS:[Office: [facility]  -schedule appoint after discharge from Rehab center]] FREE:[LAST:[Dr Canales],PHONE:[(   )    -],FAX:[(   )    -],ADDRESS:[Office: [facility]  -schedule appoint after discharge from Rehab center]],PROVIDER:[TOKEN:[[id number]],FOLLOWUP:[1 week]]

## 2021-02-08 NOTE — DISCHARGE NOTE PROVIDER - NSDCCPCAREPLAN_GEN_ALL_CORE_FT
PRINCIPAL DISCHARGE DIAGNOSIS  Diagnosis: Weakness  Assessment and Plan of Treatment: Deconditioning: needs Rehab      SECONDARY DISCHARGE DIAGNOSES  Diagnosis: Cerebrovascular accident (CVA)  Assessment and Plan of Treatment: Hx of CVA: on Statin+ASA    Diagnosis: HTN (hypertension)  Assessment and Plan of Treatment: continue BP meds: DASH diet    Diagnosis: Pulmonary embolism  Assessment and Plan of Treatment: Hx of PE: continue Eliquis    Diagnosis: Ambulatory dysfunction  Assessment and Plan of Treatment: needs Rehab at SNF

## 2021-02-08 NOTE — PHYSICAL THERAPY INITIAL EVALUATION ADULT - TRANSFER SAFETY CONCERNS NOTED: SIT/STAND, REHAB EVAL
decreased balance during turns/decreased proprioception/decreased sequencing ability/decreased step length/decreased weight-shifting ability

## 2021-02-08 NOTE — DISCHARGE NOTE PROVIDER - HOSPITAL COURSE
65M w/PMHx HFpEF, PE on DOAC, EtOH dependence, Hx left pontine CVA w/Dysphagia presents to the ER with BLE pain, frequent falls, and inability to ambulate, admitted to medicine service for further management      #Severe ambulatory dysfunction w/BLE weakness and pain  - admited to medicine service  - Neurology c/s  - BLE Venous doppler negative  - Fall precautions  - s/p MRI L-spine 1/1/21 for similar symptoms (BLE weakness):  **negative for acute pathology/cord compression   - social work c/s for safe discharge planning        #Leukocytosis  - WBC 11.17, Afebrile  -  CXR: improving Left basilar opacity  -  No ABX needed      #Hx pontine Left CVA  - c/w ASA 81/Lipitor 80  - PT/OT needed  - Fall precautions    #Dysphagia s/p CVA  - Dysphagia 3 with thin liquids       #Hx EtOH Dependence.   - monitor for Withdrawal signs, not likely based on social Hx (only drinking infreqently, last 3 days ago and prior to that was 1 month)  - thiamine, Folic acid and MV      #HFpEF  - EF >55%, mild LVH 6/20  - euvolemic, monitor off diuretics   - weights QD  - monitor I and O    #HX PE  - c/w Eliquis    # HTN  - c/w Losartan/Metoprolol, doses now as hypertensive in ED  - monitor bp, adjust regimen PRN  - Low Na+ diet         Attending Statement:  #Inability to ambulate, in setting of spinal stenosis lumbar, deconditioning    Physical therapy needed   65M w/PMHx HFpEF, PE on DOAC, EtOH dependence, Hx left pontine CVA w/Dysphagia presents to the ER with BLE pain, frequent falls, and inability to ambulate, admitted to medicine service for further management . Per the  patient he has no family and has been having difficulty with worsening ambulating .     #Severe ambulatory dysfunction w/BLE weakness and pain  - admited to medicine service  - Neurology c/s  - BLE Venous doppler negative  - Fall precautions  - s/p MRI L-spine 1/1/21 for similar symptoms (BLE weakness):  **negative for acute pathology/cord compression   - social work c/s for safe discharge planning        #Leukocytosis  - WBC 11.17, Afebrile  -  CXR: improving Left basilar opacity  -  No ABX needed      #Hx pontine Left CVA  - c/w ASA 81/Lipitor 80  - PT/OT needed  - Fall precautions    #Dysphagia s/p CVA  - Dysphagia 3 with thin liquids       #Hx EtOH Dependence.   - monitor for Withdrawal signs, not likely based on social Hx (only drinking infreqently, last 3 days ago and prior to that was 1 month)  - thiamine, Folic acid and MV      #HFpEF  - EF >55%, mild LVH 6/20  - euvolemic, monitor off diuretics   - weights QD  - monitor I and O    #HX PE  - c/w Eliquis    # HTN  - c/w Losartan/Metoprolol, doses now as hypertensive in ED  - monitor bp, adjust regimen PRN  - Low Na+ diet         Attending Statement:  #Inability to ambulate, in setting of spinal stenosis lumbar, deconditioning    Physical therapy needed   65M w/PMHx HFpEF, PE on DOAC, EtOH dependence, Hx left pontine CVA w/Dysphagia presents to the ER with BLE pain, frequent falls, and inability to ambulate, admitted to medicine service for further management . Per the  patient he has no family and has been having difficulty with worsening ambulating . Today sitting up in chair eating food. comfortable     Vital Signs Last 24 Hrs  T(C): 35.8 (08 Feb 2021 05:07), Max: 36.5 (07 Feb 2021 14:52)  T(F): 96.4 (08 Feb 2021 05:07), Max: 97.7 (07 Feb 2021 14:52)  HR: 64 (08 Feb 2021 05:07) (64 - 87)  BP: 141/60 (08 Feb 2021 05:07) (109/57 - 166/84)  BP(mean): --  RR: 18 (08 Feb 2021 05:07) (17 - 18)  SpO2: --    GENERAL: NAD, AAOx2  HEAD:  Atraumatic, Normocephalic  EYES: EOMI, conjunctiva and sclera clear  CHEST/LUNG: CLEAR B/L, No wheeze  HEART: Regular rate and rhythm; No murmurs, rubs, or gallops  ABDOMEN: Soft, Nontender, Nondistended; Bowel sounds present      #Severe ambulatory dysfunction w/BLE weakness and pain  - admited to medicine service  - BLE Venous doppler negative  - Fall precautions  - s/p MRI L-spine 1/1/21 for similar symptoms (BLE weakness):  **negative for acute pathology/cord compression   - social work c/s for safe discharge planning    #Leukocytosis: now resolved.   - WBC 11.17, Afebrile, no indication for antibiotics.   CXR: improving Left basilar opacity  -  No ABX needed      #Hx pontine Left CVA  - c/w ASA 81/Lipitor 80  - PT/OT needed; if patient is compliant , will be discharged to rehab today.   - Fall precautions    #Dysphagia s/p CVA  - Dysphagia 3 with thin liquids       #Hx EtOH Dependence.   - monitor for Withdrawal signs, not likely based on social Hx (only drinking infreqently, last 3 days ago and prior to that was 1 month)  - thiamine, Folic acid and MV      #HFpEF  - EF >55%, mild LVH 6/20  - euvolemic, monitor off diuretics   - weights QD  - monitor I and O    #HX PE  - c/w Eliquis, however if patient has recurrent falls will need to reconsider risk / benefit. IF patient remains in rehab and gait improves then continue.     # HTN  - c/w Losartan/Metoprolol, doses now as hypertensive in ED  - monitor bp, adjust regimen PRN  - Low Na+ diet       65M w/PMHx HFpEF, PE on DOAC, EtOH dependence, Hx left pontine CVA w/Dysphagia presents to the ER with BLE pain, frequent falls, and inability to ambulate, admitted to medicine service for further management . Per the  patient he has no family and has been having difficulty with worsening ambulating . Today sitting up in chair eating food. comfortable . Clinically at baseline awaiting placement.     Vital Signs Last 24 Hrs  T(C): 35.8 (08 Feb 2021 05:07), Max: 36.5 (07 Feb 2021 14:52)  T(F): 96.4 (08 Feb 2021 05:07), Max: 97.7 (07 Feb 2021 14:52)  HR: 64 (08 Feb 2021 05:07) (64 - 87)  BP: 141/60 (08 Feb 2021 05:07) (109/57 - 166/84)  BP(mean): --  RR: 18 (08 Feb 2021 05:07) (17 - 18)  SpO2: --    GENERAL: NAD, AAOx2  HEAD:  Atraumatic, Normocephalic  EYES: EOMI, conjunctiva and sclera clear  CHEST/LUNG: CLEAR B/L, No wheeze  HEART: Regular rate and rhythm; No murmurs, rubs, or gallops  ABDOMEN: Soft, Nontender, Nondistended; Bowel sounds present      #Severe ambulatory dysfunction w/BLE weakness and pain  - admitted to medicine service, but seen and evaluated by the neurological team.   - BLE Venous doppler negative  - Fall precautions  - s/p MRI L-spine 1/1/21 for similar symptoms (BLE weakness):  **negative for acute pathology/cord compression  - Starting Neurontin 100mg BID  - Continue current medications  - Continue Physical Therapy  - May follow up with outpatient Neurology Dr Garcia   - social work c/s for safe discharge planning    #Leukocytosis: now resolved.   - WBC 11.17, Afebrile, no indication for antibiotics.   CXR: improving Left basilar opacity  -  No ABX needed    #Hx pontine Left CVA  - c/w ASA 81/Lipitor 80  - PT/OT needed; if patient is compliant , will be discharged to rehab .   - Fall precautions    #Dysphagia s/p CVA  - Dysphagia 3 with thin liquids       #Hx EtOH Dependence.   - monitor for Withdrawal signs, not likely based on social Hx (only drinking infreqently, last 3 days ago and prior to that was 1 month)  - thiamine, Folic acid and MV      #HFpEF  - EF >55%, mild LVH 6/20  - euvolemic, monitor off diuretics   - weights QD  - monitor I and O    #HX PE  - c/w Eliquis, however if patient has recurrent falls will need to reconsider risk / benefit. IF patient remains in rehab and gait improves then continue.     # HTN  - c/w Losartan/Metoprolol, doses now as hypertensive in ED  - monitor bp, adjust regimen PRN  - Low Na+ diet    # Nutritional deficiency : thiamine and folic acid being replaced.        CHAPARRO ROBISON is a 65y Male with hx of HFpEF, PE on DOAC, EtOH dependence, Hx left pontine CVA w/Dysphagia presented to the ER with complaints of BLE pain and inability to ambulate.     #Severe ambulatory dysfunction w/BLE weakness and pain -  - BLE Venous doppler negative.    - s/p MRI L-spine 1/1/21 for similar symptoms (BLE weakness):  **negative for acute pathology/cord compression  - Fall precautions  - Started Neurontin 100mg BID  - Continue Physical Therapy  - May follow up with outpatient Neurology Dr Garcia   - discharge to SNF    Resolved -- Diarrhea -  - noted 2/11 with several episodes x 3. Stopped later in day 2/11    CHRONIC MEDICAL CONDITIONS  1. Hx pontine Left CVA --  c/w ASA 81/Lipitor 80, fall precautions  2. Dysphagia s/p CVA -- Dysphagia 3 with thin liquids  3. Hx EtOH Dependence -- not in withdrawal. thiamine, folic acid, MV  4. HFpEF -- EF >55%, mild LVH 6/20. not on diuretics  5. HX PE -- c/w Eliquis, however if patient has recurrent falls will need to reconsider risk / benefit. IF patient remains in rehab and gait improves then continue.   6. HTN -- c/w metoprolol, Increased Losartan to 100mg (from 50mg)

## 2021-02-08 NOTE — DISCHARGE NOTE PROVIDER - INSTRUCTIONS
Dysphagia 3 wirh thin liquids, DASH Diet, Dysphagia 3 Soft-Thin Liquids:   DASH/TLC {Sodium & Cholesterol Restricted} (02-07-21 @ 14:04) [Active]

## 2021-02-08 NOTE — CONSULT NOTE ADULT - SUBJECTIVE AND OBJECTIVE BOX
Neurology Consult  LETICIA Mayes [id number]    Patient is a 65y old  Male who presents with a chief complaint of BLE weakness (2021 13:05)      HPI:  65M w/PMHx HFpEF, PE on DOAC, EtOH dependence, Hx left pontine CVA w/Dysphagia presents to the ER with complaints of BLE pain and inability to ambulate.  Patient discharged home after hospital stay for new onset CVA, not receiving PT/OT at home, has no help.  Reports has fallen approximately 5x since discharge, no injuries, last fall a few days ago.  Reports worsening BLE pain, calves and thighs, x 1 week.  Reports came to ED for evaluation.  Reports has not taken any medications x 1 week, "sometimes I just forget".  Has no HHA, no family intervention.  No CP/SOB.  No abdominal complaints including pain n/v or change in bowel habits.  No urinary complaints.   (2021 13:23)    Patient seen at bedside with Dr Garcia, patient acknowledges intermittent CHRONIC leg pain since traumatic injury in past.  Patient states he has no new complaints, states pain comes and goes, and has numbness that travels from feet to knees.  Patient denies any other neurological concerns at present.  Patient denies any past medication for neuropathy.  Patient states he is compliant with home medications.    PAST MEDICAL & SURGICAL HISTORY:  Cerebrovascular accident (CVA)  left pontine with dysphagia    Pulmonary embolism  2015    Chronic back pain    Alcohol dependence    HLD (hyperlipidemia)    HTN (hypertension)    CHF (congestive heart failure)    TIA (transient ischemic attack)    History of appendectomy        FAMILY HISTORY:  FH: MI (myocardial infarction) (Father, Mother)  Dad, Mom        Social History: (-) x 3    Allergies    No Known Allergies    Intolerances        MEDICATIONS  (STANDING):  apixaban 5 milliGRAM(s) Oral every 12 hours  aspirin  chewable 81 milliGRAM(s) Oral daily  atorvastatin 80 milliGRAM(s) Oral at bedtime  chlorhexidine 4% Liquid 1 Application(s) Topical <User Schedule>  folic acid 1 milliGRAM(s) Oral daily  losartan 50 milliGRAM(s) Oral daily  metoprolol tartrate 25 milliGRAM(s) Oral two times a day  multivitamin/minerals 1 Tablet(s) Oral daily  nicotine - 21 mG/24Hr(s) Patch 1 patch Transdermal daily  pantoprazole    Tablet 40 milliGRAM(s) Oral before breakfast  thiamine 100 milliGRAM(s) Oral daily    MEDICATIONS  (PRN):  acetaminophen   Tablet .. 650 milliGRAM(s) Oral every 4 hours PRN Mild Pain (1 - 3)  methocarbamol 500 milliGRAM(s) Oral every 8 hours PRN Muscle Spasm  senna 2 Tablet(s) Oral at bedtime PRN Constipation      Review of systems:    Constitutional: No fever, weight loss or fatigue    Eyes: No eye pain or discharge  ENMT:  No difficulty hearing; No sinus or throat pain  Neck: No pain or stiffness  Respiratory: No cough, wheezing, chills or hemoptysis  Cardiovascular: No chest pain, palpitations, shortness of breath, dyspnea on exertion  Gastrointestinal: No abdominal pain, nausea, vomiting or hematemesis; No diarrhea or constipation.   Genitourinary: No dysuria, frequency, hematuria or incontinence  Neurological: As per HPI  Skin: No rashes or lesions   Endocrine: No heat or cold intolerance; No hair loss  Musculoskeletal: No joint pain or swelling  Psychiatric: No depression, anxiety, mood swings  Heme/Lymph: No easy bruising or bleeding gums    Vital Signs Last 24 Hrs  T(C): 36.3 (2021 14:14), Max: 36.3 (2021 14:14)  T(F): 97.4 (2021 14:14), Max: 97.4 (2021 14:14)  HR: 92 (2021 14:14) (64 - 92)  BP: 114/59 (2021 14:14) (109/57 - 141/60)  BP(mean): --  RR: 18 (2021 14:14) (17 - 18)      Neurologic Examination:  General:  Appearance is consistent with chronologic age.  No abnormal facies.   General: The patient is oriented to person, place, time and date.  Recent and remote memory intact.  Fund of knowledge is intact and normal.  Language with normal comprehension.   Cranial nerves:   EOMI w/o nystagmus, skew or reported double vision.  PERRL.  No ptosis/weakness of eyelid closure.  Facial sensation is normal with normal bite.  No facial asymmetry.   Motor examination:   Normal tone, bulk and range of motion.  No tenderness, twitching, tremors or involuntary movements.  Formal Muscle Strength Testing: (MRC grade R/L) 5/5 UE; 4/5 LE.  No observable drift.  Sensory examination:   Intact to light touch and proprioception and vibration in all extremities. Decreased in lower extremities distal to knees.       Labs:   CBC Full  -  ( 2021 06:54 )  WBC Count : 8.95 K/uL  RBC Count : 3.75 M/uL  Hemoglobin : 11.6 g/dL  Hematocrit : 34.3 %  Platelet Count - Automated : 146 K/uL  Mean Cell Volume : 91.5 fL  Mean Cell Hemoglobin : 30.9 pg  Mean Cell Hemoglobin Concentration : 33.8 g/dL  Auto Neutrophil # : 6.25 K/uL  Auto Lymphocyte # : 1.87 K/uL  Auto Monocyte # : 0.52 K/uL  Auto Eosinophil # : 0.23 K/uL  Auto Basophil # : 0.03 K/uL  Auto Neutrophil % : 69.8 %  Auto Lymphocyte % : 20.9 %  Auto Monocyte % : 5.8 %  Auto Eosinophil % : 2.6 %  Auto Basophil % : 0.3 %        137  |  101  |  14  ----------------------------<  101<H>  3.1<L>   |  28  |  0.7    Ca    8.8      2021 06:54    TPro  6.2  /  Alb  3.6  /  TBili  1.3<H>  /  DBili  x   /  AST  23  /  ALT  25  /  AlkPhos  82  02-08    LIVER FUNCTIONS - ( 2021 06:54 )  Alb: 3.6 g/dL / Pro: 6.2 g/dL / ALK PHOS: 82 U/L / ALT: 25 U/L / AST: 23 U/L / GGT: x           PT/INR - ( 2021 10:26 )   PT: 12.20 sec;   INR: 1.06 ratio         PTT - ( 2021 10:26 )  PTT:29.9 sec  Urinalysis Basic - ( 2021 07:30 )    Color: Yellow / Appearance: Clear / S.025 / pH: x  Gluc: x / Ketone: Trace  / Bili: Small / Urobili: 2.0 mg/dL   Blood: x / Protein: 30 mg/dL / Nitrite: Negative   Leuk Esterase: Negative / RBC: 1-2 /HPF / WBC 1-2 /HPF   Sq Epi: x / Non Sq Epi: Occasional /HPF / Bacteria: Few      Assessment:  This is a 65y Male with h/o  left pontine CVA presenting with Distal lower extremity Neuropathy.    Plan:     1- Recommend Starting Neurontin 100mg BID  2- Continue current medications  3- Continue Physical Therapy  4- May follow up with outpatient Neurology Dr Garcia  5- Neurology team will follow as needed.    21 @ 16:58      
HPI: 65 M w/PMHx HFpEF, PE on DOAC, EtOH dependence, Hx left pontine CVA w/Dysphagia presents to the ER with complaints of BLE pain and inability to ambulate.  Patient discharged home after hospital stay for new onset CVA, not receiving PT/OT at home, has no help.  Reports has fallen approximately 5x since discharge, no injuries, last fall a few days ago.  Reports worsening BLE pain, calves and thighs, x 1 week.  Reports came to ED for evaluation.  Reports has not taken any medications x 1 week, "sometimes I just forget".  Has no HHA, no family intervention.  No CP/SOB.  No abdominal complaints including pain n/v or change in bowel habits.  No urinary complaints.   ptn  seen and exam  at  bed side nad    command      PTN  REFERRED TO ACUTE  REHAB  FOR  EVAL AND  TX   PAST MEDICAL & SURGICAL HISTORY:  Cerebrovascular accident (CVA)  left pontine with dysphagia    Pulmonary embolism      Chronic back pain    Alcohol dependence    HLD (hyperlipidemia)    HTN (hypertension)    CHF (congestive heart failure)    TIA (transient ischemic attack)    History of appendectomy        Hospital Course:    TODAY'S SUBJECTIVE & REVIEW OF SYMPTOMS:     Constitutional WNL   Cardio WNL   Resp WNL   GI WNL  Heme WNL  Endo WNL  Skin WNL  MSK WNL  Neuro WNL  Cognitive WNL  Psych WNL      MEDICATIONS  (STANDING):  apixaban 5 milliGRAM(s) Oral every 12 hours  aspirin  chewable 81 milliGRAM(s) Oral daily  atorvastatin 80 milliGRAM(s) Oral at bedtime  chlorhexidine 4% Liquid 1 Application(s) Topical <User Schedule>  folic acid 1 milliGRAM(s) Oral daily  losartan 50 milliGRAM(s) Oral daily  metoprolol tartrate 25 milliGRAM(s) Oral two times a day  multivitamin/minerals 1 Tablet(s) Oral daily  nicotine - 21 mG/24Hr(s) Patch 1 patch Transdermal daily  pantoprazole    Tablet 40 milliGRAM(s) Oral before breakfast  thiamine 100 milliGRAM(s) Oral daily    MEDICATIONS  (PRN):  acetaminophen   Tablet .. 650 milliGRAM(s) Oral every 4 hours PRN Mild Pain (1 - 3)  methocarbamol 500 milliGRAM(s) Oral every 8 hours PRN Muscle Spasm  senna 2 Tablet(s) Oral at bedtime PRN Constipation      FAMILY HISTORY:  FH: MI (myocardial infarction) (Father, Mother)  Dad, Mom        Allergies    No Known Allergies    Intolerances        SOCIAL HISTORY:    [  ] Etoh  [  ] Smoking  [  ] Substance abuse     Home Environment:  [ x ] Home Alone  [  ] Lives with Family  [  ] Home Health Aid    Dwelling:  [  ] Apartment  [ x ] Private House  [  ] Adult Home  [  ] Skilled Nursing Facility      [  ] Short Term  [  ] Long Term  [  x] Stairs  8 steps       Elevator [  ]    FUNCTIONAL STATUS PTA: (Check all that apply)  Ambulation: [ x ]Independent    [  ] Dependent     [  ] Non-Ambulatory  Assistive Device: [  ] SA Cane  [  ]  Q Cane  [  x] Walker  [  ]  Wheelchair  ADL : [  x] Independent  [  ]  Dependent       Vital Signs Last 24 Hrs  T(C): 35.8 (2021 05:07), Max: 36.5 (2021 14:52)  T(F): 96.4 (2021 05:07), Max: 97.7 (2021 14:52)  HR: 64 (2021 05:07) (64 - 95)  BP: 141/60 (2021 05:07) (109/57 - 185/104)  BP(mean): --  RR: 18 (2021 05:07) (17 - 20)  SpO2: 99% (2021 13:42) (98% - 99%)      PHYSICAL EXAM: Alert & Oriented X3  GENERAL: NAD, well-groomed, well-developed  HEAD:  Atraumatic, Normocephalic  EYES: EOMI, PERRLA, conjunctiva and sclera clear  NECK: Supple, No JVD, Normal thyroid  CHEST/LUNG: Clear to percussion bilaterally; No rales, rhonchi, wheezing, or rubs  HEART: Regular rate and rhythm; No murmurs, rubs, or gallops  ABDOMEN: Soft, Nontender, Nondistended; Bowel sounds present  EXTREMITIES:  2+ Peripheral Pulses, No clubbing, cyanosis, or edema    NERVOUS SYSTEM:  Cranial Nerves 2-12 intact [ x ] Abnormal  [  ]  ROM: WFL all extremities [ x ]  Abnormal [  ]  Motor Strength: WFL all extremities  [  ]  Abnormal [4/5  all ext   ]  Sensation: intact to light touch [ x ] Abnormal [  ]  Reflexes: Symmetric [  x]  Abnormal [  ]    FUNCTIONAL STATUS:  Bed Mobility: Independent [  ]  Supervision [  ]  Needs Assistance [x  ]  N/A [  ]  Transfers: Independent [  ]  Supervision [  ]  Needs Assistance [x  ]  N/A [  ]   Ambulation: Independent [  ]  Supervision [  ]  Needs Assistance [ x ]  N/A [  ]  ADL: Independent [  ] Requires Assistance [  ] N/A [ x ]  SEE PT/OT IE NOTES    LABS:                        11.6   8.95  )-----------( 146      ( 2021 06:54 )             34.3     02-08    137  |  101  |  14  ----------------------------<  101<H>  3.1<L>   |  28  |  0.7    Ca    8.8      2021 06:54    TPro  6.2  /  Alb  3.6  /  TBili  1.3<H>  /  DBili  x   /  AST  23  /  ALT  25  /  AlkPhos  82  02-08    PT/INR - ( 2021 10:26 )   PT: 12.20 sec;   INR: 1.06 ratio         PTT - ( 2021 10:26 )  PTT:29.9 sec  Urinalysis Basic - ( 2021 07:30 )    Color: Yellow / Appearance: Clear / S.025 / pH: x  Gluc: x / Ketone: Trace  / Bili: Small / Urobili: 2.0 mg/dL   Blood: x / Protein: 30 mg/dL / Nitrite: Negative   Leuk Esterase: Negative / RBC: 1-2 /HPF / WBC 1-2 /HPF   Sq Epi: x / Non Sq Epi: Occasional /HPF / Bacteria: Few        RADIOLOGY & ADDITIONAL STUDIES:< from: CT Head No Cont (21 @ 15:47) >  IMPRESSION:  No significant change since recent head CT of 2020.    Extensive chronic microvascular-type changes as well as chronic lacunar infarcts involving left thalamus, left rosalie and left cerebellar hemisphere.      < end of copied text >      Assesment:

## 2021-02-08 NOTE — DISCHARGE NOTE PROVIDER - CARE PROVIDERS DIRECT ADDRESSES
,DirectAddress_Unknown ,DirectAddress_Unknown,[email or web address]

## 2021-02-08 NOTE — CONSULT NOTE ADULT - ATTENDING COMMENTS
Patient seen and examined with PA and agree with above except as noted.  Patients history, notes, labs, imaging, vitals and meds reviewed personally.  Patient with recent pontine stroke and neurology called for b/l LE pain.  Pain currently resolved and patient says he has had chronic pain for many years since his accident.  Pain comes and goes and currently has improved.  Has neuropathy and complaints of paresthesias in feet b/l currently.    Plan as above

## 2021-02-08 NOTE — CONSULT NOTE ADULT - ASSESSMENT
IMPRESSION: Rehab of 66 yo m  rehab  for  hx  of  cva  GD     PRECAUTIONS: [  ] Cardiac  [  ] Respiratory  [  ] Seizures [  ] Contact Isolation  [  ] Droplet Isolation  [ FALL ] Other    Weight Bearing Status:     RECOMMENDATION:    Out of Bed to Chair     DVT/Decubiti Prophylaxis    REHAB PLAN:     [  x ] Bedside P/T 3-5 times a week   [  x ]   Bedside O/T  2-3 times a week             [   ] No Rehab Therapy Indicated                   [ x  ]  Speech Therapy   Conditioning/ROM                                    ADL  Bed Mobility                                               Conditioning/ROM  Transfers                                                     Bed Mobility  Sitting /Standing Balance                         Transfers                                        Gait Training                                               Sitting/Standing Balance  Stair Training [   ]Applicable                    Home equipment Eval                                                                        Splinting  [   ] Only      GOALS:   ADL   [ x  ]   Independent                    Transfers  [ x  ] Independent                          Ambulation  [ x  ] Independent     [    ] With device                            [ x  ]  CG                                                         [ x  ]  CG                                                                  [  x ] CG                            [    ] Min A                                                   [   ] Min A                                                              [   ] Min  A          DISCHARGE PLAN:   [   ]  Good candidate for Intensive Rehabilitation/Hospital based-4A [facility]                                             Will tolerate 3hrs Intensive Rehab Daily                                       [  xx  ]  Short Term Rehab in Skilled Nursing Facility and  cont current care                                       [    ]  Home with Outpatient or VN services                                         [    ]  Possible Candidate for Intensive Hospital based Rehab

## 2021-02-08 NOTE — PHYSICAL THERAPY INITIAL EVALUATION ADULT - ADDITIONAL COMMENTS
Pt reports he lives alone, he has 8 steps to enter house with rail on right when ascending.  Pt says he walks with a RW independently.

## 2021-02-09 LAB
SARS-COV-2 IGG SERPL QL IA: NEGATIVE — SIGNIFICANT CHANGE UP
SARS-COV-2 IGM SERPL IA-ACNC: 0.74 INDEX — SIGNIFICANT CHANGE UP
SARS-COV-2 RNA SPEC QL NAA+PROBE: SIGNIFICANT CHANGE UP

## 2021-02-09 PROCEDURE — 99231 SBSQ HOSP IP/OBS SF/LOW 25: CPT

## 2021-02-09 RX ADMIN — Medication 1 PATCH: at 09:54

## 2021-02-09 RX ADMIN — Medication 1 PATCH: at 13:36

## 2021-02-09 RX ADMIN — CHLORHEXIDINE GLUCONATE 1 APPLICATION(S): 213 SOLUTION TOPICAL at 04:40

## 2021-02-09 RX ADMIN — Medication 20 MILLIEQUIVALENT(S): at 07:33

## 2021-02-09 RX ADMIN — ATORVASTATIN CALCIUM 80 MILLIGRAM(S): 80 TABLET, FILM COATED ORAL at 22:36

## 2021-02-09 RX ADMIN — METHOCARBAMOL 500 MILLIGRAM(S): 500 TABLET, FILM COATED ORAL at 17:32

## 2021-02-09 RX ADMIN — APIXABAN 5 MILLIGRAM(S): 2.5 TABLET, FILM COATED ORAL at 17:31

## 2021-02-09 RX ADMIN — APIXABAN 5 MILLIGRAM(S): 2.5 TABLET, FILM COATED ORAL at 04:39

## 2021-02-09 RX ADMIN — Medication 100 MILLIGRAM(S): at 13:36

## 2021-02-09 RX ADMIN — Medication 1 TABLET(S): at 13:36

## 2021-02-09 RX ADMIN — LOSARTAN POTASSIUM 50 MILLIGRAM(S): 100 TABLET, FILM COATED ORAL at 04:40

## 2021-02-09 RX ADMIN — Medication 1 PATCH: at 17:20

## 2021-02-09 RX ADMIN — Medication 81 MILLIGRAM(S): at 13:36

## 2021-02-09 RX ADMIN — Medication 1 MILLIGRAM(S): at 13:36

## 2021-02-09 RX ADMIN — PANTOPRAZOLE SODIUM 40 MILLIGRAM(S): 20 TABLET, DELAYED RELEASE ORAL at 04:41

## 2021-02-09 RX ADMIN — Medication 25 MILLIGRAM(S): at 17:31

## 2021-02-09 RX ADMIN — Medication 650 MILLIGRAM(S): at 07:33

## 2021-02-09 RX ADMIN — Medication 25 MILLIGRAM(S): at 04:39

## 2021-02-10 PROCEDURE — 99232 SBSQ HOSP IP/OBS MODERATE 35: CPT

## 2021-02-10 RX ORDER — GUAIFENESIN/DEXTROMETHORPHAN 600MG-30MG
10 TABLET, EXTENDED RELEASE 12 HR ORAL EVERY 4 HOURS
Refills: 0 | Status: DISCONTINUED | OUTPATIENT
Start: 2021-02-10 | End: 2021-02-16

## 2021-02-10 RX ORDER — GUAIFENESIN/DEXTROMETHORPHAN 600MG-30MG
10 TABLET, EXTENDED RELEASE 12 HR ORAL ONCE
Refills: 0 | Status: COMPLETED | OUTPATIENT
Start: 2021-02-10 | End: 2021-02-10

## 2021-02-10 RX ADMIN — APIXABAN 5 MILLIGRAM(S): 2.5 TABLET, FILM COATED ORAL at 17:30

## 2021-02-10 RX ADMIN — Medication 1 PATCH: at 11:22

## 2021-02-10 RX ADMIN — Medication 10 MILLILITER(S): at 23:41

## 2021-02-10 RX ADMIN — Medication 1 TABLET(S): at 11:22

## 2021-02-10 RX ADMIN — Medication 1 PATCH: at 11:23

## 2021-02-10 RX ADMIN — Medication 25 MILLIGRAM(S): at 04:53

## 2021-02-10 RX ADMIN — ATORVASTATIN CALCIUM 80 MILLIGRAM(S): 80 TABLET, FILM COATED ORAL at 20:56

## 2021-02-10 RX ADMIN — CHLORHEXIDINE GLUCONATE 1 APPLICATION(S): 213 SOLUTION TOPICAL at 04:53

## 2021-02-10 RX ADMIN — Medication 1 PATCH: at 19:00

## 2021-02-10 RX ADMIN — Medication 100 MILLIGRAM(S): at 11:21

## 2021-02-10 RX ADMIN — Medication 10 MILLILITER(S): at 14:02

## 2021-02-10 RX ADMIN — LOSARTAN POTASSIUM 50 MILLIGRAM(S): 100 TABLET, FILM COATED ORAL at 04:53

## 2021-02-10 RX ADMIN — Medication 81 MILLIGRAM(S): at 11:21

## 2021-02-10 RX ADMIN — Medication 1 PATCH: at 04:51

## 2021-02-10 RX ADMIN — PANTOPRAZOLE SODIUM 40 MILLIGRAM(S): 20 TABLET, DELAYED RELEASE ORAL at 04:53

## 2021-02-10 RX ADMIN — Medication 1 MILLIGRAM(S): at 11:23

## 2021-02-10 RX ADMIN — APIXABAN 5 MILLIGRAM(S): 2.5 TABLET, FILM COATED ORAL at 04:52

## 2021-02-10 RX ADMIN — Medication 650 MILLIGRAM(S): at 15:56

## 2021-02-10 RX ADMIN — Medication 25 MILLIGRAM(S): at 17:30

## 2021-02-11 ENCOUNTER — TRANSCRIPTION ENCOUNTER (OUTPATIENT)
Age: 66
End: 2021-02-11

## 2021-02-11 LAB — SARS-COV-2 RNA SPEC QL NAA+PROBE: SIGNIFICANT CHANGE UP

## 2021-02-11 PROCEDURE — 99233 SBSQ HOSP IP/OBS HIGH 50: CPT

## 2021-02-11 RX ADMIN — APIXABAN 5 MILLIGRAM(S): 2.5 TABLET, FILM COATED ORAL at 17:03

## 2021-02-11 RX ADMIN — Medication 10 MILLILITER(S): at 17:06

## 2021-02-11 RX ADMIN — Medication 25 MILLIGRAM(S): at 17:03

## 2021-02-11 RX ADMIN — Medication 100 MILLIGRAM(S): at 11:53

## 2021-02-11 RX ADMIN — ATORVASTATIN CALCIUM 80 MILLIGRAM(S): 80 TABLET, FILM COATED ORAL at 22:14

## 2021-02-11 RX ADMIN — Medication 1 PATCH: at 20:00

## 2021-02-11 RX ADMIN — Medication 650 MILLIGRAM(S): at 09:21

## 2021-02-11 RX ADMIN — Medication 1 PATCH: at 05:40

## 2021-02-11 RX ADMIN — Medication 81 MILLIGRAM(S): at 11:53

## 2021-02-11 RX ADMIN — Medication 1 TABLET(S): at 11:53

## 2021-02-11 RX ADMIN — Medication 1 MILLIGRAM(S): at 11:53

## 2021-02-11 RX ADMIN — Medication 1 PATCH: at 11:53

## 2021-02-11 RX ADMIN — LOSARTAN POTASSIUM 50 MILLIGRAM(S): 100 TABLET, FILM COATED ORAL at 05:41

## 2021-02-11 RX ADMIN — PANTOPRAZOLE SODIUM 40 MILLIGRAM(S): 20 TABLET, DELAYED RELEASE ORAL at 05:42

## 2021-02-11 RX ADMIN — Medication 10 MILLILITER(S): at 09:22

## 2021-02-11 RX ADMIN — APIXABAN 5 MILLIGRAM(S): 2.5 TABLET, FILM COATED ORAL at 05:39

## 2021-02-11 RX ADMIN — Medication 10 MILLILITER(S): at 22:14

## 2021-02-11 RX ADMIN — Medication 25 MILLIGRAM(S): at 05:41

## 2021-02-11 NOTE — DISCHARGE NOTE NURSING/CASE MANAGEMENT/SOCIAL WORK - PATIENT PORTAL LINK FT
You can access the FollowMyHealth Patient Portal offered by [facility] by registering at the following website: [email or web address]. By joining [facility]’s FollowMyHealth portal, you will also be able to view your health information using other applications (apps) compatible with our system.

## 2021-02-12 LAB
ANION GAP SERPL CALC-SCNC: 11 MMOL/L — SIGNIFICANT CHANGE UP (ref 7–14)
BUN SERPL-MCNC: 10 MG/DL — SIGNIFICANT CHANGE UP (ref 10–20)
CALCIUM SERPL-MCNC: 8.7 MG/DL — SIGNIFICANT CHANGE UP (ref 8.5–10.1)
CHLORIDE SERPL-SCNC: 104 MMOL/L — SIGNIFICANT CHANGE UP (ref 98–110)
CO2 SERPL-SCNC: 24 MMOL/L — SIGNIFICANT CHANGE UP (ref 17–32)
CREAT SERPL-MCNC: 0.8 MG/DL — SIGNIFICANT CHANGE UP (ref 0.7–1.5)
GLUCOSE SERPL-MCNC: 124 MG/DL — HIGH (ref 70–99)
HCT VFR BLD CALC: 38.2 % — LOW (ref 42–52)
HGB BLD-MCNC: 12.7 G/DL — LOW (ref 14–18)
MCHC RBC-ENTMCNC: 30.8 PG — SIGNIFICANT CHANGE UP (ref 27–31)
MCHC RBC-ENTMCNC: 33.2 G/DL — SIGNIFICANT CHANGE UP (ref 32–37)
MCV RBC AUTO: 92.7 FL — SIGNIFICANT CHANGE UP (ref 80–94)
NRBC # BLD: 0 /100 WBCS — SIGNIFICANT CHANGE UP (ref 0–0)
PLATELET # BLD AUTO: 137 K/UL — SIGNIFICANT CHANGE UP (ref 130–400)
POTASSIUM SERPL-MCNC: 3.8 MMOL/L — SIGNIFICANT CHANGE UP (ref 3.5–5)
POTASSIUM SERPL-SCNC: 3.8 MMOL/L — SIGNIFICANT CHANGE UP (ref 3.5–5)
RBC # BLD: 4.12 M/UL — LOW (ref 4.7–6.1)
RBC # FLD: 13.1 % — SIGNIFICANT CHANGE UP (ref 11.5–14.5)
SODIUM SERPL-SCNC: 139 MMOL/L — SIGNIFICANT CHANGE UP (ref 135–146)
WBC # BLD: 8.25 K/UL — SIGNIFICANT CHANGE UP (ref 4.8–10.8)
WBC # FLD AUTO: 8.25 K/UL — SIGNIFICANT CHANGE UP (ref 4.8–10.8)

## 2021-02-12 PROCEDURE — 99222 1ST HOSP IP/OBS MODERATE 55: CPT

## 2021-02-12 RX ADMIN — Medication 1 MILLIGRAM(S): at 12:20

## 2021-02-12 RX ADMIN — Medication 25 MILLIGRAM(S): at 17:46

## 2021-02-12 RX ADMIN — Medication 10 MILLILITER(S): at 05:24

## 2021-02-12 RX ADMIN — Medication 81 MILLIGRAM(S): at 12:19

## 2021-02-12 RX ADMIN — Medication 1 TABLET(S): at 12:20

## 2021-02-12 RX ADMIN — METHOCARBAMOL 500 MILLIGRAM(S): 500 TABLET, FILM COATED ORAL at 16:24

## 2021-02-12 RX ADMIN — CHLORHEXIDINE GLUCONATE 1 APPLICATION(S): 213 SOLUTION TOPICAL at 05:24

## 2021-02-12 RX ADMIN — Medication 10 MILLILITER(S): at 10:27

## 2021-02-12 RX ADMIN — Medication 650 MILLIGRAM(S): at 18:38

## 2021-02-12 RX ADMIN — Medication 1 PATCH: at 12:34

## 2021-02-12 RX ADMIN — APIXABAN 5 MILLIGRAM(S): 2.5 TABLET, FILM COATED ORAL at 17:46

## 2021-02-12 RX ADMIN — Medication 1 PATCH: at 19:44

## 2021-02-12 RX ADMIN — APIXABAN 5 MILLIGRAM(S): 2.5 TABLET, FILM COATED ORAL at 05:24

## 2021-02-12 RX ADMIN — PANTOPRAZOLE SODIUM 40 MILLIGRAM(S): 20 TABLET, DELAYED RELEASE ORAL at 05:27

## 2021-02-12 RX ADMIN — Medication 100 MILLIGRAM(S): at 12:20

## 2021-02-12 RX ADMIN — LOSARTAN POTASSIUM 50 MILLIGRAM(S): 100 TABLET, FILM COATED ORAL at 05:24

## 2021-02-12 RX ADMIN — Medication 10 MILLILITER(S): at 17:46

## 2021-02-12 RX ADMIN — Medication 25 MILLIGRAM(S): at 05:24

## 2021-02-12 RX ADMIN — ATORVASTATIN CALCIUM 80 MILLIGRAM(S): 80 TABLET, FILM COATED ORAL at 21:10

## 2021-02-12 RX ADMIN — Medication 1 PATCH: at 15:52

## 2021-02-13 PROCEDURE — 99232 SBSQ HOSP IP/OBS MODERATE 35: CPT

## 2021-02-13 RX ADMIN — Medication 1 MILLIGRAM(S): at 10:47

## 2021-02-13 RX ADMIN — Medication 10 MILLILITER(S): at 05:10

## 2021-02-13 RX ADMIN — Medication 25 MILLIGRAM(S): at 05:14

## 2021-02-13 RX ADMIN — Medication 1 PATCH: at 19:24

## 2021-02-13 RX ADMIN — Medication 1 PATCH: at 05:14

## 2021-02-13 RX ADMIN — Medication 1 PATCH: at 10:47

## 2021-02-13 RX ADMIN — Medication 100 MILLIGRAM(S): at 10:47

## 2021-02-13 RX ADMIN — PANTOPRAZOLE SODIUM 40 MILLIGRAM(S): 20 TABLET, DELAYED RELEASE ORAL at 05:14

## 2021-02-13 RX ADMIN — LOSARTAN POTASSIUM 50 MILLIGRAM(S): 100 TABLET, FILM COATED ORAL at 05:11

## 2021-02-13 RX ADMIN — Medication 81 MILLIGRAM(S): at 10:47

## 2021-02-13 RX ADMIN — ATORVASTATIN CALCIUM 80 MILLIGRAM(S): 80 TABLET, FILM COATED ORAL at 22:08

## 2021-02-13 RX ADMIN — Medication 10 MILLILITER(S): at 10:46

## 2021-02-13 RX ADMIN — APIXABAN 5 MILLIGRAM(S): 2.5 TABLET, FILM COATED ORAL at 05:10

## 2021-02-13 RX ADMIN — Medication 650 MILLIGRAM(S): at 19:16

## 2021-02-13 RX ADMIN — Medication 650 MILLIGRAM(S): at 14:20

## 2021-02-13 RX ADMIN — Medication 1 PATCH: at 10:51

## 2021-02-13 RX ADMIN — Medication 25 MILLIGRAM(S): at 16:29

## 2021-02-13 RX ADMIN — APIXABAN 5 MILLIGRAM(S): 2.5 TABLET, FILM COATED ORAL at 16:29

## 2021-02-13 RX ADMIN — Medication 1 TABLET(S): at 10:47

## 2021-02-13 RX ADMIN — Medication 10 MILLILITER(S): at 13:26

## 2021-02-13 NOTE — ED ADULT NURSE NOTE - PRO INTERPRETER NEED 2
Ice pack applied to right wrist
Pt remains tearful after receiving IM pain medication.  MD aware and at bedside re-assessing patient
English

## 2021-02-14 LAB
ANION GAP SERPL CALC-SCNC: 9 MMOL/L — SIGNIFICANT CHANGE UP (ref 7–14)
BUN SERPL-MCNC: 13 MG/DL — SIGNIFICANT CHANGE UP (ref 10–20)
CALCIUM SERPL-MCNC: 9 MG/DL — SIGNIFICANT CHANGE UP (ref 8.5–10.1)
CHLORIDE SERPL-SCNC: 105 MMOL/L — SIGNIFICANT CHANGE UP (ref 98–110)
CO2 SERPL-SCNC: 25 MMOL/L — SIGNIFICANT CHANGE UP (ref 17–32)
CREAT SERPL-MCNC: 0.8 MG/DL — SIGNIFICANT CHANGE UP (ref 0.7–1.5)
GLUCOSE SERPL-MCNC: 163 MG/DL — HIGH (ref 70–99)
POTASSIUM SERPL-MCNC: 4.3 MMOL/L — SIGNIFICANT CHANGE UP (ref 3.5–5)
POTASSIUM SERPL-SCNC: 4.3 MMOL/L — SIGNIFICANT CHANGE UP (ref 3.5–5)
SODIUM SERPL-SCNC: 139 MMOL/L — SIGNIFICANT CHANGE UP (ref 135–146)

## 2021-02-14 PROCEDURE — 99232 SBSQ HOSP IP/OBS MODERATE 35: CPT

## 2021-02-14 RX ORDER — LOSARTAN POTASSIUM 100 MG/1
100 TABLET, FILM COATED ORAL DAILY
Refills: 0 | Status: DISCONTINUED | OUTPATIENT
Start: 2021-02-14 | End: 2021-02-16

## 2021-02-14 RX ADMIN — Medication 25 MILLIGRAM(S): at 05:35

## 2021-02-14 RX ADMIN — Medication 1 PATCH: at 14:00

## 2021-02-14 RX ADMIN — ATORVASTATIN CALCIUM 80 MILLIGRAM(S): 80 TABLET, FILM COATED ORAL at 21:06

## 2021-02-14 RX ADMIN — LOSARTAN POTASSIUM 50 MILLIGRAM(S): 100 TABLET, FILM COATED ORAL at 05:35

## 2021-02-14 RX ADMIN — Medication 1 MILLIGRAM(S): at 10:32

## 2021-02-14 RX ADMIN — Medication 1 PATCH: at 10:32

## 2021-02-14 RX ADMIN — Medication 1 TABLET(S): at 10:32

## 2021-02-14 RX ADMIN — Medication 25 MILLIGRAM(S): at 17:01

## 2021-02-14 RX ADMIN — Medication 650 MILLIGRAM(S): at 10:32

## 2021-02-14 RX ADMIN — Medication 10 MILLILITER(S): at 05:35

## 2021-02-14 RX ADMIN — Medication 81 MILLIGRAM(S): at 10:32

## 2021-02-14 RX ADMIN — APIXABAN 5 MILLIGRAM(S): 2.5 TABLET, FILM COATED ORAL at 05:35

## 2021-02-14 RX ADMIN — Medication 10 MILLILITER(S): at 12:51

## 2021-02-14 RX ADMIN — Medication 10 MILLILITER(S): at 02:34

## 2021-02-14 RX ADMIN — PANTOPRAZOLE SODIUM 40 MILLIGRAM(S): 20 TABLET, DELAYED RELEASE ORAL at 05:35

## 2021-02-14 RX ADMIN — CHLORHEXIDINE GLUCONATE 1 APPLICATION(S): 213 SOLUTION TOPICAL at 05:35

## 2021-02-14 RX ADMIN — APIXABAN 5 MILLIGRAM(S): 2.5 TABLET, FILM COATED ORAL at 17:01

## 2021-02-14 RX ADMIN — Medication 10 MILLILITER(S): at 10:32

## 2021-02-14 RX ADMIN — Medication 100 MILLIGRAM(S): at 10:32

## 2021-02-14 RX ADMIN — Medication 650 MILLIGRAM(S): at 19:39

## 2021-02-14 NOTE — CHART NOTE - NSCHARTNOTEFT_GEN_A_CORE
ORDER FOR ISOLATION FOR C-DIFF ID D/YARELIS AS PER DR DASHA BRADEN REQUEST .
Patient prepped for discharge . Seen and evaluated. will hold discharge for am due to placement.
Pt is planned to be discharged to SNF pending authorization.  Pt was COVID swabbed this afternoon for the discharge.
Pt with 5 episodes of loose foul smelling stool since this morning.  Will order cdiff by PCR and isolation precautions.    Case d/w Dr. Jones

## 2021-02-15 LAB — SARS-COV-2 RNA SPEC QL NAA+PROBE: SIGNIFICANT CHANGE UP

## 2021-02-15 PROCEDURE — 99232 SBSQ HOSP IP/OBS MODERATE 35: CPT

## 2021-02-15 RX ADMIN — Medication 10 MILLILITER(S): at 17:13

## 2021-02-15 RX ADMIN — Medication 1 PATCH: at 13:28

## 2021-02-15 RX ADMIN — ATORVASTATIN CALCIUM 80 MILLIGRAM(S): 80 TABLET, FILM COATED ORAL at 21:17

## 2021-02-15 RX ADMIN — Medication 1 MILLIGRAM(S): at 10:50

## 2021-02-15 RX ADMIN — Medication 650 MILLIGRAM(S): at 15:57

## 2021-02-15 RX ADMIN — Medication 81 MILLIGRAM(S): at 10:50

## 2021-02-15 RX ADMIN — APIXABAN 5 MILLIGRAM(S): 2.5 TABLET, FILM COATED ORAL at 06:09

## 2021-02-15 RX ADMIN — LOSARTAN POTASSIUM 100 MILLIGRAM(S): 100 TABLET, FILM COATED ORAL at 06:09

## 2021-02-15 RX ADMIN — Medication 10 MILLILITER(S): at 12:57

## 2021-02-15 RX ADMIN — PANTOPRAZOLE SODIUM 40 MILLIGRAM(S): 20 TABLET, DELAYED RELEASE ORAL at 06:09

## 2021-02-15 RX ADMIN — Medication 10 MILLILITER(S): at 21:17

## 2021-02-15 RX ADMIN — Medication 1 TABLET(S): at 10:50

## 2021-02-15 RX ADMIN — Medication 650 MILLIGRAM(S): at 21:17

## 2021-02-15 RX ADMIN — Medication 25 MILLIGRAM(S): at 06:09

## 2021-02-15 RX ADMIN — Medication 650 MILLIGRAM(S): at 00:29

## 2021-02-15 RX ADMIN — Medication 25 MILLIGRAM(S): at 17:12

## 2021-02-15 RX ADMIN — APIXABAN 5 MILLIGRAM(S): 2.5 TABLET, FILM COATED ORAL at 17:12

## 2021-02-15 RX ADMIN — Medication 1 PATCH: at 10:50

## 2021-02-15 RX ADMIN — Medication 650 MILLIGRAM(S): at 10:50

## 2021-02-15 RX ADMIN — Medication 100 MILLIGRAM(S): at 10:50

## 2021-02-15 RX ADMIN — Medication 10 MILLILITER(S): at 10:49

## 2021-02-15 NOTE — DIETITIAN INITIAL EVALUATION ADULT. - ORAL INTAKE PTA/DIET HISTORY
Patient was sleeping at time of RD visit, attempted to call emergency contact for nutrition hx, no answer. Will obtain nutrition hx at f/u

## 2021-02-15 NOTE — DIETITIAN INITIAL EVALUATION ADULT. - CONTINUE CURRENT NUTRITION CARE PLAN
GOAL: Patient to continue to consume ~75% of meals in the next 7 days. Intervention: meals and snacks

## 2021-02-15 NOTE — DIETITIAN INITIAL EVALUATION ADULT. - PERTINENT MEDS FT
MEDICATIONS  (STANDING):  aspirin  chewable 81 milliGRAM(s) Oral daily  atorvastatin 80 milliGRAM(s) Oral at bedtime  folic acid 1 milliGRAM(s) Oral daily  metoprolol tartrate 25 milliGRAM(s) Oral two times a day  multivitamin/minerals 1 Tablet(s) Oral daily  pantoprazole    Tablet 40 milliGRAM(s) Oral before breakfast  thiamine 100 milliGRAM(s) Oral daily    MEDICATIONS  (PRN):    senna 2 Tablet(s) Oral at bedtime PRN Constipation

## 2021-02-15 NOTE — DIETITIAN INITIAL EVALUATION ADULT. - NAME AND PHONE
RD to monitor: diet order, body composition, energy intake, nutrition focused physical finding, glucose profile. not at risk will f/u in 7 days.. Discussed with LIP

## 2021-02-15 NOTE — DIETITIAN INITIAL EVALUATION ADULT. - PHYSCIAL ASSESSMENT
skin: intact/ no edema noted per RN flow sheets  No nutrition focused physical finding at this time overweight

## 2021-02-15 NOTE — DIETITIAN INITIAL EVALUATION ADULT. - OTHER CALCULATIONS
Weight used: 88.4 kg Energy needs: 6906-2725 kcal/day (MSJ 1.2-1.3 AF - dosing weight ) Protein needs:  g/day (1.0-1.2 g/kg) Fluid needs: 1ml/kcal or per LIP

## 2021-02-15 NOTE — DIETITIAN INITIAL EVALUATION ADULT. - OTHER INFO
Pertinent medical information: Patient with hx of HFpEF, PE on DOAC, EtOH dependence, Hx left pontine CVA w/Dysphagia presents to the ER with complaints of BLE pain and inability to ambulate. Per attending note; evere ambulatory dysfunction w/BLE weakness and pain - pending authorization for discharge to SNF. Diarrhea resolved per attending note. . Dysphagia s/p CVA -- Dysphagia 3 with thin liquids. Hx EtOH Dependence -- not in withdrawal. thiamine, folic acid, MV        Pertinent nutrition information: Per RN, patients appetite is good, ranges between %, no factors affecting appetite at this time. no gastrointestinal signs/ symptoms per RN flow sheets. No documented bowel movement.

## 2021-02-16 VITALS — TEMPERATURE: 96 F | HEART RATE: 67 BPM | SYSTOLIC BLOOD PRESSURE: 122 MMHG | DIASTOLIC BLOOD PRESSURE: 68 MMHG

## 2021-02-16 PROCEDURE — 99239 HOSP IP/OBS DSCHRG MGMT >30: CPT

## 2021-02-16 RX ORDER — LOSARTAN POTASSIUM 100 MG/1
1 TABLET, FILM COATED ORAL
Qty: 30 | Refills: 0
Start: 2021-02-16 | End: 2021-03-17

## 2021-02-16 RX ADMIN — PANTOPRAZOLE SODIUM 40 MILLIGRAM(S): 20 TABLET, DELAYED RELEASE ORAL at 05:45

## 2021-02-16 RX ADMIN — Medication 1 PATCH: at 12:42

## 2021-02-16 RX ADMIN — Medication 25 MILLIGRAM(S): at 05:45

## 2021-02-16 RX ADMIN — Medication 650 MILLIGRAM(S): at 05:45

## 2021-02-16 RX ADMIN — Medication 81 MILLIGRAM(S): at 10:32

## 2021-02-16 RX ADMIN — Medication 1 PATCH: at 05:49

## 2021-02-16 RX ADMIN — APIXABAN 5 MILLIGRAM(S): 2.5 TABLET, FILM COATED ORAL at 05:45

## 2021-02-16 RX ADMIN — Medication 100 MILLIGRAM(S): at 10:32

## 2021-02-16 RX ADMIN — LOSARTAN POTASSIUM 100 MILLIGRAM(S): 100 TABLET, FILM COATED ORAL at 10:32

## 2021-02-16 RX ADMIN — Medication 1 PATCH: at 10:32

## 2021-02-16 RX ADMIN — Medication 10 MILLILITER(S): at 12:53

## 2021-02-16 RX ADMIN — Medication 1 TABLET(S): at 10:32

## 2021-02-16 RX ADMIN — METHOCARBAMOL 500 MILLIGRAM(S): 500 TABLET, FILM COATED ORAL at 13:29

## 2021-02-16 RX ADMIN — Medication 10 MILLILITER(S): at 10:33

## 2021-02-16 RX ADMIN — Medication 1 MILLIGRAM(S): at 10:32

## 2021-02-16 RX ADMIN — Medication 10 MILLILITER(S): at 05:45

## 2021-02-16 NOTE — PROGRESS NOTE ADULT - REASON FOR ADMISSION
BLE weakness

## 2021-02-16 NOTE — PROGRESS NOTE ADULT - SUBJECTIVE AND OBJECTIVE BOX
Subjective:    Reports that he wants to go to rehab and doesnt get why it is taking so long. Denies any issues other than LE weakness      ROS: 10 system review negative other than mentioned in subjective      T(C): 36 (02-10-21 @ 14:05), Max: 36.3 (02-10-21 @ 05:33)  HR: 68 (02-10-21 @ 14:05) (61 - 68)  BP: 142/74 (02-10-21 @ 14:05) (107/56 - 151/86)  RR: 16 (02-10-21 @ 14:05) (16 - 18)  SpO2: --    Physical Exam:  General: appears stated age, no acute distress, speech clear  Eyes: PERRLA/ EOMI  HEENT: Neck supple, trachea midline, No JVD  Respiratory: Clear to auscultation bilaterally, No wheezing/rales/rhonchi  CV: RRR,  no murmurs/ rubs / gallops  Abdomen: Soft, Nontender, nondistended, bowel sounds present  Extremities: No edema,  Neurology: A&Ox3, nonfocal      02-09-21 @ 07:01  -  02-10-21 @ 07:00  --------------------------------------------------------  IN: 640 mL / OUT: 0 mL / NET: 640 mL    02-10-21 @ 07:01  -  02-10-21 @ 20:37  --------------------------------------------------------  IN: 0 mL / OUT: 750 mL / NET: -750 mL        --- LABS ---     no new labs      --- MEDICATIONS ---   acetaminophen   Tablet .. 650 milliGRAM(s) Oral every 4 hours PRN  apixaban 5 milliGRAM(s) Oral every 12 hours  aspirin  chewable 81 milliGRAM(s) Oral daily  atorvastatin 80 milliGRAM(s) Oral at bedtime  chlorhexidine 4% Liquid 1 Application(s) Topical <User Schedule>  folic acid 1 milliGRAM(s) Oral daily  losartan 50 milliGRAM(s) Oral daily  methocarbamol 500 milliGRAM(s) Oral every 8 hours PRN  metoprolol tartrate 25 milliGRAM(s) Oral two times a day  multivitamin/minerals 1 Tablet(s) Oral daily  nicotine - 21 mG/24Hr(s) Patch 1 patch Transdermal daily  pantoprazole    Tablet 40 milliGRAM(s) Oral before breakfast  senna 2 Tablet(s) Oral at bedtime PRN  thiamine 100 milliGRAM(s) Oral daily        ASSESSMENT AND PLAN    CHAPARRO ROBISON is a 65y Male with hx of     #Severe ambulatory dysfunction w/BLE weakness and pain -- BLE Venous doppler negative.  s/p MRI L-spine 1/1/21 for similar symptoms (BLE weakness):  **negative for acute pathology/cord compression  - Fall precautions  - Started Neurontin 100mg BID  - Continue Physical Therapy  - May follow up with outpatient Neurology Dr Garcia   - pending authorization for discharge to SNF    #Leukocytosis: now resolved.   - WBC 11.17, Afebrile, no indication for antibiotics.   CXR: improving Left basilar opacity  -  No ABX needed    #Hx pontine Left CVA  - c/w ASA 81/Lipitor 80  - PT/OT needed; if patient is compliant , will be discharged to rehab .   - Fall precautions    #Dysphagia s/p CVA  - Dysphagia 3 with thin liquids      #Hx EtOH Dependence.   - monitor for Withdrawal signs, not likely based on social Hx (only drinking infreqently, last 3 days ago and prior to that was 1 month)  - thiamine, Folic acid and MV      #HFpEF  - EF >55%, mild LVH 6/20  - euvolemic, monitor off diuretics   - weights QD  - monitor I and O    #HX PE  - c/w Eliquis, however if patient has recurrent falls will need to reconsider risk / benefit. IF patient remains in rehab and gait improves then continue.     # HTN  - c/w Losartan/Metoprolol, doses now as hypertensive in ED  - monitor bp, adjust regimen PRN  - Low Na+ diet      DVT -- on apixaban  Code -- Full  Dispo -- pending authorization for SNF placement. Will need repeat COVID    
Subjective:    reports diarrhea stopped yesterday afternoon. Feels fine. No abd pain, does not feel sick. Wanting to go to rehab      ROS: 10 system review negative other than mentioned in subjective    Vital Signs Last 24 Hrs  T(C): 36.1 (12 Feb 2021 13:24), Max: 36.1 (12 Feb 2021 13:24)  T(F): 96.9 (12 Feb 2021 13:24), Max: 96.9 (12 Feb 2021 13:24)  HR: 76 (12 Feb 2021 13:24) (60 - 76)  BP: 136/71 (12 Feb 2021 13:24) (136/71 - 156/77)  BP(mean): --  RR: 16 (12 Feb 2021 04:55) (16 - 16)  SpO2: --    Physical Exam:  General: appears stated age, no acute distress, speech clear  Eyes: PERRLA/ EOMI  HEENT: Neck supple, trachea midline, No JVD  Respiratory: Clear to auscultation bilaterally, No wheezing/rales/rhonchi  CV: RRR,  no murmurs/ rubs / gallops  Abdomen: Soft, Nontender, nondistended, bowel sounds present  Extremities: No edema,  Neurology: A&Ox3, nonfocal      02-09-21 @ 07:01  -  02-10-21 @ 07:00  --------------------------------------------------------  IN: 640 mL / OUT: 0 mL / NET: 640 mL    02-10-21 @ 07:01  -  02-10-21 @ 20:37  --------------------------------------------------------  IN: 0 mL / OUT: 750 mL / NET: -750 mL        --- LABS ---     no new labs      --- MEDICATIONS ---   MEDICATIONS  (STANDING):  apixaban 5 milliGRAM(s) Oral every 12 hours  aspirin  chewable 81 milliGRAM(s) Oral daily  atorvastatin 80 milliGRAM(s) Oral at bedtime  chlorhexidine 4% Liquid 1 Application(s) Topical <User Schedule>  folic acid 1 milliGRAM(s) Oral daily  guaifenesin/dextromethorphan  Syrup 10 milliLiter(s) Oral every 4 hours  losartan 50 milliGRAM(s) Oral daily  metoprolol tartrate 25 milliGRAM(s) Oral two times a day  multivitamin/minerals 1 Tablet(s) Oral daily  nicotine - 21 mG/24Hr(s) Patch 1 patch Transdermal daily  pantoprazole    Tablet 40 milliGRAM(s) Oral before breakfast  thiamine 100 milliGRAM(s) Oral daily    ASSESSMENT AND PLAN    CHAPARRO ROBISON is a 65y Male with hx of     # Diarrhea  - noted 2/11 with several episodes x 3. Stopped 2/11  - C diff ordered and pending    #Severe ambulatory dysfunction w/BLE weakness and pain -- BLE Venous doppler negative.  s/p MRI L-spine 1/1/21 for similar symptoms (BLE weakness):  **negative for acute pathology/cord compression  - Fall precautions  - Started Neurontin 100mg BID  - Continue Physical Therapy  - May follow up with outpatient Neurology Dr Garcia   - pending authorization for discharge to SNF    #Leukocytosis: now resolved.   - WBC 11.17, Afebrile, no indication for antibiotics.   CXR: improving Left basilar opacity  -  No ABX needed    #Hx pontine Left CVA  - c/w ASA 81/Lipitor 80  - PT/OT needed; if patient is compliant , will be discharged to rehab .   - Fall precautions    #Dysphagia s/p CVA  - Dysphagia 3 with thin liquids    #Hx EtOH Dependence.   - monitor for Withdrawal signs, not likely based on social Hx (only drinking infreqently, last 3 days ago and prior to that was 1 month)  - thiamine, Folic acid and MV      #HFpEF  - EF >55%, mild LVH 6/20  - euvolemic, monitor off diuretics   - weights QD  - monitor I and O    #HX PE  - c/w Eliquis, however if patient has recurrent falls will need to reconsider risk / benefit. IF patient remains in rehab and gait improves then continue.     # HTN  - c/w Losartan/Metoprolol, doses now as hypertensive in ED  - monitor bp, adjust regimen PRN  - Low Na+ diet      DVT -- on apixaban  Code -- Full  Dispo -- pending authorization for SNF placement. COVID 2/10 negative, pending C diff    
Subjective:    No complaints - intermittent pain        ROS: 10 system review negative other than mentioned in subjective  ICU Vital Signs Last 24 Hrs  T(C): 35.6 (16 Feb 2021 05:30), Max: 36.4 (15 Feb 2021 21:10)  T(F): 96 (16 Feb 2021 05:30), Max: 97.6 (15 Feb 2021 21:10)  HR: 67 (16 Feb 2021 05:30) (60 - 69)  BP: 122/68 (16 Feb 2021 05:30) (122/68 - 158/81)  BP(mean): --  ABP: --  ABP(mean): --  RR: 16 (15 Feb 2021 21:10) (16 - 16)  SpO2: --      Physical Exam:  General: appears stated age, no acute distress, speech clear  Eyes: PERRLA/ EOMI  HEENT: Neck supple, trachea midline, No JVD  Respiratory: Clear to auscultation bilaterally, No wheezing/rales/rhonchi  CV: RRR,  no murmurs/ rubs / gallops  Abdomen: Soft, Nontender, nondistended, bowel sounds present  Extremities: No edema,  Neurology: A&Ox3, nonfocal      02-09-21 @ 07:01  -  02-10-21 @ 07:00  --------------------------------------------------------  IN: 640 mL / OUT: 0 mL / NET: 640 mL    02-10-21 @ 07:01  -  02-10-21 @ 20:37  --------------------------------------------------------  IN: 0 mL / OUT: 750 mL / NET: -750 mL        --- LABS ---     no labs        --- MEDICATIONS ---   MEDICATIONS  (STANDING):  apixaban 5 milliGRAM(s) Oral every 12 hours  aspirin  chewable 81 milliGRAM(s) Oral daily  atorvastatin 80 milliGRAM(s) Oral at bedtime  chlorhexidine 4% Liquid 1 Application(s) Topical <User Schedule>  folic acid 1 milliGRAM(s) Oral daily  guaifenesin/dextromethorphan  Syrup 10 milliLiter(s) Oral every 4 hours  losartan 100 milliGRAM(s) Oral daily  metoprolol tartrate 25 milliGRAM(s) Oral two times a day  multivitamin/minerals 1 Tablet(s) Oral daily  nicotine - 21 mG/24Hr(s) Patch 1 patch Transdermal daily  pantoprazole    Tablet 40 milliGRAM(s) Oral before breakfast  thiamine 100 milliGRAM(s) Oral daily        ASSESSMENT AND PLAN    CHAPARRO ROBISON is a 65y Male with hx of HFpEF, PE on DOAC, EtOH dependence, Hx left pontine CVA w/Dysphagia presents to the ER with complaints of BLE pain and inability to ambulate.     #Severe ambulatory dysfunction w/BLE weakness and pain -- BLE Venous doppler negative.  s/p MRI L-spine 1/1/21 for similar symptoms (BLE weakness):  **negative for acute pathology/cord compression  - Fall precautions  - Neurontin 100mg BID - will start on dc  - Continue Physical Therapy  - May follow up with outpatient Neurology Dr Garcia   - auth obtained     Resolved -- Diarrhea -- noted 2/11 with several episodes x 3. Stopped later in day 2/11    CHRONIC MEDICAL CONDITIONS  1. Hx pontine Left CVA --  c/w ASA 81/Lipitor 80, fall precautions  2. Dysphagia s/p CVA -- Dysphagia 3 with thin liquids  3. Hx EtOH Dependence -- not in withdrawal. thiamine, folic acid, MV  4. HFpEF -- EF >55%, mild LVH 6/20. not on diuretics  5. HX PE -- c/w Eliquis, however if patient has recurrent falls will need to reconsider risk / benefit. IF patient remains in rehab and gait improves then continue.   6. HTN -- c/w metoprolol, Increased Losartan to 100mg (from 50mg)      DVT -- on apixaban (for PE)  Code -- Full  ready for dc today to SNF  meds reviewed  pt counselled   time spent 35 mins    
Subjective:    No issues. wants to go to rehab        ROS: 10 system review negative other than mentioned in subjective    Vital Signs Last 24 Hrs  T(C): 36.1 (15 Feb 2021 13:44), Max: 36.1 (15 Feb 2021 13:44)  T(F): 97 (15 Feb 2021 13:44), Max: 97 (15 Feb 2021 13:44)  HR: 69 (15 Feb 2021 17:09) (57 - 69)  BP: 158/81 (15 Feb 2021 17:09) (145/78 - 177/84)  BP(mean): --  RR: 16 (15 Feb 2021 13:44) (16 - 16)  SpO2: --      Physical Exam:  General: appears stated age, no acute distress, speech clear  Eyes: PERRLA/ EOMI  HEENT: Neck supple, trachea midline, No JVD  Respiratory: Clear to auscultation bilaterally, No wheezing/rales/rhonchi  CV: RRR,  no murmurs/ rubs / gallops  Abdomen: Soft, Nontender, nondistended, bowel sounds present  Extremities: No edema,  Neurology: A&Ox3, nonfocal      02-09-21 @ 07:01  -  02-10-21 @ 07:00  --------------------------------------------------------  IN: 640 mL / OUT: 0 mL / NET: 640 mL    02-10-21 @ 07:01  -  02-10-21 @ 20:37  --------------------------------------------------------  IN: 0 mL / OUT: 750 mL / NET: -750 mL        --- LABS ---     no labs        --- MEDICATIONS ---   MEDICATIONS  (STANDING):  apixaban 5 milliGRAM(s) Oral every 12 hours  aspirin  chewable 81 milliGRAM(s) Oral daily  atorvastatin 80 milliGRAM(s) Oral at bedtime  chlorhexidine 4% Liquid 1 Application(s) Topical <User Schedule>  folic acid 1 milliGRAM(s) Oral daily  guaifenesin/dextromethorphan  Syrup 10 milliLiter(s) Oral every 4 hours  losartan 100 milliGRAM(s) Oral daily  metoprolol tartrate 25 milliGRAM(s) Oral two times a day  multivitamin/minerals 1 Tablet(s) Oral daily  nicotine - 21 mG/24Hr(s) Patch 1 patch Transdermal daily  pantoprazole    Tablet 40 milliGRAM(s) Oral before breakfast  thiamine 100 milliGRAM(s) Oral daily        ASSESSMENT AND PLAN    CHAPARRO ROBISON is a 65y Male with hx of HFpEF, PE on DOAC, EtOH dependence, Hx left pontine CVA w/Dysphagia presents to the ER with complaints of BLE pain and inability to ambulate.     #Severe ambulatory dysfunction w/BLE weakness and pain -- BLE Venous doppler negative.  s/p MRI L-spine 1/1/21 for similar symptoms (BLE weakness):  **negative for acute pathology/cord compression  - Fall precautions  - Started Neurontin 100mg BID  - Continue Physical Therapy  - May follow up with outpatient Neurology Dr Garcia   - pending authorization for discharge to SNF, may be ready tomorrow 2/16    Resolved -- Diarrhea -- noted 2/11 with several episodes x 3. Stopped later in day 2/11    CHRONIC MEDICAL CONDITIONS  1. Hx pontine Left CVA --  c/w ASA 81/Lipitor 80, fall precautions  2. Dysphagia s/p CVA -- Dysphagia 3 with thin liquids  3. Hx EtOH Dependence -- not in withdrawal. thiamine, folic acid, MV  4. HFpEF -- EF >55%, mild LVH 6/20. not on diuretics  5. HX PE -- c/w Eliquis, however if patient has recurrent falls will need to reconsider risk / benefit. IF patient remains in rehab and gait improves then continue.   6. HTN -- c/w metoprolol, Increased Losartan to 100mg (from 50mg)      DVT -- on apixaban (for PE)  Code -- Full  Dispo -- pending authorization for SNF placement, may be ready 2/16. COVID 2/14 neg    
Patient seen and examined. No acute issues.   Vital Signs Last 24 Hrs  T(C): 35.6 (09 Feb 2021 13:08), Max: 36.4 (08 Feb 2021 21:00)  T(F): 96 (09 Feb 2021 13:08), Max: 97.6 (08 Feb 2021 21:00)  HR: 74 (09 Feb 2021 13:08) (69 - 92)  BP: 147/87 (09 Feb 2021 13:08) (114/59 - 147/87)  BP(mean): --  RR: 18 (09 Feb 2021 13:08) (18 - 18)  SpO2: --    GENERAL: NAD, AAOx3  HEAD:  Atraumatic, Normocephalic  EYES: EOMI, conjunctiva and sclera clear  CHEST/LUNG: CTABL, No wheeze  HEART: Regular rate and rhythm; No murmurs, rubs, or gallops  ABDOMEN: Soft, Nontender, Nondistended; Bowel sounds present  EXTREMITIES: + Pulses, No clubbing, cyanosis, or edema  SKIN: No rashes or lesions    Stable for d/c .   awaiting authorization. 
Subjective:    Feeling fine today. waiting for rehab. no complaints      ROS: 10 system review negative other than mentioned in subjective    Vital Signs Last 24 Hrs  T(C): 36.8 (13 Feb 2021 13:46), Max: 36.8 (13 Feb 2021 13:46)  T(F): 98.3 (13 Feb 2021 13:46), Max: 98.3 (13 Feb 2021 13:46)  HR: 64 (13 Feb 2021 13:46) (57 - 73)  BP: 147/69 (13 Feb 2021 13:46) (144/74 - 167/78)  BP(mean): --  RR: 16 (13 Feb 2021 13:46) (16 - 16)  SpO2: --    Physical Exam:  General: appears stated age, no acute distress, speech clear  Eyes: PERRLA/ EOMI  HEENT: Neck supple, trachea midline, No JVD  Respiratory: Clear to auscultation bilaterally, No wheezing/rales/rhonchi  CV: RRR,  no murmurs/ rubs / gallops  Abdomen: Soft, Nontender, nondistended, bowel sounds present  Extremities: No edema,  Neurology: A&Ox3, nonfocal      02-09-21 @ 07:01  -  02-10-21 @ 07:00  --------------------------------------------------------  IN: 640 mL / OUT: 0 mL / NET: 640 mL    02-10-21 @ 07:01  -  02-10-21 @ 20:37  --------------------------------------------------------  IN: 0 mL / OUT: 750 mL / NET: -750 mL        --- LABS ---     no new labs      --- MEDICATIONS ---   MEDICATIONS  (STANDING):  apixaban 5 milliGRAM(s) Oral every 12 hours  aspirin  chewable 81 milliGRAM(s) Oral daily  atorvastatin 80 milliGRAM(s) Oral at bedtime  chlorhexidine 4% Liquid 1 Application(s) Topical <User Schedule>  folic acid 1 milliGRAM(s) Oral daily  guaifenesin/dextromethorphan  Syrup 10 milliLiter(s) Oral every 4 hours  losartan 50 milliGRAM(s) Oral daily  metoprolol tartrate 25 milliGRAM(s) Oral two times a day  multivitamin/minerals 1 Tablet(s) Oral daily  nicotine - 21 mG/24Hr(s) Patch 1 patch Transdermal daily  pantoprazole    Tablet 40 milliGRAM(s) Oral before breakfast  thiamine 100 milliGRAM(s) Oral daily      ASSESSMENT AND PLAN    CHAPARRO ROBISON is a 65y Male with hx of     #Severe ambulatory dysfunction w/BLE weakness and pain -- BLE Venous doppler negative.  s/p MRI L-spine 1/1/21 for similar symptoms (BLE weakness):  **negative for acute pathology/cord compression  - Fall precautions  - Started Neurontin 100mg BID  - Continue Physical Therapy  - May follow up with outpatient Neurology Dr Garcia   - pending authorization for discharge to SNF    #Hx pontine Left CVA  - c/w ASA 81/Lipitor 80  - PT/OT needed; if patient is compliant , will be discharged to rehab .   - Fall precautions    #Dysphagia s/p CVA  - Dysphagia 3 with thin liquids    #Hx EtOH Dependence.   - monitor for Withdrawal signs, not likely based on social Hx (only drinking infreqently, last 3 days ago and prior to that was 1 month)  - thiamine, Folic acid and MV      #HFpEF  - EF >55%, mild LVH 6/20  - euvolemic, monitor off diuretics   - weights QD  - monitor I and O    #HX PE  - c/w Eliquis, however if patient has recurrent falls will need to reconsider risk / benefit. IF patient remains in rehab and gait improves then continue.     # HTN  - c/w Losartan/Metoprolol, doses now as hypertensive in ED  - monitor bp, adjust regimen PRN  - Low Na+ diet    Resolved -- Diarrhea -- noted 2/11 with several episodes x 3. Stopped later in day 2/11    DVT -- on apixaban  Code -- Full  Dispo -- pending authorization for SNF placement. COVID 2/10 negative    
Subjective:    No issues. Just not understanding why this is taking so long for him to go to rehab. States that he is trying to move his legs around in bed to keep his strength      ROS: 10 system review negative other than mentioned in subjective    Vital Signs Last 24 Hrs  T(C): 35.4 (14 Feb 2021 05:00), Max: 36.8 (13 Feb 2021 13:46)  T(F): 95.7 (14 Feb 2021 05:00), Max: 98.3 (13 Feb 2021 13:46)  HR: 63 (14 Feb 2021 10:35) (53 - 64)  BP: 126/64 (14 Feb 2021 10:35) (126/64 - 167/77)  BP(mean): --  RR: 16 (14 Feb 2021 05:00) (16 - 16)  SpO2: --      Physical Exam:  General: appears stated age, no acute distress, speech clear  Eyes: PERRLA/ EOMI  HEENT: Neck supple, trachea midline, No JVD  Respiratory: Clear to auscultation bilaterally, No wheezing/rales/rhonchi  CV: RRR,  no murmurs/ rubs / gallops  Abdomen: Soft, Nontender, nondistended, bowel sounds present  Extremities: No edema,  Neurology: A&Ox3, nonfocal      02-09-21 @ 07:01  -  02-10-21 @ 07:00  --------------------------------------------------------  IN: 640 mL / OUT: 0 mL / NET: 640 mL    02-10-21 @ 07:01  -  02-10-21 @ 20:37  --------------------------------------------------------  IN: 0 mL / OUT: 750 mL / NET: -750 mL        --- LABS ---       02-14    139  |  105  |  13  ----------------------------<  163<H>  4.3   |  25  |  0.8    Ca    9.0      14 Feb 2021 11:03        --- MEDICATIONS ---   MEDICATIONS  (STANDING):  apixaban 5 milliGRAM(s) Oral every 12 hours  aspirin  chewable 81 milliGRAM(s) Oral daily  atorvastatin 80 milliGRAM(s) Oral at bedtime  chlorhexidine 4% Liquid 1 Application(s) Topical <User Schedule>  folic acid 1 milliGRAM(s) Oral daily  guaifenesin/dextromethorphan  Syrup 10 milliLiter(s) Oral every 4 hours  losartan 100 milliGRAM(s) Oral daily  metoprolol tartrate 25 milliGRAM(s) Oral two times a day  multivitamin/minerals 1 Tablet(s) Oral daily  nicotine - 21 mG/24Hr(s) Patch 1 patch Transdermal daily  pantoprazole    Tablet 40 milliGRAM(s) Oral before breakfast  thiamine 100 milliGRAM(s) Oral daily        ASSESSMENT AND PLAN    CHAPARRO ROBISON is a 65y Male with hx of HFpEF, PE on DOAC, EtOH dependence, Hx left pontine CVA w/Dysphagia presents to the ER with complaints of BLE pain and inability to ambulate.     #Severe ambulatory dysfunction w/BLE weakness and pain -- BLE Venous doppler negative.  s/p MRI L-spine 1/1/21 for similar symptoms (BLE weakness):  **negative for acute pathology/cord compression  - Fall precautions  - Started Neurontin 100mg BID  - Continue Physical Therapy  - May follow up with outpatient Neurology Dr Garcia   - pending authorization for discharge to SNF    Resolved -- Diarrhea -- noted 2/11 with several episodes x 3. Stopped later in day 2/11    CHRONIC MEDICAL CONDITIONS  1. Hx pontine Left CVA --  c/w ASA 81/Lipitor 80, fall precautions  2. Dysphagia s/p CVA -- Dysphagia 3 with thin liquids  3. Hx EtOH Dependence -- not in withdrawal. thiamine, folic acid, MV  4. HFpEF -- EF >55%, mild LVH 6/20. not on diuretics  5. HX PE -- c/w Eliquis, however if patient has recurrent falls will need to reconsider risk / benefit. IF patient remains in rehab and gait improves then continue.   6. HTN -- c/w metoprolol, Increased Losartan to 100mg (from 50mg)      DVT -- on apixaban (for PE)  Code -- Full  Dispo -- pending authorization for SNF placement. COVID 2/10 negative, Repeated 2/14 and pending    
Subjective:    Started having diarrhea x 3-4 times watery this morning. Denies feeling sick or with abd pain. Asking for something to stop the diarrhea.       ROS: 10 system review negative other than mentioned in subjective    Vital Signs Last 24 Hrs  T(C): 35.9 (11 Feb 2021 14:00), Max: 35.9 (11 Feb 2021 14:00)  T(F): 96.6 (11 Feb 2021 14:00), Max: 96.6 (11 Feb 2021 14:00)  HR: 65 (11 Feb 2021 14:00) (63 - 68)  BP: 165/84 (11 Feb 2021 14:00) (140/66 - 177/84)  BP(mean): --  RR: 16 (11 Feb 2021 14:00) (16 - 16)  SpO2: --    Physical Exam:  General: appears stated age, no acute distress, speech clear  Eyes: PERRLA/ EOMI  HEENT: Neck supple, trachea midline, No JVD  Respiratory: Clear to auscultation bilaterally, No wheezing/rales/rhonchi  CV: RRR,  no murmurs/ rubs / gallops  Abdomen: Soft, Nontender, nondistended, bowel sounds present  Extremities: No edema,  Neurology: A&Ox3, nonfocal      02-09-21 @ 07:01  -  02-10-21 @ 07:00  --------------------------------------------------------  IN: 640 mL / OUT: 0 mL / NET: 640 mL    02-10-21 @ 07:01  -  02-10-21 @ 20:37  --------------------------------------------------------  IN: 0 mL / OUT: 750 mL / NET: -750 mL        --- LABS ---     no new labs      --- MEDICATIONS ---   MEDICATIONS  (STANDING):  apixaban 5 milliGRAM(s) Oral every 12 hours  aspirin  chewable 81 milliGRAM(s) Oral daily  atorvastatin 80 milliGRAM(s) Oral at bedtime  chlorhexidine 4% Liquid 1 Application(s) Topical <User Schedule>  folic acid 1 milliGRAM(s) Oral daily  guaifenesin/dextromethorphan  Syrup 10 milliLiter(s) Oral every 4 hours  losartan 50 milliGRAM(s) Oral daily  metoprolol tartrate 25 milliGRAM(s) Oral two times a day  multivitamin/minerals 1 Tablet(s) Oral daily  nicotine - 21 mG/24Hr(s) Patch 1 patch Transdermal daily  pantoprazole    Tablet 40 milliGRAM(s) Oral before breakfast  thiamine 100 milliGRAM(s) Oral daily      ASSESSMENT AND PLAN    CHAPARRO ROBISON is a 65y Male with hx of     # Diarrhea  - noted 2/11 with several episodes x 3.   - C diff ordered and pending    #Severe ambulatory dysfunction w/BLE weakness and pain -- BLE Venous doppler negative.  s/p MRI L-spine 1/1/21 for similar symptoms (BLE weakness):  **negative for acute pathology/cord compression  - Fall precautions  - Started Neurontin 100mg BID  - Continue Physical Therapy  - May follow up with outpatient Neurology Dr Garcia   - pending authorization for discharge to SNF    #Leukocytosis: now resolved.   - WBC 11.17, Afebrile, no indication for antibiotics.   CXR: improving Left basilar opacity  -  No ABX needed    #Hx pontine Left CVA  - c/w ASA 81/Lipitor 80  - PT/OT needed; if patient is compliant , will be discharged to rehab .   - Fall precautions    #Dysphagia s/p CVA  - Dysphagia 3 with thin liquids      #Hx EtOH Dependence.   - monitor for Withdrawal signs, not likely based on social Hx (only drinking infreqently, last 3 days ago and prior to that was 1 month)  - thiamine, Folic acid and MV      #HFpEF  - EF >55%, mild LVH 6/20  - euvolemic, monitor off diuretics   - weights QD  - monitor I and O    #HX PE  - c/w Eliquis, however if patient has recurrent falls will need to reconsider risk / benefit. IF patient remains in rehab and gait improves then continue.     # HTN  - c/w Losartan/Metoprolol, doses now as hypertensive in ED  - monitor bp, adjust regimen PRN  - Low Na+ diet      DVT -- on apixaban  Code -- Full  Dispo -- pending authorization for SNF placement. COVID 2/10 negative, pending C diff

## 2021-03-22 ENCOUNTER — INPATIENT (INPATIENT)
Facility: HOSPITAL | Age: 66
LOS: 1 days | Discharge: NURSING, REHAB OR LONG-TERM CARE | End: 2021-03-24
Attending: INTERNAL MEDICINE | Admitting: INTERNAL MEDICINE
Payer: MEDICARE

## 2021-03-22 VITALS
HEART RATE: 74 BPM | TEMPERATURE: 100 F | RESPIRATION RATE: 18 BRPM | WEIGHT: 199.96 LBS | DIASTOLIC BLOOD PRESSURE: 86 MMHG | SYSTOLIC BLOOD PRESSURE: 194 MMHG | HEIGHT: 69 IN | OXYGEN SATURATION: 96 %

## 2021-03-22 DIAGNOSIS — Z90.49 ACQUIRED ABSENCE OF OTHER SPECIFIED PARTS OF DIGESTIVE TRACT: Chronic | ICD-10-CM

## 2021-03-22 PROBLEM — I63.9 CEREBRAL INFARCTION, UNSPECIFIED: Chronic | Status: ACTIVE | Noted: 2021-02-07

## 2021-03-22 LAB
ALBUMIN SERPL ELPH-MCNC: 4.3 G/DL — SIGNIFICANT CHANGE UP (ref 3.5–5.2)
ALP SERPL-CCNC: 79 U/L — SIGNIFICANT CHANGE UP (ref 30–115)
ALT FLD-CCNC: 27 U/L — SIGNIFICANT CHANGE UP (ref 0–41)
ANION GAP SERPL CALC-SCNC: 10 MMOL/L — SIGNIFICANT CHANGE UP (ref 7–14)
APPEARANCE UR: CLEAR — SIGNIFICANT CHANGE UP
APTT BLD: 31 SEC — SIGNIFICANT CHANGE UP (ref 27–39.2)
AST SERPL-CCNC: 22 U/L — SIGNIFICANT CHANGE UP (ref 0–41)
BACTERIA # UR AUTO: ABNORMAL
BASOPHILS # BLD AUTO: 0.02 K/UL — SIGNIFICANT CHANGE UP (ref 0–0.2)
BASOPHILS NFR BLD AUTO: 0.3 % — SIGNIFICANT CHANGE UP (ref 0–1)
BILIRUB SERPL-MCNC: 0.5 MG/DL — SIGNIFICANT CHANGE UP (ref 0.2–1.2)
BILIRUB UR-MCNC: NEGATIVE — SIGNIFICANT CHANGE UP
BUN SERPL-MCNC: 15 MG/DL — SIGNIFICANT CHANGE UP (ref 10–20)
CALCIUM SERPL-MCNC: 9.3 MG/DL — SIGNIFICANT CHANGE UP (ref 8.5–10.1)
CHLORIDE SERPL-SCNC: 102 MMOL/L — SIGNIFICANT CHANGE UP (ref 98–110)
CO2 SERPL-SCNC: 26 MMOL/L — SIGNIFICANT CHANGE UP (ref 17–32)
COLOR SPEC: YELLOW — SIGNIFICANT CHANGE UP
CREAT SERPL-MCNC: 0.8 MG/DL — SIGNIFICANT CHANGE UP (ref 0.7–1.5)
DIFF PNL FLD: NEGATIVE — SIGNIFICANT CHANGE UP
EOSINOPHIL # BLD AUTO: 0.12 K/UL — SIGNIFICANT CHANGE UP (ref 0–0.7)
EOSINOPHIL NFR BLD AUTO: 1.6 % — SIGNIFICANT CHANGE UP (ref 0–8)
EPI CELLS # UR: ABNORMAL /HPF
GLUCOSE SERPL-MCNC: 120 MG/DL — HIGH (ref 70–99)
GLUCOSE UR QL: NEGATIVE MG/DL — SIGNIFICANT CHANGE UP
HCT VFR BLD CALC: 37 % — LOW (ref 42–52)
HGB BLD-MCNC: 12.3 G/DL — LOW (ref 14–18)
IMM GRANULOCYTES NFR BLD AUTO: 0.4 % — HIGH (ref 0.1–0.3)
INR BLD: 1.12 RATIO — SIGNIFICANT CHANGE UP (ref 0.65–1.3)
KETONES UR-MCNC: NEGATIVE — SIGNIFICANT CHANGE UP
LEUKOCYTE ESTERASE UR-ACNC: NEGATIVE — SIGNIFICANT CHANGE UP
LYMPHOCYTES # BLD AUTO: 1.33 K/UL — SIGNIFICANT CHANGE UP (ref 1.2–3.4)
LYMPHOCYTES # BLD AUTO: 17.5 % — LOW (ref 20.5–51.1)
MAGNESIUM SERPL-MCNC: 1.9 MG/DL — SIGNIFICANT CHANGE UP (ref 1.8–2.4)
MCHC RBC-ENTMCNC: 30 PG — SIGNIFICANT CHANGE UP (ref 27–31)
MCHC RBC-ENTMCNC: 33.2 G/DL — SIGNIFICANT CHANGE UP (ref 32–37)
MCV RBC AUTO: 90.2 FL — SIGNIFICANT CHANGE UP (ref 80–94)
MONOCYTES # BLD AUTO: 0.57 K/UL — SIGNIFICANT CHANGE UP (ref 0.1–0.6)
MONOCYTES NFR BLD AUTO: 7.5 % — SIGNIFICANT CHANGE UP (ref 1.7–9.3)
NEUTROPHILS # BLD AUTO: 5.51 K/UL — SIGNIFICANT CHANGE UP (ref 1.4–6.5)
NEUTROPHILS NFR BLD AUTO: 72.7 % — SIGNIFICANT CHANGE UP (ref 42.2–75.2)
NITRITE UR-MCNC: NEGATIVE — SIGNIFICANT CHANGE UP
NRBC # BLD: 0 /100 WBCS — SIGNIFICANT CHANGE UP (ref 0–0)
PH UR: 7 — SIGNIFICANT CHANGE UP (ref 5–8)
PLATELET # BLD AUTO: 191 K/UL — SIGNIFICANT CHANGE UP (ref 130–400)
POTASSIUM SERPL-MCNC: 4.2 MMOL/L — SIGNIFICANT CHANGE UP (ref 3.5–5)
POTASSIUM SERPL-SCNC: 4.2 MMOL/L — SIGNIFICANT CHANGE UP (ref 3.5–5)
PROT SERPL-MCNC: 7.2 G/DL — SIGNIFICANT CHANGE UP (ref 6–8)
PROT UR-MCNC: 30 MG/DL
PROTHROM AB SERPL-ACNC: 12.9 SEC — HIGH (ref 9.95–12.87)
RAPID RVP RESULT: SIGNIFICANT CHANGE UP
RBC # BLD: 4.1 M/UL — LOW (ref 4.7–6.1)
RBC # FLD: 13.1 % — SIGNIFICANT CHANGE UP (ref 11.5–14.5)
SARS-COV-2 RNA SPEC QL NAA+PROBE: SIGNIFICANT CHANGE UP
SODIUM SERPL-SCNC: 138 MMOL/L — SIGNIFICANT CHANGE UP (ref 135–146)
SP GR SPEC: 1.02 — SIGNIFICANT CHANGE UP (ref 1.01–1.03)
TROPONIN T SERPL-MCNC: <0.01 NG/ML — SIGNIFICANT CHANGE UP
UROBILINOGEN FLD QL: 1 MG/DL (ref 0.2–0.2)
WBC # BLD: 7.58 K/UL — SIGNIFICANT CHANGE UP (ref 4.8–10.8)
WBC # FLD AUTO: 7.58 K/UL — SIGNIFICANT CHANGE UP (ref 4.8–10.8)
WBC UR QL: SIGNIFICANT CHANGE UP /HPF

## 2021-03-22 PROCEDURE — 99285 EMERGENCY DEPT VISIT HI MDM: CPT

## 2021-03-22 PROCEDURE — 71045 X-RAY EXAM CHEST 1 VIEW: CPT | Mod: 26

## 2021-03-22 PROCEDURE — 99223 1ST HOSP IP/OBS HIGH 75: CPT | Mod: AI

## 2021-03-22 RX ORDER — NICOTINE POLACRILEX 2 MG
1 GUM BUCCAL DAILY
Refills: 0 | Status: DISCONTINUED | OUTPATIENT
Start: 2021-03-22 | End: 2021-03-24

## 2021-03-22 RX ORDER — METOPROLOL TARTRATE 50 MG
25 TABLET ORAL
Refills: 0 | Status: DISCONTINUED | OUTPATIENT
Start: 2021-03-22 | End: 2021-03-24

## 2021-03-22 RX ORDER — ACETAMINOPHEN 500 MG
650 TABLET ORAL EVERY 6 HOURS
Refills: 0 | Status: DISCONTINUED | OUTPATIENT
Start: 2021-03-22 | End: 2021-03-24

## 2021-03-22 RX ORDER — PANTOPRAZOLE SODIUM 20 MG/1
40 TABLET, DELAYED RELEASE ORAL
Refills: 0 | Status: DISCONTINUED | OUTPATIENT
Start: 2021-03-22 | End: 2021-03-24

## 2021-03-22 RX ORDER — THIAMINE MONONITRATE (VIT B1) 100 MG
100 TABLET ORAL DAILY
Refills: 0 | Status: DISCONTINUED | OUTPATIENT
Start: 2021-03-22 | End: 2021-03-24

## 2021-03-22 RX ORDER — SODIUM CHLORIDE 9 MG/ML
1000 INJECTION INTRAMUSCULAR; INTRAVENOUS; SUBCUTANEOUS ONCE
Refills: 0 | Status: DISCONTINUED | OUTPATIENT
Start: 2021-03-22 | End: 2021-03-22

## 2021-03-22 RX ORDER — ASPIRIN/CALCIUM CARB/MAGNESIUM 324 MG
81 TABLET ORAL DAILY
Refills: 0 | Status: DISCONTINUED | OUTPATIENT
Start: 2021-03-22 | End: 2021-03-24

## 2021-03-22 RX ORDER — CHLORHEXIDINE GLUCONATE 213 G/1000ML
1 SOLUTION TOPICAL
Refills: 0 | Status: DISCONTINUED | OUTPATIENT
Start: 2021-03-22 | End: 2021-03-24

## 2021-03-22 RX ORDER — MULTIVIT-MIN/FERROUS GLUCONATE 9 MG/15 ML
1 LIQUID (ML) ORAL DAILY
Refills: 0 | Status: DISCONTINUED | OUTPATIENT
Start: 2021-03-22 | End: 2021-03-24

## 2021-03-22 RX ORDER — APIXABAN 2.5 MG/1
5 TABLET, FILM COATED ORAL
Refills: 0 | Status: DISCONTINUED | OUTPATIENT
Start: 2021-03-22 | End: 2021-03-24

## 2021-03-22 RX ORDER — GABAPENTIN 400 MG/1
100 CAPSULE ORAL THREE TIMES A DAY
Refills: 0 | Status: DISCONTINUED | OUTPATIENT
Start: 2021-03-22 | End: 2021-03-24

## 2021-03-22 RX ORDER — LOSARTAN POTASSIUM 100 MG/1
100 TABLET, FILM COATED ORAL DAILY
Refills: 0 | Status: DISCONTINUED | OUTPATIENT
Start: 2021-03-22 | End: 2021-03-24

## 2021-03-22 RX ORDER — METHOCARBAMOL 500 MG/1
500 TABLET, FILM COATED ORAL EVERY 8 HOURS
Refills: 0 | Status: DISCONTINUED | OUTPATIENT
Start: 2021-03-22 | End: 2021-03-24

## 2021-03-22 RX ORDER — FOLIC ACID 0.8 MG
1 TABLET ORAL DAILY
Refills: 0 | Status: DISCONTINUED | OUTPATIENT
Start: 2021-03-22 | End: 2021-03-24

## 2021-03-22 RX ORDER — SENNA PLUS 8.6 MG/1
2 TABLET ORAL AT BEDTIME
Refills: 0 | Status: DISCONTINUED | OUTPATIENT
Start: 2021-03-22 | End: 2021-03-24

## 2021-03-22 RX ORDER — ATORVASTATIN CALCIUM 80 MG/1
80 TABLET, FILM COATED ORAL AT BEDTIME
Refills: 0 | Status: DISCONTINUED | OUTPATIENT
Start: 2021-03-22 | End: 2021-03-24

## 2021-03-22 RX ADMIN — Medication 100 MILLIGRAM(S): at 17:43

## 2021-03-22 RX ADMIN — Medication 1 PATCH: at 17:42

## 2021-03-22 RX ADMIN — Medication 1 MILLIGRAM(S): at 17:42

## 2021-03-22 RX ADMIN — Medication 25 MILLIGRAM(S): at 17:43

## 2021-03-22 RX ADMIN — Medication 81 MILLIGRAM(S): at 17:42

## 2021-03-22 RX ADMIN — LOSARTAN POTASSIUM 100 MILLIGRAM(S): 100 TABLET, FILM COATED ORAL at 17:42

## 2021-03-22 RX ADMIN — Medication 1 TABLET(S): at 17:42

## 2021-03-22 RX ADMIN — ATORVASTATIN CALCIUM 80 MILLIGRAM(S): 80 TABLET, FILM COATED ORAL at 21:12

## 2021-03-22 RX ADMIN — PANTOPRAZOLE SODIUM 40 MILLIGRAM(S): 20 TABLET, DELAYED RELEASE ORAL at 17:43

## 2021-03-22 RX ADMIN — APIXABAN 5 MILLIGRAM(S): 2.5 TABLET, FILM COATED ORAL at 17:43

## 2021-03-22 RX ADMIN — Medication 1 PATCH: at 19:05

## 2021-03-22 RX ADMIN — GABAPENTIN 100 MILLIGRAM(S): 400 CAPSULE ORAL at 21:12

## 2021-03-22 NOTE — ED PROVIDER NOTE - ATTENDING CONTRIBUTION TO CARE
s/p pontine stroke with significant balance problems and trouble ambulating independently.  he is unable to care for himself at home.  he is a high risk to fall.  will admit for safety and placement.

## 2021-03-22 NOTE — H&P ADULT - ATTENDING COMMENTS
I have performed a history and physical exam of this patient and discussed the management with the PA I have reviewed the PA's note and agree with the documented findings and plan of care.

## 2021-03-22 NOTE — H&P ADULT - NSHPLABSRESULTS_GEN_ALL_CORE
12.3   7.58  )-----------( 191      ( 22 Mar 2021 09:50 )             37.0           138  |  102  |  15  ----------------------------<  120<H>  4.2   |  26  |  0.8    Ca    9.3      22 Mar 2021 09:50  Mg     1.9         TPro  7.2  /  Alb  4.3  /  TBili  0.5  /  DBili  x   /  AST  22  /  ALT  27  /  AlkPhos  79            Magnesium, Serum: 1.9 mg/dL (21 @ 09:50)          Urinalysis Basic - ( 22 Mar 2021 11:12 )    Color: Yellow / Appearance: Clear / S.025 / pH: x  Gluc: x / Ketone: Negative  / Bili: Negative / Urobili: 1.0 mg/dL   Blood: x / Protein: 30 mg/dL / Nitrite: Negative   Leuk Esterase: Negative / RBC: x / WBC 1-2 /HPF   Sq Epi: x / Non Sq Epi: Occasional /HPF / Bacteria: Few      PT/INR - ( 22 Mar 2021 09:50 )   PT: 12.90 sec;   INR: 1.12 ratio         PTT - ( 22 Mar 2021 09:50 )  PTT:31.0 sec    Lactate Trend      CARDIAC MARKERS ( 22 Mar 2021 09:50 )  x     / <0.01 ng/mL / x     / x     / x          < from: Xray Chest 1 View- PORTABLE-Urgent (21 @ 09:41) >    Impression:    No radiographic evidence of acute cardiopulmonary disease.      SHARON LEON MD; Attending Radiologist  This document has been electronically signed. Mar 22 2021  9:53AM    < end of copied text >

## 2021-03-22 NOTE — H&P ADULT - NSHPREVIEWOFSYSTEMS_GEN_ALL_CORE
REVIEW OF SYSTEMS:    CONSTITUTIONAL: No weakness, fevers or chills  EYES/ENT: No visual changes;  No vertigo or throat pain   NECK: No pain or stiffness  RESPIRATORY: No cough, wheezing, hemoptysis; No shortness of breath  CARDIOVASCULAR: No chest pain or palpitations  GASTROINTESTINAL: No abdominal or epigastric pain. No nausea, vomiting, or hematemesis; No diarrhea or constipation. No melena or hematochezia.  GENITOURINARY: No dysuria, frequency or hematuria  NEUROLOGICAL: No numbness. POSITIVE residual weakness and gait abnormality  SKIN: No itching, rashes

## 2021-03-22 NOTE — H&P ADULT - NSHPPHYSICALEXAM_GEN_ALL_CORE
VITALS:     ICU Vital Signs Last 24 Hrs  T(C): 37.6 (22 Mar 2021 09:11), Max: 37.6 (22 Mar 2021 09:11)  T(F): 99.6 (22 Mar 2021 09:11), Max: 99.6 (22 Mar 2021 09:11)  HR: 74 (22 Mar 2021 09:11) (74 - 74)  BP: 194/86 (22 Mar 2021 09:11) (194/86 - 194/86)  RR: 18 (22 Mar 2021 09:11) (18 - 18)  SpO2: 96% (22 Mar 2021 09:11) (96% - 96%)      GENERAL: NAD, lying in bed comfortably  HEAD:  Atraumatic, Normocephalic, no facial asymmetry   EYES: EOMI, PERRLA, conjunctiva and sclera clear  ENT: Moist mucous membranes  NECK: Supple, No JVD  CHEST/LUNG: Clear to auscultation bilaterally; No rales, rhonchi, wheezing, or rubs. Unlabored respirations  HEART: Regular rate and rhythm; No murmurs, rubs, or gallops  ABDOMEN:  Soft, Nontender, Nondistended. No hepatomegally  EXTREMITIES:  2+ Peripheral Pulses, brisk capillary refill. No clubbing, cyanosis, or edema  NERVOUS SYSTEM:  Alert & Oriented X3, speech clear. No deficits   MSK: FROM all 4 extremities, full and equal strength, sensation intact, no upper or lower extremity drifting. hand  5/5 bilaterally, sensation intact bilaterally.   SKIN: No rashes or lesions

## 2021-03-22 NOTE — ED PROVIDER NOTE - OBJECTIVE STATEMENT
Patient is a 64 yo male with PMHx of PE on Eliquis, CHF, CVA, HTN, HLD c/o imbalance x 1 to 2 months. Patient had stroke about 1 to 2 months ago, ever since he has been having trouble with balance. He usually walks with a walker and able to walk, but because of the imbalance, he feels like he will fall. Patient was d/c from [facility] because he wanted to go home, but now he feels like he needs to go back. Denies fever, chills, chest pain, SOB, abdominal pain, n/v/d, dysuria.

## 2021-03-22 NOTE — CONSULT NOTE ADULT - ASSESSMENT
IMPRESSION: Rehab of 64 y/o m  rehab  for  ATAXIA hx  of cva  GD     PRECAUTIONS: [  ] Cardiac  [  ] Respiratory  [  ] Seizures [  ] Contact Isolation  [  ] Droplet Isolation  [ FALL ] Other    Weight Bearing Status:     RECOMMENDATION:    Out of Bed to Chair     DVT/Decubiti Prophylaxis    REHAB PLAN:     [ xx  ] Bedside P/T 3-5 times a week   [   ]   Bedside O/T  2-3 times a week             [   ] No Rehab Therapy Indicated                   [   ]  Speech Therapy   Conditioning/ROM                                    ADL  Bed Mobility                                               Conditioning/ROM  Transfers                                                     Bed Mobility  Sitting /Standing Balance                         Transfers                                        Gait Training                                               Sitting/Standing Balance  Stair Training [   ]Applicable                    Home equipment Eval                                                                        Splinting  [   ] Only      GOALS:   ADL   [ x  ]   Independent                    Transfers  [ x  ] Independent                          Ambulation  [ x  ] Independent     [ x   ] With device                            [ x  ]  CG                                                         [  x ]  CG                                                                  [  x ] CG                            [    ] Min A                                                   [   ] Min A                                                              [   ] Min  A          DISCHARGE PLAN:   [   ]  Good candidate for Intensive Rehabilitation/Hospital based-4A [facility]                                             Will tolerate 3hrs Intensive Rehab Daily                                       [ xx   ]  Short Term Rehab in Skilled Nursing Facility                                       [    ]  Home with Outpatient or VN services                                         [    ]  Possible Candidate for Intensive Hospital based Rehab

## 2021-03-22 NOTE — H&P ADULT - ASSESSMENT
A 66 y/o male with PMHx of PE on Eliquis, CHF, hx of  CVA pontine infract 2months ago , HTN, HLD,  recently  discharged  from hospital 2/7-16 to  [facility], left a week ago to go home , presents to ed with inability to ambulate for 1-2 months.       #Severe ambulatory dysfunction   -left [facility], left a week ago wants to go back   - Fall precautions  - Pt consult   - rehab consult   - case management for placement   - pain meds PRN         CHRONIC MEDICAL CONDITIONS  1. Hx pontine Left CVA   --  c/w ASA 81/Lipitor 80,  --  fall precautions    2. Dysphagia s/p CVA   -- Dysphagia 3 with thin liquids    3. Hx EtOH Dependence   -- not in withdrawal. thiamine, folic acid, MV    4. HFpEF   -- EF >55%, mild LVH 6/20. not on diuretics    5. HX PE   -- c/w Eliquis, however if patient has recurrent falls will need to reconsider risk / benefit.    6. HTN   -- c/w metoprolol,  Losartan , monitor BP     7. Nicotine dependency   -- nicotine patch   -- smoking cessation education       DVT -- on apixaban (for PE)   A 66 y/o male with PMHx of PE on Eliquis, CHF, hx of  CVA pontine infract 2months ago , HTN, HLD,  recently  discharged  from hospital 2/7-16 to  [facility], left a week ago to go home , presents to ed with inability to ambulate for 1-2 months.       #Severe ambulatory dysfunction   -left [facility] a week ago wants to go back   - Fall precautions  - Pt consult   - rehab consult   - case management for placement   - pain meds PRN         CHRONIC MEDICAL CONDITIONS  1. Hx pontine Left CVA   --  c/w ASA 81/Lipitor 80,  --  fall precautions    2. Dysphagia s/p CVA   -- Dysphagia 3 with thin liquids    3. Hx EtOH Dependence   -- not in withdrawal. thiamine, folic acid, MV    4. HFpEF   -- EF >55%, mild LVH 6/20. not on diuretics    5. HX PE   -- c/w Eliquis, however if patient has recurrent falls will need to reconsider risk / benefit.    6. HTN   -- c/w metoprolol,  Losartan , monitor BP     7. Nicotine dependency   -- nicotine patch   -- smoking cessation education       DVT -- on apixaban (for PE)   A 66 y/o male with PMHx of PE on Eliquis, CHF, hx of  CVA pontine infract 2months ago , HTN, HLD,  recently  discharged  from hospital 2/7-16 to  [facility], left a week ago to go home , presents to ed with inability to ambulate for 1-2 months.       #Severe ambulatory dysfunction due to recent CVA  -left [facility] a week ago wants to go back   - Fall precautions  - Pt consult   - rehab consult   - case management for placement   - pain meds PRN   - Discharge planning for tomorrow        CHRONIC MEDICAL CONDITIONS  1. Hx pontine Left CVA   --  c/w ASA 81/Lipitor 80,  --  fall precautions    2. Dysphagia s/p CVA   -- Dysphagia 3 with thin liquids    3. Hx EtOH Dependence   -- not in withdrawal. thiamine, folic acid, MV    4. HFpEF   -- EF >55%, mild LVH 6/20. not on diuretics    5. HX PE   -- c/w Eliquis, however if patient has recurrent falls will need to reconsider risk / benefit.    6. HTN   -- c/w metoprolol,  Losartan , monitor BP     7. Nicotine dependency   -- nicotine patch   -- smoking cessation education       DVT -- on apixaban (for PE)    Prepare for discharge to rehab tomorrow.

## 2021-03-22 NOTE — PATIENT PROFILE ADULT - ..
AVSS; orthostatic BPs/HRs negative; neuro's intact except intermittently confused to time, place, and situation; also with a slight intermittent left pronator drift, and slightly weaker left hand grasp, (noted by neurology as well); up with 2 assist to the bedside commode with a walker/gait belt; voiding; no stool overnight; appeared to sleep well.    22-Mar-2021 16:20:03

## 2021-03-22 NOTE — ED ADULT NURSE REASSESSMENT NOTE - NS ED NURSE REASSESS COMMENT FT1
pt climbing to bottom of the bed and demand side rails down. pt educated on bed alarm and fall precautions. remains non compliant pt climbing to bottom of the bed and demand side rails down. pt educated on bed alarm and fall precautions. remains non compliant and refuses all fall and safety precautions

## 2021-03-22 NOTE — H&P ADULT - HISTORY OF PRESENT ILLNESS
A 66 y/o male with PMHx of PE on Eliquis, CHF, hx of  CVA pontine infract 2months ago , HTN, HLD c/o imbalance x 1 to 2 months. Pt reports  had recent stroke about  2 months ago, ever since he has been having trouble with balance.  Pt reports able to stand and ambulate with a walker.  Pt recently  discharged  from hospital 2/7-16 to  [facility], left a week ago because he wanted to go home, now wants to go back. Pt reports lives alone and can not do much. Pt denies any pain in lower extremity. Pt denies any new weaknesses in upper or lower extremities. Pt denies fever , chills, chest pain, shortness of breath, burning with urination, diarrhea.

## 2021-03-22 NOTE — ED ADULT NURSE REASSESSMENT NOTE - NS ED NURSE REASSESS COMMENT FT1
pt a/o x 4, educated about IV access and  continuos cardiac monitor, Pt verbalized back understanding of prescribed IV access and cardiac monitor, Despite patient education pt states "I don't want an IV access, you can do the blood, but I don't want it. I do not want that cardiac monitor either, if anyone puts the cardiac monitor on I'll rip it off". Blood work obtained, MD Kristal ba

## 2021-03-22 NOTE — ED ADULT TRIAGE NOTE - CHIEF COMPLAINT QUOTE
BIBA from home as per patient "Both my legs hurts". Denies fall/trauma, denies nausea, chest pain, shortness of breath

## 2021-03-22 NOTE — ED PROVIDER NOTE - PROGRESS NOTE DETAILS
MQ: Labs, ecg, cxr, ct head and reassess MQ: Labs, ecg, cxr, and reassess MQ: Talked to neurology, does not need any new imaging, needs rehab, patient refused IV and cardiac monitor, is agreeable to IV if needs any medication IV MQ: Case management will attempt to d/c patient to rehab today MQ: Will admit

## 2021-03-22 NOTE — CONSULT NOTE ADULT - SUBJECTIVE AND OBJECTIVE BOX
HPI:  A 64 y/o male with PMHx of PE on Eliquis, CHF, hx of  CVA pontine infract 2months ago , HTN, HLD c/o imbalance x 1 to 2 months. Pt reports  had recent stroke about  2 months ago, ever since he has been having trouble with balance.  Pt reports able to stand and ambulate with a walker.  Pt recently  discharged  from hospital  to  [facility], left a week ago because he wanted to go home, now wants to go back. Pt reports lives alone and can not do much. Pt denies any pain in lower extremity. Pt denies any new weaknesses in upper or lower extremities. Pt denies fever , chills, chest pain, shortness of breath, burning with urination, diarrhea.    ptn seen at  bed  side  nad  c/o  weakness  bl  le  and  poor balance     PTN  REFERRED TO ACUTE  REHAB  FOR  EVAL AND  TX   PAST MEDICAL & SURGICAL HISTORY:  Cerebrovascular accident (CVA)  left pontine with dysphagia    Pulmonary embolism      Chronic back pain    Alcohol dependence    HLD (hyperlipidemia)    HTN (hypertension)    CHF (congestive heart failure)    TIA (transient ischemic attack)    History of appendectomy        Hospital Course:    TODAY'S SUBJECTIVE & REVIEW OF SYMPTOMS:     Constitutional WNL   Cardio WNL   Resp WNL   GI WNL  Heme WNL  Endo WNL  Skin WNL  MSK WNL  Neuro WNL  Cognitive WNL  Psych WNL      MEDICATIONS  (STANDING):  apixaban 5 milliGRAM(s) Oral two times a day  aspirin  chewable 81 milliGRAM(s) Oral daily  atorvastatin 80 milliGRAM(s) Oral at bedtime  chlorhexidine 4% Liquid 1 Application(s) Topical <User Schedule>  folic acid 1 milliGRAM(s) Oral daily  gabapentin 100 milliGRAM(s) Oral three times a day  losartan 100 milliGRAM(s) Oral daily  metoprolol tartrate 25 milliGRAM(s) Oral two times a day  multivitamin/minerals 1 Tablet(s) Oral daily  nicotine - 21 mG/24Hr(s) Patch 1 patch Transdermal daily  pantoprazole    Tablet 40 milliGRAM(s) Oral before breakfast  thiamine 100 milliGRAM(s) Oral daily    MEDICATIONS  (PRN):  acetaminophen   Tablet .. 650 milliGRAM(s) Oral every 6 hours PRN Temp greater or equal to 38C (100.4F), Mild Pain (1 - 3)  methocarbamol 500 milliGRAM(s) Oral every 8 hours PRN Muscle Spasm  senna 2 Tablet(s) Oral at bedtime PRN Constipation      FAMILY HISTORY:  FH: MI (myocardial infarction) (Father, Mother)  Dad, Mom        Allergies    No Known Allergies    Intolerances        SOCIAL HISTORY:    [  ] Etoh  [  ] Smoking  [  ] Substance abuse     Home Environment:  [ x ] Home Alone  [  ] Lives with Family  [  ] Home Health Aid    Dwelling:  [ x ] Apartment  [  ] Private House  [  ] Adult Home  [  ] Skilled Nursing Facility      [  ] Short Term  [  ] Long Term  [ x ] Stairs       Elevator [  ]    FUNCTIONAL STATUS PTA: (Check all that apply)  Ambulation: [ x  ]Independent    [  ] Dependent     [  ] Non-Ambulatory  Assistive Device: [  ] SA Cane  [  ]  Q Cane  [ x ] Walker  [  ]  Wheelchair  ADL : [ x ] Independent  [  ]  Dependent       Vital Signs Last 24 Hrs  T(C): 36 (22 Mar 2021 13:00), Max: 37.6 (22 Mar 2021 09:11)  T(F): 96.8 (22 Mar 2021 13:00), Max: 99.6 (22 Mar 2021 09:11)  HR: 72 (22 Mar 2021 13:00) (72 - 74)  BP: 191/88 (22 Mar 2021 13:00) (191/88 - 194/86)  BP(mean): --  RR: 18 (22 Mar 2021 13:00) (18 - 18)  SpO2: 98% (22 Mar 2021 13:00) (96% - 98%)      PHYSICAL EXAM: Alert & Oriented X 2  GENERAL: NAD, well-groomed, well-developed  HEAD:  Atraumatic, Normocephalic  EYES: EOMI, PERRLA, conjunctiva and sclera clear  NECK: Supple, No JVD, Normal thyroid  CHEST/LUNG: Clear to percussion bilaterally; No rales, rhonchi, wheezing, or rubs  HEART: Regular rate and rhythm; No murmurs, rubs, or gallops  ABDOMEN: Soft, Nontender, Nondistended; Bowel sounds present  EXTREMITIES:  2+ Peripheral Pulses, No clubbing, cyanosis, or edema    NERVOUS SYSTEM:  Cranial Nerves 2-12 intact [ x ] Abnormal  [  ]  ROM: WFL all extremities [  ]  Abnormal [ x ]  Motor Strength: WFL all extremities  [  ]  Abnormal [ x ]  Sensation: intact to light touch [  ] Abnormal [x  ]  Reflexes: Symmetric [  ]  Abnormal [ x ]    FUNCTIONAL STATUS:  Bed Mobility: Independent [  ]  Supervision [  ]  Needs Assistance [  ]  N/A [ x ]  Transfers: Independent [  ]  Supervision [  ]  Needs Assistance [  ]  N/A [x  ]   Ambulation: Independent [  ]  Supervision [  ]  Needs Assistance [  ]  N/A [x  ]  ADL: Independent [  ] Requires Assistance [  ] N/A [ x ]  SEE PT/OT IE NOTES    LABS:                        12.3   7.58  )-----------( 191      ( 22 Mar 2021 09:50 )             37.0         138  |  102  |  15  ----------------------------<  120<H>  4.2   |  26  |  0.8    Ca    9.3      22 Mar 2021 09:50  Mg     1.9         TPro  7.2  /  Alb  4.3  /  TBili  0.5  /  DBili  x   /  AST  22  /  ALT  27  /  AlkPhos  79      PT/INR - ( 22 Mar 2021 09:50 )   PT: 12.90 sec;   INR: 1.12 ratio         PTT - ( 22 Mar 2021 09:50 )  PTT:31.0 sec  Urinalysis Basic - ( 22 Mar 2021 11:12 )    Color: Yellow / Appearance: Clear / S.025 / pH: x  Gluc: x / Ketone: Negative  / Bili: Negative / Urobili: 1.0 mg/dL   Blood: x / Protein: 30 mg/dL / Nitrite: Negative   Leuk Esterase: Negative / RBC: x / WBC 1-2 /HPF   Sq Epi: x / Non Sq Epi: Occasional /HPF / Bacteria: Few        RADIOLOGY & ADDITIONAL STUDIES:< from: CT Head No Cont (21 @ 15:47) >  IMPRESSION:  No significant change since recent head CT of 2020.    Extensive chronic microvascular-type changes as well as chronic lacunar infarcts involving left thalamus, left rosalie and left cerebellar hemisphere.    < end of copied text >      Assesment:

## 2021-03-22 NOTE — ED PROVIDER NOTE - NS ED ROS FT

## 2021-03-22 NOTE — ED PROVIDER NOTE - CLINICAL SUMMARY MEDICAL DECISION MAKING FREE TEXT BOX
risk to fall.  needs placement.  In my opinion, in patient treatment is medically justifiable and appropriate.

## 2021-03-23 LAB
CULTURE RESULTS: SIGNIFICANT CHANGE UP
SPECIMEN SOURCE: SIGNIFICANT CHANGE UP

## 2021-03-23 PROCEDURE — 99232 SBSQ HOSP IP/OBS MODERATE 35: CPT

## 2021-03-23 RX ORDER — NICOTINE POLACRILEX 2 MG
1 GUM BUCCAL
Qty: 0 | Refills: 0 | DISCHARGE
Start: 2021-03-23

## 2021-03-23 RX ORDER — SENNA PLUS 8.6 MG/1
2 TABLET ORAL
Qty: 0 | Refills: 0 | DISCHARGE
Start: 2021-03-23

## 2021-03-23 RX ORDER — GABAPENTIN 400 MG/1
1 CAPSULE ORAL
Qty: 0 | Refills: 0 | DISCHARGE

## 2021-03-23 RX ORDER — METHOCARBAMOL 500 MG/1
1 TABLET, FILM COATED ORAL
Qty: 0 | Refills: 0 | DISCHARGE
Start: 2021-03-23

## 2021-03-23 RX ORDER — GABAPENTIN 400 MG/1
1 CAPSULE ORAL
Qty: 0 | Refills: 0 | DISCHARGE
Start: 2021-03-23

## 2021-03-23 RX ORDER — METOPROLOL TARTRATE 50 MG
0.5 TABLET ORAL
Qty: 0 | Refills: 0 | DISCHARGE
Start: 2021-03-23

## 2021-03-23 RX ORDER — APIXABAN 2.5 MG/1
1 TABLET, FILM COATED ORAL
Qty: 0 | Refills: 0 | DISCHARGE
Start: 2021-03-23

## 2021-03-23 RX ORDER — PANTOPRAZOLE SODIUM 20 MG/1
1 TABLET, DELAYED RELEASE ORAL
Qty: 0 | Refills: 0 | DISCHARGE
Start: 2021-03-23

## 2021-03-23 RX ORDER — ATORVASTATIN CALCIUM 80 MG/1
1 TABLET, FILM COATED ORAL
Qty: 0 | Refills: 0 | DISCHARGE
Start: 2021-03-23

## 2021-03-23 RX ORDER — LOSARTAN POTASSIUM 100 MG/1
1 TABLET, FILM COATED ORAL
Qty: 0 | Refills: 0 | DISCHARGE
Start: 2021-03-23

## 2021-03-23 RX ORDER — METOPROLOL TARTRATE 50 MG
1 TABLET ORAL
Qty: 0 | Refills: 0 | DISCHARGE
Start: 2021-03-23

## 2021-03-23 RX ORDER — THIAMINE MONONITRATE (VIT B1) 100 MG
1 TABLET ORAL
Qty: 0 | Refills: 0 | DISCHARGE
Start: 2021-03-23

## 2021-03-23 RX ORDER — FOLIC ACID 0.8 MG
1 TABLET ORAL
Qty: 0 | Refills: 0 | DISCHARGE
Start: 2021-03-23

## 2021-03-23 RX ORDER — MULTIVIT-MIN/FERROUS GLUCONATE 9 MG/15 ML
1 LIQUID (ML) ORAL
Qty: 0 | Refills: 0 | DISCHARGE
Start: 2021-03-23

## 2021-03-23 RX ORDER — ASPIRIN/CALCIUM CARB/MAGNESIUM 324 MG
1 TABLET ORAL
Qty: 0 | Refills: 0 | DISCHARGE
Start: 2021-03-23

## 2021-03-23 RX ADMIN — GABAPENTIN 100 MILLIGRAM(S): 400 CAPSULE ORAL at 14:30

## 2021-03-23 RX ADMIN — Medication 81 MILLIGRAM(S): at 12:07

## 2021-03-23 RX ADMIN — Medication 1 MILLIGRAM(S): at 14:29

## 2021-03-23 RX ADMIN — PANTOPRAZOLE SODIUM 40 MILLIGRAM(S): 20 TABLET, DELAYED RELEASE ORAL at 05:32

## 2021-03-23 RX ADMIN — Medication 100 MILLIGRAM(S): at 14:29

## 2021-03-23 RX ADMIN — GABAPENTIN 100 MILLIGRAM(S): 400 CAPSULE ORAL at 22:34

## 2021-03-23 RX ADMIN — Medication 25 MILLIGRAM(S): at 18:04

## 2021-03-23 RX ADMIN — LOSARTAN POTASSIUM 100 MILLIGRAM(S): 100 TABLET, FILM COATED ORAL at 05:32

## 2021-03-23 RX ADMIN — Medication 25 MILLIGRAM(S): at 05:32

## 2021-03-23 RX ADMIN — GABAPENTIN 100 MILLIGRAM(S): 400 CAPSULE ORAL at 05:32

## 2021-03-23 RX ADMIN — APIXABAN 5 MILLIGRAM(S): 2.5 TABLET, FILM COATED ORAL at 18:04

## 2021-03-23 RX ADMIN — Medication 1 PATCH: at 20:00

## 2021-03-23 RX ADMIN — Medication 1 TABLET(S): at 14:29

## 2021-03-23 RX ADMIN — ATORVASTATIN CALCIUM 80 MILLIGRAM(S): 80 TABLET, FILM COATED ORAL at 22:34

## 2021-03-23 RX ADMIN — APIXABAN 5 MILLIGRAM(S): 2.5 TABLET, FILM COATED ORAL at 05:32

## 2021-03-23 RX ADMIN — Medication 1 PATCH: at 14:29

## 2021-03-23 NOTE — PHYSICAL THERAPY INITIAL EVALUATION ADULT - GAIT DEVIATIONS NOTED, PT EVAL
stooped posture , decreased heel strike / push off , shuffling steps, narrow base of support/decreased fish/increased time in double stance/decreased step length/decreased weight-shifting ability

## 2021-03-23 NOTE — PROGRESS NOTE ADULT - SUBJECTIVE AND OBJECTIVE BOX
A 64 y/o male with PMHx of PE on Eliquis, CHF, hx of  CVA pontine infract 2months ago , HTN, HLD c/o imbalance x 1 to 2 months. Pt reports  had recent stroke about  2 months ago, ever since he has been having trouble with balance.  Pt recently  discharged  from hospital 2/7-16 to  [facility], left a week ago because he wanted to go home, now wants to go back.    No overnight events, no active complaints.       PAST MEDICAL HISTORY:  Alcohol dependence     Cerebrovascular accident (CVA) left pontine with dysphagia    CHF (congestive heart failure)     Chronic back pain     HLD (hyperlipidemia)     HTN (hypertension)     Pulmonary embolism 2015    TIA (transient ischemic attack).     PAST SURGICAL HISTORY:  History of appendectomy.       GENERAL: NAD, lying in bed comfortably  HEAD:  Atraumatic, Normocephalic, no facial asymmetry   EYES: EOMI, PERRLA, conjunctiva and sclera clear  ENT: Moist mucous membranes  NECK: Supple, No JVD  CHEST/LUNG: Clear to auscultation bilaterally; No rales, rhonchi, wheezing, or rubs. Unlabored respirations  HEART: Regular rate and rhythm; No murmurs, rubs, or gallops  ABDOMEN:  Soft, Nontender, Nondistended. No hepatomegally  EXTREMITIES:  2+ Peripheral Pulses, brisk capillary refill. No clubbing, cyanosis, or edema  NERVOUS SYSTEM:  Alert & Oriented X3, speech clear. No deficits   MSK: FROM all 4 extremities, full and equal strength, sensation intact, no upper or lower extremity drifting. hand  5/5 bilaterally, sensation intact bilaterally.   SKIN: No rashes or lesions

## 2021-03-23 NOTE — PHYSICAL THERAPY INITIAL EVALUATION ADULT - GENERAL OBSERVATIONS, REHAB EVAL
9:40 - 10:05. Chart reviewed. Patient available to be seen for physical therapy, confirmed with nurse. Patient encountered semi-reclined in bed. Denies pain, c/o "leg weakness," agreeable for PT evaluation now.

## 2021-03-23 NOTE — PROGRESS NOTE ADULT - ASSESSMENT
A 66 y/o male with PMHx of PE on Eliquis, CHF, hx of  CVA pontine infract 2months ago , HTN, HLD,  recently  discharged  from hospital 2/7-16 to  [facility], left a week ago to go home , presents to ed with inability to ambulate for 1-2 months.     #Severe ambulatory dysfunction due to recent CVA  - left [facility] a week ago wants to go back   - Fall precautions  - Pt/OT  - case management for placement   - pain meds PRN     CHRONIC MEDICAL CONDITIONS  1. Hx pontine Left CVA   -  c/w ASA 81/Lipitor 80,  -  fall precautions    2. Dysphagia s/p CVA   - Dysphagia 3 with thin liquids    3. Hx EtOH Dependence  - not in withdrawal. thiamine, folic acid, MV    4. HFpEF  -EF >55%, mild LVH 6/20. not on diuretics    5. HX PE   - c/w Eliquis, however if patient has recurrent falls will need to reconsider risk / benefit.    6. HTN  - c/w metoprolol,  Losartan , monitor BP     7. Nicotine dependency   - nicotine patch   - smoking cessation education     DVT   - on apixaban (for PE)    Disposition.   -Discharge to [facility] pending bed availability and COVID PCR test result    A 66 y/o male with PMHx of PE on Eliquis, CHF, hx of  CVA pontine infract 2months ago , HTN, HLD,  recently  discharged  from hospital 2/7-16 to  [facility], left a week ago to go home , presents to ed with inability to ambulate for 1-2 months.     #Severe ambulatory dysfunction due to recent CVA  - left [facility] a week ago wants to go back   - Fall precautions  - Pt/OT  - case management for placement   - pain meds PRN     CHRONIC MEDICAL CONDITIONS  1. Hx pontine Left CVA   -  c/w ASA 81/Lipitor 80,  -  fall precautions    2. Dysphagia s/p CVA   - Dysphagia 3 with thin liquids    3. Hx EtOH Dependence  - not in withdrawal. thiamine, folic acid, MV    4. HFpEF  -EF >55%, mild LVH 6/20. not on diuretics    5. HX PE   - c/w Eliquis, however if patient has recurrent falls will need to reconsider risk / benefit.    6. HTN  - c/w metoprolol,  Losartan , monitor BP     7. Nicotine dependency   - nicotine patch   - smoking cessation education     DVT   - on apixaban (for PE)    Disposition.   -Discharge to [facility] pending bed availability and COVID PCR test result   -anticipated discharge

## 2021-03-23 NOTE — PHYSICAL THERAPY INITIAL EVALUATION ADULT - ADDITIONAL COMMENTS
As per patient, resides alone in private home, with 8 steps to enter and no stairs inside. Patient reports ambulating with rolling walker at baseline, however, admits he has been having frequent falls at home

## 2021-03-24 ENCOUNTER — TRANSCRIPTION ENCOUNTER (OUTPATIENT)
Age: 66
End: 2021-03-24

## 2021-03-24 VITALS — DIASTOLIC BLOOD PRESSURE: 70 MMHG | SYSTOLIC BLOOD PRESSURE: 150 MMHG | HEART RATE: 69 BPM | TEMPERATURE: 98 F

## 2021-03-24 LAB
COVID-19 SPIKE DOMAIN AB INTERP: POSITIVE
COVID-19 SPIKE DOMAIN ANTIBODY RESULT: >250 U/ML — HIGH
SARS-COV-2 IGG+IGM SERPL QL IA: >250 U/ML — HIGH
SARS-COV-2 IGG+IGM SERPL QL IA: POSITIVE
SARS-COV-2 RNA SPEC QL NAA+PROBE: SIGNIFICANT CHANGE UP

## 2021-03-24 PROCEDURE — 99233 SBSQ HOSP IP/OBS HIGH 50: CPT

## 2021-03-24 RX ADMIN — Medication 25 MILLIGRAM(S): at 17:41

## 2021-03-24 RX ADMIN — LOSARTAN POTASSIUM 100 MILLIGRAM(S): 100 TABLET, FILM COATED ORAL at 06:10

## 2021-03-24 RX ADMIN — Medication 1 PATCH: at 12:36

## 2021-03-24 RX ADMIN — Medication 1 PATCH: at 11:31

## 2021-03-24 RX ADMIN — Medication 1 MILLIGRAM(S): at 11:31

## 2021-03-24 RX ADMIN — Medication 1 TABLET(S): at 11:31

## 2021-03-24 RX ADMIN — Medication 100 MILLIGRAM(S): at 11:31

## 2021-03-24 RX ADMIN — PANTOPRAZOLE SODIUM 40 MILLIGRAM(S): 20 TABLET, DELAYED RELEASE ORAL at 06:10

## 2021-03-24 RX ADMIN — GABAPENTIN 100 MILLIGRAM(S): 400 CAPSULE ORAL at 06:10

## 2021-03-24 RX ADMIN — GABAPENTIN 100 MILLIGRAM(S): 400 CAPSULE ORAL at 15:13

## 2021-03-24 RX ADMIN — Medication 81 MILLIGRAM(S): at 11:31

## 2021-03-24 RX ADMIN — Medication 25 MILLIGRAM(S): at 06:10

## 2021-03-24 RX ADMIN — APIXABAN 5 MILLIGRAM(S): 2.5 TABLET, FILM COATED ORAL at 17:41

## 2021-03-24 RX ADMIN — APIXABAN 5 MILLIGRAM(S): 2.5 TABLET, FILM COATED ORAL at 06:10

## 2021-03-24 NOTE — DISCHARGE NOTE PROVIDER - NSDCMRMEDTOKEN_GEN_ALL_CORE_FT
apixaban 5 mg oral tablet: 1 tab(s) orally 2 times a day  aspirin 81 mg oral tablet, chewable: 1 tab(s) orally once a day  atorvastatin 80 mg oral tablet: 1 tab(s) orally once a day (at bedtime)  folic acid 1 mg oral tablet: 1 tab(s) orally once a day  gabapentin 100 mg oral capsule: 1 cap(s) orally 3 times a day  losartan 100 mg oral tablet: 1 tab(s) orally once a day  methocarbamol 500 mg oral tablet: 1 tab(s) orally every 8 hours, As needed, Muscle Spasm  metoprolol tartrate 25 mg oral tablet: 1 tab(s) orally 2 times a day  Multiple Vitamins with Minerals oral tablet: 1 tab(s) orally once a day  nicotine 21 mg/24 hr transdermal film, extended release: 1 patch transdermal once a day  pantoprazole 40 mg oral delayed release tablet: 1 tab(s) orally once a day (before a meal)  senna oral tablet: 2 tab(s) orally once a day (at bedtime), As needed, Constipation  thiamine 100 mg oral tablet: 1 tab(s) orally once a day

## 2021-03-24 NOTE — DISCHARGE NOTE PROVIDER - HOSPITAL COURSE
HPI:   A 66 y/o male with PMHx of PE on Eliquis, CHF, hx of  CVA pontine infract 2months ago , HTN, HLD c/o imbalance x 1 to 2 months. Pt reports  had recent stroke about  2 months ago, ever since he has been having trouble with balance.  Pt reports able to stand and ambulate with a walker.  Pt recently  discharged  from hospital 2/7-16 to  [facility], left a week ago because he wanted to go home, now wants to go back. Pt reports lives alone and can not do much. Pt denies any pain in lower extremity. Pt denies any new weaknesses in upper or lower extremities. Pt denies fever , chills, chest pain, shortness of breath, burning with urination, diarrhea.       Hospital Course:   A 66 y/o male with PMHx of PE on Eliquis, CHF, hx of  CVA pontine infract 2months ago , HTN, HLD,  recently  discharged  from hospital 2/7-16 to  [facility], left a week ago to go home , presents to ed with inability to ambulate for 1-2 months.     #Severe ambulatory dysfunction due to recent CVA  - left [facility] a week ago wants to go back   - seen by Physiatry- plan for STR     #Hx pontine Left CVA   -  c/w ASA 81/Lipitor 80,  -  fall precautions    #Dysphagia s/p CVA   - Dysphagia 3 with thin liquids    #Hx EtOH Dependence  - not in withdrawal. thiamine, folic acid, MV    #chronic HFpEF  -EF >55%, mild LVH 6/20. not on diuretics    #HX PE   - c/w Eliquis, however if patient has recurrent falls will need to reconsider risk / benefit.    # HTN  - c/w metoprolol,  Losartan , monitor BP     # Nicotine dependency   - nicotine patch   - smoking cessation education     Physical Exam:   GENERAL: NAD, lying in bed comfortably  HEAD:  Atraumatic, Normocephalic, no facial asymmetry   EYES: EOMI, PERRLA, conjunctiva and sclera clear  ENT: Moist mucous membranes  NECK: Supple, No JVD  CHEST/LUNG: Clear to auscultation bilaterally; No rales, rhonchi, wheezing, or rubs. Unlabored respirations  HEART: Regular rate and rhythm; No murmurs, rubs, or gallops  ABDOMEN:  Soft, Nontender, Nondistended.  EXTREMITIES:  2+ Peripheral Pulses No clubbing, cyanosis, or edema  NERVOUS SYSTEM:  Alert & Oriented X3, speech clear. No deficits   SKIN: No rashes or lesions        Plan:   d/c to [facility]  needs ENT follow up as an outpatient

## 2021-03-24 NOTE — DISCHARGE NOTE PROVIDER - NSDCCPCAREPLAN_GEN_ALL_CORE_FT
PRINCIPAL DISCHARGE DIAGNOSIS  Diagnosis: Imbalance  Assessment and Plan of Treatment: - seen by Physiatry, plan for STR      SECONDARY DISCHARGE DIAGNOSES  Diagnosis: Ear bleeding, left  Assessment and Plan of Treatment: - please follow up with ENT as an outpatient

## 2021-03-24 NOTE — DISCHARGE NOTE PROVIDER - CARE PROVIDER_API CALL
cbc, bmp, type pending
Marcial Mahoney)  Otolaryngology  [address] [place] [address], [address] [place] [address]  Phone: [phone]  Fax: [phone]  Follow Up Time: 1 week

## 2021-03-24 NOTE — DISCHARGE NOTE NURSING/CASE MANAGEMENT/SOCIAL WORK - PATIENT PORTAL LINK FT
You can access the FollowMyHealth Patient Portal offered by [facility] by registering at the following website: [email or web address]. By joining [employer]’s FollowMyHealth portal, you will also be able to view your health information using other applications (apps) compatible with our system.

## 2021-03-24 NOTE — DISCHARGE NOTE PROVIDER - NSDCFUADDAPPT_GEN_ALL_CORE_FT
ENT will call you to schedule follow up appt within a week, if you don't receive a call, then please call Dr. Mahoney for follow up as listed

## 2021-03-30 DIAGNOSIS — I69.398 OTHER SEQUELAE OF CEREBRAL INFARCTION: ICD-10-CM

## 2021-03-30 DIAGNOSIS — F17.210 NICOTINE DEPENDENCE, CIGARETTES, UNCOMPLICATED: ICD-10-CM

## 2021-03-30 DIAGNOSIS — F10.20 ALCOHOL DEPENDENCE, UNCOMPLICATED: ICD-10-CM

## 2021-03-30 DIAGNOSIS — I69.391 DYSPHAGIA FOLLOWING CEREBRAL INFARCTION: ICD-10-CM

## 2021-03-30 DIAGNOSIS — I11.0 HYPERTENSIVE HEART DISEASE WITH HEART FAILURE: ICD-10-CM

## 2021-03-30 DIAGNOSIS — M54.9 DORSALGIA, UNSPECIFIED: ICD-10-CM

## 2021-03-30 DIAGNOSIS — I50.32 CHRONIC DIASTOLIC (CONGESTIVE) HEART FAILURE: ICD-10-CM

## 2021-03-30 DIAGNOSIS — E78.5 HYPERLIPIDEMIA, UNSPECIFIED: ICD-10-CM

## 2021-03-30 DIAGNOSIS — R26.89 OTHER ABNORMALITIES OF GAIT AND MOBILITY: ICD-10-CM

## 2021-03-30 DIAGNOSIS — F17.200 NICOTINE DEPENDENCE, UNSPECIFIED, UNCOMPLICATED: ICD-10-CM

## 2021-03-30 DIAGNOSIS — G89.29 OTHER CHRONIC PAIN: ICD-10-CM

## 2021-03-30 DIAGNOSIS — Z79.899 OTHER LONG TERM (CURRENT) DRUG THERAPY: ICD-10-CM

## 2021-03-30 DIAGNOSIS — Z86.711 PERSONAL HISTORY OF PULMONARY EMBOLISM: ICD-10-CM

## 2021-03-31 ENCOUNTER — APPOINTMENT (OUTPATIENT)
Dept: OTOLARYNGOLOGY | Facility: CLINIC | Age: 66
End: 2021-03-31

## 2021-03-31 PROBLEM — Z00.00 ENCOUNTER FOR PREVENTIVE HEALTH EXAMINATION: Status: ACTIVE | Noted: 2021-03-31

## 2021-04-30 ENCOUNTER — INPATIENT (INPATIENT)
Facility: HOSPITAL | Age: 66
LOS: 3 days | Discharge: NURSING, REHAB OR LONG-TERM CARE | End: 2021-05-04
Attending: HOSPITALIST | Admitting: HOSPITALIST
Payer: MEDICARE

## 2021-04-30 VITALS
DIASTOLIC BLOOD PRESSURE: 79 MMHG | WEIGHT: 199.96 LBS | RESPIRATION RATE: 18 BRPM | HEIGHT: 69 IN | OXYGEN SATURATION: 97 % | HEART RATE: 90 BPM | SYSTOLIC BLOOD PRESSURE: 176 MMHG | TEMPERATURE: 97 F

## 2021-04-30 DIAGNOSIS — R29.898 OTHER SYMPTOMS AND SIGNS INVOLVING THE MUSCULOSKELETAL SYSTEM: ICD-10-CM

## 2021-04-30 DIAGNOSIS — E78.5 HYPERLIPIDEMIA, UNSPECIFIED: ICD-10-CM

## 2021-04-30 DIAGNOSIS — Z90.49 ACQUIRED ABSENCE OF OTHER SPECIFIED PARTS OF DIGESTIVE TRACT: Chronic | ICD-10-CM

## 2021-04-30 DIAGNOSIS — I10 ESSENTIAL (PRIMARY) HYPERTENSION: ICD-10-CM

## 2021-04-30 DIAGNOSIS — I26.99 OTHER PULMONARY EMBOLISM WITHOUT ACUTE COR PULMONALE: ICD-10-CM

## 2021-04-30 DIAGNOSIS — F10.20 ALCOHOL DEPENDENCE, UNCOMPLICATED: ICD-10-CM

## 2021-04-30 DIAGNOSIS — R26.2 DIFFICULTY IN WALKING, NOT ELSEWHERE CLASSIFIED: ICD-10-CM

## 2021-04-30 LAB
ALBUMIN SERPL ELPH-MCNC: 4.4 G/DL — SIGNIFICANT CHANGE UP (ref 3.5–5.2)
ALP SERPL-CCNC: 100 U/L — SIGNIFICANT CHANGE UP (ref 30–115)
ALT FLD-CCNC: 30 U/L — SIGNIFICANT CHANGE UP (ref 0–41)
ANION GAP SERPL CALC-SCNC: 16 MMOL/L — HIGH (ref 7–14)
APPEARANCE UR: CLEAR — SIGNIFICANT CHANGE UP
AST SERPL-CCNC: 30 U/L — SIGNIFICANT CHANGE UP (ref 0–41)
BACTERIA # UR AUTO: ABNORMAL
BASOPHILS # BLD AUTO: 0.04 K/UL — SIGNIFICANT CHANGE UP (ref 0–0.2)
BASOPHILS NFR BLD AUTO: 0.4 % — SIGNIFICANT CHANGE UP (ref 0–1)
BILIRUB SERPL-MCNC: 1 MG/DL — SIGNIFICANT CHANGE UP (ref 0.2–1.2)
BILIRUB UR-MCNC: ABNORMAL
BUN SERPL-MCNC: 14 MG/DL — SIGNIFICANT CHANGE UP (ref 10–20)
CALCIUM SERPL-MCNC: 9.5 MG/DL — SIGNIFICANT CHANGE UP (ref 8.5–10.1)
CHLORIDE SERPL-SCNC: 99 MMOL/L — SIGNIFICANT CHANGE UP (ref 98–110)
CO2 SERPL-SCNC: 25 MMOL/L — SIGNIFICANT CHANGE UP (ref 17–32)
COLOR SPEC: YELLOW — SIGNIFICANT CHANGE UP
CREAT SERPL-MCNC: 0.9 MG/DL — SIGNIFICANT CHANGE UP (ref 0.7–1.5)
DIFF PNL FLD: NEGATIVE — SIGNIFICANT CHANGE UP
EOSINOPHIL # BLD AUTO: 0.04 K/UL — SIGNIFICANT CHANGE UP (ref 0–0.7)
EOSINOPHIL NFR BLD AUTO: 0.4 % — SIGNIFICANT CHANGE UP (ref 0–8)
EPI CELLS # UR: ABNORMAL /HPF
ETHANOL SERPL-MCNC: <10 MG/DL — SIGNIFICANT CHANGE UP
GLUCOSE SERPL-MCNC: 109 MG/DL — HIGH (ref 70–99)
GLUCOSE UR QL: NEGATIVE MG/DL — SIGNIFICANT CHANGE UP
GRAN CASTS # UR COMP ASSIST: ABNORMAL /LPF
HCT VFR BLD CALC: 35.8 % — LOW (ref 42–52)
HGB BLD-MCNC: 11.8 G/DL — LOW (ref 14–18)
IMM GRANULOCYTES NFR BLD AUTO: 0.5 % — HIGH (ref 0.1–0.3)
KETONES UR-MCNC: 160
LEUKOCYTE ESTERASE UR-ACNC: NEGATIVE — SIGNIFICANT CHANGE UP
LYMPHOCYTES # BLD AUTO: 1.39 K/UL — SIGNIFICANT CHANGE UP (ref 1.2–3.4)
LYMPHOCYTES # BLD AUTO: 15 % — LOW (ref 20.5–51.1)
MAGNESIUM SERPL-MCNC: 1.9 MG/DL — SIGNIFICANT CHANGE UP (ref 1.8–2.4)
MCHC RBC-ENTMCNC: 28.5 PG — SIGNIFICANT CHANGE UP (ref 27–31)
MCHC RBC-ENTMCNC: 33 G/DL — SIGNIFICANT CHANGE UP (ref 32–37)
MCV RBC AUTO: 86.5 FL — SIGNIFICANT CHANGE UP (ref 80–94)
MONOCYTES # BLD AUTO: 0.52 K/UL — SIGNIFICANT CHANGE UP (ref 0.1–0.6)
MONOCYTES NFR BLD AUTO: 5.6 % — SIGNIFICANT CHANGE UP (ref 1.7–9.3)
NEUTROPHILS # BLD AUTO: 7.2 K/UL — HIGH (ref 1.4–6.5)
NEUTROPHILS NFR BLD AUTO: 78.1 % — HIGH (ref 42.2–75.2)
NITRITE UR-MCNC: NEGATIVE — SIGNIFICANT CHANGE UP
NRBC # BLD: 0 /100 WBCS — SIGNIFICANT CHANGE UP (ref 0–0)
PH UR: 7 — SIGNIFICANT CHANGE UP (ref 5–8)
PHOSPHATE SERPL-MCNC: 2.7 MG/DL — SIGNIFICANT CHANGE UP (ref 2.1–4.9)
PLATELET # BLD AUTO: 229 K/UL — SIGNIFICANT CHANGE UP (ref 130–400)
POTASSIUM SERPL-MCNC: 3.9 MMOL/L — SIGNIFICANT CHANGE UP (ref 3.5–5)
POTASSIUM SERPL-SCNC: 3.9 MMOL/L — SIGNIFICANT CHANGE UP (ref 3.5–5)
PROT SERPL-MCNC: 7.6 G/DL — SIGNIFICANT CHANGE UP (ref 6–8)
PROT UR-MCNC: 100 MG/DL
RAPID RVP RESULT: SIGNIFICANT CHANGE UP
RBC # BLD: 4.14 M/UL — LOW (ref 4.7–6.1)
RBC # FLD: 13.1 % — SIGNIFICANT CHANGE UP (ref 11.5–14.5)
SARS-COV-2 RNA SPEC QL NAA+PROBE: SIGNIFICANT CHANGE UP
SODIUM SERPL-SCNC: 140 MMOL/L — SIGNIFICANT CHANGE UP (ref 135–146)
SP GR SPEC: 1.02 — SIGNIFICANT CHANGE UP (ref 1.01–1.03)
UROBILINOGEN FLD QL: 2 MG/DL (ref 0.2–0.2)
WBC # BLD: 9.24 K/UL — SIGNIFICANT CHANGE UP (ref 4.8–10.8)
WBC # FLD AUTO: 9.24 K/UL — SIGNIFICANT CHANGE UP (ref 4.8–10.8)
WBC UR QL: SIGNIFICANT CHANGE UP /HPF

## 2021-04-30 PROCEDURE — 99223 1ST HOSP IP/OBS HIGH 75: CPT | Mod: 25

## 2021-04-30 PROCEDURE — 99285 EMERGENCY DEPT VISIT HI MDM: CPT

## 2021-04-30 PROCEDURE — 99406 BEHAV CHNG SMOKING 3-10 MIN: CPT

## 2021-04-30 RX ORDER — THIAMINE MONONITRATE (VIT B1) 100 MG
100 TABLET ORAL DAILY
Refills: 0 | Status: DISCONTINUED | OUTPATIENT
Start: 2021-04-30 | End: 2021-05-04

## 2021-04-30 RX ORDER — MULTIVIT-MIN/FERROUS GLUCONATE 9 MG/15 ML
1 LIQUID (ML) ORAL DAILY
Refills: 0 | Status: DISCONTINUED | OUTPATIENT
Start: 2021-04-30 | End: 2021-05-04

## 2021-04-30 RX ORDER — GABAPENTIN 400 MG/1
100 CAPSULE ORAL THREE TIMES A DAY
Refills: 0 | Status: DISCONTINUED | OUTPATIENT
Start: 2021-04-30 | End: 2021-05-04

## 2021-04-30 RX ORDER — APIXABAN 2.5 MG/1
5 TABLET, FILM COATED ORAL EVERY 12 HOURS
Refills: 0 | Status: DISCONTINUED | OUTPATIENT
Start: 2021-04-30 | End: 2021-05-04

## 2021-04-30 RX ORDER — ASPIRIN/CALCIUM CARB/MAGNESIUM 324 MG
81 TABLET ORAL DAILY
Refills: 0 | Status: DISCONTINUED | OUTPATIENT
Start: 2021-04-30 | End: 2021-05-04

## 2021-04-30 RX ORDER — ACETAMINOPHEN 500 MG
650 TABLET ORAL ONCE
Refills: 0 | Status: COMPLETED | OUTPATIENT
Start: 2021-04-30 | End: 2021-04-30

## 2021-04-30 RX ORDER — SODIUM CHLORIDE 9 MG/ML
1000 INJECTION, SOLUTION INTRAVENOUS ONCE
Refills: 0 | Status: COMPLETED | OUTPATIENT
Start: 2021-04-30 | End: 2021-04-30

## 2021-04-30 RX ORDER — THIAMINE MONONITRATE (VIT B1) 100 MG
100 TABLET ORAL ONCE
Refills: 0 | Status: COMPLETED | OUTPATIENT
Start: 2021-04-30 | End: 2021-04-30

## 2021-04-30 RX ORDER — FOLIC ACID 0.8 MG
1 TABLET ORAL DAILY
Refills: 0 | Status: DISCONTINUED | OUTPATIENT
Start: 2021-04-30 | End: 2021-05-04

## 2021-04-30 RX ORDER — NICOTINE POLACRILEX 2 MG
1 GUM BUCCAL DAILY
Refills: 0 | Status: DISCONTINUED | OUTPATIENT
Start: 2021-04-30 | End: 2021-05-04

## 2021-04-30 RX ORDER — METHOCARBAMOL 500 MG/1
500 TABLET, FILM COATED ORAL EVERY 8 HOURS
Refills: 0 | Status: DISCONTINUED | OUTPATIENT
Start: 2021-04-30 | End: 2021-05-04

## 2021-04-30 RX ORDER — LOSARTAN POTASSIUM 100 MG/1
100 TABLET, FILM COATED ORAL DAILY
Refills: 0 | Status: DISCONTINUED | OUTPATIENT
Start: 2021-04-30 | End: 2021-05-04

## 2021-04-30 RX ORDER — CHLORHEXIDINE GLUCONATE 213 G/1000ML
1 SOLUTION TOPICAL
Refills: 0 | Status: DISCONTINUED | OUTPATIENT
Start: 2021-04-30 | End: 2021-05-04

## 2021-04-30 RX ORDER — METOPROLOL TARTRATE 50 MG
25 TABLET ORAL
Refills: 0 | Status: DISCONTINUED | OUTPATIENT
Start: 2021-04-30 | End: 2021-05-04

## 2021-04-30 RX ADMIN — APIXABAN 5 MILLIGRAM(S): 2.5 TABLET, FILM COATED ORAL at 17:29

## 2021-04-30 RX ADMIN — SODIUM CHLORIDE 1000 MILLILITER(S): 9 INJECTION, SOLUTION INTRAVENOUS at 10:34

## 2021-04-30 RX ADMIN — Medication 100 MILLIGRAM(S): at 10:36

## 2021-04-30 RX ADMIN — Medication 25 MILLIGRAM(S): at 17:29

## 2021-04-30 RX ADMIN — LOSARTAN POTASSIUM 100 MILLIGRAM(S): 100 TABLET, FILM COATED ORAL at 17:29

## 2021-04-30 RX ADMIN — Medication 1 TABLET(S): at 17:29

## 2021-04-30 RX ADMIN — Medication 1 MILLIGRAM(S): at 17:29

## 2021-04-30 RX ADMIN — Medication 650 MILLIGRAM(S): at 14:00

## 2021-04-30 RX ADMIN — GABAPENTIN 100 MILLIGRAM(S): 400 CAPSULE ORAL at 21:07

## 2021-04-30 RX ADMIN — Medication 1 PATCH: at 19:41

## 2021-04-30 RX ADMIN — SODIUM CHLORIDE 1000 MILLILITER(S): 9 INJECTION, SOLUTION INTRAVENOUS at 13:26

## 2021-04-30 RX ADMIN — Medication 81 MILLIGRAM(S): at 17:28

## 2021-04-30 NOTE — H&P ADULT - HISTORY OF PRESENT ILLNESS
65 y/o male last admitted to hospital on 3/22/2021 due to inability to ambulate and discharged  on 4/22/2021. He was sent to [facility] for STR which he completed. Patient stated the Rehab was not helpful. He was d/c to home and uses a  walker but is still unable to ambulate due to leg weakness.   - he is now readmitted with the same complaint.                            63 y/o male last admitted to hospital on 3/22/2021 due to inability to ambulate and discharged  on 4/22/2021. He was sent to [facility] for STR which he completed. Patient stated the Rehab was not helpful. He was d/c to home and uses a  walker but is still unable to ambulate due to leg weakness.   - he is now readmitted with the same complaint.    -patient states he is not always compliant with his medications.                            65 y/o male last admitted to hospital on 3/22/2021 due to inability to ambulate and discharged  on 3/22/2021. He was sent to [facility] for STR which he completed. Patient stated the Rehab was not helpful. He was d/c to home and uses a  walker but is still unable to ambulate due to leg weakness.   - he is now readmitted with the same complaint.    -patient states he is not always compliant with his medications.

## 2021-04-30 NOTE — ED PROVIDER NOTE - PHYSICAL EXAMINATION
PHYSICAL EXAM: I have reviewed current vital signs.  GENERAL: NAD, unkempt.  HEAD:  Normocephalic, atraumatic.  EYES: Conjunctiva and sclera clear.  ENT: MMM.  NECK: Supple, FROM.  CHEST/LUNG: Clear to auscultation bilaterally; no wheezes, rales, or rhonchi.  HEART: Regular rate and rhythm, normal S1 and S2; no murmurs, rubs, or gallops.  ABDOMEN: Soft, nontender, nondistended.  EXTREMITIES:  2+ peripheral pulses; FROM.  NEUROLOGY: A&O x 3. Motor 5/5. Sensory intact. No focal neurological deficits. CN II - XII grossly intact. No dysmetria, facial droop, or pronator drift.  SKIN: Warm and dry.

## 2021-04-30 NOTE — ED ADULT NURSE NOTE - NSIMPLEMENTINTERV_GEN_ALL_ED
Implemented All Fall with Harm Risk Interventions:  [place] to call system. Call bell, personal items and telephone within reach. Instruct patient to call for assistance. Room bathroom lighting operational. Non-slip footwear when patient is off stretcher. Physically safe environment: no spills, clutter or unnecessary equipment. Stretcher in lowest position, wheels locked, appropriate side rails in place. Provide visual cue, wrist band, yellow gown, etc. Monitor gait and stability. Monitor for mental status changes and reorient to person, place, and time. Review medications for side effects contributing to fall risk. Reinforce activity limits and safety measures with patient and family. Provide visual clues: red socks.

## 2021-04-30 NOTE — H&P ADULT - NSHPLABSRESULTS_GEN_ALL_CORE
11.8   9.24  )-----------( 229      ( 2021 10:08 )             35.8       04-30    140  |  99  |  14  ----------------------------<  109<H>  3.9   |  25  |  0.9    Ca    9.5      2021 10:08  Phos  2.7     04-30  Mg     1.9         TPro  7.6  /  Alb  4.4  /  TBili  1.0  /  DBili  x   /  AST  30  /  ALT  30  /  AlkPhos  100  04-30        Urinalysis Basic - ( 2021 13:09 )    Color: Yellow / Appearance: Clear / S.025 / pH: x  Gluc: x / Ketone: 160  / Bili: Small / Urobili: 2.0 mg/dL   Blood: x / Protein: 100 mg/dL / Nitrite: Negative   Leuk Esterase: Negative / RBC: x / WBC 1-2 /HPF   Sq Epi: x / Non Sq Epi: Occasional /HPF / Bacteria: Few                          CAPILLARY BLOOD GLUCOSE

## 2021-04-30 NOTE — ED PROVIDER NOTE - OBJECTIVE STATEMENT
64yo M 66yo M with PMH of PE on Eliquis, CHF, hx of CVA pontine infarct about 2 months ago, HTN, HLD, and LE weakness/imbalance presenting to ED 2/2 inability to ambulate. Patient lives alone, has minimal help at home, has been in and out of the hospital several times 2/2 similar sxs. Unable to ambulate, states his legs give out from under him. Denies any significant injuries/traumas/falls, f/c, CP, SOB, cough/hemoptysis, abd pain, neck/back pain, paresthesias, focal weakness, or n/v/d. +Smoker. Last drink was about 2 days ago. Typically daily drinker, "couple of drinks" daily, denies any other drug use. Patient denies hx of alcohol withdrawal szs.

## 2021-04-30 NOTE — H&P ADULT - NSHPPHYSICALEXAM_GEN_ALL_CORE
Vital Signs Last 24 Hrs    T(F): 97.1 (04-30-21 @ 09:41), Max: 97.1 (04-30-21 @ 09:41)  HR: 79 (04-30-21 @ 13:57) (79 - 90)  BP: 168/81 (04-30-21 @ 13:57)  RR: 18 (04-30-21 @ 13:57) (18 - 18)  SpO2: 96% (04-30-21 @ 13:57) (96% - 97%)    PHYSICAL EXAM:      Constitutional: NAD, A&O x3, sitting on stretcher    Eyes: PERRLA    Respiratory: +air entry, no rales, no rhonchi, no wheezes    Cardiovascular: +S1 and S2, regular rate and rhythm    Gastrointestinal: +BS, soft, non-tender, not distended    Extremities:  no edema, no calf tenderness    Vascular: +dorsal pedis and radial pulses, no extremity cyanosis    Neurological: sensation intact, ROM equal B/L, CN II-XII intact    Skin: no rashes, normal turgor    ** Strenght equal bilaterally: upper/lower Vital Signs Last 24 Hrs    T(F): 97.1 (04-30-21 @ 09:41), Max: 97.1 (04-30-21 @ 09:41)  HR: 79 (04-30-21 @ 13:57) (79 - 90)  BP: 168/81 (04-30-21 @ 13:57)  RR: 18 (04-30-21 @ 13:57) (18 - 18)  SpO2: 96% (04-30-21 @ 13:57) (96% - 97%)    PHYSICAL EXAM:      Constitutional: NAD, A&O x3, sitting on stretcher    Eyes: PERRLA    Respiratory: +air entry, no rales, no rhonchi, no wheezes    Cardiovascular: +S1 and S2, regular rate and rhythm    Gastrointestinal: +BS, soft, non-tender, not distended    Extremities:  no edema, no calf tenderness    Vascular: +dorsal pedis and radial pulses, no extremity cyanosis    Neurological: sensation intact, ROM equal B/L, CN II-XII intact    Skin: ** Scattered echemosis ove the arms    ** Strength equal bilaterally: upper/lower

## 2021-04-30 NOTE — H&P ADULT - PROBLEM SELECTOR PLAN 6
Alcohol does not seem to be an issue at this time but should monitor for any signs of withdrawals. Intake not excessive at this time Alcohol does not seem to be an issue at this time but should monitor for any signs of withdrawals. Intake not excessive. Alcohol does not seem to be an issue at this time but should monitor for any signs of withdrawals. On discussion with MD: will add Ativan 1 mg PO Q6h/prn for any signs of withdrawals. Watch for any confusion

## 2021-04-30 NOTE — H&P ADULT - PROBLEM SELECTOR PLAN 1
Physical therapy consult, social work evaluation for Rehab again. OB to chair with assistence Physical therapy consult, social work and  evaluation for Rehab again. OB to chair with assistance

## 2021-04-30 NOTE — ED PROVIDER NOTE - NS ED ROS FT
Constitutional:  No fevers or chills.  Eyes:  No visual changes.  ENT:  No sore throat.  Neck:  No neck pain or stiffness.  Cardiac:  No CP or edema.  Resp:  No cough or SOB.  GI:  No nausea, vomiting, diarrhea, or abdominal pain.  :  No dysuria, frequency, or hematuria.  MSK:  +LE weakness.  Neuro:  No headache/dizziness.  Skin:  No skin rash.

## 2021-04-30 NOTE — ED PROVIDER NOTE - CLINICAL SUMMARY MEDICAL DECISION MAKING FREE TEXT BOX
65y male above PMH well known to me presents c/o inability to ambulate x 3 days, denies falls but has not been able to obtain food/water, in fact pt has been having ambulatory issues for years, worse since CVA 5 months ago, was admitted to [facility] but discharged home, PE sa above, is very unsteady on feet, pt is agreeable to rehab/placement, will admit

## 2021-04-30 NOTE — H&P ADULT - NSHPSOCIALHISTORY_GEN_ALL_CORE
Tobacco use: Yes X 18 yrs, 2 PPD  EtOH use: Yes, drinks 2 days/week: last drink 3 days ago, AVG intake: 2-3 drinks, No tremors or withdrawal seizures  Illicit drug use: No  Marital Status: Single

## 2021-04-30 NOTE — ED PROVIDER NOTE - CARE PLAN
Principal Discharge DX:	Inability to walk  Secondary Diagnosis:	Lower extremity weakness  Secondary Diagnosis:	History of alcoholism

## 2021-04-30 NOTE — ED PROVIDER NOTE - ATTENDING CONTRIBUTION TO CARE
65y male above PMH well known to me presents c/o inability to ambulate x 3 days, denies falls but has not been able to obtain food/water, in fact pt has been having ambulatory issues for years, worse since CVA 5 months ago, was admitted to [facility] but discharged home, PE sa above, is very unsteady on feet, pt is agreeable to rehab/placement, will admit

## 2021-04-30 NOTE — ED PROVIDER NOTE - PROGRESS NOTE DETAILS
Kelly- Patient agrees with plan for admission. Will need rehab/strengthening/placement 2/2 living alone. VS stable, w/u unremarkable for acute findings.

## 2021-04-30 NOTE — H&P ADULT - ATTENDING COMMENTS
Patient seen and examined independently of PA and agree with the H/P unless otherwise stated     # Inability to ambulate/Debility   -rehab/pt evaluation --- may need STR       # Chronic Pulmonary Embolism   -on long term anticoagulant    # Hypertension   -home meds     # Hyperlipidemia   -on hold, check CK levels     # Alcohol Dependence  -monitor for withdrawals  -prn protocol     # Tobacco Addiction/Dependence  -smoking cessation and counseling done   -Nicotine replacement therapy     DVT and GI ppx     Attending Physician Dr. Leila Trujillo # [id number]     Spent over 70 mins today's plan of care   Discussed with housestaff Patient seen and examined independently of PA and agree with the H/P unless otherwise stated     # Inability to ambulate/Debility   -rehab/pt evaluation --- may need STR       # Chronic Pulmonary Embolism   -on long term anticoagulant    # Hypertension   -home meds     # Hyperlipidemia   -on hold, check CK levels     # Alcohol Dependence  -monitor for withdrawals  -prn protocol     # Tobacco Addiction/Dependence  -smoking cessation and counseling done   -Nicotine replacement therapy     # Overweight   -BMI > 29  -weight loss and diet modification     DVT and GI ppx     Attending Physician Dr. Leila Trujillo # [id number]     Spent over 70 mins today's plan of care   Discussed with housestaff

## 2021-04-30 NOTE — H&P ADULT - ASSESSMENT
66 y/o male admitted again with inability to ambulate due to leg pain/weakness.

## 2021-05-01 PROCEDURE — 99233 SBSQ HOSP IP/OBS HIGH 50: CPT

## 2021-05-01 RX ORDER — IBUPROFEN 200 MG
400 TABLET ORAL EVERY 12 HOURS
Refills: 0 | Status: DISCONTINUED | OUTPATIENT
Start: 2021-05-01 | End: 2021-05-04

## 2021-05-01 RX ADMIN — GABAPENTIN 100 MILLIGRAM(S): 400 CAPSULE ORAL at 13:41

## 2021-05-01 RX ADMIN — APIXABAN 5 MILLIGRAM(S): 2.5 TABLET, FILM COATED ORAL at 17:23

## 2021-05-01 RX ADMIN — Medication 25 MILLIGRAM(S): at 17:23

## 2021-05-01 RX ADMIN — LOSARTAN POTASSIUM 100 MILLIGRAM(S): 100 TABLET, FILM COATED ORAL at 05:45

## 2021-05-01 RX ADMIN — Medication 1 PATCH: at 07:01

## 2021-05-01 RX ADMIN — Medication 1 TABLET(S): at 11:24

## 2021-05-01 RX ADMIN — APIXABAN 5 MILLIGRAM(S): 2.5 TABLET, FILM COATED ORAL at 05:45

## 2021-05-01 RX ADMIN — Medication 81 MILLIGRAM(S): at 11:24

## 2021-05-01 RX ADMIN — Medication 100 MILLIGRAM(S): at 11:24

## 2021-05-01 RX ADMIN — Medication 400 MILLIGRAM(S): at 19:35

## 2021-05-01 RX ADMIN — METHOCARBAMOL 500 MILLIGRAM(S): 500 TABLET, FILM COATED ORAL at 16:50

## 2021-05-01 RX ADMIN — Medication 25 MILLIGRAM(S): at 05:45

## 2021-05-01 RX ADMIN — Medication 1 MILLIGRAM(S): at 11:24

## 2021-05-01 RX ADMIN — Medication 1 PATCH: at 11:24

## 2021-05-01 RX ADMIN — Medication 1 PATCH: at 19:36

## 2021-05-01 RX ADMIN — GABAPENTIN 100 MILLIGRAM(S): 400 CAPSULE ORAL at 21:08

## 2021-05-01 RX ADMIN — Medication 400 MILLIGRAM(S): at 18:56

## 2021-05-01 RX ADMIN — GABAPENTIN 100 MILLIGRAM(S): 400 CAPSULE ORAL at 05:45

## 2021-05-01 NOTE — PROGRESS NOTE ADULT - ASSESSMENT
63 y/o male last admitted to hospital on 3/22/2021 due to inability to ambulate and discharged  on 3/22/2021. He was sent to [facility] for STR which he completed. Patient stated the Rehab was not helpful. He was d/c to home and uses a  walker but is still unable to ambulate due to leg weakness.      # Inability to ambulate/Debility   -rehab/pt evaluation --- may need STR       # Chronic Pulmonary Embolism   -on long term anticoagulant    # Hypertension   -home meds     # Hyperlipidemia   -on hold, check CK levels     # Alcohol Dependence  -monitor for withdrawals  -prn protocol     # Tobacco Addiction/Dependence  -smoking cessation and counseling done   -Nicotine replacement therapy     # Overweight   -BMI > 29  -weight loss and diet modification     DVT and GI ppx     Attending Physician Dr. Leila Trujillo # [id number]     Spent over 35 mins today's plan of care   Discussed with housestaff.

## 2021-05-01 NOTE — PHYSICAL THERAPY INITIAL EVALUATION ADULT - ADDITIONAL COMMENTS
Pt reports he lives in house with 6 steps outside, rail on L to ascend, no steps in house.   Pt reports his wife just recently passed away.  Pt uses RW for amb and unsteady gait.

## 2021-05-01 NOTE — PHYSICAL THERAPY INITIAL EVALUATION ADULT - GENERAL OBSERVATIONS, REHAB EVAL
8:40-9:00 Chart reviewed. Patient available to be seen for physical therapy, denies pain, confirmed with RN.  Pt rec'd in bed in NAD.

## 2021-05-02 LAB
COVID-19 SPIKE DOMAIN AB INTERP: POSITIVE
COVID-19 SPIKE DOMAIN ANTIBODY RESULT: >250 U/ML — HIGH
CULTURE RESULTS: SIGNIFICANT CHANGE UP
SARS-COV-2 IGG+IGM SERPL QL IA: >250 U/ML — HIGH
SARS-COV-2 IGG+IGM SERPL QL IA: POSITIVE
SPECIMEN SOURCE: SIGNIFICANT CHANGE UP

## 2021-05-02 PROCEDURE — 99232 SBSQ HOSP IP/OBS MODERATE 35: CPT

## 2021-05-02 RX ADMIN — LOSARTAN POTASSIUM 100 MILLIGRAM(S): 100 TABLET, FILM COATED ORAL at 05:25

## 2021-05-02 RX ADMIN — GABAPENTIN 100 MILLIGRAM(S): 400 CAPSULE ORAL at 13:52

## 2021-05-02 RX ADMIN — Medication 1 PATCH: at 07:59

## 2021-05-02 RX ADMIN — GABAPENTIN 100 MILLIGRAM(S): 400 CAPSULE ORAL at 21:37

## 2021-05-02 RX ADMIN — APIXABAN 5 MILLIGRAM(S): 2.5 TABLET, FILM COATED ORAL at 05:25

## 2021-05-02 RX ADMIN — Medication 400 MILLIGRAM(S): at 18:39

## 2021-05-02 RX ADMIN — Medication 1 MILLIGRAM(S): at 11:13

## 2021-05-02 RX ADMIN — Medication 1 TABLET(S): at 11:13

## 2021-05-02 RX ADMIN — Medication 1 PATCH: at 11:14

## 2021-05-02 RX ADMIN — GABAPENTIN 100 MILLIGRAM(S): 400 CAPSULE ORAL at 05:25

## 2021-05-02 RX ADMIN — Medication 25 MILLIGRAM(S): at 05:25

## 2021-05-02 RX ADMIN — METHOCARBAMOL 500 MILLIGRAM(S): 500 TABLET, FILM COATED ORAL at 11:20

## 2021-05-02 RX ADMIN — APIXABAN 5 MILLIGRAM(S): 2.5 TABLET, FILM COATED ORAL at 17:49

## 2021-05-02 RX ADMIN — Medication 100 MILLIGRAM(S): at 11:14

## 2021-05-02 RX ADMIN — Medication 400 MILLIGRAM(S): at 17:48

## 2021-05-02 RX ADMIN — Medication 81 MILLIGRAM(S): at 11:13

## 2021-05-02 RX ADMIN — Medication 1 PATCH: at 19:48

## 2021-05-02 RX ADMIN — Medication 25 MILLIGRAM(S): at 17:49

## 2021-05-02 NOTE — CHART NOTE - NSCHARTNOTEFT_GEN_A_CORE
MEDICINE  LETICIA Mayes   Spectra: [phone]      Patient seen at bedside resting comfortably.  Routine Covid swab was needed and attempted.  Patient stated he does not want swab to go into his nose to far. When attempt was made and explained, patient   grabbed and pushed hand away twice.  Patient was advised he will be documented that he is refusing to allow procedure to be done properly.  Dr Trujillo notified/. Repair Type: Intermediate

## 2021-05-02 NOTE — PROGRESS NOTE ADULT - ASSESSMENT
65 y/o male last admitted to hospital on 3/22/2021 due to inability to ambulate and discharged  on 3/22/2021. He was sent to [facility] for STR which he completed. Patient stated the Rehab was not helpful. He was d/c to home and uses a  walker but is still unable to ambulate due to leg weakness.      # Inability to ambulate/Debility   -rehab/pt evaluation --- may need STR       # Chronic Pulmonary Embolism   -on long term anticoagulant    # Hypertension   -home meds     # Hyperlipidemia   -on hold, check CK levels     # Alcohol Dependence  -monitor for withdrawals  -prn protocol     # Tobacco Addiction/Dependence  -smoking cessation and counseling done   -Nicotine replacement therapy     # Overweight   -BMI > 29  -weight loss and diet modification     DVT and GI ppx     Attending Physician Dr. Leila Trujillo # [id number]     Spent over 35 mins today's plan of care   Discussed with housestaff.

## 2021-05-03 ENCOUNTER — TRANSCRIPTION ENCOUNTER (OUTPATIENT)
Age: 66
End: 2021-05-03

## 2021-05-03 LAB
RAPID RVP RESULT: SIGNIFICANT CHANGE UP
SARS-COV-2 RNA SPEC QL NAA+PROBE: SIGNIFICANT CHANGE UP

## 2021-05-03 PROCEDURE — 99232 SBSQ HOSP IP/OBS MODERATE 35: CPT

## 2021-05-03 RX ORDER — BACITRACIN ZINC 500 UNIT/G
1 OINTMENT IN PACKET (EA) TOPICAL THREE TIMES A DAY
Refills: 0 | Status: DISCONTINUED | OUTPATIENT
Start: 2021-05-03 | End: 2021-05-04

## 2021-05-03 RX ADMIN — APIXABAN 5 MILLIGRAM(S): 2.5 TABLET, FILM COATED ORAL at 17:28

## 2021-05-03 RX ADMIN — Medication 81 MILLIGRAM(S): at 11:51

## 2021-05-03 RX ADMIN — Medication 25 MILLIGRAM(S): at 05:27

## 2021-05-03 RX ADMIN — Medication 1 PATCH: at 11:50

## 2021-05-03 RX ADMIN — Medication 1 MILLIGRAM(S): at 11:51

## 2021-05-03 RX ADMIN — Medication 1 PATCH: at 11:51

## 2021-05-03 RX ADMIN — Medication 1 TABLET(S): at 11:51

## 2021-05-03 RX ADMIN — Medication 100 MILLIGRAM(S): at 11:50

## 2021-05-03 RX ADMIN — GABAPENTIN 100 MILLIGRAM(S): 400 CAPSULE ORAL at 14:44

## 2021-05-03 RX ADMIN — LOSARTAN POTASSIUM 100 MILLIGRAM(S): 100 TABLET, FILM COATED ORAL at 05:27

## 2021-05-03 RX ADMIN — Medication 25 MILLIGRAM(S): at 17:28

## 2021-05-03 RX ADMIN — GABAPENTIN 100 MILLIGRAM(S): 400 CAPSULE ORAL at 05:27

## 2021-05-03 RX ADMIN — APIXABAN 5 MILLIGRAM(S): 2.5 TABLET, FILM COATED ORAL at 05:27

## 2021-05-03 RX ADMIN — METHOCARBAMOL 500 MILLIGRAM(S): 500 TABLET, FILM COATED ORAL at 17:28

## 2021-05-03 RX ADMIN — GABAPENTIN 100 MILLIGRAM(S): 400 CAPSULE ORAL at 21:06

## 2021-05-03 RX ADMIN — Medication 1 APPLICATION(S): at 22:35

## 2021-05-03 NOTE — PROGRESS NOTE ADULT - ASSESSMENT
65 y/o male last admitted to hospital on 3/22/2021 due to inability to ambulate and discharged  on 3/22/2021. He was sent to [facility] for STR which he completed. Patient stated the Rehab was not helpful. He was d/c to home and uses a  walker but is still unable to ambulate due to leg weakness.      # Inability to ambulate/Debility   -rehab/pt as tolerated   -STR dispo       # Chronic Pulmonary Embolism   -on long term anticoagulant    # Hypertension   -home meds     # Hyperlipidemia   -on hold, check CK levels     # Alcohol Dependence  -monitor for withdrawals  -prn protocol     # Tobacco Addiction/Dependence  -smoking cessation and counseling done   -Nicotine replacement therapy     # Overweight   -BMI > 29  -weight loss and diet modification     DVT and GI ppx     Attending Physician Dr. Leila Trujillo # [id number]     Spent over 35 mins today's plan of care   Discussed with housestaff.   d/c papers done by me    63 y/o male last admitted to hospital on 3/22/2021 due to inability to ambulate and discharged  on 3/22/2021. He was sent to [facility] for STR which he completed. Patient stated the Rehab was not helpful. He was d/c to home and uses a  walker but is still unable to ambulate due to leg weakness.      # Inability to ambulate/Debility   -rehab/pt as tolerated   -STR dispo       # Chronic Pulmonary Embolism   -on long term anticoagulant    # Hypertension   -home meds     # Hyperlipidemia   -on hold, check CK levels     # Alcohol Dependence  -monitor for withdrawals  -prn protocol     # Tobacco Addiction/Dependence  -smoking cessation and counseling done   -Nicotine replacement therapy     # Overweight   -BMI > 29  -weight loss and diet modification     # Thiamine and Folic acid def  -on supplements     DVT and GI ppx     Attending Physician Dr. Leila Trujillo # [phone]     Spent over 35 mins today's plan of care   Discussed with housestaff.   d/c papers done by me

## 2021-05-03 NOTE — DISCHARGE NOTE PROVIDER - NSDCMRMEDTOKEN_GEN_ALL_CORE_FT
apixaban 5 mg oral tablet: 1 tab(s) orally 2 times a day  aspirin 81 mg oral tablet, chewable: 1 tab(s) orally once a day  atorvastatin 80 mg oral tablet: 1 tab(s) orally once a day (at bedtime)  folic acid 1 mg oral tablet: 1 tab(s) orally once a day  gabapentin 100 mg oral capsule: 1 cap(s) orally 3 times a day  losartan 100 mg oral tablet: 1 tab(s) orally once a day  methocarbamol 500 mg oral tablet: 1 tab(s) orally every 8 hours, As needed, Muscle Spasm  metoprolol tartrate 25 mg oral tablet: 1 tab(s) orally 2 times a day  Multiple Vitamins with Minerals oral tablet: 1 tab(s) orally once a day  thiamine 100 mg oral tablet: 1 tab(s) orally once a day   apixaban 5 mg oral tablet: 1 tab(s) orally 2 times a day  aspirin 81 mg oral tablet, chewable: 1 tab(s) orally once a day  atorvastatin 80 mg oral tablet: 1 tab(s) orally once a day (at bedtime)  bacitracin 500 units/g topical ointment: 1 application topically 3 times a day  folic acid 1 mg oral tablet: 1 tab(s) orally once a day  gabapentin 100 mg oral capsule: 1 cap(s) orally 3 times a day  losartan 100 mg oral tablet: 1 tab(s) orally once a day  methocarbamol 500 mg oral tablet: 1 tab(s) orally every 8 hours, As needed, Muscle Spasm  metoprolol tartrate 25 mg oral tablet: 1 tab(s) orally 2 times a day  Multiple Vitamins with Minerals oral tablet: 1 tab(s) orally once a day  thiamine 100 mg oral tablet: 1 tab(s) orally once a day

## 2021-05-03 NOTE — DISCHARGE NOTE PROVIDER - NSDCCPCAREPLAN_GEN_ALL_CORE_FT
PRINCIPAL DISCHARGE DIAGNOSIS  Diagnosis: Inability to walk  Assessment and Plan of Treatment: physical therapy as tolerated

## 2021-05-03 NOTE — DISCHARGE NOTE PROVIDER - HOSPITAL COURSE
# Inability to ambulate/Debility   -rehab/pt evaluation --- may need STR       # Chronic Pulmonary Embolism   -on long term anticoagulant    # Hypertension   -home meds     # Hyperlipidemia   -on hold, check CK levels     # Alcohol Dependence  -monitor for withdrawals  -prn protocol     # Tobacco Addiction/Dependence  -smoking cessation and counseling done   -Nicotine replacement therapy     # Overweight   -BMI > 29  -weight loss and diet modification     DVT and GI ppx     Attending Physician Dr. Leila Trujillo # [id number]     Spent over 35 mins d/c planning   Discussed with housestaff.

## 2021-05-04 ENCOUNTER — TRANSCRIPTION ENCOUNTER (OUTPATIENT)
Age: 66
End: 2021-05-04

## 2021-05-04 VITALS
DIASTOLIC BLOOD PRESSURE: 88 MMHG | SYSTOLIC BLOOD PRESSURE: 129 MMHG | RESPIRATION RATE: 18 BRPM | HEART RATE: 73 BPM | TEMPERATURE: 98 F

## 2021-05-04 PROCEDURE — 99239 HOSP IP/OBS DSCHRG MGMT >30: CPT

## 2021-05-04 RX ORDER — BACITRACIN ZINC 500 UNIT/G
1 OINTMENT IN PACKET (EA) TOPICAL
Qty: 0 | Refills: 0 | DISCHARGE
Start: 2021-05-04

## 2021-05-04 RX ADMIN — Medication 100 MILLIGRAM(S): at 11:07

## 2021-05-04 RX ADMIN — Medication 81 MILLIGRAM(S): at 11:06

## 2021-05-04 RX ADMIN — Medication 25 MILLIGRAM(S): at 17:05

## 2021-05-04 RX ADMIN — Medication 1 PATCH: at 11:07

## 2021-05-04 RX ADMIN — GABAPENTIN 100 MILLIGRAM(S): 400 CAPSULE ORAL at 15:16

## 2021-05-04 RX ADMIN — Medication 1 APPLICATION(S): at 15:16

## 2021-05-04 RX ADMIN — Medication 1 APPLICATION(S): at 06:04

## 2021-05-04 RX ADMIN — Medication 25 MILLIGRAM(S): at 06:09

## 2021-05-04 RX ADMIN — APIXABAN 5 MILLIGRAM(S): 2.5 TABLET, FILM COATED ORAL at 06:09

## 2021-05-04 RX ADMIN — Medication 1 TABLET(S): at 11:06

## 2021-05-04 RX ADMIN — LOSARTAN POTASSIUM 100 MILLIGRAM(S): 100 TABLET, FILM COATED ORAL at 06:09

## 2021-05-04 RX ADMIN — Medication 1 PATCH: at 06:09

## 2021-05-04 RX ADMIN — GABAPENTIN 100 MILLIGRAM(S): 400 CAPSULE ORAL at 06:09

## 2021-05-04 RX ADMIN — Medication 1 MILLIGRAM(S): at 11:06

## 2021-05-04 RX ADMIN — Medication 1 PATCH: at 06:10

## 2021-05-04 RX ADMIN — APIXABAN 5 MILLIGRAM(S): 2.5 TABLET, FILM COATED ORAL at 17:05

## 2021-05-04 NOTE — DISCHARGE NOTE NURSING/CASE MANAGEMENT/SOCIAL WORK - PATIENT PORTAL LINK FT
You can access the FollowMyHealth Patient Portal offered by [facility] by registering at the following website: [email or web address]. By joining [employer]’s FollowMyHealth portal, you will also be able to view your health information using other applications (apps) compatible with our system.

## 2021-05-04 NOTE — PROGRESS NOTE ADULT - ASSESSMENT
63 y/o male last admitted to hospital on 3/22/2021 due to inability to ambulate and discharged  on 3/22/2021. He was sent to [facility] for STR which he completed. Patient stated the Rehab was not helpful. He was d/c to home and uses a  walker but is still unable to ambulate due to leg weakness.      # Inability to ambulate/Debility   -rehab/pt as tolerated   -STR dispo       # Chronic Pulmonary Embolism   -on long term anticoagulant    # Hypertension   -home meds     # Hyperlipidemia   -on hold, check CK levels     # Alcohol Dependence  -monitor for withdrawals  -prn protocol     # Tobacco Addiction/Dependence  -smoking cessation and counseling done   -Nicotine replacement therapy     # Overweight   -BMI > 29  -weight loss and diet modification     # Thiamine and Folic acid def  -on supplements     DVT and GI ppx     Attending Physician Dr. Leila Trujillo # [id number]     Spent over 35 mins today's plan of care   Discussed with housestaff.   d/c papers done by me

## 2021-05-04 NOTE — PROGRESS NOTE ADULT - SUBJECTIVE AND OBJECTIVE BOX
CHAPARRO ROBISON  65y  Male      Patient is a 65y old  Male who presents with a chief complaint of Inability to Ambulate (2021 14:18)    INTERVAL HPI/OVERNIGHT EVENTS:  pt seen and examined at bedside   -case management on board for d/c planning   -Physical therapy as tolerated     REVIEW OF SYSTEMS:  no complaints to me     Vital Signs Last 24 Hrs  T(C): 35.6 (02 May 2021 05:04), Max: 36.4 (01 May 2021 21:25)  T(F): 96 (02 May 2021 05:04), Max: 97.6 (01 May 2021 21:25)  HR: 61 (02 May 2021 05:04) (61 - 62)  BP: 134/78 (02 May 2021 05:04) (122/56 - 144/66)  RR: 16 (02 May 2021 05:04) (16 - 18)    PHYSICAL EXAM:  GENERAL: NAD, well-groomed, well-developed  HEAD:  Atraumatic, Normocephalic  EYES: EOMI, PERRLA, conjunctiva and sclera clear  NERVOUS SYSTEM:  Alert & Oriented X 3  CHEST/LUNG: Clear to percussion bilaterally; No rales, rhonchi, wheezing, or rubs  CV/HEART: Regular rate and rhythm; No murmurs, rubs, or gallops  GI/ABDOMEN: Soft, Nontender, Nondistended; Bowel sounds present  EXTREMITIES:  2+ Peripheral Pulses, No clubbing, cyanosis, or edema  SKIN: No rashes or lesions    LAB:                            11.8   9.24  )-----------( 229      ( 2021 10:08 )             35.8     04-30    140  |  99  |  14  ----------------------------<  109<H>  3.9   |  25  |  0.9    Ca    9.5      2021 10:08  Phos  2.7     04-30  Mg     1.9     04-30    TPro  7.6  /  Alb  4.4  /  TBili  1.0  /  DBili  x   /  AST  30  /  ALT  30  /  AlkPhos  100  04-30    Daily Height in cm: 175.26 (2021 15:45)    Daily   CAPILLARY BLOOD GLUCOSE    Urinalysis Basic - ( 2021 13:09 )    Color: Yellow / Appearance: Clear / S.025 / pH: x  Gluc: x / Ketone: 160  / Bili: Small / Urobili: 2.0 mg/dL   Blood: x / Protein: 100 mg/dL / Nitrite: Negative   Leuk Esterase: Negative / RBC: x / WBC 1-2 /HPF   Sq Epi: x / Non Sq Epi: Occasional /HPF / Bacteria: Few    LIVER FUNCTIONS - ( 2021 10:08 )  Alb: 4.4 g/dL / Pro: 7.6 g/dL / ALK PHOS: 100 U/L / ALT: 30 U/L / AST: 30 U/L / GGT: x           RADIOLOGY:    Imaging Personally Reviewed:  [Y] YES  [ ] NO    HEALTH ISSUES - PROBLEM Dx:  Inability to walk  Inability to walk    Weakness of both lower extremities  Weakness of both lower extremities    Pulmonary embolism  Pulmonary embolism    HLD (hyperlipidemia)  HLD (hyperlipidemia)    HTN (hypertension)  HTN (hypertension)    Uncomplicated alcohol dependence  Uncomplicated alcohol dependence    Pulmonary thromboembolism    MEDS:  apixaban 5 milliGRAM(s) Oral every 12 hours  aspirin  chewable 81 milliGRAM(s) Oral daily  chlorhexidine 4% Liquid 1 Application(s) Topical <User Schedule>  folic acid 1 milliGRAM(s) Oral daily  gabapentin 100 milliGRAM(s) Oral three times a day  LORazepam     Tablet 1 milliGRAM(s) Oral every 6 hours PRN  losartan 100 milliGRAM(s) Oral daily  methocarbamol 500 milliGRAM(s) Oral every 8 hours PRN  metoprolol tartrate 25 milliGRAM(s) Oral two times a day  multivitamin/minerals 1 Tablet(s) Oral daily  nicotine - 21 mG/24Hr(s) Patch 1 patch Transdermal daily  thiamine 100 milliGRAM(s) Oral daily      
  CHAPARRO ROBISON  65y  Male      Patient is a 65y old  Male who presents with a chief complaint of Inability to Ambulate (2021 14:18)      INTERVAL HPI/OVERNIGHT EVENTS:  pt seen and examined at bedside   -case management on board for d/c planning   -Physical therapy eval today     REVIEW OF SYSTEMS:  no complaints to me     Vital Signs Last 24 Hrs  T(C): 35.8 (01 May 2021 05:31), Max: 36.9 (2021 21:41)  T(F): 96.4 (01 May 2021 05:31), Max: 98.4 (2021 21:41)  HR: 59 (01 May 2021 05:31) (59 - 79)  BP: 145/70 (01 May 2021 05:31) (124/69 - 170/79)  RR: 18 (01 May 2021 05:31) (16 - 18)  SpO2: 96% (2021 13:57) (96% - 96%)    PHYSICAL EXAM:  GENERAL: NAD, well-groomed, well-developed  HEAD:  Atraumatic, Normocephalic  EYES: EOMI, PERRLA, conjunctiva and sclera clear  NERVOUS SYSTEM:  Alert & Oriented X 3  CHEST/LUNG: Clear to percussion bilaterally; No rales, rhonchi, wheezing, or rubs  CV/HEART: Regular rate and rhythm; No murmurs, rubs, or gallops  GI/ABDOMEN: Soft, Nontender, Nondistended; Bowel sounds present  EXTREMITIES:  2+ Peripheral Pulses, No clubbing, cyanosis, or edema  SKIN: No rashes or lesions    LAB:                   11.8   9.24  )-----------( 229      ( 2021 10:08 )             35.8     04-30    140  |  99  |  14  ----------------------------<  109<H>  3.9   |  25  |  0.9    Ca    9.5      2021 10:08  Phos  2.7     04-30  Mg     1.9     04-30    TPro  7.6  /  Alb  4.4  /  TBili  1.0  /  DBili  x   /  AST  30  /  ALT  30  /  AlkPhos  100  04-30    Daily Height in cm: 175.26 (2021 15:45)    Daily   CAPILLARY BLOOD GLUCOSE    Urinalysis Basic - ( 2021 13:09 )    Color: Yellow / Appearance: Clear / S.025 / pH: x  Gluc: x / Ketone: 160  / Bili: Small / Urobili: 2.0 mg/dL   Blood: x / Protein: 100 mg/dL / Nitrite: Negative   Leuk Esterase: Negative / RBC: x / WBC 1-2 /HPF   Sq Epi: x / Non Sq Epi: Occasional /HPF / Bacteria: Few    LIVER FUNCTIONS - ( 2021 10:08 )  Alb: 4.4 g/dL / Pro: 7.6 g/dL / ALK PHOS: 100 U/L / ALT: 30 U/L / AST: 30 U/L / GGT: x           RADIOLOGY:    Imaging Personally Reviewed:  [Y] YES  [ ] NO    HEALTH ISSUES - PROBLEM Dx:  Inability to walk  Inability to walk    Weakness of both lower extremities  Weakness of both lower extremities    Pulmonary embolism  Pulmonary embolism    HLD (hyperlipidemia)  HLD (hyperlipidemia)    HTN (hypertension)  HTN (hypertension)    Uncomplicated alcohol dependence  Uncomplicated alcohol dependence    Pulmonary thromboembolism    MEDS:  apixaban 5 milliGRAM(s) Oral every 12 hours  aspirin  chewable 81 milliGRAM(s) Oral daily  chlorhexidine 4% Liquid 1 Application(s) Topical <User Schedule>  folic acid 1 milliGRAM(s) Oral daily  gabapentin 100 milliGRAM(s) Oral three times a day  LORazepam     Tablet 1 milliGRAM(s) Oral every 6 hours PRN  losartan 100 milliGRAM(s) Oral daily  methocarbamol 500 milliGRAM(s) Oral every 8 hours PRN  metoprolol tartrate 25 milliGRAM(s) Oral two times a day  multivitamin/minerals 1 Tablet(s) Oral daily  nicotine - 21 mG/24Hr(s) Patch 1 patch Transdermal daily  thiamine 100 milliGRAM(s) Oral daily      
  CHAPARRO ROBISON  65y  Male      Patient is a 65y old  Male who presents with a chief complaint of Inability to Ambulate (2021 14:18)    INTERVAL HPI/OVERNIGHT EVENTS:  pt seen and examined at bedside   -case management on board for d/c planning for today   -Physical therapy as tolerated     REVIEW OF SYSTEMS:  no complaints to me     Vital Signs Last 24 Hrs  T(C): 35.5 (03 May 2021 05:49), Max: 36.4 (02 May 2021 21:18)  T(F): 95.9 (03 May 2021 05:49), Max: 97.6 (02 May 2021 21:18)  HR: 54 (03 May 2021 05:49) (54 - 77)  BP: 180/93 (03 May 2021 05:49) (149/64 - 180/93)  RR: 16 (03 May 2021 05:49) (16 - 16)      PHYSICAL EXAM:  GENERAL: NAD, well-groomed, well-developed  HEAD:  Atraumatic, Normocephalic  EYES: EOMI, PERRLA, conjunctiva and sclera clear  NERVOUS SYSTEM:  Alert & Oriented X 3  CHEST/LUNG: Clear to percussion bilaterally; No rales, rhonchi, wheezing, or rubs  CV/HEART: Regular rate and rhythm; No murmurs, rubs, or gallops  GI/ABDOMEN: Soft, Nontender, Nondistended; Bowel sounds present  EXTREMITIES:  2+ Peripheral Pulses, No clubbing, cyanosis, or edema  SKIN: No rashes or lesions    LAB:                            11.8   9.24  )-----------( 229      ( 2021 10:08 )             35.8     04-30    140  |  99  |  14  ----------------------------<  109<H>  3.9   |  25  |  0.9    Ca    9.5      2021 10:08  Phos  2.7     04-30  Mg     1.9     04-30    TPro  7.6  /  Alb  4.4  /  TBili  1.0  /  DBili  x   /  AST  30  /  ALT  30  /  AlkPhos  100  04-30    Daily Height in cm: 175.26 (2021 15:45)    Daily   CAPILLARY BLOOD GLUCOSE    Urinalysis Basic - ( 2021 13:09 )    Color: Yellow / Appearance: Clear / S.025 / pH: x  Gluc: x / Ketone: 160  / Bili: Small / Urobili: 2.0 mg/dL   Blood: x / Protein: 100 mg/dL / Nitrite: Negative   Leuk Esterase: Negative / RBC: x / WBC 1-2 /HPF   Sq Epi: x / Non Sq Epi: Occasional /HPF / Bacteria: Few    LIVER FUNCTIONS - ( 2021 10:08 )  Alb: 4.4 g/dL / Pro: 7.6 g/dL / ALK PHOS: 100 U/L / ALT: 30 U/L / AST: 30 U/L / GGT: x           RADIOLOGY:    Imaging Personally Reviewed:  [Y] YES  [ ] NO    HEALTH ISSUES - PROBLEM Dx:  Inability to walk  Inability to walk    Weakness of both lower extremities  Weakness of both lower extremities    Pulmonary embolism  Pulmonary embolism    HLD (hyperlipidemia)  HLD (hyperlipidemia)    HTN (hypertension)  HTN (hypertension)    Uncomplicated alcohol dependence  Uncomplicated alcohol dependence    Pulmonary thromboembolism    MEDICATIONS  (STANDING):  apixaban 5 milliGRAM(s) Oral every 12 hours  aspirin  chewable 81 milliGRAM(s) Oral daily  chlorhexidine 4% Liquid 1 Application(s) Topical <User Schedule>  folic acid 1 milliGRAM(s) Oral daily  gabapentin 100 milliGRAM(s) Oral three times a day  losartan 100 milliGRAM(s) Oral daily  metoprolol tartrate 25 milliGRAM(s) Oral two times a day  multivitamin/minerals 1 Tablet(s) Oral daily  nicotine - 21 mG/24Hr(s) Patch 1 patch Transdermal daily  thiamine 100 milliGRAM(s) Oral daily    MEDICATIONS  (PRN):  ibuprofen  Tablet. 400 milliGRAM(s) Oral every 12 hours PRN Moderate Pain (4 - 6)  LORazepam     Tablet 1 milliGRAM(s) Oral every 6 hours PRN Shakes: Alcohol withdrawal symptoms  methocarbamol 500 milliGRAM(s) Oral every 8 hours PRN Muscle Spasm  
  CHAPARRO ROBISON  65y  Male      Patient is a 65y old  Male who presents with a chief complaint of Inability to Ambulate (2021 14:18)    INTERVAL HPI/OVERNIGHT EVENTS:  pt seen and examined at bedside   -case management on board for d/c planning for today   -Physical therapy as tolerated   -no acute events notified to me     REVIEW OF SYSTEMS:  no complaints to me     Vital Signs Last 24 Hrs  T(C): 37.1 (04 May 2021 06:57), Max: 37.1 (04 May 2021 06:57)  T(F): 98.8 (04 May 2021 06:57), Max: 98.8 (04 May 2021 06:57)  HR: 66 (04 May 2021 06:57) (56 - 66)  BP: 152/86 (04 May 2021 06:57) (147/72 - 154/79)  RR: 18 (04 May 2021 06:57) (16 - 18)    PHYSICAL EXAM:  GENERAL: NAD, well-groomed, well-developed  HEAD:  Atraumatic, Normocephalic  EYES: EOMI, PERRLA, conjunctiva and sclera clear  NERVOUS SYSTEM:  Alert & Oriented X 3  CHEST/LUNG: Clear to percussion bilaterally; No rales, rhonchi, wheezing, or rubs  CV/HEART: Regular rate and rhythm; No murmurs, rubs, or gallops  GI/ABDOMEN: Soft, Nontender, Nondistended; Bowel sounds present  EXTREMITIES:  2+ Peripheral Pulses, No clubbing, cyanosis, or edema  SKIN: No rashes or lesions    LAB:                            11.8   9.24  )-----------( 229      ( 2021 10:08 )             35.8     04-30    140  |  99  |  14  ----------------------------<  109<H>  3.9   |  25  |  0.9    Ca    9.5      2021 10:08  Phos  2.7     04-30  Mg     1.9     04-30    TPro  7.6  /  Alb  4.4  /  TBili  1.0  /  DBili  x   /  AST  30  /  ALT  30  /  AlkPhos  100  04-30    Daily Height in cm: 175.26 (2021 15:45)    Daily   CAPILLARY BLOOD GLUCOSE    Urinalysis Basic - ( 2021 13:09 )    Color: Yellow / Appearance: Clear / S.025 / pH: x  Gluc: x / Ketone: 160  / Bili: Small / Urobili: 2.0 mg/dL   Blood: x / Protein: 100 mg/dL / Nitrite: Negative   Leuk Esterase: Negative / RBC: x / WBC 1-2 /HPF   Sq Epi: x / Non Sq Epi: Occasional /HPF / Bacteria: Few    LIVER FUNCTIONS - ( 2021 10:08 )  Alb: 4.4 g/dL / Pro: 7.6 g/dL / ALK PHOS: 100 U/L / ALT: 30 U/L / AST: 30 U/L / GGT: x           RADIOLOGY:    Imaging Personally Reviewed:  [Y] YES  [ ] NO    HEALTH ISSUES - PROBLEM Dx:  Inability to walk  Inability to walk    Weakness of both lower extremities  Weakness of both lower extremities    Pulmonary embolism  Pulmonary embolism    HLD (hyperlipidemia)  HLD (hyperlipidemia)    HTN (hypertension)  HTN (hypertension)    Uncomplicated alcohol dependence  Uncomplicated alcohol dependence    Pulmonary thromboembolism    MEDICATIONS  (STANDING):  apixaban 5 milliGRAM(s) Oral every 12 hours  aspirin  chewable 81 milliGRAM(s) Oral daily  BACItracin   Ointment 1 Application(s) Topical three times a day  chlorhexidine 4% Liquid 1 Application(s) Topical <User Schedule>  folic acid 1 milliGRAM(s) Oral daily  gabapentin 100 milliGRAM(s) Oral three times a day  losartan 100 milliGRAM(s) Oral daily  metoprolol tartrate 25 milliGRAM(s) Oral two times a day  multivitamin/minerals 1 Tablet(s) Oral daily  nicotine - 21 mG/24Hr(s) Patch 1 patch Transdermal daily  thiamine 100 milliGRAM(s) Oral daily    MEDICATIONS  (PRN):  ibuprofen  Tablet. 400 milliGRAM(s) Oral every 12 hours PRN Moderate Pain (4 - 6)  LORazepam     Tablet 1 milliGRAM(s) Oral every 6 hours PRN Shakes: Alcohol withdrawal symptoms  methocarbamol 500 milliGRAM(s) Oral every 8 hours PRN Muscle Spasm

## 2021-05-13 DIAGNOSIS — E53.8 DEFICIENCY OF OTHER SPECIFIED B GROUP VITAMINS: ICD-10-CM

## 2021-05-13 DIAGNOSIS — I50.9 HEART FAILURE, UNSPECIFIED: ICD-10-CM

## 2021-05-13 DIAGNOSIS — G89.29 OTHER CHRONIC PAIN: ICD-10-CM

## 2021-05-13 DIAGNOSIS — Z74.1 NEED FOR ASSISTANCE WITH PERSONAL CARE: ICD-10-CM

## 2021-05-13 DIAGNOSIS — I11.0 HYPERTENSIVE HEART DISEASE WITH HEART FAILURE: ICD-10-CM

## 2021-05-13 DIAGNOSIS — R53.1 WEAKNESS: ICD-10-CM

## 2021-05-13 DIAGNOSIS — I69.391 DYSPHAGIA FOLLOWING CEREBRAL INFARCTION: ICD-10-CM

## 2021-05-13 DIAGNOSIS — F10.20 ALCOHOL DEPENDENCE, UNCOMPLICATED: ICD-10-CM

## 2021-05-13 DIAGNOSIS — E51.9 THIAMINE DEFICIENCY, UNSPECIFIED: ICD-10-CM

## 2021-05-13 DIAGNOSIS — F17.210 NICOTINE DEPENDENCE, CIGARETTES, UNCOMPLICATED: ICD-10-CM

## 2021-05-13 DIAGNOSIS — M54.9 DORSALGIA, UNSPECIFIED: ICD-10-CM

## 2021-05-13 DIAGNOSIS — Z79.01 LONG TERM (CURRENT) USE OF ANTICOAGULANTS: ICD-10-CM

## 2021-05-13 DIAGNOSIS — E66.3 OVERWEIGHT: ICD-10-CM

## 2021-05-13 DIAGNOSIS — I27.82 CHRONIC PULMONARY EMBOLISM: ICD-10-CM

## 2021-05-13 DIAGNOSIS — R53.81 OTHER MALAISE: ICD-10-CM

## 2021-05-13 DIAGNOSIS — Z75.1 PERSON AWAITING ADMISSION TO ADEQUATE FACILITY ELSEWHERE: ICD-10-CM

## 2021-06-20 ENCOUNTER — EMERGENCY (EMERGENCY)
Facility: HOSPITAL | Age: 66
LOS: 0 days | Discharge: HOME | End: 2021-06-21
Attending: EMERGENCY MEDICINE | Admitting: EMERGENCY MEDICINE
Payer: MEDICARE

## 2021-06-20 VITALS
SYSTOLIC BLOOD PRESSURE: 117 MMHG | DIASTOLIC BLOOD PRESSURE: 55 MMHG | OXYGEN SATURATION: 98 % | RESPIRATION RATE: 20 BRPM | TEMPERATURE: 97 F | HEIGHT: 69 IN | WEIGHT: 199.96 LBS | HEART RATE: 60 BPM
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
## 2021-06-20 DIAGNOSIS — E78.5 HYPERLIPIDEMIA, UNSPECIFIED: ICD-10-CM

## 2021-06-20 DIAGNOSIS — I11.0 HYPERTENSIVE HEART DISEASE WITH HEART FAILURE: ICD-10-CM

## 2021-06-20 DIAGNOSIS — Z86.73 PERSONAL HISTORY OF TRANSIENT ISCHEMIC ATTACK (TIA), AND CEREBRAL INFARCTION WITHOUT RESIDUAL DEFICITS: ICD-10-CM

## 2021-06-20 DIAGNOSIS — I50.9 HEART FAILURE, UNSPECIFIED: ICD-10-CM

## 2021-06-20 DIAGNOSIS — F10.129 ALCOHOL ABUSE WITH INTOXICATION, UNSPECIFIED: ICD-10-CM

## 2021-06-20 DIAGNOSIS — R60.0 LOCALIZED EDEMA: ICD-10-CM

## 2021-06-20 DIAGNOSIS — Z79.82 LONG TERM (CURRENT) USE OF ASPIRIN: ICD-10-CM

## 2021-06-20 DIAGNOSIS — Z90.49 ACQUIRED ABSENCE OF OTHER SPECIFIED PARTS OF DIGESTIVE TRACT: Chronic | ICD-10-CM

## 2021-06-20 DIAGNOSIS — Z79.01 LONG TERM (CURRENT) USE OF ANTICOAGULANTS: ICD-10-CM

## 2021-06-20 DIAGNOSIS — Z86.711 PERSONAL HISTORY OF PULMONARY EMBOLISM: ICD-10-CM

## 2021-06-20 DIAGNOSIS — R41.82 ALTERED MENTAL STATUS, UNSPECIFIED: ICD-10-CM

## 2021-06-20 LAB
ALBUMIN SERPL ELPH-MCNC: 4.1 G/DL — SIGNIFICANT CHANGE UP (ref 3.5–5.2)
ALP SERPL-CCNC: 97 U/L — SIGNIFICANT CHANGE UP (ref 30–115)
ALT FLD-CCNC: 36 U/L — SIGNIFICANT CHANGE UP (ref 0–41)
ANION GAP SERPL CALC-SCNC: 11 MMOL/L — SIGNIFICANT CHANGE UP (ref 7–14)
AST SERPL-CCNC: 42 U/L — HIGH (ref 0–41)
BASOPHILS # BLD AUTO: 0.03 K/UL — SIGNIFICANT CHANGE UP (ref 0–0.2)
BASOPHILS NFR BLD AUTO: 0.3 % — SIGNIFICANT CHANGE UP (ref 0–1)
BILIRUB SERPL-MCNC: 0.5 MG/DL — SIGNIFICANT CHANGE UP (ref 0.2–1.2)
BUN SERPL-MCNC: 11 MG/DL — SIGNIFICANT CHANGE UP (ref 10–20)
CALCIUM SERPL-MCNC: 9 MG/DL — SIGNIFICANT CHANGE UP (ref 8.5–10.1)
CHLORIDE SERPL-SCNC: 102 MMOL/L — SIGNIFICANT CHANGE UP (ref 98–110)
CO2 SERPL-SCNC: 30 MMOL/L — SIGNIFICANT CHANGE UP (ref 17–32)
CREAT SERPL-MCNC: 0.8 MG/DL — SIGNIFICANT CHANGE UP (ref 0.7–1.5)
EOSINOPHIL # BLD AUTO: 0.19 K/UL — SIGNIFICANT CHANGE UP (ref 0–0.7)
EOSINOPHIL NFR BLD AUTO: 1.8 % — SIGNIFICANT CHANGE UP (ref 0–8)
GLUCOSE SERPL-MCNC: 99 MG/DL — SIGNIFICANT CHANGE UP (ref 70–99)
HCT VFR BLD CALC: 40.4 % — LOW (ref 42–52)
HGB BLD-MCNC: 13.4 G/DL — LOW (ref 14–18)
IMM GRANULOCYTES NFR BLD AUTO: 0.4 % — HIGH (ref 0.1–0.3)
LYMPHOCYTES # BLD AUTO: 1.85 K/UL — SIGNIFICANT CHANGE UP (ref 1.2–3.4)
LYMPHOCYTES # BLD AUTO: 17.4 % — LOW (ref 20.5–51.1)
MCHC RBC-ENTMCNC: 28.9 PG — SIGNIFICANT CHANGE UP (ref 27–31)
MCHC RBC-ENTMCNC: 33.2 G/DL — SIGNIFICANT CHANGE UP (ref 32–37)
MCV RBC AUTO: 87.1 FL — SIGNIFICANT CHANGE UP (ref 80–94)
MONOCYTES # BLD AUTO: 0.94 K/UL — HIGH (ref 0.1–0.6)
MONOCYTES NFR BLD AUTO: 8.8 % — SIGNIFICANT CHANGE UP (ref 1.7–9.3)
NEUTROPHILS # BLD AUTO: 7.61 K/UL — HIGH (ref 1.4–6.5)
NEUTROPHILS NFR BLD AUTO: 71.3 % — SIGNIFICANT CHANGE UP (ref 42.2–75.2)
NRBC # BLD: 0 /100 WBCS — SIGNIFICANT CHANGE UP (ref 0–0)
NT-PROBNP SERPL-SCNC: 43 PG/ML — SIGNIFICANT CHANGE UP (ref 0–300)
PLATELET # BLD AUTO: 145 K/UL — SIGNIFICANT CHANGE UP (ref 130–400)
POTASSIUM SERPL-MCNC: 3.3 MMOL/L — LOW (ref 3.5–5)
POTASSIUM SERPL-SCNC: 3.3 MMOL/L — LOW (ref 3.5–5)
PROT SERPL-MCNC: 7.4 G/DL — SIGNIFICANT CHANGE UP (ref 6–8)
RBC # BLD: 4.64 M/UL — LOW (ref 4.7–6.1)
RBC # FLD: 14.4 % — SIGNIFICANT CHANGE UP (ref 11.5–14.5)
SODIUM SERPL-SCNC: 143 MMOL/L — SIGNIFICANT CHANGE UP (ref 135–146)
WBC # BLD: 10.66 K/UL — SIGNIFICANT CHANGE UP (ref 4.8–10.8)
WBC # FLD AUTO: 10.66 K/UL — SIGNIFICANT CHANGE UP (ref 4.8–10.8)

## 2021-06-20 PROCEDURE — 99285 EMERGENCY DEPT VISIT HI MDM: CPT

## 2021-06-20 PROCEDURE — 71045 X-RAY EXAM CHEST 1 VIEW: CPT | Mod: 26

## 2021-06-20 NOTE — ED PROVIDER NOTE - CLINICAL SUMMARY MEDICAL DECISION MAKING FREE TEXT BOX
Labs unremarkable. Refused venous duplex.  CXR negative. Sobered up in ED and discharged in stable condition.  Will D/C to follow up with outpatient detox.

## 2021-06-20 NOTE — ED PROVIDER NOTE - PATIENT PORTAL LINK FT
You can access the FollowMyHealth Patient Portal offered by [facility] by registering at the following website: [email or web address]. By joining [facility]’s FollowMyHealth portal, you will also be able to view your health information using other applications (apps) compatible with our system.

## 2021-06-20 NOTE — ED PROVIDER NOTE - NSFOLLOWUPINSTRUCTIONS_ED_ALL_ED_FT
Alcohol Abuse    Alcohol intoxication occurs when the amount of alcohol that a person has consumed impairs his or her ability to mentally and physically function. Chronic alcohol consumption can also lead to a variety of health issues including neurological disease, stomach disease, heart disease, liver disease, etc. Do not drive after drinking alcohol.     SEEK IMMEDIATE MEDICAL CARE IF YOU HAVE THE FOLLOWING SYMPTOMS: seizures, vomiting blood, blood in your stool, lightheadedness/dizziness, or becoming shaky to tremulous when you stop drinking.     Leg Edema    WHAT YOU NEED TO KNOW:    Leg edema is swelling caused by fluid buildup. Your legs may swell if you sit or stand for long periods of time, are pregnant, or are injured. Swelling may also occur if you have heart failure or circulation problems. This means that your heart does not pump blood through your body as it should.    DISCHARGE INSTRUCTIONS:    Self-care:     Elevate your legs: Raise your legs above the level of your heart as often as you can. This will help decrease swelling and pain. Prop your legs on pillows or blankets to keep them elevated comfortably.    Wear pressure stockings: These tight stockings put pressure on your legs to promote blood flow and prevent blood clots. Wear the stockings during the day. Do not wear them while you sleep.    Apply heat: Heat helps decrease pain and swelling. Apply heat on the area for 20 to 30 minutes every 2 hours for as many days as directed.     Stay active: Do not stand or sit for long periods of time. Ask your healthcare provider about the best exercise plan for you.    Eat healthy foods: Healthy foods include fruits, vegetables, whole-grain breads, low-fat dairy products, beans, lean meats, and fish. Ask if you need to be on a special diet. Limit salt. Salt will make your body hold even more fluid.    Follow up with your healthcare provider as directed: Write down your questions so you remember to ask them during your visits.     Contact your healthcare provider if:  You have a fever or feel more tired than usual.  The veins in your legs look larger than usual. They may look full or bulging.  Your legs itch or feel heavy.  You have red or white areas or sores on your legs. The skin may also appear dimpled or have indentations.  You are gaining weight.  You have trouble moving your ankles.  The swelling does not go away, or other parts of your body swell.  You have questions or concerns about your condition or care.    Return to the emergency department if:   You cannot walk.  You feel faint or confused.   Your skin turns blue or gray.  Your leg feels warm, tender, and painful. It may be swollen and red.  You have chest pain or trouble breathing that is worse when you lie down.  You suddenly feel lightheaded and have trouble breathing.  You have new and sudden chest pain. You may have more pain when you take deep breaths or cough. You may also cough up blood.

## 2021-06-20 NOTE — ED ADULT NURSE NOTE - TEMPLATE LIST FOR HEAD TO TOE ASSESSMENT
"Subjective:       Patient ID: Edna Watts is a 31 y.o. female.    Vitals:  height is 5' 4" (1.626 m) and weight is 81.6 kg (180 lb). Her temperature is 99.5 °F (37.5 °C). Her pulse is 56 (abnormal). Her respiration is 18 and oxygen saturation is 100%.     Chief Complaint: Cough    Cough   This is a new problem. The current episode started 1 to 4 weeks ago. The problem has been gradually worsening. The problem occurs every few minutes. The cough is productive of sputum. Pertinent negatives include no chills, ear pain, eye redness, fever, hemoptysis, myalgias, rash, sore throat, shortness of breath or wheezing. Treatments tried: cough medicine. The treatment provided no relief.       Constitution: Negative for chills, sweating, fatigue and fever.   HENT: Negative for ear pain, congestion, sinus pain, sinus pressure, sore throat and voice change.    Neck: Negative for painful lymph nodes.   Eyes: Negative for eye redness.   Respiratory: Positive for cough and sputum production. Negative for chest tightness, bloody sputum, COPD, shortness of breath, stridor, wheezing and asthma.    Gastrointestinal: Negative for nausea and vomiting.   Musculoskeletal: Negative for muscle ache.   Skin: Negative for rash.   Allergic/Immunologic: Negative for seasonal allergies and asthma.   Hematologic/Lymphatic: Negative for swollen lymph nodes.       Objective:      Physical Exam   Constitutional: She is oriented to person, place, and time. She appears well-developed and well-nourished. She is cooperative.  Non-toxic appearance. She does not have a sickly appearance. She does not appear ill. No distress.   HENT:   Head: Normocephalic and atraumatic.   Right Ear: Hearing, external ear and ear canal normal. A middle ear effusion is present.   Left Ear: Hearing, external ear and ear canal normal. A middle ear effusion is present.   Nose: Mucosal edema present. No rhinorrhea or nasal deformity. No epistaxis. Right sinus exhibits no " maxillary sinus tenderness and no frontal sinus tenderness. Left sinus exhibits no maxillary sinus tenderness and no frontal sinus tenderness.   Mouth/Throat: Uvula is midline and mucous membranes are normal. No trismus in the jaw. Normal dentition. No uvula swelling. Posterior oropharyngeal erythema and cobblestoning present. No oropharyngeal exudate, posterior oropharyngeal edema or tonsillar abscesses.   Eyes: Conjunctivae and lids are normal. No scleral icterus.   Neck: Trachea normal, full passive range of motion without pain and phonation normal. Neck supple. No neck rigidity. No edema and no erythema present.   Cardiovascular: Normal rate, regular rhythm, normal heart sounds, intact distal pulses and normal pulses.   Pulmonary/Chest: Effort normal. No accessory muscle usage or stridor. No respiratory distress. She has no decreased breath sounds. She has no wheezes. She has no rhonchi. She has no rales. Chest wall is not dull to percussion. She exhibits no tenderness and no bony tenderness.   Moderate upper airway congestion noted with intermittent productive cough   Abdominal: Normal appearance.   Musculoskeletal: Normal range of motion. She exhibits no edema or deformity.   Lymphadenopathy:        Head (right side): No submental, no submandibular, no tonsillar, no preauricular, no posterior auricular and no occipital adenopathy present.        Head (left side): No submental, no submandibular, no tonsillar, no preauricular, no posterior auricular and no occipital adenopathy present.     She has no cervical adenopathy.        Right cervical: No superficial cervical, no deep cervical and no posterior cervical adenopathy present.       Left cervical: No superficial cervical, no deep cervical and no posterior cervical adenopathy present.   Neurological: She is alert and oriented to person, place, and time. She exhibits normal muscle tone. Coordination normal.   Skin: Skin is warm, dry, intact, not diaphoretic and  not pale.   Psychiatric: She has a normal mood and affect. Her speech is normal and behavior is normal. Judgment and thought content normal. Cognition and memory are normal.   Nursing note and vitals reviewed.        Assessment:       1. Acute bacterial bronchitis    2. Pharyngitis, unspecified etiology    3. Cough        Plan:         Acute bacterial bronchitis  -     dexamethasone injection 6 mg  -     azithromycin (ZITHROMAX Z-ALIYAH) 250 MG tablet; Take 2 tablets (500 mg) on  Day 1,  followed by 1 tablet (250 mg) once daily on Days 2 through 5.  Dispense: 6 tablet; Refill: 0  -     promethazine-dextromethorphan (PROMETHAZINE-DM) 6.25-15 mg/5 mL Syrp; Take 5 mLs by mouth 3 (three) times daily as needed (cough).  Dispense: 180 mL; Refill: 0    Pharyngitis, unspecified etiology    Cough     Discussed with her to continue Allegra.       Bronchitis, Antibiotic Treatment (Adult)    Bronchitis is an infection of the air passages (bronchial tubes) in your lungs. It often occurs when you have a cold. This illness is contagious during the first few days and is spread through the air by coughing and sneezing, or by direct contact (touching the sick person and then touching your own eyes, nose, or mouth).  Symptoms of bronchitis include cough with mucus (phlegm) and low-grade fever. Bronchitis usually lasts 7 to 14 days. Mild cases can be treated with simple home remedies. More severe infection is treated with an antibiotic.  Home care  Follow these guidelines when caring for yourself at home:  · If your symptoms are severe, rest at home for the first 2 to 3 days. When you go back to your usual activities, don't let yourself get too tired.  · Do not smoke. Also avoid being exposed to secondhand smoke.  · You may use over-the-counter medicines to control fever or pain, unless another medicine was prescribed. (Note: If you have chronic liver or kidney disease or have ever had a stomach ulcer or gastrointestinal bleeding, talk  with your healthcare provider before using these medicines. Also talk to your provider if you are taking medicine to prevent blood clots.) Aspirin should never be given to anyone younger than 18 years of age who is ill with a viral infection or fever. It may cause severe liver or brain damage.  · Your appetite may be poor, so a light diet is fine. Avoid dehydration by drinking 6 to 8 glasses of fluids per day (such as water, soft drinks, sports drinks, juices, tea, or soup). Extra fluids will help loosen secretions in the nose and lungs.  · Over-the-counter cough, cold, and sore-throat medicines will not shorten the length of the illness, but they may be helpful to reduce symptoms. (Note: Do not use decongestants if you have high blood pressure.)  · Finish all antibiotic medicine. Do this even if you are feeling better after only a few days.  Follow-up care  Follow up with your healthcare provider, or as advised. If you had an X-ray or ECG (electrocardiogram), a specialist will review it. You will be notified of any new findings that may affect your care.  Note: If you are age 65 or older, or if you have a chronic lung disease or condition that affects your immune system, or you smoke, talk to your healthcare provider about having pneumococcal vaccinations and a yearly influenza vaccination (flu shot).  When to seek medical advice  Call your healthcare provider right away if any of these occur:  · Fever of 100.4°F (38°C) or higher  · Coughing up increased amounts of colored sputum  · Weakness, drowsiness, headache, facial pain, ear pain, or a stiff neck  Call 911, or get immediate medical care  Contact emergency services right away if any of these occur.  · Coughing up blood  · Worsening weakness, drowsiness, headache, or stiff neck  · Trouble breathing, wheezing, or pain with breathing  Date Last Reviewed: 9/13/2015 © 2000-2017 [employer]. [address] [place] [address], [place] [address]. All rights  reserved. This information is not intended as a substitute for professional medical care. Always follow your healthcare professional's instructions.      Patient Instructions   -Below are suggestions for symptomatic relief:              -Tylenol every 4 hours OR ibuprofen every 6 hours as needed for pain/fever.              -Salt water gargles to soothe throat pain.              -Chloroseptic spray also helps to numb throat pain.              -Nasal saline spray reduces inflammation and dryness.              -Warm face compresses to help with facial sinus pain/pressure.              -Vicks vapor rub at night.              -Flonase OTC or Nasacort OTC for nasal congestion.              -Simple foods like chicken noodle soup.              -Delsym helps with coughing at night              -Zyrtec/Claritin during the day & Benadryl at night may help with allergies.                If you DO NOT have Hypertension or any history of palpitations, it is ok to take over the counter Sudafed or Mucinex D or Allegra-D or Claritin-D or Zyrtec-D.  If you do take one of the above, it is ok to combine that with plain over the counter Mucinex or Allegra or Claritin or Zyrtec. If, for example, you are taking Zyrtec -D, you can combine that with Mucinex, but not Mucinex-D.  If you are taking Mucinex-D, you can combine that with plain Allegra or Claritin or Zyrtec.   If you DO have Hypertension or palpitations, it is safe to take Coricidin HBP for relief of sinus symptoms.    Please follow up with your Primary care provider within 2-5 days if your signs and symptoms have not resolved or worsen.     If your condition worsens or fails to improve we recommend that you receive another evaluation at the emergency room immediately or contact your primary medical clinic to discuss your concerns.   You must understand that you have received an Urgent Care treatment only and that you may be released before all of your medical problems are known  or treated. You, the patient, will arrange for follow up care as instructed.     RED FLAGS/WARNING SYMPTOMS DISCUSSED WITH PATIENT THAT WOULD WARRANT EMERGENT MEDICAL ATTENTION. PATIENT VERBALIZED UNDERSTANDING.          General

## 2021-06-20 NOTE — ED PROVIDER NOTE - NSFOLLOWUPCLINICS_GEN_ALL_ED_FT
[facility]  [facility]  [address] [place] [address]  Phone: [phone]  Fax:   Follow Up Time: 1-3 Days

## 2021-06-20 NOTE — ED PROVIDER NOTE - PHYSICAL EXAMINATION
CONST: unkempt   EYES: Sclera and conjunctiva clear.  ENT: ETOH on breath  CARD: Normal S1 S2; Normal rate and rhythm  RESP: Equal BS B/L, No wheezes, rhonchi or rales. No distress  GI: Soft, non-tender, non-distended.  MS: Normal ROM in all extremities. 3+ edema of lower extremities, no calf pain, radial pulses 2+ bilaterally  SKIN: Warm, dry, no acute rashes. Good turgor  NEURO: A&Ox3, + slurred speech,  No focal deficits. Strength 5/5 with no sensory deficits

## 2021-06-20 NOTE — ED PROVIDER NOTE - NS ED ROS FT
Constitutional: See HPI.  Eyes: No visual changes, eye pain or discharge.   ENMT: No hearing changes, pain, discharge or infections. No neck pain or stiffness. No limited ROM  Cardiac: No SOB or edema. No chest pain with exertion.  Respiratory: No cough or respiratory distress.   GI: No nausea, vomiting, diarrhea or abdominal pain.  : No dysuria, frequency or burning. No Discharge  MS: No myalgia, muscle weakness, joint pain or back pain.  Neuro: + alcohol intox. No headache or weakness  Skin: No skin rash.  Except as documented in the HPI, all other systems are negative.

## 2021-06-20 NOTE — ED PROVIDER NOTE - OBJECTIVE STATEMENT
65 y.o male w/ hx of alcohol abuse, CVA, PE, HTN, HLD, CHF, TIA presents to the ED for evaluation. Pt states friends called EMS because he was intoxicated.  No recent falls or trauma.  NO Si or HI.  denies Drug use.  no further complaints.

## 2021-06-21 ENCOUNTER — INPATIENT (INPATIENT)
Facility: HOSPITAL | Age: 66
LOS: 2 days | Discharge: NURSING, REHAB OR LONG-TERM CARE | End: 2021-06-24
Attending: HOSPITALIST | Admitting: HOSPITALIST
Payer: MEDICARE

## 2021-06-21 VITALS
DIASTOLIC BLOOD PRESSURE: 69 MMHG | OXYGEN SATURATION: 97 % | SYSTOLIC BLOOD PRESSURE: 137 MMHG | HEART RATE: 66 BPM | TEMPERATURE: 96 F | RESPIRATION RATE: 18 BRPM

## 2021-06-21 VITALS
WEIGHT: 199.96 LBS | RESPIRATION RATE: 20 BRPM | OXYGEN SATURATION: 96 % | HEIGHT: 69 IN | DIASTOLIC BLOOD PRESSURE: 81 MMHG | HEART RATE: 95 BPM | SYSTOLIC BLOOD PRESSURE: 181 MMHG | TEMPERATURE: 99 F

## 2021-06-21 DIAGNOSIS — Z90.49 ACQUIRED ABSENCE OF OTHER SPECIFIED PARTS OF DIGESTIVE TRACT: Chronic | ICD-10-CM

## 2021-06-21 LAB
ALBUMIN SERPL ELPH-MCNC: 3.8 G/DL — SIGNIFICANT CHANGE UP (ref 3.5–5.2)
ALP SERPL-CCNC: 102 U/L — SIGNIFICANT CHANGE UP (ref 30–115)
ALT FLD-CCNC: 33 U/L — SIGNIFICANT CHANGE UP (ref 0–41)
ANION GAP SERPL CALC-SCNC: 9 MMOL/L — SIGNIFICANT CHANGE UP (ref 7–14)
APPEARANCE UR: CLEAR — SIGNIFICANT CHANGE UP
AST SERPL-CCNC: 37 U/L — SIGNIFICANT CHANGE UP (ref 0–41)
BACTERIA # UR AUTO: SIGNIFICANT CHANGE UP /HPF
BILIRUB SERPL-MCNC: 0.9 MG/DL — SIGNIFICANT CHANGE UP (ref 0.2–1.2)
BILIRUB UR-MCNC: NEGATIVE — SIGNIFICANT CHANGE UP
BUN SERPL-MCNC: 13 MG/DL — SIGNIFICANT CHANGE UP (ref 10–20)
CALCIUM SERPL-MCNC: 9.1 MG/DL — SIGNIFICANT CHANGE UP (ref 8.5–10.1)
CHLORIDE SERPL-SCNC: 100 MMOL/L — SIGNIFICANT CHANGE UP (ref 98–110)
CO2 SERPL-SCNC: 28 MMOL/L — SIGNIFICANT CHANGE UP (ref 17–32)
COLOR SPEC: YELLOW — SIGNIFICANT CHANGE UP
CREAT SERPL-MCNC: 0.9 MG/DL — SIGNIFICANT CHANGE UP (ref 0.7–1.5)
DIFF PNL FLD: ABNORMAL
EPI CELLS # UR: ABNORMAL /HPF
ETHANOL SERPL-MCNC: <10 MG/DL — SIGNIFICANT CHANGE UP
FLUAV AG NPH QL: NEGATIVE COUNTS — SIGNIFICANT CHANGE UP
FLUBV AG NPH QL: NEGATIVE COUNTS — SIGNIFICANT CHANGE UP
GLUCOSE SERPL-MCNC: 142 MG/DL — HIGH (ref 70–99)
GLUCOSE UR QL: NEGATIVE MG/DL — SIGNIFICANT CHANGE UP
HCT VFR BLD CALC: 37.8 % — LOW (ref 42–52)
HGB BLD-MCNC: 12.5 G/DL — LOW (ref 14–18)
KETONES UR-MCNC: NEGATIVE — SIGNIFICANT CHANGE UP
LEUKOCYTE ESTERASE UR-ACNC: NEGATIVE — SIGNIFICANT CHANGE UP
MCHC RBC-ENTMCNC: 28.4 PG — SIGNIFICANT CHANGE UP (ref 27–31)
MCHC RBC-ENTMCNC: 33.1 G/DL — SIGNIFICANT CHANGE UP (ref 32–37)
MCV RBC AUTO: 85.9 FL — SIGNIFICANT CHANGE UP (ref 80–94)
NITRITE UR-MCNC: NEGATIVE — SIGNIFICANT CHANGE UP
NRBC # BLD: 0 /100 WBCS — SIGNIFICANT CHANGE UP (ref 0–0)
PH UR: 8.5 — SIGNIFICANT CHANGE UP (ref 5–8)
PLATELET # BLD AUTO: 117 K/UL — LOW (ref 130–400)
POTASSIUM SERPL-MCNC: 3.7 MMOL/L — SIGNIFICANT CHANGE UP (ref 3.5–5)
POTASSIUM SERPL-SCNC: 3.7 MMOL/L — SIGNIFICANT CHANGE UP (ref 3.5–5)
PROT SERPL-MCNC: 7.2 G/DL — SIGNIFICANT CHANGE UP (ref 6–8)
PROT UR-MCNC: 30 MG/DL
RBC # BLD: 4.4 M/UL — LOW (ref 4.7–6.1)
RBC # FLD: 14.3 % — SIGNIFICANT CHANGE UP (ref 11.5–14.5)
RBC CASTS # UR COMP ASSIST: SIGNIFICANT CHANGE UP /HPF
RSV RNA NPH QL NAA+NON-PROBE: NEGATIVE COUNTS — SIGNIFICANT CHANGE UP
SARS-COV-2 RNA SPEC QL NAA+PROBE: NEGATIVE COUNTS — SIGNIFICANT CHANGE UP
SODIUM SERPL-SCNC: 137 MMOL/L — SIGNIFICANT CHANGE UP (ref 135–146)
SP GR SPEC: 1.02 — SIGNIFICANT CHANGE UP (ref 1.01–1.03)
UROBILINOGEN FLD QL: 1 MG/DL (ref 0.2–0.2)
WBC # BLD: 9.05 K/UL — SIGNIFICANT CHANGE UP (ref 4.8–10.8)
WBC # FLD AUTO: 9.05 K/UL — SIGNIFICANT CHANGE UP (ref 4.8–10.8)
WBC UR QL: SIGNIFICANT CHANGE UP /HPF

## 2021-06-21 PROCEDURE — G0443: CPT | Mod: 59

## 2021-06-21 PROCEDURE — 99285 EMERGENCY DEPT VISIT HI MDM: CPT

## 2021-06-21 PROCEDURE — 99406 BEHAV CHNG SMOKING 3-10 MIN: CPT

## 2021-06-21 PROCEDURE — 99223 1ST HOSP IP/OBS HIGH 75: CPT

## 2021-06-21 RX ORDER — MULTIVIT-MIN/FERROUS GLUCONATE 9 MG/15 ML
1 LIQUID (ML) ORAL DAILY
Refills: 0 | Status: DISCONTINUED | OUTPATIENT
Start: 2021-06-21 | End: 2021-06-24

## 2021-06-21 RX ORDER — BACITRACIN ZINC 500 UNIT/G
1 OINTMENT IN PACKET (EA) TOPICAL THREE TIMES A DAY
Refills: 0 | Status: DISCONTINUED | OUTPATIENT
Start: 2021-06-21 | End: 2021-06-24

## 2021-06-21 RX ORDER — METHOCARBAMOL 500 MG/1
500 TABLET, FILM COATED ORAL EVERY 8 HOURS
Refills: 0 | Status: DISCONTINUED | OUTPATIENT
Start: 2021-06-21 | End: 2021-06-24

## 2021-06-21 RX ORDER — THIAMINE MONONITRATE (VIT B1) 100 MG
500 TABLET ORAL ONCE
Refills: 0 | Status: COMPLETED | OUTPATIENT
Start: 2021-06-21 | End: 2021-06-21

## 2021-06-21 RX ORDER — GABAPENTIN 400 MG/1
100 CAPSULE ORAL THREE TIMES A DAY
Refills: 0 | Status: DISCONTINUED | OUTPATIENT
Start: 2021-06-21 | End: 2021-06-24

## 2021-06-21 RX ORDER — SODIUM CHLORIDE 9 MG/ML
1000 INJECTION, SOLUTION INTRAVENOUS ONCE
Refills: 0 | Status: COMPLETED | OUTPATIENT
Start: 2021-06-21 | End: 2021-06-21

## 2021-06-21 RX ORDER — ASPIRIN/CALCIUM CARB/MAGNESIUM 324 MG
81 TABLET ORAL DAILY
Refills: 0 | Status: DISCONTINUED | OUTPATIENT
Start: 2021-06-21 | End: 2021-06-24

## 2021-06-21 RX ORDER — METOPROLOL TARTRATE 50 MG
25 TABLET ORAL
Refills: 0 | Status: DISCONTINUED | OUTPATIENT
Start: 2021-06-21 | End: 2021-06-24

## 2021-06-21 RX ORDER — LOSARTAN POTASSIUM 100 MG/1
100 TABLET, FILM COATED ORAL DAILY
Refills: 0 | Status: DISCONTINUED | OUTPATIENT
Start: 2021-06-21 | End: 2021-06-24

## 2021-06-21 RX ORDER — ATORVASTATIN CALCIUM 80 MG/1
80 TABLET, FILM COATED ORAL AT BEDTIME
Refills: 0 | Status: DISCONTINUED | OUTPATIENT
Start: 2021-06-21 | End: 2021-06-24

## 2021-06-21 RX ORDER — APIXABAN 2.5 MG/1
5 TABLET, FILM COATED ORAL
Refills: 0 | Status: DISCONTINUED | OUTPATIENT
Start: 2021-06-22 | End: 2021-06-24

## 2021-06-21 RX ORDER — FUROSEMIDE 40 MG
20 TABLET ORAL ONCE
Refills: 0 | Status: COMPLETED | OUTPATIENT
Start: 2021-06-21 | End: 2021-06-21

## 2021-06-21 RX ORDER — POTASSIUM CHLORIDE 20 MEQ
40 PACKET (EA) ORAL ONCE
Refills: 0 | Status: COMPLETED | OUTPATIENT
Start: 2021-06-21 | End: 2021-06-21

## 2021-06-21 RX ORDER — FOLIC ACID 0.8 MG
1 TABLET ORAL DAILY
Refills: 0 | Status: DISCONTINUED | OUTPATIENT
Start: 2021-06-21 | End: 2021-06-24

## 2021-06-21 RX ORDER — THIAMINE MONONITRATE (VIT B1) 100 MG
100 TABLET ORAL DAILY
Refills: 0 | Status: DISCONTINUED | OUTPATIENT
Start: 2021-06-21 | End: 2021-06-24

## 2021-06-21 RX ADMIN — SODIUM CHLORIDE 1000 MILLILITER(S): 9 INJECTION, SOLUTION INTRAVENOUS at 17:56

## 2021-06-21 RX ADMIN — Medication 105 MILLIGRAM(S): at 17:56

## 2021-06-21 RX ADMIN — Medication 40 MILLIEQUIVALENT(S): at 01:19

## 2021-06-21 RX ADMIN — Medication 20 MILLIGRAM(S): at 01:20

## 2021-06-21 NOTE — ED ADULT NURSE REASSESSMENT NOTE - NS ED NURSE REASSESS COMMENT FT1
Patient states he is "supposed" to take medications including Eliquis but does not currently take any of his prescribed medications.

## 2021-06-21 NOTE — ED PROVIDER NOTE - OBJECTIVE STATEMENT
66 yo M with PMH of HTN, HLD, CHF, CVA, PE ~2015 BIBEMS for difficulty walking. Per EMS, patient was at a bus stop, called 911 to be driven home, was refused, and then complained of difficulty walking. Patient says symptoms have been going on for months, but got acutely worse today. Denies fall/head trauma/back trauma, numbness, weakness, tingling, headache, vision changes, dizziness, chest pain, shortness of breath, nausea, vomiting, diarrhea, dysuria,  hematuria, melena, hematochezia. Denies drug/alcohol use today.

## 2021-06-21 NOTE — H&P ADULT - ASSESSMENT
65 yr old male with hx of HFpEF, PE on DOAC, EtOH dependence, Hx left pontine CVA w/Dysphagia presents to the ER with complaints of BLE pain and inability to ambulate.      # Inability to ambulate secondary to chronic deconditioning   - ambulate as tolerated  - fall precaution   - PT eval    # Chronic Pulmonary Embolism   - c/w eliquis     # Chronic HFpEF  - euvolumic on exam   - c/w ASA, meotprlol and statin     # Hypertension   - c/w losartan  metoprolol    # Alcohol Dependence  - BAL <10  - monitor for withdrawals  -prn protocol     # Tobacco Addiction/Dependence  -smoking cessation and counseling done (3 mins)  -Nicotine replacement therapy     # Overweight   - BMI > 29  - weight loss and diet modification     # Thiamine and Folic acid def  - c/w supplements     # DVT ppx  - start Lovenox     # DASH diet     # Full code   65 yr old male with hx of HFpEF, PE on DOAC, EtOH dependence, Hx left pontine CVA w/Dysphagia presents to the ER with complaints of BLE pain and inability to ambulate.      # Inability to ambulate secondary to chronic deconditioning   - ambulate as tolerated  - fall precaution   - PT eval    # Chronic Pulmonary Embolism   - c/w eliquis     # Chronic HFpEF  - euvolumic on exam   - c/w ASA, meotprlol and statin     # Hypertension   - c/w losartan, metoprolol    # Alcohol Dependence  - BAL <10  - monitor for withdrawals    # Tobacco Addiction/Dependence  - smoking cessation and counseling done (3 mins)  - Nicotine replacement therapy     # Overweight   - BMI > 29  - weight loss and diet modification     # Thiamine and Folic acid def  - c/w supplements     # DVT ppx  - start Lovenox     # DASH diet     # Full code   65 yr old male with hx of HFpEF, PE on DOAC, EtOH dependence, Hx left pontine CVA w/Dysphagia presents to the ER with complaints of BLE pain and inability to ambulate.      # Inability to ambulate secondary to chronic deconditioning   - ambulate as tolerated  - fall precaution   - PT eval    # subacute HFpEF  - ECHO (05/15/20): normal; EF 55%  - 2+ b/l LE edema   - c/w ASA, meotprlol and statin   - start Lasix 20mg IV daily    # Chronic Pulmonary Embolism   - c/w Eliquis     # Hypertension   - c/w losartan, metoprolol    # Alcohol Dependence  - BAL <10  - monitor for withdrawals    # Tobacco Addiction/Dependence  - smoking cessation and counseling done (3 mins)  - Nicotine replacement therapy     # Overweight   - BMI > 29  - weight loss and diet modification     # Thiamine and Folic acid def  - c/w supplements     # DVT ppx  - start Lovenox     # DASH diet     # Full code   65 yr old male with hx of HFpEF, PE on DOAC, EtOH dependence, Hx left pontine CVA w/Dysphagia presents to the ER with complaints of BLE pain and inability to ambulate.      # Inability to ambulate secondary to chronic deconditioning   - ambulate as tolerated  - fall precaution   - PT eval    # subacute HFpEF  - ECHO (05/15/20): normal; EF 55%  - 2+ b/l LE edema   - c/w ASA, metoprolol and statin   - start Lasix 20mg IV daily    # Chronic Pulmonary Embolism   - c/w Eliquis     # Hypertension   - c/w losartan, metoprolol    # Alcohol Dependence  - BAL <10  - monitor for withdrawals    # Tobacco Addiction/Dependence  - smoking cessation and counseling done (3 mins)  - Nicotine replacement therapy     # Overweight   - BMI > 29  - weight loss and diet modification     # Thiamine and Folic acid def  - c/w supplements     # DVT ppx  - start Lovenox     # DASH diet     # Full code   65 yr old male with hx of HFpEF, PE on DOAC, EtOH dependence, Hx left pontine CVA w/Dysphagia presents to the ER with complaints of BLE pain and inability to ambulate.      # Inability to ambulate secondary to chronic deconditioning   - UA negative   - labs unremarkable   - hemodynamically stable   - ambulate as tolerated  - fall precaution   - PT eval    # subacute HFpEF  - ECHO (05/15/20): normal; EF 55%  - 2+ b/l LE edema   - c/w ASA, metoprolol and statin   - start Lasix 20mg IV daily    # Chronic Pulmonary Embolism   - c/w Eliquis     # Hypertension   - c/w losartan, metoprolol    # Alcohol Dependence  - BAL <10  - monitor for withdrawals    # Tobacco Addiction/Dependence  - smoking cessation and counseling done (3 mins)  - Nicotine replacement therapy     # Overweight   - BMI > 29  - weight loss and diet modification     # Thiamine and Folic acid def  - c/w supplements     # DVT ppx  - start Lovenox     # DASH diet     # Full code   65 yr old male with hx of HFpEF, PE on DOAC, EtOH dependence, Hx left pontine CVA presents to the ER with complaints of BLE pain and inability to ambulate.      # Inability to ambulate secondary to chronic deconditioning   - UA negative   - labs unremarkable   - hemodynamically stable   - ambulate as tolerated  - fall precaution   - PT eval    # subacute HFpEF  - ECHO (05/15/20): normal; EF 55%  - 2+ b/l LE edema   - c/w ASA, metoprolol and statin   - start Lasix 20mg IV daily    # Hx left pontine CVA  - c/w ASA and statin     # Chronic Pulmonary Embolism   - c/w Eliquis     # Hypertension   - c/w losartan, metoprolol    # Alcohol Dependence  - BAL <10  - monitor for withdrawals    # Tobacco Addiction/Dependence  - smoking cessation and counseling done (3 mins)  - Nicotine replacement therapy     # Overweight   - BMI > 29  - weight loss and diet modification     # Thiamine and Folic acid def  - c/w supplements     # DVT ppx  - on Eliquis     # DASH diet     # Full code   65 yr old male with hx of HFpEF, PE on DOAC, EtOH dependence, Hx left pontine CVA presents to the ER with complaints of BLE pain and inability to ambulate.      # Inability to ambulate secondary to chronic deconditioning   - UA negative   - labs unremarkable   - hemodynamically stable   - ambulate as tolerated  - fall precaution   - PT eval    # subacute HFpEF  - ECHO (05/15/20): normal; EF 55%  - 2+ b/l LE edema   - c/w ASA, metoprolol and statin   - start Lasix 20mg IV daily    # Hx left pontine CVA  - c/w ASA and statin     # Chronic Pulmonary Embolism   - c/w Eliquis     # Hypertension   - c/w losartan, metoprolol    # Alcohol Dependence  - BAL <10  - last drink 2 days ago   - monitor for withdrawals    # Tobacco Addiction/Dependence  - smoking cessation and counseling done (3 mins)  - Nicotine replacement therapy     # Overweight   - BMI > 29  - weight loss and diet modification     # Thiamine and Folic acid def  - c/w supplements     # DVT ppx  - on Eliquis     # DASH diet     # Full code   65 yr old male with hx of HFpEF, PE on DOAC, EtOH dependence, Hx left pontine CVA presents to the ER with complaints of BLE pain and inability to ambulate.      # Inability to ambulate secondary to chronic deconditioning   - UA negative   - labs unremarkable   - hemodynamically stable   - ambulate as tolerated  - fall precaution   - PT eval    # subacute HFpEF  - ECHO (05/15/20): normal; EF 55%  - 2+ b/l LE edema   - c/w ASA, metoprolol and statin   - start Lasix 20mg IV daily    # Hx left pontine CVA  - c/w ASA and statin     # Chronic Pulmonary Embolism   - c/w Eliquis     # Hypertension   - c/w losartan, metoprolol    # Alcohol Dependence  - BAL <10  - last drink 2 days ago   - counseled to quit drinking (5 mins)   - monitor for withdrawals    # Tobacco Addiction/Dependence  - smoking cessation and counseling done (3 mins)  - Nicotine replacement therapy     # Overweight   - BMI > 29  - weight loss and diet modification     # Thiamine and Folic acid def  - c/w supplements     # DVT ppx  - on Eliquis     # DASH diet     # Full code

## 2021-06-21 NOTE — ED PROVIDER NOTE - PHYSICAL EXAMINATION
CONSTITUTIONAL: unkempt male, sitting in urine soaked pants  SKIN: Warm dry, normal skin turgor  HEAD: NCAT  EYES: EOMI, PERRLA, no scleral icterus, conjunctiva pink  ENT: dry mucous membranes with no erythema or exudates  NECK: Supple; non tender. Full ROM.  CARD: RRR, no murmurs.  RESP: clear to ausculation b/l. No crackles or wheezing.  ABD: soft, non-tender, non-distended, no rebound or guarding.  EXT: Full ROM, no bony tenderness, no pedal edema, no calf tenderness  NEURO: normal motor. normal sensory. CN II-XII intact. Cerebellar testing normal.  PSYCH: Cooperative, appropriate.

## 2021-06-21 NOTE — H&P ADULT - NSHPLABSRESULTS_GEN_ALL_CORE
12.5   9.05  )-----------( 117      ( 21 Jun 2021 17:24 )             37.8       06-21    137  |  100  |  13  ----------------------------<  142<H>  3.7   |  28  |  0.9    Ca    9.1      21 Jun 2021 17:24    TPro  7.2  /  Alb  3.8  /  TBili  0.9  /  DBili  x   /  AST  37  /  ALT  33  /  AlkPhos  102  06-21 12.5   9.05  )-----------( 117      ( 21 Jun 2021 17:24 )             37.8       06-21    137  |  100  |  13  ----------------------------<  142<H>  3.7   |  28  |  0.9    Ca    9.1      21 Jun 2021 17:24    TPro  7.2  /  Alb  3.8  /  TBili  0.9  /  DBili  x   /  AST  37  /  ALT  33  /  AlkPhos  102  06-21        < from: Xray Chest 1 View- PORTABLE-Urgent (Xray Chest 1 View- PORTABLE-Urgent .) (06.20.21 @ 21:24) >    No radiographic evidence of acute cardiopulmonary disease.    < end of copied text >

## 2021-06-21 NOTE — ED ADULT TRIAGE NOTE - CHIEF COMPLAINT QUOTE
Pt brought in by ambulance from bus stop. As per EMT "He called because he was having difficulty walking." Pt recently discharged from hospital (bracelet on left wrist). Pt denies alcohol use; pt denies pain to legs. Clothes urine soaked upon arrival.

## 2021-06-21 NOTE — H&P ADULT - NSHPPHYSICALEXAM_GEN_ALL_CORE
T(C): 36.2 (06-21-21 @ 21:00), Max: 37.1 (06-21-21 @ 08:27)  HR: 76 (06-21-21 @ 21:00) (66 - 95)  BP: 180/86 (06-21-21 @ 21:00) (137/69 - 181/81)  RR: 18 (06-21-21 @ 21:00) (18 - 20)  SpO2: 97% (06-21-21 @ 18:00) (96% - 97%)        GENERAL: NAD, well-developed  HEAD:  Atraumatic, Normocephalic  EYES: EOMI, PERRLA, conjunctiva and sclera clear  ENT: Normal tympanic membrane. No nasal obstruction or discharge. No tonsillar exudate, swelling or erythema.  NECK: Supple, No JVD  CHEST/LUNG: Clear to auscultation bilaterally; No wheeze  HEART: Regular rate and rhythm; No murmurs, rubs, or gallops  ABDOMEN: Soft, Nontender, Nondistended; Bowel sounds present  EXTREMITIES:  2+ Peripheral Pulses, No clubbing, cyanosis, or edema  PSYCH: AAOx3  NEUROLOGY: non-focal  SKIN: No rashes or lesions T(C): 36.2 (06-21-21 @ 21:00), Max: 37.1 (06-21-21 @ 08:27)  HR: 76 (06-21-21 @ 21:00) (66 - 95)  BP: 180/86 (06-21-21 @ 21:00) (137/69 - 181/81)  RR: 18 (06-21-21 @ 21:00) (18 - 20)  SpO2: 97% (06-21-21 @ 18:00) (96% - 97%)    GENERAL: NAD, well-developed  HEAD:  Atraumatic, Normocephalic  EYES: EOMI, PERRLA, conjunctiva and sclera clear  ENT: Normal tympanic membrane. No nasal obstruction or discharge. No tonsillar exudate, swelling or erythema.  NECK: Supple, No JVD  CHEST/LUNG: Clear to auscultation bilaterally; No wheeze  HEART: Regular rate and rhythm; No murmurs, rubs, or gallops  ABDOMEN: Soft, Nontender, Nondistended; Bowel sounds present  EXTREMITIES:  2+ Peripheral Pulses, No clubbing, cyanosis, 2+ b/l LE edema   PSYCH: AAOx3  NEUROLOGY: non-focal  SKIN: No rashes or lesions

## 2021-06-21 NOTE — H&P ADULT - HISTORY OF PRESENT ILLNESS
65 yr old male with hx of HFpEF, PE on Eliquis, EtOH dependence, Hx left pontine CVA w/Dysphagia presents to the ER with complaints of BLE pain and inability to ambulate. Per EMS, patient was at a bus stop, called 911 to be driven home, was refused, and then complained of difficulty walking. Patient says symptoms have been going on for months, but got acutely worse today. Patient was admitted twice this year for similar complains. Denies fall/head trauma/back trauma, numbness, weakness, tingling, headache, vision changes, dizziness, chest pain, shortness of breath, nausea, vomiting, diarrhea, dysuria,  hematuria, melena, hematochezia. Denies drug/alcohol use today. 65 yr old male with hx of HFpEF, PE on Eliquis, EtOH dependence, Hx left pontine CVA w/Dysphagia presents to the ER with complaints of BLE pain and inability to ambulate. Per EMS, patient was at a bus stop, called 911 to be driven home, was refused, and then complained of difficulty walking. Patient says symptoms have been going on for months, but got acutely worse today. Patient was admitted twice this year for similar complains. Denies fall/head trauma/back trauma, numbness, weakness, tingling, headache, vision changes, dizziness, chest pain, shortness of breath, nausea, vomiting, diarrhea, dysuria,  hematuria, melena, hematochezia.

## 2021-06-21 NOTE — ED PROVIDER NOTE - CLINICAL SUMMARY MEDICAL DECISION MAKING FREE TEXT BOX
ATTENDING NOTE: 64 y/o M presents with ETOH intoxication. Pt was initially inebriated. Vitals reviewed by me noted to be wnl. Upon re-eval a few hours later, pt was unable to ambulate and tremulous. Labs obtained. Pt given thiamine. Case signed out pending sobriety and final disposition. ATTENDING NOTE: 66 y/o M presents with ETOH intoxication. Pt was initially inebriated. Vitals reviewed by me noted to be wnl. Upon re-eval a few hours later, pt was unable to ambulate and tremulous. Labs obtained. Pt given thiamine. Case signed out pending sobriety and final disposition. On reeval pt was unable to ambulate, etoh < 10. will admit for gait dysfunction, fall risk, pt eval.

## 2021-06-22 LAB
ANION GAP SERPL CALC-SCNC: 12 MMOL/L — SIGNIFICANT CHANGE UP (ref 7–14)
BASOPHILS # BLD AUTO: 0.02 K/UL — SIGNIFICANT CHANGE UP (ref 0–0.2)
BASOPHILS NFR BLD AUTO: 0.3 % — SIGNIFICANT CHANGE UP (ref 0–1)
BUN SERPL-MCNC: 9 MG/DL — LOW (ref 10–20)
CALCIUM SERPL-MCNC: 9.2 MG/DL — SIGNIFICANT CHANGE UP (ref 8.5–10.1)
CHLORIDE SERPL-SCNC: 99 MMOL/L — SIGNIFICANT CHANGE UP (ref 98–110)
CO2 SERPL-SCNC: 27 MMOL/L — SIGNIFICANT CHANGE UP (ref 17–32)
CREAT SERPL-MCNC: 0.8 MG/DL — SIGNIFICANT CHANGE UP (ref 0.7–1.5)
EOSINOPHIL # BLD AUTO: 0.1 K/UL — SIGNIFICANT CHANGE UP (ref 0–0.7)
EOSINOPHIL NFR BLD AUTO: 1.4 % — SIGNIFICANT CHANGE UP (ref 0–8)
GLUCOSE SERPL-MCNC: 103 MG/DL — HIGH (ref 70–99)
HCT VFR BLD CALC: 38.5 % — LOW (ref 42–52)
HGB BLD-MCNC: 12.8 G/DL — LOW (ref 14–18)
IMM GRANULOCYTES NFR BLD AUTO: 0.4 % — HIGH (ref 0.1–0.3)
LYMPHOCYTES # BLD AUTO: 1.03 K/UL — LOW (ref 1.2–3.4)
LYMPHOCYTES # BLD AUTO: 14.8 % — LOW (ref 20.5–51.1)
MAGNESIUM SERPL-MCNC: 1.5 MG/DL — LOW (ref 1.8–2.4)
MCHC RBC-ENTMCNC: 28.8 PG — SIGNIFICANT CHANGE UP (ref 27–31)
MCHC RBC-ENTMCNC: 33.2 G/DL — SIGNIFICANT CHANGE UP (ref 32–37)
MCV RBC AUTO: 86.7 FL — SIGNIFICANT CHANGE UP (ref 80–94)
MONOCYTES # BLD AUTO: 0.59 K/UL — SIGNIFICANT CHANGE UP (ref 0.1–0.6)
MONOCYTES NFR BLD AUTO: 8.5 % — SIGNIFICANT CHANGE UP (ref 1.7–9.3)
NEUTROPHILS # BLD AUTO: 5.18 K/UL — SIGNIFICANT CHANGE UP (ref 1.4–6.5)
NEUTROPHILS NFR BLD AUTO: 74.6 % — SIGNIFICANT CHANGE UP (ref 42.2–75.2)
NRBC # BLD: 0 /100 WBCS — SIGNIFICANT CHANGE UP (ref 0–0)
PLATELET # BLD AUTO: 105 K/UL — LOW (ref 130–400)
POTASSIUM SERPL-MCNC: 3.1 MMOL/L — LOW (ref 3.5–5)
POTASSIUM SERPL-SCNC: 3.1 MMOL/L — LOW (ref 3.5–5)
RBC # BLD: 4.44 M/UL — LOW (ref 4.7–6.1)
RBC # FLD: 14.3 % — SIGNIFICANT CHANGE UP (ref 11.5–14.5)
SODIUM SERPL-SCNC: 138 MMOL/L — SIGNIFICANT CHANGE UP (ref 135–146)
WBC # BLD: 6.95 K/UL — SIGNIFICANT CHANGE UP (ref 4.8–10.8)
WBC # FLD AUTO: 6.95 K/UL — SIGNIFICANT CHANGE UP (ref 4.8–10.8)

## 2021-06-22 PROCEDURE — 99232 SBSQ HOSP IP/OBS MODERATE 35: CPT

## 2021-06-22 RX ORDER — FUROSEMIDE 40 MG
20 TABLET ORAL DAILY
Refills: 0 | Status: DISCONTINUED | OUTPATIENT
Start: 2021-06-22 | End: 2021-06-22

## 2021-06-22 RX ORDER — FUROSEMIDE 40 MG
20 TABLET ORAL DAILY
Refills: 0 | Status: DISCONTINUED | OUTPATIENT
Start: 2021-06-22 | End: 2021-06-24

## 2021-06-22 RX ORDER — NICOTINE POLACRILEX 2 MG
1 GUM BUCCAL DAILY
Refills: 0 | Status: DISCONTINUED | OUTPATIENT
Start: 2021-06-22 | End: 2021-06-24

## 2021-06-22 RX ORDER — POTASSIUM CHLORIDE 20 MEQ
20 PACKET (EA) ORAL ONCE
Refills: 0 | Status: COMPLETED | OUTPATIENT
Start: 2021-06-22 | End: 2021-06-22

## 2021-06-22 RX ORDER — MAGNESIUM SULFATE 500 MG/ML
1 VIAL (ML) INJECTION ONCE
Refills: 0 | Status: COMPLETED | OUTPATIENT
Start: 2021-06-22 | End: 2021-06-22

## 2021-06-22 RX ADMIN — LOSARTAN POTASSIUM 100 MILLIGRAM(S): 100 TABLET, FILM COATED ORAL at 06:20

## 2021-06-22 RX ADMIN — Medication 20 MILLIEQUIVALENT(S): at 18:27

## 2021-06-22 RX ADMIN — Medication 1 PATCH: at 11:48

## 2021-06-22 RX ADMIN — Medication 20 MILLIGRAM(S): at 06:17

## 2021-06-22 RX ADMIN — Medication 1 TABLET(S): at 11:48

## 2021-06-22 RX ADMIN — APIXABAN 5 MILLIGRAM(S): 2.5 TABLET, FILM COATED ORAL at 06:19

## 2021-06-22 RX ADMIN — ATORVASTATIN CALCIUM 80 MILLIGRAM(S): 80 TABLET, FILM COATED ORAL at 21:24

## 2021-06-22 RX ADMIN — APIXABAN 5 MILLIGRAM(S): 2.5 TABLET, FILM COATED ORAL at 18:28

## 2021-06-22 RX ADMIN — GABAPENTIN 100 MILLIGRAM(S): 400 CAPSULE ORAL at 21:24

## 2021-06-22 RX ADMIN — Medication 100 MILLIGRAM(S): at 11:48

## 2021-06-22 RX ADMIN — Medication 1 APPLICATION(S): at 21:24

## 2021-06-22 RX ADMIN — GABAPENTIN 100 MILLIGRAM(S): 400 CAPSULE ORAL at 06:19

## 2021-06-22 RX ADMIN — Medication 25 MILLIGRAM(S): at 18:28

## 2021-06-22 RX ADMIN — Medication 1 MILLIGRAM(S): at 11:48

## 2021-06-22 RX ADMIN — Medication 25 MILLIGRAM(S): at 06:20

## 2021-06-22 RX ADMIN — Medication 81 MILLIGRAM(S): at 11:48

## 2021-06-22 RX ADMIN — Medication 100 GRAM(S): at 18:26

## 2021-06-22 RX ADMIN — GABAPENTIN 100 MILLIGRAM(S): 400 CAPSULE ORAL at 14:48

## 2021-06-22 NOTE — CONSULT NOTE ADULT - SUBJECTIVE AND OBJECTIVE BOX
HPI: 65 yr old male with hx of HFpEF, PE on Eliquis, EtOH dependence, Hx left pontine CVA w/Dysphagia presents to the ER with complaints of BLE pain and inability to ambulate. Per EMS, patient was at a bus stop, called 911 to be driven home, was refused, and then complained of difficulty walking. Patient says symptoms have been going on for months, but got acutely worse today. Patient was admitted twice this year for similar complains. Denies fall/head trauma/back trauma, numbness, weakness, tingling, headache, vision changes, dizziness, chest pain, shortness of breath, nausea, vomiting, diarrhea, dysuria,  hematuria, melena, hematochezia.  ptn  seen at  bed side  with  rehab PT  ptn  is  aox3 nad fu  command      PTN  REFERRED TO ACUTE  REHAB  FOR  EVAL AND  TX   PAST MEDICAL & SURGICAL HISTORY:  TIA (transient ischemic attack)    CHF (congestive heart failure)    HTN (hypertension)    HLD (hyperlipidemia)    Alcohol dependence    Chronic back pain    Pulmonary embolism      Cerebrovascular accident (CVA)  left pontine with dysphagia    History of appendectomy    Hospital Course:    TODAY'S SUBJECTIVE & REVIEW OF SYMPTOMS:     Constitutional WNL   Cardio WNL   Resp WNL   GI WNL  Heme WNL  Endo WNL  Skin WNL  MSK WNL  Neuro WNL  Cognitive WNL  Psych WNL      MEDICATIONS  (STANDING):  apixaban 5 milliGRAM(s) Oral two times a day  aspirin  chewable 81 milliGRAM(s) Oral daily  atorvastatin 80 milliGRAM(s) Oral at bedtime  BACItracin   Ointment 1 Application(s) Topical three times a day  folic acid 1 milliGRAM(s) Oral daily  furosemide   Injectable 20 milliGRAM(s) IV Push daily  gabapentin 100 milliGRAM(s) Oral three times a day  losartan 100 milliGRAM(s) Oral daily  metoprolol tartrate 25 milliGRAM(s) Oral two times a day  multivitamin/minerals 1 Tablet(s) Oral daily  nicotine - 21 mG/24Hr(s) Patch 1 patch Transdermal daily  thiamine 100 milliGRAM(s) Oral daily    MEDICATIONS  (PRN):  methocarbamol 500 milliGRAM(s) Oral every 8 hours PRN Muscle Spasm      FAMILY HISTORY:  FH: MI (myocardial infarction) (Father, Mother)  Dad, Mom        Allergies    No Known Allergies    Intolerances        SOCIAL HISTORY:    [  ] Etoh  [  ] Smoking  [  ] Substance abuse     Home Environment:  [  x] Home Alone  [  ] Lives with Family  [  ] Home Health Aid    Dwelling:  [ x ] Apartment  [x  ] Private House  [  ] Adult Home  [  ] Skilled Nursing Facility      [  ] Short Term  [  ] Long Term  [ x ] Stairs       Elevator [  ]    FUNCTIONAL STATUS PTA: (Check all that apply)  Ambulation: [ x  ]Independent    [  ] Dependent     [  ] Non-Ambulatory  Assistive Device: [  ] SA Cane  [  ]  Q Cane  [x  ] Walker  [  ]  Wheelchair  ADL : [ x ] Independent  [  ]  Dependent       Vital Signs Last 24 Hrs  T(C): 36.3 (2021 05:22), Max: 36.3 (2021 11:30)  T(F): 97.3 (2021 05:22), Max: 97.3 (2021 11:30)  HR: 80 (2021 05:22) (76 - 89)  BP: 175/85 (2021 05:22) (166/77 - 180/86)  BP(mean): --  RR: 18 (2021 05:22) (18 - 20)  SpO2: 97% (2021 18:00) (97% - 97%)      PHYSICAL EXAM: Alert & Oriented X3  GENERAL: NAD, well-groomed, well-developed  HEAD:  Atraumatic, Normocephalic  EYES: EOMI, PERRLA, conjunctiva and sclera clear  NECK: Supple, No JVD, Normal thyroid  CHEST/LUNG: Clear to percussion bilaterally; No rales, rhonchi, wheezing, or rubs  HEART: Regular rate and rhythm; No murmurs, rubs, or gallops  ABDOMEN: Soft, Nontender, Nondistended; Bowel sounds present  EXTREMITIES:  2+ Peripheral Pulses, No clubbing, cyanosis, or edema    NERVOUS SYSTEM:  Cranial Nerves 2-12 intact [ x ] Abnormal  [x  ]  ROM: WFL all extremities [  ]  Abnormal [x  ]  Motor Strength: WFL all extremities  [  ]  Abnormal [ x ]  Sensation: intact to light touch [  ] Abnormal [x  ]  Reflexes: Symmetric [x  ]  Abnormal [  ]    FUNCTIONAL STATUS:  Bed Mobility: Independent [  ]  Supervision [  ]  Needs Assistance [  x]  N/A [  ]  Transfers: Independent [  ]  Supervision [  ]  Needs Assistance [ x ]  N/A [  ]   Ambulation: Independent [  ]  Supervision [  ]  Needs Assistance [x  ]  N/A [  ]  ADL: Independent [  ] Requires Assistance [  ] N/A [ x ]  SEE PT/OT IE NOTES    LABS:                        12.5   9.05  )-----------( 117      ( 2021 17:24 )             37.8     06-    137  |  100  |  13  ----------------------------<  142<H>  3.7   |  28  |  0.9    Ca    9.1      2021 17:24    TPro  7.2  /  Alb  3.8  /  TBili  0.9  /  DBili  x   /  AST  37  /  ALT  33  /  AlkPhos  102        Urinalysis Basic - ( 2021 21:30 )    Color: Yellow / Appearance: Clear / S.020 / pH: x  Gluc: x / Ketone: Negative  / Bili: Negative / Urobili: 1.0 mg/dL   Blood: x / Protein: 30 mg/dL / Nitrite: Negative   Leuk Esterase: Negative / RBC: 1-2 /HPF / WBC 3-5 /HPF   Sq Epi: x / Non Sq Epi: Few /HPF / Bacteria: occ /HPF        RADIOLOGY & ADDITIONAL STUDIES:< from: CT Head No Cont (21 @ 15:47) >  IMPRESSION:  No significant change since recent head CT of 2020.    Extensive chronic microvascular-type changes as well as chronic lacunar infarcts involving left thalamus, left rosalie and left cerebellar hemisphere.    < end of copied text >      Assesment:

## 2021-06-22 NOTE — PHYSICAL THERAPY INITIAL EVALUATION ADULT - ADDITIONAL COMMENTS
Pt is an inconsistent historian but well known to PT service from previous admissions - reports he lives in a private house alone (sometimes a friend stays with him). He states there are 8 steps with rail on the outside of the house to enter, then he lives on the first level. Pt has SC and RW at home, may also have W/C.

## 2021-06-22 NOTE — PHYSICAL THERAPY INITIAL EVALUATION ADULT - GAIT DEVIATIONS NOTED, PT EVAL
forward flexed trunk, dec heel strike and push off, amb on flexed knees/decreased fish/decreased step length/decreased stride length/decreased weight-shifting ability

## 2021-06-22 NOTE — CONSULT NOTE ADULT - ASSESSMENT
IMPRESSION: Rehab of 65  y/op m  rehab  for  GD  hx  of cva  tia      PRECAUTIONS: [  ] Cardiac  [  ] Respiratory  [  ] Seizures [  ] Contact Isolation  [  ] Droplet Isolation  [ FALL ] Other    Weight Bearing Status:     RECOMMENDATION:    Out of Bed to Chair     DVT/Decubiti Prophylaxis    REHAB PLAN:     [ xx  ] Bedside P/T 3-5 times a week   [x   ]   Bedside O/T  2-3 times a week             [   ] No Rehab Therapy Indicated                   [ x  ]  Speech Therapy   Conditioning/ROM                                    ADL  Bed Mobility                                               Conditioning/ROM  Transfers                                                     Bed Mobility  Sitting /Standing Balance                         Transfers                                        Gait Training                                               Sitting/Standing Balance  Stair Training [   ]Applicable                    Home equipment Eval                                                                        Splinting  [   ] Only      GOALS:   ADL   [   x]   Independent                    Transfers  [  x ] Independent                          Ambulation  [ x  ] Independent     [x    ] With device                            [ x  ]  CG                                                         [ x  ]  CG                                                                  [  x ] CG                            [    ] Min A                                                   [   ] Min A                                                              [   ] Min  A          DISCHARGE PLAN:   [   ]  Good candidate for Intensive Rehabilitation/Hospital based-4A [facility]                                             Will tolerate 3hrs Intensive Rehab Daily                                       [  xx  ]  Short Term Rehab in Skilled Nursing Facility and  cont current care                                        [    ]  Home with Outpatient or VN services                                         [    ]  Possible Candidate for Intensive Hospital based Rehab

## 2021-06-23 LAB
ALBUMIN SERPL ELPH-MCNC: 4.1 G/DL — SIGNIFICANT CHANGE UP (ref 3.5–5.2)
ALP SERPL-CCNC: 102 U/L — SIGNIFICANT CHANGE UP (ref 30–115)
ALT FLD-CCNC: 34 U/L — SIGNIFICANT CHANGE UP (ref 0–41)
ANION GAP SERPL CALC-SCNC: 10 MMOL/L — SIGNIFICANT CHANGE UP (ref 7–14)
AST SERPL-CCNC: 43 U/L — HIGH (ref 0–41)
BILIRUB SERPL-MCNC: 0.6 MG/DL — SIGNIFICANT CHANGE UP (ref 0.2–1.2)
BUN SERPL-MCNC: 18 MG/DL — SIGNIFICANT CHANGE UP (ref 10–20)
CALCIUM SERPL-MCNC: 9.2 MG/DL — SIGNIFICANT CHANGE UP (ref 8.5–10.1)
CHLORIDE SERPL-SCNC: 101 MMOL/L — SIGNIFICANT CHANGE UP (ref 98–110)
CO2 SERPL-SCNC: 29 MMOL/L — SIGNIFICANT CHANGE UP (ref 17–32)
COVID-19 SPIKE DOMAIN AB INTERP: POSITIVE
COVID-19 SPIKE DOMAIN ANTIBODY RESULT: >250 U/ML — HIGH
CREAT SERPL-MCNC: 1.2 MG/DL — SIGNIFICANT CHANGE UP (ref 0.7–1.5)
GLUCOSE SERPL-MCNC: 117 MG/DL — HIGH (ref 70–99)
HCT VFR BLD CALC: 39.4 % — LOW (ref 42–52)
HGB BLD-MCNC: 12.7 G/DL — LOW (ref 14–18)
MAGNESIUM SERPL-MCNC: 1.8 MG/DL — SIGNIFICANT CHANGE UP (ref 1.8–2.4)
MCHC RBC-ENTMCNC: 28.4 PG — SIGNIFICANT CHANGE UP (ref 27–31)
MCHC RBC-ENTMCNC: 32.2 G/DL — SIGNIFICANT CHANGE UP (ref 32–37)
MCV RBC AUTO: 88.1 FL — SIGNIFICANT CHANGE UP (ref 80–94)
NRBC # BLD: 0 /100 WBCS — SIGNIFICANT CHANGE UP (ref 0–0)
PLATELET # BLD AUTO: 99 K/UL — LOW (ref 130–400)
POTASSIUM SERPL-MCNC: 3.8 MMOL/L — SIGNIFICANT CHANGE UP (ref 3.5–5)
POTASSIUM SERPL-SCNC: 3.8 MMOL/L — SIGNIFICANT CHANGE UP (ref 3.5–5)
PROT SERPL-MCNC: 7 G/DL — SIGNIFICANT CHANGE UP (ref 6–8)
RBC # BLD: 4.47 M/UL — LOW (ref 4.7–6.1)
RBC # FLD: 14.3 % — SIGNIFICANT CHANGE UP (ref 11.5–14.5)
SARS-COV-2 IGG+IGM SERPL QL IA: >250 U/ML — HIGH
SARS-COV-2 IGG+IGM SERPL QL IA: POSITIVE
SODIUM SERPL-SCNC: 140 MMOL/L — SIGNIFICANT CHANGE UP (ref 135–146)
WBC # BLD: 6.77 K/UL — SIGNIFICANT CHANGE UP (ref 4.8–10.8)
WBC # FLD AUTO: 6.77 K/UL — SIGNIFICANT CHANGE UP (ref 4.8–10.8)

## 2021-06-23 PROCEDURE — 99231 SBSQ HOSP IP/OBS SF/LOW 25: CPT

## 2021-06-23 RX ORDER — GUAIFENESIN/DEXTROMETHORPHAN 600MG-30MG
5 TABLET, EXTENDED RELEASE 12 HR ORAL EVERY 6 HOURS
Refills: 0 | Status: DISCONTINUED | OUTPATIENT
Start: 2021-06-23 | End: 2021-06-24

## 2021-06-23 RX ORDER — ACETAMINOPHEN 500 MG
650 TABLET ORAL EVERY 6 HOURS
Refills: 0 | Status: DISCONTINUED | OUTPATIENT
Start: 2021-06-23 | End: 2021-06-24

## 2021-06-23 RX ADMIN — Medication 25 MILLIGRAM(S): at 17:05

## 2021-06-23 RX ADMIN — Medication 650 MILLIGRAM(S): at 22:42

## 2021-06-23 RX ADMIN — GABAPENTIN 100 MILLIGRAM(S): 400 CAPSULE ORAL at 14:10

## 2021-06-23 RX ADMIN — Medication 81 MILLIGRAM(S): at 11:37

## 2021-06-23 RX ADMIN — GABAPENTIN 100 MILLIGRAM(S): 400 CAPSULE ORAL at 22:45

## 2021-06-23 RX ADMIN — Medication 100 MILLIGRAM(S): at 11:38

## 2021-06-23 RX ADMIN — Medication 1 PATCH: at 11:37

## 2021-06-23 RX ADMIN — Medication 1 PATCH: at 06:00

## 2021-06-23 RX ADMIN — APIXABAN 5 MILLIGRAM(S): 2.5 TABLET, FILM COATED ORAL at 17:05

## 2021-06-23 RX ADMIN — Medication 1 PATCH: at 19:33

## 2021-06-23 RX ADMIN — GABAPENTIN 100 MILLIGRAM(S): 400 CAPSULE ORAL at 05:11

## 2021-06-23 RX ADMIN — Medication 20 MILLIGRAM(S): at 05:12

## 2021-06-23 RX ADMIN — Medication 1 TABLET(S): at 11:37

## 2021-06-23 RX ADMIN — Medication 1 MILLIGRAM(S): at 11:37

## 2021-06-23 RX ADMIN — Medication 1 APPLICATION(S): at 05:12

## 2021-06-23 RX ADMIN — Medication 650 MILLIGRAM(S): at 20:33

## 2021-06-23 RX ADMIN — APIXABAN 5 MILLIGRAM(S): 2.5 TABLET, FILM COATED ORAL at 05:11

## 2021-06-23 RX ADMIN — Medication 5 MILLILITER(S): at 11:38

## 2021-06-23 RX ADMIN — LOSARTAN POTASSIUM 100 MILLIGRAM(S): 100 TABLET, FILM COATED ORAL at 05:12

## 2021-06-23 RX ADMIN — ATORVASTATIN CALCIUM 80 MILLIGRAM(S): 80 TABLET, FILM COATED ORAL at 22:45

## 2021-06-23 RX ADMIN — Medication 1 APPLICATION(S): at 14:11

## 2021-06-23 RX ADMIN — Medication 25 MILLIGRAM(S): at 05:11

## 2021-06-23 RX ADMIN — Medication 1 APPLICATION(S): at 22:45

## 2021-06-24 ENCOUNTER — TRANSCRIPTION ENCOUNTER (OUTPATIENT)
Age: 66
End: 2021-06-24

## 2021-06-24 VITALS
HEART RATE: 61 BPM | DIASTOLIC BLOOD PRESSURE: 57 MMHG | TEMPERATURE: 97 F | RESPIRATION RATE: 16 BRPM | SYSTOLIC BLOOD PRESSURE: 127 MMHG

## 2021-06-24 LAB
SARS-COV-2 RNA SPEC QL NAA+PROBE: SIGNIFICANT CHANGE UP
SARS-COV-2 RNA SPEC QL NAA+PROBE: SIGNIFICANT CHANGE UP

## 2021-06-24 PROCEDURE — 99238 HOSP IP/OBS DSCHRG MGMT 30/<: CPT

## 2021-06-24 RX ORDER — FUROSEMIDE 40 MG
1 TABLET ORAL
Qty: 0 | Refills: 0 | DISCHARGE
Start: 2021-06-24

## 2021-06-24 RX ORDER — GUAIFENESIN/DEXTROMETHORPHAN 600MG-30MG
5 TABLET, EXTENDED RELEASE 12 HR ORAL
Qty: 0 | Refills: 0 | DISCHARGE
Start: 2021-06-24

## 2021-06-24 RX ORDER — NICOTINE POLACRILEX 2 MG
1 GUM BUCCAL
Qty: 0 | Refills: 0 | DISCHARGE
Start: 2021-06-24

## 2021-06-24 RX ADMIN — Medication 25 MILLIGRAM(S): at 06:22

## 2021-06-24 RX ADMIN — APIXABAN 5 MILLIGRAM(S): 2.5 TABLET, FILM COATED ORAL at 06:23

## 2021-06-24 RX ADMIN — Medication 1 APPLICATION(S): at 13:01

## 2021-06-24 RX ADMIN — GABAPENTIN 100 MILLIGRAM(S): 400 CAPSULE ORAL at 13:01

## 2021-06-24 RX ADMIN — Medication 5 MILLILITER(S): at 06:39

## 2021-06-24 RX ADMIN — Medication 1 PATCH: at 12:46

## 2021-06-24 RX ADMIN — LOSARTAN POTASSIUM 100 MILLIGRAM(S): 100 TABLET, FILM COATED ORAL at 06:22

## 2021-06-24 RX ADMIN — APIXABAN 5 MILLIGRAM(S): 2.5 TABLET, FILM COATED ORAL at 17:14

## 2021-06-24 RX ADMIN — Medication 1 APPLICATION(S): at 06:23

## 2021-06-24 RX ADMIN — Medication 1 PATCH: at 11:24

## 2021-06-24 RX ADMIN — Medication 25 MILLIGRAM(S): at 17:14

## 2021-06-24 RX ADMIN — GABAPENTIN 100 MILLIGRAM(S): 400 CAPSULE ORAL at 21:51

## 2021-06-24 RX ADMIN — Medication 1 TABLET(S): at 11:23

## 2021-06-24 RX ADMIN — Medication 1 MILLIGRAM(S): at 11:23

## 2021-06-24 RX ADMIN — ATORVASTATIN CALCIUM 80 MILLIGRAM(S): 80 TABLET, FILM COATED ORAL at 21:51

## 2021-06-24 RX ADMIN — Medication 20 MILLIGRAM(S): at 06:22

## 2021-06-24 RX ADMIN — Medication 100 MILLIGRAM(S): at 11:23

## 2021-06-24 RX ADMIN — Medication 81 MILLIGRAM(S): at 11:24

## 2021-06-24 RX ADMIN — GABAPENTIN 100 MILLIGRAM(S): 400 CAPSULE ORAL at 06:22

## 2021-06-24 NOTE — PROGRESS NOTE ADULT - SUBJECTIVE AND OBJECTIVE BOX
CC.  Weakness    HPI.  Patient reports generalized weakness. Denies any complaints; reports he lives at home by himself. Denies F/C, CP, palpitations;. Otherwise denies any complaints. Discharge planning was discussed with him    ROS:  Constitutional: No fever, fatigue or weight loss.  Eyes: No recent vision problems or eye pain.  ENT: No congestion, ear pain, or sore throat.  Endocrine: No thyroid problems.  Cardiovascular: No chest pain or palpation.  Respiratory: No congestion, or wheezing. reports of cough  Gastrointestinal: No abdominal pain, nausea, vomiting, or diarrhea.  Genitourinary: No dysuria.  Musculoskeletal: No joint swelling.  Neurologic: No headache.      Vital Signs Last 24 Hrs  T(C): 36.6 (2021 05:53), Max: 36.6 (2021 21:00)  T(F): 97.8 (2021 05:53), Max: 97.8 (2021 21:00)  HR: 65 (2021 05:53) (65 - 77)  BP: 140/56 (2021 05:53) (99/58 - 140/56)  BP(mean): --  RR: 16 (2021 05:53) (16 - 16)  SpO2: --  PHYSICAL EXAM-  GENERAL: NAD, appears comfortable  HEAD:  Atraumatic, Normocephalic  EYES: EOMI, PERRLA, conjunctiva and sclera clear  NECK: Supple, No JVD, Normal thyroid  NERVOUS SYSTEM:  Alert & Oriented X3, Moving all extremities  CHEST/LUNG: Clear to percussion bilaterally; No rales, rhonchi, wheezing, or rubs  HEART: Regular rate and rhythm; No murmurs, rubs, or gallops  ABDOMEN: Soft, Nontender, Nondistended; Bowel sounds present  EXTREMITIES:   No clubbing, cyanosis, or edema  SKIN: No rashes or lesions                              12.7   6.77  )-----------( 99       ( 2021 06:04 )             39.4   06-23    140  |  101  |  18  ----------------------------<  117<H>  3.8   |  29  |  1.2    Ca    9.2      2021 06:04  Mg     1.8     06-23    TPro  7.0  /  Alb  4.1  /  TBili  0.6  /  DBili  x   /  AST  43<H>  /  ALT  34  /  AlkPhos  102          Urinalysis Basic - ( 2021 21:30 )    Color: Yellow / Appearance: Clear / S.020 / pH: x  Gluc: x / Ketone: Negative  / Bili: Negative / Urobili: 1.0 mg/dL   Blood: x / Protein: 30 mg/dL / Nitrite: Negative   Leuk Esterase: Negative / RBC: 1-2 /HPF / WBC 3-5 /HPF   Sq Epi: x / Non Sq Epi: Few /HPF / Bacteria: occ /HPF                 MEDICATIONS  (STANDING):  apixaban 5 milliGRAM(s) Oral two times a day  aspirin  chewable 81 milliGRAM(s) Oral daily  atorvastatin 80 milliGRAM(s) Oral at bedtime  BACItracin   Ointment 1 Application(s) Topical three times a day  folic acid 1 milliGRAM(s) Oral daily  furosemide    Tablet 20 milliGRAM(s) Oral daily  gabapentin 100 milliGRAM(s) Oral three times a day  losartan 100 milliGRAM(s) Oral daily  metoprolol tartrate 25 milliGRAM(s) Oral two times a day  multivitamin/minerals 1 Tablet(s) Oral daily  nicotine - 21 mG/24Hr(s) Patch 1 patch Transdermal daily  thiamine 100 milliGRAM(s) Oral daily    MEDICATIONS  (PRN):  acetaminophen   Tablet .. 650 milliGRAM(s) Oral every 6 hours PRN Mild Pain (1 - 3)  guaifenesin/dextromethorphan Oral Liquid 5 milliLiter(s) Oral every 6 hours PRN Cough  LORazepam   Injectable 0.5 milliGRAM(s) IV Push every 4 hours PRN withdrawal symptoms  methocarbamol 500 milliGRAM(s) Oral every 8 hours PRN Muscle Spasm      Imaging Personally Reviewed:     [x ] YES  [ ] NO    Consultant(s) Notes Reviewed:  [x ] YES  [ ] NO    Care Discussed with Consultants/Other Providers [x ] YES  [ ] No medical contraindication for discharge  
CC.  Weakness  HPI.  Patient reports generalized weakness.  afebrile.  SOB resolving  offers no other complaints              Constitutional: No fever, fatigue or weight loss.  Skin: No rash.  Eyes: No recent vision problems or eye pain.  ENT: No congestion, ear pain, or sore throat.  Endocrine: No thyroid problems.  Cardiovascular: No chest pain or palpation.  Respiratory: No cough,   congestion, or wheezing.  Gastrointestinal: No abdominal pain, nausea, vomiting, or diarrhea.  Genitourinary: No dysuria.  Musculoskeletal: No joint swelling.  Neurologic: No headache.      Vital Signs Last 24 Hrs  T(C): 36.3 (21 @ 14:38), Max: 36.3 (21 @ 05:22)  T(F): 97.4 (21 @ 14:38), Max: 97.4 (21 @ 14:38)  HR: 72 (21 @ 14:38) (72 - 83)  BP: 128/65 (21 @ 14:38) (128/65 - 180/86)  BP(mean): --  RR: 18 (21 @ 14:38) (18 - 19)  SpO2: 97% (21 @ 18:00) (97% - 97%)        PHYSICAL EXAM-  GENERAL: NAD,    HEAD:  Atraumatic, Normocephalic  EYES: EOMI, PERRLA, conjunctiva and sclera clear  NECK: Supple, No JVD, Normal thyroid  NERVOUS SYSTEM:  Alert & Oriented X3, Moving all extremities  CHEST/LUNG: Clear to percussion bilaterally; No rales, rhonchi, wheezing, or rubs  HEART: Regular rate and rhythm; No murmurs, rubs, or gallops  ABDOMEN: Soft, Nontender, Nondistended; Bowel sounds present  EXTREMITIES:   No clubbing, cyanosis, or edema  SKIN: No rashes or lesions                                  12.8   6.95  )-----------( 105      ( 2021 06:25 )             38.5         138  |  99  |  9<L>  ----------------------------<  103<H>  3.1<L>   |  27  |  0.8    Ca    9.2      2021 06:25  Mg     1.5         TPro  7.2  /  Alb  3.8  /  TBili  0.9  /  DBili  x   /  AST  37  /  ALT  33  /  AlkPhos  102            Urinalysis Basic - ( 2021 21:30 )    Color: Yellow / Appearance: Clear / S.020 / pH: x  Gluc: x / Ketone: Negative  / Bili: Negative / Urobili: 1.0 mg/dL   Blood: x / Protein: 30 mg/dL / Nitrite: Negative   Leuk Esterase: Negative / RBC: 1-2 /HPF / WBC 3-5 /HPF   Sq Epi: x / Non Sq Epi: Few /HPF / Bacteria: occ /HPF              MEDICATIONS  (STANDING):  apixaban 5 milliGRAM(s) Oral two times a day  aspirin  chewable 81 milliGRAM(s) Oral daily  atorvastatin 80 milliGRAM(s) Oral at bedtime  BACItracin   Ointment 1 Application(s) Topical three times a day  folic acid 1 milliGRAM(s) Oral daily  furosemide   Injectable 20 milliGRAM(s) IV Push daily  gabapentin 100 milliGRAM(s) Oral three times a day  losartan 100 milliGRAM(s) Oral daily  metoprolol tartrate 25 milliGRAM(s) Oral two times a day  multivitamin/minerals 1 Tablet(s) Oral daily  nicotine - 21 mG/24Hr(s) Patch 1 patch Transdermal daily  thiamine 100 milliGRAM(s) Oral daily    MEDICATIONS  (PRN):  methocarbamol 500 milliGRAM(s) Oral every 8 hours PRN Muscle Spasm      Imaging Personally Reviewed:     [x ] YES  [ ] NO    Consultant(s) Notes Reviewed:  [x ] YES  [ ] NO    Care Discussed with Consultants/Other Providers [x ] YES  [ ] No medical contraindication for discharge  
CC.  Weakness    HPI.  Patient reports generalized weakness. Denies any complaints; reports he lives at home by himself. Denies F/C, CP, palpitations; reports of cough. Otherwise denies any complaints.     ROS:  Constitutional: No fever, fatigue or weight loss.  Eyes: No recent vision problems or eye pain.  ENT: No congestion, ear pain, or sore throat.  Endocrine: No thyroid problems.  Cardiovascular: No chest pain or palpation.  Respiratory: No congestion, or wheezing. reports of cough  Gastrointestinal: No abdominal pain, nausea, vomiting, or diarrhea.  Genitourinary: No dysuria.  Musculoskeletal: No joint swelling.  Neurologic: No headache.        PHYSICAL EXAM-  GENERAL: NAD, appears comfortable  HEAD:  Atraumatic, Normocephalic  EYES: EOMI, PERRLA, conjunctiva and sclera clear  NECK: Supple, No JVD, Normal thyroid  NERVOUS SYSTEM:  Alert & Oriented X3, Moving all extremities  CHEST/LUNG: Clear to percussion bilaterally; No rales, rhonchi, wheezing, or rubs  HEART: Regular rate and rhythm; No murmurs, rubs, or gallops  ABDOMEN: Soft, Nontender, Nondistended; Bowel sounds present  EXTREMITIES:   No clubbing, cyanosis, or edema  SKIN: No rashes or lesions                              12.7   6.77  )-----------( 99       ( 2021 06:04 )             39.4   -    140  |  101  |  18  ----------------------------<  117<H>  3.8   |  29  |  1.2    Ca    9.2      2021 06:04  Mg     1.8         TPro  7.0  /  Alb  4.1  /  TBili  0.6  /  DBili  x   /  AST  43<H>  /  ALT  34  /  AlkPhos  102          Urinalysis Basic - ( 2021 21:30 )    Color: Yellow / Appearance: Clear / S.020 / pH: x  Gluc: x / Ketone: Negative  / Bili: Negative / Urobili: 1.0 mg/dL   Blood: x / Protein: 30 mg/dL / Nitrite: Negative   Leuk Esterase: Negative / RBC: 1-2 /HPF / WBC 3-5 /HPF   Sq Epi: x / Non Sq Epi: Few /HPF / Bacteria: occ /HPF                 MEDICATIONS  (STANDING):  apixaban 5 milliGRAM(s) Oral two times a day  aspirin  chewable 81 milliGRAM(s) Oral daily  atorvastatin 80 milliGRAM(s) Oral at bedtime  BACItracin   Ointment 1 Application(s) Topical three times a day  folic acid 1 milliGRAM(s) Oral daily  furosemide    Tablet 20 milliGRAM(s) Oral daily  gabapentin 100 milliGRAM(s) Oral three times a day  losartan 100 milliGRAM(s) Oral daily  metoprolol tartrate 25 milliGRAM(s) Oral two times a day  multivitamin/minerals 1 Tablet(s) Oral daily  nicotine - 21 mG/24Hr(s) Patch 1 patch Transdermal daily  thiamine 100 milliGRAM(s) Oral daily    MEDICATIONS  (PRN):  guaiFENesin Oral Liquid (Sugar-Free) 100 milliGRAM(s) Oral every 6 hours PRN Cough  LORazepam   Injectable 0.5 milliGRAM(s) IV Push every 4 hours PRN withdrawal symptoms  methocarbamol 500 milliGRAM(s) Oral every 8 hours PRN Muscle Spasm      Imaging Personally Reviewed:     [x ] YES  [ ] NO    Consultant(s) Notes Reviewed:  [x ] YES  [ ] NO    Care Discussed with Consultants/Other Providers [x ] YES  [ ] No medical contraindication for discharge

## 2021-06-24 NOTE — DISCHARGE NOTE PROVIDER - NSDCMRMEDTOKEN_GEN_ALL_CORE_FT
apixaban 5 mg oral tablet: 1 tab(s) orally 2 times a day  aspirin 81 mg oral tablet, chewable: 1 tab(s) orally once a day  atorvastatin 80 mg oral tablet: 1 tab(s) orally once a day (at bedtime)  bacitracin 500 units/g topical ointment: 1 application topically 3 times a day  folic acid 1 mg oral tablet: 1 tab(s) orally once a day  furosemide 20 mg oral tablet: 1 tab(s) orally once a day  gabapentin 100 mg oral capsule: 1 cap(s) orally 3 times a day  guaifenesin-dextromethorphan 100 mg-10 mg/5 mL oral liquid: 5 milliliter(s) orally every 6 hours, As needed, Cough  losartan 100 mg oral tablet: 1 tab(s) orally once a day  methocarbamol 500 mg oral tablet: 1 tab(s) orally every 8 hours, As needed, Muscle Spasm  metoprolol tartrate 25 mg oral tablet: 1 tab(s) orally 2 times a day  Multiple Vitamins with Minerals oral tablet: 1 tab(s) orally once a day  nicotine 21 mg/24 hr transdermal film, extended release: 1 patch transdermal once a day  thiamine 100 mg oral tablet: 1 tab(s) orally once a day

## 2021-06-24 NOTE — DISCHARGE NOTE PROVIDER - NSDCCPCAREPLAN_GEN_ALL_CORE_FT
PRINCIPAL DISCHARGE DIAGNOSIS  Diagnosis: Unable to ambulate  Assessment and Plan of Treatment: Patient was admitted for Inability to ambulate secondary to chronic deconditioning. His UA was negative and labs were unremarkable. His blood alcohol level was < 10. He reported his last drink was 2 weeks ago; he was not found to be in alcohol withdrawl or acute intoxication.. Patient was evaluated by PT and recommended STR.   Things to follow up:  -PCP follow up in 1-2 weeks  -Continue taking home medications as instructed   -Alcohol cessation was re-enforced

## 2021-06-24 NOTE — DISCHARGE NOTE NURSING/CASE MANAGEMENT/SOCIAL WORK - PATIENT PORTAL LINK FT
You can access the FollowMyHealth Patient Portal offered by [facility] by registering at the following website: [email or web address]. By joining [employer]’s FollowMyHealth portal, you will also be able to view your health information using other applications (apps) compatible with our system.

## 2021-06-24 NOTE — DISCHARGE NOTE PROVIDER - NSDCFUADDINST_GEN_ALL_CORE_FT
Things to follow up:  -PCP follow up in 1-2 weeks  -Continue taking home medications as instructed   -Alcohol cessation was re-enforced

## 2021-06-24 NOTE — PROGRESS NOTE ADULT - ASSESSMENT
65 yr old male with hx of HFpEF, PE on DOAC, EtOH dependence, Hx left pontine CVA presents to the ER with complaints of BLE pain and inability to ambulate.      # Inability to ambulate secondary to chronic deconditioning   - UA negative   - labs unremarkable   - hemodynamically stable   PLAN  - ambulate as tolerated  - fall precaution   - PT eval; patient likely needs STR    # subacute HFpEF  - ECHO (05/15/20): normal; EF 55%  - 2+ b/l LE edema   PLAN  - c/w ASA, metoprolol and statin   - Switched to po lasix      # Hx left pontine CVA  - c/w ASA and statin     # Chronic Pulmonary Embolism   - c/w Eliquis     # Hypertension   - c/w losartan, metoprolol    # Alcohol Dependence, monitor for signs of withdrawl  - BAL <10  - Patient reports last drink was 2 weeks ago.  no sign of withdrawal  - monitor for withdrawals  - cont Ativan PRN    # Tobacco Addiction/Dependence  - smoking cessation and counseling done (3 mins)  - Nicotine replacement therapy     # Overweight   - BMI > 29  - weight loss and diet modification     # Thiamine and Folic acid def  - c/w supplements     #Progress Note Handoff  Pending (specify):  placement  Family discussion:  plan of care was discussed with patient   in details.  all questions were answered.  seems to understand, and in agreement  Disposition:  likely STR
65 yr old male with hx of HFpEF, PE on DOAC, EtOH dependence, Hx left pontine CVA presents to the ER with complaints of BLE pain and inability to ambulate.      # Inability to ambulate secondary to chronic deconditioning   - UA negative   - labs unremarkable   - hemodynamically stable   - ambulate as tolerated  - fall precaution   - PT eval    # subacute HFpEF  - ECHO (05/15/20): normal; EF 55%  - 2+ b/l LE edema   - c/w ASA, metoprolol and statin   -Switch to po lasix      # Hx left pontine CVA  - c/w ASA and statin     # Chronic Pulmonary Embolism   - c/w Eliquis     # Hypertension   - c/w losartan, metoprolol    # Alcohol Dependence  - BAL <10  -patient reports last drink was 2 weeks ago.  no sign of withdrawal  - monitor for withdrawals  -Ativan PRN    # Tobacco Addiction/Dependence  - smoking cessation and counseling done (3 mins)  - Nicotine replacement therapy     # Overweight   - BMI > 29  - weight loss and diet modification     # Thiamine and Folic acid def  - c/w supplements     #Progress Note Handoff  Pending (specify):  placement  Family discussion:  plan of care was discussed with patient   in details.  all questions were answered.  seems to understand, and in agreement  Disposition:  unknown        
65 yr old male with hx of HFpEF, PE on DOAC, EtOH dependence, Hx left pontine CVA presents to the ER with complaints of BLE pain and inability to ambulate.      # Inability to ambulate secondary to chronic deconditioning   - UA negative   - labs unremarkable   - hemodynamically stable   PLAN  - ambulate as tolerated  - fall precaution   - PT eval; patient likely needs STR    # subacute HFpEF  - ECHO (05/15/20): normal; EF 55%  - 2+ b/l LE edema   PLAN  - c/w ASA, metoprolol and statin   - Switched to po lasix      # Hx left pontine CVA  - c/w ASA and statin     # Chronic Pulmonary Embolism   - c/w Eliquis     # Hypertension   - c/w losartan, metoprolol    # Alcohol Dependence, monitor for signs of withdrawl  - BAL <10  - Patient reports last drink was 2 weeks ago.  no sign of withdrawal  - monitor for withdrawals  - cont Ativan PRN    # Tobacco Addiction/Dependence  - smoking cessation and counseling done (3 mins)  - Nicotine replacement therapy     # Overweight   - BMI > 29  - weight loss and diet modification     # Thiamine and Folic acid def  - c/w supplements     #Progress Note Handoff  Pending (specify):  placement  Family discussion:  plan of care was discussed with patient   in details.  all questions were answered.  seems to understand, and in agreement  Disposition:  likely STR

## 2021-06-24 NOTE — DISCHARGE NOTE PROVIDER - HOSPITAL COURSE
65 yr old male with hx of HFpEF, PE on DOAC, EtOH dependence, Hx left pontine CVA presents to the ER with complaints of BLE pain and inability to ambulate.  Patient was admitted for Inability to ambulate secondary to chronic deconditioning. His UA was negative and labs were unremarkable. His blood alcohol level was < 10. He reported his last drink was 2 weeks ago; he was not found to be in alcohol withdrawl or acute intoxication.. Patient was evaluated by PT and recommended STR. Patient was continued on his home medications. At this time patient is medically stable for a hospital discharge.    Things to follow up:  -PCP follow up in 1-2 weeks  -Continue taking home medications as instructed   -Alcohol cessation was re-enforced

## 2021-06-28 ENCOUNTER — INPATIENT (INPATIENT)
Facility: HOSPITAL | Age: 66
LOS: 7 days | Discharge: NURSING, REHAB OR LONG-TERM CARE | End: 2021-07-06
Attending: HOSPITALIST | Admitting: HOSPITALIST
Payer: MEDICARE

## 2021-06-28 VITALS
SYSTOLIC BLOOD PRESSURE: 129 MMHG | HEIGHT: 69 IN | HEART RATE: 100 BPM | TEMPERATURE: 98 F | OXYGEN SATURATION: 96 % | RESPIRATION RATE: 20 BRPM | DIASTOLIC BLOOD PRESSURE: 61 MMHG

## 2021-06-28 DIAGNOSIS — E86.1 HYPOVOLEMIA: ICD-10-CM

## 2021-06-28 DIAGNOSIS — R04.0 EPISTAXIS: ICD-10-CM

## 2021-06-28 DIAGNOSIS — K21.9 GASTRO-ESOPHAGEAL REFLUX DISEASE WITHOUT ESOPHAGITIS: ICD-10-CM

## 2021-06-28 DIAGNOSIS — D62 ACUTE POSTHEMORRHAGIC ANEMIA: ICD-10-CM

## 2021-06-28 DIAGNOSIS — I11.0 HYPERTENSIVE HEART DISEASE WITH HEART FAILURE: ICD-10-CM

## 2021-06-28 DIAGNOSIS — G62.9 POLYNEUROPATHY, UNSPECIFIED: ICD-10-CM

## 2021-06-28 DIAGNOSIS — Z86.711 PERSONAL HISTORY OF PULMONARY EMBOLISM: ICD-10-CM

## 2021-06-28 DIAGNOSIS — I50.32 CHRONIC DIASTOLIC (CONGESTIVE) HEART FAILURE: ICD-10-CM

## 2021-06-28 DIAGNOSIS — Z66 DO NOT RESUSCITATE: ICD-10-CM

## 2021-06-28 DIAGNOSIS — Z90.49 ACQUIRED ABSENCE OF OTHER SPECIFIED PARTS OF DIGESTIVE TRACT: Chronic | ICD-10-CM

## 2021-06-28 DIAGNOSIS — I95.1 ORTHOSTATIC HYPOTENSION: ICD-10-CM

## 2021-06-28 DIAGNOSIS — R09.89 OTHER SPECIFIED SYMPTOMS AND SIGNS INVOLVING THE CIRCULATORY AND RESPIRATORY SYSTEMS: ICD-10-CM

## 2021-06-28 DIAGNOSIS — E63.9 NUTRITIONAL DEFICIENCY, UNSPECIFIED: ICD-10-CM

## 2021-06-28 DIAGNOSIS — F17.210 NICOTINE DEPENDENCE, CIGARETTES, UNCOMPLICATED: ICD-10-CM

## 2021-06-28 DIAGNOSIS — N17.9 ACUTE KIDNEY FAILURE, UNSPECIFIED: ICD-10-CM

## 2021-06-28 DIAGNOSIS — D68.32 HEMORRHAGIC DISORDER DUE TO EXTRINSIC CIRCULATING ANTICOAGULANTS: ICD-10-CM

## 2021-06-28 DIAGNOSIS — E78.5 HYPERLIPIDEMIA, UNSPECIFIED: ICD-10-CM

## 2021-06-28 DIAGNOSIS — T39.015A ADVERSE EFFECT OF ASPIRIN, INITIAL ENCOUNTER: ICD-10-CM

## 2021-06-28 DIAGNOSIS — I69.991 DYSPHAGIA FOLLOWING UNSPECIFIED CEREBROVASCULAR DISEASE: ICD-10-CM

## 2021-06-28 DIAGNOSIS — F10.10 ALCOHOL ABUSE, UNCOMPLICATED: ICD-10-CM

## 2021-06-28 DIAGNOSIS — Z53.20 PROCEDURE AND TREATMENT NOT CARRIED OUT BECAUSE OF PATIENT'S DECISION FOR UNSPECIFIED REASONS: ICD-10-CM

## 2021-06-28 DIAGNOSIS — T45.7X5A ADVERSE EFFECT OF ANTICOAGULANT ANTAGONISTS, VITAMIN K AND OTHER COAGULANTS, INITIAL ENCOUNTER: ICD-10-CM

## 2021-06-28 DIAGNOSIS — R26.89 OTHER ABNORMALITIES OF GAIT AND MOBILITY: ICD-10-CM

## 2021-06-28 LAB
ANION GAP SERPL CALC-SCNC: 10 MMOL/L — SIGNIFICANT CHANGE UP (ref 7–14)
BUN SERPL-MCNC: 46 MG/DL — HIGH (ref 10–20)
CALCIUM SERPL-MCNC: 9.7 MG/DL — SIGNIFICANT CHANGE UP (ref 8.5–10.1)
CHLORIDE SERPL-SCNC: 102 MMOL/L — SIGNIFICANT CHANGE UP (ref 98–110)
CO2 SERPL-SCNC: 28 MMOL/L — SIGNIFICANT CHANGE UP (ref 17–32)
CREAT SERPL-MCNC: 1.2 MG/DL — SIGNIFICANT CHANGE UP (ref 0.7–1.5)
GLUCOSE SERPL-MCNC: 130 MG/DL — HIGH (ref 70–99)
HCT VFR BLD CALC: 32.6 % — LOW (ref 42–52)
HGB BLD-MCNC: 10.4 G/DL — LOW (ref 14–18)
MCHC RBC-ENTMCNC: 28.3 PG — SIGNIFICANT CHANGE UP (ref 27–31)
MCHC RBC-ENTMCNC: 31.9 G/DL — LOW (ref 32–37)
MCV RBC AUTO: 88.6 FL — SIGNIFICANT CHANGE UP (ref 80–94)
NRBC # BLD: 0 /100 WBCS — SIGNIFICANT CHANGE UP (ref 0–0)
PLATELET # BLD AUTO: 210 K/UL — SIGNIFICANT CHANGE UP (ref 130–400)
POTASSIUM SERPL-MCNC: 4.7 MMOL/L — SIGNIFICANT CHANGE UP (ref 3.5–5)
POTASSIUM SERPL-SCNC: 4.7 MMOL/L — SIGNIFICANT CHANGE UP (ref 3.5–5)
RBC # BLD: 3.68 M/UL — LOW (ref 4.7–6.1)
RBC # FLD: 15.3 % — HIGH (ref 11.5–14.5)
SARS-COV-2 RNA SPEC QL NAA+PROBE: SIGNIFICANT CHANGE UP
SODIUM SERPL-SCNC: 140 MMOL/L — SIGNIFICANT CHANGE UP (ref 135–146)
WBC # BLD: 14.17 K/UL — HIGH (ref 4.8–10.8)
WBC # FLD AUTO: 14.17 K/UL — HIGH (ref 4.8–10.8)

## 2021-06-28 PROCEDURE — 99284 EMERGENCY DEPT VISIT MOD MDM: CPT

## 2021-06-28 PROCEDURE — 99223 1ST HOSP IP/OBS HIGH 75: CPT

## 2021-06-28 PROCEDURE — 93970 EXTREMITY STUDY: CPT | Mod: 26

## 2021-06-28 PROCEDURE — 99285 EMERGENCY DEPT VISIT HI MDM: CPT

## 2021-06-28 PROCEDURE — 93306 TTE W/DOPPLER COMPLETE: CPT | Mod: 26

## 2021-06-28 RX ORDER — GABAPENTIN 400 MG/1
100 CAPSULE ORAL
Refills: 0 | Status: DISCONTINUED | OUTPATIENT
Start: 2021-06-28 | End: 2021-07-06

## 2021-06-28 RX ORDER — MULTIVIT-MIN/FERROUS GLUCONATE 9 MG/15 ML
1 LIQUID (ML) ORAL DAILY
Refills: 0 | Status: DISCONTINUED | OUTPATIENT
Start: 2021-06-28 | End: 2021-07-06

## 2021-06-28 RX ORDER — ATORVASTATIN CALCIUM 80 MG/1
80 TABLET, FILM COATED ORAL AT BEDTIME
Refills: 0 | Status: DISCONTINUED | OUTPATIENT
Start: 2021-06-28 | End: 2021-07-06

## 2021-06-28 RX ORDER — SODIUM CHLORIDE 9 MG/ML
500 INJECTION, SOLUTION INTRAVENOUS ONCE
Refills: 0 | Status: COMPLETED | OUTPATIENT
Start: 2021-06-28 | End: 2021-06-28

## 2021-06-28 RX ORDER — GUAIFENESIN/DEXTROMETHORPHAN 600MG-30MG
5 TABLET, EXTENDED RELEASE 12 HR ORAL EVERY 6 HOURS
Refills: 0 | Status: DISCONTINUED | OUTPATIENT
Start: 2021-06-28 | End: 2021-07-06

## 2021-06-28 RX ORDER — MORPHINE SULFATE 50 MG/1
4 CAPSULE, EXTENDED RELEASE ORAL ONCE
Refills: 0 | Status: DISCONTINUED | OUTPATIENT
Start: 2021-06-28 | End: 2021-06-28

## 2021-06-28 RX ORDER — FOLIC ACID 0.8 MG
1 TABLET ORAL DAILY
Refills: 0 | Status: DISCONTINUED | OUTPATIENT
Start: 2021-06-28 | End: 2021-07-06

## 2021-06-28 RX ORDER — THIAMINE MONONITRATE (VIT B1) 100 MG
100 TABLET ORAL DAILY
Refills: 0 | Status: DISCONTINUED | OUTPATIENT
Start: 2021-06-28 | End: 2021-07-06

## 2021-06-28 RX ORDER — METHOCARBAMOL 500 MG/1
500 TABLET, FILM COATED ORAL EVERY 8 HOURS
Refills: 0 | Status: DISCONTINUED | OUTPATIENT
Start: 2021-06-28 | End: 2021-07-06

## 2021-06-28 RX ORDER — BACITRACIN ZINC 500 UNIT/G
1 OINTMENT IN PACKET (EA) TOPICAL THREE TIMES A DAY
Refills: 0 | Status: DISCONTINUED | OUTPATIENT
Start: 2021-06-28 | End: 2021-07-06

## 2021-06-28 RX ORDER — OXYMETAZOLINE HYDROCHLORIDE 0.5 MG/ML
1 SPRAY NASAL ONCE
Refills: 0 | Status: COMPLETED | OUTPATIENT
Start: 2021-06-28 | End: 2021-06-28

## 2021-06-28 RX ORDER — NICOTINE POLACRILEX 2 MG
1 GUM BUCCAL DAILY
Refills: 0 | Status: DISCONTINUED | OUTPATIENT
Start: 2021-06-28 | End: 2021-07-06

## 2021-06-28 RX ADMIN — Medication 1 APPLICATION(S): at 22:47

## 2021-06-28 RX ADMIN — OXYMETAZOLINE HYDROCHLORIDE 1 SPRAY(S): 0.5 SPRAY NASAL at 05:57

## 2021-06-28 RX ADMIN — Medication 1 PATCH: at 21:02

## 2021-06-28 RX ADMIN — Medication 5 MILLILITER(S): at 22:51

## 2021-06-28 RX ADMIN — Medication 1 APPLICATION(S): at 14:24

## 2021-06-28 RX ADMIN — Medication 100 MILLIGRAM(S): at 12:54

## 2021-06-28 RX ADMIN — Medication 1 MILLIGRAM(S): at 12:54

## 2021-06-28 RX ADMIN — MORPHINE SULFATE 4 MILLIGRAM(S): 50 CAPSULE, EXTENDED RELEASE ORAL at 06:46

## 2021-06-28 RX ADMIN — Medication 1 TABLET(S): at 12:54

## 2021-06-28 RX ADMIN — GABAPENTIN 100 MILLIGRAM(S): 400 CAPSULE ORAL at 17:21

## 2021-06-28 RX ADMIN — ATORVASTATIN CALCIUM 80 MILLIGRAM(S): 80 TABLET, FILM COATED ORAL at 22:46

## 2021-06-28 RX ADMIN — SODIUM CHLORIDE 250 MILLILITER(S): 9 INJECTION, SOLUTION INTRAVENOUS at 12:53

## 2021-06-28 RX ADMIN — Medication 1 PATCH: at 12:54

## 2021-06-28 NOTE — H&P ADULT - NSICDXFAMILYHX_GEN_ALL_CORE_FT
To be medicated and placed in seclusion to prevent harm to pt, staff and pt's peers. FAMILY HISTORY:  Father  Still living? Unknown  FH: MI (myocardial infarction), Age at diagnosis: Age Unknown    Mother  Still living? Unknown  FH: MI (myocardial infarction), Age at diagnosis: Age Unknown

## 2021-06-28 NOTE — H&P ADULT - NSHPREVIEWOFSYSTEMS_GEN_ALL_CORE
REVIEW OF SYSTEMS:    CONSTITUTIONAL: No weakness, fevers or chills  EYES/ENT: No visual changes;  No vertigo or throat pain, no sensation of post-nasal bleeding now, was bleeding at NH  NECK: No pain or stiffness  RESPIRATORY: No cough, wheezing, hemoptysis; No shortness of breath  CARDIOVASCULAR: No chest pain or palpitations  GASTROINTESTINAL: No abdominal or epigastric pain. No nausea, vomiting, or hematemesis; No diarrhea or constipation. No melena or hematochezia.  GENITOURINARY: No dysuria, frequency or hematuria  NEUROLOGICAL: No numbness or weakness  SKIN: No itching, rashes

## 2021-06-28 NOTE — ED PROVIDER NOTE - PHYSICAL EXAMINATION
CONSTITUTIONAL: Well-developed; well-nourished; in no acute distress.   SKIN: warm, dry  HEAD: Normocephalic; atraumatic.  EYES: no conjunctival injection. PERRL.   ENT: Dark red clots and bleeding from both nares, dark red clots in oropharynx.  NECK: Supple; non tender.  CARD: S1, S2 normal; no murmurs, gallops, or rubs. Regular rate and rhythm.   RESP: No wheezes, rales or rhonchi.  ABD: soft ntnd  EXT: Normal ROM.  No clubbing, cyanosis or edema.   LYMPH: No acute cervical adenopathy.  NEURO: Alert, oriented, grossly unremarkable  PSYCH: Cooperative, appropriate.

## 2021-06-28 NOTE — ED ADULT NURSE NOTE - OBJECTIVE STATEMENT
Pt was sent from [facility] for nose bleed since last night. Pt is currently bleeding from both nostril. Pt is alert, able to talk, c/o pains on from the nose Morphine 4mg given.

## 2021-06-28 NOTE — ED PROVIDER NOTE - ATTENDING CONTRIBUTION TO CARE
65 year old male with PMH of HTN, HLD, CHF, CVA, PE on eliquis presents here for a nosebleed. Patient denies any trauma to the area. No dizziness or lightheadness.  On exam  CONSTITUTIONAL: WA / WN / NAD  HEAD: NCAT  EYES: PERRL; EOMI;   ENT: b/l bleeding both nares with blood/clots in posterior pharynx  NECK: Supple;   MSK/EXT: No gross deformities; full range of motion.  SKIN: Warm and dry;   NEURO: AAOx3  PSYCH: Memory Intact, Normal Affect

## 2021-06-28 NOTE — ED PROVIDER NOTE - NS ED ROS FT
Review of Systems:  •	CONSTITUTIONAL - No fever, No diaphoresis, No weight change  •	SKIN - No rash  •	HEMATOLOGIC - No abnormal bleeding or bruising  •	EYES - No eye pain, No blurred vision  •	ENT - No change in hearing, No sore throat, No neck pain, No rhinorrhea, No ear pain, + nosebleed  •	RESPIRATORY - No shortness of breath, No cough  •	CARDIAC -No chest pain, No palpitations  •	GI - No abdominal pain, No nausea, No vomiting, No diarrhea, No constipation, No bright red blood per rectum or melena. No flank pain  •                 - No dysuria, frequency, hematuria.   •	ENDO - No polydypsia, No polyuria, No heat/cold intolerance  •	MUSCULOSKELETAL - No joint paint, No swelling, No back pain  •	NEUROLOGIC - No numbness, No focal weakness, No headache, No dizziness  All other systems negative, unless specified in HPI

## 2021-06-28 NOTE — ED PROVIDER NOTE - OBJECTIVE STATEMENT
Patient is a 64 yo male with PMHx of CVA, PE, HLD, HTN, CHF, TIA on aspirin/plavix c/o nosebleed since 10 PM. Patient is from [facility] and started bleeding from both nares at 10 PM. Taking aspirin/plavix. Denies head trauma, lightheadedness, LOC, chest pain, SOB, n/v. Compressed nares without resolution of bleeding.

## 2021-06-28 NOTE — PROGRESS NOTE ADULT - ATTENDING COMMENTS
Patient seen and examined at bedside. No active bleeding seen.    Recommended and discussed local care, bacitracin and avoiding nasal trauma.

## 2021-06-28 NOTE — H&P ADULT - ASSESSMENT
65 year old male with hx of CVA of left ventral rosalie 2015, PE, HLD, CHF (undocumented EF) presents with epistaxis starting yesterday.  Patient states he was bled a large amount (unable to quantify) from both nostrils while he was at the nursing home    # Epistaxis likely predisposed due to Eliquis and aspirin use  - started at 10 pm yesterday, drop in hgb from 12.7 to 10.4 in 5 days // BUN increased from 18 to 46 likely from epistaxis with some hypovolemia  - aspirin and Eliquis were being held in NH, ENT requesting to hold AC if not contraindicated  - rhinorocket was refused, surgicell placed, currently no bleeding  - will hold AC for now / patient states he had a PE more than a year ago, no evidence in charts weather it was provoked or unprovoked, no indications in his medical history suggesting unprovoked conditions; his prolonged immobility from CVA hx may put him at risk // would need a discussion with PCP regarding risk/benefits of continuing AC  - hold aspirin for now, consider restarting if patient is determined to no longer be candidate for Eliquis    # PE hx  - states it was a year ago, holding AC for now  - f/u d-dimer, f/u va duplex    # DAYDAY  - had an increase in his creatinine from 0.8 to 1.2 before being discharged last admission, today creatinine is 1.2  - BUN increased before admission suggesting pre-renal DAYDAY, however today BUN is much higher 46 compared to 18 // likely from ingesting blood from epistaxis coupled with mild pre-renal DAYDAY since he left hospital  - will give  bolus for now, f/u orthostatics    # CHF w/ undocumented EF  - takes lasix 20 po qd and lopressor 25 bid in the NH  - will hold lasix for now considering DAYDAY, will hold lopressor for now considering vitals suggesting he is orthostatic // has a hx of HTN however having soft BP and elevated HR    # HLD  - c/w atorvastatin 80, if continuing to bleed consider holding  - f/u ldl    # GERD  - c/w protonix    PLAN: f/u ENT, assess if patient is a candidate for AC in the long-term, holding for now    # DVT PPX: SCDs  # GI PPX: protonix  # Diet: DASH  FULL CODE 65 year old male with hx of CVA of left ventral rosalie 2015, PE, HLD, CHF (undocumented EF) presents with epistaxis starting yesterday.  Patient states he was bled a large amount (unable to quantify) from both nostrils while he was at the nursing home    # Epistaxis likely predisposed due to Eliquis and aspirin use  - started at 10 pm yesterday, drop in hgb from 12.7 to 10.4 in 5 days // BUN increased from 18 to 46 likely from epistaxis with some hypovolemia  - aspirin and Eliquis were being held in NH, ENT requesting to hold AC if not contraindicated  - rhinorocket was refused, surgicell placed, currently no bleeding  - will hold AC for now / patient states he had a PE more than a year ago, no evidence in charts weather it was provoked or unprovoked, no indications in his medical history suggesting unprovoked conditions; his prolonged immobility from CVA hx may put him at risk // would need a discussion with PCP regarding risk/benefits of continuing AC  - hold aspirin for now, consider restarting if patient is determined to no longer be candidate for Eliquis    # PE hx  - states it was a year ago, holding AC for now  - f/u d-dimer, f/u va duplex    # DAYDAY  - had an increase in his creatinine from 0.8 to 1.2 before being discharged last admission, today creatinine is 1.2  - BUN increased before admission suggesting pre-renal DAYDAY, however today BUN is much higher 46 compared to 18 // likely from ingesting blood from epistaxis coupled with mild pre-renal DAYDAY since he left hospital  - will give  bolus for now, f/u orthostatics    # CHF w/ undocumented EF  - takes lasix 20 po qd and lopressor 25 bid in the NH  - will hold lasix for now considering DAYDAY, will hold lopressor for now considering vitals suggesting he is orthostatic // has a hx of HTN however having soft BP and elevated HR  - f/u echo    # HLD  - c/w atorvastatin 80, if continuing to bleed consider holding  - f/u ldl    # GERD  - c/w protonix    PLAN: f/u ENT, assess if patient is a candidate for AC in the long-term, holding for now    # DVT PPX: SCDs  # GI PPX: protonix  # Diet: DASH  FULL CODE 65 year old male with hx of CVA of left ventral rosalie 2015, PE in 2015 and on Eliquis since that time, HLD, CHF (undocumented EF) presents with epistaxis starting yesterday.  Patient states he was bled a large amount (unable to quantify) from both nostrils while he was at the nursing home    # Epistaxis likely predisposed due to Eliquis and aspirin use  - started at 10 pm yesterday, drop in hgb from 12.7 to 10.4 in 5 days // BUN increased from 18 to 46 likely from epistaxis with some hypovolemia  - aspirin and Eliquis were being held in NH, ENT requesting to hold AC if not contraindicated  - rhinorocket was refused, surgicell placed, currently no bleeding  - will hold AC for now / patient states he had a PE more than a year ago, no evidence in charts whether it was provoked or unprovoked, no indications in his medical history suggesting unprovoked conditions; his prolonged immobility from CVA hx may put him at risk // would need a discussion with PCP regarding risk/benefits of continuing AC  - hold aspirin for now and restart if no further epistaxis (needed for secondary stroke prevention)    # PE dx in 2015 - unprovoked per chart review  - was on Eliquis since that time  - pt denies any VTE events since 2015  - f/u d-dimer, f/u va duplex  - hold Eliquis for now due to epistaxis  - if risks of continued anticoagulation outweigh the benefits, then stop Eliquis on discharge  - pt does not want to resume Eliquis once epistaxis is resolved    # DAYDAY  - had an increase in his creatinine from 0.8 to 1.2 before being discharged last admission, today creatinine is 1.2  - BUN increased before admission suggesting pre-renal DAYDAY, however today BUN is much higher 46 compared to 18 // likely from ingesting blood from epistaxis coupled with mild pre-renal DAYDAY since he left hospital  - will give  bolus for now, f/u orthostatics    # CHF w/ undocumented EF  - takes lasix 20 po qd and lopressor 25 bid in the NH  - will hold lasix for now considering DAYDAY, will hold lopressor for now considering vitals suggesting he is orthostatic // has a hx of HTN however having soft BP and elevated HR  - f/u echo    # HLD  - c/w atorvastatin 80, if continuing to bleed consider holding  - f/u ldl    # GERD  - c/w protonix    PLAN: f/u ENT, assess if patient is a candidate for AC in the long-term, holding for now    # DVT PPX: SCDs  # GI PPX: protonix  # Diet: DASH  FULL CODE

## 2021-06-28 NOTE — ED ADULT NURSE NOTE - CHIEF COMPLAINT QUOTE
BIBA from [facility] for complaints of a nose bleed since 10 pm. As per NH pt. currently on Plavix and ASA. No known injury. Denies any SOB. No distress noted. Pt speaking in complete sentences.

## 2021-06-28 NOTE — CONSULT NOTE ADULT - PROBLEM SELECTOR RECOMMENDATION 9
Hold ASA and Eliquis, no anticoagulation  Apply Afrin nasal spray to both nares if there is no medical contraindication  Apply Bacitracin to both nares  Monitor Hgb/Hct  Will continue to follow

## 2021-06-28 NOTE — H&P ADULT - HISTORY OF PRESENT ILLNESS
65 year old male with hx of CVA, PE, HLD, CHF (undocumented EF) presents with epistaxis starting yesterday.  Patient states he was bled a large amount (unable to quantify) from both nostrils while he was at the nursing home.  He denies any trauma or manipulation.  His Eliquis and aspirin were held in the nursing home and he was sent in for evaluation.  He denies any chest pain, denies any abdominal pain.  Of note his hemoglobin dropped from 12.7 to 10.4 since 6/23 (admitted then for inability to ambulate).    In the ED he received morphine and afrin, he was seen by ENT who attempted to place a rhinorocket however patient was combative and refused, so surgicell was placed.  There was no active bleeding during my interview.    Triage Vitals: /61    RR 20  Temp 98  SpO2 96% on room air

## 2021-06-28 NOTE — H&P ADULT - ATTENDING COMMENTS
Pt currently denies epistaxis - I saw him in the afternoon.  No other complaints.  No previous h/o epistaxis - denies trauma, nose picking  ENT eval appreciated  treated with surgicell - pt refused rhinorocket  had drop in Hgb from 12.7 to 10.4  thrombocytopenia now resolved  elevated BUN likely from ingested blood from nosebleed  pt denies GI bleed    hold ASA and Eliquis for now and monitor  transfuse for Hgb <8  recall ENT if he rebleeds  keep active T&S    Pt will need to restart ASA once bleeding resolves and cleared by ENT   secondary stroke prevention    He had unprovoked PE in 2015 per notes and was on Eliquis since that time.  Called PMD to discuss his case but the PMD is now retired and the pt was not seen in the office for the last 2 years.  He has been on extended therapy for PE and now presents with epistaxis and anemia due to acute blood loss.  continue to hold Eliquis if risks of bleeding outweigh the benefits of anticoagulation.  If Eliquis is not resumed, then pt should continue ASA to reduce risk of VTE recurrence.  At this time, the pt does not want to resume Eliquis.    I discussed the case with the resident and I reviewed his note.  Agree with the history, physical exam, assessment and plan with additions and corrections as above.       PROGRESS NOTE HANDOFF    Pending: monitor for recurrent epistaxis, H/H, labs in AM, PT consult, venous duplex of LE    pt aware of plan of care    Disposition: STR at [facility]

## 2021-06-28 NOTE — ED PROVIDER NOTE - PROGRESS NOTE DETAILS
SR: attempted to appy pressure with txa, unsuccessful, afrin provided, patient continues to have bleeding, ent consult placed, patient might require b/l nasal packing. SR: s/o provided to dr. fisher , patient pending ENT re-eval as patient was unable to tolerate 1st rhinorocket currently has surgicele packing TD: Received s/o from Dr. Giraldo. Pt reassessed, continues to have oozing from both nostrils after surgicele packing administration. Called ENT to inform them of continued oozing, they state they will come down to evaluate pt and attempt to place rhinorocket if pt tolerates. Will reassess TD: ENT reevaluated pt, state that no active bleeding seen posteriorly on exam only small oozing. Pt again refusing rhino rocket. Will be admitted for epistaxis with hg drop. Pt agreeable with plan. JOSE   Pt s/o to me by Dr. Samson. 64 yo M on Eliquis, Aspirin and Plavix presents to ED for nosebleed since 10PM. Bleeding not controlled with pressure, TXA, afrin. Pt seen by ENT. Did not tolerate b/l nasal packing. Surgicel still placed. Hgb 10.4, previous 12.7. Pt mildly oozing from b/l nares. Reevaluated by ENT. Admit for further eval and monitoring. JOSE wrap up note;  Pt s/o to me by Dr. Samson. 64 yo M on Eliquis, Aspirin and Plavix presents to ED for nosebleed since 10PM. Bleeding not controlled with pressure, TXA, afrin. Pt seen by ENT. Did not tolerate b/l nasal packing. Surgicel still placed. Hgb 10.4, previous 12.7. Pt mildly oozing from b/l nares. Reevaluated by ENT. Admit for further eval and monitoring.

## 2021-06-28 NOTE — H&P ADULT - NSHPLABSRESULTS_GEN_ALL_CORE
LABS/RADIOLOGY RESULTS:                          10.4   14.17 )-----------( 210      ( 2021 06:37 )             32.6   -    140  |  102  |  46<H>  ----------------------------<  130<H>  4.7   |  28  |  1.2    Ca    9.7      2021 06:37    Blood Cultures    Urinalysis Basic - ( 2021 21:30 )    Color: Yellow / Appearance: Clear / S.020 / pH:   Gluc:  / Ketone: Negative  / Bili: Negative / Urobili: 1.0 mg/dL   Blood:  / Protein: 30 mg/dL / Nitrite: Negative   Leuk Esterase: Negative / RBC: 1-2 /HPF / WBC 3-5 /HPF   Sq Epi:  / Non Sq Epi: Few /HPF / Bacteria: occ /HPF

## 2021-06-28 NOTE — PROGRESS NOTE ADULT - ASSESSMENT
65y Male with PMH of CVA of Left pontine with dysphagia, CHF, HTN, HLD, Alcohol dependence, PE(2015), TIA presented from [facility] with b/l epistaxis since 10pm last night - Recalled by ED patient oozing from b/l nares.     Plan:   - Patient continues to refuse packing at this time, "I don't want it right now". Held pressure at the nasal ala for 10 minutes, after reassessed patient, no active bleeding or clots noted to b/l nares.   - Monitor H/H, transfuse prn. Hrb 10.4 <12. 7   - Afrin/Nasal spray to b/l nares if not contraindicated  - Apply bacitracin to b/l nares   - Hold AC if not contraindicated, on ASA/Eliquis   - HOB elevated 30   - Avoid digital trauma, sneezing/blowing nose, heavy lifting, bending below the waist   - ED team aware   - Recall prn   - Will d/w attending

## 2021-06-28 NOTE — PROGRESS NOTE ADULT - SUBJECTIVE AND OBJECTIVE BOX
ENT DAILY PROGRESS NOTE    Pt is a 65y Male with PMH of CVA of Left pontine with dysphagia, CHF, HTN, HLD, Alcohol dependence, PE(2015), TIA presented from [facility] with b/l epistaxis since 10pm last night - Recalled by ED patient oozing from b/l nares. Patient seen and examined at bedside. Patient reports he began to bleed from both nares spontaneously last night and held pressure with no relief. Denies any fever, chills, N/V, SOB, CP, headache, digital trauma.     In ED, attempted to place 5.5 RhinoRocket by ENT which patient adamantly refused and combative. Patient allowed b/l surgicell placement and noted to have a small clot to left posterior oropharynx.       REVIEW OF SYSTEMS   [x] A ten-point review of systems was otherwise negative except as noted.  [ ] Due to altered mental status/intubation, subjective information were not able to be obtained from patient. History was obtained, to the extent possible, from review of the chart and collateral sources of information.    Allergies    No Known Allergies    Intolerances        MEDICATIONS:      Vital Signs Last 24 Hrs  T(C): 36.3 (28 Jun 2021 07:38), Max: 36.7 (28 Jun 2021 04:17)  T(F): 97.3 (28 Jun 2021 07:38), Max: 98 (28 Jun 2021 04:17)  HR: 94 (28 Jun 2021 07:38) (94 - 100)  BP: 108/57 (28 Jun 2021 07:38) (108/57 - 129/61)  RR: 16 (28 Jun 2021 07:38) (16 - 20)  SpO2: 97% (28 Jun 2021 07:38) (96% - 97%)        PHYSICAL EXAM:    GEN: Well-developed, well-nourished. NAD, awake and alert. No drooling or pooling of secretions. No stridor or stertor. Good vocal quality, no hoarseness.   SKIN: Good color, non diaphoretic  HEENT: NC/AT;    NARES: Bilaterll nares  noted with small oozing of dark red blood, no surgicell noted,  MOUTH: Oral mucosa pink and moist. No erythema or edema noted to buccal mucosa, tongue, FOM, uvula. Uvula midline. Posterior oropharynx noted very small clot noted to left PO with slight blood-tinged PO.   NECK:  Trachea midline. Neck supple, no TTP to B/L lateral neck, no cervical LAD.  RESP: No dyspnea, non-labored breathing. No use of accessory muscles.  CARDIO: +S1/S2  ABDO: Soft, NT.  EXT: CHAVEZ x 4    LABS:  CBC-                        10.4   14.17 )-----------( 210      ( 28 Jun 2021 06:37 )             32.6     BMP/CMP-  28 Jun 2021 06:37    140    |  102    |  46     ----------------------------<  130    4.7     |  28     |  1.2      Ca    9.7        28 Jun 2021 06:37      Coagulation Studies-    Endocrine Panel-  Calcium, Total Serum: 9.7 mg/dL (06-28 @ 06:37)              RADIOLOGY & ADDITIONAL STUDIES:

## 2021-06-28 NOTE — H&P ADULT - NSHPPHYSICALEXAM_GEN_ALL_CORE
General: WN/WD NAD  Neurology: A&Ox3, nonfocal, CHAVEZ x 4  Head:  Normocephalic, atraumatic  ENT:  dried blood in bl nares  Neck:  Supple, no sinuses or palpable masses  Lymphatic:  No palpable cervical, supraclavicular, axillary or inguinal adenopathy  Respiratory: CTA B/L  CV: RRR, S1S2, no murmur  Abdominal: Soft, NT, ND no palpable mass  MSK: No edema General: WN/WD NAD  Neurology: A&Ox3, nonfocal, CHAVEZ x 4  Head:  Normocephalic, atraumatic  ENT:  dried blood in bl nares  Neck:  Supple, no sinuses or palpable masses  Lymphatic:  No palpable cervical, supraclavicular, axillary or inguinal adenopathy  Respiratory: CTA B/L  CV: RRR, S1S2, no murmur  Abdominal: Soft, NT, ND no palpable mass, + bS  MSK: No edema

## 2021-06-28 NOTE — CONSULT NOTE ADULT - SUBJECTIVE AND OBJECTIVE BOX
Pt is resident of [facility] presents with nosebleed which began at 10pm yesterday.  Pt denies any hx of nosebleeds in past.  Presently taking ASA and Eliquis    PAST MEDICAL & SURGICAL HISTORY:  TIA (transient ischemic attack)    CHF (congestive heart failure)    HTN (hypertension)    HLD (hyperlipidemia)    Alcohol dependence    Chronic back pain    Pulmonary embolism  2015    Cerebrovascular accident (CVA)  left pontine with dysphagia    History of appendectomy      Allergies    No Known Allergies    Intolerances      MEDICATIONS  (STANDING):      ROS: ENT, GI, , CV, Pulm, Neuro, Psych, MS, Heme, Endo, Constitional; all negative except as noted in HPI    Vital Signs Last 24 Hrs  T(C): 36.7 (28 Jun 2021 04:17), Max: 36.7 (28 Jun 2021 04:17)  T(F): 98 (28 Jun 2021 04:17), Max: 98 (28 Jun 2021 04:17)  HR: 100 (28 Jun 2021 04:17) (100 - 100)  BP: 129/61 (28 Jun 2021 04:17) (129/61 - 129/61)  BP(mean): --  RR: 20 (28 Jun 2021 04:17) (20 - 20)  SpO2: 96% (28 Jun 2021 04:17) (96% - 96%)              PHYSICAL EXAM:  Gen: NAD, well-developed  Head: Normocephalic, Atraumatic  Face: no edema/erythema/fluctuance, parotid glands soft without mass  Eyes: PERRL, EOMI, no scleral injection  Ears: Right - ear canal clear, TM intact without effusion            Left - ear canal clear, TM intact without effusion  Nose: slow ooze of dark red blood from both nares, attempted to place Rapid rhino but pt became combative and insisting on removing packing.  I therefore used surgicell and packed both right and left nares which appears to have controlled the bleeding  Mouth: Mucosa moist, tongue/uvula midline, oropharynx has a blood clot in left posterior pharynx, otherwise no active bleeding  Neck: Flat, supple, no lymphadenopathy, trachea midline, no masses  Resp: breathing easily, no stridor  CV: no peripheral edema/cyanosis

## 2021-06-28 NOTE — ED ADULT TRIAGE NOTE - CHIEF COMPLAINT QUOTE
BIBA from [facility] for complaints of a nose bleed since 10 pm. As per NH pt. currently on Plavix and ASA. No known injury. Denies any SOB. No distress noted. Pt speaking in complete sentences.

## 2021-06-29 ENCOUNTER — TRANSCRIPTION ENCOUNTER (OUTPATIENT)
Age: 66
End: 2021-06-29

## 2021-06-29 LAB
ALBUMIN SERPL ELPH-MCNC: 3.6 G/DL — SIGNIFICANT CHANGE UP (ref 3.5–5.2)
ALP SERPL-CCNC: 59 U/L — SIGNIFICANT CHANGE UP (ref 30–115)
ALT FLD-CCNC: 44 U/L — HIGH (ref 0–41)
ANION GAP SERPL CALC-SCNC: 9 MMOL/L — SIGNIFICANT CHANGE UP (ref 7–14)
AST SERPL-CCNC: 28 U/L — SIGNIFICANT CHANGE UP (ref 0–41)
BASOPHILS # BLD AUTO: 0.05 K/UL — SIGNIFICANT CHANGE UP (ref 0–0.2)
BASOPHILS NFR BLD AUTO: 0.5 % — SIGNIFICANT CHANGE UP (ref 0–1)
BILIRUB SERPL-MCNC: 0.3 MG/DL — SIGNIFICANT CHANGE UP (ref 0.2–1.2)
BLD GP AB SCN SERPL QL: SIGNIFICANT CHANGE UP
BLD GP AB SCN SERPL QL: SIGNIFICANT CHANGE UP
BUN SERPL-MCNC: 37 MG/DL — HIGH (ref 10–20)
CALCIUM SERPL-MCNC: 9 MG/DL — SIGNIFICANT CHANGE UP (ref 8.5–10.1)
CHLORIDE SERPL-SCNC: 104 MMOL/L — SIGNIFICANT CHANGE UP (ref 98–110)
CHOLEST SERPL-MCNC: 100 MG/DL — SIGNIFICANT CHANGE UP
CO2 SERPL-SCNC: 25 MMOL/L — SIGNIFICANT CHANGE UP (ref 17–32)
CREAT SERPL-MCNC: 0.9 MG/DL — SIGNIFICANT CHANGE UP (ref 0.7–1.5)
D DIMER BLD IA.RAPID-MCNC: 54 NG/ML DDU — SIGNIFICANT CHANGE UP (ref 0–230)
EOSINOPHIL # BLD AUTO: 0.21 K/UL — SIGNIFICANT CHANGE UP (ref 0–0.7)
EOSINOPHIL NFR BLD AUTO: 2.3 % — SIGNIFICANT CHANGE UP (ref 0–8)
GLUCOSE SERPL-MCNC: 115 MG/DL — HIGH (ref 70–99)
HCT VFR BLD CALC: 24 % — LOW (ref 42–52)
HCT VFR BLD CALC: 24.5 % — LOW (ref 42–52)
HCT VFR BLD CALC: 24.6 % — LOW (ref 42–52)
HCT VFR BLD CALC: 26.7 % — LOW (ref 42–52)
HDLC SERPL-MCNC: 29 MG/DL — LOW
HGB BLD-MCNC: 7.5 G/DL — LOW (ref 14–18)
HGB BLD-MCNC: 7.8 G/DL — LOW (ref 14–18)
HGB BLD-MCNC: 8 G/DL — LOW (ref 14–18)
HGB BLD-MCNC: 8.5 G/DL — LOW (ref 14–18)
IMM GRANULOCYTES NFR BLD AUTO: 1.2 % — HIGH (ref 0.1–0.3)
INR BLD: 1.15 RATIO — SIGNIFICANT CHANGE UP (ref 0.65–1.3)
LIPID PNL WITH DIRECT LDL SERPL: 45 MG/DL — SIGNIFICANT CHANGE UP
LYMPHOCYTES # BLD AUTO: 2.27 K/UL — SIGNIFICANT CHANGE UP (ref 1.2–3.4)
LYMPHOCYTES # BLD AUTO: 24.8 % — SIGNIFICANT CHANGE UP (ref 20.5–51.1)
MAGNESIUM SERPL-MCNC: 2.1 MG/DL — SIGNIFICANT CHANGE UP (ref 1.8–2.4)
MCHC RBC-ENTMCNC: 28 PG — SIGNIFICANT CHANGE UP (ref 27–31)
MCHC RBC-ENTMCNC: 28.5 PG — SIGNIFICANT CHANGE UP (ref 27–31)
MCHC RBC-ENTMCNC: 28.6 PG — SIGNIFICANT CHANGE UP (ref 27–31)
MCHC RBC-ENTMCNC: 29.4 PG — SIGNIFICANT CHANGE UP (ref 27–31)
MCHC RBC-ENTMCNC: 31.3 G/DL — LOW (ref 32–37)
MCHC RBC-ENTMCNC: 31.7 G/DL — LOW (ref 32–37)
MCHC RBC-ENTMCNC: 31.8 G/DL — LOW (ref 32–37)
MCHC RBC-ENTMCNC: 32.7 G/DL — SIGNIFICANT CHANGE UP (ref 32–37)
MCV RBC AUTO: 89.6 FL — SIGNIFICANT CHANGE UP (ref 80–94)
MCV RBC AUTO: 89.6 FL — SIGNIFICANT CHANGE UP (ref 80–94)
MCV RBC AUTO: 90.1 FL — SIGNIFICANT CHANGE UP (ref 80–94)
MCV RBC AUTO: 90.1 FL — SIGNIFICANT CHANGE UP (ref 80–94)
MONOCYTES # BLD AUTO: 0.77 K/UL — HIGH (ref 0.1–0.6)
MONOCYTES NFR BLD AUTO: 8.4 % — SIGNIFICANT CHANGE UP (ref 1.7–9.3)
NEUTROPHILS # BLD AUTO: 5.73 K/UL — SIGNIFICANT CHANGE UP (ref 1.4–6.5)
NEUTROPHILS NFR BLD AUTO: 62.8 % — SIGNIFICANT CHANGE UP (ref 42.2–75.2)
NON HDL CHOLESTEROL: 71 MG/DL — SIGNIFICANT CHANGE UP
NRBC # BLD: 0 /100 WBCS — SIGNIFICANT CHANGE UP (ref 0–0)
PLATELET # BLD AUTO: 186 K/UL — SIGNIFICANT CHANGE UP (ref 130–400)
PLATELET # BLD AUTO: 191 K/UL — SIGNIFICANT CHANGE UP (ref 130–400)
PLATELET # BLD AUTO: 194 K/UL — SIGNIFICANT CHANGE UP (ref 130–400)
PLATELET # BLD AUTO: 195 K/UL — SIGNIFICANT CHANGE UP (ref 130–400)
POTASSIUM SERPL-MCNC: 4.3 MMOL/L — SIGNIFICANT CHANGE UP (ref 3.5–5)
POTASSIUM SERPL-SCNC: 4.3 MMOL/L — SIGNIFICANT CHANGE UP (ref 3.5–5)
PROT SERPL-MCNC: 6 G/DL — SIGNIFICANT CHANGE UP (ref 6–8)
PROTHROM AB SERPL-ACNC: 13.2 SEC — HIGH (ref 9.95–12.87)
RBC # BLD: 2.68 M/UL — LOW (ref 4.7–6.1)
RBC # BLD: 2.72 M/UL — LOW (ref 4.7–6.1)
RBC # BLD: 2.73 M/UL — LOW (ref 4.7–6.1)
RBC # BLD: 2.98 M/UL — LOW (ref 4.7–6.1)
RBC # FLD: 15.4 % — HIGH (ref 11.5–14.5)
RBC # FLD: 15.7 % — HIGH (ref 11.5–14.5)
RBC # FLD: 15.8 % — HIGH (ref 11.5–14.5)
RBC # FLD: 15.9 % — HIGH (ref 11.5–14.5)
SODIUM SERPL-SCNC: 138 MMOL/L — SIGNIFICANT CHANGE UP (ref 135–146)
TRIGL SERPL-MCNC: 144 MG/DL — SIGNIFICANT CHANGE UP
WBC # BLD: 8.23 K/UL — SIGNIFICANT CHANGE UP (ref 4.8–10.8)
WBC # BLD: 8.43 K/UL — SIGNIFICANT CHANGE UP (ref 4.8–10.8)
WBC # BLD: 9.04 K/UL — SIGNIFICANT CHANGE UP (ref 4.8–10.8)
WBC # BLD: 9.14 K/UL — SIGNIFICANT CHANGE UP (ref 4.8–10.8)
WBC # FLD AUTO: 8.23 K/UL — SIGNIFICANT CHANGE UP (ref 4.8–10.8)
WBC # FLD AUTO: 8.43 K/UL — SIGNIFICANT CHANGE UP (ref 4.8–10.8)
WBC # FLD AUTO: 9.04 K/UL — SIGNIFICANT CHANGE UP (ref 4.8–10.8)
WBC # FLD AUTO: 9.14 K/UL — SIGNIFICANT CHANGE UP (ref 4.8–10.8)

## 2021-06-29 PROCEDURE — 99233 SBSQ HOSP IP/OBS HIGH 50: CPT

## 2021-06-29 PROCEDURE — 99223 1ST HOSP IP/OBS HIGH 75: CPT

## 2021-06-29 RX ORDER — SODIUM CHLORIDE 9 MG/ML
1000 INJECTION INTRAMUSCULAR; INTRAVENOUS; SUBCUTANEOUS
Refills: 0 | Status: DISCONTINUED | OUTPATIENT
Start: 2021-06-29 | End: 2021-06-29

## 2021-06-29 RX ORDER — KETOROLAC TROMETHAMINE 30 MG/ML
30 SYRINGE (ML) INJECTION ONCE
Refills: 0 | Status: DISCONTINUED | OUTPATIENT
Start: 2021-06-29 | End: 2021-06-29

## 2021-06-29 RX ORDER — ACETAMINOPHEN 500 MG
650 TABLET ORAL EVERY 6 HOURS
Refills: 0 | Status: DISCONTINUED | OUTPATIENT
Start: 2021-06-29 | End: 2021-06-30

## 2021-06-29 RX ADMIN — Medication 1 PATCH: at 11:41

## 2021-06-29 RX ADMIN — Medication 30 MILLIGRAM(S): at 22:21

## 2021-06-29 RX ADMIN — Medication 1 APPLICATION(S): at 06:01

## 2021-06-29 RX ADMIN — Medication 1 PATCH: at 11:39

## 2021-06-29 RX ADMIN — Medication 1 PATCH: at 21:06

## 2021-06-29 RX ADMIN — Medication 1 TABLET(S): at 11:39

## 2021-06-29 RX ADMIN — ATORVASTATIN CALCIUM 80 MILLIGRAM(S): 80 TABLET, FILM COATED ORAL at 21:48

## 2021-06-29 RX ADMIN — GABAPENTIN 100 MILLIGRAM(S): 400 CAPSULE ORAL at 17:35

## 2021-06-29 RX ADMIN — Medication 1 APPLICATION(S): at 13:38

## 2021-06-29 RX ADMIN — Medication 5 MILLILITER(S): at 14:46

## 2021-06-29 RX ADMIN — Medication 1 PATCH: at 06:01

## 2021-06-29 RX ADMIN — GABAPENTIN 100 MILLIGRAM(S): 400 CAPSULE ORAL at 06:01

## 2021-06-29 RX ADMIN — Medication 1 MILLIGRAM(S): at 11:39

## 2021-06-29 RX ADMIN — METHOCARBAMOL 500 MILLIGRAM(S): 500 TABLET, FILM COATED ORAL at 18:42

## 2021-06-29 RX ADMIN — Medication 1 APPLICATION(S): at 21:48

## 2021-06-29 RX ADMIN — Medication 100 MILLIGRAM(S): at 11:39

## 2021-06-29 RX ADMIN — SODIUM CHLORIDE 75 MILLILITER(S): 9 INJECTION INTRAMUSCULAR; INTRAVENOUS; SUBCUTANEOUS at 09:01

## 2021-06-29 RX ADMIN — Medication 5 MILLILITER(S): at 06:00

## 2021-06-29 NOTE — DISCHARGE NOTE PROVIDER - HOSPITAL COURSE
`65 year old male with hx of CVA, PE, HLD, CHF (undocumented EF) presents with epistaxis starting yesterday.  Patient states he was bled a large amount (unable to quantify) from both nostrils while he was at the nursing home.  He denies any trauma or manipulation.  His Eliquis and aspirin were held in the nursing home and he was sent in for evaluation.  He denies any chest pain, denies any abdominal pain.  Of note his hemoglobin dropped from 12.7 to 10.4 since 6/23 (admitted then for inability to ambulate).    In the ED he received morphine and afrin, he was seen by ENT who attempted to place a rhinorocket however patient was combative and refused, so surgicell was placed.  There was no active bleeding during my interview.  Vitals in ED: /61    RR 20  Temp 98  SpO2 96% on room air, vitals remained stable. Hb droped to 8 and was monitored. No more episodes of bleeding. After discussion with Pt. agreed to d/c eliquis.  Pt. stable for discharge. `65 year old male with hx of CVA, PE, HLD, CHF (undocumented EF) presents with epistaxis starting yesterday.  Patient states he was bled a large amount (unable to quantify) from both nostrils while he was at the nursing home.  He denies any trauma or manipulation.  His Eliquis and aspirin were held in the nursing home and he was sent in for evaluation.  He denies any chest pain, denies any abdominal pain.  Of note his hemoglobin dropped from 12.7 to 10.4 since 6/23 (admitted then for inability to ambulate).    In the ED he received morphine and afrin, he was seen by ENT who attempted to place a rhinorocket however patient was combative and refused, so surgicell was placed.  There was no active bleeding during my interview.  Vitals in ED: /61    RR 20  Temp 98  SpO2 96% on room air, vitals remained stable. Hb droped to 8 and was monitored. No more episodes of bleeding. After discussion with Pt. agreed to d/c eliquis.  Pt. had an Echo showed normal EF. No w clarified CHFpEF.  Pt. stable for discharge. `65 year old male with hx of CVA, PE, HLD, CHF (undocumented EF) presents with epistaxis starting yesterday.  Patient states he was bled a large amount (unable to quantify) from both nostrils while he was at the nursing home.  He denies any trauma or manipulation.  His Eliquis and aspirin were held in the nursing home and he was sent in for evaluation.  He denies any chest pain, denies any abdominal pain.  Of note his hemoglobin dropped from 12.7 to 10.4 since 6/23 (admitted then for inability to ambulate).    In the ED he received morphine and afrin, he was seen by ENT who attempted to place a rhinorocket however patient was combative and refused, so surgicell was placed.  There was no active bleeding during my interview.  Vitals in ED: /61    RR 20  Temp 98  SpO2 96% on room air, vitals remained stable. Hb droped to 8 and was monitored. No more episodes of bleeding. After discussion with Pt. agreed to d/c eliquis currently on ASA  Pt. had an Echo showed normal EF. No w clarified CHFpEF.  Pt. stable for discharge. `65 year old male with hx of CVA, PE, HLD, CHF (undocumented EF) presents with epistaxis starting yesterday.  Patient states he was bled a large amount (unable to quantify) from both nostrils while he was at the nursing home.  He denies any trauma or manipulation.  His Eliquis and aspirin were held in the nursing home and he was sent in for evaluation.  He denies any chest pain, denies any abdominal pain.  Of note his hemoglobin dropped from 12.7 to 10.4 since 6/23 (admitted then for inability to ambulate).    In the ED he received morphine and afrin, he was seen by ENT who attempted to place a rhinorocket however patient was combative and refused, so surgicell was placed.  There was no active bleeding during my interview.  Vitals in ED: /61    RR 20  Temp 98  SpO2 96% on room air, vitals remained stable. Hb droped to 8 and was monitored. No more episodes of bleeding. After discussion with Pt. agreed to d/c eliquis currently on ASA  Pt. had an Echo showed normal EF. No w clarified CHFpEF.  Pt. stable for discharge.    Attending Addendum:  patient seen and examined earlier today on rounds.  did have 2 spots of blood from his nose on a napking earlier today but no pranav bleeding.  hg is stable this AM compared to yesterday.  patient will be DC.  no apixaban per patient refusal to take (had PE 2015 as indication).  patient smokes.  counseled re smoking.  declines NRT and declines to quit.  has PAD.  risk factor modification - high dose statin plus ASA to be given.  encourage smoking cessation.  medically stable for DC. 65 year old male with hx of CVA, PE, HLD, CHF (undocumented EF) presents with epistaxis starting yesterday.  Patient states he was bled a large amount (unable to quantify) from both nostrils while he was at the nursing home.  He denies any trauma or manipulation.  His Eliquis and aspirin were held in the nursing home and he was sent in for evaluation.  He denies any chest pain, denies any abdominal pain.  Of note his hemoglobin dropped from 12.7 to 10.4 since 6/23 (admitted then for inability to ambulate).    In the ED he received morphine and afrin, he was seen by ENT who attempted to place a rhinorocket however patient was combative and refused, so surgicell was placed.  There was no active bleeding during my interview.  Vitals in ED: /61    RR 20  Temp 98  SpO2 96% on room air, vitals remained stable. Hb droped to 8 and was monitored. No more episodes of bleeding. After discussion with Pt. agreed to d/c eliquis currently on ASA  Pt. had an Echo showed normal EF. No w clarified CHFpEF.  Pt. stable for discharge.    Attending Addendum:  patient seen and examined earlier today on rounds.  did have 2 spots of blood from his nose on a napking earlier today but no pranav bleeding.  hg is stable this AM compared to yesterday.  patient will be DC.  no apixaban per patient refusal to take (had PE 2015 as indication), Pt aware of risks of not taking AC.  patient smokes.  counseled re smoking.  declines NRT and declines to quit.  has PAD.  risk factor modification - high dose statin plus ASA to be given.  encourage smoking cessation.  medically stable for DC.

## 2021-06-29 NOTE — PROGRESS NOTE ADULT - SUBJECTIVE AND OBJECTIVE BOX
ENT DAILY PROGRESS NOTE    Pt is a 65y Male PMH of CVA of Left pontine with dysphagia, CHF, HTN, HLD, Alcohol dependence, PE(2015), TIA presented from [facility] a/w epistaxis . Patient seen and examined at bedside, offers no complaints at this time. Patient states he had no episodes of epistaxis overnight. Patient denies any fever, chills, difficulty breathing/SOB, CP, odynphagia, dysphagia       REVIEW OF SYSTEMS   [x] A ten-point review of systems was otherwise negative except as noted.      Allergies    No Known Allergies    Intolerances        MEDICATIONS:  acetaminophen    Suspension .. 650 milliGRAM(s) Oral every 6 hours PRN  atorvastatin 80 milliGRAM(s) Oral at bedtime  BACItracin   Ointment 1 Application(s) Topical three times a day  folic acid 1 milliGRAM(s) Oral daily  gabapentin 100 milliGRAM(s) Oral two times a day  guaifenesin/dextromethorphan Oral Liquid 5 milliLiter(s) Oral every 6 hours PRN  methocarbamol 500 milliGRAM(s) Oral every 8 hours PRN  multivitamin/minerals 1 Tablet(s) Oral daily  nicotine - 21 mG/24Hr(s) Patch 1 patch Transdermal daily  thiamine 100 milliGRAM(s) Oral daily      Vital Signs Last 24 Hrs  T(C): 36.2 (29 Jun 2021 05:00), Max: 37.4 (28 Jun 2021 19:48)  T(F): 97.1 (29 Jun 2021 05:00), Max: 99.3 (28 Jun 2021 19:48)  HR: 71 (29 Jun 2021 05:00) (71 - 82)  BP: 115/67 (29 Jun 2021 05:00) (103/50 - 115/67)  BP(mean): --  RR: 18 (29 Jun 2021 05:00) (18 - 19)  SpO2: 99% (29 Jun 2021 05:00) (97% - 99%)      06-28 @ 07:01  -  06-29 @ 07:00  --------------------------------------------------------  IN:    Oral Fluid: 118 mL  Total IN: 118 mL    OUT:    Voided (mL): 300 mL  Total OUT: 300 mL    Total NET: -182 mL          PHYSICAL EXAM:    GEN: Well-developed, well-nourished. NAD, awake and alert. No drooling or pooling of secretions. No stridor or stertor. Good vocal quality, no hoarseness.   SKIN: Good color, non diaphoretic  HEENT: NC/AT;   NARES: b/l nares noted with mucous, no active bleeding or clots noted   MOUTH: Oral mucosa pink and moist. No erythema or edema noted to buccal mucosa, tongue, FOM, uvula orUvula midline. Posterior Oropharynx with slight streaking of dark blood to right posterior oropharynx , no active bleeding or clots noted.   NECK:  Trachea midline. Neck supple, no TTP to B/L lateral neck, no cervical LAD.  RESP: No dyspnea, non-labored breathing. No use of accessory muscles.  CARDIO: +S1/S2  ABDO: Soft, NT.      LABS:  CBC-                        8.5    9.14  )-----------( 195      ( 29 Jun 2021 07:23 )             26.7     BMP/CMP-  28 Jun 2021 06:37    140    |  102    |  46     ----------------------------<  130    4.7     |  28     |  1.2      Ca    9.7        28 Jun 2021 06:37      Coagulation Studies-  PT/INR - ( 28 Jun 2021 22:33 )   PT: 13.20 sec;   INR: 1.15 ratio           Endocrine Panel-              RADIOLOGY & ADDITIONAL STUDIES:

## 2021-06-29 NOTE — PROGRESS NOTE ADULT - SUBJECTIVE AND OBJECTIVE BOX
Pt is a 65y Male PMH of CVA of Left pontine with dysphagia, CHF, HTN, HLD, Alcohol dependence, PE(2015), TIA presented from [facility] a/w epistaxis .  Patient seen and examined at bedside, offers no complaints at this time. Patient states he had no episodes of epistaxis overnight. Patient denies any fever, chills, difficulty breathing/SOB, CP, odynphagia, dysphagia.       MEDICATIONS:  acetaminophen    Suspension .. 650 milliGRAM(s) Oral every 6 hours PRN  atorvastatin 80 milliGRAM(s) Oral at bedtime  BACItracin   Ointment 1 Application(s) Topical three times a day  folic acid 1 milliGRAM(s) Oral daily  gabapentin 100 milliGRAM(s) Oral two times a day  guaifenesin/dextromethorphan Oral Liquid 5 milliLiter(s) Oral every 6 hours PRN  methocarbamol 500 milliGRAM(s) Oral every 8 hours PRN  multivitamin/minerals 1 Tablet(s) Oral daily  nicotine - 21 mG/24Hr(s) Patch 1 patch Transdermal daily  thiamine 100 milliGRAM(s) Oral daily      Vital Signs Last 24 Hrs  T(C): 36.2 (29 Jun 2021 05:00), Max: 37.4 (28 Jun 2021 19:48)  T(F): 97.1 (29 Jun 2021 05:00), Max: 99.3 (28 Jun 2021 19:48)  HR: 71 (29 Jun 2021 05:00) (71 - 82)  BP: 115/67 (29 Jun 2021 05:00) (103/50 - 115/67)  BP(mean): --  RR: 18 (29 Jun 2021 05:00) (18 - 19)  SpO2: 99% (29 Jun 2021 05:00) (97% - 99%)      GEN: Well-developed, well-nourished. NAD, awake and alert. No drooling or pooling of secretions. No stridor or stertor. Good vocal quality, no hoarseness.   SKIN: Good color, non diaphoretic  HEENT: NC/AT;   NARES: b/l nares noted with mucous, no active bleeding or clots noted   MOUTH: Oral mucosa pink and moist. No erythema or edema noted to buccal mucosa, tongue, FOM, uvula orUvula midline. Posterior Oropharynx with slight streaking of dark blood to right posterior oropharynx , no active bleeding or clots noted.   NECK:  Trachea midline. Neck supple, no TTP to B/L lateral neck, no cervical LAD.  RESP: No dyspnea, non-labored breathing. No use of accessory muscles.  CARDIO: +S1/S2  ABDO: Soft, NT.    LABS:  CBC-                        8.5    9.14  )-----------( 195      ( 29 Jun 2021 07:23 )             26.7     BMP/CMP-  28 Jun 2021 06:37    140    |  102    |  46     ----------------------------<  130    4.7     |  28     |  1.2      Ca    9.7        28 Jun 2021 06:37      Coagulation Studies-  PT/INR - ( 28 Jun 2021 22:33 )   PT: 13.20 sec;   INR: 1.15 ratio       Pt is a 65y Male PMH of CVA of Left pontine with dysphagia, CHF, HTN, HLD, Alcohol dependence, PE(2015), TIA presented from [facility] a/w epistaxis .  Patient seen and examined at bedside, offers no complaints at this time. Patient states he had no episodes of epistaxis overnight. Patient denies any fever, chills, difficulty breathing/SOB, CP, odynphagia, dysphagia.     MEDICATIONS:  acetaminophen    Suspension .. 650 milliGRAM(s) Oral every 6 hours PRN  atorvastatin 80 milliGRAM(s) Oral at bedtime  BACItracin   Ointment 1 Application(s) Topical three times a day  folic acid 1 milliGRAM(s) Oral daily  gabapentin 100 milliGRAM(s) Oral two times a day  guaifenesin/dextromethorphan Oral Liquid 5 milliLiter(s) Oral every 6 hours PRN  methocarbamol 500 milliGRAM(s) Oral every 8 hours PRN  multivitamin/minerals 1 Tablet(s) Oral daily  nicotine - 21 mG/24Hr(s) Patch 1 patch Transdermal daily  thiamine 100 milliGRAM(s) Oral daily    Vital Signs Last 24 Hrs  T(C): 36.2 (29 Jun 2021 05:00), Max: 37.4 (28 Jun 2021 19:48)  T(F): 97.1 (29 Jun 2021 05:00), Max: 99.3 (28 Jun 2021 19:48)  HR: 71 (29 Jun 2021 05:00) (71 - 82)  BP: 115/67 (29 Jun 2021 05:00) (103/50 - 115/67)  RR: 18 (29 Jun 2021 05:00) (18 - 19)  SpO2: 99% (29 Jun 2021 05:00) (97% - 99%)    GEN: Well-developed, well-nourished. NAD, awake and alert. No drooling or pooling of secretions. No stridor or stertor. Good vocal quality, no hoarseness.   SKIN: Good color, non diaphoretic  HEENT: NC/AT;   NARES: b/l nares noted with mucous, no active bleeding or clots noted   MOUTH: Oral mucosa pink and moist. No erythema or edema noted to buccal mucosa, tongue, FOM, uvula orUvula midline. Posterior Oropharynx with slight streaking of dark blood to right posterior oropharynx , no active bleeding or clots noted.   NECK:  Trachea midline. Neck supple, no TTP to B/L lateral neck, no cervical LAD.  RESP: No dyspnea, non-labored breathing. No use of accessory muscles.  CARDIO: +S1/S2  ABDO: Soft, NT.                        8.5    9.14  )-----------( 195      ( 29 Jun 2021 07:23 )             26.7       28 Jun 2021 06:37    140    |  102    |  46     ----------------------------<  130    4.7     |  28     |  1.2      Ca    9.7        28 Jun 2021 06:37    Coagulation Studies-  PT/INR - ( 28 Jun 2021 22:33 )   PT: 13.20 sec;   INR: 1.15 ratio

## 2021-06-29 NOTE — DISCHARGE NOTE PROVIDER - NSDCCPCAREPLAN_GEN_ALL_CORE_FT
PRINCIPAL DISCHARGE DIAGNOSIS  Diagnosis: Epistaxis  Assessment and Plan of Treatment: You had a nose bleed which stopped in the emergency room after being treated by the ENT doctor. It was agred to srtop your blood thinner Eliquis. You can follow up with the ENT doctor.  Please obtain a routine cbc in 1 week to confirm that you hemaglobin remains stable.       PRINCIPAL DISCHARGE DIAGNOSIS  Diagnosis: Epistaxis  Assessment and Plan of Treatment: You had a nose bleed which stopped in the emergency room after being treated by the ENT doctor. It was agred to srtop your blood thinner Eliquis. You can follow up with the ENT doctor.  Please obtain a routine cbc in 1 week to confirm that you hemaglobin remains stable. You can continue to take aspirin daily.

## 2021-06-29 NOTE — PHYSICAL THERAPY INITIAL EVALUATION ADULT - PERTINENT HX OF CURRENT PROBLEM, REHAB EVAL
65 year old male with hx of CVA, PE, HLD, CHF (undocumented EF) presents with epistaxis starting yesterday.  Patient states he was bled a large amount (unable to quantify) from both nostrils while he was at the nursing home. His Eliquis and aspirin were held in the nursing home and he was sent in for evaluation.  He denies any chest pain, denies any abdominal pain.  Of note his hemoglobin dropped from 12.7 to 10.4 since 6/23 (admitted then for inability to ambulate).

## 2021-06-29 NOTE — PHYSICAL THERAPY INITIAL EVALUATION ADULT - GAIT DEVIATIONS NOTED, PT EVAL
shuffling unsteady gait/decreased fish/increased time in double stance/increased stride width/decreased weight-shifting ability

## 2021-06-29 NOTE — PROGRESS NOTE ADULT - ASSESSMENT
65y Male PMH of CVA of Left pontine with dysphagia, CHF, HTN, HLD, Alcohol dependence, PE(2015), TIA presented from [facility] a/w epistaxis. Patient doing well with no acute overnight events     Plan:   - No active bleeding or clots noted to b/l nares or posterior oropharynx   - Monitor H/H, transfuse prn. Hgb 12.7>10.4 >8.5    - Continue Afrin/Nasal spray to b/l nares if not contraindicated  - Continue Apply bacitracin to b/l nares   - Hold AC if not contraindicated, on ASA/Eliquis   - HOB elevated 30   - Avoid digital trauma, sneezing/blowing nose, heavy lifting, bending below the waist   - No further acute ENT intervention at this time   - Recall prn   - Will d/w attending

## 2021-06-29 NOTE — PROGRESS NOTE ADULT - ATTENDING COMMENTS
65M w/ PMH: CVA of left ventral rosalie 2015, PE in 2015 and on Eliquis since, HLD, HFpEF presents with epistaxis x2days.  Patient states he was bleeding a large amount from both nostrils while at NH for SNF.    Acute Epictasis 2/2 Eliquis complicated w/ Acute Blood Loss Anemia   - BL Hg 12 now 8.5   - Recommending to stop eliquis as has been on it since 2015 without f/u  - Send to Hematology for Hypercoagulable state w/up if develops thrombosis in the future  - At this time risk of bleeding outweighs use of AC Medications   - Watch any other signs of bleeding - no witnessed GIB and due to large nose bleed pls hold off on stool guiac  - Monitor H/H transfuse if below 7  - Check Iron / Ferritin     s/p PT Eval ambulates 5 feet hence needs SNF - CM working on SNF    Dispo: Possible d/c to SNF tomorrow - re-assess if needs [facility]BC Transfusion. f/u Heme/PMD in office 65M w/ PMH: CVA of left ventral rosalie 2015, PE in 2015 and on Eliquis since, HLD, Chronic HFpEF (TTE Below) presents with epistaxis x2days.  Patient states he was bleeding a large amount from both nostrils while at NH for SNF.    Acute Epictasis 2/2 Eliquis complicated w/ Acute Blood Loss Anemia   - BL Hg 12 now 7.8   - Recommending to stop eliquis as has been on it since 2015 without f/u  - Send to Hematology for Hypercoagulable state w/up if develops thrombosis in the future  - At this time risk of bleeding outweighs use of AC Medications   - Watch any other signs of bleeding - no witnessed GIB and due to large nose bleed pls hold off on stool guiac  - Monitor H/H transfuse if below 7  - Check Iron / Ferritin     CXR: WNL                         7.8    8.43  )-----------( 186      ( 29 Jun 2021 11:10 )             24.6     06-29    138  |  104  |  37<H>  ----------------------------<  115<H>  4.3   |  25  |  0.9    Ca    9.0      29 Jun 2021 07:23  Mg     2.1     06-29    TPro  6.0  /  Alb  3.6  /  TBili  0.3  /  DBili  x   /  AST  28  /  ALT  44<H>  /  AlkPhos  59  06-29    TTE 6/29/21   Summary:   1. Left ventricular ejection fraction, by visual estimation, is 60 to 65%.   2. Normal global left ventricular systolic function.   3. Mild concentric left ventricular hypertrophy.   4. Mildly increased LV wall thickness.   5. Normal left ventricular internal cavity size.   6. The left ventricular diastolic function could not be assessed in this study.   7. Normal left atrial size.   8. Normal right atrial size.   9. No evidence of mitral valve regurgitation.  10. Peak transaortic gradient equals 8.4 mmHg, mean transaortic gradient equals 4.1 mmHg, the calculated aortic valve area equals 2.46 cm² by the continuity equation consistent with mildaortic stenosis.      s/p PT Eval ambulates 5 feet hence needs SNF - CM working on SNF    Dispo: Possible d/c to SNF tomorrow - re-assess if needs PRBC Transfusion. f/u Heme/PMD in office

## 2021-06-29 NOTE — DISCHARGE NOTE PROVIDER - NSDCMRMEDTOKEN_GEN_ALL_CORE_FT
apixaban 5 mg oral tablet: 1 tab(s) orally 2 times a day  aspirin 81 mg oral tablet, chewable: 1 tab(s) orally once a day  atorvastatin 80 mg oral tablet: 1 tab(s) orally once a day (at bedtime)  bacitracin 500 units/g topical ointment: 1 application topically 3 times a day  folic acid 1 mg oral tablet: 1 tab(s) orally once a day  furosemide 20 mg oral tablet: 1 tab(s) orally once a day  gabapentin 100 mg oral capsule: 1 cap(s) orally 3 times a day  guaifenesin-dextromethorphan 100 mg-10 mg/5 mL oral liquid: 5 milliliter(s) orally every 6 hours, As needed, Cough  losartan 100 mg oral tablet: 1 tab(s) orally once a day  methocarbamol 500 mg oral tablet: 1 tab(s) orally every 8 hours, As needed, Muscle Spasm  metoprolol tartrate 25 mg oral tablet: 1 tab(s) orally 2 times a day  Multiple Vitamins with Minerals oral tablet: 1 tab(s) orally once a day  nicotine 21 mg/24 hr transdermal film, extended release: 1 patch transdermal once a day  thiamine 100 mg oral tablet: 1 tab(s) orally once a day   aspirin 81 mg oral tablet, chewable: 1 tab(s) orally once a day  atorvastatin 80 mg oral tablet: 1 tab(s) orally once a day (at bedtime)  bacitracin 500 units/g topical ointment: 1 application topically 3 times a day  folic acid 1 mg oral tablet: 1 tab(s) orally once a day  gabapentin 100 mg oral capsule: 1 cap(s) orally 3 times a day  guaifenesin-dextromethorphan 100 mg-10 mg/5 mL oral liquid: 5 milliliter(s) orally every 6 hours, As needed, Cough  losartan 100 mg oral tablet: 1 tab(s) orally once a day  methocarbamol 500 mg oral tablet: 1 tab(s) orally every 8 hours, As needed, Muscle Spasm  metoprolol tartrate 25 mg oral tablet: 1 tab(s) orally 2 times a day  Multiple Vitamins with Minerals oral tablet: 1 tab(s) orally once a day  nicotine 21 mg/24 hr transdermal film, extended release: 1 patch transdermal once a day  thiamine 100 mg oral tablet: 1 tab(s) orally once a day

## 2021-06-29 NOTE — PROGRESS NOTE ADULT - ASSESSMENT
65 year old male with hx of CVA of left ventral rosalie 2015, PE in 2015 and on Eliquis since that time, HLD, CHF (undocumented EF) presents with epistaxis starting yesterday.  Patient states he was bled a large amount (unable to quantify) from both nostrils while he was at the nursing home    # Epistaxis likely predisposed due to Eliquis and aspirin use  - Hb now 7.8, downtrending  - aspirin and Eliquis were being held in NH, anticoagulation held  - rhinorocket was refused, surgicell placed, currently no bleeding  - hold aspirin for now and restart if no further epistaxis (needed for secondary stroke prevention)    # PE dx in 2015 - unprovoked per chart review  - was on Eliquis since that time  - pt denies any VTE events since 2015  - f/u d-dimer: 54  - f/u va duplex: No evidence of deep venous thrombosis in either lower extremity.  - hold Eliquis for now due to epistaxis. Will discontinue upon discharge  - pt does not want to resume Eliquis once epistaxis is resolved    # DAYDAY  - had an increase in his creatinine from 0.8 to 1.2 before being discharged last admission, today creatinine is 1.2  - BUN increased before admission suggesting pre-renal DAYDAY, however today BUN is much higher 46 compared to 18 // likely from ingesting blood from epistaxis coupled with mild pre-renal DAYDAY since he left hospital  - Fluids stopped    # CHF w/ undocumented EF  - takes lasix 20 po qd and lopressor 25 bid in the NH  - will hold lasix for now considering DAYDAY, will hold lopressor for now considering vitals suggesting he is orthostatic // has a hx of HTN however having soft BP and elevated HR  - f/u echo    # HLD  - c/w atorvastatin 80, if continuing to bleed consider holding  - f/u ldl    # GERD  - c/w protonix    PLAN: f/u ENT, assess if patient is a candidate for AC in the long-term, holding for now    # DVT PPX: SCDs  # GI PPX: protonix  # Diet: DASH  FULL CODE   65 year old male with hx of CVA of left ventral rosalie 2015, PE in 2015 and on Eliquis since that time, HLD, CHF (undocumented EF) presents with epistaxis starting yesterday.  Patient states he was bled a large amount (unable to quantify) from both nostrils while he was at the nursing home    # Epistaxis likely predisposed due to Eliquis and aspirin use  - Hb now 7.8, downtrending  - aspirin and Eliquis were being held in NH, anticoagulation held  - rhinorocket was refused, surgicell placed, currently no bleeding  - hold aspirin for now and restart if no further epistaxis (needed for secondary stroke prevention)  - ENT:        - No active bleeding or clots noted to b/l nares or posterior oropharynx        - Monitor H/H, transfuse prn. Hgb 12.7>10.4 >8.5>7.8         - No further acute ENT intervention at this time        - Recall prn       # PE dx in 2015 - unprovoked per chart review  - was on Eliquis since that time  - pt denies any VTE events since 2015  - f/u d-dimer: 54  - f/u va duplex: No evidence of deep venous thrombosis in either lower extremity.  - hold Eliquis for now due to epistaxis. Will discontinue upon discharge  - pt does not want to resume Eliquis once epistaxis is resolved    # DAYDAY  - had an increase in his creatinine from 0.8 to 1.2 before being discharged last admission, today creatinine is 1.2  - BUN increased before admission suggesting pre-renal DAYDAY, however today BUN is much higher 46 compared to 18 // likely from ingesting blood from epistaxis coupled with mild pre-renal DAYDAY since he left hospital  - Fluids stopped    # CHF w/ undocumented EF  - takes lasix 20 po qd and lopressor 25 bid in the NH  - will hold lasix for now considering DAYDAY, will hold lopressor for now considering vitals suggesting he is orthostatic // has a hx of HTN however having soft BP and elevated HR  - ECHO: Left ventricular ejection fraction, by visual estimation, is 60 to 65%    # HLD  - c/w atorvastatin 80, if continuing to bleed consider holding  - LDL: 45, Non HDL cholestrol: 71    # GERD  - c/w protonix    PLAN: f/u ENT, Pt not a candidate for AC.     # DVT PPX: SCDs  # GI PPX: protonix  # Diet: DASH  FULL CODE   65 year old male with hx of CVA of left ventral rosalie 2015, PE in 2015 and on Eliquis since that time, HLD, CHF (undocumented EF) presents with epistaxis starting yesterday.  Patient states he was bled a large amount (unable to quantify) from both nostrils while he was at the nursing home    # Epistaxis likely predisposed due to Eliquis and aspirin use  - Hb now 7.8, downtrending  - aspirin and Eliquis were being held in NH, anticoagulation held  - rhinorocket was refused, surgicell placed, currently no bleeding  - hold aspirin for now and restart if no further epistaxis (needed for secondary stroke prevention)  - ENT:        - No active bleeding or clots noted to b/l nares or posterior oropharynx        - Monitor H/H, transfuse prn. Hgb 12.7>10.4 >8.5>7.8         - No further acute ENT intervention at this time        - Recall prn     # PE dx in 2015 - unprovoked per chart review  - was on Eliquis since that time  - pt denies any VTE events since 2015  - f/u d-dimer: 54  - f/u va duplex: No evidence of deep venous thrombosis in either lower extremity.  - hold Eliquis for now due to epistaxis. Will discontinue upon discharge  - pt does not want to resume Eliquis once epistaxis is resolved    # DAYDAY  - had an increase in his creatinine from 0.8 to 1.2 before being discharged last admission, today creatinine is 1.2  - BUN increased before admission suggesting pre-renal DAYDAY, however today BUN is much higher 46 compared to 18 // likely from ingesting blood from epistaxis coupled with mild pre-renal DAYDAY since he left hospital  - Fluids stopped    # CHF w/ undocumented EF  - takes lasix 20 po qd and lopressor 25 bid in the NH  - will hold lasix for now considering DAYDAY, will hold lopressor for now considering vitals suggesting he is orthostatic // has a hx of HTN however having soft BP and elevated HR  - ECHO: Left ventricular ejection fraction, by visual estimation, is 60 to 65%    # HLD  - c/w atorvastatin 80, if continuing to bleed consider holding  - LDL: 45, Non HDL cholesterol: 71    # GERD  - c/w protonix    PLAN: f/u ENT, Pt not a candidate for AC.     # DVT PPX: SCDs  # GI PPX: protonix  # Diet: DASH    FULL CODE

## 2021-06-29 NOTE — DISCHARGE NOTE PROVIDER - CARE PROVIDER_API CALL
London Barron (MD)  [facility]  [address], [address] [place] [address]  Phone: [phone]  Fax: [phone]  Follow Up Time:

## 2021-06-30 LAB
ALBUMIN SERPL ELPH-MCNC: 3.8 G/DL — SIGNIFICANT CHANGE UP (ref 3.5–5.2)
ALP SERPL-CCNC: 83 U/L — SIGNIFICANT CHANGE UP (ref 30–115)
ALT FLD-CCNC: 45 U/L — HIGH (ref 0–41)
ANION GAP SERPL CALC-SCNC: 10 MMOL/L — SIGNIFICANT CHANGE UP (ref 7–14)
AST SERPL-CCNC: 31 U/L — SIGNIFICANT CHANGE UP (ref 0–41)
BASOPHILS # BLD AUTO: 0.05 K/UL — SIGNIFICANT CHANGE UP (ref 0–0.2)
BASOPHILS NFR BLD AUTO: 0.7 % — SIGNIFICANT CHANGE UP (ref 0–1)
BILIRUB SERPL-MCNC: <0.2 MG/DL — SIGNIFICANT CHANGE UP (ref 0.2–1.2)
BUN SERPL-MCNC: 24 MG/DL — HIGH (ref 10–20)
CALCIUM SERPL-MCNC: 8.5 MG/DL — SIGNIFICANT CHANGE UP (ref 8.5–10.1)
CHLORIDE SERPL-SCNC: 107 MMOL/L — SIGNIFICANT CHANGE UP (ref 98–110)
CO2 SERPL-SCNC: 21 MMOL/L — SIGNIFICANT CHANGE UP (ref 17–32)
COVID-19 SPIKE DOMAIN AB INTERP: POSITIVE
COVID-19 SPIKE DOMAIN ANTIBODY RESULT: >250 U/ML — HIGH
CREAT SERPL-MCNC: 0.9 MG/DL — SIGNIFICANT CHANGE UP (ref 0.7–1.5)
EOSINOPHIL # BLD AUTO: 0.2 K/UL — SIGNIFICANT CHANGE UP (ref 0–0.7)
EOSINOPHIL NFR BLD AUTO: 2.6 % — SIGNIFICANT CHANGE UP (ref 0–8)
GLUCOSE SERPL-MCNC: 121 MG/DL — HIGH (ref 70–99)
HCT VFR BLD CALC: 24.6 % — LOW (ref 42–52)
HCT VFR BLD CALC: 26.4 % — LOW (ref 42–52)
HGB BLD-MCNC: 7.6 G/DL — LOW (ref 14–18)
HGB BLD-MCNC: 8.5 G/DL — LOW (ref 14–18)
IMM GRANULOCYTES NFR BLD AUTO: 1.2 % — HIGH (ref 0.1–0.3)
LYMPHOCYTES # BLD AUTO: 1.77 K/UL — SIGNIFICANT CHANGE UP (ref 1.2–3.4)
LYMPHOCYTES # BLD AUTO: 23.1 % — SIGNIFICANT CHANGE UP (ref 20.5–51.1)
MAGNESIUM SERPL-MCNC: 2.1 MG/DL — SIGNIFICANT CHANGE UP (ref 1.8–2.4)
MCHC RBC-ENTMCNC: 28.1 PG — SIGNIFICANT CHANGE UP (ref 27–31)
MCHC RBC-ENTMCNC: 29 PG — SIGNIFICANT CHANGE UP (ref 27–31)
MCHC RBC-ENTMCNC: 30.9 G/DL — LOW (ref 32–37)
MCHC RBC-ENTMCNC: 32.2 G/DL — SIGNIFICANT CHANGE UP (ref 32–37)
MCV RBC AUTO: 90.1 FL — SIGNIFICANT CHANGE UP (ref 80–94)
MCV RBC AUTO: 91.1 FL — SIGNIFICANT CHANGE UP (ref 80–94)
MONOCYTES # BLD AUTO: 0.68 K/UL — HIGH (ref 0.1–0.6)
MONOCYTES NFR BLD AUTO: 8.9 % — SIGNIFICANT CHANGE UP (ref 1.7–9.3)
NEUTROPHILS # BLD AUTO: 4.86 K/UL — SIGNIFICANT CHANGE UP (ref 1.4–6.5)
NEUTROPHILS NFR BLD AUTO: 63.5 % — SIGNIFICANT CHANGE UP (ref 42.2–75.2)
NRBC # BLD: 0 /100 WBCS — SIGNIFICANT CHANGE UP (ref 0–0)
NRBC # BLD: 0 /100 WBCS — SIGNIFICANT CHANGE UP (ref 0–0)
PLATELET # BLD AUTO: 189 K/UL — SIGNIFICANT CHANGE UP (ref 130–400)
PLATELET # BLD AUTO: 226 K/UL — SIGNIFICANT CHANGE UP (ref 130–400)
POTASSIUM SERPL-MCNC: 4 MMOL/L — SIGNIFICANT CHANGE UP (ref 3.5–5)
POTASSIUM SERPL-SCNC: 4 MMOL/L — SIGNIFICANT CHANGE UP (ref 3.5–5)
PROT SERPL-MCNC: 5.9 G/DL — LOW (ref 6–8)
RBC # BLD: 2.7 M/UL — LOW (ref 4.7–6.1)
RBC # BLD: 2.93 M/UL — LOW (ref 4.7–6.1)
RBC # FLD: 15 % — HIGH (ref 11.5–14.5)
RBC # FLD: 15.3 % — HIGH (ref 11.5–14.5)
SARS-COV-2 IGG+IGM SERPL QL IA: >250 U/ML — HIGH
SARS-COV-2 IGG+IGM SERPL QL IA: POSITIVE
SODIUM SERPL-SCNC: 138 MMOL/L — SIGNIFICANT CHANGE UP (ref 135–146)
WBC # BLD: 7.65 K/UL — SIGNIFICANT CHANGE UP (ref 4.8–10.8)
WBC # BLD: 7.93 K/UL — SIGNIFICANT CHANGE UP (ref 4.8–10.8)
WBC # FLD AUTO: 7.65 K/UL — SIGNIFICANT CHANGE UP (ref 4.8–10.8)
WBC # FLD AUTO: 7.93 K/UL — SIGNIFICANT CHANGE UP (ref 4.8–10.8)

## 2021-06-30 PROCEDURE — 93925 LOWER EXTREMITY STUDY: CPT | Mod: 26

## 2021-06-30 PROCEDURE — 99232 SBSQ HOSP IP/OBS MODERATE 35: CPT

## 2021-06-30 RX ORDER — KETOROLAC TROMETHAMINE 30 MG/ML
30 SYRINGE (ML) INJECTION ONCE
Refills: 0 | Status: DISCONTINUED | OUTPATIENT
Start: 2021-06-30 | End: 2021-06-30

## 2021-06-30 RX ORDER — MORPHINE SULFATE 50 MG/1
2 CAPSULE, EXTENDED RELEASE ORAL ONCE
Refills: 0 | Status: DISCONTINUED | OUTPATIENT
Start: 2021-06-30 | End: 2021-06-30

## 2021-06-30 RX ORDER — ACETAMINOPHEN 500 MG
650 TABLET ORAL EVERY 6 HOURS
Refills: 0 | Status: DISCONTINUED | OUTPATIENT
Start: 2021-06-30 | End: 2021-07-02

## 2021-06-30 RX ADMIN — Medication 100 MILLIGRAM(S): at 11:19

## 2021-06-30 RX ADMIN — Medication 1 APPLICATION(S): at 11:20

## 2021-06-30 RX ADMIN — Medication 30 MILLIGRAM(S): at 21:06

## 2021-06-30 RX ADMIN — Medication 1 PATCH: at 11:20

## 2021-06-30 RX ADMIN — Medication 650 MILLIGRAM(S): at 18:57

## 2021-06-30 RX ADMIN — Medication 1 PATCH: at 19:27

## 2021-06-30 RX ADMIN — GABAPENTIN 100 MILLIGRAM(S): 400 CAPSULE ORAL at 05:05

## 2021-06-30 RX ADMIN — MORPHINE SULFATE 2 MILLIGRAM(S): 50 CAPSULE, EXTENDED RELEASE ORAL at 22:46

## 2021-06-30 RX ADMIN — Medication 1 TABLET(S): at 11:20

## 2021-06-30 RX ADMIN — Medication 30 MILLIGRAM(S): at 20:35

## 2021-06-30 RX ADMIN — ATORVASTATIN CALCIUM 80 MILLIGRAM(S): 80 TABLET, FILM COATED ORAL at 21:06

## 2021-06-30 RX ADMIN — Medication 650 MILLIGRAM(S): at 19:27

## 2021-06-30 RX ADMIN — Medication 1 APPLICATION(S): at 05:05

## 2021-06-30 RX ADMIN — GABAPENTIN 100 MILLIGRAM(S): 400 CAPSULE ORAL at 17:13

## 2021-06-30 RX ADMIN — METHOCARBAMOL 500 MILLIGRAM(S): 500 TABLET, FILM COATED ORAL at 19:27

## 2021-06-30 RX ADMIN — Medication 1 PATCH: at 08:17

## 2021-06-30 RX ADMIN — Medication 1 MILLIGRAM(S): at 11:20

## 2021-06-30 NOTE — PROGRESS NOTE ADULT - ASSESSMENT
65 year old male with hx of CVA of left ventral rosalie 2015, PE in 2015 and on Eliquis since that time, HLD, CHF (undocumented EF) presents with epistaxis starting yesterday.  Patient states he was bled a large amount (unable to quantify) from both nostrils while he was at the nursing home    # Epistaxis likely predisposed due to Eliquis and aspirin use  - Hb now 7.8, downtrending  - aspirin and Eliquis were being held in NH, anticoagulation held  - rhinorocket was refused, surgicell placed, currently no bleeding  - hold aspirin for now and restart if no further epistaxis (needed for secondary stroke prevention)  - ENT:        - No active bleeding or clots noted to b/l nares or posterior oropharynx        - Monitor H/H, transfuse prn. Hgb 12.7>10.4 >8.5>7.8         - No further acute ENT intervention at this time        - Recall prn   - Follow CBC, discharge if Hb is stable.     # PE dx in 2015 - unprovoked per chart review  - was on Eliquis since that time  - pt denies any VTE events since 2015  - f/u d-dimer: 56  - f/u va duplex: No evidence of deep venous thrombosis in either lower extremity.  - hold Eliquis for now due to epistaxis. Will discontinue upon discharge  - pt does not want to resume Eliquis once epistaxis is resolved    # DAYDAY  - had an increase in his creatinine from 0.8 to 1.2 before being discharged last admission, today creatinine is 1.2  - BUN increased before admission suggesting pre-renal DAYDAY, however today BUN is much higher 46 compared to 18 // likely from ingesting blood from epistaxis coupled with mild pre-renal DAYDAY since he left hospital  - Fluids stopped    # CHF w/ undocumented EF  - takes lasix 20 po qd and lopressor 25 bid in the NH  - will hold lasix for now considering DAYDAY, will hold lopressor for now considering vitals suggesting he is orthostatic // has a hx of HTN however having soft BP and elevated HR  - ECHO: Left ventricular ejection fraction, by visual estimation, is 60 to 65%    # HLD  - c/w atorvastatin 80, if continuing to bleed consider holding  - LDL: 45, Non HDL cholesterol: 71    # GERD  - c/w protonix    PLAN: f/u ENT, Pt not a candidate for AC.     # DVT PPX: SCDs  # GI PPX: protonix

## 2021-06-30 NOTE — PROGRESS NOTE ADULT - SUBJECTIVE AND OBJECTIVE BOX
CHAPARRO ROBISON 65y Male  MRN#: [id number]   Hospital Day: 2d    HPI:  65 year old male with hx of CVA, PE, HLD, CHF (undocumented EF) presents with epistaxis starting yesterday.  Patient states he was bled a large amount (unable to quantify) from both nostrils while he was at the nursing home.  He denies any trauma or manipulation.  His Eliquis and aspirin were held in the nursing home and he was sent in for evaluation.  He denies any chest pain, denies any abdominal pain.  Of note his hemoglobin dropped from 12.7 to 10.4 since 6/23 (admitted then for inability to ambulate).    In the ED he received morphine and afrin, he was seen by ENT who attempted to place a rhinorocket however patient was combative and refused, so surgicell was placed.  There was no active bleeding during my interview.    Triage Vitals: /61    RR 20  Temp 98  SpO2 96% on room air (28 Jun 2021 11:36)      SUBJECTIVE  Patient is a 65y old Male who presents with a chief complaint of epistaxis (29 Jun 2021 15:31)  Currently admitted to medicine with the primary diagnosis of Epistaxis      INTERVAL HPI AND OVERNIGHT EVENTS:  Patient was examined and seen at bedside. This morning he is resting comfortably in bed and reports no issues or overnight events.    REVIEW OF SYMPTOMS:  CONSTITUTIONAL: No weakness, fevers or chills; No headaches  RESPIRATORY: No cough, wheezing, or hemoptysis; No shortness of breath  CARDIOVASCULAR: No chest pain or palpitations  GASTROINTESTINAL: No abdominal or epigastric pain; No nausea, vomiting, or hematemesis; No diarrhea or constipation; No melena or hematochezia  GENITOURINARY: No dysuria, frequency or hematuria      OBJECTIVE  PAST MEDICAL & SURGICAL HISTORY  TIA (transient ischemic attack)    CHF (congestive heart failure)    HTN (hypertension)    HLD (hyperlipidemia)    Alcohol dependence    Chronic back pain    Pulmonary embolism  2015    Cerebrovascular accident (CVA)  left pontine with dysphagia    History of appendectomy      ALLERGIES:  No Known Allergies    MEDICATIONS:  STANDING MEDICATIONS  atorvastatin 80 milliGRAM(s) Oral at bedtime  BACItracin   Ointment 1 Application(s) Topical three times a day  folic acid 1 milliGRAM(s) Oral daily  gabapentin 100 milliGRAM(s) Oral two times a day  multivitamin/minerals 1 Tablet(s) Oral daily  nicotine - 21 mG/24Hr(s) Patch 1 patch Transdermal daily  thiamine 100 milliGRAM(s) Oral daily    PRN MEDICATIONS  acetaminophen   Tablet .. 650 milliGRAM(s) Oral every 6 hours PRN  guaifenesin/dextromethorphan Oral Liquid 5 milliLiter(s) Oral every 6 hours PRN  methocarbamol 500 milliGRAM(s) Oral every 8 hours PRN      VITAL SIGNS: Last 24 Hours  T(C): 36 (30 Jun 2021 13:47), Max: 36.3 (30 Jun 2021 05:44)  T(F): 96.8 (30 Jun 2021 13:47), Max: 97.3 (30 Jun 2021 05:44)  HR: 67 (30 Jun 2021 13:47) (67 - 80)  BP: 153/68 (30 Jun 2021 13:47) (139/79 - 153/68)  BP(mean): --  RR: 18 (30 Jun 2021 13:47) (18 - 20)  SpO2: --    LABS:                        7.6    7.65  )-----------( 189      ( 30 Jun 2021 06:23 )             24.6     06-30    138  |  107  |  24<H>  ----------------------------<  121<H>  4.0   |  21  |  0.9    Ca    8.5      30 Jun 2021 06:23  Mg     2.1     06-30    TPro  5.9<L>  /  Alb  3.8  /  TBili  <0.2  /  DBili  x   /  AST  31  /  ALT  45<H>  /  AlkPhos  83  06-30    PT/INR - ( 28 Jun 2021 22:33 )   PT: 13.20 sec;   INR: 1.15 ratio          PHYSICAL EXAM:  CONSTITUTIONAL: No acute distress, well-developed, well-groomed, AAOx3  EYES: EOM intact, PERRLA, conjunctiva and sclera clear  ENT: Supple, no masses, no thyromegaly, no bruits, no active bleeding from nose  PULMONARY: Clear to auscultation bilaterally; no wheezes, rales, or rhonchi  CARDIOVASCULAR: Regular rate and rhythm; no murmurs, rubs, or gallops  GASTROINTESTINAL: Soft, non-tender, non-distended; bowel sounds present  MUSCULOSKELETAL: 2+ peripheral pulses; no clubbing, no cyanosis, no edema  SKIN: No rashes or lesions; warm and dry

## 2021-06-30 NOTE — PROGRESS NOTE ADULT - ATTENDING COMMENTS
patient seen and examined earlier today with resident team and nurse on rounds.  no further bleeding noted.  patient stated he would not take anticoagulants again.  of note he is on anticoagulant because of PE in 2015 - ?provoked vs. unprovoked?  Vital Signs Last 24 Hrs  T(C): 36 (30 Jun 2021 13:47), Max: 36.3 (30 Jun 2021 05:44)  T(F): 96.8 (30 Jun 2021 13:47), Max: 97.3 (30 Jun 2021 05:44)  HR: 67 (30 Jun 2021 13:47) (67 - 80)  BP: 153/68 (30 Jun 2021 13:47) (139/79 - 153/68)  RR: 18 (30 Jun 2021 13:47) (18 - 20)      conj pale, no jaundice  nares - no bleeding noted.  neck no JVD supple  lungs clear good air entry bilaterally      heart regular rhythm and rate  abd. soft nontender. BS pos.  extremities no edema.  skin turgor intact.  Labs:   06-30    138  |  107  |  24<H>  ----------------------------<  121<H>  4.0   |  21  |  0.9    Ca    8.5      30 Jun 2021 06:23  Mg     2.1     06-30    TPro  5.9<L>  /  Alb  3.8  /  TBili  <0.2  /  DBili  x   /  AST  31  /  ALT  45<H>  /  AlkPhos  83  06-30               7.6    7.65  )-----------( 189      ( 30 Jun 2021 06:23 )             24.6     a: nasal bleeding - severe, secondary to anticoagulatoin   h/o PE  h/o CVA  DAYDAY - likely prerenal secondary to blood loss  acute blood loss anemia    P:  monitor Hg  if stable tomorrow AM will DC back to SNF  continue to hold anticoagulant - more than 5 years post PE plus patient is refusing  anticipate DC tomorrow

## 2021-07-01 DIAGNOSIS — R53.1 WEAKNESS: ICD-10-CM

## 2021-07-01 DIAGNOSIS — Z79.01 LONG TERM (CURRENT) USE OF ANTICOAGULANTS: ICD-10-CM

## 2021-07-01 DIAGNOSIS — Z86.73 PERSONAL HISTORY OF TRANSIENT ISCHEMIC ATTACK (TIA), AND CEREBRAL INFARCTION WITHOUT RESIDUAL DEFICITS: ICD-10-CM

## 2021-07-01 DIAGNOSIS — F10.20 ALCOHOL DEPENDENCE, UNCOMPLICATED: ICD-10-CM

## 2021-07-01 DIAGNOSIS — I69.391 DYSPHAGIA FOLLOWING CEREBRAL INFARCTION: ICD-10-CM
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
## 2021-07-01 DIAGNOSIS — R26.89 OTHER ABNORMALITIES OF GAIT AND MOBILITY: ICD-10-CM

## 2021-07-01 DIAGNOSIS — I27.82 CHRONIC PULMONARY EMBOLISM: ICD-10-CM

## 2021-07-01 DIAGNOSIS — E51.9 THIAMINE DEFICIENCY, UNSPECIFIED: ICD-10-CM

## 2021-07-01 DIAGNOSIS — E78.5 HYPERLIPIDEMIA, UNSPECIFIED: ICD-10-CM

## 2021-07-01 DIAGNOSIS — G89.29 OTHER CHRONIC PAIN: ICD-10-CM

## 2021-07-01 DIAGNOSIS — F17.210 NICOTINE DEPENDENCE, CIGARETTES, UNCOMPLICATED: ICD-10-CM

## 2021-07-01 DIAGNOSIS — M54.9 DORSALGIA, UNSPECIFIED: ICD-10-CM

## 2021-07-01 DIAGNOSIS — Z79.82 LONG TERM (CURRENT) USE OF ASPIRIN: ICD-10-CM

## 2021-07-01 DIAGNOSIS — E53.8 DEFICIENCY OF OTHER SPECIFIED B GROUP VITAMINS: ICD-10-CM

## 2021-07-01 DIAGNOSIS — I11.0 HYPERTENSIVE HEART DISEASE WITH HEART FAILURE: ICD-10-CM

## 2021-07-01 DIAGNOSIS — E66.3 OVERWEIGHT: ICD-10-CM

## 2021-07-01 DIAGNOSIS — I50.33 ACUTE ON CHRONIC DIASTOLIC (CONGESTIVE) HEART FAILURE: ICD-10-CM

## 2021-07-01 LAB
ANION GAP SERPL CALC-SCNC: 8 MMOL/L — SIGNIFICANT CHANGE UP (ref 7–14)
BASOPHILS # BLD AUTO: 0.05 K/UL — SIGNIFICANT CHANGE UP (ref 0–0.2)
BASOPHILS NFR BLD AUTO: 0.7 % — SIGNIFICANT CHANGE UP (ref 0–1)
BUN SERPL-MCNC: 17 MG/DL — SIGNIFICANT CHANGE UP (ref 10–20)
CALCIUM SERPL-MCNC: 8.4 MG/DL — LOW (ref 8.5–10.1)
CHLORIDE SERPL-SCNC: 107 MMOL/L — SIGNIFICANT CHANGE UP (ref 98–110)
CO2 SERPL-SCNC: 24 MMOL/L — SIGNIFICANT CHANGE UP (ref 17–32)
CREAT SERPL-MCNC: 1 MG/DL — SIGNIFICANT CHANGE UP (ref 0.7–1.5)
EOSINOPHIL # BLD AUTO: 0.21 K/UL — SIGNIFICANT CHANGE UP (ref 0–0.7)
EOSINOPHIL NFR BLD AUTO: 3 % — SIGNIFICANT CHANGE UP (ref 0–8)
GLUCOSE SERPL-MCNC: 109 MG/DL — HIGH (ref 70–99)
HCT VFR BLD CALC: 23.8 % — LOW (ref 42–52)
HCT VFR BLD CALC: 25.9 % — LOW (ref 42–52)
HGB BLD-MCNC: 7.6 G/DL — LOW (ref 14–18)
HGB BLD-MCNC: 8.1 G/DL — LOW (ref 14–18)
IMM GRANULOCYTES NFR BLD AUTO: 1 % — HIGH (ref 0.1–0.3)
LYMPHOCYTES # BLD AUTO: 1.79 K/UL — SIGNIFICANT CHANGE UP (ref 1.2–3.4)
LYMPHOCYTES # BLD AUTO: 25.4 % — SIGNIFICANT CHANGE UP (ref 20.5–51.1)
MAGNESIUM SERPL-MCNC: 2.1 MG/DL — SIGNIFICANT CHANGE UP (ref 1.8–2.4)
MCHC RBC-ENTMCNC: 28.2 PG — SIGNIFICANT CHANGE UP (ref 27–31)
MCHC RBC-ENTMCNC: 28.9 PG — SIGNIFICANT CHANGE UP (ref 27–31)
MCHC RBC-ENTMCNC: 31.3 G/DL — LOW (ref 32–37)
MCHC RBC-ENTMCNC: 31.9 G/DL — LOW (ref 32–37)
MCV RBC AUTO: 90.2 FL — SIGNIFICANT CHANGE UP (ref 80–94)
MCV RBC AUTO: 90.5 FL — SIGNIFICANT CHANGE UP (ref 80–94)
MONOCYTES # BLD AUTO: 0.63 K/UL — HIGH (ref 0.1–0.6)
MONOCYTES NFR BLD AUTO: 8.9 % — SIGNIFICANT CHANGE UP (ref 1.7–9.3)
NEUTROPHILS # BLD AUTO: 4.29 K/UL — SIGNIFICANT CHANGE UP (ref 1.4–6.5)
NEUTROPHILS NFR BLD AUTO: 61 % — SIGNIFICANT CHANGE UP (ref 42.2–75.2)
NRBC # BLD: 0 /100 WBCS — SIGNIFICANT CHANGE UP (ref 0–0)
NRBC # BLD: 0 /100 WBCS — SIGNIFICANT CHANGE UP (ref 0–0)
PLATELET # BLD AUTO: 199 K/UL — SIGNIFICANT CHANGE UP (ref 130–400)
PLATELET # BLD AUTO: 228 K/UL — SIGNIFICANT CHANGE UP (ref 130–400)
POTASSIUM SERPL-MCNC: 4.3 MMOL/L — SIGNIFICANT CHANGE UP (ref 3.5–5)
POTASSIUM SERPL-SCNC: 4.3 MMOL/L — SIGNIFICANT CHANGE UP (ref 3.5–5)
RBC # BLD: 2.63 M/UL — LOW (ref 4.7–6.1)
RBC # BLD: 2.87 M/UL — LOW (ref 4.7–6.1)
RBC # FLD: 15 % — HIGH (ref 11.5–14.5)
RBC # FLD: 15.2 % — HIGH (ref 11.5–14.5)
SARS-COV-2 RNA SPEC QL NAA+PROBE: SIGNIFICANT CHANGE UP
SODIUM SERPL-SCNC: 139 MMOL/L — SIGNIFICANT CHANGE UP (ref 135–146)
WBC # BLD: 7.04 K/UL — SIGNIFICANT CHANGE UP (ref 4.8–10.8)
WBC # BLD: 7.27 K/UL — SIGNIFICANT CHANGE UP (ref 4.8–10.8)
WBC # FLD AUTO: 7.04 K/UL — SIGNIFICANT CHANGE UP (ref 4.8–10.8)
WBC # FLD AUTO: 7.27 K/UL — SIGNIFICANT CHANGE UP (ref 4.8–10.8)

## 2021-07-01 PROCEDURE — 99232 SBSQ HOSP IP/OBS MODERATE 35: CPT

## 2021-07-01 PROCEDURE — 99238 HOSP IP/OBS DSCHRG MGMT 30/<: CPT

## 2021-07-01 RX ORDER — ASPIRIN/CALCIUM CARB/MAGNESIUM 324 MG
81 TABLET ORAL DAILY
Refills: 0 | Status: DISCONTINUED | OUTPATIENT
Start: 2021-07-01 | End: 2021-07-06

## 2021-07-01 RX ORDER — MORPHINE SULFATE 50 MG/1
2 CAPSULE, EXTENDED RELEASE ORAL ONCE
Refills: 0 | Status: DISCONTINUED | OUTPATIENT
Start: 2021-07-01 | End: 2021-07-01

## 2021-07-01 RX ORDER — MORPHINE SULFATE 50 MG/1
4 CAPSULE, EXTENDED RELEASE ORAL ONCE
Refills: 0 | Status: DISCONTINUED | OUTPATIENT
Start: 2021-07-01 | End: 2021-07-01

## 2021-07-01 RX ADMIN — ATORVASTATIN CALCIUM 80 MILLIGRAM(S): 80 TABLET, FILM COATED ORAL at 21:18

## 2021-07-01 RX ADMIN — MORPHINE SULFATE 2 MILLIGRAM(S): 50 CAPSULE, EXTENDED RELEASE ORAL at 20:25

## 2021-07-01 RX ADMIN — MORPHINE SULFATE 2 MILLIGRAM(S): 50 CAPSULE, EXTENDED RELEASE ORAL at 00:06

## 2021-07-01 RX ADMIN — MORPHINE SULFATE 4 MILLIGRAM(S): 50 CAPSULE, EXTENDED RELEASE ORAL at 23:46

## 2021-07-01 RX ADMIN — METHOCARBAMOL 500 MILLIGRAM(S): 500 TABLET, FILM COATED ORAL at 18:39

## 2021-07-01 RX ADMIN — Medication 1 PATCH: at 06:12

## 2021-07-01 RX ADMIN — Medication 100 MILLIGRAM(S): at 11:41

## 2021-07-01 RX ADMIN — GABAPENTIN 100 MILLIGRAM(S): 400 CAPSULE ORAL at 05:01

## 2021-07-01 RX ADMIN — MORPHINE SULFATE 2 MILLIGRAM(S): 50 CAPSULE, EXTENDED RELEASE ORAL at 19:57

## 2021-07-01 RX ADMIN — Medication 650 MILLIGRAM(S): at 14:29

## 2021-07-01 RX ADMIN — Medication 650 MILLIGRAM(S): at 20:30

## 2021-07-01 RX ADMIN — GABAPENTIN 100 MILLIGRAM(S): 400 CAPSULE ORAL at 17:10

## 2021-07-01 RX ADMIN — METHOCARBAMOL 500 MILLIGRAM(S): 500 TABLET, FILM COATED ORAL at 05:01

## 2021-07-01 RX ADMIN — Medication 1 MILLIGRAM(S): at 11:41

## 2021-07-01 RX ADMIN — Medication 1 APPLICATION(S): at 05:04

## 2021-07-01 RX ADMIN — Medication 1 TABLET(S): at 11:41

## 2021-07-01 RX ADMIN — Medication 650 MILLIGRAM(S): at 15:08

## 2021-07-01 RX ADMIN — Medication 1 APPLICATION(S): at 21:19

## 2021-07-01 RX ADMIN — Medication 81 MILLIGRAM(S): at 11:42

## 2021-07-01 RX ADMIN — Medication 650 MILLIGRAM(S): at 22:21

## 2021-07-01 RX ADMIN — Medication 1 PATCH: at 11:47

## 2021-07-01 RX ADMIN — Medication 1 PATCH: at 11:42

## 2021-07-01 RX ADMIN — Medication 1 PATCH: at 21:20

## 2021-07-01 NOTE — PROGRESS NOTE ADULT - SUBJECTIVE AND OBJECTIVE BOX
CHAPARRO ROBISON 65y Male  MRN#: [id number]   Hospital Day: 3d    HPI:  65 year old male with hx of CVA, PE, HLD, CHF (undocumented EF) presents with epistaxis starting yesterday.  Patient states he was bled a large amount (unable to quantify) from both nostrils while he was at the nursing home.  He denies any trauma or manipulation.  His Eliquis and aspirin were held in the nursing home and he was sent in for evaluation.  He denies any chest pain, denies any abdominal pain.  Of note his hemoglobin dropped from 12.7 to 10.4 since 6/23 (admitted then for inability to ambulate).    In the ED he received morphine and afrin, he was seen by ENT who attempted to place a rhinorocket however patient was combative and refused, so surgicell was placed.  There was no active bleeding during my interview.    Triage Vitals: /61    RR 20  Temp 98  SpO2 96% on room air (28 Jun 2021 11:36)      SUBJECTIVE  Patient is a 65y old Male who presents with a chief complaint of epistaxis (30 Jun 2021 15:50)  Currently admitted to medicine with the primary diagnosis of Epistaxis      OBJECTIVE  PAST MEDICAL & SURGICAL HISTORY  TIA (transient ischemic attack)    CHF (congestive heart failure)    HTN (hypertension)    HLD (hyperlipidemia)    Alcohol dependence    Chronic back pain    Pulmonary embolism  2015    Cerebrovascular accident (CVA)  left pontine with dysphagia    History of appendectomy      ALLERGIES:  No Known Allergies    MEDICATIONS:  STANDING MEDICATIONS  aspirin  chewable 81 milliGRAM(s) Oral daily  atorvastatin 80 milliGRAM(s) Oral at bedtime  BACItracin   Ointment 1 Application(s) Topical three times a day  folic acid 1 milliGRAM(s) Oral daily  gabapentin 100 milliGRAM(s) Oral two times a day  multivitamin/minerals 1 Tablet(s) Oral daily  nicotine - 21 mG/24Hr(s) Patch 1 patch Transdermal daily  thiamine 100 milliGRAM(s) Oral daily    PRN MEDICATIONS  acetaminophen   Tablet .. 650 milliGRAM(s) Oral every 6 hours PRN  guaifenesin/dextromethorphan Oral Liquid 5 milliLiter(s) Oral every 6 hours PRN  methocarbamol 500 milliGRAM(s) Oral every 8 hours PRN      VITAL SIGNS: Last 24 Hours  T(C): 35.9 (01 Jul 2021 19:41), Max: 36.1 (01 Jul 2021 13:45)  T(F): 96.6 (01 Jul 2021 19:41), Max: 97 (01 Jul 2021 13:45)  HR: 79 (01 Jul 2021 19:41) (62 - 79)  BP: 132/63 (01 Jul 2021 19:41) (130/61 - 144/66)  BP(mean): --  RR: 18 (01 Jul 2021 19:41) (18 - 18)  SpO2: --    LABS:                        8.1    7.27  )-----------( 228      ( 01 Jul 2021 11:24 )             25.9     07-01    139  |  107  |  17  ----------------------------<  109<H>  4.3   |  24  |  1.0    Ca    8.4<L>      01 Jul 2021 06:30  Mg     2.1     07-01    TPro  5.9<L>  /  Alb  3.8  /  TBili  <0.2  /  DBili  x   /  AST  31  /  ALT  45<H>  /  AlkPhos  83  06-30      RADIOLOGY:      PHYSICAL EXAM:  CONSTITUTIONAL: No acute distress, well-developed, well-groomed, AAOx3  EYES: EOM intact, PERRLA, conjunctiva and sclera clear  ENT: Supple, no masses, no thyromegaly, no bruits, no active bleeding from nose  PULMONARY: Clear to auscultation bilaterally; no wheezes, rales, or rhonchi  CARDIOVASCULAR: Regular rate and rhythm; no murmurs, rubs, or gallops  GASTROINTESTINAL: Soft, non-tender, non-distended; bowel sounds present  MUSCULOSKELETAL: 2+ peripheral pulses; no clubbing, no cyanosis, no edema  SKIN: No rashes or lesions; warm and dry

## 2021-07-02 LAB
HCT VFR BLD CALC: 25 % — LOW (ref 42–52)
HGB BLD-MCNC: 7.9 G/DL — LOW (ref 14–18)
MCHC RBC-ENTMCNC: 29 PG — SIGNIFICANT CHANGE UP (ref 27–31)
MCHC RBC-ENTMCNC: 31.6 G/DL — LOW (ref 32–37)
MCV RBC AUTO: 91.9 FL — SIGNIFICANT CHANGE UP (ref 80–94)
NRBC # BLD: 0 /100 WBCS — SIGNIFICANT CHANGE UP (ref 0–0)
PLATELET # BLD AUTO: 242 K/UL — SIGNIFICANT CHANGE UP (ref 130–400)
RBC # BLD: 2.72 M/UL — LOW (ref 4.7–6.1)
RBC # FLD: 15.7 % — HIGH (ref 11.5–14.5)
WBC # BLD: 8.23 K/UL — SIGNIFICANT CHANGE UP (ref 4.8–10.8)
WBC # FLD AUTO: 8.23 K/UL — SIGNIFICANT CHANGE UP (ref 4.8–10.8)

## 2021-07-02 PROCEDURE — 99231 SBSQ HOSP IP/OBS SF/LOW 25: CPT

## 2021-07-02 RX ORDER — ACETAMINOPHEN 500 MG
650 TABLET ORAL EVERY 6 HOURS
Refills: 0 | Status: COMPLETED | OUTPATIENT
Start: 2021-07-02 | End: 2021-07-04

## 2021-07-02 RX ADMIN — Medication 1 APPLICATION(S): at 13:02

## 2021-07-02 RX ADMIN — Medication 1 APPLICATION(S): at 05:23

## 2021-07-02 RX ADMIN — GABAPENTIN 100 MILLIGRAM(S): 400 CAPSULE ORAL at 17:08

## 2021-07-02 RX ADMIN — Medication 81 MILLIGRAM(S): at 11:55

## 2021-07-02 RX ADMIN — Medication 1 PATCH: at 11:55

## 2021-07-02 RX ADMIN — Medication 1 PATCH: at 05:23

## 2021-07-02 RX ADMIN — Medication 1 MILLIGRAM(S): at 11:55

## 2021-07-02 RX ADMIN — Medication 100 MILLIGRAM(S): at 11:55

## 2021-07-02 RX ADMIN — Medication 1 TABLET(S): at 11:55

## 2021-07-02 RX ADMIN — GABAPENTIN 100 MILLIGRAM(S): 400 CAPSULE ORAL at 05:23

## 2021-07-02 RX ADMIN — Medication 1 PATCH: at 11:56

## 2021-07-02 RX ADMIN — MORPHINE SULFATE 4 MILLIGRAM(S): 50 CAPSULE, EXTENDED RELEASE ORAL at 00:40

## 2021-07-02 RX ADMIN — ATORVASTATIN CALCIUM 80 MILLIGRAM(S): 80 TABLET, FILM COATED ORAL at 21:26

## 2021-07-02 RX ADMIN — Medication 1 APPLICATION(S): at 21:27

## 2021-07-02 RX ADMIN — Medication 650 MILLIGRAM(S): at 20:03

## 2021-07-02 NOTE — PROGRESS NOTE ADULT - ASSESSMENT
65 year old male with hx of CVA of left ventral rosalie 2015, PE in 2015 and on Eliquis since that time, HLD, CHF (undocumented EF) presents with epistaxis starting yesterday.  Patient states he was bled a large amount (unable to quantify) from both nostrils while he was at the nursing home    # Epistaxis likely predisposed due to Eliquis and aspirin use  - Hb now 7.8, downtrending  - aspirin and Eliquis were being held in NH, anticoagulation held  - rhinorocket was refused, surgicell placed, currently no bleeding  - hold aspirin for now and restart if no further epistaxis (needed for secondary stroke prevention)  - ENT:        - No active bleeding or clots noted to b/l nares or posterior oropharynx        - Monitor H/H, transfuse prn. Hgb 12.7>10.4 >8.5>7.8>8.1         - No further acute ENT intervention at this time        - Recall prn   - Follow CBC  - Stable for discharge, authorization denied today so will discharge [facility]    # PE dx in 2015 - unprovoked per chart review  - was on Eliquis since that time  - pt denies any VTE events since 2015  - f/u d-dimer: 56  - f/u va duplex: No evidence of deep venous thrombosis in either lower extremity.  - hold Eliquis for now due to epistaxis. Will discontinue upon discharge  - pt does not want to resume Eliquis once epistaxis is resolved    # DAYDAY  - had an increase in his creatinine from 0.8 to 1.2 before being discharged last admission, today creatinine is 1.2  - BUN increased before admission suggesting pre-renal DAYDAY, however today BUN is much higher 46 compared to 18 // likely from ingesting blood from epistaxis coupled with mild pre-renal DAYDAY since he left hospital  - Fluids stopped    # HFpef  - takes lasix 20 po qd and lopressor 25 bid in the NH  - will hold lasix for now considering DAYDAY, will hold lopressor for now considering vitals suggesting he is orthostatic // has a hx of HTN however having soft BP and elevated HR  - ECHO: Left ventricular ejection fraction, by visual estimation, is 60 to 65%    # Alcohol abuse with nutrition deficiency.     # HLD  - c/w atorvastatin 80, if continuing to bleed consider holding  - LDL: 45, Non HDL cholesterol: 71    # GERD  - c/w protonix    PLAN: f/u ENT, Pt not a candidate for AC.     # DVT PPX: SCDs  # GI PPX: protonix

## 2021-07-02 NOTE — PROGRESS NOTE ADULT - SUBJECTIVE AND OBJECTIVE BOX
CHAPARRO ROBISON 65y Male  MRN#: [id number]   Hospital Day: 4d    HPI:  65 year old male with hx of CVA, PE, HLD, CHF (undocumented EF) presents with epistaxis starting yesterday.  Patient states he was bled a large amount (unable to quantify) from both nostrils while he was at the nursing home.  He denies any trauma or manipulation.  His Eliquis and aspirin were held in the nursing home and he was sent in for evaluation.  He denies any chest pain, denies any abdominal pain.  Of note his hemoglobin dropped from 12.7 to 10.4 since 6/23 (admitted then for inability to ambulate).    In the ED he received morphine and afrin, he was seen by ENT who attempted to place a rhinorocket however patient was combative and refused, so surgicell was placed.  There was no active bleeding during my interview.    Triage Vitals: /61    RR 20  Temp 98  SpO2 96% on room air (28 Jun 2021 11:36)      SUBJECTIVE  Patient is a 65y old Male who presents with a chief complaint of epistaxis (01 Jul 2021 21:52)  Currently admitted to medicine with the primary diagnosis of Epistaxis      INTERVAL HPI AND OVERNIGHT EVENTS:  Patient was examined and seen at bedside. This morning he is resting comfortably in bed and reports no issues or overnight events.     REVIEW OF SYMPTOMS:  CONSTITUTIONAL: No acute distress, well-developed, well-groomed, AAOx3  EYES: EOM intact, PERRLA, conjunctiva and sclera clear  ENT: Supple, no masses, no thyromegaly, no bruits, no active bleeding from nose  PULMONARY: Clear to auscultation bilaterally; no wheezes, rales, or rhonchi  CARDIOVASCULAR: Regular rate and rhythm; no murmurs, rubs, or gallops  GASTROINTESTINAL: Soft, non-tender, non-distended; bowel sounds present  MUSCULOSKELETAL: 2+ peripheral pulses; no clubbing, no cyanosis, no edema  SKIN: No rashes or lesions; warm and dry    OBJECTIVE  PAST MEDICAL & SURGICAL HISTORY  TIA (transient ischemic attack)    CHF (congestive heart failure)    HTN (hypertension)    HLD (hyperlipidemia)    Alcohol dependence    Chronic back pain    Pulmonary embolism  2015    Cerebrovascular accident (CVA)  left pontine with dysphagia    History of appendectomy      ALLERGIES:  No Known Allergies    MEDICATIONS:  STANDING MEDICATIONS  aspirin  chewable 81 milliGRAM(s) Oral daily  atorvastatin 80 milliGRAM(s) Oral at bedtime  BACItracin   Ointment 1 Application(s) Topical three times a day  folic acid 1 milliGRAM(s) Oral daily  gabapentin 100 milliGRAM(s) Oral two times a day  multivitamin/minerals 1 Tablet(s) Oral daily  nicotine - 21 mG/24Hr(s) Patch 1 patch Transdermal daily  thiamine 100 milliGRAM(s) Oral daily    PRN MEDICATIONS  acetaminophen   Tablet .. 650 milliGRAM(s) Oral every 6 hours PRN  guaifenesin/dextromethorphan Oral Liquid 5 milliLiter(s) Oral every 6 hours PRN  methocarbamol 500 milliGRAM(s) Oral every 8 hours PRN      VITAL SIGNS: Last 24 Hours  T(C): 37.1 (02 Jul 2021 13:45), Max: 37.1 (02 Jul 2021 13:45)  T(F): 98.7 (02 Jul 2021 13:45), Max: 98.7 (02 Jul 2021 13:45)  HR: 69 (02 Jul 2021 13:45) (51 - 79)  BP: 159/74 (02 Jul 2021 13:45) (132/63 - 188/74)  BP(mean): --  RR: 18 (02 Jul 2021 13:45) (18 - 18)  SpO2: 96% (01 Jul 2021 19:30) (96% - 96%)    LABS:                        7.9    8.23  )-----------( 242      ( 02 Jul 2021 06:38 )             25.0     07-01    139  |  107  |  17  ----------------------------<  109<H>  4.3   |  24  |  1.0    Ca    8.4<L>      01 Jul 2021 06:30  Mg     2.1     07-01                    RADIOLOGY:      PHYSICAL EXAM:  CONSTITUTIONAL: No acute distress, well-developed, well-groomed, AAOx3  HEAD: Atraumatic, normocephalic  EYES: EOM intact, PERRLA, conjunctiva and sclera clear  ENT: Supple, no masses, no thyromegaly, no bruits, no JVD; moist mucous membranes  PULMONARY: Clear to auscultation bilaterally; no wheezes, rales, or rhonchi  CARDIOVASCULAR: Regular rate and rhythm; no murmurs, rubs, or gallops  GASTROINTESTINAL: Soft, non-tender, non-distended; bowel sounds present  MUSCULOSKELETAL: 2+ peripheral pulses; no clubbing, no cyanosis, no edema  NEUROLOGY: non-focal  SKIN: No rashes or lesions; warm and dry

## 2021-07-02 NOTE — CHART NOTE - NSCHARTNOTEFT_GEN_A_CORE
patient seen and examined earlier today with resident team.  patient was ready for DC yesterday back to SNF however did not receive insurance authorization.  still awaiting authorization.  patient had no issues over night or today .  no further nose bleeding.  patient medically stable for transfer to facility once authorization occurs and patient receives a bed.  had repeat COVID swab done yesterday (;7/1/2021).  no blood tests today because planned for DC.  if patient remains over the weekend, would check CBC once Sat or sun unless clinical condition changes.

## 2021-07-03 LAB
BASOPHILS # BLD AUTO: 0.05 K/UL — SIGNIFICANT CHANGE UP (ref 0–0.2)
BASOPHILS NFR BLD AUTO: 0.5 % — SIGNIFICANT CHANGE UP (ref 0–1)
EOSINOPHIL # BLD AUTO: 0.17 K/UL — SIGNIFICANT CHANGE UP (ref 0–0.7)
EOSINOPHIL NFR BLD AUTO: 1.8 % — SIGNIFICANT CHANGE UP (ref 0–8)
HCT VFR BLD CALC: 27.5 % — LOW (ref 42–52)
HGB BLD-MCNC: 8.9 G/DL — LOW (ref 14–18)
IMM GRANULOCYTES NFR BLD AUTO: 1.1 % — HIGH (ref 0.1–0.3)
LYMPHOCYTES # BLD AUTO: 1.92 K/UL — SIGNIFICANT CHANGE UP (ref 1.2–3.4)
LYMPHOCYTES # BLD AUTO: 20.8 % — SIGNIFICANT CHANGE UP (ref 20.5–51.1)
MCHC RBC-ENTMCNC: 29.4 PG — SIGNIFICANT CHANGE UP (ref 27–31)
MCHC RBC-ENTMCNC: 32.4 G/DL — SIGNIFICANT CHANGE UP (ref 32–37)
MCV RBC AUTO: 90.8 FL — SIGNIFICANT CHANGE UP (ref 80–94)
MONOCYTES # BLD AUTO: 0.68 K/UL — HIGH (ref 0.1–0.6)
MONOCYTES NFR BLD AUTO: 7.4 % — SIGNIFICANT CHANGE UP (ref 1.7–9.3)
NEUTROPHILS # BLD AUTO: 6.3 K/UL — SIGNIFICANT CHANGE UP (ref 1.4–6.5)
NEUTROPHILS NFR BLD AUTO: 68.4 % — SIGNIFICANT CHANGE UP (ref 42.2–75.2)
NRBC # BLD: 0 /100 WBCS — SIGNIFICANT CHANGE UP (ref 0–0)
PLATELET # BLD AUTO: 284 K/UL — SIGNIFICANT CHANGE UP (ref 130–400)
RBC # BLD: 3.03 M/UL — LOW (ref 4.7–6.1)
RBC # FLD: 15.9 % — HIGH (ref 11.5–14.5)
WBC # BLD: 9.22 K/UL — SIGNIFICANT CHANGE UP (ref 4.8–10.8)
WBC # FLD AUTO: 9.22 K/UL — SIGNIFICANT CHANGE UP (ref 4.8–10.8)

## 2021-07-03 PROCEDURE — 99232 SBSQ HOSP IP/OBS MODERATE 35: CPT

## 2021-07-03 RX ADMIN — Medication 1 MILLIGRAM(S): at 11:15

## 2021-07-03 RX ADMIN — Medication 650 MILLIGRAM(S): at 20:37

## 2021-07-03 RX ADMIN — Medication 1 PATCH: at 11:16

## 2021-07-03 RX ADMIN — GABAPENTIN 100 MILLIGRAM(S): 400 CAPSULE ORAL at 06:24

## 2021-07-03 RX ADMIN — Medication 100 MILLIGRAM(S): at 11:15

## 2021-07-03 RX ADMIN — Medication 1 APPLICATION(S): at 07:23

## 2021-07-03 RX ADMIN — Medication 1 APPLICATION(S): at 22:23

## 2021-07-03 RX ADMIN — Medication 1 TABLET(S): at 11:15

## 2021-07-03 RX ADMIN — ATORVASTATIN CALCIUM 80 MILLIGRAM(S): 80 TABLET, FILM COATED ORAL at 22:22

## 2021-07-03 RX ADMIN — GABAPENTIN 100 MILLIGRAM(S): 400 CAPSULE ORAL at 17:06

## 2021-07-03 RX ADMIN — Medication 81 MILLIGRAM(S): at 11:15

## 2021-07-03 RX ADMIN — Medication 1 PATCH: at 11:17

## 2021-07-03 NOTE — PROGRESS NOTE ADULT - SUBJECTIVE AND OBJECTIVE BOX
CHAPARRO ROBISON 65y Male  MRN#: [id number]   Hospital Day: 5d    HPI:  65 year old male with hx of CVA, PE, HLD, CHF (undocumented EF) presents with epistaxis starting yesterday.  Patient states he was bled a large amount (unable to quantify) from both nostrils while he was at the nursing home.  He denies any trauma or manipulation.  His Eliquis and aspirin were held in the nursing home and he was sent in for evaluation.  He denies any chest pain, denies any abdominal pain.  Of note his hemoglobin dropped from 12.7 to 10.4 since 6/23 (admitted then for inability to ambulate).    In the ED he received morphine and afrin, he was seen by ENT who attempted to place a rhinorocket however patient was combative and refused, so surgicell was placed.  There was no active bleeding during my interview.    Triage Vitals: /61    RR 20  Temp 98  SpO2 96% on room air (28 Jun 2021 11:36)      SUBJECTIVE  Patient is a 65y old Male who presents with a chief complaint of epistaxis (02 Jul 2021 17:12)  Currently admitted to medicine with the primary diagnosis of Epistaxis      INTERVAL HPI AND OVERNIGHT EVENTS:  Patient was examined and seen at bedside. This morning he is resting comfortably in bed and reports no issues or overnight events.       OBJECTIVE  PAST MEDICAL & SURGICAL HISTORY  TIA (transient ischemic attack)    CHF (congestive heart failure)    HTN (hypertension)    HLD (hyperlipidemia)    Alcohol dependence    Chronic back pain    Pulmonary embolism  2015    Cerebrovascular accident (CVA)  left pontine with dysphagia    History of appendectomy      ALLERGIES:  No Known Allergies    MEDICATIONS:  STANDING MEDICATIONS  aspirin  chewable 81 milliGRAM(s) Oral daily  atorvastatin 80 milliGRAM(s) Oral at bedtime  BACItracin   Ointment 1 Application(s) Topical three times a day  folic acid 1 milliGRAM(s) Oral daily  gabapentin 100 milliGRAM(s) Oral two times a day  multivitamin/minerals 1 Tablet(s) Oral daily  nicotine - 21 mG/24Hr(s) Patch 1 patch Transdermal daily  thiamine 100 milliGRAM(s) Oral daily    PRN MEDICATIONS  acetaminophen   Tablet .. 650 milliGRAM(s) Oral every 6 hours PRN  guaifenesin/dextromethorphan Oral Liquid 5 milliLiter(s) Oral every 6 hours PRN  methocarbamol 500 milliGRAM(s) Oral every 8 hours PRN      VITAL SIGNS: Last 24 Hours  T(C): 36.6 (03 Jul 2021 04:30), Max: 37.1 (02 Jul 2021 13:45)  T(F): 97.8 (03 Jul 2021 04:30), Max: 98.7 (02 Jul 2021 13:45)  HR: 59 (03 Jul 2021 08:27) (57 - 69)  BP: 176/88 (03 Jul 2021 07:22) (149/71 - 194/89)  BP(mean): --  RR: 18 (03 Jul 2021 04:30) (18 - 18)  SpO2: 94% (03 Jul 2021 08:27) (94% - 94%)    LABS:                        8.9    9.22  )-----------( 284      ( 03 Jul 2021 06:22 )             27.5           RADIOLOGY:      PHYSICAL EXAM:  CONSTITUTIONAL: No acute distress, well-developed, well-groomed, AAOx3  EYES: EOM intact, PERRLA, conjunctiva and sclera clear  ENT: Supple, no masses, no thyromegaly, no bruits, no active bleeding from nose  PULMONARY: Clear to auscultation bilaterally; no wheezes, rales, or rhonchi  CARDIOVASCULAR: Regular rate and rhythm; no murmurs, rubs, or gallops  GASTROINTESTINAL: Soft, non-tender, non-distended; bowel sounds present  MUSCULOSKELETAL: 2+ peripheral pulses; no clubbing, no cyanosis, no edema  SKIN: No rashes or lesions; warm and dry       CHAPARRO ROBISON 65y Male  MRN#: [id number]   Hospital Day: 5d    HPI:  65 year old male with hx of CVA, PE, HLD, CHF (undocumented EF) presents with epistaxis starting yesterday.  Patient states he was bled a large amount (unable to quantify) from both nostrils while he was at the nursing home.  He denies any trauma or manipulation.  His Eliquis and aspirin were held in the nursing home and he was sent in for evaluation.  He denies any chest pain, denies any abdominal pain.  Of note his hemoglobin dropped from 12.7 to 10.4 since 6/23 (admitted then for inability to ambulate).    In the ED he received morphine and afrin, he was seen by ENT who attempted to place a rhinorocket however patient was combative and refused, so surgicell was placed.  There was no active bleeding during my interview.    Triage Vitals: /61    RR 20  Temp 98  SpO2 96% on room air (28 Jun 2021 11:36)      SUBJECTIVE  Patient is a 65y old Male who presents with a chief complaint of epistaxis (02 Jul 2021 17:12)  Currently admitted to medicine with the primary diagnosis of Epistaxis      INTERVAL HPI AND OVERNIGHT EVENTS:  Patient was examined and seen at bedside. This morning he is resting comfortably in bed and reports no issues or overnight events.   no cp, sob, abd pain, fever  no epistaxis, sore thorat, dysphagia, odonophagia      OBJECTIVE  PAST MEDICAL & SURGICAL HISTORY  TIA (transient ischemic attack)    CHF (congestive heart failure)    HTN (hypertension)    HLD (hyperlipidemia)    Alcohol dependence    Chronic back pain    Pulmonary embolism  2015    Cerebrovascular accident (CVA)  left pontine with dysphagia    History of appendectomy      ALLERGIES:  No Known Allergies    MEDICATIONS:  STANDING MEDICATIONS  aspirin  chewable 81 milliGRAM(s) Oral daily  atorvastatin 80 milliGRAM(s) Oral at bedtime  BACItracin   Ointment 1 Application(s) Topical three times a day  folic acid 1 milliGRAM(s) Oral daily  gabapentin 100 milliGRAM(s) Oral two times a day  multivitamin/minerals 1 Tablet(s) Oral daily  nicotine - 21 mG/24Hr(s) Patch 1 patch Transdermal daily  thiamine 100 milliGRAM(s) Oral daily    PRN MEDICATIONS  acetaminophen   Tablet .. 650 milliGRAM(s) Oral every 6 hours PRN  guaifenesin/dextromethorphan Oral Liquid 5 milliLiter(s) Oral every 6 hours PRN  methocarbamol 500 milliGRAM(s) Oral every 8 hours PRN      VITAL SIGNS: Last 24 Hours  T(C): 36.6 (03 Jul 2021 04:30), Max: 37.1 (02 Jul 2021 13:45)  T(F): 97.8 (03 Jul 2021 04:30), Max: 98.7 (02 Jul 2021 13:45)  HR: 59 (03 Jul 2021 08:27) (57 - 69)  BP: 176/88 (03 Jul 2021 07:22) (149/71 - 194/89)  BP(mean): --  RR: 18 (03 Jul 2021 04:30) (18 - 18)  SpO2: 94% (03 Jul 2021 08:27) (94% - 94%)    LABS:                        8.9    9.22  )-----------( 284      ( 03 Jul 2021 06:22 )             27.5           RADIOLOGY:      PHYSICAL EXAM:  CONSTITUTIONAL: No acute distress, well-developed, well-groomed, AAOx3  EYES: EOM intact, PERRLA, conjunctiva and sclera clear  ENT: Supple, no masses, no thyromegaly, no bruits, no active bleeding from nose  PULMONARY: Clear to auscultation bilaterally; no wheezes, rales, or rhonchi  CARDIOVASCULAR: Regular rate and rhythm; no murmurs, rubs, or gallops  GASTROINTESTINAL: Soft, non-tender, non-distended; bowel sounds present  MUSCULOSKELETAL: 2+ peripheral pulses; no clubbing, no cyanosis, no edema  SKIN: No rashes or lesions; warm and dry

## 2021-07-03 NOTE — PROGRESS NOTE ADULT - ASSESSMENT
65 year old male with hx of CVA of left ventral rosalie 2015, PE in 2015 and on Eliquis since that time, HLD, CHF (undocumented EF) presents with epistaxis starting yesterday.  Patient states he was bled a large amount (unable to quantify) from both nostrils while he was at the nursing home    # Epistaxis likely predisposed due to Eliquis and aspirin use  - Hb now 7.8, downtrending  - aspirin and Eliquis were being held in NH, anticoagulation held  - rhinorocket was refused, surgicell placed, currently no bleeding  - hold aspirin for now and restart if no further epistaxis (needed for secondary stroke prevention)  - ENT:        - No active bleeding or clots noted to b/l nares or posterior oropharynx        - Monitor H/H, transfuse prn. Hgb 12.7>10.4 >8.5>7.8>8.1         - No further acute ENT intervention at this time        - Recall prn   - Follow CBC  - Stable for discharge, authorization denied today so will discharge [facility]    # PE dx in 2015 - unprovoked per chart review  - was on Eliquis since that time  - pt denies any VTE events since 2015  - f/u d-dimer: 54  - f/u va duplex: No evidence of deep venous thrombosis in either lower extremity.  - hold Eliquis for now due to epistaxis. Will discontinue upon discharge  - pt does not want to resume Eliquis once epistaxis is resolved    # DAYDAY  - had an increase in his creatinine from 0.8 to 1.2 before being discharged last admission, today creatinine is 1.2  - BUN increased before admission suggesting pre-renal DAYDAY, however today BUN is much higher 46 compared to 18 // likely from ingesting blood from epistaxis coupled with mild pre-renal DAYDAY since he left hospital  - Fluids stopped    # HFpef  - takes lasix 20 po qd and lopressor 25 bid in the NH  - will hold lasix for now considering DAYDAY, will hold lopressor for now considering vitals suggesting he is orthostatic // has a hx of HTN however having soft BP and elevated HR  - ECHO: Left ventricular ejection fraction, by visual estimation, is 60 to 65%    # Alcohol abuse with nutrition deficiency.   - on thiamine and folate    # HLD  - c/w atorvastatin 80, if continuing to bleed consider holding  - LDL: 45, Non HDL cholesterol: 71    # GERD  - c/w protonix    PLAN: f/u ENT, Pt not a candidate for AC.     # DVT PPX: SCDs  # GI PPX: protonix

## 2021-07-03 NOTE — PROGRESS NOTE ADULT - ATTENDING COMMENTS
#Epistaxis  in setting of eliquis, asa  eliquis on hold, pt refusing resumption  will need close f/u heme outpt; h/o dvt 2015 unprovoked  resolved  h/h stable  pending auth to snf    #Progress Note Handoff:  Pending (specify):  Consults_________, Tests________, Test Results_______, Other___pending [facility]______  Family discussion: d/w pt at bedside re: treatment plan, primary dx  Disposition: Home___/SNF__x_/Other________/Unknown at this time________

## 2021-07-04 ENCOUNTER — TRANSCRIPTION ENCOUNTER (OUTPATIENT)
Age: 66
End: 2021-07-04

## 2021-07-04 PROCEDURE — 99232 SBSQ HOSP IP/OBS MODERATE 35: CPT

## 2021-07-04 RX ORDER — SENNA PLUS 8.6 MG/1
2 TABLET ORAL AT BEDTIME
Refills: 0 | Status: DISCONTINUED | OUTPATIENT
Start: 2021-07-04 | End: 2021-07-06

## 2021-07-04 RX ADMIN — Medication 1 MILLIGRAM(S): at 11:16

## 2021-07-04 RX ADMIN — GABAPENTIN 100 MILLIGRAM(S): 400 CAPSULE ORAL at 06:09

## 2021-07-04 RX ADMIN — Medication 650 MILLIGRAM(S): at 13:54

## 2021-07-04 RX ADMIN — Medication 1 TABLET(S): at 11:16

## 2021-07-04 RX ADMIN — ATORVASTATIN CALCIUM 80 MILLIGRAM(S): 80 TABLET, FILM COATED ORAL at 21:33

## 2021-07-04 RX ADMIN — Medication 1 PATCH: at 11:15

## 2021-07-04 RX ADMIN — Medication 1 APPLICATION(S): at 06:09

## 2021-07-04 RX ADMIN — Medication 1 PATCH: at 08:47

## 2021-07-04 RX ADMIN — Medication 1 APPLICATION(S): at 21:33

## 2021-07-04 RX ADMIN — METHOCARBAMOL 500 MILLIGRAM(S): 500 TABLET, FILM COATED ORAL at 21:33

## 2021-07-04 RX ADMIN — Medication 81 MILLIGRAM(S): at 11:16

## 2021-07-04 RX ADMIN — Medication 1 PATCH: at 21:34

## 2021-07-04 RX ADMIN — GABAPENTIN 100 MILLIGRAM(S): 400 CAPSULE ORAL at 17:06

## 2021-07-04 RX ADMIN — Medication 650 MILLIGRAM(S): at 12:21

## 2021-07-04 RX ADMIN — Medication 100 MILLIGRAM(S): at 11:16

## 2021-07-04 RX ADMIN — Medication 1 PATCH: at 11:24

## 2021-07-04 NOTE — PROGRESS NOTE ADULT - SUBJECTIVE AND OBJECTIVE BOX
INTERVAL HPI/OVERNIGHT EVENTS:    SUBJECTIVE: Patient seen and examined at bedside.     no cp, sob, abd pain, fever  no epistaxis, ha, dizziness, lightheadedness    OBJECTIVE:    VITAL SIGNS:  Vital Signs Last 24 Hrs  T(C): 36.8 (04 Jul 2021 04:59), Max: 36.8 (03 Jul 2021 19:55)  T(F): 98.2 (04 Jul 2021 04:59), Max: 98.2 (03 Jul 2021 19:55)  HR: 78 (04 Jul 2021 10:36) (62 - 78)  BP: 148/58 (04 Jul 2021 10:36) (142/75 - 165/70)  BP(mean): --  RR: 18 (04 Jul 2021 04:59) (18 - 18)  SpO2: 97% (04 Jul 2021 07:47) (97% - 97%)      PHYSICAL EXAM:    General: NAD  HEENT: NC/AT; PERRL, clear conjunctiva  Neck: supple  Respiratory: CTA b/l  Cardiovascular: +S1/S2; RRR  Abdomen: soft, NT/ND; +BS x4  Extremities: WWP, 2+ peripheral pulses b/l; no LE edema  Skin: normal color and turgor; no rash  Neurological:    MEDICATIONS:  MEDICATIONS  (STANDING):  aspirin  chewable 81 milliGRAM(s) Oral daily  atorvastatin 80 milliGRAM(s) Oral at bedtime  BACItracin   Ointment 1 Application(s) Topical three times a day  folic acid 1 milliGRAM(s) Oral daily  gabapentin 100 milliGRAM(s) Oral two times a day  multivitamin/minerals 1 Tablet(s) Oral daily  nicotine - 21 mG/24Hr(s) Patch 1 patch Transdermal daily  thiamine 100 milliGRAM(s) Oral daily    MEDICATIONS  (PRN):  acetaminophen   Tablet .. 650 milliGRAM(s) Oral every 6 hours PRN Temp greater or equal to 38C (100.4F), Mild Pain (1 - 3), Moderate Pain (4 - 6)  guaifenesin/dextromethorphan Oral Liquid 5 milliLiter(s) Oral every 6 hours PRN Cough  methocarbamol 500 milliGRAM(s) Oral every 8 hours PRN Muscle Spasm      ALLERGIES:  Allergies    No Known Allergies    Intolerances        LABS:                        8.9    9.22  )-----------( 284      ( 03 Jul 2021 06:22 )             27.5     Hemoglobin: 8.9 g/dL (07-03 @ 06:22)  Hemoglobin: 7.9 g/dL (07-02 @ 06:38)  Hemoglobin: 8.1 g/dL (07-01 @ 11:24)  Hemoglobin: 7.6 g/dL (07-01 @ 06:30)  Hemoglobin: 8.5 g/dL (06-30 @ 18:05)    CBC Full  -  ( 03 Jul 2021 06:22 )  WBC Count : 9.22 K/uL  RBC Count : 3.03 M/uL  Hemoglobin : 8.9 g/dL  Hematocrit : 27.5 %  Platelet Count - Automated : 284 K/uL  Mean Cell Volume : 90.8 fL  Mean Cell Hemoglobin : 29.4 pg  Mean Cell Hemoglobin Concentration : 32.4 g/dL  Auto Neutrophil # : 6.30 K/uL  Auto Lymphocyte # : 1.92 K/uL  Auto Monocyte # : 0.68 K/uL  Auto Eosinophil # : 0.17 K/uL  Auto Basophil # : 0.05 K/uL  Auto Neutrophil % : 68.4 %  Auto Lymphocyte % : 20.8 %  Auto Monocyte % : 7.4 %  Auto Eosinophil % : 1.8 %  Auto Basophil % : 0.5 %          Creatinine Trend: 1.0<--, 0.9<--, 0.9<--, 1.2<--, 1.2<--, 0.8<--        hs Troponin:              CSF:                      EKG:   MICROBIOLOGY:    IMAGING:      Labs, imaging, EKG personally reviewed    RADIOLOGY & ADDITIONAL TESTS: Reviewed.

## 2021-07-04 NOTE — DISCHARGE NOTE NURSING/CASE MANAGEMENT/SOCIAL WORK - PATIENT PORTAL LINK FT
You can access the FollowMyHealth Patient Portal offered by [facility] by registering at the following website: [email or web address]. By joining [facility]’s FollowMyHealth portal, you will also be able to view your health information using other applications (apps) compatible with our system.

## 2021-07-04 NOTE — PROGRESS NOTE ADULT - ASSESSMENT
65M PMHx CVA L rosalie 2015, h/o PE 2015 on eliquis here with epistaxis.    #Epistaxis  in setting of eliquis, asa  eliquis on hold, pt refusing resumption  will need close f/u heme outpt; h/o dvt 2015 unprovoked  resolved  h/h stable  pending auth to   #CVA  asa  lipitor 80  #PE  unprovoked  hold on ac as above  outpt heme f/u  #DVT ppx  scds    #Progress Note Handoff:  Pending (specify):  Consults_________, Tests________, Test Results_______, Other___pending auth to ______  Family discussion: d/w pt at bedside re: treatment plan, primary dx  Disposition: Home___/[facility]__x_/Other________/Unknown at this time________ .

## 2021-07-05 PROCEDURE — 99232 SBSQ HOSP IP/OBS MODERATE 35: CPT

## 2021-07-05 RX ORDER — LOSARTAN POTASSIUM 100 MG/1
100 TABLET, FILM COATED ORAL DAILY
Refills: 0 | Status: DISCONTINUED | OUTPATIENT
Start: 2021-07-05 | End: 2021-07-06

## 2021-07-05 RX ORDER — ACETAMINOPHEN 500 MG
650 TABLET ORAL EVERY 6 HOURS
Refills: 0 | Status: COMPLETED | OUTPATIENT
Start: 2021-07-05 | End: 2021-07-06

## 2021-07-05 RX ORDER — FUROSEMIDE 40 MG
20 TABLET ORAL DAILY
Refills: 0 | Status: DISCONTINUED | OUTPATIENT
Start: 2021-07-05 | End: 2021-07-06

## 2021-07-05 RX ORDER — METOPROLOL TARTRATE 50 MG
25 TABLET ORAL
Refills: 0 | Status: DISCONTINUED | OUTPATIENT
Start: 2021-07-05 | End: 2021-07-06

## 2021-07-05 RX ADMIN — Medication 650 MILLIGRAM(S): at 09:00

## 2021-07-05 RX ADMIN — SENNA PLUS 2 TABLET(S): 8.6 TABLET ORAL at 22:16

## 2021-07-05 RX ADMIN — Medication 1 PATCH: at 11:30

## 2021-07-05 RX ADMIN — GABAPENTIN 100 MILLIGRAM(S): 400 CAPSULE ORAL at 06:46

## 2021-07-05 RX ADMIN — Medication 1 APPLICATION(S): at 22:16

## 2021-07-05 RX ADMIN — Medication 1 APPLICATION(S): at 06:46

## 2021-07-05 RX ADMIN — Medication 1 MILLIGRAM(S): at 11:04

## 2021-07-05 RX ADMIN — Medication 25 MILLIGRAM(S): at 17:19

## 2021-07-05 RX ADMIN — ATORVASTATIN CALCIUM 80 MILLIGRAM(S): 80 TABLET, FILM COATED ORAL at 22:16

## 2021-07-05 RX ADMIN — Medication 650 MILLIGRAM(S): at 01:00

## 2021-07-05 RX ADMIN — Medication 1 PATCH: at 20:10

## 2021-07-05 RX ADMIN — LOSARTAN POTASSIUM 100 MILLIGRAM(S): 100 TABLET, FILM COATED ORAL at 15:43

## 2021-07-05 RX ADMIN — Medication 100 MILLIGRAM(S): at 11:04

## 2021-07-05 RX ADMIN — Medication 650 MILLIGRAM(S): at 15:30

## 2021-07-05 RX ADMIN — Medication 1 PATCH: at 06:46

## 2021-07-05 RX ADMIN — Medication 20 MILLIGRAM(S): at 15:43

## 2021-07-05 RX ADMIN — Medication 1 TABLET(S): at 11:04

## 2021-07-05 RX ADMIN — Medication 650 MILLIGRAM(S): at 08:33

## 2021-07-05 RX ADMIN — Medication 650 MILLIGRAM(S): at 16:13

## 2021-07-05 RX ADMIN — Medication 1 APPLICATION(S): at 14:38

## 2021-07-05 RX ADMIN — GABAPENTIN 100 MILLIGRAM(S): 400 CAPSULE ORAL at 17:19

## 2021-07-05 RX ADMIN — Medication 1 PATCH: at 11:04

## 2021-07-05 RX ADMIN — Medication 81 MILLIGRAM(S): at 11:04

## 2021-07-05 NOTE — PROGRESS NOTE ADULT - SUBJECTIVE AND OBJECTIVE BOX
CHAPARRO ROBISON 65y Male  MRN#: [id number]   Hospital Day: 7d    HPI:  65 year old male with hx of CVA, PE, HLD, CHF (undocumented EF) presents with epistaxis starting yesterday.  Patient states he was bled a large amount (unable to quantify) from both nostrils while he was at the nursing home.  He denies any trauma or manipulation.  His Eliquis and aspirin were held in the nursing home and he was sent in for evaluation.  He denies any chest pain, denies any abdominal pain.  Of note his hemoglobin dropped from 12.7 to 10.4 since 6/23 (admitted then for inability to ambulate).    In the ED he received morphine and afrin, he was seen by ENT who attempted to place a rhinorocket however patient was combative and refused, so surgicell was placed.  There was no active bleeding during my interview.    Triage Vitals: /61    RR 20  Temp 98  SpO2 96% on room air (28 Jun 2021 11:36)      SUBJECTIVE  Patient is a 65y old Male who presents with a chief complaint of epistaxis (04 Jul 2021 11:59)  Currently admitted to medicine with the primary diagnosis of Epistaxis      INTERVAL HPI AND OVERNIGHT EVENTS:  Patient was examined and seen at bedside. This morning he is resting comfortably in bed and reports no issues or overnight events. Complains of cramping in his calves.       OBJECTIVE  PAST MEDICAL & SURGICAL HISTORY  TIA (transient ischemic attack)    CHF (congestive heart failure)    HTN (hypertension)    HLD (hyperlipidemia)    Alcohol dependence    Chronic back pain    Pulmonary embolism  2015    Cerebrovascular accident (CVA)  left pontine with dysphagia    History of appendectomy      ALLERGIES:  No Known Allergies    MEDICATIONS:  STANDING MEDICATIONS  aspirin  chewable 81 milliGRAM(s) Oral daily  atorvastatin 80 milliGRAM(s) Oral at bedtime  BACItracin   Ointment 1 Application(s) Topical three times a day  folic acid 1 milliGRAM(s) Oral daily  gabapentin 100 milliGRAM(s) Oral two times a day  multivitamin/minerals 1 Tablet(s) Oral daily  nicotine - 21 mG/24Hr(s) Patch 1 patch Transdermal daily  senna 2 Tablet(s) Oral at bedtime  thiamine 100 milliGRAM(s) Oral daily    PRN MEDICATIONS  acetaminophen   Tablet .. 650 milliGRAM(s) Oral every 6 hours PRN  guaifenesin/dextromethorphan Oral Liquid 5 milliLiter(s) Oral every 6 hours PRN  methocarbamol 500 milliGRAM(s) Oral every 8 hours PRN      VITAL SIGNS: Last 24 Hours  T(C): 36.1 (05 Jul 2021 05:14), Max: 36.6 (04 Jul 2021 19:55)  T(F): 96.9 (05 Jul 2021 05:14), Max: 97.9 (04 Jul 2021 19:55)  HR: 64 (05 Jul 2021 05:14) (64 - 81)  BP: 159/79 (05 Jul 2021 05:14) (136/58 - 159/79)  BP(mean): --  RR: 18 (05 Jul 2021 05:14) (18 - 19)  SpO2: --    LABS:        RADIOLOGY:      PHYSICAL EXAM:  CONSTITUTIONAL: No acute distress, well-developed, well-groomed, AAOx3  EYES: EOM intact, PERRLA, conjunctiva and sclera clear  ENT: Supple, no masses, no thyromegaly, no bruits, no active bleeding from nose  PULMONARY: Clear to auscultation bilaterally; no wheezes, rales, or rhonchi  CARDIOVASCULAR: Regular rate and rhythm; no murmurs, rubs, or gallops  GASTROINTESTINAL: Soft, non-tender, non-distended; bowel sounds present  MUSCULOSKELETAL: 2+ peripheral pulses; no clubbing, no cyanosis, no edema, not cool to touch  SKIN: No rashes or lesions; warm and dry
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
## 2021-07-05 NOTE — PROGRESS NOTE ADULT - ATTENDING COMMENTS
65 yr old male presented with epistaxis  # epistaxis now resolved after surgicell placement.  patient restarted on aspirin but he refuses eliquis-- he is aware of risks  ENT feels ok to resume eliquis-- sa per discussion with them.    # PE diagnosed in 2015--duplex was negative this admission -- patient understands risk of death.  refusing eliquis-- can restart it later as outpatient-- says PMD retired-- Dr Jc.    # HTN-- restart home BP meds--lasix, losartan and metoprolol.  # Chr diastolic CHF-- lasix resumed.    Patient was unsteady with PT-- BP high-- cannot appeal discharge today-- as per case management. Intent to Dc in AM.

## 2021-07-06 VITALS
DIASTOLIC BLOOD PRESSURE: 60 MMHG | RESPIRATION RATE: 18 BRPM | HEART RATE: 65 BPM | TEMPERATURE: 98 F | SYSTOLIC BLOOD PRESSURE: 122 MMHG

## 2021-07-06 LAB — SARS-COV-2 RNA SPEC QL NAA+PROBE: SIGNIFICANT CHANGE UP

## 2021-07-06 PROCEDURE — 99239 HOSP IP/OBS DSCHRG MGMT >30: CPT

## 2021-07-06 RX ORDER — HALOPERIDOL DECANOATE 100 MG/ML
5 INJECTION INTRAMUSCULAR EVERY 12 HOURS
Refills: 0 | Status: DISCONTINUED | OUTPATIENT
Start: 2021-07-06 | End: 2021-07-06

## 2021-07-06 RX ADMIN — Medication 1 APPLICATION(S): at 21:25

## 2021-07-06 RX ADMIN — SENNA PLUS 2 TABLET(S): 8.6 TABLET ORAL at 21:25

## 2021-07-06 RX ADMIN — GABAPENTIN 100 MILLIGRAM(S): 400 CAPSULE ORAL at 17:26

## 2021-07-06 RX ADMIN — Medication 650 MILLIGRAM(S): at 21:22

## 2021-07-06 RX ADMIN — LOSARTAN POTASSIUM 100 MILLIGRAM(S): 100 TABLET, FILM COATED ORAL at 05:27

## 2021-07-06 RX ADMIN — Medication 1 APPLICATION(S): at 05:27

## 2021-07-06 RX ADMIN — GABAPENTIN 100 MILLIGRAM(S): 400 CAPSULE ORAL at 05:27

## 2021-07-06 RX ADMIN — Medication 650 MILLIGRAM(S): at 14:54

## 2021-07-06 RX ADMIN — Medication 1 TABLET(S): at 11:29

## 2021-07-06 RX ADMIN — ATORVASTATIN CALCIUM 80 MILLIGRAM(S): 80 TABLET, FILM COATED ORAL at 21:25

## 2021-07-06 RX ADMIN — Medication 1 MILLIGRAM(S): at 11:28

## 2021-07-06 RX ADMIN — Medication 25 MILLIGRAM(S): at 17:26

## 2021-07-06 RX ADMIN — Medication 650 MILLIGRAM(S): at 16:19

## 2021-07-06 RX ADMIN — Medication 1 PATCH: at 11:29

## 2021-07-06 RX ADMIN — Medication 1 APPLICATION(S): at 13:14

## 2021-07-06 RX ADMIN — Medication 650 MILLIGRAM(S): at 20:09

## 2021-07-06 RX ADMIN — Medication 20 MILLIGRAM(S): at 05:27

## 2021-07-06 RX ADMIN — Medication 81 MILLIGRAM(S): at 11:28

## 2021-07-06 RX ADMIN — Medication 1 PATCH: at 13:14

## 2021-07-06 RX ADMIN — Medication 100 MILLIGRAM(S): at 11:29
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
## 2021-07-06 NOTE — PROGRESS NOTE ADULT - PROVIDER SPECIALTY LIST ADULT
Internal Medicine
ENT
Internal Medicine
ENT
Internal Medicine
Hospitalist

## 2021-07-06 NOTE — PROGRESS NOTE ADULT - TIME BILLING
complete patient's bedside assessment, review medical chart, discuss discharge  medical plan of care with covering medical team and case management

## 2021-07-30 NOTE — ED PROVIDER NOTE - CONDITION AT DISCHARGE:
[FreeTextEntry1] : Assessment and Plan: Mr. Hardy is a 49-year-old male with history of AUD, with decompensated alcohol-related cirrhosis complicated by ascites, hepatic encephalopathy, and hyponatremia who presents to the clinic for follow up after recent hospitalization.\par \par 1. Decompensated alcohol-related cirrhosis \par No ascites or significant lower extremity edema on exam. He has complaint on furosemide 40 mg and spironolactone 150 mg daily. Will continue current dose of his diuretics. \par He has been following a low salt diet.  \par \par HE: No recent episode of HE. He takes lactulose TID and has 2-4 bowel movements a day. \par \par Esophageal Variceal Screen: He had an EGD on 06/01/21 that showed non-bleeding erosive gastropathy, duodenal erosions without bleeding, and duodenal mucosal atrophy that was biopsied (duodenal mucosa with focal acute erosive duodenitis).\par \par HCC Screen: US from 05/23/21 showed hepatic steatosis with cirrhosis and no liver lesions. Continue imaging every 6 months. Last AFP 5.6 on 04/12/21. Repeat imaging 10/2021.\par \par Patient was advised to abstain from alcohol and all illicit drugs, avoid herbal and dietary supplements, limit use of acetaminophen to <2 grams per day, avoid use of nonsteroidal antiinflammatory drugs (NSAIDs) as these can precipitate renal dysfunction in patients with advanced liver disease, avoid eating any unpasteurized dairy products, and avoid eating raw/steamed oysters or other shellfish to avoid risk of Vibrio infection.\par \par 2. Health Maintenance \par Immunizations: Immune or Susceptible to HAV, s/p HBV vaccination\par Colonoscopy: 06/01/21- showed diverticulosis in the entire examined colon, to repeat in 10 years\par [place] 4/15/21-5/13/21\par \par Follow Up: 6 months and prn\par \par \par 
Improved

## 2021-08-08 ENCOUNTER — EMERGENCY (EMERGENCY)
Facility: HOSPITAL | Age: 66
LOS: 0 days | Discharge: HOME | End: 2021-08-08
Attending: EMERGENCY MEDICINE | Admitting: EMERGENCY MEDICINE
Payer: MEDICARE

## 2021-08-08 VITALS
HEART RATE: 73 BPM | WEIGHT: 199.96 LBS | SYSTOLIC BLOOD PRESSURE: 183 MMHG | DIASTOLIC BLOOD PRESSURE: 78 MMHG | OXYGEN SATURATION: 99 % | HEIGHT: 69 IN | TEMPERATURE: 96 F | RESPIRATION RATE: 18 BRPM

## 2021-08-08 DIAGNOSIS — E78.5 HYPERLIPIDEMIA, UNSPECIFIED: ICD-10-CM

## 2021-08-08 DIAGNOSIS — F10.20 ALCOHOL DEPENDENCE, UNCOMPLICATED: ICD-10-CM

## 2021-08-08 DIAGNOSIS — Z72.89 OTHER PROBLEMS RELATED TO LIFESTYLE: ICD-10-CM

## 2021-08-08 DIAGNOSIS — Z79.899 OTHER LONG TERM (CURRENT) DRUG THERAPY: ICD-10-CM

## 2021-08-08 DIAGNOSIS — I11.0 HYPERTENSIVE HEART DISEASE WITH HEART FAILURE: ICD-10-CM

## 2021-08-08 DIAGNOSIS — I50.9 HEART FAILURE, UNSPECIFIED: ICD-10-CM

## 2021-08-08 DIAGNOSIS — Z90.49 ACQUIRED ABSENCE OF OTHER SPECIFIED PARTS OF DIGESTIVE TRACT: Chronic | ICD-10-CM

## 2021-08-08 DIAGNOSIS — Z86.73 PERSONAL HISTORY OF TRANSIENT ISCHEMIC ATTACK (TIA), AND CEREBRAL INFARCTION WITHOUT RESIDUAL DEFICITS: ICD-10-CM
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
## 2021-08-08 DIAGNOSIS — Z79.82 LONG TERM (CURRENT) USE OF ASPIRIN: ICD-10-CM

## 2021-08-08 DIAGNOSIS — Z86.711 PERSONAL HISTORY OF PULMONARY EMBOLISM: ICD-10-CM

## 2021-08-08 PROCEDURE — 99283 EMERGENCY DEPT VISIT LOW MDM: CPT

## 2021-08-08 NOTE — ED PROVIDER NOTE - ATTENDING CONTRIBUTION TO CARE
64 yo M PMHx noted presents via EMS after being found on floor in soiled clothes.  Pt states he feels fine and wants to go home, admits to having some vodka today. no SI.  On exam pt in NAD AAO x 3, GCs 15, no signs of trauma, PERRL, Lungs cat b/l no wrr, abd soft nt nd, ext atrumatic, FROM

## 2021-08-08 NOTE — ED PROVIDER NOTE - PROGRESS NOTE DETAILS
patient refuses to stay for imaging . patient denies any headache, bruising. patient alert, clear specch, AOx3. no SI/HI. will dc home

## 2021-08-08 NOTE — ED PROVIDER NOTE - OBJECTIVE STATEMENT
64 y/o male with hx of CVA, PE, HLD, CHF on anticoagulation presents to the ED by EMS after drinking vodka and being found on the floor in soiled clothes. patient denies any falls or head injury . patient denies any neck or back pain. pt denies any SI/HI. not hearing voices. patient denies any drug abuse. no vomiting or dizziness or headache. no visible marks of trauma

## 2021-08-08 NOTE — ED PROVIDER NOTE - MUSCULOSKELETAL, MLM
Spine appears normal, no cervical tenderness, pelvis stable, range of motion is not limited, no muscle or joint tenderness

## 2021-08-08 NOTE — ED PROVIDER NOTE - CLINICAL SUMMARY MEDICAL DECISION MAKING FREE TEXT BOX
Pt refuses imaging.  States he knows he is fine and would like to go home.  He is AAO x 3,  will d/c. Strict return precautions discussed. Strict return precautions discussed.

## 2021-08-08 NOTE — ED PROVIDER NOTE - NSFOLLOWUPCLINICS_GEN_ALL_ED_FT
[facility]  [facility]  [address] [place] [address]  Phone: [phone]  Fax:

## 2021-08-08 NOTE — ED PROVIDER NOTE - NS ED ROS FT
Yes
Review of Systems    Constitutional: (-) fever/ chills   Eyes (-) visual changes  ENT: (-) epistaxis (-) sore throat (-) ear pain  Cardiovascular: (-) chest pain, (-) syncope (-) palpitations  Respiratory: (-) cough, (-) shortness of breath  Gastrointestinal: (-) vomiting, (-) diarrhea (-) abdominal pain  : (-) dysuria , hematuria   neck: (-) neck pain or stiffness  Musculoskeletal:  (-) back pain, (-) joint pain   Integumentary: (-) rash, (-) swelling  Neurological: (-) headache, (-) altered mental status

## 2021-08-08 NOTE — ED ADULT TRIAGE NOTE - CHIEF COMPLAINT QUOTE
EMS states they found pt lying on the floor incontinent of urine, pt was unable to stand up. Pt states he was drinking vodka today

## 2021-08-08 NOTE — ED ADULT NURSE NOTE - NSIMPLEMENTINTERV_GEN_ALL_ED
Implemented All Fall Risk Interventions:  [place] to call system. Call bell, personal items and telephone within reach. Instruct patient to call for assistance. Room bathroom lighting operational. Non-slip footwear when patient is off stretcher. Physically safe environment: no spills, clutter or unnecessary equipment. Stretcher in lowest position, wheels locked, appropriate side rails in place. Provide visual cue, wrist band, yellow gown, etc. Monitor gait and stability. Monitor for mental status changes and reorient to person, place, and time. Review medications for side effects contributing to fall risk. Reinforce activity limits and safety measures with patient and family.

## 2021-08-08 NOTE — ED PROVIDER NOTE - PATIENT PORTAL LINK FT
You can access the FollowMyHealth Patient Portal offered by [facility] by registering at the following website: [email or web address]. By joining [facility]’s FollowMyHealth portal, you will also be able to view your health information using other applications (apps) compatible with our system.

## 2021-08-16 ENCOUNTER — APPOINTMENT (OUTPATIENT)
Dept: OTOLARYNGOLOGY | Facility: CLINIC | Age: 66
End: 2021-08-16

## 2021-08-22 ENCOUNTER — INPATIENT (INPATIENT)
Facility: HOSPITAL | Age: 66
LOS: 2 days | Discharge: NURSING, REHAB OR LONG-TERM CARE | End: 2021-08-25
Attending: HOSPITALIST | Admitting: HOSPITALIST
Payer: MEDICARE

## 2021-08-22 VITALS
TEMPERATURE: 99 F | DIASTOLIC BLOOD PRESSURE: 72 MMHG | RESPIRATION RATE: 18 BRPM | WEIGHT: 199.96 LBS | OXYGEN SATURATION: 96 % | SYSTOLIC BLOOD PRESSURE: 156 MMHG | HEIGHT: 69 IN | HEART RATE: 68 BPM

## 2021-08-22 DIAGNOSIS — Z90.49 ACQUIRED ABSENCE OF OTHER SPECIFIED PARTS OF DIGESTIVE TRACT: Chronic | ICD-10-CM

## 2021-08-22 LAB
ANION GAP SERPL CALC-SCNC: 9 MMOL/L — SIGNIFICANT CHANGE UP (ref 7–14)
BASOPHILS # BLD AUTO: 0.03 K/UL — SIGNIFICANT CHANGE UP (ref 0–0.2)
BASOPHILS NFR BLD AUTO: 0.3 % — SIGNIFICANT CHANGE UP (ref 0–1)
BUN SERPL-MCNC: 9 MG/DL — LOW (ref 10–20)
CALCIUM SERPL-MCNC: 9.2 MG/DL — SIGNIFICANT CHANGE UP (ref 8.5–10.1)
CHLORIDE SERPL-SCNC: 103 MMOL/L — SIGNIFICANT CHANGE UP (ref 98–110)
CO2 SERPL-SCNC: 28 MMOL/L — SIGNIFICANT CHANGE UP (ref 17–32)
CREAT SERPL-MCNC: 0.8 MG/DL — SIGNIFICANT CHANGE UP (ref 0.7–1.5)
EOSINOPHIL # BLD AUTO: 0.1 K/UL — SIGNIFICANT CHANGE UP (ref 0–0.7)
EOSINOPHIL NFR BLD AUTO: 1.1 % — SIGNIFICANT CHANGE UP (ref 0–8)
GLUCOSE SERPL-MCNC: 100 MG/DL — HIGH (ref 70–99)
HCT VFR BLD CALC: 40 % — LOW (ref 42–52)
HGB BLD-MCNC: 12.4 G/DL — LOW (ref 14–18)
IMM GRANULOCYTES NFR BLD AUTO: 0.9 % — HIGH (ref 0.1–0.3)
LYMPHOCYTES # BLD AUTO: 1.28 K/UL — SIGNIFICANT CHANGE UP (ref 1.2–3.4)
LYMPHOCYTES # BLD AUTO: 14.3 % — LOW (ref 20.5–51.1)
MCHC RBC-ENTMCNC: 26.3 PG — LOW (ref 27–31)
MCHC RBC-ENTMCNC: 31 G/DL — LOW (ref 32–37)
MCV RBC AUTO: 84.9 FL — SIGNIFICANT CHANGE UP (ref 80–94)
MONOCYTES # BLD AUTO: 0.72 K/UL — HIGH (ref 0.1–0.6)
MONOCYTES NFR BLD AUTO: 8 % — SIGNIFICANT CHANGE UP (ref 1.7–9.3)
NEUTROPHILS # BLD AUTO: 6.74 K/UL — HIGH (ref 1.4–6.5)
NEUTROPHILS NFR BLD AUTO: 75.4 % — HIGH (ref 42.2–75.2)
NRBC # BLD: 0 /100 WBCS — SIGNIFICANT CHANGE UP (ref 0–0)
PLATELET # BLD AUTO: 153 K/UL — SIGNIFICANT CHANGE UP (ref 130–400)
POTASSIUM SERPL-MCNC: 3.8 MMOL/L — SIGNIFICANT CHANGE UP (ref 3.5–5)
POTASSIUM SERPL-SCNC: 3.8 MMOL/L — SIGNIFICANT CHANGE UP (ref 3.5–5)
RBC # BLD: 4.71 M/UL — SIGNIFICANT CHANGE UP (ref 4.7–6.1)
RBC # FLD: 15.8 % — HIGH (ref 11.5–14.5)
SARS-COV-2 RNA SPEC QL NAA+PROBE: SIGNIFICANT CHANGE UP
SODIUM SERPL-SCNC: 140 MMOL/L — SIGNIFICANT CHANGE UP (ref 135–146)
WBC # BLD: 8.95 K/UL — SIGNIFICANT CHANGE UP (ref 4.8–10.8)
WBC # FLD AUTO: 8.95 K/UL — SIGNIFICANT CHANGE UP (ref 4.8–10.8)

## 2021-08-22 PROCEDURE — 70450 CT HEAD/BRAIN W/O DYE: CPT | Mod: 26,MG

## 2021-08-22 PROCEDURE — 99284 EMERGENCY DEPT VISIT MOD MDM: CPT

## 2021-08-22 PROCEDURE — G1004: CPT

## 2021-08-22 PROCEDURE — 99285 EMERGENCY DEPT VISIT HI MDM: CPT

## 2021-08-22 PROCEDURE — 99223 1ST HOSP IP/OBS HIGH 75: CPT

## 2021-08-22 PROCEDURE — 71045 X-RAY EXAM CHEST 1 VIEW: CPT | Mod: 26

## 2021-08-22 PROCEDURE — 93010 ELECTROCARDIOGRAM REPORT: CPT

## 2021-08-22 RX ORDER — NICOTINE POLACRILEX 2 MG
1 GUM BUCCAL DAILY
Refills: 0 | Status: DISCONTINUED | OUTPATIENT
Start: 2021-08-22 | End: 2021-08-25

## 2021-08-22 RX ORDER — ATORVASTATIN CALCIUM 80 MG/1
80 TABLET, FILM COATED ORAL AT BEDTIME
Refills: 0 | Status: DISCONTINUED | OUTPATIENT
Start: 2021-08-22 | End: 2021-08-23

## 2021-08-22 RX ORDER — ENOXAPARIN SODIUM 100 MG/ML
40 INJECTION SUBCUTANEOUS DAILY
Refills: 0 | Status: DISCONTINUED | OUTPATIENT
Start: 2021-08-23 | End: 2021-08-25

## 2021-08-22 RX ORDER — ACETAMINOPHEN 500 MG
650 TABLET ORAL EVERY 6 HOURS
Refills: 0 | Status: DISCONTINUED | OUTPATIENT
Start: 2021-08-22 | End: 2021-08-25

## 2021-08-22 RX ORDER — LOSARTAN POTASSIUM 100 MG/1
100 TABLET, FILM COATED ORAL DAILY
Refills: 0 | Status: DISCONTINUED | OUTPATIENT
Start: 2021-08-22 | End: 2021-08-25

## 2021-08-22 RX ORDER — SODIUM CHLORIDE 9 MG/ML
1000 INJECTION, SOLUTION INTRAVENOUS ONCE
Refills: 0 | Status: COMPLETED | OUTPATIENT
Start: 2021-08-22 | End: 2021-08-22

## 2021-08-22 RX ORDER — MULTIVIT-MIN/FERROUS GLUCONATE 9 MG/15 ML
1 LIQUID (ML) ORAL DAILY
Refills: 0 | Status: DISCONTINUED | OUTPATIENT
Start: 2021-08-22 | End: 2021-08-25

## 2021-08-22 RX ORDER — METHOCARBAMOL 500 MG/1
500 TABLET, FILM COATED ORAL EVERY 8 HOURS
Refills: 0 | Status: DISCONTINUED | OUTPATIENT
Start: 2021-08-22 | End: 2021-08-25

## 2021-08-22 RX ORDER — THIAMINE MONONITRATE (VIT B1) 100 MG
100 TABLET ORAL DAILY
Refills: 0 | Status: DISCONTINUED | OUTPATIENT
Start: 2021-08-22 | End: 2021-08-25

## 2021-08-22 RX ORDER — METOPROLOL TARTRATE 50 MG
25 TABLET ORAL
Refills: 0 | Status: DISCONTINUED | OUTPATIENT
Start: 2021-08-22 | End: 2021-08-25

## 2021-08-22 RX ORDER — GABAPENTIN 400 MG/1
100 CAPSULE ORAL THREE TIMES A DAY
Refills: 0 | Status: DISCONTINUED | OUTPATIENT
Start: 2021-08-22 | End: 2021-08-25

## 2021-08-22 RX ORDER — ASPIRIN/CALCIUM CARB/MAGNESIUM 324 MG
81 TABLET ORAL DAILY
Refills: 0 | Status: DISCONTINUED | OUTPATIENT
Start: 2021-08-22 | End: 2021-08-25

## 2021-08-22 RX ORDER — FOLIC ACID 0.8 MG
1 TABLET ORAL DAILY
Refills: 0 | Status: DISCONTINUED | OUTPATIENT
Start: 2021-08-22 | End: 2021-08-25

## 2021-08-22 RX ADMIN — Medication 650 MILLIGRAM(S): at 23:58

## 2021-08-22 RX ADMIN — SODIUM CHLORIDE 1000 MILLILITER(S): 9 INJECTION, SOLUTION INTRAVENOUS at 16:30

## 2021-08-22 NOTE — ED ADULT NURSE NOTE - DRUG PRE-SCREENING (DAST -1)
Requested Prescriptions     Pending Prescriptions Disp Refills    CELEBREX 200 mg capsule 60 Cap 6     Sig: Take 1 Cap by mouth two (2) times a day.    Patient stated she was told by pharmacy that she needs authorization for this medication Statement Selected

## 2021-08-22 NOTE — ED PROVIDER NOTE - NS ED ROS FT
Eyes:  No visual changes, eye pain or discharge.  ENMT:  No hearing changes, pain, discharge or infections. No neck pain or stiffness.  Cardiac:  No chest pain, SOB or edema  Respiratory:  No cough or respiratory distress. No hemoptysis. No history of asthma or RAD.  GI:  No nausea, vomiting, diarrhea or abdominal pain.  :  No dysuria, frequency or burning.  MS:  No myalgia, muscle weakness, joint pain or back pain.  Neuro:  No headache or weakness.  No LOC.  Skin:  No skin rash.   Endocrine: No history of thyroid disease or diabetes.  Except as documented in the HPI,  all other systems are negative.

## 2021-08-22 NOTE — ED ADULT TRIAGE NOTE - CHIEF COMPLAINT QUOTE
BIBA from home as per patient "Both my legs feel weak for the past three days making it difficult to get around. My legs started getting weak a month ago." Denies fall/trauma. Denies numbness, denies pain

## 2021-08-22 NOTE — H&P ADULT - NSHPLABSRESULTS_GEN_ALL_CORE
12.4   8.95  )-----------( 153      ( 22 Aug 2021 16:15 )             40.0     08-22    140  |  103  |  9<L>  ----------------------------<  100<H>  3.8   |  28  |  0.8    Ca    9.2      22 Aug 2021 16:15    Lactate Trend    CAPILLARY BLOOD GLUCOSE    POCT Blood Glucose.: 121 mg/dL (22 Aug 2021 16:18)    < from: CT Head No Cont (08.22.21 @ 19:09) >    EXAM:  CT BRAIN          PROCEDURE DATE:  08/22/2021      < from: CT Head No Cont (08.22.21 @ 19:09) >    IMPRESSION:    No evidence of acute intracranial hemorrhage, mass effect or midline shift.    Unchanged chronic microvascular type ischemic changes and lacunar infarctions.    ELLIOT LANDAU MD; Attending Radiologist  This document has been electronically signed. Aug 22 2021  7:20PM    < end of copied text >    < from: Xray Chest 1 View- PORTABLE-Urgent (Xray Chest 1 View- PORTABLE-Urgent .) (08.22.21 @ 16:40) >    EXAM:  XR CHEST PORTABLE URGENT 1V          PROCEDURE DATE:  08/22/2021      < from: Xray Chest 1 View- PORTABLE-Urgent (Xray Chest 1 View- PORTABLE-Urgent .) (08.22.21 @ 16:40) >  Impression:  No radiographic evidence of acute cardiopulmonary disease.    MERLENE NIX MD; Attending Radiologist  This document has been electronically signed. Aug 22 2021  8:18PM    < end of copied text >

## 2021-08-22 NOTE — ED PROVIDER NOTE - OBJECTIVE STATEMENT
Pt is a 66y/o male with a pmhx of PE, CVA, HTN, HLD, presents today for eval of generalized weakness, inability to ambulate x 3 days. Pt sts this has happened in the past with no explanation. Pt denies CP, SOB, NVD Fever, chills, abd pain.

## 2021-08-22 NOTE — ED PROVIDER NOTE - PHYSICAL EXAMINATION
VITAL SIGNS: I have reviewed nursing notes and confirm.  CONSTITUTIONAL: disheveled, urinated/defecated on self  SKIN:  skin exam is warm and dry, no acute rash.    HEAD: Normocephalic; atraumatic.  EYES: conjunctiva and sclera clear.  ENT: No nasal discharge; airway clear.  NECK: Supple; non tender.  CARD: S1, S2 normal; no murmurs, gallops, or rubs. Regular rate and rhythm.   RESP: No wheezes, rales or rhonchi.  ABD: Normal bowel sounds; soft; non-distended; non-tender  EXT: moving all extremities equally Normal ROM.  No clubbing, cyanosis or edema.   LYMPH: No acute cervical adenopathy.  NEURO: Alert, oriented, grossly unremarkable  PSYCH: Cooperative, appropriate.
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
## 2021-08-22 NOTE — ED PROVIDER NOTE - ATTENDING CONTRIBUTION TO CARE
I personally evaluated the patient. I reviewed the Resident’s or Physician Assistant’s note (as assigned above), and agree with the findings and plan except as documented in my note.     65 male here for right leg redness swelling and new streaking past his knee for several days. Now has constitutional weakness and malaise.     ROS otherwise unremarkable    PE: male in no distress. CV: pulses intact. CHEST: normal work of breathing. ABD: nondistended. SKIN: normal. EXT: RLE cellulitis with lymphangitis proximal to the knee. toe onychomycosis noted NEURO: AAO 3 no focal deficits. HEENT: mucosa normal I personally evaluated the patient. I reviewed the Resident’s or Physician Assistant’s note (as assigned above), and agree with the findings and plan except as documented in my note.     65 male here for weakness from the SNF. Now has constitutional weakness and malaise with inability to ambulate. Unable to control himself with toileting .     ROS otherwise unremarkable    PE: male in no distress. CV: pulses intact. CHEST: normal work of breathing. ABD: nondistended. SKIN: normal. NEURO: deconditioned.  PSYCH: mood depressed     Impression: weakness    Plan: IV labs imaging supportive care and reevaluation

## 2021-08-22 NOTE — H&P ADULT - ASSESSMENT
Inability to ambulate    CHF Inability to ambulate    CHF (per chart review, HFpEF (chronic))    history of PE - per last chart entry no longer a candidate for anticoagulation due to bleeding  Inability to ambulate    CHF (per chart review, HFpEF (chronic))    history of PE - per last chart entry no longer a candidate for anticoagulation due to bleeding     Folic acid and thiamine deficiencies -  Inability to ambulate- analgesic prn and rehab consult    CHF (per chart review, HFpEF (chronic))- not in exacerbation, monitor     history of Pulmonary Embolism - per last chart entry no longer a candidate for anticoagulation due to bleeding     Folic acid and thiamine deficiencies - continue supplement

## 2021-08-22 NOTE — H&P ADULT - HISTORY OF PRESENT ILLNESS
Presents with inability to ambulate 66yo male presents to ER due to recurring problem of ambulatory dysfunction. Specifically says he has trouble walking due to pain to legs. Asking for Tylenol or aspirin. Most recent rehab for this problem was at [facility]. Tells me medication list is unchanged and adds that his last use of alcohol was 2 weeks ago  64yo male whose PMH includes pulmonary embolism (2015- off DOAC due to bleeding, epistaxis), CVA (2015), CHF, HTN and alcohol dependnecy presents to ER due to recurring problem of ambulatory dysfunction. Specifically says he has trouble walking due to pain to legs. Asking for Tylenol or aspirin. Most recent rehab for this problem was at [facility]. Tells me medication list is unchanged and adds that his last use of alcohol was 2 weeks ago

## 2021-08-22 NOTE — ED PROVIDER NOTE - CLINICAL SUMMARY MEDICAL DECISION MAKING FREE TEXT BOX
65 male here for evaluation of RLE cellulitis with lymphangitis for several days.     Had screening labs imaging medications and reevaluation, plan is for inpatient admission for continued management. 65 male here for evaluation of weakness and inability to ambulate.   Had screening labs imaging medications and reevaluation, plan is for inpatient admission for continued management.

## 2021-08-23 LAB
ALBUMIN SERPL ELPH-MCNC: 3.5 G/DL — SIGNIFICANT CHANGE UP (ref 3.5–5.2)
ALP SERPL-CCNC: 80 U/L — SIGNIFICANT CHANGE UP (ref 30–115)
ALT FLD-CCNC: 40 U/L — SIGNIFICANT CHANGE UP (ref 0–41)
ANION GAP SERPL CALC-SCNC: 8 MMOL/L — SIGNIFICANT CHANGE UP (ref 7–14)
AST SERPL-CCNC: 38 U/L — SIGNIFICANT CHANGE UP (ref 0–41)
BILIRUB SERPL-MCNC: 0.6 MG/DL — SIGNIFICANT CHANGE UP (ref 0.2–1.2)
BUN SERPL-MCNC: 7 MG/DL — LOW (ref 10–20)
CALCIUM SERPL-MCNC: 8.8 MG/DL — SIGNIFICANT CHANGE UP (ref 8.5–10.1)
CHLORIDE SERPL-SCNC: 103 MMOL/L — SIGNIFICANT CHANGE UP (ref 98–110)
CO2 SERPL-SCNC: 26 MMOL/L — SIGNIFICANT CHANGE UP (ref 17–32)
CREAT SERPL-MCNC: 0.7 MG/DL — SIGNIFICANT CHANGE UP (ref 0.7–1.5)
GLUCOSE SERPL-MCNC: 116 MG/DL — HIGH (ref 70–99)
HCT VFR BLD CALC: 35.5 % — LOW (ref 42–52)
HGB BLD-MCNC: 11.2 G/DL — LOW (ref 14–18)
MAGNESIUM SERPL-MCNC: 1.6 MG/DL — LOW (ref 1.8–2.4)
MCHC RBC-ENTMCNC: 26.8 PG — LOW (ref 27–31)
MCHC RBC-ENTMCNC: 31.5 G/DL — LOW (ref 32–37)
MCV RBC AUTO: 84.9 FL — SIGNIFICANT CHANGE UP (ref 80–94)
NRBC # BLD: 0 /100 WBCS — SIGNIFICANT CHANGE UP (ref 0–0)
PLATELET # BLD AUTO: 145 K/UL — SIGNIFICANT CHANGE UP (ref 130–400)
POTASSIUM SERPL-MCNC: 3.4 MMOL/L — LOW (ref 3.5–5)
POTASSIUM SERPL-SCNC: 3.4 MMOL/L — LOW (ref 3.5–5)
PROT SERPL-MCNC: 6.2 G/DL — SIGNIFICANT CHANGE UP (ref 6–8)
RBC # BLD: 4.18 M/UL — LOW (ref 4.7–6.1)
RBC # FLD: 15.8 % — HIGH (ref 11.5–14.5)
SODIUM SERPL-SCNC: 137 MMOL/L — SIGNIFICANT CHANGE UP (ref 135–146)
WBC # BLD: 8.43 K/UL — SIGNIFICANT CHANGE UP (ref 4.8–10.8)
WBC # FLD AUTO: 8.43 K/UL — SIGNIFICANT CHANGE UP (ref 4.8–10.8)

## 2021-08-23 RX ORDER — ATORVASTATIN CALCIUM 80 MG/1
40 TABLET, FILM COATED ORAL AT BEDTIME
Refills: 0 | Status: DISCONTINUED | OUTPATIENT
Start: 2021-08-23 | End: 2021-08-25

## 2021-08-23 RX ORDER — FUROSEMIDE 40 MG
20 TABLET ORAL DAILY
Refills: 0 | Status: DISCONTINUED | OUTPATIENT
Start: 2021-08-23 | End: 2021-08-25

## 2021-08-23 RX ORDER — POTASSIUM CHLORIDE 20 MEQ
20 PACKET (EA) ORAL
Refills: 0 | Status: COMPLETED | OUTPATIENT
Start: 2021-08-23 | End: 2021-08-23

## 2021-08-23 RX ORDER — MAGNESIUM SULFATE 500 MG/ML
2 VIAL (ML) INJECTION ONCE
Refills: 0 | Status: COMPLETED | OUTPATIENT
Start: 2021-08-23 | End: 2021-08-23

## 2021-08-23 RX ADMIN — Medication 25 MILLIGRAM(S): at 18:16

## 2021-08-23 RX ADMIN — Medication 650 MILLIGRAM(S): at 06:25

## 2021-08-23 RX ADMIN — Medication 1 PATCH: at 12:35

## 2021-08-23 RX ADMIN — GABAPENTIN 100 MILLIGRAM(S): 400 CAPSULE ORAL at 05:57

## 2021-08-23 RX ADMIN — Medication 25 MILLIGRAM(S): at 06:00

## 2021-08-23 RX ADMIN — Medication 1 MILLIGRAM(S): at 12:32

## 2021-08-23 RX ADMIN — GABAPENTIN 100 MILLIGRAM(S): 400 CAPSULE ORAL at 22:11

## 2021-08-23 RX ADMIN — Medication 1 TABLET(S): at 12:31

## 2021-08-23 RX ADMIN — Medication 20 MILLIEQUIVALENT(S): at 12:30

## 2021-08-23 RX ADMIN — Medication 50 GRAM(S): at 20:44

## 2021-08-23 RX ADMIN — Medication 1 PATCH: at 22:10

## 2021-08-23 RX ADMIN — Medication 100 MILLIGRAM(S): at 12:31

## 2021-08-23 RX ADMIN — ATORVASTATIN CALCIUM 40 MILLIGRAM(S): 80 TABLET, FILM COATED ORAL at 22:11

## 2021-08-23 RX ADMIN — LOSARTAN POTASSIUM 100 MILLIGRAM(S): 100 TABLET, FILM COATED ORAL at 05:58

## 2021-08-23 RX ADMIN — Medication 650 MILLIGRAM(S): at 12:32

## 2021-08-23 RX ADMIN — GABAPENTIN 100 MILLIGRAM(S): 400 CAPSULE ORAL at 14:52

## 2021-08-23 RX ADMIN — Medication 650 MILLIGRAM(S): at 00:25

## 2021-08-23 RX ADMIN — METHOCARBAMOL 500 MILLIGRAM(S): 500 TABLET, FILM COATED ORAL at 12:33

## 2021-08-23 RX ADMIN — ENOXAPARIN SODIUM 40 MILLIGRAM(S): 100 INJECTION SUBCUTANEOUS at 12:29

## 2021-08-23 RX ADMIN — Medication 650 MILLIGRAM(S): at 22:51

## 2021-08-23 RX ADMIN — Medication 81 MILLIGRAM(S): at 12:34

## 2021-08-23 RX ADMIN — Medication 20 MILLIGRAM(S): at 12:31

## 2021-08-23 NOTE — CONSULT NOTE ADULT - ASSESSMENT
IMPRESSION: Rehab of 66 y/o m  rehab  for  GD hx  of tia  cva      PRECAUTIONS: [  ] Cardiac  [  ] Respiratory  [  ] Seizures [  ] Contact Isolation  [  ] Droplet Isolation  [ FALL ] Other    Weight Bearing Status:     RECOMMENDATION:    Out of Bed to Chair     DVT/Decubiti Prophylaxis    REHAB PLAN:     [ xx  ] Bedside P/T 3-5 times a week   [   ]   Bedside O/T  2-3 times a week             [   ] No Rehab Therapy Indicated                   [   ]  Speech Therapy   Conditioning/ROM                                    ADL  Bed Mobility                                               Conditioning/ROM  Transfers                                                     Bed Mobility  Sitting /Standing Balance                         Transfers                                        Gait Training                                               Sitting/Standing Balance  Stair Training [   ]Applicable                    Home equipment Eval                                                                        Splinting  [ x  ] Only      GOALS:   ADL   [  x ]   Independent                    Transfers  [ x  ] Independent                          Ambulation  [  x ] Independent     [x    ] With device                            [ x  ]  CG                                                         [ x  ]  CG                                                                  [ x  ] CG                            [    ] Min A                                                   [   ] Min A                                                              [   ] Min  A          DISCHARGE PLAN:   [   ]  Good candidate for Intensive Rehabilitation/Hospital based-[facility]                                             Will tolerate 3hrs Intensive Rehab Daily                                       [  xx  ]  Short Term Rehab in Skilled Nursing Facility                                       [    ]  Home with Outpatient or VN services                                         [    ]  Possible Candidate for Intensive Hospital based Rehab

## 2021-08-23 NOTE — PROGRESS NOTE ADULT - ASSESSMENT
66yo male whose PMH includes pulmonary embolism (2015- off DOAC due to bleeding, epistaxis), CVA (2015), CHF, HTN and alcohol dependnecy presents to ER due to recurring problem of ambulatory dysfunction    # WEAKNESS? INABILITY TO AMBULATE     # PE dx in 2015 - unprovoked per chart review  - off any DOAC due to Epistaxis admission 2 months ago   - pt denies any VTE events since 2015  - pt does not want to resume any AC    # HFpef/ HTN  - c/w losartan 100 mg, and metoprolol 25 twice a day  - will RESTART LASIX , no reason to d/c ??  - ECHO: Left ventricular ejection fraction, by visual estimation, is 60 to 65%    # Alcohol abuse with nutrition deficiency.   - last drink was 2 weeks ag as per pt   - on thiamine and folate    # HLD  - will decrease 80mg to  atorvastatin 40 mg  - last visit LDL: 45, Non HDL cholesterol: 71    # GERD  - c/w protonix      # DVT PPX: SCDs  # GI PPX: protonix     64yo male whose PMH includes pulmonary embolism (2015- off DOAC due to bleeding, epistaxis), CVA (2015), CHF, HTN and alcohol dependnecy presents to ER due to recurring problem of ambulatory dysfunction    # WEAKNESS? INABILITY TO AMBULATE B/L LE : 2 ++ edema   - restart lasix PO   - PT eval   - physiatry consult   - poss . SNF need   -OOB to chair     # PE dx in 2015 - unprovoked per chart review  - off any DOAC due to Epistaxis admission 2 months ago   - pt denies any VTE events since 2015  - pt does not want to resume any AC    # HFpef/ HTN  - c/w losartan 100 mg, and metoprolol 25 twice a day  - will RESTART LASIX , no reason to d/c ??  - ECHO: Left ventricular ejection fraction, by visual estimation, is 60 to 65%    # Alcohol abuse with nutrition deficiency.   - last drink was 2 weeks ag as per pt   - on thiamine and folate    # HLD  - will decrease 80mg to  atorvastatin 40 mg  - last visit LDL: 45, Non HDL cholesterol: 71    # GERD  - c/w protonix      # DVT PPX: SCDs  # GI PPX: protonix    d/w Dr. Faith     66yo male whose PMH includes pulmonary embolism (2015- off DOAC due to bleeding, epistaxis), CVA (2015), CHF, HTN and alcohol dependnecy presents to ER due to recurring problem of ambulatory dysfunction    # WEAKNESS? INABILITY TO AMBULATE B/L LE : 2 ++ edema   - restart lasix PO   - PT eval   - physiatry consult   - poss . SNF need   -OOB to chair     # HYPOKALEMIA 3.4  - replaced po   - labs in am with MAG    # PE dx in 2015 - unprovoked per chart review  - off any DOAC due to Epistaxis admission 2 months ago   - pt denies any VTE events since 2015  - pt does not want to resume any AC    # HFpef/ HTN  - c/w losartan 100 mg, and metoprolol 25 twice a day  - will RESTART LASIX , no reason to d/c ??  - ECHO: Left ventricular ejection fraction, by visual estimation, is 60 to 65%    # Alcohol abuse with nutrition deficiency.   - last drink was 2 weeks ag as per pt   - on thiamine and folate    # HLD  - will decrease 80mg to  atorvastatin 40 mg  - last visit LDL: 45, Non HDL cholesterol: 71    # GERD  - c/w protonix      # DVT PPX: SCDs  # GI PPX: protonix    d/w Dr. Faith

## 2021-08-23 NOTE — PHYSICAL THERAPY INITIAL EVALUATION ADULT - GAIT DEVIATIONS NOTED, PT EVAL
forward flexed trunk, dec heel strike and push off/decreased fish/decreased step length/decreased stride length/decreased weight-shifting ability

## 2021-08-23 NOTE — CONSULT NOTE ADULT - SUBJECTIVE AND OBJECTIVE BOX
HPI: 66yo male whose PMH includes pulmonary embolism (2015- off  not on AC due to bleeding, epistaxis), CVA (2015), CHF, HTN and alcohol dependnecy presents to ER due to recurring problem of ambulatory dysfunction. Specifically says he has trouble walking due to pain to legs. Asking for Tylenol or aspirin. Most recent rehab for this problem was at [facility]. Tells me medication list is unchanged and adds that his last use of alcohol was 2 weeks ago  ptn  seen at  bed  side  site at  eob  nad  fu  command  c/o  bl  le  weakness  and  unable to ambulate       PTN  REFERRED TO ACUTE  REHAB  FOR  EVAL AND  TX   PAST MEDICAL & SURGICAL HISTORY:  TIA (transient ischemic attack)    CHF (congestive heart failure)    HTN (hypertension)    HLD (hyperlipidemia)    Alcohol dependence    Chronic back pain    Pulmonary embolism  2015    Cerebrovascular accident (CVA)  left pontine with dysphagia    History of appendectomy        Hospital Course:    TODAY'S SUBJECTIVE & REVIEW OF SYMPTOMS:     Constitutional WNL   Cardio WNL   Resp WNL   GI WNL  Heme WNL  Endo WNL  Skin WNL  MSK WNL  Neuro WNL  Cognitive WNL  Psych WNL      MEDICATIONS  (STANDING):  aspirin  chewable 81 milliGRAM(s) Oral daily  atorvastatin 40 milliGRAM(s) Oral at bedtime  enoxaparin Injectable 40 milliGRAM(s) SubCutaneous daily  folic acid 1 milliGRAM(s) Oral daily  furosemide    Tablet 20 milliGRAM(s) Oral daily  gabapentin 100 milliGRAM(s) Oral three times a day  losartan 100 milliGRAM(s) Oral daily  metoprolol tartrate 25 milliGRAM(s) Oral two times a day  multivitamin/minerals 1 Tablet(s) Oral daily  nicotine - 21 mG/24Hr(s) Patch 1 patch Transdermal daily  thiamine 100 milliGRAM(s) Oral daily    MEDICATIONS  (PRN):  acetaminophen   Tablet .. 650 milliGRAM(s) Oral every 6 hours PRN Mild Pain (1 - 3)  methocarbamol 500 milliGRAM(s) Oral every 8 hours PRN Muscle Spasm      FAMILY HISTORY:  FH: MI (myocardial infarction) (Father, Mother)  Dad, Mom        Allergies    No Known Allergies    Intolerances        SOCIAL HISTORY:    [  ] Etoh  [  ] Smoking  [  ] Substance abuse     Home Environment:  [ x ] Home Alone  [  ] Lives with Family  [  ] Home Health Aid    Dwelling:  [x  ] Apartment  [  ] Private House  [  ] Adult Home  [  ] Skilled Nursing Facility      [  ] Short Term  [  ] Long Term  [x  ] Stairs       Elevator [  ]    FUNCTIONAL STATUS PTA: (Check all that apply)  Ambulation: [x   ]Independent    [  ] Dependent     [  ] Non-Ambulatory  Assistive Device: [  ] SA Cane  [  ]  Q Cane  [  ] Walker  [x  ]  Wheelchair  ADL : [x  ] Independent  [  ]  Dependent       Vital Signs Last 24 Hrs  T(C): 36 (23 Aug 2021 12:27), Max: 37.1 (22 Aug 2021 15:19)  T(F): 96.8 (23 Aug 2021 12:27), Max: 98.7 (22 Aug 2021 15:19)  HR: 62 (23 Aug 2021 12:27) (62 - 75)  BP: 145/68 (23 Aug 2021 12:27) (145/68 - 180/80)  BP(mean): --  RR: 18 (23 Aug 2021 12:27) (17 - 18)  SpO2: 96% (23 Aug 2021 12:27) (96% - 98%)      PHYSICAL EXAM: Alert & Oriented X3  GENERAL: NAD, well-groomed, well-developed  HEAD:  Atraumatic, Normocephalic  EYES: EOMI, PERRLA, conjunctiva and sclera clear  NECK: Supple, No JVD, Normal thyroid  CHEST/LUNG: Clear to percussion bilaterally; No rales, rhonchi, wheezing, or rubs  HEART: Regular rate and rhythm; No murmurs, rubs, or gallops  ABDOMEN: Soft, Nontender, Nondistended; Bowel sounds present  EXTREMITIES:  2+ Peripheral Pulses, No clubbing, cyanosis, or edema    NERVOUS SYSTEM:  Cranial Nerves 2-12 intact [  ] Abnormal  [  ]  ROM: WFL all extremities [  ]  Abnormal [  ]  Motor Strength: WFL all extremities  [  ]  Abnormal [  ]  Sensation: intact to light touch [  ] Abnormal [  ]  Reflexes: Symmetric [  ]  Abnormal [  ]    FUNCTIONAL STATUS:  Bed Mobility: Independent [  ]  Supervision [  ]  Needs Assistance [ x ]  N/A [  ]  Transfers: Independent [  ]  Supervision [  ]  Needs Assistance [x  ]  N/A [  ]   Ambulation: Independent [  ]  Supervision [  ]  Needs Assistance [ x ]  N/A [  ]  ADL: Independent [  ] Requires Assistance [  ] N/A [x  ]  SEE PT/OT IE NOTES    LABS:                        11.2   8.43  )-----------( 145      ( 23 Aug 2021 09:20 )             35.5     08-23    137  |  103  |  7<L>  ----------------------------<  116<H>  3.4<L>   |  26  |  0.7    Ca    8.8      23 Aug 2021 09:20  Mg     1.6     08-23    TPro  6.2  /  Alb  3.5  /  TBili  0.6  /  DBili  x   /  AST  38  /  ALT  40  /  AlkPhos  80  08-23          RADIOLOGY & ADDITIONAL STUDIES:< from: CT Head No Cont (08.22.21 @ 19:09) >  MPRESSION:    No evidence of acute intracranial hemorrhage, mass effect or midline shift.    Unchanged chronic microvascular type ischemic changes and lacunar infarctions.      < end of copied text >      Assesment:
pt. c/o itching x 1 month, reports small non-pustulous bumps to his chest/back.  Denies PMHx.

## 2021-08-23 NOTE — PHYSICAL THERAPY INITIAL EVALUATION ADULT - GENERAL OBSERVATIONS, REHAB EVAL
Pt encountered semi-reclined on bed in ED, NAD, ok to be seen byPT as confirmed by RN and pt agreeable. +Chart reviewed

## 2021-08-23 NOTE — PHYSICAL THERAPY INITIAL EVALUATION ADULT - ADDITIONAL COMMENTS
Pt well known to PT service - pt lives alone in a private house. He states there are stairs with a rail to enter the house. Pt ambulates with RW at baseline but having more difficulty recently. Pt also has W/C at home.

## 2021-08-23 NOTE — PHYSICAL THERAPY INITIAL EVALUATION ADULT - RANGE OF MOTION EXAMINATION, REHAB EVAL
B LEs AROM grossly WFL - limited ankle dorsiflexion B, B shoulder flexion to at least 90 degrees elevation

## 2021-08-24 ENCOUNTER — TRANSCRIPTION ENCOUNTER (OUTPATIENT)
Age: 66
End: 2021-08-24

## 2021-08-24 LAB
ANION GAP SERPL CALC-SCNC: 9 MMOL/L — SIGNIFICANT CHANGE UP (ref 7–14)
BUN SERPL-MCNC: 8 MG/DL — LOW (ref 10–20)
CALCIUM SERPL-MCNC: 9.2 MG/DL — SIGNIFICANT CHANGE UP (ref 8.5–10.1)
CHLORIDE SERPL-SCNC: 103 MMOL/L — SIGNIFICANT CHANGE UP (ref 98–110)
CO2 SERPL-SCNC: 27 MMOL/L — SIGNIFICANT CHANGE UP (ref 17–32)
COVID-19 SPIKE DOMAIN AB INTERP: POSITIVE
COVID-19 SPIKE DOMAIN ANTIBODY RESULT: >250 U/ML — HIGH
CREAT SERPL-MCNC: 0.8 MG/DL — SIGNIFICANT CHANGE UP (ref 0.7–1.5)
GLUCOSE SERPL-MCNC: 100 MG/DL — HIGH (ref 70–99)
HCT VFR BLD CALC: 39 % — LOW (ref 42–52)
HGB BLD-MCNC: 11.9 G/DL — LOW (ref 14–18)
MAGNESIUM SERPL-MCNC: 2.1 MG/DL — SIGNIFICANT CHANGE UP (ref 1.8–2.4)
MCHC RBC-ENTMCNC: 26.6 PG — LOW (ref 27–31)
MCHC RBC-ENTMCNC: 30.5 G/DL — LOW (ref 32–37)
MCV RBC AUTO: 87.1 FL — SIGNIFICANT CHANGE UP (ref 80–94)
NRBC # BLD: 0 /100 WBCS — SIGNIFICANT CHANGE UP (ref 0–0)
PLATELET # BLD AUTO: 166 K/UL — SIGNIFICANT CHANGE UP (ref 130–400)
POTASSIUM SERPL-MCNC: 4.1 MMOL/L — SIGNIFICANT CHANGE UP (ref 3.5–5)
POTASSIUM SERPL-SCNC: 4.1 MMOL/L — SIGNIFICANT CHANGE UP (ref 3.5–5)
RBC # BLD: 4.48 M/UL — LOW (ref 4.7–6.1)
RBC # FLD: 15.9 % — HIGH (ref 11.5–14.5)
SARS-COV-2 IGG+IGM SERPL QL IA: >250 U/ML — HIGH
SARS-COV-2 IGG+IGM SERPL QL IA: POSITIVE
SODIUM SERPL-SCNC: 139 MMOL/L — SIGNIFICANT CHANGE UP (ref 135–146)
WBC # BLD: 7.91 K/UL — SIGNIFICANT CHANGE UP (ref 4.8–10.8)
WBC # FLD AUTO: 7.91 K/UL — SIGNIFICANT CHANGE UP (ref 4.8–10.8)

## 2021-08-24 PROCEDURE — 99232 SBSQ HOSP IP/OBS MODERATE 35: CPT

## 2021-08-24 PROCEDURE — 93970 EXTREMITY STUDY: CPT | Mod: 26

## 2021-08-24 RX ORDER — ACETAMINOPHEN 500 MG
2 TABLET ORAL
Qty: 0 | Refills: 0 | DISCHARGE
Start: 2021-08-24

## 2021-08-24 RX ADMIN — Medication 81 MILLIGRAM(S): at 11:24

## 2021-08-24 RX ADMIN — Medication 650 MILLIGRAM(S): at 23:25

## 2021-08-24 RX ADMIN — Medication 1 PATCH: at 11:24

## 2021-08-24 RX ADMIN — Medication 1 PATCH: at 12:15

## 2021-08-24 RX ADMIN — GABAPENTIN 100 MILLIGRAM(S): 400 CAPSULE ORAL at 21:57

## 2021-08-24 RX ADMIN — Medication 650 MILLIGRAM(S): at 14:28

## 2021-08-24 RX ADMIN — ATORVASTATIN CALCIUM 40 MILLIGRAM(S): 80 TABLET, FILM COATED ORAL at 21:57

## 2021-08-24 RX ADMIN — GABAPENTIN 100 MILLIGRAM(S): 400 CAPSULE ORAL at 06:09

## 2021-08-24 RX ADMIN — Medication 1 PATCH: at 21:57

## 2021-08-24 RX ADMIN — Medication 100 MILLIGRAM(S): at 11:24

## 2021-08-24 RX ADMIN — GABAPENTIN 100 MILLIGRAM(S): 400 CAPSULE ORAL at 14:28

## 2021-08-24 RX ADMIN — Medication 650 MILLIGRAM(S): at 15:35

## 2021-08-24 RX ADMIN — Medication 20 MILLIGRAM(S): at 06:10

## 2021-08-24 RX ADMIN — LOSARTAN POTASSIUM 100 MILLIGRAM(S): 100 TABLET, FILM COATED ORAL at 06:09

## 2021-08-24 RX ADMIN — Medication 1 TABLET(S): at 11:24

## 2021-08-24 RX ADMIN — Medication 1 PATCH: at 09:32

## 2021-08-24 RX ADMIN — Medication 25 MILLIGRAM(S): at 06:09

## 2021-08-24 RX ADMIN — Medication 25 MILLIGRAM(S): at 17:49

## 2021-08-24 RX ADMIN — Medication 1 MILLIGRAM(S): at 11:24

## 2021-08-24 NOTE — PROGRESS NOTE ADULT - ASSESSMENT
64yo male whose PMH includes pulmonary embolism (2015- off DOAC due to bleeding, epistaxis), CVA (2015), CHF, HTN and alcohol dependnecy presents to ER due to recurring problem of ambulatory dysfunction    # WEAKNESS? INABILITY TO AMBULATE B/L LE : 2 ++ edema   - restart lasix PO   - PT eval   - physiatry consult   - poss . SNF need   -OOB to chair     - pain today - will check venous duplex     # HYPOKALEMIA 3.4  - replaced po   - labs in am with MAG  labs in am    # PE dx in 2015 - unprovoked per chart review  - off any DOAC due to Epistaxis admission 2 months ago   - pt denies any VTE events since 2015  - pt does not want to resume any AC    # HFpef/ HTN  - c/w losartan 100 mg, and metoprolol 25 twice a day  - will RESTART LASIX , no reason to d/c ??  - ECHO: Left ventricular ejection fraction, by visual estimation, is 60 to 65%    # Alcohol abuse with nutrition deficiency.   - last drink was 2 weeks ag as per pt   - on thiamine and folate    # HLD  - will decrease 80mg to  atorvastatin 40 mg  - last visit LDL: 45, Non HDL cholesterol: 71    # GERD  - c/w protonix      # DVT PPX: SCDs  # GI PPX: protonix

## 2021-08-24 NOTE — DISCHARGE NOTE PROVIDER - NSDCMRMEDTOKEN_GEN_ALL_CORE_FT
acetaminophen 325 mg oral tablet: 2 tab(s) orally every 6 hours, As needed, Mild Pain (1 - 3)  aspirin 81 mg oral tablet, chewable: 1 tab(s) orally once a day  atorvastatin 80 mg oral tablet: 1 tab(s) orally once a day (at bedtime)  folic acid 1 mg oral tablet: 1 tab(s) orally once a day  gabapentin 100 mg oral capsule: 1 cap(s) orally 3 times a day  losartan 100 mg oral tablet: 1 tab(s) orally once a day  methocarbamol 500 mg oral tablet: 1 tab(s) orally every 8 hours, As needed, Muscle Spasm  metoprolol tartrate 25 mg oral tablet: 1 tab(s) orally 2 times a day  Multiple Vitamins with Minerals oral tablet: 1 tab(s) orally once a day  nicotine 21 mg/24 hr transdermal film, extended release: 1 patch transdermal once a day  thiamine 100 mg oral tablet: 1 tab(s) orally once a day   apixaban 5 mg oral tablet: 2 tab(s) orally 2 times a day for 7 days then   1 tab po BID   aspirin 81 mg oral tablet, chewable: 1 tab(s) orally once a day  atorvastatin 80 mg oral tablet: 1 tab(s) orally once a day (at bedtime)  folic acid 1 mg oral tablet: 1 tab(s) orally once a day  gabapentin 100 mg oral capsule: 1 cap(s) orally 3 times a day  losartan 100 mg oral tablet: 1 tab(s) orally once a day  methocarbamol 500 mg oral tablet: 1 tab(s) orally every 8 hours, As needed, Muscle Spasm  metoprolol tartrate 25 mg oral tablet: 1 tab(s) orally 2 times a day  Multiple Vitamins with Minerals oral tablet: 1 tab(s) orally once a day  nicotine 21 mg/24 hr transdermal film, extended release: 1 patch transdermal once a day  thiamine 100 mg oral tablet: 1 tab(s) orally once a day

## 2021-08-24 NOTE — DISCHARGE NOTE PROVIDER - PROVIDER TOKENS
FREE:[LAST:[Primary Care],FIRST:[Doctor],PHONE:[(   )    -],FAX:[(   )    -]]
Unknown if ever smoked

## 2021-08-24 NOTE — DISCHARGE NOTE PROVIDER - NSDCCPCAREPLAN_GEN_ALL_CORE_FT
PRINCIPAL DISCHARGE DIAGNOSIS  Diagnosis: Ambulatory dysfunction  Assessment and Plan of Treatment: You were seen by PT and discharged to ***.  Continue with your home medications.  FU outpatient wtih your PMD       PRINCIPAL DISCHARGE DIAGNOSIS  Diagnosis: Ambulatory dysfunction  Assessment and Plan of Treatment: You were seen by PT and discharged to ***.  Continue with your home medications.  FU outpatient wtih your PMD      SECONDARY DISCHARGE DIAGNOSES  Diagnosis: DVT, lower extremity  Assessment and Plan of Treatment: continue with eliquis 10 mg BID for 7 days then 5 mg po bid,  check cbc every 3 days

## 2021-08-24 NOTE — PROGRESS NOTE ADULT - ATTENDING COMMENTS
generalized weakness  no back pain  nonfocal on neuro exam  pt/rehab  magnesium and potassium defiencecy - replete  restart lasix - pt stopped lasix on his own 3+ pitting edema bilat  chronic diastolic chf - no decompensation   dispo after pt/rehab- sNf with case management   check labs in AM for lytes  check venous duplex ordered
generalized weakness  no back pain  nonfocal on neuro exam  pt/rehab  magnesium and potassium defiencecy - replete  restart lasix - pt stopped lasix on his own 3+ pitting edema bilat  chronic diastolic chf - no decompensation   dispo after pt/rehab

## 2021-08-24 NOTE — DISCHARGE NOTE PROVIDER - HOSPITAL COURSE
66yo male whose PMH includes pulmonary embolism (2015- off DOAC due to bleeding, epistaxis), CVA (2015), CHF, HTN and alcohol dependency presents to ER due to recurring problem of ambulatory dysfunction. Specifically says he has trouble walking due to pain to legs. Asking for Tylenol or aspirin. Most recent rehab for this problem was at [facility].  Patient admitted to medicine service and seen by PT/PMR and remained medically stable.  Patient discharged to *** 66yo male whose PMH includes pulmonary embolism (2015- off DOAC due to epistaxis), CVA (2015), CHF, HTN and alcohol dependency presents to ER due to recurring problem of ambulatory dysfunction. Specifically says he has trouble walking due to pain to legs. Asking for Tylenol or aspirin. Most recent rehab for this problem was at [facility].  Patient admitted to medicine service and seen by PT/PMR and remained medically stable.  Patient discharged to rehab  doppler US of LE shows DVT.  started on eliquis 10 mg BID for 7 days then 5 mg po bid, monitor H/H at rehab    patient was seen and examined today  feels better  offers no other complaints    Constitutional: No fever, fatigue or weight loss.  Skin: No rash.  Eyes: No recent vision problems or eye pain.  ENT: No congestion, ear pain, or sore throat.  Endocrine: No thyroid problems.  Cardiovascular: No chest pain or palpation.  Respiratory: No cough, shortness of breath, congestion, or wheezing.  Gastrointestinal: No abdominal pain, nausea, vomiting, or diarrhea.  Genitourinary: No dysuria.  Musculoskeletal: No joint swelling.  Neurologic: No headache.      Vital Signs Last 24 Hrs  T(C): 36 (08-25-21 @ 05:24), Max: 37.1 (08-24-21 @ 21:01)  T(F): 96.8 (08-25-21 @ 05:24), Max: 98.7 (08-24-21 @ 21:01)  HR: 76 (08-25-21 @ 05:24) (58 - 76)  BP: 175/84 (08-25-21 @ 05:24) (145/71 - 175/84)  BP(mean): --  RR: 16 (08-25-21 @ 05:24) (16 - 18)  SpO2: --    PHYSICAL EXAM-  GENERAL: NAD, chronic ill appearing male   HEAD:  Atraumatic, Normocephalic  EYES: EOMI, PERRLA, conjunctiva and sclera clear  NECK: Supple, No JVD, Normal thyroid  NERVOUS SYSTEM:  Alert & Oriented X3, generalized muscle weakness  CHEST/LUNG: Clear to percussion bilaterally; No rales, rhonchi, wheezing, or rubs  HEART: Regular rate and rhythm; No murmurs, rubs, or gallops  ABDOMEN: Soft, Nontender, Nondistended; Bowel sounds present  EXTREMITIES:   No clubbing, cyanosis, or edema  SKIN: No rashes or lesions  Hospital course, and discharge planning were discussed with patient,  in details.  all questions were answered.  seems to understand, and in agreement.  time 70 min

## 2021-08-24 NOTE — PROGRESS NOTE ADULT - SUBJECTIVE AND OBJECTIVE BOX
Pt seen and states not able to walk for a few days, was in SNF early this year.   Pt was admitted to [facility] in June for nose bleed .   denies any other symptoms . denies any CP / sob / NVD / fever/ chills     denies any etoh x 2 weeks       PAST MEDICAL & SURGICAL HISTORY:  TIA (transient ischemic attack)    CHF (congestive heart failure)    HTN (hypertension)    HLD (hyperlipidemia)    Alcohol dependence    Chronic back pain    Pulmonary embolism  2015    Cerebrovascular accident (CVA)  left pontine with dysphagia    History of appendectomy      Allergies    No Known Allergies      MEDICATIONS  (STANDING):  aspirin  chewable 81 milliGRAM(s) Oral daily  atorvastatin 40 milliGRAM(s) Oral at bedtime  enoxaparin Injectable 40 milliGRAM(s) SubCutaneous daily  folic acid 1 milliGRAM(s) Oral daily  furosemide    Tablet 20 milliGRAM(s) Oral daily  gabapentin 100 milliGRAM(s) Oral three times a day  losartan 100 milliGRAM(s) Oral daily  metoprolol tartrate 25 milliGRAM(s) Oral two times a day  multivitamin/minerals 1 Tablet(s) Oral daily  nicotine - 21 mG/24Hr(s) Patch 1 patch Transdermal daily  thiamine 100 milliGRAM(s) Oral daily      Review of Systems: Eyes:  No visual changes, eye pain or discharge.  	ENMT:  No hearing changes, pain, discharge or infections. No neck pain or stiffness.  	Cardiac:  No chest pain, SOB or edema  	Respiratory:  No cough or respiratory distress. No hemoptysis. No history of asthma or RAD.  	GI:  No nausea, vomiting, diarrhea or abdominal pain.  	:  No dysuria, frequency or burning.  	MS:  No myalgia, muscle weakness, joint pain or back pain.  	Neuro:  No headache or weakness.  No LOC.  	Skin:  No skin rash.   	Endocrine: No history of thyroid disease or diabetes.  Except as documented in the HPI,  all other systems are negative.    ICU Vital Signs Last 24 Hrs  T(C): 36.9 (24 Aug 2021 13:56), Max: 36.9 (24 Aug 2021 13:56)  T(F): 98.4 (24 Aug 2021 13:56), Max: 98.4 (24 Aug 2021 13:56)  HR: 58 (24 Aug 2021 13:56) (58 - 64)  BP: 145/71 (24 Aug 2021 13:56) (145/71 - 160/71)  BP(mean): --  ABP: --  ABP(mean): --  RR: 18 (24 Aug 2021 13:56) (18 - 18)  SpO2: --        CONSTITUTIONAL: disheveled, urinated/defecated on self  	SKIN:  skin exam is warm and dry, no acute rash.    	HEAD: Normocephalic; atraumatic.  	NECK: Supple; non tender.  	CARD: S1, S2 normal; no murmurs, gallops, or rubs. Regular rate and rhythm.   	RESP: No wheezes, rales or rhonchi.  	ABD: Normal bowel sounds; soft; non-distended; non-tender  	EXT: moving all extremities equally Normal ROM.  No clubbing, cyanosis ++ Edema   	NEURO: Alert, oriented, grossly unremarkable    PSYCH: Cooperative, appropriate.      08-23    137  |  103  |  7<L>  ----------------------------<  116<H>  3.4<L>   |  26  |  0.7    Ca    8.8      23 Aug 2021 09:20    TPro  6.2  /  Alb  3.5  /  TBili  0.6  /  DBili  x   /  AST  38  /  ALT  40  /  AlkPhos  80  08-23                            11.2   8.43  )-----------( 145      ( 23 Aug 2021 09:20 )             35.5     CAPILLARY BLOOD GLUCOSE      POCT Blood Glucose.: 121 mg/dL (22 Aug 2021 16:18)          < from: CT Head No Cont (08.22.21 @ 19:09) >  IMPRESSION:    No evidence of acute intracranial hemorrhage, mass effect or midline shift.    Unchanged chronic microvascular type ischemic changes and lacunar infarctions.    < end of copied text >    
Pt seen and states not able to walk for a few days, was in SNF early this year.   Pt was admitted to [facility] in June for nose bleed .   denies any other symptoms . denies any CP / sob / NVD / fever/ chills     denies any etoh x 2 weeks       PAST MEDICAL & SURGICAL HISTORY:  TIA (transient ischemic attack)    CHF (congestive heart failure)    HTN (hypertension)    HLD (hyperlipidemia)    Alcohol dependence    Chronic back pain    Pulmonary embolism  2015    Cerebrovascular accident (CVA)  left pontine with dysphagia    History of appendectomy      Allergies    No Known Allergies      MEDICATIONS  (STANDING):  aspirin  chewable 81 milliGRAM(s) Oral daily  atorvastatin 40 milliGRAM(s) Oral at bedtime  enoxaparin Injectable 40 milliGRAM(s) SubCutaneous daily  folic acid 1 milliGRAM(s) Oral daily  furosemide    Tablet 20 milliGRAM(s) Oral daily  gabapentin 100 milliGRAM(s) Oral three times a day  losartan 100 milliGRAM(s) Oral daily  metoprolol tartrate 25 milliGRAM(s) Oral two times a day  multivitamin/minerals 1 Tablet(s) Oral daily  nicotine - 21 mG/24Hr(s) Patch 1 patch Transdermal daily  thiamine 100 milliGRAM(s) Oral daily      Review of Systems: Eyes:  No visual changes, eye pain or discharge.  	ENMT:  No hearing changes, pain, discharge or infections. No neck pain or stiffness.  	Cardiac:  No chest pain, SOB or edema  	Respiratory:  No cough or respiratory distress. No hemoptysis. No history of asthma or RAD.  	GI:  No nausea, vomiting, diarrhea or abdominal pain.  	:  No dysuria, frequency or burning.  	MS:  No myalgia, muscle weakness, joint pain or back pain.  	Neuro:  No headache or weakness.  No LOC.  	Skin:  No skin rash.   	Endocrine: No history of thyroid disease or diabetes.  Except as documented in the HPI,  all other systems are negative.    Vital Signs Last 24 Hrs  T(C): 37 (22 Aug 2021 21:37), Max: 37.1 (22 Aug 2021 15:19)  T(F): 98.6 (22 Aug 2021 21:37), Max: 98.7 (22 Aug 2021 15:19)  HR: 70 (23 Aug 2021 05:15) (68 - 75)  BP: 150/75 (23 Aug 2021 05:15) (150/75 - 180/80)  RR: 17 (23 Aug 2021 05:15) (17 - 18)  SpO2: 98% (23 Aug 2021 05:15) (96% - 98%)      CONSTITUTIONAL: disheveled, urinated/defecated on self  	SKIN:  skin exam is warm and dry, no acute rash.    	HEAD: Normocephalic; atraumatic.  	NECK: Supple; non tender.  	CARD: S1, S2 normal; no murmurs, gallops, or rubs. Regular rate and rhythm.   	RESP: No wheezes, rales or rhonchi.  	ABD: Normal bowel sounds; soft; non-distended; non-tender  	EXT: moving all extremities equally Normal ROM.  No clubbing, cyanosis ++ Edema   	NEURO: Alert, oriented, grossly unremarkable    PSYCH: Cooperative, appropriate.      08-23    137  |  103  |  7<L>  ----------------------------<  116<H>  3.4<L>   |  26  |  0.7    Ca    8.8      23 Aug 2021 09:20    TPro  6.2  /  Alb  3.5  /  TBili  0.6  /  DBili  x   /  AST  38  /  ALT  40  /  AlkPhos  80  08-23                            11.2   8.43  )-----------( 145      ( 23 Aug 2021 09:20 )             35.5     CAPILLARY BLOOD GLUCOSE      POCT Blood Glucose.: 121 mg/dL (22 Aug 2021 16:18)          < from: CT Head No Cont (08.22.21 @ 19:09) >  IMPRESSION:    No evidence of acute intracranial hemorrhage, mass effect or midline shift.    Unchanged chronic microvascular type ischemic changes and lacunar infarctions.    < end of copied text >

## 2021-08-25 ENCOUNTER — TRANSCRIPTION ENCOUNTER (OUTPATIENT)
Age: 66
End: 2021-08-25

## 2021-08-25 VITALS
TEMPERATURE: 97 F | HEART RATE: 76 BPM | SYSTOLIC BLOOD PRESSURE: 175 MMHG | RESPIRATION RATE: 16 BRPM | DIASTOLIC BLOOD PRESSURE: 84 MMHG

## 2021-08-25 PROCEDURE — 99239 HOSP IP/OBS DSCHRG MGMT >30: CPT

## 2021-08-25 RX ORDER — APIXABAN 2.5 MG/1
10 TABLET, FILM COATED ORAL EVERY 12 HOURS
Refills: 0 | Status: DISCONTINUED | OUTPATIENT
Start: 2021-08-25 | End: 2021-08-25

## 2021-08-25 RX ORDER — APIXABAN 2.5 MG/1
2 TABLET, FILM COATED ORAL
Qty: 0 | Refills: 0 | DISCHARGE
Start: 2021-08-25

## 2021-08-25 RX ORDER — MORPHINE SULFATE 50 MG/1
2 CAPSULE, EXTENDED RELEASE ORAL ONCE
Refills: 0 | Status: DISCONTINUED | OUTPATIENT
Start: 2021-08-25 | End: 2021-08-25

## 2021-08-25 RX ADMIN — Medication 20 MILLIGRAM(S): at 06:05

## 2021-08-25 RX ADMIN — Medication 650 MILLIGRAM(S): at 13:23

## 2021-08-25 RX ADMIN — GABAPENTIN 100 MILLIGRAM(S): 400 CAPSULE ORAL at 11:13

## 2021-08-25 RX ADMIN — Medication 650 MILLIGRAM(S): at 00:25

## 2021-08-25 RX ADMIN — APIXABAN 10 MILLIGRAM(S): 2.5 TABLET, FILM COATED ORAL at 11:20

## 2021-08-25 RX ADMIN — LOSARTAN POTASSIUM 100 MILLIGRAM(S): 100 TABLET, FILM COATED ORAL at 06:04

## 2021-08-25 RX ADMIN — MORPHINE SULFATE 2 MILLIGRAM(S): 50 CAPSULE, EXTENDED RELEASE ORAL at 00:51

## 2021-08-25 RX ADMIN — Medication 100 MILLIGRAM(S): at 11:13

## 2021-08-25 RX ADMIN — Medication 1 TABLET(S): at 11:13

## 2021-08-25 RX ADMIN — Medication 25 MILLIGRAM(S): at 06:04

## 2021-08-25 RX ADMIN — MORPHINE SULFATE 2 MILLIGRAM(S): 50 CAPSULE, EXTENDED RELEASE ORAL at 01:25

## 2021-08-25 RX ADMIN — Medication 1 PATCH: at 10:58

## 2021-08-25 RX ADMIN — Medication 1 MILLIGRAM(S): at 11:13

## 2021-08-25 RX ADMIN — APIXABAN 10 MILLIGRAM(S): 2.5 TABLET, FILM COATED ORAL at 01:10

## 2021-08-25 RX ADMIN — GABAPENTIN 100 MILLIGRAM(S): 400 CAPSULE ORAL at 06:04

## 2021-08-25 RX ADMIN — Medication 650 MILLIGRAM(S): at 11:20

## 2021-08-25 RX ADMIN — Medication 1 PATCH: at 11:13

## 2021-08-25 RX ADMIN — Medication 1 PATCH: at 08:18

## 2021-08-25 RX ADMIN — Medication 81 MILLIGRAM(S): at 11:13

## 2021-08-25 NOTE — DISCHARGE NOTE NURSING/CASE MANAGEMENT/SOCIAL WORK - NSDCPEFALRISK_GEN_ALL_CORE
For information on Fall & injury Prevention, visit [email or web address]

## 2021-08-25 NOTE — DISCHARGE NOTE NURSING/CASE MANAGEMENT/SOCIAL WORK - PATIENT PORTAL LINK FT
You can access the FollowMyHealth Patient Portal offered by [facility] by registering at the following website: [email or web address]. By joining [employer]’s FollowMyHealth portal, you will also be able to view your health information using other applications (apps) compatible with our system.

## 2021-08-30 DIAGNOSIS — I11.0 HYPERTENSIVE HEART DISEASE WITH HEART FAILURE: ICD-10-CM

## 2021-08-30 DIAGNOSIS — Z79.82 LONG TERM (CURRENT) USE OF ASPIRIN: ICD-10-CM

## 2021-08-30 DIAGNOSIS — E87.6 HYPOKALEMIA: ICD-10-CM

## 2021-08-30 DIAGNOSIS — K21.9 GASTRO-ESOPHAGEAL REFLUX DISEASE WITHOUT ESOPHAGITIS: ICD-10-CM

## 2021-08-30 DIAGNOSIS — M54.9 DORSALGIA, UNSPECIFIED: ICD-10-CM

## 2021-08-30 DIAGNOSIS — Z82.49 FAMILY HISTORY OF ISCHEMIC HEART DISEASE AND OTHER DISEASES OF THE CIRCULATORY SYSTEM: ICD-10-CM

## 2021-08-30 DIAGNOSIS — M79.605 PAIN IN LEFT LEG: ICD-10-CM

## 2021-08-30 DIAGNOSIS — E78.5 HYPERLIPIDEMIA, UNSPECIFIED: ICD-10-CM

## 2021-08-30 DIAGNOSIS — G89.29 OTHER CHRONIC PAIN: ICD-10-CM

## 2021-08-30 DIAGNOSIS — I69.321 DYSPHASIA FOLLOWING CEREBRAL INFARCTION: ICD-10-CM

## 2021-08-30 DIAGNOSIS — E61.2 MAGNESIUM DEFICIENCY: ICD-10-CM

## 2021-08-30 DIAGNOSIS — I82.402 ACUTE EMBOLISM AND THROMBOSIS OF UNSPECIFIED DEEP VEINS OF LEFT LOWER EXTREMITY: ICD-10-CM

## 2021-08-30 DIAGNOSIS — I50.32 CHRONIC DIASTOLIC (CONGESTIVE) HEART FAILURE: ICD-10-CM

## 2021-08-30 DIAGNOSIS — Z66 DO NOT RESUSCITATE: ICD-10-CM

## 2021-08-30 DIAGNOSIS — E53.8 DEFICIENCY OF OTHER SPECIFIED B GROUP VITAMINS: ICD-10-CM

## 2021-08-30 DIAGNOSIS — Z86.711 PERSONAL HISTORY OF PULMONARY EMBOLISM: ICD-10-CM

## 2021-08-30 DIAGNOSIS — F10.10 ALCOHOL ABUSE, UNCOMPLICATED: ICD-10-CM

## 2021-08-30 DIAGNOSIS — E51.9 THIAMINE DEFICIENCY, UNSPECIFIED: ICD-10-CM

## 2021-09-04 NOTE — ED ADULT TRIAGE NOTE - HEIGHT IN CM
----- Message from Nicole Broussard DO sent at 9/3/2021  4:08 PM CDT -----  Notify parent of this child (19 y/o or younger):  Results for COVID PCR test were NEGATIVE.     Has child had any known contact with a COVID(+) case in the past 14 days? If NO, child can return to school/activities as soon as:  1) Fevers have fully resolved for 24 HOURS without requiring fever-reducing meds   2) Any symptoms have BEGUN TO IMPROVE     Nurse: Ok to provide an updated excuse note (removing the prior 10-day-isolation requirement#) for this child, via [employer]/mail/ pick-up.     If pt has had contact with a COVID(+) case, nurse should discuss with UC provider of the day to determine duration of isolation.  If pt still having concerning symptoms, parent should contact their primary doctor.  If pt's school has concerns about child returning to school, they should contact [employer] public health.     (#Note to nurse: This only applies to children. Adults with negative COVID tests have to complete 10 day isolation anyways.)  
Left message with patient's mother to call back.  Agustina Mahoney RN      
[phone]

## 2021-10-21 NOTE — PHYSICAL THERAPY INITIAL EVALUATION ADULT - DISCHARGE PLANNER MADE AWARE
José Abrams returned call to schedule therapy appointment. Discussed status of intensive outpatient at [facility] she is attempting to access. They have deferred for now due to lack of insurance coverage for 4 sessions/day. She will call [facility] as well. Discussed provision of therapy in this office until more intensive services and medication evaluation can be accessed. José Abrams stated she would like support for the present then will transition when she can.
yes

## 2022-01-28 NOTE — ED PROVIDER NOTE - PHYSICAL EXAMINATION
[FreeTextEntry1] : Hypertension\par The working diagnosis is essential hypertension. The most recent guidelines provided by the American College of cardiology and American Heart Association have lower the threshold for initiation or intensification of medical intervention for hypertension. Nonpharmacological therapy, specifically diet exercise and weight loss or emphasizes major aspects of treatment.\par \par I have recommended the following\par a. Low salt low caloric diet. Regular aerobic exercise.  Avoidance of nonsteroidal anti-inflammatory agents\par b. Lisinopril 10 mg/day with further dose adjustment dictated by tolerance and response\par c. Laboratory studies to include electrolytes and renal function tests 2-3 weeks after starting treatment above\par d. Echocardiogram\par e home blood pressure monitoring\par \par \par Shortness of breath\par The working diagnoses dyspnea on exertion secondary to deconditioning and mild obesity. The history is consistent with this diagnosis. The absence of chest pain  and normal 1/22 electrocardiogram argue against did not eliminate myocardial ischemia/atherosclerotic heart disease. Noninvasive cardiac studies would be helpful for further evaluation. \par \par \par I have recommended the following\par a. Low salt low chloride diet. Regular aerobic exercise and weight loss\par b. Echocardiogram\par c. Exercise stress test\par \par \par \par The diagnosis, prognosis, risks, options alternatives were explained at length to the patient pre-all questions were answered. Issues discussed included hypertension antihypertensive medical therapy home blood pressure monitoring shortness of breath noninvasive cardiac testing diet and exercise. CONSTITUTIONAL: Well-developed; well-nourished; in no acute distress.   SKIN: warm, dry  HEAD: Normocephalic; atraumatic.  EYES: no conjunctival injection. PERRL. EOMI b/l.  ENT: No nasal discharge; airway clear.  NECK: Supple; non tender.  CARD: S1, S2 normal; no murmurs, gallops, or rubs. Regular rate and rhythm.   RESP: No wheezes, rales or rhonchi.  ABD: soft ntnd  EXT: Normal ROM.  No clubbing, cyanosis or edema.   LYMPH: No acute cervical adenopathy.  NEURO: Alert, oriented x 3. CN 2 to 12 intact. Finger to nose test nl b/l. + Romberg. Patient unable to take step due to imbalance. Normal sensation and full strength in all 4 extremities.  PSYCH: Cooperative, appropriate.

## 2022-03-29 NOTE — ED ADULT NURSE NOTE - NSFALLRSKOUTCOME_ED_ALL_ED
Patient has not attended any of the groups today and has not been out of his room for socialization with others so he has not met his socialization goal for this shift. Patient will be encouraged to attend all groups on the unit daily and to come out of his room to socialize with others during the rest of his hospital stay. Fall Risk

## 2022-04-11 ENCOUNTER — INPATIENT (INPATIENT)
Facility: HOSPITAL | Age: 67
LOS: 2 days | Discharge: NURSING, REHAB OR LONG-TERM CARE | End: 2022-04-14
Attending: STUDENT IN AN ORGANIZED HEALTH CARE EDUCATION/TRAINING PROGRAM | Admitting: STUDENT IN AN ORGANIZED HEALTH CARE EDUCATION/TRAINING PROGRAM
Payer: MEDICARE

## 2022-04-11 VITALS
HEIGHT: 69 IN | WEIGHT: 199.96 LBS | OXYGEN SATURATION: 99 % | SYSTOLIC BLOOD PRESSURE: 135 MMHG | HEART RATE: 99 BPM | DIASTOLIC BLOOD PRESSURE: 83 MMHG | TEMPERATURE: 99 F | RESPIRATION RATE: 22 BRPM
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
## 2022-04-11 DIAGNOSIS — Z91.19 PATIENT'S NONCOMPLIANCE WITH OTHER MEDICAL TREATMENT AND REGIMEN: ICD-10-CM

## 2022-04-11 DIAGNOSIS — Z90.49 ACQUIRED ABSENCE OF OTHER SPECIFIED PARTS OF DIGESTIVE TRACT: Chronic | ICD-10-CM

## 2022-04-11 LAB
ALBUMIN SERPL ELPH-MCNC: 4.7 G/DL — SIGNIFICANT CHANGE UP (ref 3.5–5.2)
ALP SERPL-CCNC: 89 U/L — SIGNIFICANT CHANGE UP (ref 30–115)
ALT FLD-CCNC: 28 U/L — SIGNIFICANT CHANGE UP (ref 0–41)
ANION GAP SERPL CALC-SCNC: 13 MMOL/L — SIGNIFICANT CHANGE UP (ref 7–14)
AST SERPL-CCNC: 28 U/L — SIGNIFICANT CHANGE UP (ref 0–41)
BASOPHILS # BLD AUTO: 0.05 K/UL — SIGNIFICANT CHANGE UP (ref 0–0.2)
BASOPHILS NFR BLD AUTO: 0.3 % — SIGNIFICANT CHANGE UP (ref 0–1)
BILIRUB SERPL-MCNC: 0.7 MG/DL — SIGNIFICANT CHANGE UP (ref 0.2–1.2)
BUN SERPL-MCNC: 19 MG/DL — SIGNIFICANT CHANGE UP (ref 10–20)
CALCIUM SERPL-MCNC: 10.1 MG/DL — SIGNIFICANT CHANGE UP (ref 8.5–10.1)
CHLORIDE SERPL-SCNC: 101 MMOL/L — SIGNIFICANT CHANGE UP (ref 98–110)
CO2 SERPL-SCNC: 26 MMOL/L — SIGNIFICANT CHANGE UP (ref 17–32)
CREAT SERPL-MCNC: 0.9 MG/DL — SIGNIFICANT CHANGE UP (ref 0.7–1.5)
EGFR: 95 ML/MIN/1.73M2 — SIGNIFICANT CHANGE UP
EOSINOPHIL # BLD AUTO: 0.09 K/UL — SIGNIFICANT CHANGE UP (ref 0–0.7)
EOSINOPHIL NFR BLD AUTO: 0.5 % — SIGNIFICANT CHANGE UP (ref 0–8)
GLUCOSE SERPL-MCNC: 138 MG/DL — HIGH (ref 70–99)
HCT VFR BLD CALC: 40.3 % — LOW (ref 42–52)
HGB BLD-MCNC: 13.7 G/DL — LOW (ref 14–18)
IMM GRANULOCYTES NFR BLD AUTO: 0.6 % — HIGH (ref 0.1–0.3)
LYMPHOCYTES # BLD AUTO: 11.9 % — LOW (ref 20.5–51.1)
LYMPHOCYTES # BLD AUTO: 2.08 K/UL — SIGNIFICANT CHANGE UP (ref 1.2–3.4)
MCHC RBC-ENTMCNC: 29 PG — SIGNIFICANT CHANGE UP (ref 27–31)
MCHC RBC-ENTMCNC: 34 G/DL — SIGNIFICANT CHANGE UP (ref 32–37)
MCV RBC AUTO: 85.4 FL — SIGNIFICANT CHANGE UP (ref 80–94)
MONOCYTES # BLD AUTO: 1.03 K/UL — HIGH (ref 0.1–0.6)
MONOCYTES NFR BLD AUTO: 5.9 % — SIGNIFICANT CHANGE UP (ref 1.7–9.3)
NEUTROPHILS # BLD AUTO: 14.17 K/UL — HIGH (ref 1.4–6.5)
NEUTROPHILS NFR BLD AUTO: 80.8 % — HIGH (ref 42.2–75.2)
NRBC # BLD: 0 /100 WBCS — SIGNIFICANT CHANGE UP (ref 0–0)
PLATELET # BLD AUTO: 270 K/UL — SIGNIFICANT CHANGE UP (ref 130–400)
POTASSIUM SERPL-MCNC: 4.3 MMOL/L — SIGNIFICANT CHANGE UP (ref 3.5–5)
POTASSIUM SERPL-SCNC: 4.3 MMOL/L — SIGNIFICANT CHANGE UP (ref 3.5–5)
PROT SERPL-MCNC: 8 G/DL — SIGNIFICANT CHANGE UP (ref 6–8)
RBC # BLD: 4.72 M/UL — SIGNIFICANT CHANGE UP (ref 4.7–6.1)
RBC # FLD: 13.3 % — SIGNIFICANT CHANGE UP (ref 11.5–14.5)
SARS-COV-2 RNA SPEC QL NAA+PROBE: SIGNIFICANT CHANGE UP
SODIUM SERPL-SCNC: 140 MMOL/L — SIGNIFICANT CHANGE UP (ref 135–146)
WBC # BLD: 17.52 K/UL — HIGH (ref 4.8–10.8)
WBC # FLD AUTO: 17.52 K/UL — HIGH (ref 4.8–10.8)

## 2022-04-11 PROCEDURE — 73502 X-RAY EXAM HIP UNI 2-3 VIEWS: CPT | Mod: 26,LT

## 2022-04-11 PROCEDURE — 99285 EMERGENCY DEPT VISIT HI MDM: CPT

## 2022-04-11 PROCEDURE — 71045 X-RAY EXAM CHEST 1 VIEW: CPT | Mod: 26

## 2022-04-11 RX ORDER — ALBUTEROL 90 UG/1
2 AEROSOL, METERED ORAL EVERY 6 HOURS
Refills: 0 | Status: DISCONTINUED | OUTPATIENT
Start: 2022-04-11 | End: 2022-04-14

## 2022-04-11 NOTE — ED PROVIDER NOTE - NS ED ATTENDING STATEMENT MOD
This was a shared visit with the JACK. I reviewed and verified the documentation and independently performed the documented:

## 2022-04-11 NOTE — ED PROVIDER NOTE - OBJECTIVE STATEMENT
this is a 67 yo male presents to ed from home after fall . patient states that he was standing while attempting to shave. patient states his leg gave out and he had to crawl to the wheelchair. patient got into wheelchair and called 911. patient has lower back pain.

## 2022-04-11 NOTE — ED PROVIDER NOTE - CLINICAL SUMMARY MEDICAL DECISION MAKING FREE TEXT BOX
admit for home failure to thrive, gait instability. no acute traumatic findings on imaging or acute infectious findings. will admit for monitoring, PT, possible placement

## 2022-04-11 NOTE — ED PROVIDER NOTE - NS ED ROS FT
Review of Systems:  	•	CONSTITUTIONAL - no fever, no diaphoresis, no chills  	•	SKIN - no rash  	•	HEMATOLOGIC - no bleeding, no bruising  	•	EYES - no eye pain, no blurry vision  	•	ENT - no change in hearing, no sore throat, no ear pain or tinnitus  	•	RESPIRATORY - no shortness of breath, no cough  	•	CARDIAC - no chest pain, no palpitations  	•	GI - no abd pain, no nausea, no vomiting, no diarrhea, no constipation  	•	GENITO-URINARY - no discharge, no dysuria; no hematuria, no increased urinary frequency  	•	MUSCULOSKELETAL - no joint paint, no swelling, no redness  	•	NEUROLOGIC -  weakness, no headache, no paresthesias, no LOC  	•	PSYCH - no anxiety, non suicidal, non homicidal, no hallucination, no depression

## 2022-04-11 NOTE — ED ADULT TRIAGE NOTE - CHIEF COMPLAINT QUOTE
BIBA from home with KLAUS.  Pt stood up to shave and his legs gave out and he tripped.  No LOC.  No complaints of physical injury.
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
## 2022-04-12 DIAGNOSIS — F10.10 ALCOHOL ABUSE, UNCOMPLICATED: ICD-10-CM

## 2022-04-12 LAB
ALBUMIN SERPL ELPH-MCNC: 3.9 G/DL — SIGNIFICANT CHANGE UP (ref 3.5–5.2)
ALP SERPL-CCNC: 79 U/L — SIGNIFICANT CHANGE UP (ref 30–115)
ALT FLD-CCNC: 23 U/L — SIGNIFICANT CHANGE UP (ref 0–41)
ANION GAP SERPL CALC-SCNC: 13 MMOL/L — SIGNIFICANT CHANGE UP (ref 7–14)
APPEARANCE UR: CLEAR — SIGNIFICANT CHANGE UP
AST SERPL-CCNC: 20 U/L — SIGNIFICANT CHANGE UP (ref 0–41)
BACTERIA # UR AUTO: ABNORMAL
BILIRUB SERPL-MCNC: 1 MG/DL — SIGNIFICANT CHANGE UP (ref 0.2–1.2)
BILIRUB UR-MCNC: ABNORMAL
BUN SERPL-MCNC: 18 MG/DL — SIGNIFICANT CHANGE UP (ref 10–20)
CALCIUM SERPL-MCNC: 8.9 MG/DL — SIGNIFICANT CHANGE UP (ref 8.5–10.1)
CHLORIDE SERPL-SCNC: 103 MMOL/L — SIGNIFICANT CHANGE UP (ref 98–110)
CO2 SERPL-SCNC: 24 MMOL/L — SIGNIFICANT CHANGE UP (ref 17–32)
COLOR SPEC: YELLOW — SIGNIFICANT CHANGE UP
COMMENT - URINE: SIGNIFICANT CHANGE UP
CREAT SERPL-MCNC: 0.9 MG/DL — SIGNIFICANT CHANGE UP (ref 0.7–1.5)
DIFF PNL FLD: NEGATIVE — SIGNIFICANT CHANGE UP
EGFR: 95 ML/MIN/1.73M2 — SIGNIFICANT CHANGE UP
EPI CELLS # UR: ABNORMAL /HPF
GLUCOSE SERPL-MCNC: 159 MG/DL — HIGH (ref 70–99)
GLUCOSE UR QL: NEGATIVE MG/DL — SIGNIFICANT CHANGE UP
HCT VFR BLD CALC: 39 % — LOW (ref 42–52)
HGB BLD-MCNC: 12.9 G/DL — LOW (ref 14–18)
KETONES UR-MCNC: 15
LEUKOCYTE ESTERASE UR-ACNC: NEGATIVE — SIGNIFICANT CHANGE UP
MCHC RBC-ENTMCNC: 29.5 PG — SIGNIFICANT CHANGE UP (ref 27–31)
MCHC RBC-ENTMCNC: 33.1 G/DL — SIGNIFICANT CHANGE UP (ref 32–37)
MCV RBC AUTO: 89.2 FL — SIGNIFICANT CHANGE UP (ref 80–94)
NITRITE UR-MCNC: NEGATIVE — SIGNIFICANT CHANGE UP
NRBC # BLD: 0 /100 WBCS — SIGNIFICANT CHANGE UP (ref 0–0)
PH UR: 5.5 — SIGNIFICANT CHANGE UP (ref 5–8)
PLATELET # BLD AUTO: 233 K/UL — SIGNIFICANT CHANGE UP (ref 130–400)
POTASSIUM SERPL-MCNC: 3.8 MMOL/L — SIGNIFICANT CHANGE UP (ref 3.5–5)
POTASSIUM SERPL-SCNC: 3.8 MMOL/L — SIGNIFICANT CHANGE UP (ref 3.5–5)
PROCALCITONIN SERPL-MCNC: 0.06 NG/ML — SIGNIFICANT CHANGE UP (ref 0.02–0.1)
PROT SERPL-MCNC: 6.8 G/DL — SIGNIFICANT CHANGE UP (ref 6–8)
PROT UR-MCNC: 100 MG/DL
RBC # BLD: 4.37 M/UL — LOW (ref 4.7–6.1)
RBC # FLD: 13.5 % — SIGNIFICANT CHANGE UP (ref 11.5–14.5)
RBC CASTS # UR COMP ASSIST: SIGNIFICANT CHANGE UP /HPF
SODIUM SERPL-SCNC: 140 MMOL/L — SIGNIFICANT CHANGE UP (ref 135–146)
SP GR SPEC: >=1.03 (ref 1.01–1.03)
UROBILINOGEN FLD QL: 1 MG/DL
WBC # BLD: 11.01 K/UL — HIGH (ref 4.8–10.8)
WBC # FLD AUTO: 11.01 K/UL — HIGH (ref 4.8–10.8)
WBC UR QL: SIGNIFICANT CHANGE UP /HPF

## 2022-04-12 PROCEDURE — 99221 1ST HOSP IP/OBS SF/LOW 40: CPT

## 2022-04-12 PROCEDURE — 99222 1ST HOSP IP/OBS MODERATE 55: CPT

## 2022-04-12 RX ORDER — THIAMINE MONONITRATE (VIT B1) 100 MG
100 TABLET ORAL DAILY
Refills: 0 | Status: DISCONTINUED | OUTPATIENT
Start: 2022-04-12 | End: 2022-04-14

## 2022-04-12 RX ORDER — LISINOPRIL 2.5 MG/1
2.5 TABLET ORAL DAILY
Refills: 0 | Status: DISCONTINUED | OUTPATIENT
Start: 2022-04-12 | End: 2022-04-14

## 2022-04-12 RX ORDER — ASPIRIN/CALCIUM CARB/MAGNESIUM 324 MG
81 TABLET ORAL DAILY
Refills: 0 | Status: DISCONTINUED | OUTPATIENT
Start: 2022-04-12 | End: 2022-04-14

## 2022-04-12 RX ORDER — ENOXAPARIN SODIUM 100 MG/ML
40 INJECTION SUBCUTANEOUS EVERY 24 HOURS
Refills: 0 | Status: DISCONTINUED | OUTPATIENT
Start: 2022-04-12 | End: 2022-04-14

## 2022-04-12 RX ORDER — METOPROLOL TARTRATE 50 MG
12.5 TABLET ORAL EVERY 12 HOURS
Refills: 0 | Status: DISCONTINUED | OUTPATIENT
Start: 2022-04-12 | End: 2022-04-14

## 2022-04-12 RX ORDER — ATORVASTATIN CALCIUM 80 MG/1
10 TABLET, FILM COATED ORAL AT BEDTIME
Refills: 0 | Status: DISCONTINUED | OUTPATIENT
Start: 2022-04-12 | End: 2022-04-14

## 2022-04-12 RX ORDER — FOLIC ACID 0.8 MG
1 TABLET ORAL DAILY
Refills: 0 | Status: DISCONTINUED | OUTPATIENT
Start: 2022-04-12 | End: 2022-04-14

## 2022-04-12 RX ADMIN — Medication 81 MILLIGRAM(S): at 11:24

## 2022-04-12 RX ADMIN — Medication 12.5 MILLIGRAM(S): at 18:13

## 2022-04-12 RX ADMIN — ENOXAPARIN SODIUM 40 MILLIGRAM(S): 100 INJECTION SUBCUTANEOUS at 06:01

## 2022-04-12 RX ADMIN — LISINOPRIL 2.5 MILLIGRAM(S): 2.5 TABLET ORAL at 06:01

## 2022-04-12 RX ADMIN — ATORVASTATIN CALCIUM 10 MILLIGRAM(S): 80 TABLET, FILM COATED ORAL at 21:25

## 2022-04-12 RX ADMIN — ALBUTEROL 2 PUFF(S): 90 AEROSOL, METERED ORAL at 20:49

## 2022-04-12 RX ADMIN — ALBUTEROL 2 PUFF(S): 90 AEROSOL, METERED ORAL at 13:11

## 2022-04-12 RX ADMIN — ALBUTEROL 2 PUFF(S): 90 AEROSOL, METERED ORAL at 08:43

## 2022-04-12 RX ADMIN — Medication 1 MILLIGRAM(S): at 11:24

## 2022-04-12 RX ADMIN — Medication 12.5 MILLIGRAM(S): at 06:01

## 2022-04-12 RX ADMIN — Medication 100 MILLIGRAM(S): at 11:23

## 2022-04-12 RX ADMIN — ALBUTEROL 2 PUFF(S): 90 AEROSOL, METERED ORAL at 01:01

## 2022-04-12 NOTE — CONSULT NOTE ADULT - PROBLEM SELECTOR RECOMMENDATION 9
After evaluation at this time no protocol was initiated for alcohol withdrawal secondary to history of use and no current withdrawal exhibited. Pt denies any issues currently with alcohol. Pt with normal LFTS, no alcohol level done on admission. Pt should be on Thiamine and Folic acid. Pt will be monitored and supportive care provided.    -Alcohol counseling provided   CATCH team involved for aftercare and pt denies any need for aftercare. recall if needed
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
## 2022-04-12 NOTE — H&P ADULT - NSHPLABSRESULTS_GEN_ALL_CORE
13.7   17.52 )-----------( 270      ( 11 Apr 2022 23:20 )             40.3       04-11    140  |  101  |  19  ----------------------------<  138<H>  4.3   |  26  |  0.9    Ca    10.1      11 Apr 2022 22:20    TPro  8.0  /  Alb  4.7  /  TBili  0.7  /  DBili  x   /  AST  28  /  ALT  28  /  AlkPhos  89  04-11              Urinalysis Basic - ( 12 Apr 2022 00:30 )    Color: Yellow / Appearance: Clear / SG: >=1.030 / pH: x  Gluc: x / Ketone: 15  / Bili: Small / Urobili: 1.0 mg/dL   Blood: x / Protein: 100 mg/dL / Nitrite: Negative   Leuk Esterase: Negative / RBC: 1-2 /HPF / WBC 1-2 /HPF   Sq Epi: x / Non Sq Epi: Few /HPF / Bacteria: Few            Lactate Trend            CAPILLARY BLOOD GLUCOSE

## 2022-04-12 NOTE — PATIENT PROFILE ADULT - FALL HARM RISK - HARM RISK INTERVENTIONS

## 2022-04-12 NOTE — H&P ADULT - NSHPPHYSICALEXAM_GEN_ALL_CORE
PHYSICAL EXAM:    CONSTITUTIONAL: NAD, saturating at >90% on RA.   ENMT: EOMI, PERRLA,  neck supple, No JVD  RESPIRATORY: Clear to auscultation bilaterally; No rales, rhonchi, wheezing, or rubs  CARDIOVASCULAR: Regular rate and rhythm; No murmurs, rubs, or gallops, negative edema  GASTROINTESTINAL: Soft, Nontender, Nondistended; Bowel sounds present  EXTREMITIES:  2+ Peripheral Pulses, No clubbing, cyanosis  PSYCH: Alert & Oriented X3, denies suicidal or homicidal ideation, denies auditory or visual hallucinations   NEURO: A&O X3, follows commands. Non focal neuro exam.   SKIN: No rashes or lesions

## 2022-04-12 NOTE — H&P ADULT - HISTORY OF PRESENT ILLNESS
Pt is an unreliable historian, pt denies ever having medical problems and denies any home medications however pt has a long hx of medical issues.   Pt is a 65 YO M with  Pt is an unreliable historian, pt denies ever having medical problems and denies any home medications however pt has a long hx of medical issues.   Pt is a 67 YO M with a pmh of TIA, CVA, CHF, HTN, HLD, Alcohol dependence, PE (2015, not on anticoagulation due to epistaxis). Pt presented to the ED with a chief complaint of fall. Pt states he  Pt is an unreliable historian, pt denies ever having medical problems and denies any home medications however pt has a long hx of medical issues.   Pt is a 67 YO M with a pmh of TIA, CVA, CHF, HTN, HLD, Alcohol dependence, PE (2015, not on anticoagulation due to epistaxis). Pt presented to the ED with a chief complaint of fall. Pt states he had fallen while he was shaving his head and that his legs gave up on him and he fell on his left buttocks. Pt states he is unable to walk due to weakness and unsteadiness of his lower extremities. In the ED, Xray of the hip showed no acute fractures, pt's WBC count was elevated at 17.52 with a left shift, UA negative. Pt states he is unable to take care of himself at home as he lives alone and does not have any assistance. He ambulates with a walker / wheel chair. Pt was seen at bedside, pt denies any CP, SOB, palpitations NVD. Pt denies any hip pain.  Pt is an unreliable historian, pt denies ever having medical problems and denies any home medications however pt has a long hx of medical issues.   Pt is a 65 YO M with a pmh of TIA, CVA, CHF, HTN, HLD, Alcohol dependence, PE (2015, not on anticoagulation due to epistaxis). Pt is non compliant and has not seen a doctor in "a few years". Pt presented to the ED with a chief complaint of fall. Pt states he had fallen while he was shaving his head and that his legs gave up on him and he fell on his left buttocks. Pt states he is unable to walk due to weakness and unsteadiness of his lower extremities. In the ED, Xray of the hip showed no acute fractures, pt's WBC count was elevated at 17.52 with a left shift, UA negative. Pt states he is unable to take care of himself at home as he lives alone and does not have any assistance. He ambulates with a walker / wheel chair. Pt was seen at bedside, pt denies any CP, SOB, palpitations NVD. Pt denies any hip pain.

## 2022-04-12 NOTE — CHART NOTE - NSCHARTNOTEFT_GEN_A_CORE
Pt admitted overnight, multiple comorbidities  weakness, could not walk   feels better  need PT eval  cont home meds
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
## 2022-04-12 NOTE — H&P ADULT - ASSESSMENT
Pt is a 65 YO M who is admitted for debility / inability to ambulate.      #debility / inability to ambulate, unsteady gait  PT   consult for placement  neuro consult pending  fall risk    # chronic CHF HFpEF  - not in exacerbation  ECho showed EF 60-65 6/28/21  Will start pt on low dose acei, bb and statin  -unknown reason why he is off of medications as he is denying taking meds.   - hold lasix as he is asymptomatic at this time    # HLD  statin    #HTN: bp controlled  cont bb    DVT ppx: LVNX       Pt is a 65 YO M who is admitted for debility / inability to ambulate.      #debility / inability to ambulate, unsteady gait  PT   consult for placement  neuro consult pending  fall risk    # chronic CHF HFpEF  - not in exacerbation  ECho showed EF 60-65 6/28/21  Will start pt on low dose acei, bb and statin  unknown reason why he is off of medications as he is denying taking meds.   hold lasix as he is asymptomatic at this time    #leukocytosis- undetermined etiology, no overt source  possibly reactive to fall  f/u procalcitonin   trend WBC  monitor for fever    # HLD  statin    #HTN: bp controlled  cont bb    DVT ppx: LVNX       Pt is a 65 YO M who is admitted for debility / inability to ambulate.      #debility / inability to ambulate, unsteady gait  PT   consult for placement  neuro consult pending  fall risk    # chronic CHF HFpEF  - not in exacerbation  ECho showed EF 60-65 6/28/21  Will start pt on low dose acei, bb and statin, asa  unknown reason why he is off of medications as he is denying taking meds.   hold lasix as he is asymptomatic at this time    #leukocytosis- undetermined etiology, no overt source  possibly reactive to fall  f/u procalcitonin   trend WBC  monitor for fever    # HLD  statin    #HTN: bp controlled  cont bb    DVT ppx: LVNX       Pt is a 67 YO M who is admitted for debility / inability to ambulate.      #debility / inability to ambulate, unsteady gait  PT   consult for placement  neuro consult pending  fall risk    # chronic CHF HFpEF  - not in exacerbation  ECho showed EF 60-65 6/28/21  Will start pt on low dose acei, bb and statin, asa  unknown reason why he is off of medications as he is denying taking meds.   hold lasix as he is asymptomatic at this time    #leukocytosis- undetermined etiology, no overt source  possibly reactive to fall  f/u procalcitonin   trend WBC  monitor for fever    # HLD  statin    #HTN: bp controlled  cont bb    #smoker  counseling given  pt refused nicotine patch    DVT ppx: LVNX       Pt is a 67 YO M who is admitted for debility / inability to ambulate.      #debility / inability to ambulate, unsteady gait  PT   consult for placement  neuro consult pending  fall risk    # chronic CHF HFpEF  - not in exacerbation  ECho showed EF 60-65 6/28/21  Will start pt on low dose acei, bb and statin, asa  unknown reason why he is off of medications as he is denying taking meds.   hold lasix as he is asymptomatic at this time    #leukocytosis- undetermined etiology, no overt source  possibly reactive to fall  f/u procalcitonin   trend WBC  monitor for fever    # HLD  statin    #HTN: bp controlled  cont bb    #smoker  counseling given  pt refused nicotine patch    #alcoholism   addiction consult  monitor for withdrawal  counseling given  Utox  thiamine and folic acid     DVT ppx: LVNX
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
## 2022-04-13 LAB
CULTURE RESULTS: SIGNIFICANT CHANGE UP
SPECIMEN SOURCE: SIGNIFICANT CHANGE UP

## 2022-04-13 PROCEDURE — 73700 CT LOWER EXTREMITY W/O DYE: CPT | Mod: 26,LT

## 2022-04-13 PROCEDURE — 99232 SBSQ HOSP IP/OBS MODERATE 35: CPT

## 2022-04-13 RX ORDER — ACETAMINOPHEN 500 MG
650 TABLET ORAL EVERY 4 HOURS
Refills: 0 | Status: DISCONTINUED | OUTPATIENT
Start: 2022-04-13 | End: 2022-04-14

## 2022-04-13 RX ADMIN — Medication 81 MILLIGRAM(S): at 11:43

## 2022-04-13 RX ADMIN — ALBUTEROL 2 PUFF(S): 90 AEROSOL, METERED ORAL at 07:48

## 2022-04-13 RX ADMIN — ATORVASTATIN CALCIUM 10 MILLIGRAM(S): 80 TABLET, FILM COATED ORAL at 21:10

## 2022-04-13 RX ADMIN — Medication 100 MILLIGRAM(S): at 11:43

## 2022-04-13 RX ADMIN — Medication 12.5 MILLIGRAM(S): at 17:10

## 2022-04-13 RX ADMIN — ALBUTEROL 2 PUFF(S): 90 AEROSOL, METERED ORAL at 13:16

## 2022-04-13 RX ADMIN — ALBUTEROL 2 PUFF(S): 90 AEROSOL, METERED ORAL at 19:43

## 2022-04-13 RX ADMIN — LISINOPRIL 2.5 MILLIGRAM(S): 2.5 TABLET ORAL at 05:09

## 2022-04-13 RX ADMIN — Medication 650 MILLIGRAM(S): at 00:30

## 2022-04-13 RX ADMIN — Medication 1 MILLIGRAM(S): at 11:43

## 2022-04-13 RX ADMIN — Medication 12.5 MILLIGRAM(S): at 05:09

## 2022-04-13 NOTE — PROGRESS NOTE ADULT - ASSESSMENT
Pt is a 65 YO M with a pmh of TIA, CVA, CHF, HTN, HLD, Alcohol dependence, PE (2015, not on anticoagulation due to epistaxis). Pt is non compliant and has not seen a doctor in "a few years". Pt presented to the ED with a chief complaint of fall. Admitted for debility / inability to ambulate.    #debility / inability to ambulate, unsteady gait, s/p fall   Left hip pain - will get CT left hip   PT after CT   neuro consult appreciated - no further neurological workup is indicated at this time, recall if new concerns arise    fall risk    # chronic CHF HFpEF  - not in exacerbation  ECho showed EF 60-65 6/28/21  Pt was started on low dose acei, bb and statin, asa- continue for now   unknown reason why he is off of medications as he is denying taking meds.   hold lasix as he is asymptomatic at this time    #leukocytosis- undetermined etiology, no overt source could be dehydration, improving     procalcitonin  negative   trend WBC  monitor for fever    # HLD  statin    #HTN: bp controlled  cont bb    #smoker  counseling given  pt refused nicotine patch    #alcoholism   addiction consult appreciated   monitor for withdrawal  counseling given  Utox  possible thiamine and folic acid deficiency - replete      DVT ppx: LVNX    pending: CT left hip then PT   dispo odilia STR

## 2022-04-13 NOTE — PHYSICAL THERAPY INITIAL EVALUATION ADULT - SPECIFY REASON(S)
Attampted to see the pt this AM , Communicated with Dr Crowell , Informed to put pt on hold at this time secondary to pending CAT scan of Lt hip, will f/u when appropriate.

## 2022-04-13 NOTE — PROGRESS NOTE ADULT - SUBJECTIVE AND OBJECTIVE BOX
CHAPARRO ROBISON  66y, Male  Allergy: No Known Allergies    Hospital Day: 1d    Patient seen and examined earlier today. Pt stated that he is doing fine, but he still having left hip pain mildly improving     PMH/PSH:  PAST MEDICAL & SURGICAL HISTORY:  TIA (transient ischemic attack)    CHF (congestive heart failure)    HTN (hypertension)    HLD (hyperlipidemia)    Alcohol dependence    Chronic back pain    Pulmonary embolism  2015    Cerebrovascular accident (CVA)  left pontine with dysphagia    Alcohol abuse    History of appendectomy        LAST 24-Hr EVENTS:    VITALS:  T(F): 96.3 (04-13-22 @ 05:10), Max: 97.6 (04-12-22 @ 14:15)  HR: 65 (04-13-22 @ 05:10)  BP: 141/61 (04-13-22 @ 05:10) (102/50 - 141/61)  RR: 18 (04-13-22 @ 05:10)  SpO2: --          TESTS & MEASUREMENTS:  Weight/BMI  110.4 (04-12-22 @ 14:15)  35.9 (04-12-22 @ 14:15)                          12.9   11.01 )-----------( 233      ( 12 Apr 2022 08:50 )             39.0         04-12    140  |  103  |  18  ----------------------------<  159<H>  3.8   |  24  |  0.9    Ca    8.9      12 Apr 2022 08:50    TPro  6.8  /  Alb  3.9  /  TBili  1.0  /  DBili  x   /  AST  20  /  ALT  23  /  AlkPhos  79  04-12    LIVER FUNCTIONS - ( 12 Apr 2022 08:50 )  Alb: 3.9 g/dL / Pro: 6.8 g/dL / ALK PHOS: 79 U/L / ALT: 23 U/L / AST: 20 U/L / GGT: x                 Urinalysis Basic - ( 12 Apr 2022 00:30 )    Color: Yellow / Appearance: Clear / SG: >=1.030 / pH: x  Gluc: x / Ketone: 15  / Bili: Small / Urobili: 1.0 mg/dL   Blood: x / Protein: 100 mg/dL / Nitrite: Negative   Leuk Esterase: Negative / RBC: 1-2 /HPF / WBC 1-2 /HPF   Sq Epi: x / Non Sq Epi: Few /HPF / Bacteria: Few      Procalcitonin, Serum: 0.06 ng/mL (04-12-22 @ 08:50)          COVID-19 PCR: NotDetec (04-11-22 @ 20:05)                RADIOLOGY, ECG, & ADDITIONAL TESTS:      RECENT DIAGNOSTIC ORDERS:  CT Hip No Cont, Left: Urgent   Indication: persistant left hip pain s/p fall  Transport: Stretcher-Crib (04-13-22 @ 10:39)      MEDICATIONS:  MEDICATIONS  (STANDING):  ALBUTerol    90 MICROgram(s) HFA Inhaler 2 Puff(s) Inhalation every 6 hours  aspirin  chewable 81 milliGRAM(s) Oral daily  atorvastatin 10 milliGRAM(s) Oral at bedtime  enoxaparin Injectable 40 milliGRAM(s) SubCutaneous every 24 hours  folic acid 1 milliGRAM(s) Oral daily  lisinopril 2.5 milliGRAM(s) Oral daily  metoprolol tartrate 12.5 milliGRAM(s) Oral every 12 hours  thiamine 100 milliGRAM(s) Oral daily    MEDICATIONS  (PRN):  acetaminophen     Tablet .. 650 milliGRAM(s) Oral every 4 hours PRN Temp greater or equal to 38C (100.4F), Mild Pain (1 - 3)      HOME MEDICATIONS:  apixaban 5 mg oral tablet (08-25)  aspirin 81 mg oral tablet, chewable (08-22)  atorvastatin 80 mg oral tablet (08-22)  folic acid 1 mg oral tablet (08-22)  gabapentin 100 mg oral capsule (08-22)  losartan 100 mg oral tablet (08-22)  methocarbamol 500 mg oral tablet (08-22)  metoprolol tartrate 25 mg oral tablet (08-22)  Multiple Vitamins with Minerals oral tablet (08-22)  nicotine 21 mg/24 hr transdermal film, extended release (08-22)  thiamine 100 mg oral tablet (08-22)      PHYSICAL EXAM:  GENERAL: awake, alert, NAD   CHEST/LUNG: CTA b/l   HEART: regular rhythm   ABDOMEN: soft, non tender non distended   EXTREMITIES:  no edema , no gross left lower ext deformity, good ROM but mild tenderness

## 2022-04-14 ENCOUNTER — TRANSCRIPTION ENCOUNTER (OUTPATIENT)
Age: 67
End: 2022-04-14

## 2022-04-14 VITALS
TEMPERATURE: 97 F | DIASTOLIC BLOOD PRESSURE: 75 MMHG | SYSTOLIC BLOOD PRESSURE: 126 MMHG | RESPIRATION RATE: 16 BRPM | HEART RATE: 76 BPM

## 2022-04-14 LAB
ALBUMIN SERPL ELPH-MCNC: 4.1 G/DL — SIGNIFICANT CHANGE UP (ref 3.5–5.2)
ALP SERPL-CCNC: 85 U/L — SIGNIFICANT CHANGE UP (ref 30–115)
ALT FLD-CCNC: 21 U/L — SIGNIFICANT CHANGE UP (ref 0–41)
ANION GAP SERPL CALC-SCNC: 11 MMOL/L — SIGNIFICANT CHANGE UP (ref 7–14)
AST SERPL-CCNC: 17 U/L — SIGNIFICANT CHANGE UP (ref 0–41)
BILIRUB SERPL-MCNC: 0.5 MG/DL — SIGNIFICANT CHANGE UP (ref 0.2–1.2)
BUN SERPL-MCNC: 15 MG/DL — SIGNIFICANT CHANGE UP (ref 10–20)
CALCIUM SERPL-MCNC: 8.9 MG/DL — SIGNIFICANT CHANGE UP (ref 8.5–10.1)
CHLORIDE SERPL-SCNC: 104 MMOL/L — SIGNIFICANT CHANGE UP (ref 98–110)
CO2 SERPL-SCNC: 25 MMOL/L — SIGNIFICANT CHANGE UP (ref 17–32)
CREAT SERPL-MCNC: 0.8 MG/DL — SIGNIFICANT CHANGE UP (ref 0.7–1.5)
EGFR: 99 ML/MIN/1.73M2 — SIGNIFICANT CHANGE UP
GLUCOSE SERPL-MCNC: 124 MG/DL — HIGH (ref 70–99)
HCT VFR BLD CALC: 39.2 % — LOW (ref 42–52)
HGB BLD-MCNC: 12.6 G/DL — LOW (ref 14–18)
MCHC RBC-ENTMCNC: 28.6 PG — SIGNIFICANT CHANGE UP (ref 27–31)
MCHC RBC-ENTMCNC: 32.1 G/DL — SIGNIFICANT CHANGE UP (ref 32–37)
MCV RBC AUTO: 88.9 FL — SIGNIFICANT CHANGE UP (ref 80–94)
NRBC # BLD: 0 /100 WBCS — SIGNIFICANT CHANGE UP (ref 0–0)
PLATELET # BLD AUTO: 204 K/UL — SIGNIFICANT CHANGE UP (ref 130–400)
POTASSIUM SERPL-MCNC: 4.3 MMOL/L — SIGNIFICANT CHANGE UP (ref 3.5–5)
POTASSIUM SERPL-SCNC: 4.3 MMOL/L — SIGNIFICANT CHANGE UP (ref 3.5–5)
PROT SERPL-MCNC: 7 G/DL — SIGNIFICANT CHANGE UP (ref 6–8)
RBC # BLD: 4.41 M/UL — LOW (ref 4.7–6.1)
RBC # FLD: 13.3 % — SIGNIFICANT CHANGE UP (ref 11.5–14.5)
SARS-COV-2 RNA SPEC QL NAA+PROBE: SIGNIFICANT CHANGE UP
SODIUM SERPL-SCNC: 140 MMOL/L — SIGNIFICANT CHANGE UP (ref 135–146)
WBC # BLD: 11.24 K/UL — HIGH (ref 4.8–10.8)
WBC # FLD AUTO: 11.24 K/UL — HIGH (ref 4.8–10.8)

## 2022-04-14 PROCEDURE — 99239 HOSP IP/OBS DSCHRG MGMT >30: CPT

## 2022-04-14 RX ORDER — ALBUTEROL 90 UG/1
2 AEROSOL, METERED ORAL
Qty: 0 | Refills: 0 | DISCHARGE
Start: 2022-04-14

## 2022-04-14 RX ORDER — THIAMINE MONONITRATE (VIT B1) 100 MG
1 TABLET ORAL
Qty: 0 | Refills: 0 | DISCHARGE
Start: 2022-04-14

## 2022-04-14 RX ORDER — ASPIRIN/CALCIUM CARB/MAGNESIUM 324 MG
1 TABLET ORAL
Qty: 0 | Refills: 0 | DISCHARGE
Start: 2022-04-14

## 2022-04-14 RX ORDER — ATORVASTATIN CALCIUM 80 MG/1
1 TABLET, FILM COATED ORAL
Qty: 0 | Refills: 0 | DISCHARGE
Start: 2022-04-14

## 2022-04-14 RX ORDER — FOLIC ACID 0.8 MG
1 TABLET ORAL
Qty: 0 | Refills: 0 | DISCHARGE
Start: 2022-04-14

## 2022-04-14 RX ORDER — LISINOPRIL 2.5 MG/1
1 TABLET ORAL
Qty: 0 | Refills: 0 | DISCHARGE
Start: 2022-04-14

## 2022-04-14 RX ADMIN — ENOXAPARIN SODIUM 40 MILLIGRAM(S): 100 INJECTION SUBCUTANEOUS at 05:23

## 2022-04-14 RX ADMIN — Medication 81 MILLIGRAM(S): at 11:20

## 2022-04-14 RX ADMIN — LISINOPRIL 2.5 MILLIGRAM(S): 2.5 TABLET ORAL at 05:23

## 2022-04-14 RX ADMIN — Medication 1 MILLIGRAM(S): at 11:20

## 2022-04-14 RX ADMIN — Medication 100 MILLIGRAM(S): at 11:21

## 2022-04-14 RX ADMIN — Medication 12.5 MILLIGRAM(S): at 05:23

## 2022-04-14 RX ADMIN — ALBUTEROL 2 PUFF(S): 90 AEROSOL, METERED ORAL at 08:31

## 2022-04-14 RX ADMIN — ALBUTEROL 2 PUFF(S): 90 AEROSOL, METERED ORAL at 13:21

## 2022-04-14 NOTE — DISCHARGE NOTE PROVIDER - ATTENDING DISCHARGE PHYSICAL EXAMINATION:
On exam General awake, alert NAD, Lungs clear to ausculations b/l, Heart regular ryhthm, Abdomen: soft, non tender non distended, Ext no edema

## 2022-04-14 NOTE — PHYSICAL THERAPY INITIAL EVALUATION ADULT - PHYSICAL ASSIST/NONPHYSICAL ASSIST: GAIT, REHAB EVAL
Safety, sequencing , hand /foot placement ,safely use AD, Negotiating obstcle during ambulation ./verbal cues/1 person assist

## 2022-04-14 NOTE — DISCHARGE NOTE NURSING/CASE MANAGEMENT/SOCIAL WORK - PATIENT PORTAL LINK FT
You can access the FollowMyHealth Patient Portal offered by [facility] by registering at the following website: [email or web address]. By joining [employer]’s FollowMyHealth portal, you will also be able to view your health information using other applications (apps) compatible with our system.

## 2022-04-14 NOTE — DISCHARGE NOTE PROVIDER - NSDCCPCAREPLAN_GEN_ALL_CORE_FT
PRINCIPAL DISCHARGE DIAGNOSIS  Diagnosis: Hip pain  Assessment and Plan of Treatment: Please follow up with Rehab   Monitor for fall risks   CT left hip with no acute findings      SECONDARY DISCHARGE DIAGNOSES  Diagnosis: Inability to ambulate due to hip  Assessment and Plan of Treatment:     Diagnosis: HTN (hypertension)  Assessment and Plan of Treatment: and possible Heart Failure with Preserved Ejection Fraction , continue with same managment - patient was not on medications so was started on low doses lisinopril and lopressor

## 2022-04-14 NOTE — DISCHARGE NOTE PROVIDER - HOSPITAL COURSE
Pt is a 67 YO M with a pmh of TIA, CVA, CHF, HTN, HLD, Alcohol dependence, PE (2015, not on anticoagulation due to epistaxis). Pt is non compliant and has not seen a doctor in "a few years". Pt presented to the ED with a chief complaint of fall. Admitted for debility / inability to ambulate.    #debility / inability to ambulate, unsteady gait, s/p fall   Left hip pain - CT left hip with no acute finding   PT consult appreciated - Rehab placement recommended    neuro consult appreciated - no further neurological workup is indicated at this time, recall if new concerns arise  fall risk    # chronic CHF HFpEF  - not in exacerbation  ECho showed EF 60-65 6/28/21  Pt was started on low dose acei, bb and statin, asa- continue for now   unknown reason why he is off of medications as he is denying taking meds.   hold lasix as he is asymptomatic at this time    #leukocytosis- undetermined etiology, no overt source could be dehydration, improving     procalcitonin  negative   trend WBC  monitor for fever    # HLD  statin    #HTN: bp controlled  cont bb    #smoker  counseling given  pt refused nicotine patch    #alcoholism   addiction consult appreciated   monitor for withdrawal  counseling given  Utox  possible thiamine and folic acid deficiency - replete

## 2022-04-14 NOTE — PHYSICAL THERAPY INITIAL EVALUATION ADULT - PERTINENT HX OF CURRENT PROBLEM, REHAB EVAL
Pt is 67 y/o male with pmh of TIA, CVA , CHF, HTN, HLD, alcohol dependence , PE (2015) present with chief c/o fall . pt mentioned he was shaving  his head and his leg gave up and he fell on is lt buttock

## 2022-04-14 NOTE — DISCHARGE NOTE NURSING/CASE MANAGEMENT/SOCIAL WORK - NSDCPEFALRISK_GEN_ALL_CORE
For information on Fall & Injury Prevention, visit: [email or web address] OR  [email or web address] OR  https://www.cdc.gov/steadi/patient.html

## 2022-04-14 NOTE — DISCHARGE NOTE PROVIDER - NSDCMRMEDTOKEN_GEN_ALL_CORE_FT
albuterol 90 mcg/inh inhalation aerosol: 2 puff(s) inhaled every 6 hours  aspirin 81 mg oral tablet, chewable: 1 tab(s) orally once a day  atorvastatin 10 mg oral tablet: 1 tab(s) orally once a day (at bedtime)  folic acid 1 mg oral tablet: 1 tab(s) orally once a day  lisinopril 2.5 mg oral tablet: 1 tab(s) orally once a day  metoprolol tartrate 25 mg oral tablet: 0.5 tab(s) orally 2 times a day  Multiple Vitamins with Minerals oral tablet: 1 tab(s) orally once a day  thiamine 100 mg oral tablet: 1 tab(s) orally once a day

## 2022-04-14 NOTE — PHYSICAL THERAPY INITIAL EVALUATION ADULT - ADDITIONAL COMMENTS
pt is 65 y/o male  , lives alone in  , information obtain from the pt himself , has 8  steps to enter with  Rt HR and  10 steps upstairs bedroom w/ Lt hR pt mentioned he doesn't go upstairs usually   , pt was independent using RW with bed mobility , transfer , ambulation ,stair negotiation and basic ADLS.

## 2022-04-14 NOTE — PHYSICAL THERAPY INITIAL EVALUATION ADULT - GENERAL OBSERVATIONS, REHAB EVAL
9:35 -10:05 Chart reviewed. Order received.  Patient available to be seen for Pt, confirmed with RN. pt encountered Semi reclined in the bed,denies pain, and agrees to participate in session, ,NAD.

## 2022-04-20 DIAGNOSIS — F10.20 ALCOHOL DEPENDENCE, UNCOMPLICATED: ICD-10-CM

## 2022-04-20 DIAGNOSIS — Z91.19 PATIENT'S NONCOMPLIANCE WITH OTHER MEDICAL TREATMENT AND REGIMEN: ICD-10-CM

## 2022-04-20 DIAGNOSIS — E78.5 HYPERLIPIDEMIA, UNSPECIFIED: ICD-10-CM

## 2022-04-20 DIAGNOSIS — F17.210 NICOTINE DEPENDENCE, CIGARETTES, UNCOMPLICATED: ICD-10-CM

## 2022-04-20 DIAGNOSIS — Z86.711 PERSONAL HISTORY OF PULMONARY EMBOLISM: ICD-10-CM

## 2022-04-20 DIAGNOSIS — M54.9 DORSALGIA, UNSPECIFIED: ICD-10-CM

## 2022-04-20 DIAGNOSIS — M25.552 PAIN IN LEFT HIP: ICD-10-CM
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
## 2022-04-20 DIAGNOSIS — G89.21 CHRONIC PAIN DUE TO TRAUMA: ICD-10-CM

## 2022-04-20 DIAGNOSIS — I69.391 DYSPHAGIA FOLLOWING CEREBRAL INFARCTION: ICD-10-CM

## 2022-04-20 DIAGNOSIS — E51.9 THIAMINE DEFICIENCY, UNSPECIFIED: ICD-10-CM

## 2022-04-20 DIAGNOSIS — R13.10 DYSPHAGIA, UNSPECIFIED: ICD-10-CM

## 2022-04-20 DIAGNOSIS — Z20.822 CONTACT WITH AND (SUSPECTED) EXPOSURE TO COVID-19: ICD-10-CM

## 2022-04-20 DIAGNOSIS — R62.7 ADULT FAILURE TO THRIVE: ICD-10-CM

## 2022-04-20 DIAGNOSIS — Z98.890 OTHER SPECIFIED POSTPROCEDURAL STATES: ICD-10-CM
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
## 2022-04-20 DIAGNOSIS — R26.2 DIFFICULTY IN WALKING, NOT ELSEWHERE CLASSIFIED: ICD-10-CM

## 2022-04-20 DIAGNOSIS — I11.0 HYPERTENSIVE HEART DISEASE WITH HEART FAILURE: ICD-10-CM

## 2022-04-20 DIAGNOSIS — Z91.81 HISTORY OF FALLING: ICD-10-CM

## 2022-04-20 DIAGNOSIS — E86.0 DEHYDRATION: ICD-10-CM

## 2022-04-20 DIAGNOSIS — E53.8 DEFICIENCY OF OTHER SPECIFIED B GROUP VITAMINS: ICD-10-CM

## 2022-04-20 DIAGNOSIS — R26.81 UNSTEADINESS ON FEET: ICD-10-CM

## 2022-04-20 DIAGNOSIS — R53.81 OTHER MALAISE: ICD-10-CM

## 2022-04-20 DIAGNOSIS — I50.32 CHRONIC DIASTOLIC (CONGESTIVE) HEART FAILURE: ICD-10-CM

## 2022-04-20 DIAGNOSIS — D72.829 ELEVATED WHITE BLOOD CELL COUNT, UNSPECIFIED: ICD-10-CM

## 2022-04-26 ENCOUNTER — INPATIENT (INPATIENT)
Facility: HOSPITAL | Age: 67
LOS: 2 days | Discharge: HOME | End: 2022-04-29
Attending: INTERNAL MEDICINE | Admitting: INTERNAL MEDICINE
Payer: MEDICARE

## 2022-04-26 VITALS
OXYGEN SATURATION: 96 % | TEMPERATURE: 96 F | RESPIRATION RATE: 18 BRPM | HEIGHT: 69 IN | DIASTOLIC BLOOD PRESSURE: 71 MMHG | SYSTOLIC BLOOD PRESSURE: 155 MMHG | HEART RATE: 95 BPM | WEIGHT: 199.96 LBS

## 2022-04-26 DIAGNOSIS — Z90.49 ACQUIRED ABSENCE OF OTHER SPECIFIED PARTS OF DIGESTIVE TRACT: Chronic | ICD-10-CM

## 2022-04-26 DIAGNOSIS — E87.2 ACIDOSIS: ICD-10-CM

## 2022-04-26 LAB
ALBUMIN SERPL ELPH-MCNC: 3.8 G/DL — SIGNIFICANT CHANGE UP (ref 3.5–5.2)
ALP SERPL-CCNC: 100 U/L — SIGNIFICANT CHANGE UP (ref 30–115)
ALT FLD-CCNC: 41 U/L — SIGNIFICANT CHANGE UP (ref 0–41)
ANION GAP SERPL CALC-SCNC: 18 MMOL/L — HIGH (ref 7–14)
APTT BLD: 32.2 SEC — SIGNIFICANT CHANGE UP (ref 27–39.2)
AST SERPL-CCNC: 29 U/L — SIGNIFICANT CHANGE UP (ref 0–41)
BASE EXCESS BLDV CALC-SCNC: -2.5 MMOL/L — LOW (ref -2–3)
BILIRUB SERPL-MCNC: 0.4 MG/DL — SIGNIFICANT CHANGE UP (ref 0.2–1.2)
BUN SERPL-MCNC: 23 MG/DL — HIGH (ref 10–20)
CA-I SERPL-SCNC: 1.24 MMOL/L — SIGNIFICANT CHANGE UP (ref 1.15–1.33)
CALCIUM SERPL-MCNC: 9.5 MG/DL — SIGNIFICANT CHANGE UP (ref 8.5–10.1)
CHLORIDE SERPL-SCNC: 98 MMOL/L — SIGNIFICANT CHANGE UP (ref 98–110)
CO2 SERPL-SCNC: 17 MMOL/L — SIGNIFICANT CHANGE UP (ref 17–32)
CREAT SERPL-MCNC: 1.6 MG/DL — HIGH (ref 0.7–1.5)
EGFR: 48 ML/MIN/1.73M2 — LOW
GAS PNL BLDV: 133 MMOL/L — LOW (ref 136–145)
GAS PNL BLDV: SIGNIFICANT CHANGE UP
GLUCOSE SERPL-MCNC: 285 MG/DL — HIGH (ref 70–99)
HCO3 BLDV-SCNC: 22 MMOL/L — SIGNIFICANT CHANGE UP (ref 22–29)
HCT VFR BLD CALC: 34.2 % — LOW (ref 42–52)
HCT VFR BLD CALC: 35.6 % — LOW (ref 42–52)
HCT VFR BLDA CALC: 41 % — SIGNIFICANT CHANGE UP (ref 39–51)
HGB BLD CALC-MCNC: 13.8 G/DL — SIGNIFICANT CHANGE UP (ref 12.6–17.4)
HGB BLD-MCNC: 11.1 G/DL — LOW (ref 14–18)
HGB BLD-MCNC: 11.8 G/DL — LOW (ref 14–18)
INR BLD: 1.55 RATIO — HIGH (ref 0.65–1.3)
LACTATE BLDV-MCNC: 5.4 MMOL/L — CRITICAL HIGH (ref 0.5–2)
MAGNESIUM SERPL-MCNC: 2.1 MG/DL — SIGNIFICANT CHANGE UP (ref 1.8–2.4)
MCHC RBC-ENTMCNC: 28.8 PG — SIGNIFICANT CHANGE UP (ref 27–31)
MCHC RBC-ENTMCNC: 29.1 PG — SIGNIFICANT CHANGE UP (ref 27–31)
MCHC RBC-ENTMCNC: 32.5 G/DL — SIGNIFICANT CHANGE UP (ref 32–37)
MCHC RBC-ENTMCNC: 33.1 G/DL — SIGNIFICANT CHANGE UP (ref 32–37)
MCV RBC AUTO: 87.9 FL — SIGNIFICANT CHANGE UP (ref 80–94)
MCV RBC AUTO: 88.6 FL — SIGNIFICANT CHANGE UP (ref 80–94)
NRBC # BLD: 0 /100 WBCS — SIGNIFICANT CHANGE UP (ref 0–0)
PCO2 BLDV: 39 MMHG — LOW (ref 42–55)
PH BLDV: 7.37 — SIGNIFICANT CHANGE UP (ref 7.32–7.43)
PLATELET # BLD AUTO: 400 K/UL — SIGNIFICANT CHANGE UP (ref 130–400)
PLATELET # BLD AUTO: 435 K/UL — HIGH (ref 130–400)
PO2 BLDV: 45 MMHG — SIGNIFICANT CHANGE UP
POTASSIUM BLDV-SCNC: 4.7 MMOL/L — SIGNIFICANT CHANGE UP (ref 3.5–5.1)
POTASSIUM SERPL-MCNC: 4.7 MMOL/L — SIGNIFICANT CHANGE UP (ref 3.5–5)
POTASSIUM SERPL-SCNC: 4.7 MMOL/L — SIGNIFICANT CHANGE UP (ref 3.5–5)
PROT SERPL-MCNC: 7 G/DL — SIGNIFICANT CHANGE UP (ref 6–8)
PROTHROM AB SERPL-ACNC: 17.7 SEC — HIGH (ref 9.95–12.87)
RBC # BLD: 3.86 M/UL — LOW (ref 4.7–6.1)
RBC # BLD: 4.05 M/UL — LOW (ref 4.7–6.1)
RBC # FLD: 13.3 % — SIGNIFICANT CHANGE UP (ref 11.5–14.5)
RBC # FLD: 13.3 % — SIGNIFICANT CHANGE UP (ref 11.5–14.5)
SAO2 % BLDV: 73.8 % — SIGNIFICANT CHANGE UP
SODIUM SERPL-SCNC: 133 MMOL/L — LOW (ref 135–146)
WBC # BLD: 16.27 K/UL — HIGH (ref 4.8–10.8)
WBC # BLD: 20.99 K/UL — HIGH (ref 4.8–10.8)
WBC # FLD AUTO: 16.27 K/UL — HIGH (ref 4.8–10.8)
WBC # FLD AUTO: 20.99 K/UL — HIGH (ref 4.8–10.8)

## 2022-04-26 PROCEDURE — 30903 CONTROL OF NOSEBLEED: CPT | Mod: LT

## 2022-04-26 PROCEDURE — 99285 EMERGENCY DEPT VISIT HI MDM: CPT | Mod: 25

## 2022-04-26 PROCEDURE — 93010 ELECTROCARDIOGRAM REPORT: CPT

## 2022-04-26 RX ORDER — SODIUM CHLORIDE 9 MG/ML
500 INJECTION, SOLUTION INTRAVENOUS ONCE
Refills: 0 | Status: COMPLETED | OUTPATIENT
Start: 2022-04-26 | End: 2022-04-26

## 2022-04-26 NOTE — ED PROVIDER NOTE - CLINICAL SUMMARY MEDICAL DECISION MAKING FREE TEXT BOX
66-year-old male presented to ED for epistaxis.  Patient had multiple episodes of diaphoresis and lightheadedness while in the emergency department.  Labs are significant for elevated creatinine.  Elevated white count and elevated lactate.

## 2022-04-26 NOTE — ED ADULT NURSE NOTE - NSIMPLEMENTINTERV_GEN_ALL_ED
Implemented All Fall Risk Interventions:  [place] to call system. Call bell, personal items and telephone within reach. Instruct patient to call for assistance. Room bathroom lighting operational. Non-slip footwear when patient is off stretcher. Physically safe environment: no spills, clutter or unnecessary equipment. Stretcher in lowest position, wheels locked, appropriate side rails in place. Provide visual cue, wrist band, yellow gown, etc. Monitor gait and stability. Monitor for mental status changes and reorient to person, place, and time. Review medications for side effects contributing to fall risk. Reinforce activity limits and safety measures with patient and family.

## 2022-04-26 NOTE — ED PROVIDER NOTE - WR ORDER NAME 1
Cardiology Progress Note            Admit Date: 4/16/2018  Admit Diagnosis: SOB (shortness of breath)  colon  Anemia  Date: 4/26/2018     Time: 2:01 PM    HPI: 82 y.o. Female with new diagnosis of CHF. Presented with chief c/o of SOB, found to have pulmonary edema and EF 40% on TTE. Noted to have NSTEMI and JEROD on CKD. Pt with hx of colon cancer and now concern for metastatic breast CA. Was in ICU over weekend (4/21-4/22) for respiratory failure, sepsis, psa UTI. Improved and transferred to floor 4/24. LHC on hold as probable metastatic disease. Interval hx: Transferred to ICU on 4/23 due to AMS and SOB. Started on nitroglycerine IV for BP control. Left lung opacification on a.m. Chest XRay 4/25/18 - mucous plug, had therapeutic bronch. Repeat CXR 4/26 with opacification of Left lung noted       Assessment and Plan     1. NSTEMI:     -Remains with no chest pain. Medical mgmt as able given other issues. -12 lead EKG 4/24, marked ST abnormality. -TTE:4/20/18  EF 45%, severe hypokinesis apical walls, mod hypokinesis basal-mid apical walls. -REPEAT TTE today   -Holding ASA and Plavix for now due to worsened anemia. (hgb 6.7)    -Coreg 6.25 mg BID. -Statin intolerant due to weakness on zocor, on repatha, last LDL 31, so will not rechallenge statin     2. Cardiomyopathy/Acute HFrEF, new:  EF 45% NYHA IV     -Repeat TTE today. -CXR with no significant change, near opacification of Left chest   -Bumex 2 mg IV BID   -Hold ARB/ACE-I d/t elevated creatinine   -Coreg 6.25 mg BID    -Daily weights, I/Os (net negative 1440/24 hours)       3. JEROD on CKD:   Creatinine unchanged (1.8)    -Nephrology following   -diuretics per nephrology. 4. Acute respiratory failure: d/t pulmonary edema and now with Left lung mucous plugging.   -diuretics as above   -Pulmonary following- Bronch again today planned.     5. Anemia: worsened- now 6.7   -On epogen   --management per hematology     7. Malignant HTN- BP  elevated   -Continue coreg   -Hydralazine prn    8. Leukocytosis/ pseudomonal UTI, now concern for HCAP.:  Management per pulm/primary team.     9. Hx of Colon cancer, now suspected metastatic breast ca (bone, liver,calvarium mets)  -Hematology oncology following. 10  Hypokalemia/Hypomagnesemia:  K=3.3  -Repleted. Mg=1.5, repleted    11. Advanced directives:  Partial code- no CPR, no shocks. Pt seen with Dr. Cruzito Pena. Pt very weak, Left lung opacification again- for therapeutic bronch. Worsened anemia (hgb 6.7). Will hold ASA and plavix for now. Repeat TTE pending. Poor prognosis. Cardiology Attending:Patient seen and examined. I agree with NP assessment and plans. Echo reviewed EF still 40-45%. Anson Vicente MD 4/26/2018 2:11 PM         Subjective:   Jamal Nazario denies chest pain. C/o SOB and mild abdominal pain. Objective:      Physical Exam:                Visit Vitals    /71    Pulse 90    Temp 98.7 °F (37.1 °C)    Resp 27    Ht 5' 1\" (1.549 m)    Wt 60.4 kg (133 lb 2.5 oz)    SpO2 100%    BMI 25.16 kg/m2          General Appearance:   elderly female, appears weak, mildly tachypneic   Ears/Nose/Mouth/Throat:    Hearing grossly normal.         Neck:  Supple. Chest:     Course breath sounds bilaterally, Left lung breath sounds diminished. Mild SOB     Cardiovascular:   Regular rate and rhythm, S1, S2 normal, no murmur,    Abdomen:    Soft, mild ttp, no gaurding. Extremities:  No edema bilaterally. Skin:  Warm and dry.      Telemetry: Sinus rhythm          Data Review:    Labs:    Recent Results (from the past 24 hour(s))   GLUCOSE, POC    Collection Time: 04/25/18 11:40 AM   Result Value Ref Range    Glucose (POC) 223 (H) 65 - 100 mg/dL    Performed by Stephanie Chapin    GLUCOSE, POC    Collection Time: 04/25/18  6:11 PM   Result Value Ref Range    Glucose (POC) 144 (H) 65 - 100 mg/dL    Performed by Yasmani Gregg    METABOLIC PANEL, BASIC    Collection Time: 04/25/18  6:42 PM   Result Value Ref Range    Sodium 144 136 - 145 mmol/L    Potassium 3.4 (L) 3.5 - 5.1 mmol/L    Chloride 108 97 - 108 mmol/L    CO2 29 21 - 32 mmol/L    Anion gap 7 5 - 15 mmol/L    Glucose 123 (H) 65 - 100 mg/dL    BUN 60 (H) 6 - 20 MG/DL    Creatinine 1.83 (H) 0.55 - 1.02 MG/DL    BUN/Creatinine ratio 33 (H) 12 - 20      GFR est AA 32 (L) >60 ml/min/1.73m2    GFR est non-AA 26 (L) >60 ml/min/1.73m2    Calcium 9.1 8.5 - 10.1 MG/DL   GLUCOSE, POC    Collection Time: 04/25/18 11:20 PM   Result Value Ref Range    Glucose (POC) 100 65 - 100 mg/dL    Performed by Alpa Chan    METABOLIC PANEL, BASIC    Collection Time: 04/26/18  3:48 AM   Result Value Ref Range    Sodium 144 136 - 145 mmol/L    Potassium 3.3 (L) 3.5 - 5.1 mmol/L    Chloride 108 97 - 108 mmol/L    CO2 27 21 - 32 mmol/L    Anion gap 9 5 - 15 mmol/L    Glucose 112 (H) 65 - 100 mg/dL    BUN 63 (H) 6 - 20 MG/DL    Creatinine 1.83 (H) 0.55 - 1.02 MG/DL    BUN/Creatinine ratio 34 (H) 12 - 20      GFR est AA 32 (L) >60 ml/min/1.73m2    GFR est non-AA 26 (L) >60 ml/min/1.73m2    Calcium 9.3 8.5 - 10.1 MG/DL   CBC WITH AUTOMATED DIFF    Collection Time: 04/26/18  3:48 AM   Result Value Ref Range    WBC 15.5 (H) 3.6 - 11.0 K/uL    RBC 2.34 (L) 3.80 - 5.20 M/uL    HGB 6.7 (L) 11.5 - 16.0 g/dL    HCT 21.4 (L) 35.0 - 47.0 %    MCV 91.5 80.0 - 99.0 FL    MCH 28.6 26.0 - 34.0 PG    MCHC 31.3 30.0 - 36.5 g/dL    RDW 24.2 (H) 11.5 - 14.5 %    PLATELET 090 631 - 028 K/uL    MPV 9.8 8.9 - 12.9 FL    NRBC 1.5 (H) 0  WBC    ABSOLUTE NRBC 0.24 (H) 0.00 - 0.01 K/uL    NEUTROPHILS 76 (H) 32 - 75 %    LYMPHOCYTES 10 (L) 12 - 49 %    MONOCYTES 12 5 - 13 %    EOSINOPHILS 0 0 - 7 %    BASOPHILS 0 0 - 1 %    IMMATURE GRANULOCYTES 2 (H) 0.0 - 0.5 %    ABS. NEUTROPHILS 11.7 (H) 1.8 - 8.0 K/UL    ABS. LYMPHOCYTES 1.6 0.8 - 3.5 K/UL    ABS. MONOCYTES 1.9 (H) 0.0 - 1.0 K/UL    ABS.  EOSINOPHILS 0.0 0.0 - 0.4 K/UL    ABS. BASOPHILS 0.0 0.0 - 0.1 K/UL    ABS. IMM.  GRANS. 0.3 (H) 0.00 - 0.04 K/UL    DF SMEAR SCANNED      PLATELET COMMENTS Large Platelets      RBC COMMENTS ANISOCYTOSIS  2+        RBC COMMENTS OVALOCYTES  PRESENT        RBC COMMENTS POLYCHROMASIA  PRESENT        RBC COMMENTS HYPOCHROMIA  1+        RBC COMMENTS SCHISTOCYTES  PRESENT        RBC COMMENTS SPHEROCYTES  PRESENT       PHOSPHORUS    Collection Time: 04/26/18  3:48 AM   Result Value Ref Range    Phosphorus 3.5 2.6 - 4.7 MG/DL   MAGNESIUM    Collection Time: 04/26/18  3:48 AM   Result Value Ref Range    Magnesium 1.5 (L) 1.6 - 2.4 mg/dL   GLUCOSE, POC    Collection Time: 04/26/18  6:55 AM   Result Value Ref Range    Glucose (POC) 123 (H) 65 - 100 mg/dL    Performed by Elkin Flores           Radiology:        Current Facility-Administered Medications   Medication Dose Route Frequency    potassium chloride 10 mEq in 50 ml IVPB  10 mEq IntraVENous Q1H    dexmedeTOMidine (PRECEDEX) 400 mcg in 0.9% sodium chloride 100 mL infusion  0.2-0.7 mcg/kg/hr IntraVENous TITRATE    magnesium sulfate 2 g/50 ml IVPB (premix or compounded)  2 g IntraVENous ONCE    sodium chloride (NS) flush 5-10 mL  5-10 mL IntraVENous Q8H    sodium chloride (NS) flush 5-10 mL  5-10 mL IntraVENous PRN    [COMPLETED] benzocaine (HURRICANE) 20 % spray   Mucous Membrane ONCE    [COMPLETED] lidocaine (PF) (XYLOCAINE) 20 mg/mL (2 %) injection 90.6 mg  1.5 mg/kg Other ONCE    lidocaine (XYLOCAINE) 20 mg/mL (2 %) injection 6 mg  0.3 mL Other ONCE    albuterol-ipratropium (DUO-NEB) 2.5 MG-0.5 MG/3 ML  3 mL Nebulization Q4H RT    budesonide (PULMICORT) 500 mcg/2 ml nebulizer suspension  500 mcg Nebulization BID RT    acetylcysteine (MUCOMYST) 100 mg/mL (10 %) nebulizer solution 200 mg  2 mL Nebulization QID RT    hydrALAZINE (APRESOLINE) 20 mg/mL injection 10 mg  10 mg IntraVENous Q6H PRN    labetalol (NORMODYNE;TRANDATE) 300 mg in 0.9% sodium chloride 150 mL (2 mg / 1 mL) infusion  0.5-2 mg/min IntraVENous TITRATE    carvedilol (COREG) tablet 6.25 mg  6.25 mg Oral BID    sodium chloride (NS) flush 20 mL  20 mL InterCATHeter PRN    sodium chloride (NS) flush 10 mL  10 mL InterCATHeter Q24H    sodium chloride (NS) flush 10 mL  10 mL InterCATHeter PRN    sodium chloride (NS) flush 10 mL  10 mL InterCATHeter Q8H    alteplase (CATHFLO) 1 mg in sterile water (preservative free) 1 mL injection  1 mg InterCATHeter PRN    bacitracin 500 unit/gram packet 1 Packet  1 Packet Topical PRN    meropenem (MERREM) 500 mg in 0.9% sodium chloride (MBP/ADV) 50 mL  0.5 g IntraVENous Q12H    levoFLOXacin (LEVAQUIN) 500 mg in D5W IVPB  500 mg IntraVENous Q48H    bumetanide (BUMEX) injection 2 mg  2 mg IntraVENous Q12H    sodium chloride (NS) flush 10-30 mL  10-30 mL InterCATHeter PRN    sodium chloride (NS) flush 10 mL  10 mL InterCATHeter Q24H    sodium chloride (NS) flush 10 mL  10 mL InterCATHeter PRN    sodium chloride (NS) flush 10-40 mL  10-40 mL InterCATHeter Q8H    sodium chloride (NS) flush 20 mL  20 mL InterCATHeter Q24H    alteplase (CATHFLO) 1 mg in sterile water (preservative free) 1 mL injection  1 mg InterCATHeter PRN    anastrozole (ARIMIDEX) tablet 1 mg  1 mg Oral DAILY    0.9% sodium chloride infusion 250 mL  250 mL IntraVENous PRN    acetaminophen (TYLENOL) tablet 650 mg  650 mg Oral Q4H PRN    epoetin celio (EPOGEN;PROCRIT) injection 10,000 Units  10,000 Units SubCUTAneous Q MON, WED & FRI    albuterol-ipratropium (DUO-NEB) 2.5 MG-0.5 MG/3 ML  3 mL Nebulization Q6H PRN    glucose chewable tablet 16 g  4 Tab Oral PRN    dextrose (D50W) injection syrg 12.5-25 g  12.5-25 g IntraVENous PRN    glucagon (GLUCAGEN) injection 1 mg  1 mg IntraMUSCular PRN    insulin lispro (HUMALOG) injection   SubCUTAneous AC&HS    prochlorperazine (COMPAZINE) with saline injection 5 mg  5 mg IntraVENous Q8H PRN    ascorbic acid (vitamin C) (VITAMIN C) tablet 500 mg  500 mg Oral DAILY    cholecalciferol (VITAMIN D3) tablet 1,000 Units  1,000 Units Oral DAILY    folic acid (FOLVITE) tablet 1 mg  1 mg Oral DAILY    magnesium oxide (MAG-OX) tablet 400 mg  400 mg Oral DAILY    therapeutic multivitamin (THERAGRAN) tablet 1 Tab  1 Tab Oral DAILY    fish oil-omega-3 fatty acids 340-1,000 mg capsule 1 Cap  1 Cap Oral DAILY    sodium chloride (NS) flush 5-10 mL  5-10 mL IntraVENous Q8H    sodium chloride (NS) flush 5-10 mL  5-10 mL IntraVENous PRN    acetaminophen (TYLENOL) tablet 650 mg  650 mg Oral Q4H PRN    ondansetron (ZOFRAN) injection 4 mg  4 mg IntraVENous Q4H PRN    levothyroxine (SYNTHROID) tablet 88 mcg  88 mcg Oral ACB     Pt critically ill, poor prognosis, multiple severe issues. Jocelyne Ness.  MARIETTA [place]     [facility] [address] [place] [address], [address] [place] [address] [address]   Radha [place]   [phone] Xray Chest 1 View- PORTABLE-Urgent

## 2022-04-26 NOTE — ED PROVIDER NOTE - NS ED ROS FT
Constitutional: (-) fever (-) chills (-) (-) lightheadedness   Eyes/ENT: (-) blurry vision, (+) epistaxis (-) rhinorrhea (-) nasal congestion  Cardiovascular: (-) chest pain, (-) syncope (-) palpitations   Respiratory: (-) cough, (-) shortness of breath (-) pleurisy   Gastrointestinal: (-) vomiting, (-) diarrhea (-) abdominal pain (-) nausea (-) anorexia  Musculoskeletal: (-) neck pain, (-) back pain, (-) joint pain (-) joint swelling (-) painful ROM  Integumentary: (-) rash, (-) edema (-) lacerations (-) pruritis   Neurological: (-) headache, (-) altered mental status (-) LOC (-) dizziness (-) paresthesias (-) gait abnormalities

## 2022-04-26 NOTE — ED PROVIDER NOTE - OBJECTIVE STATEMENT
66 y.o. M, pmh of HTN, HLD, recently D/c'd [facility] last hospital visit 2/2 frequent epistaxis sent from SNF for 1 hr atrumatic epistaxis. No HA, dizziness visual changes cp palpitations or sob

## 2022-04-26 NOTE — ED PROVIDER NOTE - CARE PLAN
1 Principal Discharge DX:	SOB (shortness of breath)  Secondary Diagnosis:	Epistaxis  Secondary Diagnosis:	DAYDAY (acute kidney injury)

## 2022-04-26 NOTE — ED PROCEDURE NOTE - CPROC ED INFORMED CONSENT1
Benefits, risks, and possible complications of procedure explained to patient/caregiver who verbalized understanding and gave verbal consent. 5-Fu Counseling: 5-Fluorouracil Counseling:  I discussed with the patient the risks of 5-fluorouracil including but not limited to erythema, scaling, itching, weeping, crusting, and pain.

## 2022-04-26 NOTE — ED PROVIDER NOTE - NSICDXPASTMEDICALHX_GEN_ALL_CORE_FT
PAST MEDICAL HISTORY:  Alcohol abuse     Alcohol dependence     Cerebrovascular accident (CVA) left pontine with dysphagia    CHF (congestive heart failure)     Chronic back pain     HLD (hyperlipidemia)     HTN (hypertension)     Pulmonary embolism 2015    TIA (transient ischemic attack)

## 2022-04-26 NOTE — ED PROVIDER NOTE - ATTENDING APP SHARED VISIT CONTRIBUTION OF CARE
65 YO M with a pmh of TIA, CVA, CHF, HTN, HLD, Alcohol dependence, PE (2015, not on anticoagulation due to epistaxis) presents to ED for epistaxis that started on hour prior to arrival 65 YO M with a pmh of TIA, CVA, CHF, HTN, HLD, Alcohol dependence, PE (2015, not on anticoagulation due to epistaxis) presents to ED for epistaxis that started on hour prior to arrival. Pt has not had any SOB, palpitations, CP. No trauma.     Const: Well nourished, well developed, appears stated age  Eyes: PERRL, no conjunctival injection  HENT:  Neck supple without meningismus, oozing from L nare.    CV: RRR, Warm, well-perfused extremities  RESP: CTA B/L, no tachypnea   Psych: Appropriate mood and affect.    will check cbc and Pt/ptt and pack L nare

## 2022-04-26 NOTE — ED ADULT TRIAGE NOTE - WEIGHT IN LBS
Refill request received for Vistaril   Filled per [facility] protocol     Crista ROUSE RN   Specialty Clinics    199.9

## 2022-04-26 NOTE — ED PROVIDER NOTE - NSICDXPASTSURGICALHX_GEN_ALL_CORE_FT
History and physical documented and up to date, allergies reviewed, lab results reviewed, pre-procedure education provided, patient verbalized understanding of procedure, procedural consent signed and patient is NPO. PAST SURGICAL HISTORY:  History of appendectomy

## 2022-04-26 NOTE — ED PROVIDER NOTE - PROGRESS NOTE DETAILS
DC: Rhinorocket placed. Will observe pt for 1 hr. VSS, pt comfortable, denies dizziness or sob. DC: Labs, EKG and CXR ordered. Pt appearing sob, diaphoretic. Rhonchi noted b/l on PE. Presentation more consistent with fluid overload than COPD exacerbation. Dr. Oglesby: Sign out to Dr. Rosales  Pt pending labs, EKG,cxr DC: Pt sating well on RA. Inc WOB likely exacerbated by ambulating to BR and attempting to have BM. PT comfortable, speaking in full sentences, tolerating po. 500 ccs given for WBC and lactate.

## 2022-04-26 NOTE — ED ADULT NURSE NOTE - OBJECTIVE STATEMENT
Pt BIBA from [facility] for epistaxis. Pt not currently on A/c; stopped eliquis recently. Pt states nose was bleeding x 1 hour. Pt BIBA from [facility] for epistaxis. Pt not currently on A/c; stopped eliquis recently. Pt states nose was bleeding x 1 hour. Pt is awake alert and not in any distress.

## 2022-04-26 NOTE — ED PROVIDER NOTE - PHYSICAL EXAMINATION
Physical Exam    Vital Signs: I have reviewed the initial vital signs.  Constitutional: well-nourished, appears stated age, no acute distress  Eyes: Conjunctiva pink, Sclera clear, PERRLA, EOMI, no ptosis, no entrapment, no racoon eyes.  Nose: No septal hematoma, no pulsatile or brisk bleeding  Cardiovascular: S1 and S2, regular rate, regular rhythm, well-perfused extremities, radial pulses equal and 2+, calves nonttp, equal in size  Respiratory: unlabored respiratory effort, speaking in full sentences, handling oral secretions  Musculoskeletal: supple neck, no lower extremity edema, no midline tenderness, paraspinal tenderness, clavicular crepitus, painful rom, moving all extremities appropriately, no gross bony deformities or swelling.  Integumentary: warm, dry, no rashes, lacerations,  Neurologic: awake, alert,  no tremors, fasciculations, no facial droop, no ataxia, no dysmetria  Psychiatric: appropriate mood, appropriate affect

## 2022-04-27 PROBLEM — F10.10 ALCOHOL ABUSE, UNCOMPLICATED: Chronic | Status: ACTIVE | Noted: 2022-04-12

## 2022-04-27 LAB
A1C WITH ESTIMATED AVERAGE GLUCOSE RESULT: 7 % — HIGH (ref 4–5.6)
ALBUMIN SERPL ELPH-MCNC: 3.9 G/DL — SIGNIFICANT CHANGE UP (ref 3.5–5.2)
ALP SERPL-CCNC: 102 U/L — SIGNIFICANT CHANGE UP (ref 30–115)
ALT FLD-CCNC: 35 U/L — SIGNIFICANT CHANGE UP (ref 0–41)
ANION GAP SERPL CALC-SCNC: 16 MMOL/L — HIGH (ref 7–14)
ANISOCYTOSIS BLD QL: SLIGHT — SIGNIFICANT CHANGE UP
APTT BLD: 32.4 SEC — SIGNIFICANT CHANGE UP (ref 27–39.2)
AST SERPL-CCNC: 18 U/L — SIGNIFICANT CHANGE UP (ref 0–41)
BASOPHILS # BLD AUTO: 0 K/UL — SIGNIFICANT CHANGE UP (ref 0–0.2)
BASOPHILS # BLD AUTO: 0.07 K/UL — SIGNIFICANT CHANGE UP (ref 0–0.2)
BASOPHILS NFR BLD AUTO: 0 % — SIGNIFICANT CHANGE UP (ref 0–1)
BASOPHILS NFR BLD AUTO: 0.4 % — SIGNIFICANT CHANGE UP (ref 0–1)
BILIRUB SERPL-MCNC: 0.3 MG/DL — SIGNIFICANT CHANGE UP (ref 0.2–1.2)
BUN SERPL-MCNC: 40 MG/DL — HIGH (ref 10–20)
CALCIUM SERPL-MCNC: 9.3 MG/DL — SIGNIFICANT CHANGE UP (ref 8.5–10.1)
CHLORIDE SERPL-SCNC: 101 MMOL/L — SIGNIFICANT CHANGE UP (ref 98–110)
CHOLEST SERPL-MCNC: 118 MG/DL — SIGNIFICANT CHANGE UP
CO2 SERPL-SCNC: 22 MMOL/L — SIGNIFICANT CHANGE UP (ref 17–32)
CREAT SERPL-MCNC: 1.5 MG/DL — SIGNIFICANT CHANGE UP (ref 0.7–1.5)
D DIMER BLD IA.RAPID-MCNC: <150 NG/ML DDU — SIGNIFICANT CHANGE UP (ref 0–230)
EGFR: 52 ML/MIN/1.73M2 — LOW
EOSINOPHIL # BLD AUTO: 0.01 K/UL — SIGNIFICANT CHANGE UP (ref 0–0.7)
EOSINOPHIL # BLD AUTO: 0.19 K/UL — SIGNIFICANT CHANGE UP (ref 0–0.7)
EOSINOPHIL NFR BLD AUTO: 0.1 % — SIGNIFICANT CHANGE UP (ref 0–8)
EOSINOPHIL NFR BLD AUTO: 0.9 % — SIGNIFICANT CHANGE UP (ref 0–8)
ESTIMATED AVERAGE GLUCOSE: 154 MG/DL — HIGH (ref 68–114)
GIANT PLATELETS BLD QL SMEAR: PRESENT — SIGNIFICANT CHANGE UP
GLUCOSE BLDC GLUCOMTR-MCNC: 127 MG/DL — HIGH (ref 70–99)
GLUCOSE BLDC GLUCOMTR-MCNC: 128 MG/DL — HIGH (ref 70–99)
GLUCOSE BLDC GLUCOMTR-MCNC: 180 MG/DL — HIGH (ref 70–99)
GLUCOSE BLDC GLUCOMTR-MCNC: 197 MG/DL — HIGH (ref 70–99)
GLUCOSE SERPL-MCNC: 202 MG/DL — HIGH (ref 70–99)
HCT VFR BLD CALC: 30.2 % — LOW (ref 42–52)
HDLC SERPL-MCNC: 24 MG/DL — LOW
HGB BLD-MCNC: 10.1 G/DL — LOW (ref 14–18)
IMM GRANULOCYTES NFR BLD AUTO: 2.2 % — HIGH (ref 0.1–0.3)
INR BLD: 1.57 RATIO — HIGH (ref 0.65–1.3)
LACTATE SERPL-SCNC: 2.5 MMOL/L — HIGH (ref 0.7–2)
LACTATE SERPL-SCNC: 3 MMOL/L — HIGH (ref 0.7–2)
LIPID PNL WITH DIRECT LDL SERPL: 70 MG/DL — SIGNIFICANT CHANGE UP
LYMPHOCYTES # BLD AUTO: 1.17 K/UL — LOW (ref 1.2–3.4)
LYMPHOCYTES # BLD AUTO: 13.9 % — LOW (ref 20.5–51.1)
LYMPHOCYTES # BLD AUTO: 2.92 K/UL — SIGNIFICANT CHANGE UP (ref 1.2–3.4)
LYMPHOCYTES # BLD AUTO: 6.1 % — LOW (ref 20.5–51.1)
MAGNESIUM SERPL-MCNC: 2 MG/DL — SIGNIFICANT CHANGE UP (ref 1.8–2.4)
MANUAL SMEAR VERIFICATION: SIGNIFICANT CHANGE UP
MCHC RBC-ENTMCNC: 29.4 PG — SIGNIFICANT CHANGE UP (ref 27–31)
MCHC RBC-ENTMCNC: 33.4 G/DL — SIGNIFICANT CHANGE UP (ref 32–37)
MCV RBC AUTO: 88 FL — SIGNIFICANT CHANGE UP (ref 80–94)
MICROCYTES BLD QL: SLIGHT — SIGNIFICANT CHANGE UP
MONOCYTES # BLD AUTO: 0.55 K/UL — SIGNIFICANT CHANGE UP (ref 0.1–0.6)
MONOCYTES # BLD AUTO: 0.9 K/UL — HIGH (ref 0.1–0.6)
MONOCYTES NFR BLD AUTO: 2.9 % — SIGNIFICANT CHANGE UP (ref 1.7–9.3)
MONOCYTES NFR BLD AUTO: 4.3 % — SIGNIFICANT CHANGE UP (ref 1.7–9.3)
MYELOCYTES NFR BLD: 2.6 % — HIGH (ref 0–0)
NEUTROPHILS # BLD AUTO: 16.44 K/UL — HIGH (ref 1.4–6.5)
NEUTROPHILS # BLD AUTO: 16.99 K/UL — HIGH (ref 1.4–6.5)
NEUTROPHILS NFR BLD AUTO: 78.3 % — HIGH (ref 42.2–75.2)
NEUTROPHILS NFR BLD AUTO: 88.3 % — HIGH (ref 42.2–75.2)
NON HDL CHOLESTEROL: 94 MG/DL — SIGNIFICANT CHANGE UP
NRBC # BLD: 0 /100 WBCS — SIGNIFICANT CHANGE UP (ref 0–0)
NT-PROBNP SERPL-SCNC: 143 PG/ML — SIGNIFICANT CHANGE UP (ref 0–300)
PLAT MORPH BLD: NORMAL — SIGNIFICANT CHANGE UP
PLATELET # BLD AUTO: 311 K/UL — SIGNIFICANT CHANGE UP (ref 130–400)
POLYCHROMASIA BLD QL SMEAR: SLIGHT — SIGNIFICANT CHANGE UP
POTASSIUM SERPL-MCNC: 5.1 MMOL/L — HIGH (ref 3.5–5)
POTASSIUM SERPL-SCNC: 5.1 MMOL/L — HIGH (ref 3.5–5)
PROCALCITONIN SERPL-MCNC: 0.31 NG/ML — HIGH (ref 0.02–0.1)
PROT SERPL-MCNC: 6.7 G/DL — SIGNIFICANT CHANGE UP (ref 6–8)
PROTHROM AB SERPL-ACNC: 18 SEC — HIGH (ref 9.95–12.87)
RBC # BLD: 3.43 M/UL — LOW (ref 4.7–6.1)
RBC # FLD: 13.3 % — SIGNIFICANT CHANGE UP (ref 11.5–14.5)
RBC BLD AUTO: NORMAL — SIGNIFICANT CHANGE UP
SARS-COV-2 RNA SPEC QL NAA+PROBE: SIGNIFICANT CHANGE UP
SODIUM SERPL-SCNC: 139 MMOL/L — SIGNIFICANT CHANGE UP (ref 135–146)
TRIGL SERPL-MCNC: 121 MG/DL — SIGNIFICANT CHANGE UP
TROPONIN T SERPL-MCNC: <0.01 NG/ML — SIGNIFICANT CHANGE UP
TSH SERPL-MCNC: 0.72 UIU/ML — SIGNIFICANT CHANGE UP (ref 0.27–4.2)
WBC # BLD: 19.22 K/UL — HIGH (ref 4.8–10.8)
WBC # FLD AUTO: 19.22 K/UL — HIGH (ref 4.8–10.8)

## 2022-04-27 PROCEDURE — 93970 EXTREMITY STUDY: CPT | Mod: 26

## 2022-04-27 PROCEDURE — 99221 1ST HOSP IP/OBS SF/LOW 40: CPT

## 2022-04-27 PROCEDURE — 71045 X-RAY EXAM CHEST 1 VIEW: CPT | Mod: 26

## 2022-04-27 RX ORDER — INSULIN LISPRO 100/ML
5 VIAL (ML) SUBCUTANEOUS
Refills: 0 | Status: DISCONTINUED | OUTPATIENT
Start: 2022-04-27 | End: 2022-04-29

## 2022-04-27 RX ORDER — ASPIRIN/CALCIUM CARB/MAGNESIUM 324 MG
81 TABLET ORAL DAILY
Refills: 0 | Status: DISCONTINUED | OUTPATIENT
Start: 2022-04-27 | End: 2022-04-29

## 2022-04-27 RX ORDER — NICOTINE POLACRILEX 2 MG
1 GUM BUCCAL DAILY
Refills: 0 | Status: DISCONTINUED | OUTPATIENT
Start: 2022-04-27 | End: 2022-04-29

## 2022-04-27 RX ORDER — ALBUTEROL 90 UG/1
2 AEROSOL, METERED ORAL EVERY 4 HOURS
Refills: 0 | Status: DISCONTINUED | OUTPATIENT
Start: 2022-04-27 | End: 2022-04-29

## 2022-04-27 RX ORDER — CEFEPIME 1 G/1
2000 INJECTION, POWDER, FOR SOLUTION INTRAMUSCULAR; INTRAVENOUS ONCE
Refills: 0 | Status: COMPLETED | OUTPATIENT
Start: 2022-04-27 | End: 2022-04-27

## 2022-04-27 RX ORDER — METOPROLOL TARTRATE 50 MG
12.5 TABLET ORAL
Refills: 0 | Status: DISCONTINUED | OUTPATIENT
Start: 2022-04-27 | End: 2022-04-29

## 2022-04-27 RX ORDER — INSULIN LISPRO 100/ML
VIAL (ML) SUBCUTANEOUS
Refills: 0 | Status: DISCONTINUED | OUTPATIENT
Start: 2022-04-27 | End: 2022-04-29

## 2022-04-27 RX ORDER — DEXTROSE 50 % IN WATER 50 %
25 SYRINGE (ML) INTRAVENOUS ONCE
Refills: 0 | Status: DISCONTINUED | OUTPATIENT
Start: 2022-04-27 | End: 2022-04-29

## 2022-04-27 RX ORDER — GLUCAGON INJECTION, SOLUTION 0.5 MG/.1ML
1 INJECTION, SOLUTION SUBCUTANEOUS ONCE
Refills: 0 | Status: DISCONTINUED | OUTPATIENT
Start: 2022-04-27 | End: 2022-04-29

## 2022-04-27 RX ORDER — INSULIN GLARGINE 100 [IU]/ML
10 INJECTION, SOLUTION SUBCUTANEOUS AT BEDTIME
Refills: 0 | Status: DISCONTINUED | OUTPATIENT
Start: 2022-04-27 | End: 2022-04-29

## 2022-04-27 RX ORDER — MULTIVIT-MIN/FERROUS GLUCONATE 9 MG/15 ML
1 LIQUID (ML) ORAL DAILY
Refills: 0 | Status: DISCONTINUED | OUTPATIENT
Start: 2022-04-27 | End: 2022-04-29

## 2022-04-27 RX ORDER — SODIUM CHLORIDE 9 MG/ML
1000 INJECTION INTRAMUSCULAR; INTRAVENOUS; SUBCUTANEOUS
Refills: 0 | Status: DISCONTINUED | OUTPATIENT
Start: 2022-04-27 | End: 2022-04-28

## 2022-04-27 RX ORDER — FOLIC ACID 0.8 MG
1 TABLET ORAL DAILY
Refills: 0 | Status: DISCONTINUED | OUTPATIENT
Start: 2022-04-27 | End: 2022-04-29

## 2022-04-27 RX ORDER — DEXTROSE 50 % IN WATER 50 %
12.5 SYRINGE (ML) INTRAVENOUS ONCE
Refills: 0 | Status: DISCONTINUED | OUTPATIENT
Start: 2022-04-27 | End: 2022-04-29

## 2022-04-27 RX ORDER — SODIUM CHLORIDE 9 MG/ML
1000 INJECTION, SOLUTION INTRAVENOUS
Refills: 0 | Status: DISCONTINUED | OUTPATIENT
Start: 2022-04-27 | End: 2022-04-29

## 2022-04-27 RX ORDER — THIAMINE MONONITRATE (VIT B1) 100 MG
100 TABLET ORAL DAILY
Refills: 0 | Status: DISCONTINUED | OUTPATIENT
Start: 2022-04-27 | End: 2022-04-29

## 2022-04-27 RX ORDER — DEXTROSE 50 % IN WATER 50 %
15 SYRINGE (ML) INTRAVENOUS ONCE
Refills: 0 | Status: DISCONTINUED | OUTPATIENT
Start: 2022-04-27 | End: 2022-04-29

## 2022-04-27 RX ORDER — ATORVASTATIN CALCIUM 80 MG/1
10 TABLET, FILM COATED ORAL AT BEDTIME
Refills: 0 | Status: DISCONTINUED | OUTPATIENT
Start: 2022-04-27 | End: 2022-04-29

## 2022-04-27 RX ADMIN — CEFEPIME 100 MILLIGRAM(S): 1 INJECTION, POWDER, FOR SOLUTION INTRAMUSCULAR; INTRAVENOUS at 02:22

## 2022-04-27 RX ADMIN — Medication 1 TABLET(S): at 12:03

## 2022-04-27 RX ADMIN — SODIUM CHLORIDE 500 MILLILITER(S): 9 INJECTION, SOLUTION INTRAVENOUS at 01:33

## 2022-04-27 RX ADMIN — Medication 12.5 MILLIGRAM(S): at 17:38

## 2022-04-27 RX ADMIN — INSULIN GLARGINE 10 UNIT(S): 100 INJECTION, SOLUTION SUBCUTANEOUS at 22:30

## 2022-04-27 RX ADMIN — ATORVASTATIN CALCIUM 10 MILLIGRAM(S): 80 TABLET, FILM COATED ORAL at 22:52

## 2022-04-27 RX ADMIN — Medication 1: at 07:57

## 2022-04-27 RX ADMIN — Medication 12.5 MILLIGRAM(S): at 05:05

## 2022-04-27 RX ADMIN — Medication 1 MILLIGRAM(S): at 12:03

## 2022-04-27 RX ADMIN — SODIUM CHLORIDE 100 MILLILITER(S): 9 INJECTION INTRAMUSCULAR; INTRAVENOUS; SUBCUTANEOUS at 21:13

## 2022-04-27 RX ADMIN — Medication 5 UNIT(S): at 12:02

## 2022-04-27 RX ADMIN — Medication 1: at 12:02

## 2022-04-27 RX ADMIN — Medication 5 UNIT(S): at 07:57

## 2022-04-27 RX ADMIN — SODIUM CHLORIDE 100 MILLILITER(S): 9 INJECTION INTRAMUSCULAR; INTRAVENOUS; SUBCUTANEOUS at 05:09

## 2022-04-27 RX ADMIN — Medication 100 MILLIGRAM(S): at 12:03

## 2022-04-27 RX ADMIN — Medication 1 PATCH: at 12:04

## 2022-04-27 RX ADMIN — Medication 81 MILLIGRAM(S): at 12:03
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
## 2022-04-27 NOTE — H&P ADULT - ATTENDING COMMENTS
****My note supersedes any discrepancies that may be above in the resident's note***    67 YO M with a pmh of TIA, CVA, CHF, HTN, HLD, Alcohol dependence, PE (2015, not on anticoagulation due to epistaxis)  presented to the ED epistaxis since yesterday.      #Acute blood loss anemia  #recurrent epistaxsis; currently not on A/C due to recurrent bleed  - patient reports not nasal trauma or manipulation  - s/p rhinorocket in ED  - hemodynamically stable   - hgb 11 (baseline 13), MCV 88, , INR 1.5,  - monitor cbc and transfuse < 8  - consider coagulation workup  - if epistaxsis still present; consult ENT     #dyspnea on exertion  # PE dx in 2015 - unprovoked per chart review  - was on Eliquis since that time  - pt denies any VTE events since 2015  - suspect symptomatic anemia?  - f/u CXR: Cardiomegaly without acute opacifications or effusions.  - d-dimer- negative and has good NPV, therefore low suspicion for PE    # DAYDAY  #lactic acidosis  # HAGMA  - ? unknown source  - creat 1.6 (baseline 0.8)-->1.5  - lactic acid 3.0-->2.5  - GAP 18-->16  - c/w with gentle hydration  - f/u urine lytes  - f/u repeat lactate and bmp    #leukocytosis/thrombocytosis most likely reactive  - previously elevated on recent admission with unknown etiology  - no fevers or focal signs/symptoms of infeciotn  - CXR unremarkable  - f/up on UA, Procal, and blood cultures  - monitor off abx  - trend cbc    #hx of etoh abuse  #smoking  - c/w thiamine and folic acid  - advised on etoh and smoking cessation  - nicotine patch     #HTN  - c/w metoprolol 12.5 bid; holding lisinopril 2.5 mg until dayday resolves     # CHF   - ECHO: Left ventricular ejection fraction, by visual estimation, is 60 to 65%  - lasxi 20 mg was discontinued on 6/2021 as patient bp soft and dayday     #TIA/CVA  - c/w aspirin 81 and statin     # HLD  - c/w atorvastatin 10    # GERD  - c/w protonix    #hyperglycemia  -  on admission  - f/u A1c  - started on insulin protocol     Diet: DASH/TLC/carb consistent   Activity: as tolerated   DVT Prophylaxis: SCD  GI Prophylaxis: protonix  Code Status: full  Disposition: acute     #Progress Note Handoff  Pending (specify):  monitoring of epistaxis, DAYDAY/HGMA resolution, Leukocytosis work up  Family discussion: patient updated and in agreement of plan  Disposition: Unknown at this time________

## 2022-04-27 NOTE — H&P ADULT - ASSESSMENT
Pt is a 67 YO M with a pmh of TIA, CVA, CHF, HTN, HLD, Alcohol dependence, PE (2015, not on anticoagulation due to epistaxis)  presented to the ED epistaxis since yesterday.      #recurrent epistaxsis; currently not on A/C due to recurrent bleed  - patient reports not nasal trauma or manipulation  - s/p rhinorocket in ED  - hemodynamically stable   - hgb 11 (baseline 13), MCV 88, , INR 1.5,  - monitor cbc and transfuse < 8  - consider coagulation workup  - if epistaxsis still present; consult ENT     #dyspnea on exertion  # PE dx in 2015 - unprovoked per chart review  - was on Eliquis since that time  - pt denies any VTE events since 2015  - f/u CXR, d-dimer and va duplex:    # DAYDAY  #lactic acidosis? unknown source? lab error?  -creat 1.6 (baseline 0.8)  - c/w with gentle hydration  - f/u urine lytes  - f/u repeat lactate and bmp    #leukocytosis/thrombocytosis most likely reactive  - previously elevated on recent admission with unknown etiology  - f/u UA, blood cultures, procal, CXR  - monitor off abx  - trend cbc    #hx of etoh abuse  - c/w thiamine and folic acid    #HTN  - c/w metoprolol 12.5 bid and lisinopril 2.5 mg daily     # CHF   - ECHO: Left ventricular ejection fraction, by visual estimation, is 60 to 65%  - lasxi 20 mg was discontinued on 6/2021 as patient bp soft and dayday     #TIA/CVA  - c/w aspirin 81 and statin     # HLD  - c/w atorvastatin 10    # GERD  - c/w protonix    #hyperglycemia  -  on admission  - f/u A1c  - started on insulin protocol     Diet: DASH/TLC/carb consistent   Activity: as tolerated   DVT Prophylaxis: SCD  GI Prophylaxis: protonix  Code Status: full  Disposition: acute        Pt is a 67 YO M with a pmh of TIA, CVA, CHF, HTN, HLD, Alcohol dependence, PE (2015, not on anticoagulation due to epistaxis)  presented to the ED epistaxis since yesterday.      #recurrent epistaxsis; currently not on A/C due to recurrent bleed  - patient reports not nasal trauma or manipulation  - s/p rhinorocket in ED  - hemodynamically stable   - hgb 11 (baseline 13), MCV 88, , INR 1.5,  - monitor cbc and transfuse < 8  - consider coagulation workup  - if epistaxsis still present; consult ENT     #dyspnea on exertion  # PE dx in 2015 - unprovoked per chart review  - was on Eliquis since that time  - pt denies any VTE events since 2015  - f/u CXR, d-dimer and va duplex:    # DAYDAY  #lactic acidosis? unknown source? lab error?  -creat 1.6 (baseline 0.8)  - c/w with gentle hydration  - f/u urine lytes  - f/u repeat lactate and bmp    #leukocytosis/thrombocytosis most likely reactive  - previously elevated on recent admission with unknown etiology  - f/u UA, blood cultures, procal, CXR  - monitor off abx  - trend cbc    #hx of etoh abuse  - c/w thiamine and folic acid    #HTN  - c/w metoprolol 12.5 bid; holding lisinopril 2.5 mg until dayday resolves     # CHF   - ECHO: Left ventricular ejection fraction, by visual estimation, is 60 to 65%  - lasxi 20 mg was discontinued on 6/2021 as patient bp soft and dayday     #TIA/CVA  - c/w aspirin 81 and statin     # HLD  - c/w atorvastatin 10    # GERD  - c/w protonix    #hyperglycemia  -  on admission  - f/u A1c  - started on insulin protocol     Diet: DASH/TLC/carb consistent   Activity: as tolerated   DVT Prophylaxis: SCD  GI Prophylaxis: protonix  Code Status: full  Disposition: acute        Pt is a 67 YO M with a pmh of TIA, CVA, CHF, HTN, HLD, Alcohol dependence, PE (2015, not on anticoagulation due to epistaxis)  presented to the ED epistaxis since yesterday.      #recurrent epistaxsis; currently not on A/C due to recurrent bleed  - patient reports not nasal trauma or manipulation  - s/p rhinorocket in ED  - hemodynamically stable   - hgb 11 (baseline 13), MCV 88, , INR 1.5,  - monitor cbc and transfuse < 8  - consider coagulation workup  - if epistaxsis still present; consult ENT     #dyspnea on exertion  # PE dx in 2015 - unprovoked per chart review  - was on Eliquis since that time  - pt denies any VTE events since 2015  - f/u CXR, d-dimer and va duplex:    # DAYDAY  #lactic acidosis? unknown source? lab error?  -creat 1.6 (baseline 0.8)  - c/w with gentle hydration  - f/u urine lytes  - f/u repeat lactate and bmp    #leukocytosis/thrombocytosis most likely reactive  - previously elevated on recent admission with unknown etiology  - f/u UA, blood cultures, procal, CXR  - monitor off abx  - trend cbc    #hx of etoh abuse  #smoking  - c/w thiamine and folic acid  - advised on etoh and smoking cessation  - nicotine patch     #HTN  - c/w metoprolol 12.5 bid; holding lisinopril 2.5 mg until dayday resolves     # CHF   - ECHO: Left ventricular ejection fraction, by visual estimation, is 60 to 65%  - lasxi 20 mg was discontinued on 6/2021 as patient bp soft and dayday     #TIA/CVA  - c/w aspirin 81 and statin     # HLD  - c/w atorvastatin 10    # GERD  - c/w protonix    #hyperglycemia  -  on admission  - f/u A1c  - started on insulin protocol     Diet: DASH/TLC/carb consistent   Activity: as tolerated   DVT Prophylaxis: SCD  GI Prophylaxis: protonix  Code Status: full  Disposition: acute

## 2022-04-27 NOTE — H&P ADULT - NSHPPHYSICALEXAM_GEN_ALL_CORE
PHYSICAL EXAM:  GENERAL: NAD, lying in bed comfortably  HEAD:  + rhinorhocket; stable   ENT: dry mucous membranes  CHEST/LUNG: + rhonchi Unlabored respirations  HEART: Regular rate and rhythm; No murmurs, rubs, or gallops  ABDOMEN: Soft, Nontender, Nondistended.   EXTREMITIES:  No clubbing, cyanosis, or edema  NERVOUS SYSTEM:  Alert & Oriented X3, speech clear. No deficits

## 2022-04-27 NOTE — H&P ADULT - NSHPLABSRESULTS_GEN_ALL_CORE
<<<<<LABS>>>>>                        11.1   20.99 )-----------( 435      ( 26 Apr 2022 22:39 )             34.2     04-26    133<L>  |  98  |  23<H>  ----------------------------<  285<H>  4.7   |  17  |  1.6<H>    Ca    9.5      26 Apr 2022 22:39  Mg     2.1     04-26    TPro  7.0  /  Alb  3.8  /  TBili  0.4  /  DBili  x   /  AST  29  /  ALT  41  /  AlkPhos  100  04-26    PT/INR - ( 26 Apr 2022 21:12 )   PT: 17.70 sec;   INR: 1.55 ratio         PTT - ( 26 Apr 2022 21:12 )  PTT:32.2 sec      Troponin T, Serum: <0.01 ng/mL (04-26-22 @ 22:39)    [id number]  CARDIAC MARKERS ( 26 Apr 2022 22:39 )  x     / <0.01 ng/mL / x     / x     / x

## 2022-04-27 NOTE — H&P ADULT - NSHPSOCIALHISTORY_GEN_ALL_CORE
Pt is a smoker, smokes 1-2 PPD, drinks 3-4 mixed drinks a 3 times a month, denies illicit drug use Pt is a smoker, smokes 1-2 PPD, drinks 3-4 mixed drinks a 3 times a month (last drink 1 month ago), denies illicit drug use

## 2022-04-28 LAB
ANION GAP SERPL CALC-SCNC: 12 MMOL/L — SIGNIFICANT CHANGE UP (ref 7–14)
APTT BLD: 32.5 SEC — SIGNIFICANT CHANGE UP (ref 27–39.2)
BLD GP AB SCN SERPL QL: SIGNIFICANT CHANGE UP
BUN SERPL-MCNC: 30 MG/DL — HIGH (ref 10–20)
CALCIUM SERPL-MCNC: 8.7 MG/DL — SIGNIFICANT CHANGE UP (ref 8.5–10.1)
CHLORIDE SERPL-SCNC: 106 MMOL/L — SIGNIFICANT CHANGE UP (ref 98–110)
CO2 SERPL-SCNC: 22 MMOL/L — SIGNIFICANT CHANGE UP (ref 17–32)
CREAT SERPL-MCNC: 0.9 MG/DL — SIGNIFICANT CHANGE UP (ref 0.7–1.5)
EGFR: 95 ML/MIN/1.73M2 — SIGNIFICANT CHANGE UP
GLUCOSE BLDC GLUCOMTR-MCNC: 108 MG/DL — HIGH (ref 70–99)
GLUCOSE BLDC GLUCOMTR-MCNC: 110 MG/DL — HIGH (ref 70–99)
GLUCOSE BLDC GLUCOMTR-MCNC: 131 MG/DL — HIGH (ref 70–99)
GLUCOSE BLDC GLUCOMTR-MCNC: 143 MG/DL — HIGH (ref 70–99)
GLUCOSE BLDC GLUCOMTR-MCNC: 256 MG/DL — HIGH (ref 70–99)
GLUCOSE SERPL-MCNC: 116 MG/DL — HIGH (ref 70–99)
HCT VFR BLD CALC: 24.9 % — LOW (ref 42–52)
HCT VFR BLD CALC: 26.2 % — LOW (ref 42–52)
HGB BLD-MCNC: 7.9 G/DL — LOW (ref 14–18)
HGB BLD-MCNC: 8.5 G/DL — LOW (ref 14–18)
INR BLD: 1.09 RATIO — SIGNIFICANT CHANGE UP (ref 0.65–1.3)
LACTATE SERPL-SCNC: 1.8 MMOL/L — SIGNIFICANT CHANGE UP (ref 0.7–2)
MAGNESIUM SERPL-MCNC: 2 MG/DL — SIGNIFICANT CHANGE UP (ref 1.8–2.4)
MCHC RBC-ENTMCNC: 28.5 PG — SIGNIFICANT CHANGE UP (ref 27–31)
MCHC RBC-ENTMCNC: 28.9 PG — SIGNIFICANT CHANGE UP (ref 27–31)
MCHC RBC-ENTMCNC: 31.7 G/DL — LOW (ref 32–37)
MCHC RBC-ENTMCNC: 32.4 G/DL — SIGNIFICANT CHANGE UP (ref 32–37)
MCV RBC AUTO: 89.1 FL — SIGNIFICANT CHANGE UP (ref 80–94)
MCV RBC AUTO: 89.9 FL — SIGNIFICANT CHANGE UP (ref 80–94)
NRBC # BLD: 0 /100 WBCS — SIGNIFICANT CHANGE UP (ref 0–0)
NRBC # BLD: 0 /100 WBCS — SIGNIFICANT CHANGE UP (ref 0–0)
PLATELET # BLD AUTO: 272 K/UL — SIGNIFICANT CHANGE UP (ref 130–400)
PLATELET # BLD AUTO: 281 K/UL — SIGNIFICANT CHANGE UP (ref 130–400)
POTASSIUM SERPL-MCNC: 4.5 MMOL/L — SIGNIFICANT CHANGE UP (ref 3.5–5)
POTASSIUM SERPL-SCNC: 4.5 MMOL/L — SIGNIFICANT CHANGE UP (ref 3.5–5)
PROTHROM AB SERPL-ACNC: 12.5 SEC — SIGNIFICANT CHANGE UP (ref 9.95–12.87)
RBC # BLD: 2.77 M/UL — LOW (ref 4.7–6.1)
RBC # BLD: 2.94 M/UL — LOW (ref 4.7–6.1)
RBC # FLD: 13.8 % — SIGNIFICANT CHANGE UP (ref 11.5–14.5)
RBC # FLD: 13.9 % — SIGNIFICANT CHANGE UP (ref 11.5–14.5)
SODIUM SERPL-SCNC: 140 MMOL/L — SIGNIFICANT CHANGE UP (ref 135–146)
WBC # BLD: 15.55 K/UL — HIGH (ref 4.8–10.8)
WBC # BLD: 17.28 K/UL — HIGH (ref 4.8–10.8)
WBC # FLD AUTO: 15.55 K/UL — HIGH (ref 4.8–10.8)
WBC # FLD AUTO: 17.28 K/UL — HIGH (ref 4.8–10.8)

## 2022-04-28 PROCEDURE — 99233 SBSQ HOSP IP/OBS HIGH 50: CPT

## 2022-04-28 RX ADMIN — Medication 5 UNIT(S): at 12:58

## 2022-04-28 RX ADMIN — Medication 100 MILLIGRAM(S): at 12:59

## 2022-04-28 RX ADMIN — Medication 1 MILLIGRAM(S): at 13:00

## 2022-04-28 RX ADMIN — Medication 1 PATCH: at 13:14

## 2022-04-28 RX ADMIN — SODIUM CHLORIDE 100 MILLILITER(S): 9 INJECTION INTRAMUSCULAR; INTRAVENOUS; SUBCUTANEOUS at 07:27

## 2022-04-28 RX ADMIN — Medication 5 UNIT(S): at 16:47

## 2022-04-28 RX ADMIN — ATORVASTATIN CALCIUM 10 MILLIGRAM(S): 80 TABLET, FILM COATED ORAL at 21:09

## 2022-04-28 RX ADMIN — Medication 12.5 MILLIGRAM(S): at 05:32

## 2022-04-28 RX ADMIN — SODIUM CHLORIDE 100 MILLILITER(S): 9 INJECTION INTRAMUSCULAR; INTRAVENOUS; SUBCUTANEOUS at 16:51

## 2022-04-28 RX ADMIN — INSULIN GLARGINE 10 UNIT(S): 100 INJECTION, SOLUTION SUBCUTANEOUS at 21:09

## 2022-04-28 RX ADMIN — Medication 1 PATCH: at 07:52

## 2022-04-28 RX ADMIN — Medication 1 PATCH: at 13:00

## 2022-04-28 RX ADMIN — Medication 5 UNIT(S): at 07:47

## 2022-04-28 RX ADMIN — Medication 81 MILLIGRAM(S): at 12:59

## 2022-04-28 RX ADMIN — Medication 1 TABLET(S): at 12:59

## 2022-04-28 NOTE — PATIENT PROFILE ADULT - FALL HARM RISK - HARM RISK INTERVENTIONS
Assistance OOB with selected safe patient handling equipment/Communicate Risk of Fall with Harm to all staff/Discuss with provider need for PT consult/Monitor gait and stability/Provide patient with walking aids - walker, cane, crutches/Reinforce activity limits and safety measures with patient and family/Tailored Fall Risk Interventions/Use of alarms - bed, chair and/or voice tab/Visual Cue: Yellow wristband and red socks/Bed in lowest position, wheels locked, appropriate side rails in place/Call bell, personal items and telephone in reach/Instruct patient to call for assistance before getting out of bed or chair/Non-slip footwear when patient is out of bed/[place] to call system/Physically safe environment - no spills, clutter or unnecessary equipment/Purposeful Proactive Rounding/Room/bathroom lighting operational, light cord in reach Assistance with ambulation/Assistance OOB with selected safe patient handling equipment/Communicate Risk of Fall with Harm to all staff/Discuss with provider need for PT consult/Monitor gait and stability/Provide patient with walking aids - walker, cane, crutches/Reinforce activity limits and safety measures with patient and family/Tailored Fall Risk Interventions/Use of alarms - bed, chair and/or voice tab/Visual Cue: Yellow wristband and red socks/Bed in lowest position, wheels locked, appropriate side rails in place/Call bell, personal items and telephone in reach/Instruct patient to call for assistance before getting out of bed or chair/Non-slip footwear when patient is out of bed/[place] to call system/Physically safe environment - no spills, clutter or unnecessary equipment/Purposeful Proactive Rounding/Room/bathroom lighting operational, light cord in reach

## 2022-04-28 NOTE — PROGRESS NOTE ADULT - ASSESSMENT
Pt is a 65 YO M with a pmh of TIA, CVA, CHF, HTN, HLD, Alcohol dependence, PE (2015, not on anticoagulation due to epistaxis)  presented to the ED epistaxis since yesterday.      #Recurrent epistaxsis; currently not on A/C due to recurrent bleed  - patient reports no nasal trauma or manipulation  - s/p rhinorocket in ED  -Epistaxis resolved for now   - hemodynamically stable   - hgb 11 -->8.5 this AM (baseline 13), MCV 88, , INR 1.5,  - monitor cbc q12h and transfuse < 8  -cbc 4pm      #dyspnea on exertion  # PE dx in 2015 - unprovoked per chart review  - was on Eliquis since that time  - pt denies any VTE events since 2015  - f/u CXR, d-dimer <150    -Duplex neg for DVT     # DAYDAY Resolved   #lactic acidosis Improving   -creat 1.6 --> 0.9(baseline 0.8)  -TREND LACTATE   - c/w with gentle hydration  - f/u urine lytes  - f/u repeat lactate and bmp    #leukocytosis/thrombocytosis most likely reactive  - previously elevated on recent admission with unknown etiology  - f/u UA, blood cultures, procal, CXR  - monitor off abx  - trend cbc    #hx of EtOH abuse  #smoking  - c/w thiamine and folic acid  - advised on etoh and smoking cessation  - nicotine patch     #HTN  - c/w metoprolol 12.5 bid; holding lisinopril 2.5 mg until dayday resolves     # CHF   - ECHO: Left ventricular ejection fraction, by visual estimation, is 60 to 65%  - lasxi 20 mg was discontinued on 6/2021 as patient bp soft and dayday     #TIA/CVA  - c/w aspirin 81 and statin     # HLD  - c/w atorvastatin 10    # GERD  - c/w protonix    #hyperglycemia  -  on admission  - f/u A1c  - started on insulin protocol     Diet: DASH/TLC/carb consistent   Activity: as tolerated   DVT Prophylaxis: SCD  GI Prophylaxis: protonix  Code Status: full  Disposition: acute

## 2022-04-28 NOTE — PROGRESS NOTE ADULT - SUBJECTIVE AND OBJECTIVE BOX
DAILY PROGRESS NOTE  ===========================================================    Patient Information:  CHAPARRO ROBISON  /  66y  /  Male  /  MRN#: [id number]    Hospital Day: 1d     |:::::::::::::::::::::::::::| SUBJECTIVE |:::::::::::::::::::::::::::|    OVERNIGHT EVENTS:   TODAY: Patient was seen today at bedside. Review of systems is otherwise negative.    |:::::::::::::::::::::::::::| OBJECTIVE |:::::::::::::::::::::::::::|    VITAL SIGNS: Last 24 Hours  T(C): 36.1 (28 Apr 2022 04:55), Max: 36.6 (27 Apr 2022 20:19)  T(F): 97 (28 Apr 2022 04:55), Max: 97.8 (27 Apr 2022 20:19)  HR: 63 (28 Apr 2022 04:55) (63 - 106)  BP: 137/61 (28 Apr 2022 04:55) (137/61 - 150/65)  BP(mean): --  RR: 18 (28 Apr 2022 04:55) (17 - 18)  SpO2: 95% (28 Apr 2022 05:42) (95% - 95%)    04-27-22 @ 07:01  -  04-28-22 @ 07:00  --------------------------------------------------------  IN: 440 mL / OUT: 950 mL / NET: -510 mL    04-28-22 @ 07:01  - 04-28-22 @ 12:16  --------------------------------------------------------  IN: 220 mL / OUT: 0 mL / NET: 220 mL      PHYSICAL EXAM:  GENERAL:   Awake, alert; NAD.  HEENT:  Head NC/AT; Conjunctivae pink, Sclera anicteric; Oral mucosa moist.  CARDIO:   Regular rate; Regular rhythm  RESP:   No rales, wheezing, or rhonchi appreciated.  GI:   Soft; NT/ND; BS; No guarding; No rebound tenderness.  EXT:   No edema.   SKIN:   Intact.    LAB RESULTS:                        8.5    17.28 )-----------( 281      ( 28 Apr 2022 08:00 )             26.2     04-28    140  |  106  |  30<H>  ----------------------------<  116<H>  4.5   |  22  |  0.9    Ca    8.7      28 Apr 2022 08:00  Mg     2.0     04-28    TPro  6.7  /  Alb  3.9  /  TBili  0.3  /  DBili  x   /  AST  18  /  ALT  35  /  AlkPhos  102  04-27    PT/INR - ( 28 Apr 2022 08:00 )   PT: 12.50 sec;   INR: 1.09 ratio         PTT - ( 28 Apr 2022 08:00 )  PTT:32.5 sec        CARDIAC MARKERS ( 26 Apr 2022 22:39 )  x     / <0.01 ng/mL / x     / x     / x          MICROBIOLOGY:    RADIOLOGY:    ALLERGIES:  No Known Allergies      ===========================================================

## 2022-04-28 NOTE — PROGRESS NOTE ADULT - ATTENDING COMMENTS
Pt is a 65 YO M with a pmh of TIA, CVA, CHF, HTN, HLD, Alcohol dependence, PE (2015, not on anticoagulation due to epistaxis)  presented to the ED with epistaxis.     #Recurrent epistaxsis; currently not on A/C due to recurrent bleed  - patient reports no nasal trauma or manipulation  - s/p rhinorocket in ED  - Epistaxis resolved for now   - hemodynamically stable   - hgb 11 -->8.5 this AM (baseline 13)  - monitor cbc q12h, if stable then dc tomorrow     #Dyspnea on exertion  # PE dx in 2015 - unprovoked per chart review  - dyspnea likely 2/2 anemia   - f/u CXR- CM without opacifications or effusions , d-dimer <150    - Duplex neg for DVT     # DAYDAY Resolved   # lactic acidosis- resolved     #leukocytosis  - notably elevated since April 11th   - no other s/s sepsis   - advise outpatient follow up with heme/onc - will need BCR-ABL/flow cytometry etc - can be done outpatient     #hx of EtOH abuse  #smoking  - c/w thiamine and folic acid  - advised on etoh and smoking cessation  - nicotine patch     #HTN  - c/w metoprolol 12.5 bid; resume lisinopril     # CHF   - ECHO: Left ventricular ejection fraction, by visual estimation, is 60 to 65%  - lasxi 20 mg was discontinued on 6/2021 as patient bp soft and dayday     #TIA/CVA  - c/w aspirin 81 and statin     # HLD  - c/w atorvastatin 10    # GERD  - c/w protonix    #DM w/ hyperglycemia- resolved   - A1C 7   - c/w basal bolus     Holding dvt ppx       #Progress Note Handoff:  Pending (specify):  ant dc tomorrow if hb stable   Family discussion: d/w pt   Disposition: Home___/SNF___/Other________/Unknown at this time__x______    Total time spent to complete patient's bedside assessment, review medical chart, discuss medical plan of care with covering medical team was more than 35 minutes  with >50% of time spent face to face with patient, discussion with patient/family and/or coordination of care      Merna Dior DO

## 2022-04-29 ENCOUNTER — TRANSCRIPTION ENCOUNTER (OUTPATIENT)
Age: 67
End: 2022-04-29

## 2022-04-29 VITALS
HEART RATE: 67 BPM | RESPIRATION RATE: 18 BRPM | DIASTOLIC BLOOD PRESSURE: 63 MMHG | TEMPERATURE: 99 F | SYSTOLIC BLOOD PRESSURE: 130 MMHG

## 2022-04-29 LAB
ALBUMIN SERPL ELPH-MCNC: 3.5 G/DL — SIGNIFICANT CHANGE UP (ref 3.5–5.2)
ALP SERPL-CCNC: 70 U/L — SIGNIFICANT CHANGE UP (ref 30–115)
ALT FLD-CCNC: 27 U/L — SIGNIFICANT CHANGE UP (ref 0–41)
ANION GAP SERPL CALC-SCNC: 11 MMOL/L — SIGNIFICANT CHANGE UP (ref 7–14)
APPEARANCE UR: ABNORMAL
AST SERPL-CCNC: 19 U/L — SIGNIFICANT CHANGE UP (ref 0–41)
BACTERIA # UR AUTO: ABNORMAL
BILIRUB SERPL-MCNC: 0.3 MG/DL — SIGNIFICANT CHANGE UP (ref 0.2–1.2)
BILIRUB UR-MCNC: NEGATIVE — SIGNIFICANT CHANGE UP
BUN SERPL-MCNC: 15 MG/DL — SIGNIFICANT CHANGE UP (ref 10–20)
CALCIUM SERPL-MCNC: 8.6 MG/DL — SIGNIFICANT CHANGE UP (ref 8.5–10.1)
CHLORIDE SERPL-SCNC: 105 MMOL/L — SIGNIFICANT CHANGE UP (ref 98–110)
CO2 SERPL-SCNC: 24 MMOL/L — SIGNIFICANT CHANGE UP (ref 17–32)
COLOR SPEC: SIGNIFICANT CHANGE UP
CREAT ?TM UR-MCNC: 50 MG/DL — SIGNIFICANT CHANGE UP
CREAT SERPL-MCNC: 0.9 MG/DL — SIGNIFICANT CHANGE UP (ref 0.7–1.5)
DIFF PNL FLD: NEGATIVE — SIGNIFICANT CHANGE UP
EGFR: 95 ML/MIN/1.73M2 — SIGNIFICANT CHANGE UP
EPI CELLS # UR: 0 /HPF — SIGNIFICANT CHANGE UP (ref 0–5)
GLUCOSE BLDC GLUCOMTR-MCNC: 114 MG/DL — HIGH (ref 70–99)
GLUCOSE BLDC GLUCOMTR-MCNC: 145 MG/DL — HIGH (ref 70–99)
GLUCOSE SERPL-MCNC: 101 MG/DL — HIGH (ref 70–99)
GLUCOSE UR QL: NEGATIVE — SIGNIFICANT CHANGE UP
HCT VFR BLD CALC: 24.7 % — LOW (ref 42–52)
HGB BLD-MCNC: 7.8 G/DL — LOW (ref 14–18)
HYALINE CASTS # UR AUTO: 0 /LPF — SIGNIFICANT CHANGE UP (ref 0–7)
KETONES UR-MCNC: NEGATIVE — SIGNIFICANT CHANGE UP
LEUKOCYTE ESTERASE UR-ACNC: NEGATIVE — SIGNIFICANT CHANGE UP
MAGNESIUM SERPL-MCNC: 2.1 MG/DL — SIGNIFICANT CHANGE UP (ref 1.8–2.4)
MCHC RBC-ENTMCNC: 28.5 PG — SIGNIFICANT CHANGE UP (ref 27–31)
MCHC RBC-ENTMCNC: 31.6 G/DL — LOW (ref 32–37)
MCV RBC AUTO: 90.1 FL — SIGNIFICANT CHANGE UP (ref 80–94)
NITRITE UR-MCNC: NEGATIVE — SIGNIFICANT CHANGE UP
NRBC # BLD: 0 /100 WBCS — SIGNIFICANT CHANGE UP (ref 0–0)
OSMOLALITY UR: 444 MOS/KG — SIGNIFICANT CHANGE UP (ref 50–1200)
PH UR: 6 — SIGNIFICANT CHANGE UP (ref 5–8)
PLATELET # BLD AUTO: 250 K/UL — SIGNIFICANT CHANGE UP (ref 130–400)
POTASSIUM SERPL-MCNC: 4.1 MMOL/L — SIGNIFICANT CHANGE UP (ref 3.5–5)
POTASSIUM SERPL-SCNC: 4.1 MMOL/L — SIGNIFICANT CHANGE UP (ref 3.5–5)
POTASSIUM UR-SCNC: 17 MMOL/L — SIGNIFICANT CHANGE UP
PROT SERPL-MCNC: 6.1 G/DL — SIGNIFICANT CHANGE UP (ref 6–8)
PROT UR-MCNC: NEGATIVE — SIGNIFICANT CHANGE UP
RBC # BLD: 2.74 M/UL — LOW (ref 4.7–6.1)
RBC # FLD: 13.7 % — SIGNIFICANT CHANGE UP (ref 11.5–14.5)
RBC CASTS # UR COMP ASSIST: 2 /HPF — SIGNIFICANT CHANGE UP (ref 0–4)
SARS-COV-2 RNA SPEC QL NAA+PROBE: SIGNIFICANT CHANGE UP
SODIUM SERPL-SCNC: 140 MMOL/L — SIGNIFICANT CHANGE UP (ref 135–146)
SODIUM UR-SCNC: 127 MMOL/L — SIGNIFICANT CHANGE UP
SP GR SPEC: 1.01 — SIGNIFICANT CHANGE UP (ref 1.01–1.03)
UROBILINOGEN FLD QL: SIGNIFICANT CHANGE UP
WBC # BLD: 12.3 K/UL — HIGH (ref 4.8–10.8)
WBC # FLD AUTO: 12.3 K/UL — HIGH (ref 4.8–10.8)
WBC UR QL: 2 /HPF — SIGNIFICANT CHANGE UP (ref 0–5)

## 2022-04-29 PROCEDURE — 99239 HOSP IP/OBS DSCHRG MGMT >30: CPT

## 2022-04-29 RX ORDER — NICOTINE POLACRILEX 2 MG
1 GUM BUCCAL
Qty: 0 | Refills: 0 | DISCHARGE
Start: 2022-04-29

## 2022-04-29 RX ADMIN — Medication 12.5 MILLIGRAM(S): at 05:31

## 2022-04-29 RX ADMIN — Medication 5 UNIT(S): at 07:47

## 2022-04-29 RX ADMIN — Medication 81 MILLIGRAM(S): at 11:15

## 2022-04-29 RX ADMIN — Medication 5 UNIT(S): at 11:18

## 2022-04-29 RX ADMIN — Medication 1 MILLIGRAM(S): at 11:15

## 2022-04-29 RX ADMIN — Medication 1 PATCH: at 11:16

## 2022-04-29 RX ADMIN — Medication 1 TABLET(S): at 11:15

## 2022-04-29 RX ADMIN — Medication 1 PATCH: at 05:33

## 2022-04-29 RX ADMIN — Medication 100 MILLIGRAM(S): at 11:15
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
## 2022-04-29 NOTE — PHYSICAL THERAPY INITIAL EVALUATION ADULT - GAIT DEVIATIONS NOTED, PT EVAL
Pt c/o fatigue, requested to return to bed./decreased fish/decreased velocity of limb motion/decreased step length/decreased stride length

## 2022-04-29 NOTE — DISCHARGE NOTE PROVIDER - ATTENDING DISCHARGE PHYSICAL EXAMINATION:
Gen- middle-age M, NAD, non toxic appearing  Eyes- anicteric sclera, non injected conjunctiva, EOMI  ENT- hearing grossly intact, oropharynx clear, MMM  Cardiac- RRR, normal s1s2, no RGM appreciated  Chest- symmetrical chest rise, CTAB  Abdominal- protuberant, soft, non ttp  Skin- warm, dry, intact  Neuro- Aox3, normal mentation, CN 2-12 grossly intact

## 2022-04-29 NOTE — DISCHARGE NOTE PROVIDER - NS AS DC PROVIDER CONTACT Y/N MULTI
PRE-SURGICAL INSTRUCTIONS        Patient's Name:  Nicolasa Soto      [id number] Date:  8/23/2021              Surgery Date:  8/30/2021                1. Do NOT eat or drink anything, including candy, gum, or ice chips after midnight on 7/29, unless you have specific instructions from your surgeon or anesthesia provider to do so.  2. You may brush your teeth before coming to the hospital.  3. No smoking 24 hours prior to the day of surgery. 4. No alcohol 24 hours prior to the day of surgery. 5. No recreational drugs for one week prior to the day of surgery. 6. Leave all valuables, including money/purse, at home. 7. Remove all jewelry, nail polish, acrylic nails, and makeup (including mascara); no lotions powders, deodorant, or perfume/cologne/after shave on the skin. 8. Glasses/contact lenses and dentures may be worn to the hospital.  They will be removed prior to surgery. 9. Call your doctor if symptoms of a cold or illness develop within 24-48 hours prior to your surgery. 10.  AN ADULT MUST DRIVE YOU HOME AFTER OUTPATIENT SURGERY. 11.  If you are having an outpatient procedure, please make arrangements for a responsible adult to be with you for 24 hours after your surgery. 12.  NO VISITORS in the hospital at this time for outpatient procedures. Exceptions may be made for surgical admissions, per nursing unit guidelines      Special Instructions:      Bring any pertinent legal medical records. Take these medications the morning of surgery with a sip of water:  NONE    On the day of surgery, come in the main entrance of [facility]. Let the  at the desk know you are there for surgery. A staff member will come escort you to the surgical area on the second floor.     If you have any questions or concerns, please do not hesitate to call:     (Prior to the day of surgery) PAT department:  [phone]   (Day of surgery) Pre-Op department:  [phone]    These surgical instructions were reviewed with Genette Fillers during the PAT phone call. Yes

## 2022-04-29 NOTE — DISCHARGE NOTE PROVIDER - NSDCQMPCI_CARD_ALL_CORE
[facility] [address] 
[phone] Patient: Ann Fall MRN: [id number] :1960 Visit Information Date & Time Provider Department Dept. Phone Encounter #  
 2018  9:40 AM DO [facility] at [address] [id number] Follow-up Instructions Return in about 3 months (around 2018). Your Appointments 7/10/2018 10:00 AM  
ROUTINE CARE with [place] NP [id number] [facility] ([address]) Appt Note: 3 MO F/U  
 [address] 
[facility] [address]  
[facility] [address] Upcoming Health Maintenance Date Due Influenza Age 5 to Adult 2018 MEDICARE YEARLY EXAM 2018 BREAST CANCER SCRN MAMMOGRAM 2019 PAP AKA CERVICAL CYTOLOGY 2019 COLONOSCOPY 3/22/2026 DTaP/Tdap/Td series (2 - Td) 2026 Allergies as of 2018  Review Complete On: 2018 By: Kiersten Davis DO Severity Noted Reaction Type Reactions Ambien [Zolpidem]  2016    Unknown (comments) Causes Migraine Cephalexin  2016    Rash Morphine  2015    Rash Motrin [Ibuprofen]  2015    Swelling Nortriptyline  2016    Unknown (comments) Bleeding from mouth Pepcid [Famotidine]  2017    Other (comments) Migraines. Protonix [Pantoprazole]  2016    Rash Vicodin [Hydrocodone-acetaminophen]  2016    Unknown (comments) Causes Migraines Vitamins For Infusion  2016    Unknown (comments) Pt stated all vitamins cause her lightheadness and sick Current Immunizations  Reviewed on 3/13/2018 Name Date Influenza High Dose Vaccine PF 8/15/2016 Influenza Vaccine 2015 Influenza Vaccine (Quad) PF 2017 Pneumococcal Vaccine (Unspecified Type) 9/1/2015 Tdap 12/13/2016 Zoster Vaccine, Live 6/13/2017 Not reviewed this visit You Were Diagnosed With   
  
 Codes Comments Migraine without aura and without status migrainosus, not intractable    -  Primary ICD-10-CM: G43.009 ICD-9-CM: 346.10 Chronic daily headache     ICD-10-CM: R51 ICD-9-CM: 784.0 Depression, unspecified depression type     ICD-10-CM: F32.9 ICD-9-CM: 927 Vitals BP Pulse Resp Weight(growth percentile) SpO2 BMI  
 120/68 (!) 57 18 165 lb (74.8 kg) 98% 25.84 kg/m2 OB Status Smoking Status Hysterectomy Never Smoker BMI and BSA Data Body Mass Index Body Surface Area  
 25.84 kg/m 2 1.88 m 2 Preferred Pharmacy Pharmacy Name Phone [facility] [address]  [phone] Your Updated Medication List  
  
   
This list is accurate as of 5/1/18  9:49 AM.  Always use your most recent med list.  
  
  
  
  
 * albuterol 2.5 mg /3 mL (0.083 %) nebulizer solution Commonly known as:  PROVENTIL VENTOLIN  
by Nebulization route once. * albuterol 90 mcg/actuation inhaler Commonly known as:  PROVENTIL HFA, VENTOLIN HFA, PROAIR HFA Take 2 Puffs by inhalation every four (4) hours as needed for Wheezing. * albuterol 2.5 mg /3 mL (0.083 %) nebulizer solution Commonly known as:  PROVENTIL VENTOLIN  
3 mL by Nebulization route every four (4) hours as needed for Wheezing. aspirin 81 mg chewable tablet Take 81 mg by mouth daily. Cetirizine 10 mg Cap Take  by mouth. divalproex  mg tablet Commonly known as:  DEPAKOTE Take 1 Tab by mouth two (2) times a day. DULoxetine 20 mg capsule Commonly known as:  CYMBALTA Take 1 Cap by mouth daily. fluticasone-salmeterol 250-50 mcg/dose diskus inhaler Commonly known as:  ADVAIR Take 1 Puff by inhalation every twelve (12) hours. gabapentin 400 mg capsule Commonly known as:  NEURONTIN  
take 1 capsule by mouth three times a day  
  
 hydrocortisone 2.5 % rectal cream  
Commonly known as:  ANUSOL-HC INSERT INTO RECTUM four times a day  
  
 montelukast 10 mg tablet Commonly known as:  SINGULAIR  
take 1 tablet by mouth once daily NAMENDA XR 28 mg capsule Generic drug:  memantine ER Take  by mouth daily. raNITIdine 150 mg tablet Commonly known as:  ZANTAC  
take 1 tablet by mouth twice a day  
  
 simvastatin 20 mg tablet Commonly known as:  ZOCOR Take 1 Tab by mouth nightly. SUMAtriptan 50 mg tablet Commonly known as:  IMITREX Take 1 Tab by mouth once as needed for Migraine for up to 1 dose. topiramate 100 mg tablet Commonly known as:  TOPAMAX Take 1 Tab by mouth two (2) times a day. * Notice: This list has 3 medication(s) that are the same as other medications prescribed for you. Read the directions carefully, and ask your doctor or other care provider to review them with you. Prescriptions Sent to Pharmacy Refills DULoxetine (CYMBALTA) 20 mg capsule 2 Sig: Take 1 Cap by mouth daily. Class: Normal  
 Pharmacy: [address] Ph #: [phone] Route: Oral  
 topiramate (TOPAMAX) 100 mg tablet 2 Sig: Take 1 Tab by mouth two (2) times a day. Class: Normal  
 Pharmacy: [address] Ph #: [phone] Route: Oral  
 divalproex DR (DEPAKOTE) 500 mg tablet 2 Sig: Take 1 Tab by mouth two (2) times a day. Class: Normal  
 Pharmacy: [address] Ph #: [phone] Route: Oral  
  
Follow-up Instructions Return in about 3 months (around 8/1/2018). Introducing [facility]!    
 [employer] introduces [employer] patient portal. Now you can access parts of your medical record, email your doctor's office, and request medication refills online. 1. In your internet browser, go to [email or web address] 2. Click on the First Time User? Click Here link in the Sign In box. You will see the New Member Sign Up page. 3. Enter your Home[employer] Access Code exactly as it appears below. You will not need to use this code after youve completed the sign-up process. If you do not sign up before the expiration date, you must request a new code. · [employer] Access Code: [email or web address] Expires: 6/6/2018 10:30 AM 
 
4. Enter the last four digits of your Social Security Number (xxxx) and Date of Birth (mm/dd/yyyy) as indicated and click Submit. You will be taken to the next sign-up page. 5. Create a [employer]Me[employer]ru ID. This will be your [employer]Me[employer] login ID and cannot be changed, so think of one that is secure and easy to remember. 6. Create a [employer] password. You can change your password at any time. 7. Enter your Password Reset Question and Answer. This can be used at a later time if you forget your password. 8. Enter your e-mail address. You will receive e-mail notification when new information is available in [address] 9. Click Sign Up. You can now view and download portions of your medical record. 10. Click the Download Summary menu link to download a portable copy of your medical information. If you have questions, please visit the Frequently Asked Questions section of the [employer] website. Remember, Home[employer] is NOT to be used for urgent needs. For medical emergencies, dial 911. Now available from your iPhone and Android! Please provide this summary of care documentation to your next provider. Your primary care clinician is listed as Edson Nation. If you have any questions after today's visit, please call [phone].

## 2022-04-29 NOTE — DISCHARGE NOTE PROVIDER - PROVIDER TOKENS
PROVIDER:[TOKEN:[[id number]:MIIS:[id number]],FOLLOWUP:[1 week]] PROVIDER:[TOKEN:[1071:MIIS:[id number]],FOLLOWUP:[1 week]],FREE:[LAST:[Your PCP],PHONE:[(   )    -],FAX:[(   )    -]]

## 2022-04-29 NOTE — DISCHARGE NOTE PROVIDER - CARE PROVIDERS DIRECT ADDRESSES
,[email or web address],DirectAddress_Unknown
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
## 2022-04-29 NOTE — DISCHARGE NOTE PROVIDER - CARE PROVIDER_API CALL
Marcial Mahoney)  Otolaryngology  [address] [place] [address], [address] [place] [address]  Phone: [phone]  Fax: [phone]  Follow Up Time: 1 week   Marcial Mahoeny)  Otolaryngology  [address] [place] [address], [address]nd [place] [address]  Phone: [phone]  Fax: [phone]  Follow Up Time: 1 week    Your PCP,   Phone: (   )    -  Fax: (   )    -  Follow Up Time:

## 2022-04-29 NOTE — DISCHARGE NOTE PROVIDER - NSDCCPCAREPLAN_GEN_ALL_CORE_FT
PRINCIPAL DISCHARGE DIAGNOSIS  Diagnosis: Acute anemia  Assessment and Plan of Treatment: This is likely secondary to bleeding from your nose. Your  Hb is stable and you didnt require any transfusion in patient. You are not currently on any anticoagulation due to your frequent nose bleeds. Please follow uip with your PCP once discharged and take all medications as prescribed.      SECONDARY DISCHARGE DIAGNOSES  Diagnosis: DAYDAY (acute kidney injury)  Assessment and Plan of Treatment: Resolved    Diagnosis: Epistaxis  Assessment and Plan of Treatment: Resolved

## 2022-04-29 NOTE — DISCHARGE NOTE PROVIDER - NSDCMRMEDTOKEN_GEN_ALL_CORE_FT
albuterol 90 mcg/inh inhalation aerosol: 2 puff(s) inhaled every 6 hours  aspirin 81 mg oral tablet, chewable: 1 tab(s) orally once a day  atorvastatin 10 mg oral tablet: 1 tab(s) orally once a day (at bedtime)  folic acid 1 mg oral tablet: 1 tab(s) orally once a day  lisinopril 2.5 mg oral tablet: 1 tab(s) orally once a day  metoprolol tartrate 25 mg oral tablet: 0.5 tab(s) orally 2 times a day  Multiple Vitamins with Minerals oral tablet: 1 tab(s) orally once a day  thiamine 100 mg oral tablet: 1 tab(s) orally once a day   albuterol 90 mcg/inh inhalation aerosol: 2 puff(s) inhaled every 6 hours  aspirin 81 mg oral tablet, chewable: 1 tab(s) orally once a day  atorvastatin 10 mg oral tablet: 1 tab(s) orally once a day (at bedtime)  folic acid 1 mg oral tablet: 1 tab(s) orally once a day  lisinopril 2.5 mg oral tablet: 1 tab(s) orally once a day  metoprolol tartrate 25 mg oral tablet: 0.5 tab(s) orally 2 times a day  Multiple Vitamins with Minerals oral tablet: 1 tab(s) orally once a day  nicotine 14 mg/24 hr transdermal film, extended release: 1 patch transdermal once a day  thiamine 100 mg oral tablet: 1 tab(s) orally once a day

## 2022-04-29 NOTE — PHYSICAL THERAPY INITIAL EVALUATION ADULT - RANGE OF MOTION EXAMINATION, REHAB EVAL
b/l knee ext 75% ROM, b/l hip flexion 75% ROM. Pt performed supine SLR 10 reps x 1, heel slides 10 reps x 1, ankle pumps 10 reps x 1

## 2022-04-29 NOTE — PHYSICAL THERAPY INITIAL EVALUATION ADULT - IMPAIRED TRANSFERS: SIT/STAND, REHAB EVAL
Dizziness upon standing, sat down and BP measured 151/91. Upon standing, /67. Dizziness resolved over time./impaired balance/decreased strength

## 2022-04-29 NOTE — DISCHARGE NOTE NURSING/CASE MANAGEMENT/SOCIAL WORK - NSDCPEFALRISK_GEN_ALL_CORE
For information on Fall & Injury Prevention, visit: [email or web address] OR  [email or web address] OR  https://www.cdc.gov/steadi/patient.html

## 2022-05-01 LAB
CULTURE RESULTS: SIGNIFICANT CHANGE UP
SPECIMEN SOURCE: SIGNIFICANT CHANGE UP

## 2022-05-02 LAB
CULTURE RESULTS: SIGNIFICANT CHANGE UP
SPECIMEN SOURCE: SIGNIFICANT CHANGE UP

## 2022-05-03 ENCOUNTER — INPATIENT (INPATIENT)
Facility: HOSPITAL | Age: 67
LOS: 4 days | Discharge: HOME | End: 2022-05-08
Attending: INTERNAL MEDICINE | Admitting: INTERNAL MEDICINE
Payer: MEDICARE

## 2022-05-03 VITALS
RESPIRATION RATE: 20 BRPM | SYSTOLIC BLOOD PRESSURE: 124 MMHG | OXYGEN SATURATION: 97 % | TEMPERATURE: 97 F | DIASTOLIC BLOOD PRESSURE: 94 MMHG | HEART RATE: 120 BPM | HEIGHT: 69 IN

## 2022-05-03 DIAGNOSIS — D50.0 IRON DEFICIENCY ANEMIA SECONDARY TO BLOOD LOSS (CHRONIC): ICD-10-CM

## 2022-05-03 DIAGNOSIS — R04.0 EPISTAXIS: ICD-10-CM

## 2022-05-03 DIAGNOSIS — E87.2 ACIDOSIS: ICD-10-CM

## 2022-05-03 DIAGNOSIS — I50.32 CHRONIC DIASTOLIC (CONGESTIVE) HEART FAILURE: ICD-10-CM

## 2022-05-03 DIAGNOSIS — Z90.49 ACQUIRED ABSENCE OF OTHER SPECIFIED PARTS OF DIGESTIVE TRACT: Chronic | ICD-10-CM

## 2022-05-03 DIAGNOSIS — I95.9 HYPOTENSION, UNSPECIFIED: ICD-10-CM

## 2022-05-03 DIAGNOSIS — F17.210 NICOTINE DEPENDENCE, CIGARETTES, UNCOMPLICATED: ICD-10-CM

## 2022-05-03 DIAGNOSIS — Z86.718 PERSONAL HISTORY OF OTHER VENOUS THROMBOSIS AND EMBOLISM: ICD-10-CM

## 2022-05-03 DIAGNOSIS — D72.829 ELEVATED WHITE BLOOD CELL COUNT, UNSPECIFIED: ICD-10-CM

## 2022-05-03 DIAGNOSIS — Z79.01 LONG TERM (CURRENT) USE OF ANTICOAGULANTS: ICD-10-CM

## 2022-05-03 DIAGNOSIS — Z79.899 OTHER LONG TERM (CURRENT) DRUG THERAPY: ICD-10-CM

## 2022-05-03 DIAGNOSIS — Z86.711 PERSONAL HISTORY OF PULMONARY EMBOLISM: ICD-10-CM

## 2022-05-03 DIAGNOSIS — F10.20 ALCOHOL DEPENDENCE, UNCOMPLICATED: ICD-10-CM

## 2022-05-03 DIAGNOSIS — D75.839 THROMBOCYTOSIS, UNSPECIFIED: ICD-10-CM

## 2022-05-03 DIAGNOSIS — I69.391 DYSPHAGIA FOLLOWING CEREBRAL INFARCTION: ICD-10-CM

## 2022-05-03 DIAGNOSIS — I11.0 HYPERTENSIVE HEART DISEASE WITH HEART FAILURE: ICD-10-CM

## 2022-05-03 DIAGNOSIS — N17.9 ACUTE KIDNEY FAILURE, UNSPECIFIED: ICD-10-CM

## 2022-05-03 DIAGNOSIS — Z20.822 CONTACT WITH AND (SUSPECTED) EXPOSURE TO COVID-19: ICD-10-CM

## 2022-05-03 DIAGNOSIS — Z79.82 LONG TERM (CURRENT) USE OF ASPIRIN: ICD-10-CM

## 2022-05-03 LAB
ALBUMIN SERPL ELPH-MCNC: 4.1 G/DL — SIGNIFICANT CHANGE UP (ref 3.5–5.2)
ALP SERPL-CCNC: 87 U/L — SIGNIFICANT CHANGE UP (ref 30–115)
ALT FLD-CCNC: 28 U/L — SIGNIFICANT CHANGE UP (ref 0–41)
ANION GAP SERPL CALC-SCNC: 21 MMOL/L — HIGH (ref 7–14)
ANISOCYTOSIS BLD QL: SIGNIFICANT CHANGE UP
APTT BLD: 30 SEC — SIGNIFICANT CHANGE UP (ref 27–39.2)
AST SERPL-CCNC: 23 U/L — SIGNIFICANT CHANGE UP (ref 0–41)
BASE EXCESS BLDV CALC-SCNC: -4.1 MMOL/L — LOW (ref -2–3)
BASE EXCESS BLDV CALC-SCNC: 0.8 MMOL/L — SIGNIFICANT CHANGE UP (ref -2–3)
BASOPHILS # BLD AUTO: 0 K/UL — SIGNIFICANT CHANGE UP (ref 0–0.2)
BASOPHILS NFR BLD AUTO: 0 % — SIGNIFICANT CHANGE UP (ref 0–1)
BILIRUB SERPL-MCNC: 0.4 MG/DL — SIGNIFICANT CHANGE UP (ref 0.2–1.2)
BLD GP AB SCN SERPL QL: SIGNIFICANT CHANGE UP
BUN SERPL-MCNC: 19 MG/DL — SIGNIFICANT CHANGE UP (ref 10–20)
CA-I SERPL-SCNC: 1.17 MMOL/L — SIGNIFICANT CHANGE UP (ref 1.15–1.33)
CA-I SERPL-SCNC: 1.17 MMOL/L — SIGNIFICANT CHANGE UP (ref 1.15–1.33)
CALCIUM SERPL-MCNC: 9.2 MG/DL — SIGNIFICANT CHANGE UP (ref 8.5–10.1)
CHLORIDE SERPL-SCNC: 98 MMOL/L — SIGNIFICANT CHANGE UP (ref 98–110)
CO2 SERPL-SCNC: 17 MMOL/L — SIGNIFICANT CHANGE UP (ref 17–32)
CREAT SERPL-MCNC: 1.3 MG/DL — SIGNIFICANT CHANGE UP (ref 0.7–1.5)
EGFR: 61 ML/MIN/1.73M2 — SIGNIFICANT CHANGE UP
EOSINOPHIL # BLD AUTO: 0.22 K/UL — SIGNIFICANT CHANGE UP (ref 0–0.7)
EOSINOPHIL NFR BLD AUTO: 0.9 % — SIGNIFICANT CHANGE UP (ref 0–8)
GAS PNL BLDV: 135 MMOL/L — LOW (ref 136–145)
GAS PNL BLDV: 136 MMOL/L — SIGNIFICANT CHANGE UP (ref 136–145)
GAS PNL BLDV: SIGNIFICANT CHANGE UP
GAS PNL BLDV: SIGNIFICANT CHANGE UP
GIANT PLATELETS BLD QL SMEAR: PRESENT — SIGNIFICANT CHANGE UP
GLUCOSE SERPL-MCNC: 245 MG/DL — HIGH (ref 70–99)
HCO3 BLDV-SCNC: 22 MMOL/L — SIGNIFICANT CHANGE UP (ref 22–29)
HCO3 BLDV-SCNC: 25 MMOL/L — SIGNIFICANT CHANGE UP (ref 22–29)
HCT VFR BLD CALC: 24.1 % — LOW (ref 42–52)
HCT VFR BLD CALC: 28 % — LOW (ref 42–52)
HCT VFR BLDA CALC: 32 % — LOW (ref 39–51)
HCT VFR BLDA CALC: 38 % — LOW (ref 39–51)
HGB BLD CALC-MCNC: 10.7 G/DL — LOW (ref 12.6–17.4)
HGB BLD CALC-MCNC: 12.7 G/DL — SIGNIFICANT CHANGE UP (ref 12.6–17.4)
HGB BLD-MCNC: 7.9 G/DL — LOW (ref 14–18)
HGB BLD-MCNC: 9 G/DL — LOW (ref 14–18)
INR BLD: 1.45 RATIO — HIGH (ref 0.65–1.3)
LACTATE BLDV-MCNC: 2.5 MMOL/L — HIGH (ref 0.5–2)
LACTATE BLDV-MCNC: 6.1 MMOL/L — CRITICAL HIGH (ref 0.5–2)
LACTATE SERPL-SCNC: 1.5 MMOL/L — SIGNIFICANT CHANGE UP (ref 0.7–2)
LYMPHOCYTES # BLD AUTO: 12.4 % — LOW (ref 20.5–51.1)
LYMPHOCYTES # BLD AUTO: 3.03 K/UL — SIGNIFICANT CHANGE UP (ref 1.2–3.4)
MACROCYTES BLD QL: SLIGHT — SIGNIFICANT CHANGE UP
MANUAL SMEAR VERIFICATION: SIGNIFICANT CHANGE UP
MCHC RBC-ENTMCNC: 29.3 PG — SIGNIFICANT CHANGE UP (ref 27–31)
MCHC RBC-ENTMCNC: 29.3 PG — SIGNIFICANT CHANGE UP (ref 27–31)
MCHC RBC-ENTMCNC: 32.1 G/DL — SIGNIFICANT CHANGE UP (ref 32–37)
MCHC RBC-ENTMCNC: 32.8 G/DL — SIGNIFICANT CHANGE UP (ref 32–37)
MCV RBC AUTO: 89.3 FL — SIGNIFICANT CHANGE UP (ref 80–94)
MCV RBC AUTO: 91.2 FL — SIGNIFICANT CHANGE UP (ref 80–94)
MICROCYTES BLD QL: SLIGHT — SIGNIFICANT CHANGE UP
MONOCYTES # BLD AUTO: 0.41 K/UL — SIGNIFICANT CHANGE UP (ref 0.1–0.6)
MONOCYTES NFR BLD AUTO: 1.7 % — SIGNIFICANT CHANGE UP (ref 1.7–9.3)
NEUTROPHILS # BLD AUTO: 20.75 K/UL — HIGH (ref 1.4–6.5)
NEUTROPHILS NFR BLD AUTO: 83.2 % — HIGH (ref 42.2–75.2)
NEUTS BAND # BLD: 1.8 % — SIGNIFICANT CHANGE UP (ref 0–6)
NRBC # BLD: 0 /100 WBCS — SIGNIFICANT CHANGE UP (ref 0–0)
PCO2 BLDV: 39 MMHG — LOW (ref 42–55)
PCO2 BLDV: 41 MMHG — LOW (ref 42–55)
PH BLDV: 7.33 — SIGNIFICANT CHANGE UP (ref 7.32–7.43)
PH BLDV: 7.42 — SIGNIFICANT CHANGE UP (ref 7.32–7.43)
PLAT MORPH BLD: ABNORMAL
PLATELET # BLD AUTO: 315 K/UL — SIGNIFICANT CHANGE UP (ref 130–400)
PLATELET # BLD AUTO: 415 K/UL — HIGH (ref 130–400)
PO2 BLDV: 27 MMHG — SIGNIFICANT CHANGE UP
PO2 BLDV: 40 MMHG — SIGNIFICANT CHANGE UP
POLYCHROMASIA BLD QL SMEAR: SLIGHT — SIGNIFICANT CHANGE UP
POTASSIUM BLDV-SCNC: 4.3 MMOL/L — SIGNIFICANT CHANGE UP (ref 3.5–5.1)
POTASSIUM BLDV-SCNC: 4.7 MMOL/L — SIGNIFICANT CHANGE UP (ref 3.5–5.1)
POTASSIUM SERPL-MCNC: 4.8 MMOL/L — SIGNIFICANT CHANGE UP (ref 3.5–5)
POTASSIUM SERPL-SCNC: 4.8 MMOL/L — SIGNIFICANT CHANGE UP (ref 3.5–5)
PROT SERPL-MCNC: 7 G/DL — SIGNIFICANT CHANGE UP (ref 6–8)
PROTHROM AB SERPL-ACNC: 16.6 SEC — HIGH (ref 9.95–12.87)
RBC # BLD: 2.7 M/UL — LOW (ref 4.7–6.1)
RBC # BLD: 3.07 M/UL — LOW (ref 4.7–6.1)
RBC # FLD: 15.1 % — HIGH (ref 11.5–14.5)
RBC # FLD: 15.3 % — HIGH (ref 11.5–14.5)
RBC BLD AUTO: ABNORMAL
SAO2 % BLDV: 33.9 % — SIGNIFICANT CHANGE UP
SAO2 % BLDV: 68.3 % — SIGNIFICANT CHANGE UP
SARS-COV-2 RNA SPEC QL NAA+PROBE: SIGNIFICANT CHANGE UP
SODIUM SERPL-SCNC: 136 MMOL/L — SIGNIFICANT CHANGE UP (ref 135–146)
WBC # BLD: 24.41 K/UL — HIGH (ref 4.8–10.8)
WBC # BLD: 25.33 K/UL — HIGH (ref 4.8–10.8)
WBC # FLD AUTO: 24.41 K/UL — HIGH (ref 4.8–10.8)
WBC # FLD AUTO: 25.33 K/UL — HIGH (ref 4.8–10.8)

## 2022-05-03 PROCEDURE — 93010 ELECTROCARDIOGRAM REPORT: CPT

## 2022-05-03 PROCEDURE — 99223 1ST HOSP IP/OBS HIGH 75: CPT

## 2022-05-03 PROCEDURE — 99291 CRITICAL CARE FIRST HOUR: CPT | Mod: 25

## 2022-05-03 PROCEDURE — 30903 CONTROL OF NOSEBLEED: CPT | Mod: LT

## 2022-05-03 RX ORDER — LANOLIN ALCOHOL/MO/W.PET/CERES
3 CREAM (GRAM) TOPICAL AT BEDTIME
Refills: 0 | Status: DISCONTINUED | OUTPATIENT
Start: 2022-05-03 | End: 2022-05-08

## 2022-05-03 RX ORDER — ALBUTEROL 90 UG/1
4 AEROSOL, METERED ORAL EVERY 4 HOURS
Refills: 0 | Status: DISCONTINUED | OUTPATIENT
Start: 2022-05-03 | End: 2022-05-08

## 2022-05-03 RX ORDER — ACETAMINOPHEN 500 MG
975 TABLET ORAL ONCE
Refills: 0 | Status: COMPLETED | OUTPATIENT
Start: 2022-05-03 | End: 2022-05-03

## 2022-05-03 RX ORDER — THIAMINE MONONITRATE (VIT B1) 100 MG
100 TABLET ORAL DAILY
Refills: 0 | Status: DISCONTINUED | OUTPATIENT
Start: 2022-05-03 | End: 2022-05-08

## 2022-05-03 RX ORDER — ONDANSETRON 8 MG/1
4 TABLET, FILM COATED ORAL ONCE
Refills: 0 | Status: DISCONTINUED | OUTPATIENT
Start: 2022-05-03 | End: 2022-05-08

## 2022-05-03 RX ORDER — ATORVASTATIN CALCIUM 80 MG/1
10 TABLET, FILM COATED ORAL AT BEDTIME
Refills: 0 | Status: DISCONTINUED | OUTPATIENT
Start: 2022-05-03 | End: 2022-05-08

## 2022-05-03 RX ORDER — SODIUM CHLORIDE 9 MG/ML
1000 INJECTION, SOLUTION INTRAVENOUS ONCE
Refills: 0 | Status: COMPLETED | OUTPATIENT
Start: 2022-05-03 | End: 2022-05-03

## 2022-05-03 RX ORDER — MULTIVIT-MIN/FERROUS GLUCONATE 9 MG/15 ML
1 LIQUID (ML) ORAL DAILY
Refills: 0 | Status: DISCONTINUED | OUTPATIENT
Start: 2022-05-03 | End: 2022-05-08

## 2022-05-03 RX ORDER — TRANEXAMIC ACID 100 MG/ML
1000 INJECTION, SOLUTION INTRAVENOUS ONCE
Refills: 0 | Status: COMPLETED | OUTPATIENT
Start: 2022-05-03 | End: 2022-05-03

## 2022-05-03 RX ORDER — MORPHINE SULFATE 50 MG/1
4 CAPSULE, EXTENDED RELEASE ORAL ONCE
Refills: 0 | Status: DISCONTINUED | OUTPATIENT
Start: 2022-05-03 | End: 2022-05-03

## 2022-05-03 RX ORDER — NICOTINE POLACRILEX 2 MG
1 GUM BUCCAL DAILY
Refills: 0 | Status: DISCONTINUED | OUTPATIENT
Start: 2022-05-03 | End: 2022-05-08

## 2022-05-03 RX ORDER — PANTOPRAZOLE SODIUM 20 MG/1
40 TABLET, DELAYED RELEASE ORAL
Refills: 0 | Status: DISCONTINUED | OUTPATIENT
Start: 2022-05-03 | End: 2022-05-08

## 2022-05-03 RX ORDER — FOLIC ACID 0.8 MG
1 TABLET ORAL DAILY
Refills: 0 | Status: DISCONTINUED | OUTPATIENT
Start: 2022-05-03 | End: 2022-05-08

## 2022-05-03 RX ORDER — SODIUM CHLORIDE 9 MG/ML
1000 INJECTION, SOLUTION INTRAVENOUS
Refills: 0 | Status: DISCONTINUED | OUTPATIENT
Start: 2022-05-03 | End: 2022-05-04

## 2022-05-03 RX ADMIN — SODIUM CHLORIDE 2000 MILLILITER(S): 9 INJECTION, SOLUTION INTRAVENOUS at 11:08

## 2022-05-03 RX ADMIN — SODIUM CHLORIDE 6000 MILLILITER(S): 9 INJECTION, SOLUTION INTRAVENOUS at 10:14

## 2022-05-03 RX ADMIN — ATORVASTATIN CALCIUM 10 MILLIGRAM(S): 80 TABLET, FILM COATED ORAL at 23:01

## 2022-05-03 RX ADMIN — SODIUM CHLORIDE 75 MILLILITER(S): 9 INJECTION, SOLUTION INTRAVENOUS at 16:31

## 2022-05-03 RX ADMIN — Medication 975 MILLIGRAM(S): at 11:09

## 2022-05-03 RX ADMIN — TRANEXAMIC ACID 220 MILLIGRAM(S): 100 INJECTION, SOLUTION INTRAVENOUS at 10:15

## 2022-05-03 RX ADMIN — MORPHINE SULFATE 4 MILLIGRAM(S): 50 CAPSULE, EXTENDED RELEASE ORAL at 11:11

## 2022-05-03 NOTE — H&P ADULT - NSHPSOCIALHISTORY_GEN_ALL_CORE
Active smoker 1-2 ppd, occasional drinker., denied illicit drug use. Active smoker 1-2 ppd, occasional drinker, denied illicit drug use.

## 2022-05-03 NOTE — ED ADULT NURSE NOTE - NSIMPLEMENTINTERV_GEN_ALL_ED
Implemented All Fall with Harm Risk Interventions:  [place] to call system. Call bell, personal items and telephone within reach. Instruct patient to call for assistance. Room bathroom lighting operational. Non-slip footwear when patient is off stretcher. Physically safe environment: no spills, clutter or unnecessary equipment. Stretcher in lowest position, wheels locked, appropriate side rails in place. Provide visual cue, wrist band, yellow gown, etc. Monitor gait and stability. Monitor for mental status changes and reorient to person, place, and time. Review medications for side effects contributing to fall risk. Reinforce activity limits and safety measures with patient and family. Provide visual clues: red socks.

## 2022-05-03 NOTE — ED ADULT TRIAGE NOTE - CHIEF COMPLAINT QUOTE
BIBA from [facility] home with epistaxis from the left nostril, patient on Eliquis and Aspirin.

## 2022-05-03 NOTE — ED PROVIDER NOTE - OBJECTIVE STATEMENT
66y M pmh TIA, CVA, CHF, HTN, HLD, Etoh abuse, PE not on AC due to Epistaxis, now presents for epistaxis. Pt has large volume L sided epistaxis that was spontanous starting @0500 this morning, mild improvement with pressure. Associated fatigue. Similar episode requiring admission last wk with a drop in Hgb from 13 to 7.8. Denies ha, cp, sob, bloody bm, hematuria

## 2022-05-03 NOTE — PATIENT PROFILE ADULT - FALL HARM RISK - HARM RISK INTERVENTIONS
Assistance with ambulation/Assistance OOB with selected safe patient handling equipment/Communicate Risk of Fall with Harm to all staff/Discuss with provider need for PT consult/Monitor gait and stability/Reinforce activity limits and safety measures with patient and family/Tailored Fall Risk Interventions/Visual Cue: Yellow wristband and red socks/Bed in lowest position, wheels locked, appropriate side rails in place/Call bell, personal items and telephone in reach/Instruct patient to call for assistance before getting out of bed or chair/Non-slip footwear when patient is out of bed/[place] to call system/Physically safe environment - no spills, clutter or unnecessary equipment/Purposeful Proactive Rounding/Room/bathroom lighting operational, light cord in reach

## 2022-05-03 NOTE — ED PROVIDER NOTE - PROGRESS NOTE DETAILS
Note authored by Dr. Rivas: Repeat blood pressure is 60/30.  Patient diaphoretic.  Rhino Rocket inserted.  Bleeding controlled.  Patient endorsed to Dr. Paredes And moved to critical care to continue resuscitation, labs and further management. Patient appears calm and in no acute distress. Patient is currently stable with normal vitals. Nose bleeding stopped. Spoke with the provider from . Discussed case, reviewed results, and agreed to admit the patient.

## 2022-05-03 NOTE — ED PROVIDER NOTE - NS ED ROS FT
Constitutional: (-) fever  Eyes/ENT: (+) epistaxis, (-) blurry vision  Cardiovascular: (-) chest pain, (-) syncope  Respiratory: (-) cough, (-) shortness of breath  Gastrointestinal: (-) vomiting, (-) diarrhea  : (-) hematuria, (-) dysuria  Musculoskeletal: (-) neck pain, (-) back pain, (-) joint pain  Integumentary: (-) rash, (-) edema  Neurological: (-) headache, (-) altered mental status  Allergic/Immunologic: (-) pruritus

## 2022-05-03 NOTE — ED PROVIDER NOTE - CARE PLAN
Principal Discharge DX:	Bleeding from the nose   1 Principal Discharge DX:	Bleeding from the nose  Secondary Diagnosis:	Transient hypotension

## 2022-05-03 NOTE — ED PROVIDER NOTE - PHYSICAL EXAMINATION
CONST: NAD  EYES: Sclera and conjunctiva clear.   ENT: L sided epistaxis. Oropharynx normal appearing  NECK: Non-tender, no meningeal signs. normal ROM. supple   CARD: Tachycardiac S1 S2; No jvd  RESP: Equal BS B/L, No wheezes, rhonchi or rales. No distress  GI: Soft, non-tender, non-distended. normal BS  MS: Normal ROM in all extremities. pulses 2 +.   SKIN: Warm, dry, no acute rashes. Good turgor  NEURO: A&Ox3

## 2022-05-03 NOTE — ED PROVIDER NOTE - CLINICAL SUMMARY MEDICAL DECISION MAKING FREE TEXT BOX
Patient upgraded to Critical Care ED with epistaxis with hypotension. Patient seen and assessed. 2nd large bore IV placed. I personally saw and evaluated the patient. He is a 67 y/o M with a pmh of TIA, CVA, CHFpEF 65%, HTN, HLD, Alcohol dependence, PE (2015, not on anticoagulation due to epistaxis)  presented to the ED epistaxis. Patient was recently discharged from the hospital (4/27-4/29) for epistaxis from both nostrils which was resolved and during that admission hemoglobin dropped from 13 to 7.8 but was never transfused. Patient was restarted on Eliquis in the Nursing home. This morning, patient had spontaneous epistaxis from his left nostril and began to feel fatigued. Patient had Rhino Rocket placed and bleeding was controlled. Patient given fluids. Initial BP in Critical Care ED improved. Hb higher than discharge level. Initial lactate high was repeated and is improving. HR and blood pressure remained stable.    ED work up reviewed and results and plan of care discussed with patient. Patient requires admission for further work up, monitoring, and management. Need for admission discussed with patient.

## 2022-05-03 NOTE — H&P ADULT - ASSESSMENT
67 YO M with a pmh of TIA, CVA, CHF, HTN, HLD, Alcohol dependence, PE (2015, not on anticoagulation due to epistaxis)  presented to the ED epistaxis since yesterday.      # Recurrent epistaxsis; currently not on A/C due to recurrent bleed  # HO PE in 2015 - unprovoked as per chart review   # HO DVT in bilateral tibial veins 2021 - now resolved  - recently admitted for epistaxis, during the admission (4/27-4/29) hb dropped from 13.8 -> 7.8, never required transfusion  - became hypotensive in the ED to 60/30  - Hb 9.0 on admission, lactate 2.5  - s/p rhinorocket in ED  - trend CBC BID, keep active type and screen  - hold eliquis and ASA for now.    - Duplex 4/27 negative.   - ENT eval     # DAYDAY likely prerenal?  # HAGMA likely secondary to lactic acidosis in the setting of hypotension   - Cr 1.3 on admission, baseline Cr ~0.9   - lactate 2.5 on admission   - trend lactate   - gentle hydration with LR at 75 for 12 hours   - if worsening, consider renal bladder US, urine lytes, and nephro eval.     # leukocytosis/thrombocytosis most likely reactive  - previously elevated on recent admission with unknown etiology  - similar presentation on previous admission  - can repeat UA, blood cultures, procal, CXR  - monitor off abx  - trend cbc    #hx of EtOH abuse  #smoking  - c/w thiamine and folic acid  - advised on etoh and smoking cessation  - nicotine patch     #HTN  - c/w metoprolol 12.5 bid; holding lisinopril 2.5 mg until dayday resolves     # HO HFpEF  - ECHO: Left ventricular ejection fraction, by visual estimation, is 60 to 65%  - Lasix 20 mg was discontinued on 6/2021    # TIA/CVA - c/w aspirin 81 and statin   # HLD - c/w atorvastatin 10  # GERD - c/w protonix  # DM - HbA1c 7.0% on 4/27, monitor FS start insulin protocol if FS consistently > 180.       Diet: DASH/TLC/carb consistent   Activity: as tolerated   DVT Prophylaxis: SCD  GI Prophylaxis: protonix  Code Status: full  Disposition: acute    67 YO M with a pmh of TIA, CVA, CHF, HTN, HLD, Alcohol dependence, PE (2015, not on anticoagulation due to epistaxis)  presented to the ED epistaxis since yesterday.      # Recurrent epistaxsis; currently not on A/C due to recurrent bleed  # HO PE in 2015 - unprovoked as per chart review   # HO DVT in bilateral tibial veins 2021 - now resolved  - recently admitted for epistaxis, during the admission (4/27-4/29) hb dropped from 13.8 -> 7.8, never required transfusion  - became hypotensive in the ED to 60/30  - Hb 9.0 on admission, lactate 2.5  - s/p rhinorocket in ED  - trend CBC BID, keep active type and screen  - hold eliquis and ASA for now.    - in the setting of hypotension will hold lisinopril 2.5mg and metoprolol 25mg PO BID. if hemodynamically stable or becomes hypertensive please restart.   - Duplex 4/27 negative. SCDs for DVT ppx.   - ENT eval     # DAYDAY likely prerenal?  # HAGMA likely secondary to lactic acidosis in the setting of hypotension   - Cr 1.3 on admission, baseline Cr ~0.9   - lactate 2.5 on admission   - trend lactate   - gentle hydration with LR at 75 for 12 hours   - if worsening, consider renal bladder US, urine lytes, and nephro eval.     # leukocytosis/thrombocytosis most likely reactive  - previously elevated on recent admission with unknown etiology  - similar presentation on previous admission  - can repeat UA, blood cultures, procal, CXR   - monitor off abx  - trend cbc    #hx of EtOH abuse  #smoking  - c/w thiamine and folic acid  - advised on etoh and smoking cessation  - nicotine patch     #HTN  - hold metoprolol 25 bid; holding lisinopril 2.5 mg until dayday resolves     # HO HFpEF  - ECHO: Left ventricular ejection fraction, by visual estimation, is 60 to 65%  - Lasix 20 mg was discontinued on 6/2021    # TIA/CVA - c/w aspirin 81 and statin   # HLD - c/w atorvastatin 10  # GERD - c/w protonix  # DM - HbA1c 7.0% on 4/27, monitor FS start insulin protocol if FS consistently > 180.       Diet: DASH/TLC/carb consistent   Activity: as tolerated   DVT Prophylaxis: SCD  GI Prophylaxis: protonix  Code Status: full  Disposition: acute

## 2022-05-03 NOTE — ED PROVIDER NOTE - CADM POA URETHRAL CATHETER
FIRST PROVIDER CONTACT ASSESSMENT NOTE      Department of Emergency Medicine   Admit Date: No admission date for patient encounter. Chief Complaint: Cough (x 2 days, with chest pain with coughing and inspiration)      History of Present Illness:    Olivia Arambula is a 34 y.o. female who presents to the ED for cough for the past 2 days with chest pain which is worse with coughing and deep inspiration. Denies any fever, body aches or chills. Denies any concern for Covid. Denies any history of blood clots or clotting disorders.         -----------------END OF FIRST PROVIDER CONTACT ASSESSMENT NOTE--------------  Electronically signed by PAOLA Zimmer CNP   DD: 10/25/21               PAOLA Ayala CNP  10/25/21 9904 No

## 2022-05-03 NOTE — H&P ADULT - NSHPLABSRESULTS_GEN_ALL_CORE
LABS:  cret                        9.0    24.41 )-----------( 415      ( 03 May 2022 10:15 )             28.0     05-03    136  |  98  |  19  ----------------------------<  245<H>  4.8   |  17  |  1.3    Ca    9.2      03 May 2022 10:15    TPro  7.0  /  Alb  4.1  /  TBili  0.4  /  DBili  x   /  AST  23  /  ALT  28  /  AlkPhos  87  05-03    PT/INR - ( 03 May 2022 10:15 )   PT: 16.60 sec;   INR: 1.45 ratio         PTT - ( 03 May 2022 10:15 )  PTT:30.0 sec

## 2022-05-03 NOTE — ED PROVIDER NOTE - ATTENDING APP SHARED VISIT CONTRIBUTION OF CARE
Attending Statement: I have personally provided the amount of critical care time documented below excluding time spent on separate procedures.     Critical Care Time Spent (min) Must be 30 or more minutes to qualify: 35.    66-year-old male history of CVA, CHF, hypertension, PE on anticoagulation which was stopped last month, TIA and alcohol abuse now presents with epistaxis again from his left nostrils and feeling fatigued and weak.  Recent admission to the hospital status post controlling of the bleed was never transfused, but dropped his in hemoglobin from 13.8 to 7.8.    On exam, patient ill-appearing, diaphoretic, hypotensive, tachycardic, actively bleeding from left nostril, blood in the oropharynx, no respiratory distress, abdomen soft,    Bleeding control, labs

## 2022-05-03 NOTE — H&P ADULT - NSHPPHYSICALEXAM_GEN_ALL_CORE
PHYSICAL EXAM:  GENERAL: NAD, lying in bed comfortably  HEAD:  Atraumatic, Normocephalic  EYES: EOMI, conjunctiva and sclera clear  ENT: Moist mucous membranes  NECK: Supple, No JVD  CHEST/LUNG: audible bilateral   HEART: Regular rate and rhythm; No murmurs, rubs, or gallops  ABDOMEN: Bowel sounds present; Soft, Nontender, Nondistended, obese   EXTREMITIES:  No edema   NERVOUS SYSTEM:  Alert & Oriented X3    MSK: FROM all 4 extremities, full and equal strength

## 2022-05-03 NOTE — H&P ADULT - HISTORY OF PRESENT ILLNESS
67 YO M with a pmh of TIA, CVA, CHFpEF 65%, HTN, HLD, Alcohol dependence, PE (2015, not on anticoagulation due to epistaxis)  presented to the ED epistaxis. Patient was recently discharged from the hospital (4/27-4/29) for epistaxis from both nostrils which was resolved and during that admission hemoglobin dropped from 13 to 7.8 but was never transfused. In the morning of admission, patient had spontaneous epistaxis from his left nostril and began to feel fatigued. Patient was restarted on eliquis and takes aspirin in NH.     In the ED, BP was initial 124/94 with , patient became hypotensive (60/30) and diaphoretic. Subsequently, rhino rocket was was inserted which controlled the bleed.  65 YO M with a pmh of TIA, CVA, CHFpEF 65%, HTN, HLD, Alcohol dependence, PE (2015, not on anticoagulation due to epistaxis)  presented to the ED epistaxis. Patient was recently discharged from the hospital (4/27-4/29) for epistaxis from both nostrils which was resolved and during that admission hemoglobin dropped from 13 to 7.8 but was never transfused. In the morning of admission, patient had spontaneous epistaxis from his left nostril and began to feel fatigued. Patient was restarted on eliquis and takes aspirin in NH.    In the ED, BP was initial 124/94 with , patient became hypotensive (60/30) and diaphoretic. Subsequently, rhino rocket was was inserted which controlled the bleed.

## 2022-05-03 NOTE — ED ADULT NURSE NOTE - CHIEF COMPLAINT QUOTE
BIBA from [facility] home with epistaxis from the left nostril, patient on Eliquis and Aspirin.

## 2022-05-03 NOTE — ED ADULT NURSE NOTE - OBJECTIVE STATEMENT
pt c/o nose bleed from left nostril since this morning. pt states he no longer takes eliquis and aspirin. pt was here recently for same thing with significant hemoglobin drop. pt denies bleeding from any where else. airway patent.

## 2022-05-04 DIAGNOSIS — R04.0 EPISTAXIS: ICD-10-CM

## 2022-05-04 LAB
ALBUMIN SERPL ELPH-MCNC: 3.5 G/DL — SIGNIFICANT CHANGE UP (ref 3.5–5.2)
ALP SERPL-CCNC: 69 U/L — SIGNIFICANT CHANGE UP (ref 30–115)
ALT FLD-CCNC: 20 U/L — SIGNIFICANT CHANGE UP (ref 0–41)
ANION GAP SERPL CALC-SCNC: 15 MMOL/L — HIGH (ref 7–14)
ANISOCYTOSIS BLD QL: SLIGHT — SIGNIFICANT CHANGE UP
AST SERPL-CCNC: 15 U/L — SIGNIFICANT CHANGE UP (ref 0–41)
BASOPHILS # BLD AUTO: 0.06 K/UL — SIGNIFICANT CHANGE UP (ref 0–0.2)
BASOPHILS NFR BLD AUTO: 0.4 % — SIGNIFICANT CHANGE UP (ref 0–1)
BILIRUB SERPL-MCNC: 0.3 MG/DL — SIGNIFICANT CHANGE UP (ref 0.2–1.2)
BUN SERPL-MCNC: 27 MG/DL — HIGH (ref 10–20)
CALCIUM SERPL-MCNC: 8.5 MG/DL — SIGNIFICANT CHANGE UP (ref 8.5–10.1)
CHLORIDE SERPL-SCNC: 104 MMOL/L — SIGNIFICANT CHANGE UP (ref 98–110)
CO2 SERPL-SCNC: 19 MMOL/L — SIGNIFICANT CHANGE UP (ref 17–32)
CREAT SERPL-MCNC: 0.8 MG/DL — SIGNIFICANT CHANGE UP (ref 0.7–1.5)
EGFR: 99 ML/MIN/1.73M2 — SIGNIFICANT CHANGE UP
EOSINOPHIL # BLD AUTO: 0.17 K/UL — SIGNIFICANT CHANGE UP (ref 0–0.7)
EOSINOPHIL NFR BLD AUTO: 1 % — SIGNIFICANT CHANGE UP (ref 0–8)
GIANT PLATELETS BLD QL SMEAR: PRESENT — SIGNIFICANT CHANGE UP
GLUCOSE SERPL-MCNC: 106 MG/DL — HIGH (ref 70–99)
HCT VFR BLD CALC: 20.7 % — LOW (ref 42–52)
HCT VFR BLD CALC: 21.7 % — LOW (ref 42–52)
HGB BLD-MCNC: 6.7 G/DL — CRITICAL LOW (ref 14–18)
HGB BLD-MCNC: 7 G/DL — LOW (ref 14–18)
IMM GRANULOCYTES NFR BLD AUTO: 4.3 % — HIGH (ref 0.1–0.3)
LACTATE SERPL-SCNC: 1.1 MMOL/L — SIGNIFICANT CHANGE UP (ref 0.7–2)
LACTATE SERPL-SCNC: 1.1 MMOL/L — SIGNIFICANT CHANGE UP (ref 0.7–2)
LYMPHOCYTES # BLD AUTO: 16.7 % — LOW (ref 20.5–51.1)
LYMPHOCYTES # BLD AUTO: 2.85 K/UL — SIGNIFICANT CHANGE UP (ref 1.2–3.4)
MAGNESIUM SERPL-MCNC: 2 MG/DL — SIGNIFICANT CHANGE UP (ref 1.8–2.4)
MANUAL SMEAR VERIFICATION: SIGNIFICANT CHANGE UP
MCHC RBC-ENTMCNC: 29.3 PG — SIGNIFICANT CHANGE UP (ref 27–31)
MCHC RBC-ENTMCNC: 29.3 PG — SIGNIFICANT CHANGE UP (ref 27–31)
MCHC RBC-ENTMCNC: 32.3 G/DL — SIGNIFICANT CHANGE UP (ref 32–37)
MCHC RBC-ENTMCNC: 32.4 G/DL — SIGNIFICANT CHANGE UP (ref 32–37)
MCV RBC AUTO: 90.4 FL — SIGNIFICANT CHANGE UP (ref 80–94)
MCV RBC AUTO: 90.8 FL — SIGNIFICANT CHANGE UP (ref 80–94)
MICROCYTES BLD QL: SLIGHT — SIGNIFICANT CHANGE UP
MONOCYTES # BLD AUTO: 0.76 K/UL — HIGH (ref 0.1–0.6)
MONOCYTES NFR BLD AUTO: 4.4 % — SIGNIFICANT CHANGE UP (ref 1.7–9.3)
MYELOCYTES NFR BLD: 2.7 % — HIGH (ref 0–0)
NEUTROPHILS # BLD AUTO: 12.52 K/UL — HIGH (ref 1.4–6.5)
NEUTROPHILS NFR BLD AUTO: 73.2 % — SIGNIFICANT CHANGE UP (ref 42.2–75.2)
NRBC # BLD: 0 /100 WBCS — SIGNIFICANT CHANGE UP (ref 0–0)
PLAT MORPH BLD: NORMAL — SIGNIFICANT CHANGE UP
PLATELET # BLD AUTO: 263 K/UL — SIGNIFICANT CHANGE UP (ref 130–400)
PLATELET # BLD AUTO: 264 K/UL — SIGNIFICANT CHANGE UP (ref 130–400)
POLYCHROMASIA BLD QL SMEAR: SLIGHT — SIGNIFICANT CHANGE UP
POTASSIUM SERPL-MCNC: 4.4 MMOL/L — SIGNIFICANT CHANGE UP (ref 3.5–5)
POTASSIUM SERPL-SCNC: 4.4 MMOL/L — SIGNIFICANT CHANGE UP (ref 3.5–5)
PROCALCITONIN SERPL-MCNC: 0.49 NG/ML — HIGH (ref 0.02–0.1)
PROT SERPL-MCNC: 6.1 G/DL — SIGNIFICANT CHANGE UP (ref 6–8)
RBC # BLD: 2.29 M/UL — LOW (ref 4.7–6.1)
RBC # BLD: 2.39 M/UL — LOW (ref 4.7–6.1)
RBC # FLD: 15.3 % — HIGH (ref 11.5–14.5)
RBC # FLD: 15.7 % — HIGH (ref 11.5–14.5)
RBC BLD AUTO: ABNORMAL
SODIUM SERPL-SCNC: 138 MMOL/L — SIGNIFICANT CHANGE UP (ref 135–146)
WBC # BLD: 16.5 K/UL — HIGH (ref 4.8–10.8)
WBC # BLD: 17.1 K/UL — HIGH (ref 4.8–10.8)
WBC # FLD AUTO: 16.5 K/UL — HIGH (ref 4.8–10.8)
WBC # FLD AUTO: 17.1 K/UL — HIGH (ref 4.8–10.8)

## 2022-05-04 PROCEDURE — 99233 SBSQ HOSP IP/OBS HIGH 50: CPT

## 2022-05-04 RX ADMIN — Medication 1 MILLIGRAM(S): at 11:28

## 2022-05-04 RX ADMIN — Medication 100 MILLIGRAM(S): at 11:27

## 2022-05-04 RX ADMIN — PANTOPRAZOLE SODIUM 40 MILLIGRAM(S): 20 TABLET, DELAYED RELEASE ORAL at 05:03

## 2022-05-04 RX ADMIN — ATORVASTATIN CALCIUM 10 MILLIGRAM(S): 80 TABLET, FILM COATED ORAL at 22:45

## 2022-05-04 RX ADMIN — Medication 1 TABLET(S): at 11:27

## 2022-05-04 RX ADMIN — Medication 1 PATCH: at 11:27

## 2022-05-04 NOTE — MEDICAL STUDENT PROGRESS NOTE(EDUCATION) - SUBJECTIVE AND OBJECTIVE BOX
CHAPARRO ROBISON 66y Male  MRN#: [id number]   Hospital Day: 1d    SUBJECTIVE  Patient is a 66y old Male who presents with a chief complaint of Currently admitted to medicine with the primary diagnosis of Bleeding from the nose      INTERVAL HPI AND OVERNIGHT EVENTS:  Patient was examined and seen at bedside. This morning he is resting comfortably in bed and reports no issues or overnight events.    65 YO M with a pmh of TIA, CVA, CHFpEF 65%, HTN, HLD, Alcohol dependence, PE (2015, not on anticoagulation due to epistaxis)  presented to the ED epistaxis. Patient was recently discharged from the hospital (4/27-4/29) for epistaxis from both nostrils which was resolved and during that admission hemoglobin dropped from 13 to 7.8 but was never transfused. In the morning of admission, patient had spontaneous epistaxis from his left nostril and began to feel fatigued. Patient was restarted on eliquis and takes aspirin in NH.    In the ED, BP was initial 124/94 with , patient became hypotensive (60/30) and diaphoretic. Subsequently, rhino rocket was was inserted which controlled the bleed.     REVIEW OF SYMPTOMS:  CONSTITUTIONAL: No weakness, fevers or chills; No headaches  EYES: No visual changes, eye pain, or discharge  ENT: No vertigo; No ear pain or change in hearing; No sore throat or difficulty swallowing  NECK: No pain or stiffness  RESPIRATORY: No cough, wheezing, or hemoptysis; No shortness of breath  CARDIOVASCULAR: No chest pain or palpitations  GASTROINTESTINAL: No abdominal or epigastric pain; No nausea, vomiting, or hematemesis; No diarrhea or constipation; No melena or hematochezia  GENITOURINARY: No dysuria, frequency or hematuria  MUSCULOSKELETAL: No joint pain, no muscle pain, no weakness  NEUROLOGICAL: No numbness or weakness  SKIN: No itching or rashes    OBJECTIVE  PAST MEDICAL & SURGICAL HISTORY  TIA (transient ischemic attack)    CHF (congestive heart failure)    HTN (hypertension)    HLD (hyperlipidemia)    Alcohol dependence    Chronic back pain    Pulmonary embolism  2015    Cerebrovascular accident (CVA)  left pontine with dysphagia    Alcohol abuse    History of appendectomy      ALLERGIES:  No Known Allergies    MEDICATIONS:  STANDING MEDICATIONS  atorvastatin 10 milliGRAM(s) Oral at bedtime  folic acid 1 milliGRAM(s) Oral daily  lactated ringers. 1000 milliLiter(s) IV Continuous <Continuous>  multivitamin/minerals 1 Tablet(s) Oral daily  nicotine  14 mG/24 Hr(s) Transdermal Patch - Peds 1 Patch Transdermal daily  ondansetron Injectable 4 milliGRAM(s) IV Push once  pantoprazole    Tablet 40 milliGRAM(s) Oral before breakfast  thiamine 100 milliGRAM(s) Oral daily    PRN MEDICATIONS  ALBUTerol  90 MICROgram(s) HFA Inhaler - Peds 4 Puff(s) Inhalation every 4 hours PRN  melatonin 3 milliGRAM(s) Oral at bedtime PRN      VITAL SIGNS: Last 24 Hours  T(C): 36.4 (04 May 2022 04:44), Max: 36.5 (03 May 2022 19:51)  T(F): 97.6 (04 May 2022 04:44), Max: 97.7 (03 May 2022 19:51)  HR: 74 (04 May 2022 04:44) (74 - 111)  BP: 136/68 (04 May 2022 04:44) (60/38 - 136/68)  BP(mean): --  RR: 18 (04 May 2022 04:44) (18 - 21)  SpO2: 99% (04 May 2022 04:44) (93% - 99%)    LABS:                        6.7    17.10 )-----------( 264      ( 04 May 2022 08:01 )             20.7     05-04    138  |  104  |  27<H>  ----------------------------<  106<H>  4.4   |  19  |  0.8    Ca    8.5      04 May 2022 08:01  Mg     2.0     05-04    TPro  6.1  /  Alb  3.5  /  TBili  0.3  /  DBili  x   /  AST  15  /  ALT  20  /  AlkPhos  69  05-04    PT/INR - ( 03 May 2022 10:15 )   PT: 16.60 sec;   INR: 1.45 ratio         PTT - ( 03 May 2022 10:15 )  PTT:30.0 sec      Lactate, Blood: 1.1 mmol/L (05-04-22 @ 08:01)  Lactate, Blood: 1.1 mmol/L (05-03-22 @ 22:00)  Lactate, Blood: 1.5 mmol/L (05-03-22 @ 16:55)          RADIOLOGY:      PHYSICAL EXAM:  CONSTITUTIONAL: No acute distress, well-developed, well-groomed, AAOx3  HEAD: Atraumatic, normocephalic  PULMONARY: Clear to auscultation bilaterally; no wheezes, rales, or rhonchi  CARDIOVASCULAR: Regular rate and rhythm; no murmurs, rubs, or gallops  GASTROINTESTINAL: Soft, non-tender, non-distended; bowel sounds present  MUSCULOSKELETAL: 2+ peripheral pulses; no clubbing, no cyanosis, no edema  NEUROLOGY: all CN grossly intact CHAPARRO ROBISON 66y Male  MRN#: [id number]   Hospital Day: 1d    SUBJECTIVE  Patient is a 66y old Male who presents with a chief complaint of Currently admitted to medicine with the primary diagnosis of Bleeding from the nose      INTERVAL HPI AND OVERNIGHT EVENTS:  Patient was examined and seen at bedside. This morning he is resting comfortably in bed and reports no issues or overnight events.    67 YO M with a pmh of TIA, CVA, CHFpEF 65%, HTN, HLD, Alcohol dependence, PE (2015, not on anticoagulation due to epistaxis)  presented to the ED epistaxis. Patient was recently discharged from the hospital (4/27-4/29) for epistaxis from both nostrils.  During previous admission, hemoglobin dropped from 13 to 7.8 but pt was never transfused.  Epistaxis resolved and patient was discharge to NH off AC.  In the morning of admission, patient had spontaneous epistaxis from his left nostril and began to feel fatigued.     In the ED, BP was initial 124/94 with , patient became hypotensive (60/30) and diaphoretic. Subsequently, ENT was consulted and rhino rocket was inserted, which controlled the bleed.     REVIEW OF SYMPTOMS:  CONSTITUTIONAL: No weakness, fevers or chills; No headaches  EYES: No visual changes, eye pain, or discharge  ENT: No vertigo; No ear pain or change in hearing; No sore throat or difficulty swallowing  NECK: No pain or stiffness  RESPIRATORY: No cough, wheezing, or hemoptysis; No shortness of breath  CARDIOVASCULAR: No chest pain or palpitations  GASTROINTESTINAL: No abdominal or epigastric pain; No nausea, vomiting, or hematemesis; No diarrhea or constipation; No melena or hematochezia  GENITOURINARY: No dysuria, frequency or hematuria  MUSCULOSKELETAL: No joint pain, no muscle pain, no weakness  NEUROLOGICAL: No numbness or weakness  SKIN: No itching or rashes    OBJECTIVE  PAST MEDICAL & SURGICAL HISTORY  TIA (transient ischemic attack)    CHF (congestive heart failure)    HTN (hypertension)    HLD (hyperlipidemia)    Alcohol dependence    Chronic back pain    Pulmonary embolism  2015    Cerebrovascular accident (CVA)  left pontine with dysphagia    Alcohol abuse    History of appendectomy      ALLERGIES:  No Known Allergies    MEDICATIONS:  STANDING MEDICATIONS  atorvastatin 10 milliGRAM(s) Oral at bedtime  folic acid 1 milliGRAM(s) Oral daily  lactated ringers. 1000 milliLiter(s) IV Continuous <Continuous>  multivitamin/minerals 1 Tablet(s) Oral daily  nicotine  14 mG/24 Hr(s) Transdermal Patch - Peds 1 Patch Transdermal daily  ondansetron Injectable 4 milliGRAM(s) IV Push once  pantoprazole    Tablet 40 milliGRAM(s) Oral before breakfast  thiamine 100 milliGRAM(s) Oral daily    PRN MEDICATIONS  ALBUTerol  90 MICROgram(s) HFA Inhaler - Peds 4 Puff(s) Inhalation every 4 hours PRN  melatonin 3 milliGRAM(s) Oral at bedtime PRN      VITAL SIGNS: Last 24 Hours  T(C): 36.4 (04 May 2022 04:44), Max: 36.5 (03 May 2022 19:51)  T(F): 97.6 (04 May 2022 04:44), Max: 97.7 (03 May 2022 19:51)  HR: 74 (04 May 2022 04:44) (74 - 111)  BP: 136/68 (04 May 2022 04:44) (60/38 - 136/68)  BP(mean): --  RR: 18 (04 May 2022 04:44) (18 - 21)  SpO2: 99% (04 May 2022 04:44) (93% - 99%)    LABS:                        6.7    17.10 )-----------( 264      ( 04 May 2022 08:01 )             20.7     05-04    138  |  104  |  27<H>  ----------------------------<  106<H>  4.4   |  19  |  0.8    Ca    8.5      04 May 2022 08:01  Mg     2.0     05-04    TPro  6.1  /  Alb  3.5  /  TBili  0.3  /  DBili  x   /  AST  15  /  ALT  20  /  AlkPhos  69  05-04    PT/INR - ( 03 May 2022 10:15 )   PT: 16.60 sec;   INR: 1.45 ratio         PTT - ( 03 May 2022 10:15 )  PTT:30.0 sec      Lactate, Blood: 1.1 mmol/L (05-04-22 @ 08:01)  Lactate, Blood: 1.1 mmol/L (05-03-22 @ 22:00)  Lactate, Blood: 1.5 mmol/L (05-03-22 @ 16:55)          RADIOLOGY:      PHYSICAL EXAM:  CONSTITUTIONAL: No acute distress, well-developed, well-groomed, AAOx3  HEAD: Atraumatic, normocephalic  PULMONARY: Clear to auscultation bilaterally; no wheezes, rales, or rhonchi  CARDIOVASCULAR: Regular rate and rhythm; no murmurs, rubs, or gallops  GASTROINTESTINAL: Soft, non-tender, non-distended; bowel sounds present  MUSCULOSKELETAL: 2+ peripheral pulses; no clubbing, no cyanosis, no edema  NEUROLOGY: all CN grossly intact

## 2022-05-04 NOTE — MEDICAL STUDENT PROGRESS NOTE(EDUCATION) - NS MD HP STUD ASPLAN PLAN FT
# Recurrent epistaxsis; currently not on A/C due to recurrent bleed  # HO PE in 2015 - unprovoked as per chart review   # HO DVT in bilateral tibial veins  - now resolved  - recently admitted for epistaxis, during the admission (-) hb dropped from 13.8 -> 7.8, never required transfusion  - became hypotensive in the ED to 60/30  - Hb 9.0 on admission downtrending -> 7.6, 7, 6.7 today, lactate 1.1  - s/p rhinorocket in ED  - trend CBC BID, keep active type and screen  - hold eliquis and ASA for now.    - in the setting of hypotension will hold lisinopril 2.5mg and metoprolol 25mg PO BID. if hemodynamically stable or becomes hypertensive please restart.   - Duplex  negative. SCDs for DVT ppx.   - ENT consulted and also recommended holding eliquis and aspirin  - give 1 unit of PRBCs today    # DAYDAY likely prerenal?  # HAGMA likely secondary to lactic acidosis in the setting of hypotension   - Cr 1.3 on admission, baseline Cr ~0.9   - lactate 2.5 on admission   - trend lactate   - gentle hydration with LR at 75 for 12 hours   - if worsening, consider renal bladder US, urine lytes, and nephro eval.     # leukocytosis/thrombocytosis most likely reactive  - previously elevated on recent admission with unknown etiology  - similar presentation on previous admission  - can repeat UA, blood cultures, procal, CXR   - monitor off abx  - trend cbc    #hx of EtOH abuse  #smoking  - c/w thiamine and folic acid  - advised on etoh and smoking cessation  - nicotine patch     #HTN  - hold metoprolol 25 bid; holding lisinopril 2.5 mg until dayday resolves     # HO HFpEF  - ECHO: Left ventricular ejection fraction, by visual estimation, is 60 to 65%  - Lasix 20 mg was discontinued on 2021    # TIA/CVA - c/w aspirin 81 and statin   # HLD - c/w atorvastatin 10  # GERD - c/w protonix  # DM - HbA1c 7.0% on , monitor FS start insulin protocol if FS consistently > 180.     Diet: DASH/TLC/carb consistent   Activity: as tolerated   DVT Prophylaxis: SCD  GI Prophylaxis: protonix  Code Status: full  Disposition: acute  [facility] PRBC transfusion   # Recurrent epistaxsis; currently not on A/C due to recurrent bleed  # HO PE in  - unprovoked as per chart review   # HO DVT in bilateral tibial veins  - now resolved  - recently admitted for epistaxis, during the admission (-) hb dropped from 13.8 -> 7.8, never required transfusion  - became hypotensive in the ED to 60/30  - Hb 9.0 on admission, downtrending -> 7.6, 7, 6.7 today, lactate 1.1  - s/p rhinorocket in ED  - trend CBC BID, keep active type and screen  - hold eliquis and ASA for now.    - in the setting of hypotension will hold lisinopril 2.5mg and metoprolol 25mg PO BID. if hemodynamically stable or becomes hypertensive please restart.   - Duplex  negative. SCDs for DVT ppx.   - ENT consulted and also recommended holding eliquis and aspirin  - give 1 unit of PRBCs today    # DAYDAY likely prerenal?  # HAGMA likely secondary to lactic acidosis in the setting of hypotension   - Cr 1.3 on admission, baseline Cr ~0.9   - lactate 2.5 on admission   - trend lactate   - gentle hydration with LR at 75 for 12 hours   - if worsening, consider renal bladder US, urine lytes, and nephro eval.     # leukocytosis/thrombocytosis most likely reactive  - previously elevated on recent admission with unknown etiology  - similar presentation on previous admission  - can repeat UA, blood cultures, procal, CXR   - monitor off abx  - trend cbc    #hx of EtOH abuse  #Suspected thiamine and folic acid deficiencies   #smoking  - c/w thiamine and folic acid  - advised on etoh and smoking cessation  - nicotine patch     #HTN  - hold metoprolol 25 bid; holding lisinopril 2.5 mg until dayday resolves     # HO HFpEF  - ECHO: Left ventricular ejection fraction, by visual estimation, is 60 to 65%  - Lasix 20 mg was discontinued on 2021    # TIA/CVA - c/w aspirin 81 and statin   # HLD - c/w atorvastatin 10  # GERD - c/w protonix  # DM - HbA1c 7.0% on , monitor FS start insulin protocol if FS consistently > 180.     Diet: DASH/TLC/carb consistent   Activity: as tolerated   DVT Prophylaxis: SCD  GI Prophylaxis: protonix  Code Status: full  Disposition: acute  [facility] PRBC transfusion

## 2022-05-04 NOTE — CONSULT NOTE ADULT - PROBLEM SELECTOR RECOMMENDATION 9
Keep packing in place  Hold Eliquis and ASA if no medical contraindication  IV hydration  Type and Cross RBC  Monitor Hemoglobin and Hematocrit  Transfuse as medically needed  Will follow

## 2022-05-04 NOTE — PROGRESS NOTE ADULT - ASSESSMENT
65y/o M a/w recurrent epistaxis s/p LEFT nare packing with Rhino Rocket in the ED yesterday    - Continue LEFT rhino rocket until tomorrow, if remains stable will consider deflating and subsequently removing packing.  - Please cont to hold AC if no medical contraindication  - Trend H&H and transfuse prn   - Will d/w attending .

## 2022-05-04 NOTE — PROGRESS NOTE ADULT - ASSESSMENT
· Assessment	65 YO M with a pmh of TIA, CVA, CHF, HTN, HLD, Alcohol dependence, PE (2015, not on anticoagulation due to epistaxis)  presented to the ED epistaxis since yesterday.  Since admission, hbg has been downtrending and is at 6.7 today, will require transfusion.      	  # Recurrent epistaxis   # HO PE in 2015 - unprovoked as per chart review   # HO DVT in bilateral tibial veins 2021 - now resolved  - recently admitted for epistaxis, during the admission (4/27-4/29) hb dropped from 13.8 -> 7.8, never required transfusion  - became hypotensive in the ED to 60/30  - Hb 9.0 on admission downtrending -> 7.6, 7, 6.7 today, lactate 1.1  - s/p rhinorocket in ED  - trend CBC BID, keep active type and screen  - hold eliquis and ASA for now.    - in the setting of hypotension will hold lisinopril 2.5mg and metoprolol 25mg PO BID   - Duplex 4/27 negative. SCDs for DVT ppx.   - ENT consulted and also recommended holding Eliquis and aspirin  - give 1 unit of PRBCs today    # DAYDAY likely prerenal?  - Cr 1.3 on admission, baseline Cr ~0.9   - gentle hydration     # leukocytosis/thrombocytosis most likely reactive  - previously elevated on recent admission with unknown etiology  - similar presentation on previous admission  - can repeat UA, blood cultures, procal, CXR   - monitor off abx  - trend cbc    #hx of EtOH abuse  #smoking  - c/w thiamine and folic acid  - advised on etoh and smoking cessation  - nicotine patch     #HTN  - hold metoprolol 25 bid; holding lisinopril 2.5 mg until dayday resolves     # HO HFpEF  - ECHO: Left ventricular ejection fraction, by visual estimation, is 60 to 65%  - Lasix 20 mg was discontinued on 6/2021    # TIA/CVA - c/w aspirin 81 and statin   # HLD - c/w atorvastatin 10  # GERD - c/w protonix  # DM - HbA1c 7.0% on 4/27, monitor FS start insulin protocol if FS consistently > 180.     Diet: DASH/TLC/carb consistent      DVT Prophylaxis: SCD    Pending: epistaxis resolution

## 2022-05-04 NOTE — MEDICAL STUDENT PROGRESS NOTE(EDUCATION) - NS MD HP STUD ASPLAN ASSES FT
67 YO M with a pmh of TIA, CVA, CHF, HTN, HLD, Alcohol dependence, PE (2015, not on anticoagulation due to epistaxis)  presented to the ED epistaxis since yesterday.  Since admission, hbg has been downtrending and is at 6.7 today, will require transfusion.

## 2022-05-04 NOTE — CONSULT NOTE ADULT - SUBJECTIVE AND OBJECTIVE BOX
67 YO M with a pmh of TIA, CVA, CHFpEF 65%, HTN, HLD, Alcohol dependence, PE (2015, not on anticoagulation due to epistaxis)  presented to the ED epistaxis. Patient was recently discharged from the hospital (4/27-4/29) for epistaxis from both nostrils which was resolved and during that admission hemoglobin dropped from 13 to 7.8 but was never transfused. In the morning of admission, patient had spontaneous epistaxis from his left nostril and began to feel fatigued. Patient was restarted on eliquis and takes aspirin in NH.    In the ED, BP was initial 124/94 with , patient became hypotensive (60/30) and diaphoretic. Subsequently, rhino rocket was was inserted which controlled the bleed.       PAST MEDICAL & SURGICAL HISTORY:  TIA (transient ischemic attack)    CHF (congestive heart failure)    HTN (hypertension)    HLD (hyperlipidemia)    Alcohol dependence    Chronic back pain    Pulmonary embolism  2015    Cerebrovascular accident (CVA)  left pontine with dysphagia    Alcohol abuse    History of appendectomy      Allergies    No Known Allergies    Intolerances      MEDICATIONS  (STANDING):  atorvastatin 10 milliGRAM(s) Oral at bedtime  folic acid 1 milliGRAM(s) Oral daily  lactated ringers. 1000 milliLiter(s) (75 mL/Hr) IV Continuous <Continuous>  multivitamin/minerals 1 Tablet(s) Oral daily  nicotine  14 mG/24 Hr(s) Transdermal Patch - Peds 1 Patch Transdermal daily  ondansetron Injectable 4 milliGRAM(s) IV Push once  pantoprazole    Tablet 40 milliGRAM(s) Oral before breakfast  thiamine 100 milliGRAM(s) Oral daily    MEDICATIONS  (PRN):  ALBUTerol  90 MICROgram(s) HFA Inhaler - Peds 4 Puff(s) Inhalation every 4 hours PRN Shortness of Breath and/or Wheezing  melatonin 3 milliGRAM(s) Oral at bedtime PRN Insomnia    Social History: ????    ROS: ENT, GI, , CV, Pulm, Neuro, Psych, MS, Heme, Endo, Constitional; all negative except as noted in HPI    Vital Signs Last 24 Hrs  T(C): 36.4 (04 May 2022 04:44), Max: 36.5 (03 May 2022 19:51)  T(F): 97.6 (04 May 2022 04:44), Max: 97.7 (03 May 2022 19:51)  HR: 74 (04 May 2022 04:44) (74 - 120)  BP: 136/68 (04 May 2022 04:44) (60/38 - 136/68)  BP(mean): --  RR: 18 (04 May 2022 04:44) (18 - 21)  SpO2: 99% (04 May 2022 04:44) (93% - 99%)                          7.0    16.50 )-----------( 263      ( 04 May 2022 01:00 )             21.7    05-03    136  |  98  |  19  ----------------------------<  245<H>  4.8   |  17  |  1.3    Ca    9.2      03 May 2022 10:15    TPro  7.0  /  Alb  4.1  /  TBili  0.4  /  DBili  x   /  AST  23  /  ALT  28  /  AlkPhos  87  05-03   PT/INR - ( 03 May 2022 10:15 )   PT: 16.60 sec;   INR: 1.45 ratio         PTT - ( 03 May 2022 10:15 )  PTT:30.0 sec    PHYSICAL EXAM:  Gen: NAD, well-developed  Head: Normocephalic, Atraumatic  Face: no edema/erythema/fluctuance, parotid glands soft without mass  Eyes: PERRL, EOMI, no scleral injection  Ears: Right - ear canal clear, TM intact without effusion            Left - ear canal clear, TM intact without effusion  Nose: Left nare packed w/ 7.5cm Rhino Rocket, no active bleeding  Mouth: Mucosa moist, tongue/uvula midline, oropharynx clear  Neck: Flat, supple, no lymphadenopathy, trachea midline, no masses  Resp: breathing easily, no stridor  CV: no peripheral edema/cyanosis

## 2022-05-05 DIAGNOSIS — Z86.73 PERSONAL HISTORY OF TRANSIENT ISCHEMIC ATTACK (TIA), AND CEREBRAL INFARCTION WITHOUT RESIDUAL DEFICITS: ICD-10-CM

## 2022-05-05 DIAGNOSIS — E78.5 HYPERLIPIDEMIA, UNSPECIFIED: ICD-10-CM

## 2022-05-05 DIAGNOSIS — E11.65 TYPE 2 DIABETES MELLITUS WITH HYPERGLYCEMIA: ICD-10-CM

## 2022-05-05 DIAGNOSIS — Z79.82 LONG TERM (CURRENT) USE OF ASPIRIN: ICD-10-CM

## 2022-05-05 DIAGNOSIS — F17.210 NICOTINE DEPENDENCE, CIGARETTES, UNCOMPLICATED: ICD-10-CM

## 2022-05-05 DIAGNOSIS — R04.0 EPISTAXIS: ICD-10-CM

## 2022-05-05 DIAGNOSIS — D72.829 ELEVATED WHITE BLOOD CELL COUNT, UNSPECIFIED: ICD-10-CM

## 2022-05-05 DIAGNOSIS — N17.9 ACUTE KIDNEY FAILURE, UNSPECIFIED: ICD-10-CM

## 2022-05-05 DIAGNOSIS — E87.2 ACIDOSIS: ICD-10-CM

## 2022-05-05 DIAGNOSIS — Z20.822 CONTACT WITH AND (SUSPECTED) EXPOSURE TO COVID-19: ICD-10-CM

## 2022-05-05 DIAGNOSIS — I50.9 HEART FAILURE, UNSPECIFIED: ICD-10-CM

## 2022-05-05 DIAGNOSIS — F10.20 ALCOHOL DEPENDENCE, UNCOMPLICATED: ICD-10-CM

## 2022-05-05 DIAGNOSIS — I11.0 HYPERTENSIVE HEART DISEASE WITH HEART FAILURE: ICD-10-CM

## 2022-05-05 DIAGNOSIS — R06.00 DYSPNEA, UNSPECIFIED: ICD-10-CM

## 2022-05-05 DIAGNOSIS — K21.9 GASTRO-ESOPHAGEAL REFLUX DISEASE WITHOUT ESOPHAGITIS: ICD-10-CM

## 2022-05-05 DIAGNOSIS — Z86.711 PERSONAL HISTORY OF PULMONARY EMBOLISM: ICD-10-CM

## 2022-05-05 LAB
ANION GAP SERPL CALC-SCNC: 10 MMOL/L — SIGNIFICANT CHANGE UP (ref 7–14)
BASOPHILS # BLD AUTO: 0.04 K/UL — SIGNIFICANT CHANGE UP (ref 0–0.2)
BASOPHILS NFR BLD AUTO: 0.3 % — SIGNIFICANT CHANGE UP (ref 0–1)
BUN SERPL-MCNC: 13 MG/DL — SIGNIFICANT CHANGE UP (ref 10–20)
CALCIUM SERPL-MCNC: 8.5 MG/DL — SIGNIFICANT CHANGE UP (ref 8.5–10.1)
CHLORIDE SERPL-SCNC: 105 MMOL/L — SIGNIFICANT CHANGE UP (ref 98–110)
CO2 SERPL-SCNC: 24 MMOL/L — SIGNIFICANT CHANGE UP (ref 17–32)
CREAT SERPL-MCNC: 0.8 MG/DL — SIGNIFICANT CHANGE UP (ref 0.7–1.5)
EGFR: 99 ML/MIN/1.73M2 — SIGNIFICANT CHANGE UP
EOSINOPHIL # BLD AUTO: 0.24 K/UL — SIGNIFICANT CHANGE UP (ref 0–0.7)
EOSINOPHIL NFR BLD AUTO: 1.9 % — SIGNIFICANT CHANGE UP (ref 0–8)
GLUCOSE SERPL-MCNC: 106 MG/DL — HIGH (ref 70–99)
HCT VFR BLD CALC: 23.4 % — LOW (ref 42–52)
HGB BLD-MCNC: 7.6 G/DL — LOW (ref 14–18)
IMM GRANULOCYTES NFR BLD AUTO: 4.4 % — HIGH (ref 0.1–0.3)
LYMPHOCYTES # BLD AUTO: 16.1 % — LOW (ref 20.5–51.1)
LYMPHOCYTES # BLD AUTO: 2.04 K/UL — SIGNIFICANT CHANGE UP (ref 1.2–3.4)
MAGNESIUM SERPL-MCNC: 2.1 MG/DL — SIGNIFICANT CHANGE UP (ref 1.8–2.4)
MCHC RBC-ENTMCNC: 29.5 PG — SIGNIFICANT CHANGE UP (ref 27–31)
MCHC RBC-ENTMCNC: 32.5 G/DL — SIGNIFICANT CHANGE UP (ref 32–37)
MCV RBC AUTO: 90.7 FL — SIGNIFICANT CHANGE UP (ref 80–94)
MONOCYTES # BLD AUTO: 0.59 K/UL — SIGNIFICANT CHANGE UP (ref 0.1–0.6)
MONOCYTES NFR BLD AUTO: 4.6 % — SIGNIFICANT CHANGE UP (ref 1.7–9.3)
NEUTROPHILS # BLD AUTO: 9.23 K/UL — HIGH (ref 1.4–6.5)
NEUTROPHILS NFR BLD AUTO: 72.7 % — SIGNIFICANT CHANGE UP (ref 42.2–75.2)
NRBC # BLD: 0 /100 WBCS — SIGNIFICANT CHANGE UP (ref 0–0)
PLATELET # BLD AUTO: 244 K/UL — SIGNIFICANT CHANGE UP (ref 130–400)
POTASSIUM SERPL-MCNC: 4.1 MMOL/L — SIGNIFICANT CHANGE UP (ref 3.5–5)
POTASSIUM SERPL-SCNC: 4.1 MMOL/L — SIGNIFICANT CHANGE UP (ref 3.5–5)
RBC # BLD: 2.58 M/UL — LOW (ref 4.7–6.1)
RBC # FLD: 15.6 % — HIGH (ref 11.5–14.5)
SODIUM SERPL-SCNC: 139 MMOL/L — SIGNIFICANT CHANGE UP (ref 135–146)
WBC # BLD: 12.7 K/UL — HIGH (ref 4.8–10.8)
WBC # FLD AUTO: 12.7 K/UL — HIGH (ref 4.8–10.8)

## 2022-05-05 PROCEDURE — 30901 CONTROL OF NOSEBLEED: CPT | Mod: LT

## 2022-05-05 PROCEDURE — 99024 POSTOP FOLLOW-UP VISIT: CPT

## 2022-05-05 PROCEDURE — 99233 SBSQ HOSP IP/OBS HIGH 50: CPT

## 2022-05-05 RX ORDER — LISINOPRIL 2.5 MG/1
2.5 TABLET ORAL DAILY
Refills: 0 | Status: DISCONTINUED | OUTPATIENT
Start: 2022-05-05 | End: 2022-05-08

## 2022-05-05 RX ORDER — METOPROLOL TARTRATE 50 MG
12.5 TABLET ORAL
Refills: 0 | Status: DISCONTINUED | OUTPATIENT
Start: 2022-05-05 | End: 2022-05-08

## 2022-05-05 RX ORDER — APIXABAN 2.5 MG/1
5 TABLET, FILM COATED ORAL
Refills: 0 | Status: DISCONTINUED | OUTPATIENT
Start: 2022-05-05 | End: 2022-05-08

## 2022-05-05 RX ADMIN — Medication 1 PATCH: at 21:09

## 2022-05-05 RX ADMIN — Medication 100 MILLIGRAM(S): at 13:06

## 2022-05-05 RX ADMIN — Medication 1 TABLET(S): at 13:06

## 2022-05-05 RX ADMIN — Medication 12.5 MILLIGRAM(S): at 17:10

## 2022-05-05 RX ADMIN — PANTOPRAZOLE SODIUM 40 MILLIGRAM(S): 20 TABLET, DELAYED RELEASE ORAL at 08:33

## 2022-05-05 RX ADMIN — LISINOPRIL 2.5 MILLIGRAM(S): 2.5 TABLET ORAL at 21:21

## 2022-05-05 RX ADMIN — APIXABAN 5 MILLIGRAM(S): 2.5 TABLET, FILM COATED ORAL at 17:10

## 2022-05-05 RX ADMIN — ATORVASTATIN CALCIUM 10 MILLIGRAM(S): 80 TABLET, FILM COATED ORAL at 21:21

## 2022-05-05 RX ADMIN — Medication 1 PATCH: at 11:28

## 2022-05-05 RX ADMIN — Medication 1 MILLIGRAM(S): at 13:06

## 2022-05-05 NOTE — PHYSICAL THERAPY INITIAL EVALUATION ADULT - LEVEL OF INDEPENDENCE: SIT/STAND, REHAB EVAL
multiple attempts made. however pt unable to perform activity secondary to c/o dizziness. /81 HR 77/min/unable to perform

## 2022-05-05 NOTE — MEDICAL STUDENT PROGRESS NOTE(EDUCATION) - NS MD HP STUD ASPLAN ASSES FT
67 YO M with a pmh of TIA, CVA, CHF, HTN, HLD, Alcohol dependence, PE (2015, not on anticoagulation due to epistaxis)  presented to the ED epistaxis since yesterday.  Since admission, hbg has been downtrending and is at 6.7 today, will require transfusion. 65 YO M with a pmh of TIA, CVA, CHF, HTN, HLD, Alcohol dependence, PE (2015, not on anticoagulation due to epistaxis)  presented to the ED epistaxis since yesterday.  Since admission, hbg has been downtrending (9 -> 6.7).  Patient is now s/p 1u pRBC with repeat hgb 7.6.

## 2022-05-05 NOTE — PROGRESS NOTE ADULT - ASSESSMENT
· Assessment	65 YO M with a pmh of TIA, CVA, CHF, HTN, HLD, Alcohol dependence, PE/DVT on Eliquis presented to recurrent epistaxis.       	  # Recurrent epistaxis   # HO PE in 2015 - unprovoked as per chart review   # HO DVT in bilateral tibial veins 2021 - now resolved  - recently admitted for epistaxis, during the admission (4/27-4/29) hb dropped from 13.8 -> 7.8, never required transfusion  - s/p rhinorocket removal today by ENT   - trend CBC BID, keep active type and screen  - Resume Eliquis        # DAYDAY likely prerenal  - resolved     # leukocytosis/thrombocytosis most likely reactive  - resolving     #hx of EtOH abuse  #smoking  - c/w thiamine and folic acid  - advised on etoh and smoking cessation  - nicotine patch     #HTN  - monitor     # HO HFpEF  - ECHO: Left ventricular ejection fraction, by visual estimation, is 60 to 65%  - Lasix 20 mg was discontinued on 6/2021    # TIA/CVA - on aspirin 81 (held for epistaxis) and statin   # HLD - c/w atorvastatin 10  # GERD - c/w Protonix  # DM - HbA1c 7.0% on 4/27, monitor FS start insulin protocol if FS consistently > 180.     Diet: DASH/TLC/carb consistent      DVT Prophylaxis: Eliquis     Pending: monitor for epistaxis, resumed Eliquis   from STR

## 2022-05-05 NOTE — PROGRESS NOTE ADULT - ASSESSMENT
67y/o M a/w recurrent epistaxis s/p LEFT nare packing with Rhino Rocket in the ED 2 days ago    - Deflated LEFT rhino rocket without complication this morning. Packing removed intact with no bleed observed.   - Can restart eliquis later this evening/tomorrow morning from ENT standpoint and observe for further bleed   - Alberto H&H, transfuse prn   - Will d/w attending . 65y/o M a/w recurrent epistaxis s/p LEFT nare packing with Rhino Rocket in the ED 2 days ago    - Pt seen and examined at bedside with Dr. Terrazas, plan as per attending  - Deflated LEFT rhino rocket without complication this morning. Packing removed intact with no bleed observed.   - Can restart eliquis later this evening/tomorrow morning from ENT standpoint and observe for further bleed   - Alberto H&H, transfuse prn   - Discussed with medical team .

## 2022-05-05 NOTE — PHYSICAL THERAPY INITIAL EVALUATION ADULT - PERTINENT HX OF CURRENT PROBLEM, REHAB EVAL
65 YO M with a pmh of TIA, CVA, CHFpEF 65%, HTN, HLD, Alcohol dependence, PE (2015, not on anticoagulation due to epistaxis)  presented to the ED epistaxis

## 2022-05-05 NOTE — MEDICAL STUDENT PROGRESS NOTE(EDUCATION) - NS MD HP STUD ASPLAN PLAN FT
# Recurrent epistaxsis; currently not on A/C due to recurrent bleed  # HO PE in 2015 - unprovoked as per chart review   # HO DVT in bilateral tibial veins 2021 - now resolved  - recently admitted for epistaxis, during the admission (4/27-4/29) hb dropped from 13.8 -> 7.8, never required transfusion  - became hypotensive in the ED to 60/30  - Hb 9.0 on admission, downtrending -> 7.6, 7, 6.7; lactate 1.1  - s/p rhinorocket in ED  - trend CBC BID, keep active type and screen  - hold eliquis and ASA for now.    - in the setting of hypotension will hold lisinopril 2.5mg and metoprolol 25mg PO BID. if hemodynamically stable or becomes hypertensive please restart.   - Duplex 4/27 negative. SCDs for DVT ppx.   - ENT consulted and also recommended holding eliquis and aspirin  - s/p 1 unit of PRBC- pending H&H labs  - ENT consulted: considering deflating rhino rocket today and removing packing if no subsequent bleeding    # DAYDAY likely prerenal?  # HAGMA likely secondary to lactic acidosis in the setting of hypotension   - Cr 1.3 on admission, baseline Cr ~0.9   - lactate 2.5 on admission   - trend lactate   - gentle hydration with LR at 75 for 12 hours   - if worsening, consider renal bladder US, urine lytes, and nephro eval.     # leukocytosis/thrombocytosis most likely reactive  - previously elevated on recent admission with unknown etiology  - similar presentation on previous admission  - can repeat UA, blood cultures, procal, CXR   - monitor off abx  - trend cbc    #hx of EtOH abuse  #Suspected thiamine and folic acid deficiencies   #smoking  - c/w thiamine and folic acid  - advised on etoh and smoking cessation  - nicotine patch     #HTN  - hold metoprolol 25 bid; holding lisinopril 2.5 mg until dayday resolves     # HO HFpEF  - ECHO: Left ventricular ejection fraction, by visual estimation, is 60 to 65%  - Lasix 20 mg was discontinued on 6/2021    # TIA/CVA - c/w aspirin 81 and statin   # HLD - c/w atorvastatin 10  # GERD - c/w protonix  # DM - HbA1c 7.0% on 4/27, monitor FS start insulin protocol if FS consistently > 180.     Diet: DASH/TLC/carb consistent   Activity: as tolerated   DVT Prophylaxis: SCD  GI Prophylaxis: protonix  Code Status: full  Disposition: acute  Pending: H&H levels, ENT f/u       # Recurrent epistaxsis; currently not on A/C due to recurrent bleed  # HO PE in 2015 - unprovoked as per chart review   # HO DVT in bilateral tibial veins 2021 - now resolved  - recently admitted for epistaxis, during the admission (4/27-4/29) hb dropped from 13.8 -> 7.8, never required transfusion  - became hypotensive in the ED to 60/30  - Hb 9.0 on admission, downtrending -> 7.6, 7, 6.7; lactate 1.1  - s/p rhinorocket in ED  - trend CBC BID, keep active type and screen  - hold eliquis and ASA for now.    - in the setting of hypotension will hold lisinopril 2.5mg and metoprolol 25mg PO BID. if hemodynamically stable or becomes hypertensive please restart.   - Duplex 4/27 negative. SCDs for DVT ppx.   - ENT consulted and also recommended holding eliquis and aspirin  - s/p 1 unit of PRBC- repeat hgb 7.6  - ENT following, removed rhino rocket this AM, no signs of bleeding, okay to resume anti-platelet + AC this PM or tomorrow AM    # DAYDAY likely prerenal? - resolved  # HAGMA likely secondary to lactic acidosis in the setting of hypotension   - Cr 1.3 on admission, baseline Cr ~0.9, Cr 0.8 today  - lactate 1.1  - if worsens, consider renal bladder US, urine lytes, and nephro eval.     # leukocytosis/thrombocytosis most likely reactive  - previously elevated on recent admission with unknown etiology  - similar presentation on previous admission  - procal 0.49 (incr from previous admission)  - f/u pending UA in setting of malodorous urine  - Bcx NGTD  - monitor off abx  - trend cbc    #hx of EtOH abuse  #Suspected thiamine and folic acid deficiencies   #smoking  - c/w thiamine and folic acid supplementation  - advised on etoh and smoking cessation  - nicotine patch     #HTN  - resume lisinopril 2.5mg    # HO HFpEF  - ECHO: Left ventricular ejection fraction, by visual estimation, is 60 to 65%  - Lasix 20 mg was discontinued on 6/2021  - resume metoprolol 12.5mg BID     # TIA/CVA - c/w aspirin 81 and statin   # HLD - c/w atorvastatin 10  # GERD - c/w protonix  # DM - HbA1c 7.0% on 4/27, monitor FS start insulin protocol if FS consistently > 180.     Diet: DASH/TLC/carb consistent   Activity: as tolerated   DVT Prophylaxis: SCD  GI Prophylaxis: protonix  Code Status: full  Disposition: acute  Pending: PT eval, NH placement

## 2022-05-05 NOTE — PHYSICAL THERAPY INITIAL EVALUATION ADULT - GENERAL OBSERVATIONS, REHAB EVAL
Chart reviewed. attempted PT IE [id number]. However pt refused secondary to stating that he feels that his nose might start bleeding soon. to f/u as appropriate. Covering RN Natali made aware.
Chart reviewed. Held off PT IE secondary to no appropriate activity orders placed. Resident Desmond notified. to f/u when appropriate activity orders are placed.
PT IE [id number]. Chart reviewed. pt encountered semi-reclined in bed. In NAD. + IV lock.

## 2022-05-05 NOTE — PROGRESS NOTE ADULT - NS ATTEND AMEND GEN_ALL_CORE FT
Seen and examined. No further bleeding after rhinorocket removal. Okay to restart anticoagulation tomorrow as long as no further bleeding. Suggest nasal precautions and vaseline to nares bid.

## 2022-05-05 NOTE — MEDICAL STUDENT PROGRESS NOTE(EDUCATION) - SUBJECTIVE AND OBJECTIVE BOX
CHAPARRO ROBISON 66y Male  MRN#: [id number]   Hospital Day: 2d    SUBJECTIVE  Patient is a 66y old Male who presents with a chief complaint of Currently admitted to medicine with the primary diagnosis of Bleeding from the nose    65 YO M with a pmh of TIA, CVA, CHFpEF 65%, HTN, HLD, Alcohol dependence, PE (2015, not on anticoagulation due to epistaxis)  presented to the ED epistaxis. Patient was recently discharged from the hospital (4/27-4/29) for epistaxis from both nostrils which was resolved and during that admission hemoglobin dropped from 13 to 7.8 but was never transfused. In the morning of admission, patient had spontaneous epistaxis from his left nostril and began to feel fatigued. Patient was restarted on eliquis and takes aspirin in NH.    In the ED, BP was initial 124/94 with , patient became hypotensive (60/30) and diaphoretic. Subsequently, rhino rocket was was inserted which controlled the bleed.     INTERVAL HPI AND OVERNIGHT EVENTS:  Patient was examined and seen at bedside. This morning he is resting comfortably in bed and reports no issues or overnight events. Patient had 1 unit PRBC transfused with no reactions. No overnight bleeding    REVIEW OF SYMPTOMS:  CONSTITUTIONAL: No weakness, fevers or chills; No headaches  EYES: No visual changes, eye pain, or discharge  ENT: No vertigo; No ear pain or change in hearing; No sore throat or difficulty swallowing  NECK: No pain or stiffness  RESPIRATORY: No cough, wheezing, or hemoptysis; No shortness of breath  CARDIOVASCULAR: No chest pain or palpitations  GASTROINTESTINAL: No abdominal or epigastric pain; No nausea, vomiting, or hematemesis; No diarrhea or constipation; No melena or hematochezia  GENITOURINARY: No dysuria, frequency or hematuria  MUSCULOSKELETAL: No joint pain, no muscle pain, no weakness  NEUROLOGICAL: No numbness or weakness  SKIN: No itching or rashes    OBJECTIVE  PAST MEDICAL & SURGICAL HISTORY  TIA (transient ischemic attack)    CHF (congestive heart failure)    HTN (hypertension)    HLD (hyperlipidemia)    Alcohol dependence    Chronic back pain    Pulmonary embolism  2015    Cerebrovascular accident (CVA)  left pontine with dysphagia    Alcohol abuse    History of appendectomy      ALLERGIES:  No Known Allergies    MEDICATIONS:  STANDING MEDICATIONS  atorvastatin 10 milliGRAM(s) Oral at bedtime  folic acid 1 milliGRAM(s) Oral daily  multivitamin/minerals 1 Tablet(s) Oral daily  nicotine  14 mG/24 Hr(s) Transdermal Patch - Peds 1 Patch Transdermal daily  ondansetron Injectable 4 milliGRAM(s) IV Push once  pantoprazole    Tablet 40 milliGRAM(s) Oral before breakfast  thiamine 100 milliGRAM(s) Oral daily    PRN MEDICATIONS  ALBUTerol  90 MICROgram(s) HFA Inhaler - Peds 4 Puff(s) Inhalation every 4 hours PRN  melatonin 3 milliGRAM(s) Oral at bedtime PRN      VITAL SIGNS: Last 24 Hours  T(C): 36.5 (05 May 2022 05:20), Max: 36.6 (04 May 2022 19:40)  T(F): 97.7 (05 May 2022 05:20), Max: 97.9 (04 May 2022 19:40)  HR: 75 (05 May 2022 05:20) (75 - 86)  BP: 140/64 (05 May 2022 05:20) (139/64 - 160/72)  BP(mean): --  RR: 18 (05 May 2022 05:20) (18 - 18)  SpO2: --    LABS:                        6.7    17.10 )-----------( 264      ( 04 May 2022 08:01 )             20.7     05-04    138  |  104  |  27<H>  ----------------------------<  106<H>  4.4   |  19  |  0.8    Ca    8.5      04 May 2022 08:01  Mg     2.0     05-04    TPro  6.1  /  Alb  3.5  /  TBili  0.3  /  DBili  x   /  AST  15  /  ALT  20  /  AlkPhos  69  05-04    PT/INR - ( 03 May 2022 10:15 )   PT: 16.60 sec;   INR: 1.45 ratio         PTT - ( 03 May 2022 10:15 )  PTT:30.0 sec              RADIOLOGY:      PHYSICAL EXAM:  CONSTITUTIONAL: No acute distress, well-developed, well-groomed, AAOx3  HEAD: Atraumatic, normocephalic  PULMONARY: Clear to auscultation bilaterally; no wheezes, rales, or rhonchi  CARDIOVASCULAR: Regular rate and rhythm; no murmurs, rubs, or gallops  GASTROINTESTINAL: Soft, non-tender, non-distended; bowel sounds present  MUSCULOSKELETAL: 2+ peripheral pulses; no clubbing, no cyanosis, no edema  NEUROLOGY: all CN grossly intact

## 2022-05-06 LAB
ALBUMIN SERPL ELPH-MCNC: 3.6 G/DL — SIGNIFICANT CHANGE UP (ref 3.5–5.2)
ALP SERPL-CCNC: 81 U/L — SIGNIFICANT CHANGE UP (ref 30–115)
ALT FLD-CCNC: 24 U/L — SIGNIFICANT CHANGE UP (ref 0–41)
ANION GAP SERPL CALC-SCNC: 11 MMOL/L — SIGNIFICANT CHANGE UP (ref 7–14)
AST SERPL-CCNC: 18 U/L — SIGNIFICANT CHANGE UP (ref 0–41)
BILIRUB SERPL-MCNC: 0.4 MG/DL — SIGNIFICANT CHANGE UP (ref 0.2–1.2)
BLD GP AB SCN SERPL QL: SIGNIFICANT CHANGE UP
BUN SERPL-MCNC: 10 MG/DL — SIGNIFICANT CHANGE UP (ref 10–20)
CALCIUM SERPL-MCNC: 8.8 MG/DL — SIGNIFICANT CHANGE UP (ref 8.5–10.1)
CHLORIDE SERPL-SCNC: 105 MMOL/L — SIGNIFICANT CHANGE UP (ref 98–110)
CO2 SERPL-SCNC: 24 MMOL/L — SIGNIFICANT CHANGE UP (ref 17–32)
CREAT SERPL-MCNC: 0.8 MG/DL — SIGNIFICANT CHANGE UP (ref 0.7–1.5)
EGFR: 99 ML/MIN/1.73M2 — SIGNIFICANT CHANGE UP
GLUCOSE SERPL-MCNC: 116 MG/DL — HIGH (ref 70–99)
HCT VFR BLD CALC: 24.3 % — LOW (ref 42–52)
HGB BLD-MCNC: 7.8 G/DL — LOW (ref 14–18)
MAGNESIUM SERPL-MCNC: 2.2 MG/DL — SIGNIFICANT CHANGE UP (ref 1.8–2.4)
MCHC RBC-ENTMCNC: 29.1 PG — SIGNIFICANT CHANGE UP (ref 27–31)
MCHC RBC-ENTMCNC: 32.1 G/DL — SIGNIFICANT CHANGE UP (ref 32–37)
MCV RBC AUTO: 90.7 FL — SIGNIFICANT CHANGE UP (ref 80–94)
NRBC # BLD: 0 /100 WBCS — SIGNIFICANT CHANGE UP (ref 0–0)
PLATELET # BLD AUTO: 239 K/UL — SIGNIFICANT CHANGE UP (ref 130–400)
POTASSIUM SERPL-MCNC: 4.2 MMOL/L — SIGNIFICANT CHANGE UP (ref 3.5–5)
POTASSIUM SERPL-SCNC: 4.2 MMOL/L — SIGNIFICANT CHANGE UP (ref 3.5–5)
PROT SERPL-MCNC: 6.3 G/DL — SIGNIFICANT CHANGE UP (ref 6–8)
RBC # BLD: 2.68 M/UL — LOW (ref 4.7–6.1)
RBC # FLD: 16.6 % — HIGH (ref 11.5–14.5)
SARS-COV-2 RNA SPEC QL NAA+PROBE: SIGNIFICANT CHANGE UP
SODIUM SERPL-SCNC: 140 MMOL/L — SIGNIFICANT CHANGE UP (ref 135–146)
WBC # BLD: 10.62 K/UL — SIGNIFICANT CHANGE UP (ref 4.8–10.8)
WBC # FLD AUTO: 10.62 K/UL — SIGNIFICANT CHANGE UP (ref 4.8–10.8)

## 2022-05-06 PROCEDURE — 99232 SBSQ HOSP IP/OBS MODERATE 35: CPT

## 2022-05-06 RX ORDER — DIPHENHYDRAMINE HCL 50 MG
25 CAPSULE ORAL ONCE
Refills: 0 | Status: COMPLETED | OUTPATIENT
Start: 2022-05-06 | End: 2022-05-06

## 2022-05-06 RX ORDER — TRAMADOL HYDROCHLORIDE 50 MG/1
50 TABLET ORAL EVERY 4 HOURS
Refills: 0 | Status: DISCONTINUED | OUTPATIENT
Start: 2022-05-06 | End: 2022-05-08

## 2022-05-06 RX ORDER — ASPIRIN/CALCIUM CARB/MAGNESIUM 324 MG
81 TABLET ORAL DAILY
Refills: 0 | Status: DISCONTINUED | OUTPATIENT
Start: 2022-05-06 | End: 2022-05-08

## 2022-05-06 RX ORDER — TRAMADOL HYDROCHLORIDE 50 MG/1
25 TABLET ORAL EVERY 12 HOURS
Refills: 0 | Status: DISCONTINUED | OUTPATIENT
Start: 2022-05-06 | End: 2022-05-06

## 2022-05-06 RX ADMIN — APIXABAN 5 MILLIGRAM(S): 2.5 TABLET, FILM COATED ORAL at 17:35

## 2022-05-06 RX ADMIN — APIXABAN 5 MILLIGRAM(S): 2.5 TABLET, FILM COATED ORAL at 09:22

## 2022-05-06 RX ADMIN — Medication 12.5 MILLIGRAM(S): at 06:56

## 2022-05-06 RX ADMIN — Medication 1 PATCH: at 11:55

## 2022-05-06 RX ADMIN — PANTOPRAZOLE SODIUM 40 MILLIGRAM(S): 20 TABLET, DELAYED RELEASE ORAL at 06:56

## 2022-05-06 RX ADMIN — Medication 81 MILLIGRAM(S): at 17:35

## 2022-05-06 RX ADMIN — Medication 1 TABLET(S): at 11:47

## 2022-05-06 RX ADMIN — TRAMADOL HYDROCHLORIDE 25 MILLIGRAM(S): 50 TABLET ORAL at 11:20

## 2022-05-06 RX ADMIN — TRAMADOL HYDROCHLORIDE 25 MILLIGRAM(S): 50 TABLET ORAL at 11:44

## 2022-05-06 RX ADMIN — Medication 1 PATCH: at 11:47

## 2022-05-06 RX ADMIN — Medication 12.5 MILLIGRAM(S): at 17:35

## 2022-05-06 RX ADMIN — Medication 25 MILLIGRAM(S): at 21:32

## 2022-05-06 RX ADMIN — Medication 1 PATCH: at 20:05

## 2022-05-06 RX ADMIN — Medication 1 MILLIGRAM(S): at 11:47

## 2022-05-06 RX ADMIN — LISINOPRIL 2.5 MILLIGRAM(S): 2.5 TABLET ORAL at 06:56

## 2022-05-06 RX ADMIN — Medication 100 MILLIGRAM(S): at 11:47

## 2022-05-06 RX ADMIN — ATORVASTATIN CALCIUM 10 MILLIGRAM(S): 80 TABLET, FILM COATED ORAL at 21:32

## 2022-05-06 NOTE — MEDICAL STUDENT PROGRESS NOTE(EDUCATION) - NS MD HP STUD ASPLAN PLAN FT
# Recurrent epistaxsis; currently not on A/C due to recurrent bleed  # HO PE in 2015 - unprovoked as per chart review   # HO DVT in bilateral tibial veins 2021 - now resolved  - recently admitted for epistaxis, during the admission (4/27-4/29) hb dropped from 13.8 -> 7.8, never required transfusion  - became hypotensive in the ED to 60/30  - Hb 9.0 on admission, downtrending -> 7.6, 7, 6.7; lactate 1.1  - s/p rhinorocket in ED  - trend CBC BID, keep active type and screen  - in the setting of hypotension will hold lisinopril 2.5mg and metoprolol 25mg PO BID. if hemodynamically stable or becomes hypertensive please restart.   - Duplex 4/27 negative. SCDs for DVT ppx.   - ENT consulted and also recommended holding eliquis and aspirin  - s/p 1 unit of PRBC- repeat hgb 7.6 yesterday, f/u hbg today  - ENT following, removed rhino rocket this AM, no signs of bleeding, okay to resume anti-platelet + AC- started yesterday    # DAYDAY likely prerenal? - resolved  # HAGMA likely secondary to lactic acidosis in the setting of hypotension   - Cr 1.3 on admission, baseline Cr ~0.9, Cr 0.8 today  - lactate 1.1  - if worsens, consider renal bladder US, urine lytes, and nephro eval.     # leukocytosis/thrombocytosis most likely reactive  - previously elevated on recent admission with unknown etiology  - similar presentation on previous admission  - procal 0.49 (incr from previous admission)    #hx of EtOH abuse  #Suspected thiamine and folic acid deficiencies   #smoking  - c/w thiamine and folic acid supplementation  - advised on etoh and smoking cessation  - nicotine patch     #HTN  - resume lisinopril 2.5mg    # HO HFpEF  - ECHO: Left ventricular ejection fraction, by visual estimation, is 60 to 65%  - Lasix 20 mg was discontinued on 6/2021  - resume metoprolol 12.5mg BID     # hip pain- started tramadol 25mg prn  # TIA/CVA - c/w aspirin 81 and statin   # HLD - c/w atorvastatin 10  # GERD - c/w protonix  # DM - HbA1c 7.0% on 4/27, monitor FS start insulin protocol if FS consistently > 180.     Diet: DASH/TLC/carb consistent   Activity: as tolerated   DVT Prophylaxis: SCD  GI Prophylaxis: protonix  Code Status: full  Disposition: acute  Pending: PT eval, NH placement # Recurrent epistaxsis; currently not on A/C due to recurrent bleed  # HO PE in 2015 - unprovoked as per chart review   # HO DVT in bilateral tibial veins 2021 - now resolved  - recently admitted for epistaxis, during the admission (4/27-4/29) hb dropped from 13.8 -> 7.8, never required transfusion  - became hypotensive in the ED to 60/30  - Hb 9.0 on admission, downtrending -> 7.6, 7, 6.7--> 7.6 s/p transfusion yesterday and today 7.8; lactate 1.1  - s/p rhinorocket in ED  - trend CBC BID, keep active type and screen  - in the setting of hypotension will hold lisinopril 2.5mg and metoprolol 25mg PO BID. if hemodynamically stable or becomes hypertensive please restart.   - Duplex 4/27 negative. SCDs for DVT ppx.   - ENT consulted and also recommended holding eliquis and aspirin  - s/p 1 unit of PRBC- repeat hgb 7.6 yesterday, f/u hbg today  - ENT following, removed rhino rocket this AM, no signs of bleeding, okay to resume anti-platelet + AC- started yesterday    # DAYDAY likely prerenal? - resolved  # HAGMA likely secondary to lactic acidosis in the setting of hypotension   - Cr 1.3 on admission, baseline Cr ~0.9, Cr 0.8 today  - lactate 1.1  - if worsens, consider renal bladder US, urine lytes, and nephro eval.     # leukocytosis/thrombocytosis most likely reactive  - previously elevated on recent admission with unknown etiology  - similar presentation on previous admission  - procal 0.49 (incr from previous admission)    #hx of EtOH abuse  #Suspected thiamine and folic acid deficiencies   #smoking  - c/w thiamine and folic acid supplementation  - advised on etoh and smoking cessation  - nicotine patch     #HTN  - resume lisinopril 2.5mg    # HO HFpEF  - ECHO: Left ventricular ejection fraction, by visual estimation, is 60 to 65%  - Lasix 20 mg was discontinued on 6/2021  - resume metoprolol 12.5mg BID     # hip pain- started tramadol 25mg prn  # TIA/CVA - c/w aspirin 81 and statin   # HLD - c/w atorvastatin 10  # GERD - c/w protonix  # DM - HbA1c 7.0% on 4/27, monitor FS start insulin protocol if FS consistently > 180.     Diet: DASH/TLC/carb consistent   Activity: as tolerated   DVT Prophylaxis: SCD  GI Prophylaxis: protonix  Code Status: full  Disposition: acute  Pending: PT eval, NH placement, d/c tomorrow # Recurrent epistaxsis; currently not on A/C due to recurrent bleed  # HO PE in 2015 - unprovoked as per chart review   # HO DVT in bilateral tibial veins 2021 - now resolved  - recently admitted for epistaxis, during the admission (4/27-4/29) hb dropped from 13.8 -> 7.8, never required transfusion  - became hypotensive in the ED to 60/30  - Hb 9.0 on admission, downtrending -> 7.6, 7, 6.7--> 7.6 s/p transfusion yesterday and today 7.8; lactate 1.1  - s/p rhinorocket in ED  - trend CBC BID, keep active type and screen  - in the setting of hypotension will hold lisinopril 2.5mg and metoprolol 25mg PO BID. if hemodynamically stable or becomes hypertensive please restart.   - Duplex 4/27 negative. SCDs for DVT ppx.   - ENT consulted and also recommended holding eliquis and aspirin  - s/p 1 unit of PRBC- repeat hgb 7.6 yesterday, f/u hbg today  - ENT following, removed rhino rocket this AM, no signs of bleeding, okay to resume anti-platelet + AC- started yesterday    # DAYADY likely prerenal? - resolved  # HAGMA likely secondary to lactic acidosis in the setting of hypotension   - Cr 1.3 on admission, baseline Cr ~0.9, Cr 0.8 today  - lactate 1.1  - if worsens, consider renal bladder US, urine lytes, and nephro eval.     # leukocytosis/thrombocytosis most likely reactive - resolved  - previously elevated on recent admission with unknown etiology  - similar presentation on previous admission  - procal 0.49 (incr from previous admission)    #hx of EtOH abuse  #Suspected thiamine and folic acid deficiencies   #smoking  - c/w thiamine and folic acid supplementation  - advised on etoh and smoking cessation  - nicotine patch     #HTN  - resume lisinopril 2.5mg    # HO HFpEF  - ECHO: Left ventricular ejection fraction, by visual estimation, is 60 to 65%  - Lasix 20 mg was discontinued on 6/2021  - resume metoprolol 12.5mg BID     # hip pain  - started tramadol 25mg prn  - monitor pain control    # TIA/CVA - c/w aspirin 81 and statin   # HLD - c/w atorvastatin 10  # GERD - c/w protonix  # DM - HbA1c 7.0% on 4/27, monitor FS start insulin protocol if FS consistently > 180.     Diet: DASH/TLC/carb consistent   Activity: as tolerated   DVT Prophylaxis: SCD  GI Prophylaxis: protonix  Code Status: full  Disposition: acute  Pending: NH placement, d/c tomorrow

## 2022-05-06 NOTE — PROGRESS NOTE ADULT - ASSESSMENT
· Assessment	65 YO M with a pmh of TIA, CVA, CHF, HTN, HLD, Alcohol dependence, PE/DVT on Eliquis presented to recurrent epistaxis.       	  # Recurrent epistaxis   # HO PE in 2015 - unprovoked as per chart review   # HO DVT in bilateral tibial veins 2021 - now resolved  - recently admitted for epistaxis, during the admission (4/27-4/29) hb dropped from 13.8 -> 7.8, never required transfusion  - s/p rhinorocket removal today by ENT   - trend CBC, keep active type and screen  - c/w Eliquis     - Resume ASA      # DAYDAY likely prerenal  - resolved     # leukocytosis/thrombocytosis most likely reactive  - resolving     #hx of EtOH abuse  #smoking  - c/w thiamine and folic acid  - advised on etoh and smoking cessation  - nicotine patch     #HTN  - monitor     # HO HFpEF  - ECHO: Left ventricular ejection fraction, by visual estimation, is 60 to 65%  - Lasix 20 mg was discontinued on 6/2021    # TIA/CVA - on aspirin 81 and statin   # HLD - c/w atorvastatin 10  # GERD - c/w Protonix  # DM - HbA1c 7.0% on 4/27, monitor FS start insulin protocol if FS consistently > 180.     Diet: DASH/TLC/carb consistent      DVT Prophylaxis: Eliquis     Pending: monitor for epistaxis, resumed Eliquis and ASA   from [facility]

## 2022-05-06 NOTE — PROGRESS NOTE ADULT - SUBJECTIVE AND OBJECTIVE BOX
Pt seen and examined at bedside.         VITAL SIGNS (Last 24 hrs):  T(C): 36.5 (05-05-22 @ 05:20), Max: 36.6 (05-04-22 @ 19:40)  HR: 75 (05-05-22 @ 05:20) (75 - 86)  BP: 140/64 (05-05-22 @ 05:20) (139/64 - 160/72)  RR: 18 (05-05-22 @ 05:20) (18 - 18)  SpO2: --  Wt(kg): --  Daily     Daily     I&O's Summary    05 May 2022 07:01  -  05 May 2022 12:51  --------------------------------------------------------  IN: 210 mL / OUT: 200 mL / NET: 10 mL        PHYSICAL EXAM:  GENERAL: NAD   HEAD:  Atraumatic, Normocephalic  EYES:   conjunctiva and sclera clear  NECK: Supple, No JVD  CHEST/LUNG: Clear to auscultation bilaterally; No wheeze  HEART: Regular rate and rhythm; No murmurs, rubs, or gallops  ABDOMEN: Soft, Nontender, Nondistended; Bowel sounds present  EXTREMITIES:  2+ Peripheral Pulses, No clubbing, cyanosis, or edema  PSYCH: AAOx3  NEUROLOGY: non-focal  SKIN: No rashes or lesions    Labs Reviewed  Spoke to patient in regards to abnormal labs.    CBC Full  -  ( 05 May 2022 07:30 )  WBC Count : 12.70 K/uL  Hemoglobin : 7.6 g/dL  Hematocrit : 23.4 %  Platelet Count - Automated : 244 K/uL  Mean Cell Volume : 90.7 fL  Mean Cell Hemoglobin : 29.5 pg  Mean Cell Hemoglobin Concentration : 32.5 g/dL  Auto Neutrophil # : 9.23 K/uL  Auto Lymphocyte # : 2.04 K/uL  Auto Monocyte # : 0.59 K/uL  Auto Eosinophil # : 0.24 K/uL  Auto Basophil # : 0.04 K/uL  Auto Neutrophil % : 72.7 %  Auto Lymphocyte % : 16.1 %  Auto Monocyte % : 4.6 %  Auto Eosinophil % : 1.9 %  Auto Basophil % : 0.3 %    BMP:    05-05 @ 07:30    Blood Urea Nitrogen - 13  Calcium - 8.5  Carbon Dioxide - 24  Chloride - 105  Creatinine - 0.8  Glucose - 106  Potassium - 4.1  Sodium - 139         PT/INR - ( 03 May 2022 10:15 )   PT: 16.60 sec;   INR: 1.45 ratio         PTT - ( 03 May 2022 10:15 )  PTT:30.0 sec  Urine Culture:  05-03 @ 23:49 Urine culture: --    Culture Results:   No growth to date.  Method Type: --  Organism: --  Organism Identification: --  Specimen Source: .Blood Blood-Peripheral           MEDICATIONS  (STANDING):  apixaban 5 milliGRAM(s) Oral two times a day  atorvastatin 10 milliGRAM(s) Oral at bedtime  folic acid 1 milliGRAM(s) Oral daily  lisinopril 2.5 milliGRAM(s) Oral daily  metoprolol tartrate 12.5 milliGRAM(s) Oral two times a day  multivitamin/minerals 1 Tablet(s) Oral daily  nicotine  14 mG/24 Hr(s) Transdermal Patch - Peds 1 Patch Transdermal daily  ondansetron Injectable 4 milliGRAM(s) IV Push once  pantoprazole    Tablet 40 milliGRAM(s) Oral before breakfast  thiamine 100 milliGRAM(s) Oral daily    MEDICATIONS  (PRN):  ALBUTerol  90 MICROgram(s) HFA Inhaler - Peds 4 Puff(s) Inhalation every 4 hours PRN Shortness of Breath and/or Wheezing  melatonin 3 milliGRAM(s) Oral at bedtime PRN Insomnia  
     Pt seen and examined at bedside.       VITAL SIGNS (Last 24 hrs):  T(C): 37.4 (22 @ 14:05), Max: 37.4 (22 @ 14:05)  HR: 64 (22 @ 14:05) (64 - 79)  BP: 127/58 (22 @ 14:05) (127/58 - 136/68)  RR: 18 (22 @ 14:05) (18 - 18)  SpO2: --  Wt(kg): --  Daily     Daily Weight in k.3 (06 May 2022 09:12)    I&O's Summary    05 May 2022 07:01  -  06 May 2022 07:00  --------------------------------------------------------  IN: 450 mL / OUT: 600 mL / NET: -150 mL        PHYSICAL EXAM:  GENERAL: NAD, well-developed  HEAD:  Atraumatic, Normocephalic  EYES: EOMI, PERRLA, conjunctiva and sclera clear  NECK: Supple, No JVD  CHEST/LUNG: Clear to auscultation bilaterally; No wheeze  HEART: Regular rate and rhythm; No murmurs, rubs, or gallops  ABDOMEN: Soft, Nontender, Nondistended; Bowel sounds present  EXTREMITIES:  2+ Peripheral Pulses, No clubbing, cyanosis, or edema  PSYCH: AAOx3  NEUROLOGY: non-focal  SKIN: No rashes or lesions    Labs Reviewed  Spoke to patient in regards to abnormal labs.    CBC Full  -  ( 06 May 2022 08:25 )  WBC Count : 10.62 K/uL  Hemoglobin : 7.8 g/dL  Hematocrit : 24.3 %  Platelet Count - Automated : 239 K/uL  Mean Cell Volume : 90.7 fL  Mean Cell Hemoglobin : 29.1 pg  Mean Cell Hemoglobin Concentration : 32.1 g/dL  Auto Neutrophil # : x  Auto Lymphocyte # : x  Auto Monocyte # : x  Auto Eosinophil # : x  Auto Basophil # : x  Auto Neutrophil % : x  Auto Lymphocyte % : x  Auto Monocyte % : x  Auto Eosinophil % : x  Auto Basophil % : x    BMP:     @ 08:25    Blood Urea Nitrogen - 10  Calcium - 8.8  Carbon Dioxide - 24  Chloride - 105  Creatinine - 0.8  Glucose - 116  Potassium - 4.2  Sodium - 140         PT/INR - ( 03 May 2022 10:15 )   PT: 16.60 sec;   INR: 1.45 ratio         PTT - ( 03 May 2022 10:15 )  PTT:30.0 sec  Urine Culture:   @ 07:59 Urine culture: --    Culture Results:   No growth to date.  Method Type: --  Organism: --  Organism Identification: --  Specimen Source: .Blood Blood-Peripheral   @ 23:49 Urine culture: --    Culture Results:   No growth to date.  Method Type: --  Organism: --  Organism Identification: --  Specimen Source: .Blood Blood-Peripheral           MEDICATIONS  (STANDING):  apixaban 5 milliGRAM(s) Oral two times a day  atorvastatin 10 milliGRAM(s) Oral at bedtime  folic acid 1 milliGRAM(s) Oral daily  lisinopril 2.5 milliGRAM(s) Oral daily  metoprolol tartrate 12.5 milliGRAM(s) Oral two times a day  multivitamin/minerals 1 Tablet(s) Oral daily  nicotine  14 mG/24 Hr(s) Transdermal Patch - Peds 1 Patch Transdermal daily  ondansetron Injectable 4 milliGRAM(s) IV Push once  pantoprazole    Tablet 40 milliGRAM(s) Oral before breakfast  thiamine 100 milliGRAM(s) Oral daily    MEDICATIONS  (PRN):  ALBUTerol  90 MICROgram(s) HFA Inhaler - Peds 4 Puff(s) Inhalation every 4 hours PRN Shortness of Breath and/or Wheezing  melatonin 3 milliGRAM(s) Oral at bedtime PRN Insomnia  traMADol 25 milliGRAM(s) Oral every 12 hours PRN Moderate Pain (4 - 6)  
ENT: Pt is a 67y/o M a/w recurrent epistaxis. Pt was packed with Rhino Rocket in the ED yesterday morning. Pt seen and examined at bedside, no further episodes of bleeding overnight.    [ x ] A 10 Point Review of Systems was negative except where noted.    Vital Signs Last 24 Hrs  T(C): 36.4 (04 May 2022 04:44), Max: 36.5 (03 May 2022 19:51)  T(F): 97.6 (04 May 2022 04:44), Max: 97.7 (03 May 2022 19:51)  HR: 74 (04 May 2022 04:44) (74 - 110)  BP: 136/68 (04 May 2022 04:44) (98/54 - 136/68)  RR: 18 (04 May 2022 04:44) (18 - 20)  SpO2: 99% (04 May 2022 04:44) (95% - 99%)    PHYSICAL EXAM:  Gen: NAD  Skin: Good color  HEENT: Head NC/AT. LEFT nare packed with rhino rocket, no e/o bleeding. RIGHT nare patent no blood or discharge noted. Oral cavity no erythema/edema. Tongue wnl. Posterior oropharynx clear, no discharge/blood noted.   Neck: Flat, supple, no lymphadenopathy, trachea midline  Resp: breathing easily, no stridor  CV: no peripheral edema/cyanosis  Abd: soft, nontender  Ext: CHAVEZ x 4    LABS:             6.7    17.10 )-----------( 264      ( 04 May 2022 08:01 )             20.7
     Pt seen and examined at bedside.    VITAL SIGNS (Last 24 hrs):  T(C): 35.7 (22 @ 05:05), Max: 36.5 (22 @ 19:51)  HR: 77 (22 @ 05:05) (74 - 87)  BP: 134/61 (22 @ 05:05) (114/56 - 136/68)  RR: 18 (22 @ 05:05) (18 - 18)  SpO2: 99% (22 @ 04:44) (98% - 99%)  Wt(kg): --  Daily Height in cm: 175.26 (04 May 2022 05:05)    Daily Weight in k.2 (04 May 2022 05:05)    I&O's Summary      PHYSICAL EXAM:  GENERAL: NAD   HEAD:  Atraumatic, Normocephalic  EYES:  conjunctiva and sclera clear  NECK: Supple, No JVD  CHEST/LUNG: Clear to auscultation bilaterally; No wheeze  HEART: Regular rate and rhythm; No murmurs, rubs, or gallops  ABDOMEN: Soft, Nontender, Nondistended; Bowel sounds present  EXTREMITIES:  2+ Peripheral Pulses, No clubbing, cyanosis, or edema  PSYCH: AAOx3  NEUROLOGY: non-focal  SKIN: No rashes or lesions    Labs Reviewed  Spoke to patient in regards to abnormal labs.    CBC Full  -  ( 04 May 2022 08:01 )  WBC Count : 17.10 K/uL  Hemoglobin : 6.7 g/dL  Hematocrit : 20.7 %  Platelet Count - Automated : 264 K/uL  Mean Cell Volume : 90.4 fL  Mean Cell Hemoglobin : 29.3 pg  Mean Cell Hemoglobin Concentration : 32.4 g/dL  Auto Neutrophil # : 12.52 K/uL  Auto Lymphocyte # : 2.85 K/uL  Auto Monocyte # : 0.76 K/uL  Auto Eosinophil # : 0.17 K/uL  Auto Basophil # : 0.06 K/uL  Auto Neutrophil % : 73.2 %  Auto Lymphocyte % : 16.7 %  Auto Monocyte % : 4.4 %  Auto Eosinophil % : 1.0 %  Auto Basophil % : 0.4 %    BMP:     @ 08:01    Blood Urea Nitrogen - 27  Calcium - 8.5  Carbon  Dioxide - 19  Chloride - 104  Creatinine - 0.8  Glucose - 106  Potassium - 4.4  Sodium - 138         PT/INR - ( 03 May 2022 10:15 )   PT: 16.60 sec;   INR: 1.45 ratio         PTT - ( 03 May 2022 10:15 )  PTT:30.0 sec       MEDICATIONS  (STANDING):  atorvastatin 10 milliGRAM(s) Oral at bedtime  folic acid 1 milliGRAM(s) Oral daily  multivitamin/minerals 1 Tablet(s) Oral daily  nicotine  14 mG/24 Hr(s) Transdermal Patch - Peds 1 Patch Transdermal daily  ondansetron Injectable 4 milliGRAM(s) IV Push once  pantoprazole    Tablet 40 milliGRAM(s) Oral before breakfast  thiamine 100 milliGRAM(s) Oral daily    MEDICATIONS  (PRN):  ALBUTerol  90 MICROgram(s) HFA Inhaler - Peds 4 Puff(s) Inhalation every 4 hours PRN Shortness of Breath and/or Wheezing  melatonin 3 milliGRAM(s) Oral at bedtime PRN Insomnia  
ENT: Pt is a 65y/o M a/w recurrent epistaxis. Pt was packed with Rhino Rocket in the ED 2 days ago. Pt seen and examined at bedside, no further episodes of bleeding overnight.    [ x ] A 10 Point Review of Systems was negative except where noted.    Vital Signs Last 24 Hrs  T(C): 36.5 (05 May 2022 05:20), Max: 36.6 (04 May 2022 19:40)  T(F): 97.7 (05 May 2022 05:20), Max: 97.9 (04 May 2022 19:40)  HR: 75 (05 May 2022 05:20) (75 - 86)  BP: 140/64 (05 May 2022 05:20) (139/64 - 160/72)  RR: 18 (05 May 2022 05:20) (18 - 18)    PHYSICAL EXAM:  Gen: NAD  Skin: Good color  HEENT: Head NC/AT. LEFT nare packed with rhino rocket, no e/o bleeding. RIGHT nare patent no blood or discharge noted. Oral cavity no erythema/edema. Tongue wnl. Posterior oropharynx clear, no discharge/blood noted.   Neck: Flat, supple, no lymphadenopathy, trachea midline  Resp: breathing easily, no stridor  CV: no peripheral edema/cyanosis  Abd: soft, nontender  Ext: CHAVEZ x 4    LABS:             7.6    12.70 )-----------( 244      ( 05 May 2022 07:30 )             23.4

## 2022-05-06 NOTE — MEDICAL STUDENT PROGRESS NOTE(EDUCATION) - NS MD HP STUD ASPLAN ASSES FT
67 YO M with a pmh of TIA, CVA, CHF, HTN, HLD, Alcohol dependence, PE (2015, not on anticoagulation due to epistaxis)  presented to the ED epistaxis since yesterday.  Since admission, hbg has been downtrending (9 -> 6.7).  Patient is now s/p 1u pRBC with repeat hgb 7.6.

## 2022-05-07 ENCOUNTER — TRANSCRIPTION ENCOUNTER (OUTPATIENT)
Age: 67
End: 2022-05-07

## 2022-05-07 DIAGNOSIS — E78.5 HYPERLIPIDEMIA, UNSPECIFIED: ICD-10-CM

## 2022-05-07 DIAGNOSIS — I63.9 CEREBRAL INFARCTION, UNSPECIFIED: ICD-10-CM

## 2022-05-07 DIAGNOSIS — M25.559 PAIN IN UNSPECIFIED HIP: ICD-10-CM

## 2022-05-07 DIAGNOSIS — I50.32 CHRONIC DIASTOLIC (CONGESTIVE) HEART FAILURE: ICD-10-CM

## 2022-05-07 DIAGNOSIS — E51.9 THIAMINE DEFICIENCY, UNSPECIFIED: ICD-10-CM

## 2022-05-07 DIAGNOSIS — I10 ESSENTIAL (PRIMARY) HYPERTENSION: ICD-10-CM

## 2022-05-07 DIAGNOSIS — D52.9 FOLATE DEFICIENCY ANEMIA, UNSPECIFIED: ICD-10-CM

## 2022-05-07 DIAGNOSIS — I26.99 OTHER PULMONARY EMBOLISM WITHOUT ACUTE COR PULMONALE: ICD-10-CM

## 2022-05-07 DIAGNOSIS — F17.200 NICOTINE DEPENDENCE, UNSPECIFIED, UNCOMPLICATED: ICD-10-CM

## 2022-05-07 DIAGNOSIS — I82.409 ACUTE EMBOLISM AND THROMBOSIS OF UNSPECIFIED DEEP VEINS OF UNSPECIFIED LOWER EXTREMITY: ICD-10-CM

## 2022-05-07 DIAGNOSIS — E11.9 TYPE 2 DIABETES MELLITUS WITHOUT COMPLICATIONS: ICD-10-CM

## 2022-05-07 DIAGNOSIS — K21.9 GASTRO-ESOPHAGEAL REFLUX DISEASE WITHOUT ESOPHAGITIS: ICD-10-CM

## 2022-05-07 LAB
ANION GAP SERPL CALC-SCNC: 12 MMOL/L — SIGNIFICANT CHANGE UP (ref 7–14)
BASOPHILS # BLD AUTO: 0.04 K/UL — SIGNIFICANT CHANGE UP (ref 0–0.2)
BASOPHILS NFR BLD AUTO: 0.3 % — SIGNIFICANT CHANGE UP (ref 0–1)
BUN SERPL-MCNC: 11 MG/DL — SIGNIFICANT CHANGE UP (ref 10–20)
CALCIUM SERPL-MCNC: 8.8 MG/DL — SIGNIFICANT CHANGE UP (ref 8.5–10.1)
CHLORIDE SERPL-SCNC: 105 MMOL/L — SIGNIFICANT CHANGE UP (ref 98–110)
CO2 SERPL-SCNC: 24 MMOL/L — SIGNIFICANT CHANGE UP (ref 17–32)
CREAT SERPL-MCNC: 0.9 MG/DL — SIGNIFICANT CHANGE UP (ref 0.7–1.5)
EGFR: 95 ML/MIN/1.73M2 — SIGNIFICANT CHANGE UP
EOSINOPHIL # BLD AUTO: 0.27 K/UL — SIGNIFICANT CHANGE UP (ref 0–0.7)
EOSINOPHIL NFR BLD AUTO: 2.3 % — SIGNIFICANT CHANGE UP (ref 0–8)
GLUCOSE SERPL-MCNC: 120 MG/DL — HIGH (ref 70–99)
HCT VFR BLD CALC: 25.5 % — LOW (ref 42–52)
HGB BLD-MCNC: 8.1 G/DL — LOW (ref 14–18)
IMM GRANULOCYTES NFR BLD AUTO: 1.6 % — HIGH (ref 0.1–0.3)
LYMPHOCYTES # BLD AUTO: 1.78 K/UL — SIGNIFICANT CHANGE UP (ref 1.2–3.4)
LYMPHOCYTES # BLD AUTO: 15.4 % — LOW (ref 20.5–51.1)
MCHC RBC-ENTMCNC: 29.2 PG — SIGNIFICANT CHANGE UP (ref 27–31)
MCHC RBC-ENTMCNC: 31.8 G/DL — LOW (ref 32–37)
MCV RBC AUTO: 92.1 FL — SIGNIFICANT CHANGE UP (ref 80–94)
MONOCYTES # BLD AUTO: 0.54 K/UL — SIGNIFICANT CHANGE UP (ref 0.1–0.6)
MONOCYTES NFR BLD AUTO: 4.7 % — SIGNIFICANT CHANGE UP (ref 1.7–9.3)
NEUTROPHILS # BLD AUTO: 8.74 K/UL — HIGH (ref 1.4–6.5)
NEUTROPHILS NFR BLD AUTO: 75.7 % — HIGH (ref 42.2–75.2)
NRBC # BLD: 0 /100 WBCS — SIGNIFICANT CHANGE UP (ref 0–0)
PLATELET # BLD AUTO: 242 K/UL — SIGNIFICANT CHANGE UP (ref 130–400)
POTASSIUM SERPL-MCNC: 4.4 MMOL/L — SIGNIFICANT CHANGE UP (ref 3.5–5)
POTASSIUM SERPL-SCNC: 4.4 MMOL/L — SIGNIFICANT CHANGE UP (ref 3.5–5)
RBC # BLD: 2.77 M/UL — LOW (ref 4.7–6.1)
RBC # FLD: 16.7 % — HIGH (ref 11.5–14.5)
SODIUM SERPL-SCNC: 141 MMOL/L — SIGNIFICANT CHANGE UP (ref 135–146)
WBC # BLD: 11.56 K/UL — HIGH (ref 4.8–10.8)
WBC # FLD AUTO: 11.56 K/UL — HIGH (ref 4.8–10.8)

## 2022-05-07 PROCEDURE — 99239 HOSP IP/OBS DSCHRG MGMT >30: CPT

## 2022-05-07 RX ORDER — TRAMADOL HYDROCHLORIDE 50 MG/1
1 TABLET ORAL
Qty: 0 | Refills: 0 | DISCHARGE
Start: 2022-05-07

## 2022-05-07 RX ADMIN — Medication 1 PATCH: at 11:14

## 2022-05-07 RX ADMIN — TRAMADOL HYDROCHLORIDE 50 MILLIGRAM(S): 50 TABLET ORAL at 10:58

## 2022-05-07 RX ADMIN — Medication 1 PATCH: at 11:18

## 2022-05-07 RX ADMIN — TRAMADOL HYDROCHLORIDE 50 MILLIGRAM(S): 50 TABLET ORAL at 18:36

## 2022-05-07 RX ADMIN — Medication 12.5 MILLIGRAM(S): at 17:25

## 2022-05-07 RX ADMIN — APIXABAN 5 MILLIGRAM(S): 2.5 TABLET, FILM COATED ORAL at 17:24

## 2022-05-07 RX ADMIN — Medication 1 MILLIGRAM(S): at 11:13

## 2022-05-07 RX ADMIN — Medication 1 PATCH: at 06:38

## 2022-05-07 RX ADMIN — Medication 1 PATCH: at 18:36

## 2022-05-07 RX ADMIN — LISINOPRIL 2.5 MILLIGRAM(S): 2.5 TABLET ORAL at 05:13

## 2022-05-07 RX ADMIN — ATORVASTATIN CALCIUM 10 MILLIGRAM(S): 80 TABLET, FILM COATED ORAL at 22:05

## 2022-05-07 RX ADMIN — Medication 81 MILLIGRAM(S): at 11:13

## 2022-05-07 RX ADMIN — Medication 1 TABLET(S): at 11:14

## 2022-05-07 RX ADMIN — TRAMADOL HYDROCHLORIDE 50 MILLIGRAM(S): 50 TABLET ORAL at 17:24

## 2022-05-07 RX ADMIN — Medication 100 MILLIGRAM(S): at 11:14

## 2022-05-07 RX ADMIN — PANTOPRAZOLE SODIUM 40 MILLIGRAM(S): 20 TABLET, DELAYED RELEASE ORAL at 06:34

## 2022-05-07 RX ADMIN — APIXABAN 5 MILLIGRAM(S): 2.5 TABLET, FILM COATED ORAL at 05:13

## 2022-05-07 RX ADMIN — TRAMADOL HYDROCHLORIDE 50 MILLIGRAM(S): 50 TABLET ORAL at 11:18

## 2022-05-07 RX ADMIN — Medication 12.5 MILLIGRAM(S): at 05:13

## 2022-05-07 NOTE — DISCHARGE NOTE PROVIDER - NSDCPNSUBOBJ_GEN_ALL_CORE
<<<RESIDENT DISCHARGE NOTE>>>     CHAPARRO ROBISON  MRN-[id number]    VITAL SIGNS:  T(F): 98.3 (05-07-22 @ 05:15), Max: 99.3 (05-06-22 @ 14:05)  HR: 63 (05-07-22 @ 05:15)  BP: 121/57 (05-07-22 @ 05:15)  SpO2: --      PHYSICAL EXAMINATION:  General: lying in bed, NAD  Head & Neck: nontraumatic, neck supple  Pulmonary: CTA, no cough or increased WOB, on RA  Cardiovascular: RRR, S1/S2  Gastrointestinal/Abdomen & Pelvis: soft, nontender, nondistended  Neurologic/Motor: AAOx3, CN grossly intact, moves all extremities    TEST RESULTS:                        7.8    10.62 )-----------( 239      ( 06 May 2022 08:25 )             24.3       05-06    140  |  105  |  10  ----------------------------<  116<H>  4.2   |  24  |  0.8    Ca    8.8      06 May 2022 08:25  Mg     2.2     05-06    TPro  6.3  /  Alb  3.6  /  TBili  0.4  /  DBili  x   /  AST  18  /  ALT  24  /  AlkPhos  81  05-06      FINAL DISCHARGE INTERVIEW:  Resident(s) Present: (Name: Rosamaria Logan PGY1), RN Present: (Name:  ___________)    DISCHARGE MEDICATION RECONCILIATION  reviewed with Attending (Name: Zion Weems MD)    DISPOSITION:   [  ] Home,    [  ] Home with Visiting Nursing Services,   [  X  ]  SNF/ NH,    [   ] Acute Rehab (4A),   [   ] Other (Specify:_________)

## 2022-05-07 NOTE — DISCHARGE NOTE NURSING/CASE MANAGEMENT/SOCIAL WORK - NSDCPEFALRISK_GEN_ALL_CORE
For information on Fall & Injury Prevention, visit: [email or web address] OR  [email or web address] OR  https://www.cdc.gov/steadi/patient.html

## 2022-05-07 NOTE — DISCHARGE NOTE PROVIDER - NSDCCPCAREPLAN_GEN_ALL_CORE_FT
PRINCIPAL DISCHARGE DIAGNOSIS  Diagnosis: Bleeding from the nose  Assessment and Plan of Treatment: Epistaxis  Epistaxis is the medical term for a nosebleed. Nosebleeds are common and can be caused by many conditions, such as injury, infections, dry environments, medicines, nose picking, and home heating and cooling systems. Try controlling your nosebleed by pinching your nose continuously for at least 10 minutes. Avoid lying down while you are having a nosebleed. Sit up and lean forward. Avoid blowing or sniffing your nose for a number of hours after having a nosebleed. Resume your normal activities as you are able, but avoid straining, lifting, or bending at the waist for several days. Maintain humidity in your home by using less air conditioning or by using a humidifier.   If your nose was packed by your health care provider, keep the packing inside of your nose until a health care provider removes it. If a balloon catheter was used to pack your nose, do not cut or remove it unless your health care provider has instructed you to do that.   Aspirin and blood thinners make bleeding more likely. If you are prescribed these medicines and you suffer from nosebleeds, ask your health care provider if you should stop taking the medicines or adjust the dose. Do not stop medicines unless directed by your health care provider.  SEEK IMMEDIATE MEDICAL CARE IF YOU HAVE ANY OF THE FOLLOWING SYMPTOMS: nosebleed lasting longer than 20 minutes, unusual bleeding from or bruising on other parts of your body, dizziness or lightheadedness, fainting, nosebleed occurring after a head injury, or fever.

## 2022-05-07 NOTE — DISCHARGE NOTE PROVIDER - NSFOLLOWUPCLINICS_GEN_ALL_ED_FT
[facility]  Medicine  [address], [place]   Phone: [phone]  Fax:   Follow Up Time: Routine

## 2022-05-07 NOTE — DISCHARGE NOTE PROVIDER - NSDCMRMEDTOKEN_GEN_ALL_CORE_FT
albuterol 90 mcg/inh inhalation aerosol: 2 puff(s) inhaled every 6 hours  aspirin 81 mg oral tablet, chewable: 1 tab(s) orally once a day  atorvastatin 10 mg oral tablet: 1 tab(s) orally once a day (at bedtime)  Eliquis 5 mg oral tablet: 1 tab(s) orally 2 times a day  folic acid 1 mg oral tablet: 1 tab(s) orally once a day  lisinopril 2.5 mg oral tablet: 1 tab(s) orally once a day  metoprolol tartrate 25 mg oral tablet: 0.5 tab(s) orally 2 times a day  Multiple Vitamins with Minerals oral tablet: 1 tab(s) orally once a day  nicotine 14 mg/24 hr transdermal film, extended release: 1 patch transdermal once a day  thiamine 100 mg oral tablet: 1 tab(s) orally once a day  traMADol 50 mg oral tablet: 1 tab(s) orally every 4 hours, As needed, Severe Pain (7 - 10)

## 2022-05-07 NOTE — DISCHARGE NOTE PROVIDER - HOSPITAL COURSE
HPI: 67 YO M with a pmh of TIA, CVA, CHFpEF 65%, HTN, HLD, Alcohol dependence, PE (2015, not on anticoagulation due to epistaxis)  presented to the ED epistaxis. Patient was recently discharged from the hospital (4/27-4/29) for epistaxis from both nostrils which was resolved and during that admission hemoglobin dropped from 13 to 7.8 but was never transfused. In the morning of admission, patient had spontaneous epistaxis from his left nostril and began to feel fatigued. Patient was restarted on eliquis and takes aspirin in NH.    ED Course: BP was initial 124/94 with , patient became hypotensive (60/30) and diaphoretic. Subsequently, rhino rocket was inserted which controlled the bleed.     Hospital Course: Hgb downtrending to 6.7, now s/p 1u pRBC with repeat hgb 7.6.  Bleeding resolved and rhino rocket removed by ENT, anticoagulation and anti-platelet therapy resumed.  No further episodes of epitaxis, hgb stable.  Patient hemodynamically stable for discharge back to SNF.

## 2022-05-07 NOTE — DISCHARGE NOTE NURSING/CASE MANAGEMENT/SOCIAL WORK - PATIENT PORTAL LINK FT
You can access the FollowMyHealth Patient Portal offered by [facility] by registering at the following website: [email or web address]. By joining [facility]’s FollowMyHealth portal, you will also be able to view your health information using other applications (apps) compatible with our system.

## 2022-05-08 VITALS
DIASTOLIC BLOOD PRESSURE: 67 MMHG | SYSTOLIC BLOOD PRESSURE: 142 MMHG | HEART RATE: 58 BPM | RESPIRATION RATE: 17 BRPM | TEMPERATURE: 97 F

## 2022-05-08 PROCEDURE — ZZZZZ: CPT

## 2022-05-08 RX ADMIN — Medication 12.5 MILLIGRAM(S): at 05:27

## 2022-05-08 RX ADMIN — LISINOPRIL 2.5 MILLIGRAM(S): 2.5 TABLET ORAL at 05:27

## 2022-05-08 RX ADMIN — PANTOPRAZOLE SODIUM 40 MILLIGRAM(S): 20 TABLET, DELAYED RELEASE ORAL at 05:27

## 2022-05-08 RX ADMIN — APIXABAN 5 MILLIGRAM(S): 2.5 TABLET, FILM COATED ORAL at 05:27

## 2022-06-17 ENCOUNTER — INPATIENT (INPATIENT)
Facility: HOSPITAL | Age: 67
LOS: 3 days | Discharge: NURSING, REHAB OR LONG-TERM CARE | End: 2022-06-21
Attending: HOSPITALIST | Admitting: HOSPITALIST
Payer: MEDICARE

## 2022-06-17 VITALS
SYSTOLIC BLOOD PRESSURE: 133 MMHG | DIASTOLIC BLOOD PRESSURE: 70 MMHG | OXYGEN SATURATION: 97 % | HEIGHT: 69 IN | TEMPERATURE: 97 F | RESPIRATION RATE: 18 BRPM | WEIGHT: 220.02 LBS | HEART RATE: 60 BPM

## 2022-06-17 DIAGNOSIS — Z90.49 ACQUIRED ABSENCE OF OTHER SPECIFIED PARTS OF DIGESTIVE TRACT: Chronic | ICD-10-CM

## 2022-06-17 LAB
ALBUMIN SERPL ELPH-MCNC: 4.3 G/DL — SIGNIFICANT CHANGE UP (ref 3.5–5.2)
ALP SERPL-CCNC: 95 U/L — SIGNIFICANT CHANGE UP (ref 30–115)
ALT FLD-CCNC: 22 U/L — SIGNIFICANT CHANGE UP (ref 0–41)
ANION GAP SERPL CALC-SCNC: 12 MMOL/L — SIGNIFICANT CHANGE UP (ref 7–14)
APTT BLD: 28.2 SEC — SIGNIFICANT CHANGE UP (ref 27–39.2)
AST SERPL-CCNC: 21 U/L — SIGNIFICANT CHANGE UP (ref 0–41)
BASOPHILS # BLD AUTO: 0.04 K/UL — SIGNIFICANT CHANGE UP (ref 0–0.2)
BASOPHILS NFR BLD AUTO: 0.4 % — SIGNIFICANT CHANGE UP (ref 0–1)
BILIRUB SERPL-MCNC: 0.4 MG/DL — SIGNIFICANT CHANGE UP (ref 0.2–1.2)
BUN SERPL-MCNC: 12 MG/DL — SIGNIFICANT CHANGE UP (ref 10–20)
CALCIUM SERPL-MCNC: 9.2 MG/DL — SIGNIFICANT CHANGE UP (ref 8.5–10.1)
CHLORIDE SERPL-SCNC: 103 MMOL/L — SIGNIFICANT CHANGE UP (ref 98–110)
CO2 SERPL-SCNC: 26 MMOL/L — SIGNIFICANT CHANGE UP (ref 17–32)
CREAT SERPL-MCNC: 0.9 MG/DL — SIGNIFICANT CHANGE UP (ref 0.7–1.5)
EGFR: 95 ML/MIN/1.73M2 — SIGNIFICANT CHANGE UP
EOSINOPHIL # BLD AUTO: 0.12 K/UL — SIGNIFICANT CHANGE UP (ref 0–0.7)
EOSINOPHIL NFR BLD AUTO: 1.1 % — SIGNIFICANT CHANGE UP (ref 0–8)
ETHANOL SERPL-MCNC: <10 MG/DL — SIGNIFICANT CHANGE UP
FLUAV AG NPH QL: SIGNIFICANT CHANGE UP
FLUBV AG NPH QL: SIGNIFICANT CHANGE UP
GLUCOSE SERPL-MCNC: 120 MG/DL — HIGH (ref 70–99)
HCT VFR BLD CALC: 35.9 % — LOW (ref 42–52)
HGB BLD-MCNC: 10.6 G/DL — LOW (ref 14–18)
IMM GRANULOCYTES NFR BLD AUTO: 0.5 % — HIGH (ref 0.1–0.3)
INR BLD: 1.23 RATIO — SIGNIFICANT CHANGE UP (ref 0.65–1.3)
LACTATE SERPL-SCNC: 2.4 MMOL/L — HIGH (ref 0.7–2)
LIDOCAIN IGE QN: 33 U/L — SIGNIFICANT CHANGE UP (ref 7–60)
LYMPHOCYTES # BLD AUTO: 1.6 K/UL — SIGNIFICANT CHANGE UP (ref 1.2–3.4)
LYMPHOCYTES # BLD AUTO: 14.5 % — LOW (ref 20.5–51.1)
MAGNESIUM SERPL-MCNC: 2.1 MG/DL — SIGNIFICANT CHANGE UP (ref 1.8–2.4)
MCHC RBC-ENTMCNC: 24.3 PG — LOW (ref 27–31)
MCHC RBC-ENTMCNC: 29.5 G/DL — LOW (ref 32–37)
MCV RBC AUTO: 82.3 FL — SIGNIFICANT CHANGE UP (ref 80–94)
MONOCYTES # BLD AUTO: 0.6 K/UL — SIGNIFICANT CHANGE UP (ref 0.1–0.6)
MONOCYTES NFR BLD AUTO: 5.4 % — SIGNIFICANT CHANGE UP (ref 1.7–9.3)
NEUTROPHILS # BLD AUTO: 8.6 K/UL — HIGH (ref 1.4–6.5)
NEUTROPHILS NFR BLD AUTO: 78.1 % — HIGH (ref 42.2–75.2)
NRBC # BLD: 0 /100 WBCS — SIGNIFICANT CHANGE UP (ref 0–0)
PLATELET # BLD AUTO: 268 K/UL — SIGNIFICANT CHANGE UP (ref 130–400)
POTASSIUM SERPL-MCNC: 3.8 MMOL/L — SIGNIFICANT CHANGE UP (ref 3.5–5)
POTASSIUM SERPL-SCNC: 3.8 MMOL/L — SIGNIFICANT CHANGE UP (ref 3.5–5)
PROT SERPL-MCNC: 7.3 G/DL — SIGNIFICANT CHANGE UP (ref 6–8)
PROTHROM AB SERPL-ACNC: 14.1 SEC — HIGH (ref 9.95–12.87)
RBC # BLD: 4.36 M/UL — LOW (ref 4.7–6.1)
RBC # FLD: 15.8 % — HIGH (ref 11.5–14.5)
RSV RNA NPH QL NAA+NON-PROBE: SIGNIFICANT CHANGE UP
SARS-COV-2 RNA SPEC QL NAA+PROBE: SIGNIFICANT CHANGE UP
SODIUM SERPL-SCNC: 141 MMOL/L — SIGNIFICANT CHANGE UP (ref 135–146)
TROPONIN T SERPL-MCNC: <0.01 NG/ML — SIGNIFICANT CHANGE UP
WBC # BLD: 11.01 K/UL — HIGH (ref 4.8–10.8)
WBC # FLD AUTO: 11.01 K/UL — HIGH (ref 4.8–10.8)

## 2022-06-17 PROCEDURE — 70450 CT HEAD/BRAIN W/O DYE: CPT | Mod: 26,MA

## 2022-06-17 PROCEDURE — 71045 X-RAY EXAM CHEST 1 VIEW: CPT | Mod: 26

## 2022-06-17 PROCEDURE — 93010 ELECTROCARDIOGRAM REPORT: CPT

## 2022-06-17 PROCEDURE — 99222 1ST HOSP IP/OBS MODERATE 55: CPT

## 2022-06-17 PROCEDURE — 99285 EMERGENCY DEPT VISIT HI MDM: CPT

## 2022-06-17 RX ORDER — APIXABAN 2.5 MG/1
1 TABLET, FILM COATED ORAL
Qty: 0 | Refills: 0 | DISCHARGE

## 2022-06-17 RX ORDER — NICOTINE POLACRILEX 2 MG
1 GUM BUCCAL DAILY
Refills: 0 | Status: DISCONTINUED | OUTPATIENT
Start: 2022-06-17 | End: 2022-06-21

## 2022-06-17 RX ORDER — MULTIVIT-MIN/FERROUS GLUCONATE 9 MG/15 ML
1 LIQUID (ML) ORAL DAILY
Refills: 0 | Status: DISCONTINUED | OUTPATIENT
Start: 2022-06-17 | End: 2022-06-21

## 2022-06-17 RX ORDER — APIXABAN 2.5 MG/1
5 TABLET, FILM COATED ORAL EVERY 12 HOURS
Refills: 0 | Status: DISCONTINUED | OUTPATIENT
Start: 2022-06-17 | End: 2022-06-21

## 2022-06-17 RX ORDER — ONDANSETRON 8 MG/1
4 TABLET, FILM COATED ORAL EVERY 8 HOURS
Refills: 0 | Status: DISCONTINUED | OUTPATIENT
Start: 2022-06-17 | End: 2022-06-21

## 2022-06-17 RX ORDER — ASPIRIN/CALCIUM CARB/MAGNESIUM 324 MG
81 TABLET ORAL DAILY
Refills: 0 | Status: DISCONTINUED | OUTPATIENT
Start: 2022-06-17 | End: 2022-06-21

## 2022-06-17 RX ORDER — INFLUENZA VIRUS VACCINE 15; 15; 15; 15 UG/.5ML; UG/.5ML; UG/.5ML; UG/.5ML
0.5 SUSPENSION INTRAMUSCULAR ONCE
Refills: 0 | Status: COMPLETED | OUTPATIENT
Start: 2022-06-17 | End: 2022-06-17

## 2022-06-17 RX ORDER — ATORVASTATIN CALCIUM 80 MG/1
10 TABLET, FILM COATED ORAL AT BEDTIME
Refills: 0 | Status: DISCONTINUED | OUTPATIENT
Start: 2022-06-17 | End: 2022-06-21

## 2022-06-17 RX ORDER — THIAMINE MONONITRATE (VIT B1) 100 MG
100 TABLET ORAL DAILY
Refills: 0 | Status: DISCONTINUED | OUTPATIENT
Start: 2022-06-17 | End: 2022-06-21

## 2022-06-17 RX ORDER — ACETAMINOPHEN 500 MG
650 TABLET ORAL EVERY 6 HOURS
Refills: 0 | Status: DISCONTINUED | OUTPATIENT
Start: 2022-06-17 | End: 2022-06-21

## 2022-06-17 RX ORDER — LANOLIN ALCOHOL/MO/W.PET/CERES
3 CREAM (GRAM) TOPICAL AT BEDTIME
Refills: 0 | Status: DISCONTINUED | OUTPATIENT
Start: 2022-06-17 | End: 2022-06-21

## 2022-06-17 RX ORDER — FOLIC ACID 0.8 MG
1 TABLET ORAL DAILY
Refills: 0 | Status: DISCONTINUED | OUTPATIENT
Start: 2022-06-17 | End: 2022-06-21

## 2022-06-17 RX ADMIN — ATORVASTATIN CALCIUM 10 MILLIGRAM(S): 80 TABLET, FILM COATED ORAL at 22:16

## 2022-06-17 NOTE — H&P ADULT - NSHPLABSRESULTS_GEN_ALL_CORE
Ongoing SW/CM Assessment/Plan of Care Note     See SW/CM flowsheets for goals and other objective data.    Patient/Family discharge goal (s):  Goal #1: Psychosocial needs assessed          PT Recommendation:     Recommendation for Discharge: PT IL:  (TBD)    OT Recommendation:     Recommendations for Discharge: OT IL: Other (comment) (TBD (pending surgical course))    SLP Recommendation:       Disposition:home w/home health       Progress note:   [facility] delivered no picc line no discharge today. Notified [facility] jacey in [facility]       10.6   11.01 )-----------( 268      ( 17 Jun 2022 15:20 )             35.9       06-17    141  |  103  |  12  ----------------------------<  120<H>  3.8   |  26  |  0.9    Ca    9.2      17 Jun 2022 15:20  Mg     2.1     06-17    TPro  7.3  /  Alb  4.3  /  TBili  0.4  /  DBili  x   /  AST  21  /  ALT  22  /  AlkPhos  95  06-17          Magnesium, Serum: 2.1 mg/dL (06-17-22 @ 15:20)              PT/INR - ( 17 Jun 2022 15:20 )   PT: 14.10 sec;   INR: 1.23 ratio         PTT - ( 17 Jun 2022 15:20 )  PTT:28.2 sec    Lactate Trend  06-17 @ 15:20 Lactate:2.4       CARDIAC MARKERS ( 17 Jun 2022 15:20 )  x     / <0.01 ng/mL / x     / x     / x
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
## 2022-06-17 NOTE — H&P ADULT - NSHPPHYSICALEXAM_GEN_ALL_CORE
Vital Signs Last 24 Hrs  T(C): 35.7 (18 Jun 2022 05:00), Max: 36.9 (17 Jun 2022 19:30)  T(F): 96.3 (18 Jun 2022 05:00), Max: 98.5 (17 Jun 2022 19:30)  HR: 71 (18 Jun 2022 05:00) (60 - 74)  BP: 138/67 (18 Jun 2022 05:00) (133/70 - 144/74)  RR: 18 (18 Jun 2022 05:00) (18 - 18)  SpO2: 97% (18 Jun 2022 05:00) (97% - 97%)    PHYSICAL EXAM:  GENERAL: alert, comfortable, poor hygiene  HEAD:  Atraumatic, Normocephalic  EYES: EOMI, PERRLA, conjunctiva and sclera clear  NECK: Supple, No JVD  CHEST/LUNG: Clear to auscultation bilaterally  HEART: Regular rate and rhythm; s1,s2  ABDOMEN: Soft, Nontender, Nondistended; Bowel sounds present  EXTREMITIES: motor and sensory intact,  2+ Peripheral Pulses, b/l feet edema  NEUROLOGY: non-focal  SKIN: No rashes or lesions

## 2022-06-17 NOTE — ED PROVIDER NOTE - ATTENDING APP SHARED VISIT CONTRIBUTION OF CARE
66 y.o. male, PMH of TIA, CVA, CHF, HTN, HLD, PE, alcohol abuse presents to the ED complaining of b/l lower legs weakness and mild pain since yesterday associated with difficulty ambulating. Pt normally ambulates with a walker but now unable to stand and ambulate with a walker. As per family pt was recently discharged from rehab. Pt admits to alcohol use of 2 shots of vodka/week x10yrs. Last use yesterday. Pt denies drug abuse, chest/abdominal pain, SOB, n/v, fever/chills or withdrawal sxs at this time. As per family pt not able to self-care and not safe for patient to stay home at this time. On exam, pt in NAD, AAOx3, head NC/AT, CN II-XII intact, lungs CTA B/L, CV S1S2 regular, abdomen soft/NT/ND/(+)BS, ext (-) edema, moving all extremities. Pt is a very poor historian. Poor hygiene. Will admit for further eval and care.
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
## 2022-06-17 NOTE — ED ADULT NURSE NOTE - NSIMPLEMENTINTERV_GEN_ALL_ED
Implemented All Fall Risk Interventions:  [place] to call system. Call bell, personal items and telephone within reach. Instruct patient to call for assistance. Room bathroom lighting operational. Non-slip footwear when patient is off stretcher. Physically safe environment: no spills, clutter or unnecessary equipment. Stretcher in lowest position, wheels locked, appropriate side rails in place. Provide visual cue, wrist band, yellow gown, etc. Monitor gait and stability. Monitor for mental status changes and reorient to person, place, and time. Review medications for side effects contributing to fall risk. Reinforce activity limits and safety measures with patient and family.

## 2022-06-17 NOTE — PATIENT PROFILE ADULT - FALL HARM RISK - HARM RISK INTERVENTIONS

## 2022-06-17 NOTE — ED ADULT NURSE NOTE - NS PRO PASSIVE SMOKE EXP
"PLAN with eating and behavior  - deep hugs with breaths smell a flower and then blow a candle  - acknowledge feedings \"I know you want that\"  - birth to three referral with Dr. [facility] [phone]    Pediatric surgery for umbilical hernia [phone]    [facility] pediatric dental or the [facility] for DENTAL    LEFT DRAINING EAR  - plan ofloaxacin ear drops 2x/day x 7 days   Let us know if not improved after 3-4 days     Preventive Care at the 18 Month Visit  Growth Measurements & Percentiles  Head Circumference: 18.6\" (47.2 cm) (74 %, Source: WHO (Girls, 0-2 years)) 74 %ile based on WHO (Girls, 0-2 years) head circumference-for-age data using vitals from 10/29/2018.   Weight: 30 lbs 3 oz / 13.7 kg (actual weight) / 98 %ile based on WHO (Girls, 0-2 years) weight-for-age data using vitals from 10/29/2018.   Length: 2' 8.4\" / 82.3 cm 62 %ile based on WHO (Girls, 0-2 years) length-for-age data using vitals from 10/29/2018.   Weight for length: >99 %ile based on WHO (Girls, 0-2 years) weight-for-recumbent length data using vitals from 10/29/2018.    Your toddler s next Preventive Check-up will be at 4 years of age    Development  At this age, most children will:    Walk fast, run stiffly, walk backwards and walk up stairs with one hand held.    Sit in a small chair and climb into an adult chair.    Kick and throw a ball.    Stack three or four blocks and put rings on a cone.    Turn single pages in a book or magazine, look at pictures and name some objects    Speak four to 10 words, combine two-word phrases, understand and follow simple directions, and point to a body part when asked.    Imitate a crayon stroke on paper.    Feed herself, use a spoon and hold and drink from a sippy cup fairly well.    Use a household toy (like a toy telephone) well.    Feeding Tips    Your toddler's food likes and dislikes may change.  Do not make mealtimes a castro.  Your toddler may be stubborn, but she often copies your " eating habits.  This is not done on purpose.  Give your toddler a good example and eat healthy every day.    Offer your toddler a variety of foods.    The amount of food your toddler should eat should average one  good  meal each day.    To see if your toddler has a healthy diet, look at a four or five day span to see if she is eating a good balance of foods from the food groups.    Your toddler may have an interest in sweets.  Try to offer nutritional, naturally sweet foods such as fruit or dried fruits.  Offer sweets no more than once each day.  Avoid offering sweets as a reward for completing a meal.    Teach your toddler to wash his or her hands and face often.  This is important before eating and drinking.    Toilet Training    Your toddler may show interest in potty training.  Signs she may be ready include dry naps, use of words like  pee pee,   wee wee  or  poo,  grunting and straining after meals, wanting to be changed when they are dirty, realizing the need to go, going to the potty alone and undressing.  For most children, this interest in toilet training happens between the ages of 2 and 3.    Sleep    Most children this age take one nap a day.  If your toddler does not nap, you may want to start a  quiet time.     Your toddler may have night fears.  Using a night light or opening the bedroom door may help calm fears.    Choose calm activities before bedtime.    Continue your regular nighttime routine: bath, brushing teeth and reading.    Safety    Use an approved toddler car seat every time your child rides in the car.  Make sure to install it in the back seat.  Your toddler should remain rear-facing until 2 years of age.    Protect your toddler from falls, burns, drowning, choking and other accidents.    Keep all medicines, cleaning supplies and poisons out of your toddler s reach. Call the poison control center or your health care provider for directions in case your toddler swallows poison.    Put  "the poison control number on all phones:  3-860-455-2191.    Use sunscreen with a SPF of more than 15 when your toddler is outside.    Never leave your child alone in the bathtub or near water.    Do not leave your child alone in the car, even if he or she is asleep.    What Your Toddler Needs    Your toddler may become stubborn and possessive.  Do not expect him or her to share toys with other children.  Give your toddler strong toys that can pull apart, be put together or be used to build.  Stay away from toys with small or sharp parts.    Your toddler may become interested in what s in drawers, cabinets and wastebaskets.  If possible, let her look through (unload and re-load) some drawers or cupboards.    Make sure your toddler is getting consistent discipline at home and at day care. Talk with your  provider if this isn t the case.    Praise your toddler for positive, appropriate behavior.  Your toddler does not understand danger or remember the word  no.     Read to your toddler often.    Dental Care    Brush your toddler s teeth one to two times each day with a soft-bristled toothbrush.    Use a small amount (smaller than pea size) of fluoridated toothpaste once daily.    Let your toddler play with the toothbrush after brushing    Your pediatric provider will speak with you regarding the need for regular dental appointments for cleanings and check-ups starting when your child s first tooth appears. (Your child may need fluoride supplements if you have well water.)        A FEW BASIC PRINCIPLES FOR YOUNG CHILDREN     GREAT free CORDELIA is \"Breathe, Think, Do with Sesame\"    Blog posts:     Sarah Roach http://www.parentGreen[employer] A/S.EatAds.com/index.cfm    Sol Geiger [email or web address] ([email or web address]).[email or web address]    1) Acknowledge your child's feelings, connect, and then PAUSE.  Acknowledging a child's feelings is crucial to de-escalating their frustration.  Do not say, \"I see you do not want to put on your coat, BUT we " "have to go.\"  Instead, say, \"I see you do not want to put on your coat....\" THEN PAUSE.  Just this little pause-time will make them feel heard and allow them to re-evaluate the situation in a \"new light.\"      Feelings are facts.  You can tell someone not to feel (\"that didn't hurt,\" \"you're ok\"), but it won't work.  Instead, labeling the feeling and affirming the child's ability to deal with the problem gives the child what he/she needs to be competent.    2) Give the child choices (\"do you want to wear the red shirt or the bule shirt?\") so that the child feels empowered and can control some of his or her daily choices.  You can also use this strategy if the child engages in a negative behavior (screaming) and then give the child an acceptable choice (\"it is not ok to scream inside the house but you can go onto the porch and scream\").      3) Relationship is everything  Reciprocal relationships make learning and parenting better. Your child will respect you when you respect her!    4) The most effective guidance is PREVENTION.  Give your child what they need to remain in balance (sleep, food, down time etc.) and YOUR ATTENTION.  Be aware of situations which may lead to problems.  Kids are physical and \"kids need to move!\"  Spend \"special time\" with the child each day when he/she has your full attention (without your cell phone or TV!).    5) Give praise that is specific to the action or effort when warranted.  For example, do say, \"You focused for a long time and used lots of different colors in your drawing\" and do not say \"good job, you are good at coloring.\"  The former takes the \"judgement\" out of it and allows the child to make their own inferences, \"wow, I must be good at coloring!\" vs. the child relying on your opinion of them.       6) use positive words: \"Walk, use walking feet, stay with me, Keep your hands down, look with your eyes,\" or \"Use a calm voice, use an inside voice\"    REFRAME how you think " "about your child and encourage their full potential!  \"she is so wild\" vs. \"she has lots of energy\"  \"he is an attention seeker\" vs. \"he knows how to get his needs met\"  \"she is so insecure/anxiety/fearful\" vs. \"she knows the limits of her strength\"  \"my child is willful (stubborn)\" vs. \"my child persists\"  \"she is lazy\" vs. \"she takes time to reflect\"  \"she is overly sensitive\" vs. \"she notices everything\"  \"he is annoying\" vs. \"he is curious about everything\"  \"he is easily frustrated\" vs. \"he is eager to succeed\"    7) Children are \"in the process of\" learning acceptable behavior.  They are not \"out to get you\" and are learning through experience.  You are their guide.  Guidance trumps discipline.      8) Give clear expectations.  Do not ask questions when you request something that is mandatory, \"honey, do you want to leave?\" or, \"we're going to leave, OK?\"  Instead, calmly state, \"we will be leaving in 5 minutes.\"      THOUGHTS ON CHALLENGING SITUATIONS: There are many ways to teach limits or \"discipline strategies\" and it is up to you to choose which is right for your family.      1) Choose to connect and de-escelate the situation.  When you start to sense frustration coming, STOP and get down to your child's level.  Give them your full attention: \"I am here, I will help you,\" and then listen.  Ask them about their feelings, (needing attention \"I can see that you want me.  Do you know when I'll be able to play with you?\"; fighting over a toy, \"what did you want to tell him?\" and handling a disappointment, \"did you have a different plan\"?).    2) Setting necessary limits makes a child feel secure, however only set those that are needed.  We need to be attuned to our children and respond to their needs, but this does not mean giving them everything that they want at all times (such as candy at the check out counter!).  Providing safe and healthy boundaries actually makes them feel more secure and confident in " "the world.    However - rethink your requests and only set limits when needed.  Let them walk on a small ledge for fun holding your hand or use a plastic knife to spread PB&J on their own sandwich.  Reconsider your limits if they are set for your own good (e.g. to save you time) - take the time to let them stop and smell the roses or \"do it myself,\" and enjoy it!      3) Make sure to never criticize the child, herself, rather make it clear that the BEHAVIOR is the problem, not the child.       4) When they do something inappropriate, a very helpful phrase is, \"I can not let you do that.\"  As they get older you can explain why (if appropriate) and give them alternate choices.  Do not say, \"no,you can't do that\" or the child will think/say \"yes, I can!!\"      5) One size does not fit all situations: You choose when it's appropriate to \"ignore\" negative behaviors or allow the child to do something themselves and learn through natural consequences.  This is part of \"picking your battles\" (always aim to respect your child and only pick necessary battles.)  Your strategy may depend on a) age, b) child's understanding of your expectation, c) child's intentions d) outside factors (e.g., hungry, tired etc.) e) severity of the problem behavior (e.g., is child's safety in danger?).      6) Natural Consequences (when you believe child is old enough to understand) help the child learn \"how the world works.:  Examples: \"if you do not  your toys, then they will be put away in a box and you will loose the priviledge of playing with them.\"  \"If you choose to not wear mittens, your hands may be cold.\"  \"if you throw your food, it will be removed.\"      7) BREAK OR CALM TIME: Usually more around 24 months.  Studies have shown that punishments do not result in improved behaviors, rather, they result in negative feelings and frustration without true learning.  Additionally, one can be firm but always still kind and respectful, " "making clear that any \"break time\" is not \"love withdrawal.\"  If you choose to use \"time out,\" make time out a CHOICE, \"in our family we do not do XX, you can stop doing XX or take a break.\"  Teach your child that you trust them by allowing the child to choose the time-out duration and learn self-regulation (\"come back when you are done yelling/hitting\" or \"come back when you can take a deep breath and be quiet\").  The child should have an open space to go to (the space should not be confined and not the crib).  For some kids, it is better not to have a \"time-out\" spot because if they leave, they are \"getting away with something.\"  Be clear about when it is over.  When time out is over, treat your child with normal love. Some people choose to have a \"time-in\" hugging calm time.  Additionally, it is ok if you positively demonstrate that YOU need a time-out, \"I feel very frustrated and I am going to take a break.\"    7) Temper Tantrums:  PREVENTION  Ensure child gets adequate food and rest.  Pay attention to child's tolerance for stimulation.  Help child get rid of tension by running, jumping, or dancing.  Change activity if there are early warning signs of a tantrum.  Give choices as often as possible.  Choose your battles wisely (don't say no to everything!)  Acknowledge your child's feelings (\"I can see that you are frustrated\").  HANDLING TANTRUMS  Stay calm. Use a soft firm voice.  Provide a safe environment.  Do not give into your child's wants or offer a reward for stopping.  You choose: Letting the tantrum run its course and ignoring the tantrum can teach the child self-regulation skills to \"work through it\" by themselves.  However, you can sense when your child is so distressed that they need assistance calming; a \"deep hug.\"  AFTER THE TANTRUM IS OVER  Allow emotions to settle, comfort such as a hug and move on.      Pediatric Dental List  Contact Information Eligibility/Languages Fees/Insurance   [facility]  [address] [place]  [address]  Benedicto Carroll  [phone] (Adults) [phone] (Children)  [email or web address] Adults and children   English,   interpreters for other languages available with prior notice     No Referral Needed No Sliding Fee  Rates are about 25% - 40% less than private dental office.  Ana WHITEHEAD, Insurance   [facility]  [address] [place] [address] [address] [place] [address]  [phone]  [email or web address] Children requiring sedation or specialty care- Referral required    Interpreters available with prior notice Insurance  [place]   [facility]  [address] [facility] [address] [place], [place] [address] [phone]  [email or web address] Free infant exam (0-1yrs)  Children  Accepts insurance  NO [place]   [facility]  [address] [place]  [address]  [phone]  30 locations-see website  [email or web address] Children (ages 0-18),  Pregnant women  English, Omani, Austrian, Hmong, Afghan, Mauritanian   Sliding Fee,  Ana WHITEHEAD, Assured Access, Insurance     [facility]  [address] [place]  [address]  [phone]  www.[facility] Adults and children  English, Austrian, Hmong, Cambodian, Equatorial Guinean,  Omani    Sliding Fee  based on family size/income,  Ana WHITEHEAD   [facility]  [address] [place] [address]  [phone]  [email or web address] Adults and children  English, Hmong, Equatorial Guinean, Occitan, Farsi, Croatian, Afghan, Omani, Other available   Sliding Fee, Most forms of payment,   Ana WHITEHEAD, Insurance   [facility]  [address] [place] [address]  [phone]  [email or web address] Adults and children  English, Omani, Hmong, Cambodian, Croatian,  Sliding Fee,  Lana WHITEHEADCare, Insurance   [facility]  [address] [place]  [address]  [phone]  [email or web address] Adults and children  English, Swiss, Hmong, Uzbek,  Other available    Sliding Fee,   Payment Plans,   [place]      [facility]  [address] [place] [address] [phone]  [email or web address]/ Adults and children  English, Swiss, interpreters   Sliding Fee  based on family size/income,  [place], [place]Care, Assured Access, Insurance   [facility]  [address] [place] [address]  [address] [phone]  www.[email or web address] Adults and children  English, Swiss, Hmong, Slovenian, Macedonian,    Discount Program  based on family size/income,  [place], [place]Care, Insurance    [facility]  [address]  [phone] [address] [facility]  [phone]  www.[employer] Children  English Does NOT take MA  No sliding fee  Some insurance-call to verify   [facility]  [address]  [phone] [address]  [phone] [address] [facility] [address] [place]  [phone]  [address] [facility]  [phone]  [address] [facility]  [phone]  [email or web address] English  Interpreters available with prior notice Call to verify insurance  No sliding fee   [facility]  [address] [place]  [address] [phone]  [email or web address] Adults and children   Adults (Monday-Thursday)  Children (Most Saturdays)  English, Swiss   Free     [facility]  [address] [place] [address]  [address] [phone]  [email or web address] Adults, children without insurance   Free       *Please call to ensure they accept your insurance or have the language you need.       Unknown
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
## 2022-06-17 NOTE — H&P ADULT - ASSESSMENT
66y old male with pmhx of TIA, CVA, CHF, HTN, HLD, PE, alcohol abuse presents to the ED complaining of b/l lower legs weakness and mild pain since yesterday associated with difficulty ambulating. pt ambulates with walker but now unable to stand and ambulate with walker. As per family pt was recently discharged from rehab. pt admits to alcohol abuse of 2 shots of vodka/week x10yrs. last use yesterday. pt denies drug abuse, chest/abdominal pain, SOB, n/v, fever/chills or withdrawal sxs at this time. As per family pt not able to selfcare and not safe for patient to stay home at this time      # Inability to ambulate   - admit to med-surg  - labs/ecg/c-xray  - safety/fall precautions  - PT/Rehab consult  - SW consult for placement    # Alcohol abuse  - ativan prn wsxs  - monitor for wsxs  - folic acid/thiamine  -CATCH team consult    # TIA, CVA ,PE  - continue home meds  - DVT prophylaxis    # CHF  - continue home meds  - daily weights  - I&O    # HTN, HLD  - continue home meds  - GI prophylaxis  - monitor vss    #smoking  -smoking cessation with nicotine patch  - monitor pt

## 2022-06-17 NOTE — ED PROVIDER NOTE - NS ED MD TWO NIGHTS YN
Subjective     History provided by:  Patient   used: No    Sore Throat  Location:  Generalized  Severity:  Mild  Onset quality:  Sudden  Timing:  Constant  Progression:  Worsening  Chronicity:  New  Relieved by:  Nothing  Worsened by:  Nothing  Ineffective treatments:  None tried  Associated symptoms: trouble swallowing    Associated symptoms: no abdominal pain, no adenopathy, no chest pain, no chills, no cough, no drooling, no ear discharge, no ear pain, no epistaxis, no eye discharge, no fever, no headaches, no neck stiffness, no night sweats, no plugged ear sensation, no postnasal drip, no rash, no rhinorrhea, no shortness of breath, no sinus congestion, no stridor and no voice change    Risk factors: no exposure to strep, no exposure to mono, no sick contacts, no recent dental procedure, no recent endoscopy and no recent ENT procedure        Review of Systems   Constitutional: Negative for activity change, appetite change, chills, diaphoresis, fatigue, fever and night sweats.   HENT: Positive for sore throat and trouble swallowing. Negative for congestion, drooling, ear discharge, ear pain, nosebleeds, postnasal drip, rhinorrhea and voice change.    Eyes: Negative for discharge and redness.   Respiratory: Negative for cough, chest tightness, shortness of breath, wheezing and stridor.    Cardiovascular: Negative for chest pain, palpitations and leg swelling.   Gastrointestinal: Negative for abdominal pain, diarrhea, nausea and vomiting.   Genitourinary: Negative for dysuria and urgency.   Musculoskeletal: Negative for arthralgias, back pain, myalgias, neck pain and neck stiffness.   Skin: Negative for pallor, rash and wound.   Neurological: Negative for dizziness, speech difficulty, weakness and headaches.   Hematological: Negative for adenopathy.   Psychiatric/Behavioral: Negative for agitation, behavioral problems, confusion and decreased concentration.   All other systems reviewed and are  negative.      No past medical history on file.    No Known Allergies    No past surgical history on file.    No family history on file.    Social History     Socioeconomic History   • Marital status: Single           Objective   Physical Exam  Vitals and nursing note reviewed.   Constitutional:       General: He is not in acute distress.     Appearance: Normal appearance. He is well-developed. He is not toxic-appearing or diaphoretic.   HENT:      Head: Normocephalic and atraumatic.      Right Ear: External ear normal.      Left Ear: External ear normal.      Nose: Nose normal.      Mouth/Throat:      Pharynx: No oropharyngeal exudate.      Tonsils: No tonsillar exudate.   Eyes:      General: Lids are normal.      Conjunctiva/sclera: Conjunctivae normal.      Pupils: Pupils are equal, round, and reactive to light.   Neck:      Thyroid: No thyromegaly.   Cardiovascular:      Rate and Rhythm: Normal rate and regular rhythm.      Pulses: Normal pulses.      Heart sounds: Normal heart sounds, S1 normal and S2 normal.   Pulmonary:      Effort: Pulmonary effort is normal. No tachypnea or respiratory distress.      Breath sounds: Normal breath sounds. No decreased breath sounds, wheezing or rales.   Chest:      Chest wall: No tenderness.   Abdominal:      General: Bowel sounds are normal. There is no distension.      Palpations: Abdomen is soft.      Tenderness: There is no abdominal tenderness. There is no guarding or rebound.   Musculoskeletal:         General: No tenderness or deformity. Normal range of motion.      Cervical back: Full passive range of motion without pain, normal range of motion and neck supple.   Lymphadenopathy:      Cervical: No cervical adenopathy.   Skin:     General: Skin is warm and dry.      Coloration: Skin is not pale.      Findings: No erythema or rash.   Neurological:      Mental Status: He is alert and oriented to person, place, and time.      GCS: GCS eye subscore is 4. GCS verbal  subscore is 5. GCS motor subscore is 6.      Cranial Nerves: No cranial nerve deficit.      Sensory: No sensory deficit.   Psychiatric:         Speech: Speech normal.         Behavior: Behavior normal.         Thought Content: Thought content normal.         Judgment: Judgment normal.         Procedures           ED Course  ED Course as of 02/16/22 0325 Wed Feb 16, 2022 0325 Patient had a piece of food stuck in her esophagus, was treated in the emergency department and symptoms resolved.  Patient to return to the emergency department with any worsening symptoms. [ES]      ED Course User Index  [ES] Uli Acosta MD                                                 MDM  Number of Diagnoses or Management Options  Foreign body in esophagus, initial encounter: new and requires workup     Amount and/or Complexity of Data Reviewed  Clinical lab tests: ordered and reviewed  Tests in the radiology section of CPT®: ordered and reviewed  Tests in the medicine section of CPT®: ordered and reviewed  Decide to obtain previous medical records or to obtain history from someone other than the patient: yes  Independent visualization of images, tracings, or specimens: yes    Risk of Complications, Morbidity, and/or Mortality  Presenting problems: moderate  Diagnostic procedures: moderate  Management options: moderate    Patient Progress  Patient progress: stable      Final diagnoses:   Foreign body in esophagus, initial encounter       ED Disposition  ED Disposition     ED Disposition Condition Comment    Discharge Stable           PATIENT CONNECTION - [place]  See Provider List  [facility] [id number]  [phone]  Schedule an appointment as soon as possible for a visit in 1 day  EVALUATE         Medication List      No changes were made to your prescriptions during this visit.          Uli Acosta MD  02/16/22 0325     Yes

## 2022-06-17 NOTE — H&P ADULT - HISTORY OF PRESENT ILLNESS
66y old male with pmhx of CVA, CHF, alcohol abuse presents to the ED complaining of b/l lower legs weakness and mild pain since yesterday associated with difficulty ambulating. pt ambulates with walker but now unable to stand and ambulate with walker. As per family pt was recently discharged from rehab. pt admits to alcohol abuse of 2 shots of vodka/week x10yrs. last use yesterday. pt denies drug abuse, chest/abdominal pain, SOB, n/v, fever/chills or withdrawal sxs at this time. As per family pt not able to selfcare and not safe for patient to stay home at this time,   66y old male with pmhx of TIA, CVA, CHF, HTN, HLD, PE, alcohol abuse presents to the ED complaining of b/l lower legs weakness and mild pain since yesterday associated with difficulty ambulating. pt ambulates with walker but now unable to stand and ambulate with walker. As per family pt was recently discharged from rehab. pt admits to alcohol abuse of 2 shots of vodka/week x10yrs. last use yesterday. pt denies drug abuse, chest/abdominal pain, SOB, n/v, fever/chills or withdrawal sxs at this time. As per family pt not able to selfcare and not safe for patient to stay home at this time

## 2022-06-17 NOTE — H&P ADULT - NSHPREVIEWOFSYSTEMS_GEN_ALL_CORE
GENERAL: No fever, no chills  HEENT:,no eye pain, visual change/discharge, no neck pain/stiffness  RESPIRATORY: No cough,  No Shortness of Breath  CARDIOVASCULAR: no chest pain, no palpitation  GASTROINTESTINAL: No abdominal  pain. No nausea, vomiting, diarrhea.   GENITOURINARY: No LUTS  NEUROLOGICAL: No headaches, numbness, or tremors  SKIN: No itching, burning, rashes, or lesions

## 2022-06-18 LAB
ALBUMIN SERPL ELPH-MCNC: 4.3 G/DL — SIGNIFICANT CHANGE UP (ref 3.5–5.2)
ALP SERPL-CCNC: 99 U/L — SIGNIFICANT CHANGE UP (ref 30–115)
ALT FLD-CCNC: 22 U/L — SIGNIFICANT CHANGE UP (ref 0–41)
ANION GAP SERPL CALC-SCNC: 13 MMOL/L — SIGNIFICANT CHANGE UP (ref 7–14)
AST SERPL-CCNC: 50 U/L — HIGH (ref 0–41)
BILIRUB SERPL-MCNC: 0.4 MG/DL — SIGNIFICANT CHANGE UP (ref 0.2–1.2)
BUN SERPL-MCNC: 12 MG/DL — SIGNIFICANT CHANGE UP (ref 10–20)
CALCIUM SERPL-MCNC: 9.1 MG/DL — SIGNIFICANT CHANGE UP (ref 8.5–10.1)
CHLORIDE SERPL-SCNC: 101 MMOL/L — SIGNIFICANT CHANGE UP (ref 98–110)
CO2 SERPL-SCNC: 25 MMOL/L — SIGNIFICANT CHANGE UP (ref 17–32)
CREAT SERPL-MCNC: 0.9 MG/DL — SIGNIFICANT CHANGE UP (ref 0.7–1.5)
EGFR: 95 ML/MIN/1.73M2 — SIGNIFICANT CHANGE UP
GLUCOSE SERPL-MCNC: 120 MG/DL — HIGH (ref 70–99)
HCT VFR BLD CALC: 38.2 % — LOW (ref 42–52)
HGB BLD-MCNC: 11.1 G/DL — LOW (ref 14–18)
MCHC RBC-ENTMCNC: 24.1 PG — LOW (ref 27–31)
MCHC RBC-ENTMCNC: 29.1 G/DL — LOW (ref 32–37)
MCV RBC AUTO: 83 FL — SIGNIFICANT CHANGE UP (ref 80–94)
NRBC # BLD: 0 /100 WBCS — SIGNIFICANT CHANGE UP (ref 0–0)
PLATELET # BLD AUTO: 226 K/UL — SIGNIFICANT CHANGE UP (ref 130–400)
POTASSIUM SERPL-MCNC: 4.5 MMOL/L — SIGNIFICANT CHANGE UP (ref 3.5–5)
POTASSIUM SERPL-SCNC: 4.5 MMOL/L — SIGNIFICANT CHANGE UP (ref 3.5–5)
PROT SERPL-MCNC: 7.2 G/DL — SIGNIFICANT CHANGE UP (ref 6–8)
RBC # BLD: 4.6 M/UL — LOW (ref 4.7–6.1)
RBC # FLD: 15.9 % — HIGH (ref 11.5–14.5)
SODIUM SERPL-SCNC: 139 MMOL/L — SIGNIFICANT CHANGE UP (ref 135–146)
WBC # BLD: 11.37 K/UL — HIGH (ref 4.8–10.8)
WBC # FLD AUTO: 11.37 K/UL — HIGH (ref 4.8–10.8)

## 2022-06-18 PROCEDURE — 71045 X-RAY EXAM CHEST 1 VIEW: CPT | Mod: 26

## 2022-06-18 PROCEDURE — 99232 SBSQ HOSP IP/OBS MODERATE 35: CPT

## 2022-06-18 RX ORDER — METOPROLOL TARTRATE 50 MG
12.5 TABLET ORAL
Refills: 0 | Status: DISCONTINUED | OUTPATIENT
Start: 2022-06-18 | End: 2022-06-21

## 2022-06-18 RX ORDER — LISINOPRIL 2.5 MG/1
2.5 TABLET ORAL DAILY
Refills: 0 | Status: DISCONTINUED | OUTPATIENT
Start: 2022-06-18 | End: 2022-06-21

## 2022-06-18 RX ORDER — GUAIFENESIN/DEXTROMETHORPHAN 600MG-30MG
10 TABLET, EXTENDED RELEASE 12 HR ORAL EVERY 4 HOURS
Refills: 0 | Status: DISCONTINUED | OUTPATIENT
Start: 2022-06-18 | End: 2022-06-21

## 2022-06-18 RX ORDER — CHLORHEXIDINE GLUCONATE 213 G/1000ML
1 SOLUTION TOPICAL DAILY
Refills: 0 | Status: DISCONTINUED | OUTPATIENT
Start: 2022-06-18 | End: 2022-06-21

## 2022-06-18 RX ADMIN — Medication 1 MILLIGRAM(S): at 11:51

## 2022-06-18 RX ADMIN — Medication 1 PATCH: at 11:51

## 2022-06-18 RX ADMIN — APIXABAN 5 MILLIGRAM(S): 2.5 TABLET, FILM COATED ORAL at 06:39

## 2022-06-18 RX ADMIN — Medication 12.5 MILLIGRAM(S): at 17:57

## 2022-06-18 RX ADMIN — Medication 1 TABLET(S): at 11:50

## 2022-06-18 RX ADMIN — APIXABAN 5 MILLIGRAM(S): 2.5 TABLET, FILM COATED ORAL at 17:57

## 2022-06-18 RX ADMIN — LISINOPRIL 2.5 MILLIGRAM(S): 2.5 TABLET ORAL at 17:57

## 2022-06-18 RX ADMIN — Medication 1 PATCH: at 19:00

## 2022-06-18 RX ADMIN — Medication 100 MILLIGRAM(S): at 11:50

## 2022-06-18 RX ADMIN — Medication 81 MILLIGRAM(S): at 11:51

## 2022-06-18 NOTE — PHYSICAL THERAPY INITIAL EVALUATION ADULT - ADDITIONAL COMMENTS
Per patient, he lives alone in a private home with  9 steps outside with (R) rail going up, one flight of stairs with (R) rail going up to bedroom; was using a rolling walker at home and also has a wheelchair

## 2022-06-18 NOTE — PHYSICAL THERAPY INITIAL EVALUATION ADULT - PERTINENT HX OF CURRENT PROBLEM, REHAB EVAL
65 y/o male admitted with diagnosis of Unable to ambulate after presenting to ED with c/o leg pain that patient was not able to walk at home

## 2022-06-18 NOTE — PHYSICAL THERAPY INITIAL EVALUATION ADULT - GAIT DEVIATIONS NOTED, PT EVAL
stooped posture, dec heel strike/pushoff , dec AISHWARYA/decreased fish/decreased step length/decreased weight-shifting ability

## 2022-06-18 NOTE — CONSULT NOTE ADULT - ASSESSMENT
IMPRESSION: Rehab of 65 y/o m rehab  for decline  cva  hx  of tia  gd     PRECAUTIONS: [  ] Cardiac  [  ] Respiratory  [  ] Seizures [  ] Contact Isolation  [  ] Droplet Isolation  [ FALL ] Other    Weight Bearing Status:     RECOMMENDATION:    Out of Bed to Chair     DVT/Decubiti Prophylaxis    REHAB PLAN:     [  x ] Bedside P/T 3-5 times a week   [   ]   Bedside O/T  2-3 times a week             [   ] No Rehab Therapy Indicated                   [   ]  Speech Therapy   Conditioning/ROM                                    ADL  Bed Mobility                                               Conditioning/ROM  Transfers                                                     Bed Mobility  Sitting /Standing Balance                         Transfers                                        Gait Training                                               Sitting/Standing Balance  Stair Training [   ]Applicable                    Home equipment Eval                                                                        Splinting  [   ] Only      GOALS:   ADL   [x   ]   Independent                    Transfers  [ x  ] Independent                          Ambulation  [  x ] Independent     [x    ] With device                            [  x ]  CG                                                         [x   ]  CG                                                                  [  x ] CG                            [    ] Min A                                                   [   ] Min A                                                              [   ] Min  A          DISCHARGE PLAN:   [   ]  Good candidate for Intensive Rehabilitation/Hospital based-[facility]                                             Will tolerate 3hrs Intensive Rehab Daily                                       [  xx  ]  Short Term Rehab in Skilled Nursing Facility may need detention vx LTC and cont currant care                                       [    ]  Home with Outpatient or VN services                                         [    ]  Possible Candidate for Intensive Hospital based Rehab

## 2022-06-18 NOTE — CHART NOTE - NSCHARTNOTEFT_GEN_A_CORE
Limited RD note    Consult received for BMI <18 however pt's present BMI is >30. Tolerating po diet well, offers no nutrition related concerns at this time. Rd to sign off, re-consult as needed.

## 2022-06-18 NOTE — PHYSICAL THERAPY INITIAL EVALUATION ADULT - BED MOBILITY LIMITATIONS, REHAB EVAL
Patient encountered already out of bed in chair, No apparent distress. Per nursing required assistx1

## 2022-06-18 NOTE — PHYSICAL THERAPY INITIAL EVALUATION ADULT - GENERAL OBSERVATIONS, REHAB EVAL
09:55-10:20 Chart reviewed. Pt encountered reclined in chair,  may be seen by Physical Therapist as confirmed with Nurse. Patient denied pain at rest but has "difficulty walking"; +meera FLETCHER

## 2022-06-18 NOTE — PROVIDER CONTACT NOTE (OTHER) - ACTION/TREATMENT ORDERED:
"   09/05/18 1407   Quick Adds   Type of Visit Initial PT Evaluation       Present no   Language English   Living Environment   Lives With spouse   Living Arrangements house   Home Accessibility bed and bath are not on the first floor;stairs to enter home;stairs within home   Number of Stairs to Enter Home 2   Number of Stairs Within Home 5   Transportation Available family or friend will provide   Living Environment Comment Per wife Yisel, patient and wife are living at mother in laws house requiring 2 stairs with no handrails to enter then 5 stairs with 1 handrail to get to main level.    Self-Care   Usual Activity Tolerance moderate   Current Activity Tolerance moderate   Regular Exercise no   Activity/Exercise/Self-Care Comment Yisel reports patient would like to return to work however when prompted to exercise patient reports having decreased motivation to exercise.   Functional Level Prior   Ambulation 0-->independent   Transferring 0-->independent   Fall history within last six months yes   Number of times patient has fallen within last six months 5   Which of the above functional risks had a recent onset or change? none   General Information   Onset of Illness/Injury or Date of Surgery - Date 09/03/18   Referring Physician Maribell Bolden MD   Pertinent History of Current Problem (include personal factors and/or comorbidities that impact the POC) Mono Varghese is a 51 year old male with history of esophageal varices, cirrhosis, portal vein thrombosis, DM2, thrombocytopenia who presented with bright red blood per rectum. History is obtained from the patient and his wife, Yisel. His wife reports that the patient has had about 8-10 episodes of bright blood per rectum over the past three days. The amount of bleeding steadily increased until yesterday evening- wife describes the bleeding as very brisk \"like turning on a faucet\". Patient denies nausea, vomiting, fever, chills, " abdominal pain, or hematuria. Patient has known esophageal and gastric varices, did have severe bleeding from them a few years ago. Patient's wife notes that the patient is mildly more confused than usual. He has been getting his lactulose only once per day.     Precautions/Limitations fall precautions   Weight-Bearing Status - LUE full weight-bearing   Weight-Bearing Status - RUE full weight-bearing   Weight-Bearing Status - LLE full weight-bearing   Weight-Bearing Status - RLE full weight-bearing   General Info Comments Activity: up with assist   Cognitive Status Examination   Level of Consciousness alert   Follows Commands and Answers Questions 100% of the time   Personal Safety and Judgment at risk behaviors demonstrated   Pain Assessment   Patient Currently in Pain No   Integumentary/Edema   Integumentary/Edema no deficits were identifed   Posture    Posture Forward head position;Protracted shoulders   Range of Motion (ROM)   ROM Comment B LE WNL/WFL   Strength   Strength Comments B LE > 3/5 secondary to functional mobility   Bed Mobility   Bed Mobility Comments MOD I    Transfer Skills   Transfer Comments CGA/SBA   Gait   Gait Comments FWW + CGA/SBA   Clinical Impression   Criteria for Skilled Therapeutic Intervention yes, treatment indicated   PT Diagnosis Impaired functional mobility   Influenced by the following impairments Impaired LE strength, endurance, higher level balance   Functional limitations due to impairments Impaired transfers, gait, stairs   Clinical Presentation Evolving/Changing   Clinical Presentation Rationale clinical judgement; 2+ personal factors/body systems involved   Clinical Decision Making (Complexity) Moderate complexity   Therapy Frequency` 5 times/week   Predicted Duration of Therapy Intervention (days/wks) 9/15/18   Anticipated Discharge Disposition Home with Assist   Risk & Benefits of therapy have been explained Yes   Patient, Family & other staff in agreement with plan of  "care Yes   [facility]-P[facility] TM \"6 Clicks\"   2016, Trustees of [facility], under license to [employer].  All rights reserved.   6 Clicks Short Forms Basic Mobility Inpatient Short Form   [facility]  \"6 Clicks\" V.2 Basic Mobility Inpatient Short Form   1. Turning from your back to your side while in a flat bed without using bedrails? 4 - None   2. Moving from lying on your back to sitting on the side of a flat bed without using bedrails? 4 - None   3. Moving to and from a bed to a chair (including a wheelchair)? 3 - A Little   4. Standing up from a chair using your arms (e.g., wheelchair, or bedside chair)? 3 - A Little   5. To walk in hospital room? 3 - A Little   6. Climbing 3-5 steps with a railing? 3 - A Little   Basic Mobility Raw Score (Score out of 24.Lower scores equate to lower levels of function) 20   Total Evaluation Time   Total Evaluation Time (Minutes) 8     " CXR   Cough medicine

## 2022-06-18 NOTE — PROGRESS NOTE ADULT - SUBJECTIVE AND OBJECTIVE BOX
CHAPARRO ROBISON  66y  Male      Patient is a 66y old  Male who presents with a chief complaint of Inability to ambulate (18 Jun 2022 08:17)      INTERVAL HPI/OVERNIGHT EVENTS:  Patient seen and examined earlier this morning; he denies any new complaints. Denies F/C, CP, palpitations, SOB, N/V. No acute events overnight.      REVIEW OF SYSTEMS: Denies any symptoms; limited due to patient being a poor historian     T(C): 36.4 (06-18-22 @ 13:40), Max: 36.9 (06-17-22 @ 19:30)  HR: 75 (06-18-22 @ 13:40) (60 - 75)  BP: 148/78 (06-18-22 @ 13:40) (133/70 - 148/78)  RR: 16 (06-18-22 @ 13:40) (16 - 18)  SpO2: 98% (06-18-22 @ 08:39) (97% - 98%)    PHYSICAL EXAM:  CONSTITUTIONAL: disheveled, urinated on self  HEAD: Normocephalic; atraumatic.  NECK: Supple; non tender.  CARD: S1, S2 normal; no murmurs, gallops, or rubs. Regular rate and rhythm.  RESP: No wheezes, rales or rhonchi.  ABD: Normal bowel sounds; soft; non-distended; non-tender  EXT: moving all extremities equally Normal ROM.  No clubbing, cyanosis + Edema  NEURO: Alert, oriented X 3, grossly unremarkable  PSYCH: Cooperative, appropriate.    Consultant(s) Notes Reviewed:  [x ] YES  [ ] NO  Care Discussed with Consultants/Other Providers [ x] YES  [ ] NO    LAB:                        10.6   11.01 )-----------( 268      ( 17 Jun 2022 15:20 )             35.9     06-18    139  |  101  |  12  ----------------------------<  120<H>  4.5   |  25  |  0.9    Ca    9.1      18 Jun 2022 11:49  Mg     2.1     06-17    TPro  7.2  /  Alb  4.3  /  TBili  0.4  /  DBili  x   /  AST  50<H>  /  ALT  22  /  AlkPhos  99  06-18    LIVER FUNCTIONS - ( 18 Jun 2022 11:49 )  Alb: 4.3 g/dL / Pro: 7.2 g/dL / ALK PHOS: 99 U/L / ALT: 22 U/L / AST: 50 U/L / GGT: x           CARDIAC MARKERS ( 17 Jun 2022 15:20 )  x     / <0.01 ng/mL / x     / x     / x                  Drug Dosing Weight  Height (cm): 175.3 (17 Jun 2022 19:30)  Weight (kg): 100 (17 Jun 2022 19:30)  BMI (kg/m2): 32.5 (17 Jun 2022 19:30)  BSA (m2): 2.15 (17 Jun 2022 19:30)  Height (cm): 175.3 (06-17-22 @ 19:30)  Weight (kg): 100 (06-17-22 @ 19:30)  BMI (kg/m2): 32.5 (06-17-22 @ 19:30)  BSA (m2): 2.15 (06-17-22 @ 19:30)  CAPILLARY BLOOD GLUCOSE        I&O's Summary        RADIOLOGY & ADDITIONAL TESTS:  Imaging Personally Reviewed:  [x] YES  [ ] NO    HEALTH ISSUES - PROBLEM Dx:          MEDS:  acetaminophen     Tablet .. 650 milliGRAM(s) Oral every 6 hours PRN  aluminum hydroxide/magnesium hydroxide/simethicone Suspension 30 milliLiter(s) Oral every 4 hours PRN  apixaban 5 milliGRAM(s) Oral every 12 hours  aspirin  chewable 81 milliGRAM(s) Oral daily  atorvastatin 10 milliGRAM(s) Oral at bedtime  chlorhexidine 4% Liquid 1 Application(s) Topical daily  folic acid 1 milliGRAM(s) Oral daily  lisinopril 2.5 milliGRAM(s) Oral daily  LORazepam     Tablet 2 milliGRAM(s) Oral every 6 hours PRN  melatonin 3 milliGRAM(s) Oral at bedtime PRN  metoprolol tartrate 12.5 milliGRAM(s) Oral two times a day  multivitamin/minerals 1 Tablet(s) Oral daily  nicotine  14 mG/24 Hr(s) Transdermal Patch - Peds 1 Patch Transdermal daily  ondansetron Injectable 4 milliGRAM(s) IV Push every 8 hours PRN  thiamine 100 milliGRAM(s) Oral daily

## 2022-06-19 PROCEDURE — 99232 SBSQ HOSP IP/OBS MODERATE 35: CPT

## 2022-06-19 RX ADMIN — Medication 1 PATCH: at 07:44

## 2022-06-19 RX ADMIN — Medication 100 MILLIGRAM(S): at 12:09

## 2022-06-19 RX ADMIN — APIXABAN 5 MILLIGRAM(S): 2.5 TABLET, FILM COATED ORAL at 05:28

## 2022-06-19 RX ADMIN — Medication 12.5 MILLIGRAM(S): at 05:28

## 2022-06-19 RX ADMIN — APIXABAN 5 MILLIGRAM(S): 2.5 TABLET, FILM COATED ORAL at 17:36

## 2022-06-19 RX ADMIN — LISINOPRIL 2.5 MILLIGRAM(S): 2.5 TABLET ORAL at 05:29

## 2022-06-19 RX ADMIN — Medication 81 MILLIGRAM(S): at 12:09

## 2022-06-19 RX ADMIN — Medication 10 MILLILITER(S): at 01:30

## 2022-06-19 RX ADMIN — ATORVASTATIN CALCIUM 10 MILLIGRAM(S): 80 TABLET, FILM COATED ORAL at 21:46

## 2022-06-19 RX ADMIN — Medication 1 MILLIGRAM(S): at 12:09

## 2022-06-19 RX ADMIN — Medication 1 PATCH: at 12:10

## 2022-06-19 RX ADMIN — Medication 12.5 MILLIGRAM(S): at 17:36

## 2022-06-19 RX ADMIN — Medication 1 TABLET(S): at 12:09

## 2022-06-19 RX ADMIN — Medication 10 MILLILITER(S): at 05:48

## 2022-06-19 NOTE — PROGRESS NOTE ADULT - SUBJECTIVE AND OBJECTIVE BOX
CHAPARRO ROBISON  66y  Male      Patient is a 66y old  Male who presents with a chief complaint of Inability to ambulate (18 Jun 2022 08:17)      INTERVAL HPI/OVERNIGHT EVENTS:  Patient seen and examined earlier this morning; he denies any new complaints. Denies F/C, CP, palpitations, SOB, N/V. No acute events overnight. Reports overnight he was coughing and choked on rice but reports he feels normal now. Discussed with him CXR was unremarkable.      REVIEW OF SYSTEMS: Denies any symptoms; limited due to patient being a poor historian     Vital Signs Last 24 Hrs  T(C): 35.7 (19 Jun 2022 05:00), Max: 36.4 (18 Jun 2022 13:40)  T(F): 96.2 (19 Jun 2022 05:00), Max: 97.5 (18 Jun 2022 13:40)  HR: 65 (19 Jun 2022 05:00) (65 - 75)  BP: 140/70 (19 Jun 2022 05:00) (127/60 - 148/78)  BP(mean): --  RR: 16 (19 Jun 2022 05:00) (16 - 18)  SpO2: 97% (18 Jun 2022 21:00) (97% - 97%)  PHYSICAL EXAM:  CONSTITUTIONAL: disheveled, urinated on self  HEAD: Normocephalic; atraumatic.  NECK: Supple; non tender.  CARD: S1, S2 normal; no murmurs, gallops, or rubs. Regular rate and rhythm.  RESP: No wheezes, rales or rhonchi.  ABD: Normal bowel sounds; soft; non-distended; non-tender  EXT: moving all extremities equally Normal ROM.  No clubbing, cyanosis + Edema  NEURO: Alert, oriented X 3, grossly unremarkable  PSYCH: Cooperative, appropriate.    Consultant(s) Notes Reviewed:  [x ] YES  [ ] NO  Care Discussed with Consultants/Other Providers [ x] YES  [ ] NO    LAB:                        11.1   11.37 )-----------( 226      ( 18 Jun 2022 11:49 )             38.2   06-18    139  |  101  |  12  ----------------------------<  120<H>  4.5   |  25  |  0.9    Ca    9.1      18 Jun 2022 11:49  Mg     2.1     06-17    TPro  7.2  /  Alb  4.3  /  TBili  0.4  /  DBili  x   /  AST  50<H>  /  ALT  22  /  AlkPhos  99  06-18                Drug Dosing Weight  Height (cm): 175.3 (17 Jun 2022 19:30)  Weight (kg): 100 (17 Jun 2022 19:30)  BMI (kg/m2): 32.5 (17 Jun 2022 19:30)  BSA (m2): 2.15 (17 Jun 2022 19:30)  Height (cm): 175.3 (06-17-22 @ 19:30)  Weight (kg): 100 (06-17-22 @ 19:30)  BMI (kg/m2): 32.5 (06-17-22 @ 19:30)  BSA (m2): 2.15 (06-17-22 @ 19:30)  CAPILLARY BLOOD GLUCOSE        I&O's Summary        RADIOLOGY & ADDITIONAL TESTS:  Imaging Personally Reviewed:  [x] YES  [ ] NO    HEALTH ISSUES - PROBLEM Dx:        MEDICATIONS  (STANDING):  apixaban 5 milliGRAM(s) Oral every 12 hours  aspirin  chewable 81 milliGRAM(s) Oral daily  atorvastatin 10 milliGRAM(s) Oral at bedtime  chlorhexidine 4% Liquid 1 Application(s) Topical daily  folic acid 1 milliGRAM(s) Oral daily  lisinopril 2.5 milliGRAM(s) Oral daily  metoprolol tartrate 12.5 milliGRAM(s) Oral two times a day  multivitamin/minerals 1 Tablet(s) Oral daily  nicotine  14 mG/24 Hr(s) Transdermal Patch - Peds 1 Patch Transdermal daily  thiamine 100 milliGRAM(s) Oral daily    MEDICATIONS  (PRN):  acetaminophen     Tablet .. 650 milliGRAM(s) Oral every 6 hours PRN Temp greater or equal to 38C (100.4F), Mild Pain (1 - 3)  aluminum hydroxide/magnesium hydroxide/simethicone Suspension 30 milliLiter(s) Oral every 4 hours PRN Dyspepsia  guaifenesin/dextromethorphan Oral Liquid 10 milliLiter(s) Oral every 4 hours PRN Cough  LORazepam     Tablet 2 milliGRAM(s) Oral every 6 hours PRN alcohol wsxs  melatonin 3 milliGRAM(s) Oral at bedtime PRN Insomnia  ondansetron Injectable 4 milliGRAM(s) IV Push every 8 hours PRN Nausea and/or Vomiting

## 2022-06-19 NOTE — CHART NOTE - NSCHARTNOTEFT_GEN_A_CORE
CXR was ordered yesterday evening.  Pt was eating and choked on rice.  Pt in NAD, c/o cough.  CXR urgent was ordered awaiting reading. Throughout the night pt was resting comfortably asleep.  Follow CXR.

## 2022-06-20 ENCOUNTER — TRANSCRIPTION ENCOUNTER (OUTPATIENT)
Age: 67
End: 2022-06-20

## 2022-06-20 DIAGNOSIS — F10.10 ALCOHOL ABUSE, UNCOMPLICATED: ICD-10-CM

## 2022-06-20 LAB
GLUCOSE BLDC GLUCOMTR-MCNC: 121 MG/DL — HIGH (ref 70–99)
GLUCOSE BLDC GLUCOMTR-MCNC: 137 MG/DL — HIGH (ref 70–99)
SARS-COV-2 RNA SPEC QL NAA+PROBE: SIGNIFICANT CHANGE UP

## 2022-06-20 PROCEDURE — 99221 1ST HOSP IP/OBS SF/LOW 40: CPT

## 2022-06-20 PROCEDURE — 99239 HOSP IP/OBS DSCHRG MGMT >30: CPT

## 2022-06-20 RX ORDER — ALBUTEROL 90 UG/1
2 AEROSOL, METERED ORAL EVERY 6 HOURS
Refills: 0 | Status: DISCONTINUED | OUTPATIENT
Start: 2022-06-20 | End: 2022-06-21

## 2022-06-20 RX ADMIN — APIXABAN 5 MILLIGRAM(S): 2.5 TABLET, FILM COATED ORAL at 17:14

## 2022-06-20 RX ADMIN — Medication 10 MILLILITER(S): at 11:20

## 2022-06-20 RX ADMIN — ALBUTEROL 2 PUFF(S): 90 AEROSOL, METERED ORAL at 10:02

## 2022-06-20 RX ADMIN — Medication 12.5 MILLIGRAM(S): at 17:15

## 2022-06-20 RX ADMIN — Medication 1 PATCH: at 12:39

## 2022-06-20 RX ADMIN — Medication 12.5 MILLIGRAM(S): at 05:51

## 2022-06-20 RX ADMIN — LISINOPRIL 2.5 MILLIGRAM(S): 2.5 TABLET ORAL at 05:51

## 2022-06-20 RX ADMIN — APIXABAN 5 MILLIGRAM(S): 2.5 TABLET, FILM COATED ORAL at 05:51

## 2022-06-20 RX ADMIN — Medication 81 MILLIGRAM(S): at 12:39

## 2022-06-20 RX ADMIN — Medication 1 PATCH: at 12:43

## 2022-06-20 RX ADMIN — Medication 650 MILLIGRAM(S): at 23:51

## 2022-06-20 RX ADMIN — Medication 100 MILLIGRAM(S): at 12:39

## 2022-06-20 RX ADMIN — Medication 1 TABLET(S): at 12:39

## 2022-06-20 RX ADMIN — CHLORHEXIDINE GLUCONATE 1 APPLICATION(S): 213 SOLUTION TOPICAL at 12:42

## 2022-06-20 RX ADMIN — ATORVASTATIN CALCIUM 10 MILLIGRAM(S): 80 TABLET, FILM COATED ORAL at 21:21

## 2022-06-20 RX ADMIN — Medication 650 MILLIGRAM(S): at 23:21

## 2022-06-20 RX ADMIN — Medication 1 MILLIGRAM(S): at 12:39

## 2022-06-20 RX ADMIN — Medication 3 MILLIGRAM(S): at 21:21

## 2022-06-20 RX ADMIN — Medication 1 PATCH: at 21:52

## 2022-06-20 NOTE — PROGRESS NOTE ADULT - SUBJECTIVE AND OBJECTIVE BOX
CHAPARRO ROBISON  66y  Male      Patient is a 66y old  Male who presents with a chief complaint of Inability to ambulate (18 Jun 2022 08:17)      INTERVAL HPI/OVERNIGHT EVENTS:  Patient seen and examined earlier this morning; he denies any new complaints. Denies F/C, CP, palpitations, SOB, N/V. No acute events overnight.       REVIEW OF SYSTEMS: Denies any symptoms; limited due to patient being a poor historian     Vital Signs Last 24 Hrs  T(C): 35.6 (20 Jun 2022 05:00), Max: 37.1 (19 Jun 2022 12:39)  T(F): 96 (20 Jun 2022 05:00), Max: 98.7 (19 Jun 2022 12:39)  HR: 61 (20 Jun 2022 05:00) (61 - 80)  BP: 135/63 (20 Jun 2022 05:00) (109/62 - 135/63)  BP(mean): --  RR: 16 (20 Jun 2022 05:00) (16 - 18)  SpO2: 96% (19 Jun 2022 21:00) (96% - 96%)  PHYSICAL EXAM:  CONSTITUTIONAL: comfortable, obese  HEAD: Normocephalic; atraumatic.  NECK: Supple; non tender.  CARD: S1, S2 normal; no murmurs, gallops, or rubs. Regular rate and rhythm.  RESP: No wheezes, rales or rhonchi.  ABD: Normal bowel sounds; soft; non-distended; non-tender  EXT: moving all extremities equally Normal ROM.  No clubbing, cyanosis + Edema  NEURO: Alert, oriented X 3, grossly unremarkable  PSYCH: Cooperative, appropriate.    Consultant(s) Notes Reviewed:  [x ] YES  [ ] NO  Care Discussed with Consultants/Other Providers [ x] YES  [ ] NO    LAB:                        11.1   11.37 )-----------( 226      ( 18 Jun 2022 11:49 )             38.2   06-18    139  |  101  |  12  ----------------------------<  120<H>  4.5   |  25  |  0.9    Ca    9.1      18 Jun 2022 11:49  Mg     2.1     06-17    TPro  7.2  /  Alb  4.3  /  TBili  0.4  /  DBili  x   /  AST  50<H>  /  ALT  22  /  AlkPhos  99  06-18                Drug Dosing Weight  Height (cm): 175.3 (17 Jun 2022 19:30)  Weight (kg): 100 (17 Jun 2022 19:30)  BMI (kg/m2): 32.5 (17 Jun 2022 19:30)  BSA (m2): 2.15 (17 Jun 2022 19:30)  Height (cm): 175.3 (06-17-22 @ 19:30)  Weight (kg): 100 (06-17-22 @ 19:30)  BMI (kg/m2): 32.5 (06-17-22 @ 19:30)  BSA (m2): 2.15 (06-17-22 @ 19:30)  CAPILLARY BLOOD GLUCOSE        I&O's Summary        RADIOLOGY & ADDITIONAL TESTS:  Imaging Personally Reviewed:  [x] YES  [ ] NO    HEALTH ISSUES - PROBLEM Dx:        MEDICATIONS  (STANDING):  apixaban 5 milliGRAM(s) Oral every 12 hours  aspirin  chewable 81 milliGRAM(s) Oral daily  atorvastatin 10 milliGRAM(s) Oral at bedtime  chlorhexidine 4% Liquid 1 Application(s) Topical daily  folic acid 1 milliGRAM(s) Oral daily  lisinopril 2.5 milliGRAM(s) Oral daily  metoprolol tartrate 12.5 milliGRAM(s) Oral two times a day  multivitamin/minerals 1 Tablet(s) Oral daily  nicotine  14 mG/24 Hr(s) Transdermal Patch - Peds 1 Patch Transdermal daily  thiamine 100 milliGRAM(s) Oral daily    MEDICATIONS  (PRN):  acetaminophen     Tablet .. 650 milliGRAM(s) Oral every 6 hours PRN Temp greater or equal to 38C (100.4F), Mild Pain (1 - 3)  ALBUTerol    90 MICROgram(s) HFA Inhaler 2 Puff(s) Inhalation every 6 hours PRN Wheezing  aluminum hydroxide/magnesium hydroxide/simethicone Suspension 30 milliLiter(s) Oral every 4 hours PRN Dyspepsia  guaifenesin/dextromethorphan Oral Liquid 10 milliLiter(s) Oral every 4 hours PRN Cough  LORazepam     Tablet 2 milliGRAM(s) Oral every 6 hours PRN alcohol wsxs  melatonin 3 milliGRAM(s) Oral at bedtime PRN Insomnia  ondansetron Injectable 4 milliGRAM(s) IV Push every 8 hours PRN Nausea and/or Vomiting

## 2022-06-20 NOTE — DISCHARGE NOTE PROVIDER - NSDCCPCAREPLAN_GEN_ALL_CORE_FT
PRINCIPAL DISCHARGE DIAGNOSIS  Diagnosis: Unable to ambulate  Assessment and Plan of Treatment: He was admitted for Unable to ambulate 2/2 physical debility; he was evaluated by PT and physiatrist. He was also evaluated by SW. He had also admitted to drinking 2 shots of vodka the prior to hospital admission; he was monitored for alcohol withdrawl and found not to be actively withdrawing. He was evaluated by addiction medicine team. At this time, he is medically stable for a hospital discharge.  Things to follow up:  -PCP follow up in 1 week  -Alcohol cessation is strongly advised  -Continue taking home medications

## 2022-06-20 NOTE — CONSULT NOTE ADULT - SUBJECTIVE AND OBJECTIVE BOX
Pt interviewed, examined and EMR chart reviewed. Pt seen about 2 months ago.   Pt admits to drinking  a few drinks about 1 week ago. Pt states time before that was 2-3 weeks prior.     variable periods of sobriety in the past.  Has been in detox before _X____yes,   _____No    SOCIAL HISTORY:    REVIEW OF SYSTEMS:    Constitutional: No fever, weight loss or fatigue  ENT:  No difficulty hearing, tinnitus, vertigo; No sinus or throat pain  Neck: No pain or stiffness  Respiratory: No cough, wheezing, chills or hemoptysis  Cardiovascular: No chest pain, palpitations, shortness of breath, dizziness or leg swelling  Gastrointestinal: No abdominal or epigastric pain. No nausea, vomiting or hematemesis; No diarrhea or constipation. No melena or hematochezia.  Neurological: No headaches, memory loss, loss of strength, numbness or tremors  Musculoskeletal: SEE HPI  Psychiatric: No depression, anxiety, mood swings or difficulty sleeping    MEDICATIONS  (STANDING):  apixaban 5 milliGRAM(s) Oral every 12 hours  aspirin  chewable 81 milliGRAM(s) Oral daily  atorvastatin 10 milliGRAM(s) Oral at bedtime  chlorhexidine 4% Liquid 1 Application(s) Topical daily  folic acid 1 milliGRAM(s) Oral daily  lisinopril 2.5 milliGRAM(s) Oral daily  metoprolol tartrate 12.5 milliGRAM(s) Oral two times a day  multivitamin/minerals 1 Tablet(s) Oral daily  nicotine  14 mG/24 Hr(s) Transdermal Patch - Peds 1 Patch Transdermal daily  thiamine 100 milliGRAM(s) Oral daily    MEDICATIONS  (PRN):  acetaminophen     Tablet .. 650 milliGRAM(s) Oral every 6 hours PRN Temp greater or equal to 38C (100.4F), Mild Pain (1 - 3)  ALBUTerol    90 MICROgram(s) HFA Inhaler 2 Puff(s) Inhalation every 6 hours PRN Wheezing  aluminum hydroxide/magnesium hydroxide/simethicone Suspension 30 milliLiter(s) Oral every 4 hours PRN Dyspepsia  guaifenesin/dextromethorphan Oral Liquid 10 milliLiter(s) Oral every 4 hours PRN Cough  LORazepam     Tablet 2 milliGRAM(s) Oral every 6 hours PRN alcohol wsxs  melatonin 3 milliGRAM(s) Oral at bedtime PRN Insomnia  ondansetron Injectable 4 milliGRAM(s) IV Push every 8 hours PRN Nausea and/or Vomiting      Vital Signs Last 24 Hrs  T(C): 35.6 (20 Jun 2022 05:00), Max: 37.1 (19 Jun 2022 12:39)  T(F): 96 (20 Jun 2022 05:00), Max: 98.7 (19 Jun 2022 12:39)  HR: 61 (20 Jun 2022 05:00) (61 - 80)  BP: 135/63 (20 Jun 2022 05:00) (109/62 - 135/63)  BP(mean): --  RR: 16 (20 Jun 2022 05:00) (16 - 18)  SpO2: 96% (19 Jun 2022 21:00) (96% - 96%)    PHYSICAL EXAM:    Constitutional: NAD, well-groomed, well-developed  HEENT: PERRLA, EOMI, Normal Hearing, MMM  Neck: No LAD, No JVD  Back: Normal spine flexure, No CVA tenderness  Respiratory: CTAB/L  Cardiovascular: S1 and S2, RRR, no M/G/R  Gastrointestinal: BS+, soft, NT/ND  Extremities: No peripheral edema  Vascular: 2+ peripheral pulses  Neurological: A/O x 3, no focal deficits    LABS:                        11.1   11.37 )-----------( 226      ( 18 Jun 2022 11:49 )             38.2     06-18    139  |  101  |  12  ----------------------------<  120<H>  4.5   |  25  |  0.9    Ca    9.1      18 Jun 2022 11:49    TPro  7.2  /  Alb  4.3  /  TBili  0.4  /  DBili  x   /  AST  50<H>  /  ALT  22  /  AlkPhos  99  06-18        Drug Screen Urine:  Alcohol Level  Alcohol, Blood: <10 mg/dL (06-17-22 @ 15:20)        RADIOLOGY & ADDITIONAL STUDIES:  
HPI: 66 y old male with pmhx of TIA, CVA, CHF, HTN, HLD, PE, alcohol abuse presents to the ED complaining of b/l lower legs weakness and mild pain since yesterday associated with difficulty ambulating. pt ambulates with walker but now unable to stand and ambulate with walker. As per family pt was recently discharged from rehab. pt admits to alcohol abuse of 2 shots of vodka/week x10yrs. last use yesterday. pt denies drug abuse, chest/abdominal pain, SOB, n/v, fever/chills or withdrawal sxs at this time. As per family pt not able to self care and not safe for patient to stay home at this time,  ptn seen at  bed side and exam  nad  no new  c.o     PTN  REFERRED TO ACUTE  REHAB  FOR  EVAL AND  TX   PAST MEDICAL & SURGICAL HISTORY:  TIA (transient ischemic attack)      CHF (congestive heart failure)      HTN (hypertension)      HLD (hyperlipidemia)      Alcohol dependence      Chronic back pain      Pulmonary embolism        Cerebrovascular accident (CVA)  left pontine with dysphagia      Alcohol abuse      History of appendectomy          Hospital Course:    TODAY'S SUBJECTIVE & REVIEW OF SYMPTOMS:     Constitutional WNL   Cardio WNL   Resp WNL   GI WNL  Heme WNL  Endo WNL  Skin WNL  MSK WNL  Neuro WNL  Cognitive WNL  Psych WNL      MEDICATIONS  (STANDING):  apixaban 5 milliGRAM(s) Oral every 12 hours  aspirin  chewable 81 milliGRAM(s) Oral daily  atorvastatin 10 milliGRAM(s) Oral at bedtime  chlorhexidine 4% Liquid 1 Application(s) Topical daily  folic acid 1 milliGRAM(s) Oral daily  lisinopril 2.5 milliGRAM(s) Oral daily  metoprolol tartrate 12.5 milliGRAM(s) Oral two times a day  multivitamin/minerals 1 Tablet(s) Oral daily  nicotine  14 mG/24 Hr(s) Transdermal Patch - Peds 1 Patch Transdermal daily  thiamine 100 milliGRAM(s) Oral daily    MEDICATIONS  (PRN):  acetaminophen     Tablet .. 650 milliGRAM(s) Oral every 6 hours PRN Temp greater or equal to 38C (100.4F), Mild Pain (1 - 3)  aluminum hydroxide/magnesium hydroxide/simethicone Suspension 30 milliLiter(s) Oral every 4 hours PRN Dyspepsia  LORazepam     Tablet 2 milliGRAM(s) Oral every 6 hours PRN alcohol wsxs  melatonin 3 milliGRAM(s) Oral at bedtime PRN Insomnia  ondansetron Injectable 4 milliGRAM(s) IV Push every 8 hours PRN Nausea and/or Vomiting      FAMILY HISTORY:  FH: MI (myocardial infarction) (Father, Mother)  Dad, Mom        Allergies    No Known Allergies    Intolerances        SOCIAL HISTORY:    [  ] Etoh  [  ] Smoking  [  ] Substance abuse     Home Environment:  [ x ] Home Alone as  per  ptn  wife    [  ] Lives with Family  [  ] Home Health Aid    Dwelling:  [  ] Apartment  [ x ] Private House  [  ] Adult Home  [  ] Skilled Nursing Facility      [  ] Short Term  [  ] Long Term  [x  ] Stairs       Elevator [  ]    FUNCTIONAL STATUS PTA: (Check all that apply)  Ambulation: [ x  ]Independent    [  ] Dependent     [  ] Non-Ambulatory  Assistive Device: [  ] SA Cane  [  ]  Q Cane  [ x ] Walker  [  ]  Wheelchair  ADL : [ x ] Independent  [  ]  Dependent       Vital Signs Last 24 Hrs  T(C): 35.7 (2022 05:00), Max: 36.9 (2022 19:30)  T(F): 96.3 (2022 05:00), Max: 98.5 (2022 19:30)  HR: 71 (2022 05:00) (60 - 74)  BP: 138/67 (2022 05:00) (133/70 - 144/74)  BP(mean): --  RR: 18 (2022 05:00) (18 - 18)  SpO2: 97% (2022 05:00) (97% - 97%)      PHYSICAL EXAM: Alert & Oriented X2  GENERAL: NAD, well-groomed, well-developed  HEAD:  Atraumatic, Normocephalic  EYES: EOMI, PERRLA, conjunctiva and sclera clear  NECK: Supple, No JVD, Normal thyroid  CHEST/LUNG: Clear to percussion bilaterally; No rales, rhonchi, wheezing, or rubs  HEART: Regular rate and rhythm; No murmurs, rubs, or gallops  ABDOMEN: Soft, Nontender, Nondistended; Bowel sounds present  EXTREMITIES:  2+ Peripheral Pulses, No clubbing, cyanosis, or edema    NERVOUS SYSTEM:  Cranial Nerves 2-12 intact [x  ] Abnormal  [  ]  ROM: WFL all extremities [  ]  Abnormal [ x ]  Motor Strength: WFL all extremities  [  ]  Abnormal x[  ]  Sensation: intact to light touch [  ] Abnormal [ x ]  Reflexes: Symmetric [  ]  Abnormal [ x ]    FUNCTIONAL STATUS:  Bed Mobility: Independent [  ]  Supervision [  ]  Needs Assistance x[  ]  N/A [  ]  Transfers: Independent [  ]  Supervision [  ]  Needs Assistance [ x ]  N/A [  ]   Ambulation: Independent [  ]  Supervision [  ]  Needs Assistance [x  ]  N/A [  ]  ADL: Independent [  ] Requires Assistance [  ] N/A [ x ]  SEE PT/OT IE NOTES    LABS:                        10.6   11.01 )-----------( 268      ( 2022 15:20 )             35.9         141  |  103  |  12  ----------------------------<  120<H>  3.8   |  26  |  0.9    Ca    9.2      2022 15:20  Mg     2.1         TPro  7.3  /  Alb  4.3  /  TBili  0.4  /  DBili  x   /  AST  21  /  ALT  22  /  AlkPhos  95  -17    PT/INR - ( 2022 15:20 )   PT: 14.10 sec;   INR: 1.23 ratio         PTT - ( 2022 15:20 )  PTT:28.2 sec      RADIOLOGY & ADDITIONAL STUDIES:    Assesment:

## 2022-06-20 NOTE — DISCHARGE NOTE NURSING/CASE MANAGEMENT/SOCIAL WORK - NSDCPEFALRISK_GEN_ALL_CORE
For information on Fall & Injury Prevention, visit: [email or web address] OR  [email or web address] OR  https://www.cdc.gov/steadi/patient.html

## 2022-06-20 NOTE — DISCHARGE NOTE NURSING/CASE MANAGEMENT/SOCIAL WORK - PATIENT PORTAL LINK FT
You can access the FollowMyHealth Patient Portal offered by [facility] by registering at the following website: [email or web address]. By joining [facility]’s FollowMyHealth portal, you will also be able to view your health information using other applications (apps) compatible with our system.

## 2022-06-20 NOTE — CONSULT NOTE ADULT - PROBLEM SELECTOR RECOMMENDATION 9
After evaluation at this time no protocol was initiated secondary to use history and denying of any withdrawals. At this time no intervention is needed. Pt did well last admission with no intervention. Pt should be on Thiamine and Folic acid. Pt will be monitored and supportive care provided.    -Alcohol counseling provided   CATCH team involved for aftercare and pt does not want aftercare and is planning for SNF placement for physical rehab.

## 2022-06-20 NOTE — DISCHARGE NOTE PROVIDER - NSDCFUADDINST_GEN_ALL_CORE_FT
Things to follow up:  -PCP follow up in 1 week  -Alcohol cessation is strongly advised  -Continue taking home medications

## 2022-06-20 NOTE — DISCHARGE NOTE PROVIDER - HOSPITAL COURSE
65 YO M with a pmh of TIA, CVA, CHFpEF 65%, HTN, HLD, Alcohol dependence, PE (2015, not on anticoagulation due to epistaxis)  presented to the ED due to weakness in legs. He was admitted for Unable to ambulate 2/2 physical debility; he was evaluated by PT and physiatrist. He was also evaluated by SW. He had also admitted to drinking 2 shots of vodka the prior to hospital admission; he was monitored for alcohol withdrawl and found not to be actively withdrawing. He was evaluated by addiction medicine team. At this time, he is medically stable for a hospital discharge.    Things to follow up:  -PCP follow up in 1 week  -Alcohol cessation is strongly advised  -Continue taking home medications

## 2022-06-21 VITALS
TEMPERATURE: 99 F | DIASTOLIC BLOOD PRESSURE: 68 MMHG | RESPIRATION RATE: 18 BRPM | SYSTOLIC BLOOD PRESSURE: 110 MMHG | HEART RATE: 60 BPM

## 2022-06-21 PROCEDURE — 99231 SBSQ HOSP IP/OBS SF/LOW 25: CPT

## 2022-06-21 RX ORDER — IBUPROFEN 200 MG
600 TABLET ORAL ONCE
Refills: 0 | Status: COMPLETED | OUTPATIENT
Start: 2022-06-21 | End: 2022-06-21

## 2022-06-21 RX ADMIN — Medication 1 PATCH: at 06:41

## 2022-06-21 RX ADMIN — Medication 600 MILLIGRAM(S): at 02:26

## 2022-06-21 RX ADMIN — Medication 600 MILLIGRAM(S): at 03:06

## 2022-06-21 RX ADMIN — Medication 12.5 MILLIGRAM(S): at 05:10

## 2022-06-21 RX ADMIN — APIXABAN 5 MILLIGRAM(S): 2.5 TABLET, FILM COATED ORAL at 05:10

## 2022-06-21 RX ADMIN — LISINOPRIL 2.5 MILLIGRAM(S): 2.5 TABLET ORAL at 05:10
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
## 2022-06-21 NOTE — PROGRESS NOTE ADULT - SUBJECTIVE AND OBJECTIVE BOX
CHAPARRO ROBISON  66y  Male      Patient is a 66y old  Male who presents with a chief complaint of Inability to ambulate (18 Jun 2022 08:17)      INTERVAL HPI/OVERNIGHT EVENTS:  Patient seen and examined earlier this morning; he denies any new complaints. Denies F/C, CP, palpitations, SOB, N/V. No acute events overnight. Discharge planning was discussed with him.      REVIEW OF SYSTEMS: Denies any symptoms; limited due to patient being a poor historian     Vital Signs Last 24 Hrs  T(C): 37.2 (21 Jun 2022 05:53), Max: 37.2 (21 Jun 2022 05:53)  T(F): 98.9 (21 Jun 2022 05:53), Max: 98.9 (21 Jun 2022 05:53)  HR: 60 (21 Jun 2022 05:53) (60 - 80)  BP: 110/68 (21 Jun 2022 05:53) (110/68 - 143/63)  BP(mean): --  RR: 18 (21 Jun 2022 05:53) (16 - 18)  SpO2: --  PHYSICAL EXAM:  CONSTITUTIONAL: comfortable, obese  HEAD: Normocephalic; atraumatic.  NECK: Supple; non tender.  CARD: S1, S2 normal; no murmurs, gallops, or rubs. Regular rate and rhythm.  RESP: No wheezes, rales or rhonchi.  ABD: Normal bowel sounds; soft; non-distended; non-tender  EXT: moving all extremities equally Normal ROM.  No clubbing, cyanosis + Edema  NEURO: Alert, oriented X 3, grossly unremarkable  PSYCH: Cooperative, appropriate.    Consultant(s) Notes Reviewed:  [x ] YES  [ ] NO  Care Discussed with Consultants/Other Providers [ x] YES  [ ] NO    LAB:                        11.1   11.37 )-----------( 226      ( 18 Jun 2022 11:49 )             38.2   06-18    139  |  101  |  12  ----------------------------<  120<H>  4.5   |  25  |  0.9    Ca    9.1      18 Jun 2022 11:49  Mg     2.1     06-17    TPro  7.2  /  Alb  4.3  /  TBili  0.4  /  DBili  x   /  AST  50<H>  /  ALT  22  /  AlkPhos  99  06-18                Drug Dosing Weight  Height (cm): 175.3 (17 Jun 2022 19:30)  Weight (kg): 100 (17 Jun 2022 19:30)  BMI (kg/m2): 32.5 (17 Jun 2022 19:30)  BSA (m2): 2.15 (17 Jun 2022 19:30)  Height (cm): 175.3 (06-17-22 @ 19:30)  Weight (kg): 100 (06-17-22 @ 19:30)  BMI (kg/m2): 32.5 (06-17-22 @ 19:30)  BSA (m2): 2.15 (06-17-22 @ 19:30)  CAPILLARY BLOOD GLUCOSE        I&O's Summary        RADIOLOGY & ADDITIONAL TESTS:  Imaging Personally Reviewed:  [x] YES  [ ] NO    HEALTH ISSUES - PROBLEM Dx:        MEDICATIONS  (STANDING):  apixaban 5 milliGRAM(s) Oral every 12 hours  aspirin  chewable 81 milliGRAM(s) Oral daily  atorvastatin 10 milliGRAM(s) Oral at bedtime  chlorhexidine 4% Liquid 1 Application(s) Topical daily  folic acid 1 milliGRAM(s) Oral daily  lisinopril 2.5 milliGRAM(s) Oral daily  metoprolol tartrate 12.5 milliGRAM(s) Oral two times a day  multivitamin/minerals 1 Tablet(s) Oral daily  nicotine  14 mG/24 Hr(s) Transdermal Patch - Peds 1 Patch Transdermal daily  thiamine 100 milliGRAM(s) Oral daily    MEDICATIONS  (PRN):  acetaminophen     Tablet .. 650 milliGRAM(s) Oral every 6 hours PRN Temp greater or equal to 38C (100.4F), Mild Pain (1 - 3)  ALBUTerol    90 MICROgram(s) HFA Inhaler 2 Puff(s) Inhalation every 6 hours PRN Wheezing  aluminum hydroxide/magnesium hydroxide/simethicone Suspension 30 milliLiter(s) Oral every 4 hours PRN Dyspepsia  guaifenesin/dextromethorphan Oral Liquid 10 milliLiter(s) Oral every 4 hours PRN Cough  melatonin 3 milliGRAM(s) Oral at bedtime PRN Insomnia  ondansetron Injectable 4 milliGRAM(s) IV Push every 8 hours PRN Nausea and/or Vomiting

## 2022-06-24 DIAGNOSIS — Z79.891 LONG TERM (CURRENT) USE OF OPIATE ANALGESIC: ICD-10-CM

## 2022-06-24 DIAGNOSIS — F17.210 NICOTINE DEPENDENCE, CIGARETTES, UNCOMPLICATED: ICD-10-CM

## 2022-06-24 DIAGNOSIS — G89.29 OTHER CHRONIC PAIN: ICD-10-CM

## 2022-06-24 DIAGNOSIS — I50.32 CHRONIC DIASTOLIC (CONGESTIVE) HEART FAILURE: ICD-10-CM

## 2022-06-24 DIAGNOSIS — I11.0 HYPERTENSIVE HEART DISEASE WITH HEART FAILURE: ICD-10-CM
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
## 2022-06-24 DIAGNOSIS — E53.8 DEFICIENCY OF OTHER SPECIFIED B GROUP VITAMINS: ICD-10-CM

## 2022-06-24 DIAGNOSIS — R53.81 OTHER MALAISE: ICD-10-CM

## 2022-06-24 DIAGNOSIS — I69.391 DYSPHAGIA FOLLOWING CEREBRAL INFARCTION: ICD-10-CM

## 2022-06-24 DIAGNOSIS — R26.2 DIFFICULTY IN WALKING, NOT ELSEWHERE CLASSIFIED: ICD-10-CM

## 2022-06-24 DIAGNOSIS — E78.5 HYPERLIPIDEMIA, UNSPECIFIED: ICD-10-CM

## 2022-06-24 DIAGNOSIS — F10.10 ALCOHOL ABUSE, UNCOMPLICATED: ICD-10-CM

## 2022-06-24 DIAGNOSIS — R53.1 WEAKNESS: ICD-10-CM
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
## 2022-06-24 DIAGNOSIS — M79.604 PAIN IN RIGHT LEG: ICD-10-CM

## 2022-06-24 DIAGNOSIS — Z20.822 CONTACT WITH AND (SUSPECTED) EXPOSURE TO COVID-19: ICD-10-CM
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
## 2022-06-24 DIAGNOSIS — Z86.711 PERSONAL HISTORY OF PULMONARY EMBOLISM: ICD-10-CM

## 2022-06-24 DIAGNOSIS — M54.9 DORSALGIA, UNSPECIFIED: ICD-10-CM

## 2022-06-24 DIAGNOSIS — Z79.01 LONG TERM (CURRENT) USE OF ANTICOAGULANTS: ICD-10-CM

## 2022-06-24 DIAGNOSIS — M79.605 PAIN IN LEFT LEG: ICD-10-CM
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
## 2022-06-24 DIAGNOSIS — Z79.82 LONG TERM (CURRENT) USE OF ASPIRIN: ICD-10-CM

## 2022-06-24 DIAGNOSIS — E51.9 THIAMINE DEFICIENCY, UNSPECIFIED: ICD-10-CM

## 2022-07-21 NOTE — ED ADULT TRIAGE NOTE - IDEAL BODY WEIGHT(KG)
[facility] [place]   [address] [place] [address] [id number]

## 2022-08-02 NOTE — PHYSICAL THERAPY INITIAL EVALUATION ADULT - ORIENTATION, REHAB EVAL
place/person Protopic Pregnancy And Lactation Text: This medication is Pregnancy Category C. It is unknown if this medication is excreted in breast milk when applied topically.

## 2022-08-03 NOTE — PHYSICAL THERAPY INITIAL EVALUATION ADULT - TRANSFER SAFETY CONCERNS NOTED: SIT/STAND, REHAB EVAL
Hypertension Hypertension Hypertension Hypertension Hypertension Pre-diabetes Hypertension Pre-diabetes Hypertension decreased balance during turns/decreased safety awareness/losing balance/decreased weight-shifting ability

## 2023-01-01 NOTE — DIETITIAN INITIAL EVALUATION ADULT. - IDEAL BODY WEIGHT (KG)
vigorous baby girl, placed on mother's abdomen, dried, stimulated and suctioned with bulb syringe during delayed cord clamp/cut then placed skin to skin. APGARS 9&9  Weight, measurements, bands, foot prints, vitamin k and erythromycin administered  Mother attempting to breast feed. Breast fed her other babies. 72.5

## 2023-02-07 NOTE — PATIENT PROFILE ADULT - PRO INTERPRETER NEED 2
Date of Office Visit: 2023  Encounter Provider: Dr. Alon Mccabe  Place of Service: [facility] CARDIOLOGY [place]  Patient Name: Anthony Shelley  :1943  Jonathan Johnson MD    Chief Complaint   Patient presents with   • Coronary Artery Disease   • Hypertension   • Hyperlipidemia   • Consult     History of Present Illness:    I am pleased to see Mr. Shelley in my office today as a new consultation.    As you know, patient is 79-year-old white gentleman whose past medical history is significant for hypertension, hyperlipidemia, CAD who came today for second opinion.    In 2018, patient had symptoms of chest discomfort and underwent stress echocardiography which showed baseline EF of 45 to 50%.  Postexercise images showed septal and inferior wall hypokinesis consistent with LAD ischemia.  Patient underwent cardiac catheterization by Dr. Soliz.  Patient was noted to have 100% occlusion of LAD and 50% diagonal branch of LAD.  Distal LAD was reconstituted with collaterals.  LCx and RCA was free of significant disease.  Patient was recommended to have medical treatment.  Patient did well.  In 2020, patient underwent stress test at [facility] which showed large apical myocardial infarction without any significant ischemia.  In 2022, patient underwent repeat echocardiogram which showed EF of 45%.  In 2022, patient underwent cardiac catheterization by Dr. Soliz again at [facility].  LVEF was noted to be 55%.  RCA was free of significant disease and it was giving collaterals to LAD.  Left main was free of significant disease.  LAD was 100% occluded in the ostium.  Ramus intermedius was hemodynamically free of significant disease.  LCx was free of significant disease.  D1 branch of LAD after his region had 70 to 80% stenosis.  Patient was recommended to have possible laser intervention of D1 branch of LAD.    Patient came today for second opinion.  Patient  is totally asymptomatic.  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  Patient denies any orthopnea PND no syncope or presyncope.  No leg edema noted.    EKG showed normal sinus rhythm.  Poor progression of R wave noted.  Anterior myocardial infarction.    Patient is totally asymptomatic.  He had similar finding in 2018 and 2022 on cardiac catheterization except that there is progression of disease in D1 branch of LAD.  However this is a small caliber vessel.    I discussed in detail with the patient.  He is totally asymptomatic I have offered him that there are 2 options either we proceed with cardiac catheterization with intention to intervene on D1 branch of LAD.  Other option is to treat  him medically and if there is any symptoms come I will proceed with cardiac catheterization.  Patient opted for second option.  However I educated him to call me if there is any change in symptoms.            Past Medical History:   Diagnosis Date   • Abnormal EKG     Impression: q waves anterior leads, asymptomatic eval / rx for htn in progress Follow up recheck stress echo sched   • Abnormal stress test     Impression: resting ef 45% with hypomobility septum / distal segment not improved with exercise, and apical lv dysfuxn with exercise has seen Dr Soliz, cardiolyte stress test upcoming Call / return if chest pain on exertion, respiratory difficulty, worsening symptoms.   • Abnormal TSH    • Abscess of leg, right     Impression: improving, packing left out continue antibiotics   • CAD (coronary artery disease)     Impression: 100% occ with collaterals, 50% in another artery followed by fernanda   • Carotid bruit    • Cellulitis of right lower extremity     mpression: much improved s/p I and D / evacuation hematoma persistent open wound / wound care referrall Topical care discussed. continue antibiotics   • Chest pain    • Colon cancer screening     Impression:  Remote history of colonoscopy per his report, repeat colonoscopy scheduled   • Elevated blood pressure reading     Impression: mild elevation Discussed low sodium diet, lifestyle modification. + fh monitor bp at home Follow up recheck consider rx if if persistent elevation.   • Elevated LFTs     Impression: mild followed by stavens holding statin   • Fractured skull (HCC)    • Hip pain    • History of chickenpox    • History of tobacco use    • History of use of hearing aid in both ears    • HTN (hypertension), benign     Impression: good control   • Hyperlipidemia    • Lumbar disc disease     Impression: strain ice 20 minutes bid nsaids Rehabilitation exercises discussed. h/o ldd, has had imaging in past, + chronic sciatica Consider repeat imaging if persistent symptoms.   • Medicare annual wellness visit, subsequent     With abnormal findings.   • Mixed hyperlipidemia     Impression: Discussed diet, exercise, lifestyle modification. mild ekevation Follow up recheck ------------- Stable Compliant with meds Is getting adequate diet and exercise Goals developed at last visit were met Care management needs are self addressed.   • Pain of right lower extremity    • Screening for alcoholism    • Screening for depression    • Screening for hyperlipidemia    • Screening PSA (prostate specific antigen)     Impression: psa normal check geoff at upcoming colonoscopy   • Sun-damaged skin    • Syncope     Impression: clinically improved today, no further episodes, benign exam   • Wrist fracture, left, closed, initial encounter          Past Surgical History:   Procedure Laterality Date   • CARDIAC CATHETERIZATION  12/12/2018   • CHOLECYSTECTOMY  1996   • SKIN BIOPSY  01/20/2022           Current Outpatient Medications:   •  amLODIPine (NORVASC) 5 MG tablet, Norvasc 5 mg tablet  Take 1 tablet every day by oral route at bedtime for 90 days., Disp: , Rfl:   •  aspirin 81 MG EC tablet, Take 1 tablet by mouth Daily., Disp: , Rfl:   •   irbesartan (AVAPRO) 300 MG tablet, Take 300 mg by mouth Daily., Disp: , Rfl:   •  metoprolol succinate XL (TOPROL-XL) 50 MG 24 hr tablet, TAKE 1 TABLET EVERY DAY, Disp: 90 tablet, Rfl: 1  •  Multiple Vitamins-Minerals (CENTRUM ADULTS) tablet, Take 1 tablet by mouth Daily., Disp: , Rfl:   •  rosuvastatin (CRESTOR) 5 MG tablet, TAKE 1 TABLET EVERY DAY, Disp: 90 tablet, Rfl: 1      Social History     Socioeconomic History   • Marital status:    Tobacco Use   • Smoking status: Former     Packs/day: 1.00     Years: 32.00     Pack years: 32.00     Types: Cigarettes     Start date: 1961     Quit date: 2000     Years since quittin.1   • Smokeless tobacco: Never   Vaping Use   • Vaping Use: Never used   Substance and Sexual Activity   • Alcohol use: Yes     Alcohol/week: 15.0 standard drinks     Types: 15 Cans of beer per week   • Drug use: No   • Sexual activity: Defer         Review of Systems   Constitutional: Negative for chills and fever.   HENT: Negative for ear discharge and nosebleeds.    Eyes: Negative for discharge and redness.   Cardiovascular: Negative for chest pain, orthopnea, palpitations, paroxysmal nocturnal dyspnea and syncope.   Respiratory: Positive for shortness of breath. Negative for cough and wheezing.    Endocrine: Negative for heat intolerance.   Skin: Negative for rash.   Musculoskeletal: Negative for arthritis and myalgias.   Gastrointestinal: Negative for abdominal pain, melena, nausea and vomiting.   Genitourinary: Negative for dysuria and hematuria.   Neurological: Negative for dizziness, light-headedness, numbness and tremors.   Psychiatric/Behavioral: Negative for depression. The patient is not nervous/anxious.        Procedures      ECG 12 Lead    Date/Time: 2023 6:49 PM  Performed by: Alon Mccabe MD  Authorized by: Alon Mccabe MD   Previous ECG: no previous ECG available  Rhythm: sinus rhythm  Other findings: poor R wave progression    Clinical impression:  "abnormal EKG            ECG 12 Lead    (Results Pending)           Objective:    /75 (BP Location: Right arm, Patient Position: Sitting, Cuff Size: Large Adult)   Pulse 54   Ht 177.8 cm (70\")   Wt 99.3 kg (219 lb)   SpO2 99%   BMI 31.42 kg/m²         Constitutional:       Appearance: Well-developed.   Eyes:      General: No scleral icterus.        Right eye: No discharge.   HENT:      Head: Normocephalic and atraumatic.   Neck:      Thyroid: No thyromegaly.      Lymphadenopathy: No cervical adenopathy.   Pulmonary:      Effort: Pulmonary effort is normal. No respiratory distress.      Breath sounds: Normal breath sounds. No wheezing. No rales.   Cardiovascular:      Normal rate. Regular rhythm.      No gallop.   Abdominal:      Tenderness: There is no abdominal tenderness.   Skin:     Findings: No erythema or rash.   Neurological:      Mental Status: Alert and oriented to person, place, and time.             Assessment:       Diagnosis Plan   1. Mixed hyperlipidemia  ECG 12 Lead      2. Benign essential HTN  ECG 12 Lead      3. Coronary artery disease involving native coronary artery of native heart without angina pectoris  ECG 12 Lead               Plan:       MDM:    1.  CAD:    Patient is totally asymptomatic his recent cardiac catheterization showed LCx/ramus intermedius and RCA was free of disease.  LAD was 100% occluded which is chronically occluded with collateralization.  D1 branch of LAD had 70 to 80% stenosis.  Patient is totally asymptomatic.  At this stage he recommended medical treatment but if patient wants I would proceed with cardiac catheterization with intention of intervention to D1 branch of LAD.  Patient reports that he is in his usual health.    2.  Hypertension:    Blood pressure is controlled current treatment would be continued    Hyperlipidemia:    Patient is on Crestor.  Recent LDL is 75 which is desirable  " English

## 2023-04-04 NOTE — CONSULT NOTE ADULT - ASSESSMENT
etoh dependency  dementia?          Counselling done  Librium protocol has been started  Could probably place on prn in a day or so  Thiamine supplements  Possible SNF but up to primary team
65 yo  male, with PMHx of ETOH dependence, CHF, chronic back pain, HLD, HTN, PE on eliquis, TIA who was BIBEMS for falls called by his neighbout. His blood pressure was 190s/90s. A&Ox3 here, no slurred speech. He had AFib on admission. Psychiatry consult called as pt requests to leave AMA, refusing further medical workup and treatment.    Pt said he lives on himself since his wife passed away four years ago. He has repeated admissions to ED in the past three months due to falls. Pt gave inconsistent stories re his drinking. Pt displays linear thought process, goal-directed. He denies depression, or having other psychiatric symptoms, such as AVH, bethany, delusion or paranoid. He denies suicidal ideation or psychiatric service.    During interview with the undersigned, pt stated that he began drinking in his 20's, and has reduced drinking in recent years. He was unable to calculate the year when his wife passed away, but saying 4 years ago, given now 2020.  He used his fingers to do deduction, but was able to concentrate. Pt displays significant impaired attention and short-term memory, e.g. when was asked to repeat three digit numbers and repeat backwards, and failed in three times. He was unable     When the undersigned recommends for further neurological consultation to assess his CNS impairment, including cognitive functioning, to find out what causes his repeated falls, which can be dangerous. He refused and stated that he needs to go home immediately as he needs to go home and "clean his house". Pt displays impaired decision making in the context of to complet medical workup and find appropriate treatment to reduce future risk of falls and associate medical complications.     Impression:  1. Impaired ability to perceive immediate risks and associated complication of repeated falls, refusing medical workup and treatment recommendation  2. Impaired ability for self-care, including medical treatment to manage his HTN
Statement Selected

## 2023-04-11 NOTE — ED PROVIDER NOTE - NSICDXFAMILYHX_GEN_ALL_CORE_FT
REASON FOR VISIT:  Urinary retention  Suprapubic catheter for about one year    HISTORY  Adelso Noguera is a 87 year old male presents  For follow up for  urinary retention.     Patient was initially scheduled for Urodynamics study but was rescheduled a a follow up visit to reassess his condition, as no one in our practice has seen him since 9/22.     Patient had a spinal cord injury as a child.  He was able to urinate normally on his own until about a year ago.     Patient states about a year ago, he was weak and needed to go to hospital for sepsis due to retention and bladder stones. Dr. Betancur did a bladder stone removal procedure in 3/2022.    He was then transitioned to a SP catheter and has been having regular changes by the nursing staff at the SNF he resides. He has never been seen by our Urology group in the outpatient setting until today.  Patient stated he was too weak to be catheter free but now has been getting stronger and would like to see if he can become catheter free.   He reports a  history of recent gross hematuria in catheter bag after doing PT sessions.   The staff at [facility] irrigate his SP cath with Acetic acid twice daily to keep it clear of sediment and to prevent blocking of catheter.     He denies recurrent UTI and kidney stones.    Patient has bowel movements every 3-4 days. Takes fiber pills and magnesium regularly. Only takes Miralax when he is really backed up.     Average daily fluid intake:  35-40 oz of water   0 cups of coffee   1 cans of soda a week  0 cups of tea  Rare servings of alcohol  4 oz of OJ twice weekly  Milk - 4 cups a week  8 oz Ensure 4-5 times a week     HISTORY:  Past history of TURP in [place] about 15 years prior to 2022 2017- Patient had gross hematuria, had workup that was negative per patient.   2/25/22- Patient was seen in consult by Deloris Sanchez NP for urinary retention. He was hospitalized for CoVid 19 and went into retention during hospital stay.  He  failed voiding trial while in hospital before discharge.   He also failed voiding trial at the [facility] where he resides 2 weeks later.   Patient was told to continue Tamsulosin, start Finasteride, continue with lovelace catheter, have a CT Urogram due to recent gross hematuria, repeat voiding trial. Cystoscopy recommended in the UC.  3/19/22- Cystoscopy with clot evacuation, fulguration, and removal of several bladder calculi performed for gross hematuria by Dr. Betancur.    Lovelace catheter was changed at [facility] after becoming occluded.  Catheter was not draining.  With change, a significant amount of bleeding occurred and patient septic. Cysto revealed  large perforation of the posterior aspect of the bulbar urethra from balloon or catheter trauma. Prostate is very small and widely patent.   There are about 5 bladder calculi approximately 5 mm in size.  These were all removed.  There are several wide bore diverticula of the bladder.  There was some oozing from the area of the bladder neck which was controlled with a Bugbee electrode. 20 Fr 3 way catheter was placed.   Bladder stone calculi analysis  80% calcium oxalate monohydrate, and   20% uric acid.   3/22/22- SP tube placed by IR.   5/4/22- First SP tube change by IR  6/15/22- SP tube change in UC by Deloris. 18 Fr silicone SP lovelace.  10 cc balloon.   7/29/22, 8/26/22, 9/22/22- SP change in .       PAST MEDICAL HISTORY :  Past Medical History:   Diagnosis Date   • Atrial fibrillation (CMD)    • Chronic kidney disease     CKD, stage 3   • COVID-19    • Malignant neoplasm (CMD)     CLL   • Skin cancer    • Urinary retention    • Urinary tract infection        PAST SURGICAL HISTORY    Past Surgical History:   Procedure Laterality Date   • Bladder stone removal     • Fracture surgery      plate inserted for left leg fracture   • Transurethral elec-surg prostatectom     • Ureteroscopy      bilateral kidney stones, lithotripsy with laser       SOCIAL HISTORY:    Tobacco Use:  Never             Alcohol Use: Never           Review of patient's social economics indicates:  No social economics status on file  Patient is  but wife lives in [place].  His two sisters and brother in law live here and are his family contacts.     SEXUALLY TRANSMITTED DISEASES:  Patient denies history of GC.    MEDICATIONS:   Current Outpatient Medications   Medication Sig Dispense Refill   • hydroCORTisone (CORTIZONE) 2.5 % cream Apply topically 2 times daily. Apply to areas of itch PRN 30 g 3   • niacinamide 500 MG tablet Take 1 tablet by mouth in the morning and 1 tablet in the evening. Take with meals. 60 tablet 11   • metoPROLOL succinate (Toprol XL) 25 MG 24 hr tablet Take 1/2 tablet (12.5 mg) in AM + 1 tablet (25 mg) in PM 45 tablet 5   • apixaBAN (Eliquis) 5 MG Tab Take 0.5 tablets by mouth every 12 hours.     • finasteride (PROSCAR) 5 MG tablet Take 5 mg by mouth daily.     • PSYLLIUM HUSK PO Take 5 tablets by mouth daily.     • magnesium hydroxide (MILK OF MAGNESIA) 400 MG/5ML suspension Take 15 mLs by mouth daily as needed for Constipation.     • acetaminophen (TYLENOL) 325 MG tablet Take 650 mg by mouth every 4 hours as needed for Pain.     • docusate sodium-sennosides (SENOKOT S) 50-8.6 MG per tablet Take 1 tablet by mouth as needed for Constipation.     • aluminum-magnesium hydroxide-simethicone (MAALOX) 200-200-20 MG/5ML Suspension Take 15 mLs by mouth every 4 hours as needed.     • acetaminophen (TYLENOL) 650 MG suppository Place 650 mg rectally every 4 hours as needed for Fever.     • guaiFENesin-DM, (ROBITUSSIN DM) 100-10 MG/5ML syrup Take 10 mLs by mouth every 4 hours as needed for Cough.     • bisacodyl (DULCOLAX) 10 MG suppository Place 1 suppository rectally daily as needed for Constipation.     • polyethylene glycol (MIRALAX) 17 g packet Take 17 g by mouth daily as needed (constipation). Stir and dissolve powder in any 4 to 8 ounces of beverage, then drink.     • magnesium oxide 400  (241.3 Mg) MG Tab Take 1 tablet by mouth daily. Do not start before February 2, 2022.     • tamsulosin (FLOMAX) 0.4 MG Cap Take 1 capsule by mouth daily after a meal. Do not start before February 2, 2022. 30 capsule    • atorvastatin (LIPITOR) 40 MG tablet Take 40 mg by mouth daily.        No current facility-administered medications for this visit.       ALLERGIES:   Allergies as of 04/11/2023   • (No Known Allergies)       Family History  No family history on file.    MA's notes reviewed and I concur.     PHYSICAL EXAM:  GENERAL:  appears stated age, is in no apparent distress and is well developed and well nourished. Patient presents in wheelchair.  HEENT:  head is normocephalic, face is symmetric, bilateral eyes with no gross abnormality, bilateral ears with no gross abnormality, nose with no gross abnormality and mouth with pink mucosa  SKIN: Warm and dry.  ABDOMEN:abdomen is soft, nontender and without guarding.  SP exit tube site has dried, crusted blood. No erythema, purulent drainage, or signs of infection or trauma  GENITOURINARY: deferred.  SP lovelace bag has light pink/ watermelon urine in it.  Urine coming upstream directly from bladder is medium yellow. Sediment seen in lovelace cath tubing  RECTAL: deferred.  Prostate gland is deferred  NEUROLOGIC: Awake, alert and oriented x 3. Affect appropriate and pleasant.    EXTREMITIES: symmetrical with limited ROM.  Bilateral hand contracted into fists and in the Decorticate posturing position. Unable to use hands for all fine motor skills and some large motor skills.     URINALYSIS:    Urine test was performed yesterday at [facility]. Patient will be treated as indicated.       Recent Labs   Lab 05/04/22  0907   WBC 48.1*   HGB 10.6*   HCT 33.9*   *   Sodium 142   Potassium 4.7   BUN 31*   Creatinine 1.12   Glucose 91   Glomerular Filtration Rate 65     IMAGING REVIEWED:  I personally reviewed images in PACS and with the patient.    3/11/22 CT Urogram w/wo  contrast (for gross hematuria)  IMPRESSION:   Numerous bilateral renal cortical cysts. The largest cysts are in the lower pole left kidney where there are confluent cortical cysts that are partially calcified. Bilateral nonobstructing renal calculi.     The urinary bladder is nondistended and a Cartwright catheter is in place in the urinary bladder. Prostatectomy.     Retained stool throughout the colon and rectum with rectal distention measuring 7.7 cm.      IMPRESSION:  -Urinary retention with indwelling suprapubic catheter- patient would like to see if he can void independently and become catheter free.   -Frequent irritation of SP exit site with mild bleeding (patient is on Eliquis)  -Gross hematuria after PT and increase activity    Co-Morbidities / conditions:  Atrial fibrillation on Eliquis  Chronic kidney disease  History of CLL  History of spinal cord injury as young adult with impaired mobility    PLAN:  -Call clinic if you have large blood clots in bag or dark or bright red blood that is not stopping after a couple days. Patient will need to stop Eliquis until hematuria resolves.     -Follow up in six months in clinic or sooner or needed. OR  After after trial and when you are urinating through urethra more than what is left in bladder.       -Constipation management was discussed:  Goal is to have a soft, formed bowel movement daily    Step management to treatment:    Start with Stool softener tablets- 1 pill twice daily  4 oz of prune juice daily.  If that is not effective enough, then may add Miralax - 1 dose every day  Avoid fiber supplements- such as benefiber, Metamucil, fiber gummies and pills    Work toward drinking 60 oz of water a day    -Once your bowel movement are more regular- every day or every other day, you may try the voiding trial    Voiding trial instructions-   In morning when you wake up:    Take off SP bag and use catheter plug to plug end of SP catheter  Drink water and stay hydrated  and wait at least 2 hours or until need to urinate.    Void as normally would through penis and urethra. Measure output and log on sheet.  Unplug SP cath and drain into measurement tool and log amount on sheet.     Cap SP again and repeat.     May place lovelace bag on at night for nighttime drainage and may flush as needed with Acetic acid to keep catheter running freely.     Sarai CAMILO  Urology  [phone]   FAMILY HISTORY:  Father  Still living? Unknown  FH: MI (myocardial infarction), Age at diagnosis: Age Unknown    Mother  Still living? Unknown  FH: MI (myocardial infarction), Age at diagnosis: Age Unknown

## 2023-04-27 NOTE — ED ADULT TRIAGE NOTE - HEIGHT IN INCHES
[facility] Hospital Medicine Discharge Summary    Admit Date: 4/18/2023  Discharge Date and Time: 4/22/2023, 01:08 pm    Admitting Physician:  Team  Discharging Physician: Adriana Cota MD.  Primary Care Physician: Isabella Miller MD  Consults: Cardiology    Discharge Diagnoses:  Acute on chronic diastolic congestive heart failure   Acute kidney injury - improved to normal   Paroxysmal Atrial fibrillation , on Eliquis        Hospital Course:     Shamika Pleitez is a 89 y.o. female with a PMHx of HTN, CAD, PAF on Eliquis, history of CVA, hypothyroidism, venous insufficiency, VHD who presented to [facility] on 4/18/2023 at the direction of her PCP after outpatient labs demonstrated an elevated BUN and creatinine on 04/17/2023.  Patient is a resident of [facility] since January 2023.  Family reports the patient has gained 20 lb since January.  Per chart review, patient was seen by her PCP on 04/10/2023 with worsening lower extremity swelling, fatigue and wheezing despite compliance with Lasix; Lasix was discontinued and she was initiated on Bumex 1 mg b.i.d. and had an echocardiogram ordered for Thursday 04/20/2023 to evaluate for acute CHF exacerbation.     Initial ED VS include /84, HR 94, RR 18, SpO2 97%, temperature 98.4° F.  Labs notable for hemoglobin 16.4, hematocrit 50.5, BUN 34.6, creatinine 1.43, .9. Urinalysis unremarkable.  CXR negative for acute or worsened intrathoracic process. Patient admitted to hospital medicine services for further medical management.    Pt was treated with IV Furosemide with excellent response. IV lasix transitioned to oral Bumex once pt reached eu volemic . BP was marginal after diuresis , therefore home medicine Enalapril held during hospitalization with instruction to resume once follow up with Cardiologist and BP permits. BB initiated at low dose Metoprolol 12.5 mg bid. On 4/22/23 pt deemed improved and stable for  discharge back to Assisted living facility.        Pt was seen and examined on the day of discharge  Vitals:  VITAL SIGNS: 24 HRS MIN & MAX LAST   No data recorded 98.2 °F (36.8 °C)   No data recorded 107/77   No data recorded  78   No data recorded 18   No data recorded 96 %       Physical Exam:  General: In no acute distress, afebrile, on RA  Chest: Clear to auscultation bilaterally  Heart: RRR, +S1, S2, no appreciable murmur  Abdomen: Soft, nontender, BS +  MSK: Warm, 1+ lower extremity/pedal edema, no clubbing or cyanosis  Neurologic: Alert and oriented x4, Cranial nerve II-XII intact, Strength 5/5 in all 4 extremities      Procedures Performed: No admission procedures for hospital encounter.     Significant Diagnostic Studies: See Full reports for all details    No results for input(s): WBC, RBC, HGB, HCT, MCV, MCH, MCHC, RDW, PLT, MPV, GRAN, LYMPH, MONO, BASO, NRBC in the last 168 hours.    Recent Labs   Lab 04/21/23  0411      K 4.0   CO2 30   BUN 28.3*   CREATININE 0.96   CALCIUM 8.9   MG 2.10        Microbiology Results (last 7 days)       ** No results found for the last 168 hours. **             US Retroperitoneal Complete  Narrative: EXAMINATION:  US RETROPERITONEAL COMPLETE    CLINICAL HISTORY:  zelda;, .    TECHNIQUE:  Transverse and longitudinal images of the kidneys  and bladder were obtained.    COMPARISON:  None    FINDINGS:  Right Kidney:    Length: 9.9 x 4.4 x 5.4 cm    Appearance: Normal echogenicity.    Collecting system: No hydronephrosis    Stones: None    Cyst/Mass: None    Left Kidney: Left kidney was not optimally assessed due to limited acoustic windows and some gas    Length: 7.7 x 3.3 x 3.2 cm    Appearance: Normal echogenicity.    Collecting system: No hydronephrosis    Stones: None    Cyst/Mass: None    Bladder:    Normal    Vessels:    Visualized portions of the IVC and aorta have a normal grayscale appearance.  Impression: Limited assessment of the left kidney but grossly  unremarkable.    No other abnormalities identified    Electronically signed by: Jalil Sandoval  Date:    04/19/2023  Time:    11:16  Echo  · The left ventricle is normal in size with normal systolic function.  · The estimated ejection fraction is 65%.  · Normal right ventricular size with normal right ventricular systolic   function.  · Mild-to-moderate mitral regurgitation.  · Mild tricuspid regurgitation.  · The estimated PA systolic pressure is 31 mmHg.  · Moderate to severe left atrial enlargement.  · Moderate right atrial enlargement.  · A diastolic pattern consistent with atrial fibrillation observed.            Medication List        START taking these medications      metoprolol tartrate 25 MG tablet  Commonly known as: LOPRESSOR  Take 0.5 tablets (12.5 mg total) by mouth 2 (two) times daily.            CHANGE how you take these medications      bumetanide 1 MG tablet  Commonly known as: BUMEX  Take 1 tablet (1 mg total) by mouth once daily.  What changed: when to take this            CONTINUE taking these medications      acetaminophen 500 MG tablet  Commonly known as: TYLENOL  Take 1 tablet (500 mg total) by mouth every 6 (six) hours as needed for Pain.     atorvastatin 20 MG tablet  Commonly known as: LIPITOR  Take 1 tablet (20 mg total) by mouth once daily.     CENTRUM COMPLETE Tab  Generic drug: multivitamin-iron-folic acid  Take 1 tablet by mouth Daily.     docusate sodium 100 MG capsule  Commonly known as: COLACE  Take 1 capsule (100 mg total) by mouth Daily.     ELIQUIS 5 mg Tab  Generic drug: apixaban  Take 1 tablet (5 mg total) by mouth 2 (two) times daily.     gentamicin 0.3 % ophthalmic solution  Commonly known as: GARAMYCIN  Place 1 drop into the left eye every 4 (four) hours.     levothyroxine 75 MCG tablet  Commonly known as: SYNTHROID  Take 1 tablet (75 mcg total) by mouth once daily.     psyllium 0.52 gram capsule  Take 1 capsule (0.52 g total) by mouth once daily.            STOP taking  these medications      enalapril 10 MG tablet  Commonly known as: VASOTEC     naproxen sodium 220 MG tablet  Commonly known as: ALEVE     nitrofurantoin (macrocrystal-monohydrate) 100 MG capsule  Commonly known as: MACROBID               Where to Get Your Medications        You can get these medications from any pharmacy    Bring a paper prescription for each of these medications  bumetanide 1 MG tablet  metoprolol tartrate 25 MG tablet          Explained in detail to the patient about the discharge plan, medications, and follow-up visits. Pt understands and agrees with the treatment plan  [place] [facility]   Discharged Condition: stable  Diet-    Medications Per DC med rec  Activities as tolerated   Follow-up Information       Torey Lowry MD Follow up.    Specialties: Cardiovascular Disease, Cardiology  Why: 7-10 days  We will call you with your appointment date and time.  Contact information:  [facility] [id number] [phone]               [facility] Follow up.    Specialties: Home Health Services, Home Therapy Services, Home Living Aide Services  Why: This is your home health agency.  Contact information:  [id number]  [facility], [address]  [facility] [id number] [phone]                         For further questions contact hospitalist office    Discharge time 33 minutes    For worsening symptoms, chest pain, shortness of breath, increased abdominal pain, high grade fever, stroke or stroke like symptoms, immediately go to the nearest Emergency Room or call 911 as soon as possible.      Adriana Diaz M.D, 4/22/2023, 01:08 pm           9

## 2023-05-04 NOTE — ED ADULT NURSE NOTE - NS ED NURSE PATIENT LEFT UNIT TIME
Immediate Post- Operative Note        Findings: IVC Filter      Procedure(s): Filter retrieval      Estimated Blood Loss: Less than 5 ml        Complications: None            5/4/2023     9:06 AM     Isiah Cruz M.D.      
20:51

## 2023-08-05 NOTE — PHYSICAL THERAPY INITIAL EVALUATION ADULT - SITTING BALANCE: DYNAMIC
Reina Brantley was seen today for   Chief Complaint   Patient presents with   • Office Visit   • Hearing Loss   • Hearing Aid Check   . Patient was referred by PCP.    Patient reports : hearing loss bilaterally . Annual audiogram and hearing aid check.    Otoscopy revealed Clear external auditory canals and both tympanic membranes visualized bilaterally.    Audiogram revealed a moderate sensorineural hearing loss bilaterally .    See scanned audiogram for more details. Audiometry was completed without incident.    Recommendations: Follow-up with Dr. Lau. Schedule for annual audiogram and hearing aid check.     Left aid wax system is broken, sending in for repair today. Will call patient when it returns.  
fair balance
Statement Selected

## 2023-08-12 NOTE — DISCHARGE NOTE NURSING/CASE MANAGEMENT/SOCIAL WORK - NSDCPEPT PROEDMA_GEN_ALL_CORE
"Subjective     Martina Rea is a 57 y.o. male who presents with Other (Department of transportation physical exam )            HPI    ROS  DOT         Objective     /84 (BP Location: Left arm, Patient Position: Sitting)   Pulse 80   Temp 36.8 °C (98.2 °F) (Temporal)   Resp 20   Ht 1.676 m (5' 6\")   Wt 97.7 kg (215 lb 6.4 oz)   SpO2 98%   BMI 34.77 kg/m²      Physical Exam                        Assessment & Plan        1. Encounter for CDL (commercial driving license) exam          Cleared for one year due to DM type 2.              "
Yes

## 2023-08-17 NOTE — H&P ADULT - VTE RISK ASSESSMENT
Detail Level: Detailed Quality 130: Documentation Of Current Medications In The Medical Record: Current Medications Documented Quality 431: Preventive Care And Screening: Unhealthy Alcohol Use - Screening: Patient not identified as an unhealthy alcohol user when screened for unhealthy alcohol use using a systematic screening method Quality 226: Preventive Care And Screening: Tobacco Use: Screening And Cessation Intervention: Patient screened for tobacco use and is an ex/non-smoker Quality 110: Preventive Care And Screening: Influenza Immunization: Influenza immunization was not ordered or administered, reason not given Quality 111:Pneumonia Vaccination Status For Older Adults: Patient received any pneumococcal conjugate or polysaccharide vaccine on or after their 60th birthday and before the end of the measurement period VTE Assessment already completed for this visit

## 2023-08-20 NOTE — PATIENT PROFILE ADULT - FUNCTIONAL ASSESSMENT - BASIC MOBILITY 5.
52-year-old female with past medical history of HIV, asthma, lung cancer s/p resection, hypertension coming in with few days of dizziness, nausea, headache, photophobia. Pt reports she has been taking Pepto-Bismol with no relief.  Thinks that made her constipated.  Only having small bowel movements for past two days.  No abdominal pain.  States she had similar symptoms last month and was treated in the ED.  No fevers, chills, chest pain.  Reports mild shortness of breath states she has been wheezing when she lies down at night.  States she has been using her albuterol wheezing is improved at this time.  Patient is well-appearing.  No distress.  Clear to auscultation bilaterally.  Regular rate and rhythm.  Abdomen soft nontender.  Equal strength and sensation in all extremities.  Smooth finger-to-nose.  Patient does not feel comfortable walking.  Differential diagnoses include but not limited to migraine headache, BPPV, anemia, electrolyte abnormality.  Low concern for stroke given patient reports the dizziness has been intermittent over past few days.  Will obtain CT head -that should show changes suggestive due to stroke given patient has been having symptoms for past 3 days.  No concern for asthma exacerbation at this time patient is satting well, no shortness of breath at this time and clear to auscultation bilaterally 1 = Total assistance

## 2023-11-06 NOTE — ED ADULT NURSE NOTE - NSFALLRSKPSTHSTOCCUR_ED_ALL_ED
"Chief Complaint   Patient presents with    Fracture     Right ankle       Initial /85   Pulse 71   Ht 1.549 m (5' 1\")   Wt 101.6 kg (224 lb)   LMP  (LMP Unknown)   BMI 42.32 kg/m   Estimated body mass index is 42.32 kg/m  as calculated from the following:    Height as of this encounter: 1.549 m (5' 1\").    Weight as of this encounter: 101.6 kg (224 lb).  Medications and allergies reviewed.      Mamie PAREDES MA    " Single Mechanical/Accidental Fall

## 2024-02-20 NOTE — PHYSICAL THERAPY INITIAL EVALUATION ADULT - PHYSICAL ASSIST/NONPHYSICAL ASSIST: SIT/SUPINE, REHAB EVAL
RECLAT ORDERED.  PLEASE CALL IF NOT CONTACTED WITHIN ONE 1 WEEK.    SCHEDULE YOUR MAMMOGRAM AND PLEASE COMPLETE YOUR COLON CANCER TEST (COLOGUARD).    Follow up in 3 months with labs to be done PRIOR.    It was a pleasure to see you today. Thank you for choosing us for your health care needs.    If you have lab or other testing completed and have not been informed of results within one week, please call the office for your results.    If you haven't done so, consider signing up for [facility] [employer], the [facility] personal health record. Ask the staff how you can get started.        
verbal cues

## 2024-04-02 NOTE — ED ADULT NURSE NOTE - PSH
[facility] Hospitalist Progress Note    Subjective  Patient resting in bed. Denies any complaints at this time.  Awaiting surgery tomorrow.      Assessment/Plan  70-year-old male who presented to the ED on 3/14/2024 with concerns of increased drainage from the left heel wound as well as the posterior left lower extremity.     Diabetic foot infection; Acute on chronic left heel ulcer osteomyelitis with exposed bone  Sepsis present on admission  History of right fourth and fifth toe amputation in the past  PAD; Hyperlipidemia  Chronic venous stasis dermatitis  History of chronic osteomyelitis of left calcaneus and ankle and had been recommended for BKA during multiple previous hospitalizations. Failed multiple previous long term IV antibiotic treatments. Has been declining amputation during previous hospitalizations. Patient agreeable now.  Infectious disease consulted   IV antibiotics per ID  Cardiology consulted.  Continue home ASA, statin.  Orthopedic surgery consulted.  Planned for BKA by orthopedic surgery on 4/3.     Acute kidney injury on CKD IIIA  Baseline Cr 1.3 - 1.5  Nephrology consulted   Monitor renal function    Hyperkalemia  Nephrology consulted.    Started on Lokelma.     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    Monitor BP     Type II insulin-dependent diabetes mellitus with hyperglycemia and CKD and Hyperglycemia  Hemoglobin A1C 05/16/2023 8.1 (H)  Hemoglobin A1C (%) Date Value 02/25/2024 7.2 (H)  Continue home lantus 10u nightly             SSI, accuchecks     Anemia of chronic disease  Chronic microcytic anemia  Cont daily ferrous sulfate             Hb slowly trended down to as low as 7.9, but now improving..   No bleeding noted             Monitor. Transfuse if Hb less than 7.             Follow-up  with PCP for outpatient anemia work-up     Neurocognitive impairment, possibly early dementia  AAO x 3 but does not have ability to comprehend complex medical issues and make decisions for himself. Lacks insight regarding the severity of his disease. Seen by psych last hospital stay. Niece is SDM.  Will benefit from neruopsychiatry evaluation as outpatient.  Follow-up with PCP for referral.                       Disposition plan  Plans for post acute care: Discharge to [facility] after left BKA likely        Ellen Jacome-Niece (Relative) [phone] (Mobile)              *Things to be done as outpatient by your primary care doctor. Please notify your doctor of the following:             PTA home meds:  Amlodipine 10 mg  Aspirin 81 mg  Atorvastatin 40 mg  Carvedilol 12.5 mg  Ferrous sulfate  Lantus  Torsemide 20 mg        Activity          Fluids  Current Facility-Administered Medications   Medication Dose Route Frequency Provider Last Rate Last Admin        Prophylaxis  Current Active Medications for DVT Prophylaxis (From admission, onward)           Stop     heparin (porcine) injection 5,000 Units  5,000 Units,   Subcutaneous,   3 times per day         --                      Diet  Dietary Orders (From admission, onward)       Start     Ordered    04/03/24 0001  NPO Diet with Exceptions; Medications  DIET EFFECTIVE MIDNIGHT        Question Answer Comment   NPO Modifier with Exceptions    Exceptions Medications    Comment For orthopedic surgery on 4/3.        04/02/24 0730    03/16/24 0140  Sodium 2 gm (Low Sodium), Consistent Carb Moderate (45-75 gm/meal); Fluid Restrict 2000ml (1200 from Dietary) Diet  DIET EFFECTIVE NOW        Question Answer Comment   Diet Modifiers Sodium 2 gm (Low Sodium)    Diet Modifiers Consistent Carb Moderate (45-75 gm/meal)    Fluid Restriction Modifier Fluid Restrict 2000ml (1200 from Dietary)        03/16/24 0140                            PCP: Sabine Street CNP     Code Status  Information       Code Status    Full Resuscitation               Objective  Blood pressure 120/74, pulse 75, temperature 97.9 °F (36.6 °C), temperature source Oral, resp. rate 18, height 5' 9\" (1.753 m), weight 99.6 kg (219 lb 9.3 oz), SpO2 100 %.    Gen: Not in acute distress, Non-obese  HEENT: anicteric, moist mucous membranes, No conjunctival pallor/injection,  Heme: No lymphadenopathy, no bruises, no bleeding, no pallor  C/V: No elevated jugular venous pressure, No carotid bruits, S1, S2, No murmurs or rubs or gallops, No lower extremity edema  Lungs: Clear to auscultation bilaterally  Abd: bowel sounds present, Nondistended, nontender, No organomegaly  : No CVAT, No suprapubic tenderness  Ext: Dressing clean, intact, dry on left lower ext  Skin: No rash, no jaundice  Neuro: Alert and oriented x3, CN grossly intact, strength and sensations normal      Labs     Recent Labs     03/31/24  0538 04/01/24  0559 04/02/24  0501   SODIUM 135 135 135   POTASSIUM 5.0 5.0 4.7   CO2 26 27 27   ANIONGAP 13 12 12   GLUCOSE 106* 130* 121*   BUN 31* 32* 31*   CREATININE 2.08* 1.88* 1.85*   CALCIUM 8.7 8.6 8.7   BILIRUBIN 0.4  --   --    AST 30  --   --    GPT 32  --   --    ALKPT 91  --   --         Recent Labs     03/31/24  0538 04/01/24  0559 04/02/24  0501   WBC 7.4 7.4 7.7   RBC 3.69* 3.71* 3.61*   HGB 9.0* 9.0* 8.8*   HCT 26.9* 27.1* 26.4*    268 247   MCV 72.9* 73.0* 73.1*   MCH 24.4* 24.3* 24.4*   MCHC 33.5 33.2 33.3   NRBCRE 0 0 0   ASEG  --  2.7  --    ALYMP  --  2.0  --    AMONO  --  1.0*  --    AEO  --  1.8*  --          Imaging  US KIDNEY BILATERAL  Narrative: EXAM: US KIDNEY BILATERAL        DATE: 3/30/2024 1:54 PM    CLINICAL INDICATION: TAWNY    COMPARISON: 7/5/2023    FINDINGS: Real-time imaging is performed of the technologist.  Representative images submitted for interpretation.    Exam limitations: Patient was not able to cooperate for routine  positioning.    Right:  Length: 11.4 x 4.8 x 5.6  cm  Parenchyma: Cortical echogenicity appears maintained.   Cysts, masses: None  Pelvicalyceal dilatation: None.  Calculi: No shadowing calculi    Left:  Length: 11.1 x 5.9 x 5.9 cm  Parenchyma: The cortical echogenicity appears maintained, bowel gas  obscuring some detail.   Cysts, masses: None  Pelvicalyceal dilatation: None.  Calculi: No shadowing calculi    Urinary bladder:  Partially distended.   Impression: 1.   No hydronephrosis.    FOR PHYSICIAN USE ONLY: Please note that this report was generated using  voice recognition software.  If you require clarification or feel that  there has been an error in this report please contact me.    Electronically Signed by: MARY BESS D.O.   Signed on: 3/30/2024 4:08 PM   Workstation ID: [id number]             Kia Huggins MD  4/2/2024     History of appendectomy

## 2024-04-11 NOTE — DISCHARGE NOTE NURSING/CASE MANAGEMENT/SOCIAL WORK - NSDCPEELIQUISDIET_GEN_ALL_CORE
Group Therapy Note    Date: 4/11/2024    Group Start Time:  9:00 AM  Group End Time:  9:40 AM  Group Topic: Community Meeting    [facility]    Viola Aguilar MSW        Group Therapy Note    Attendees: 6    Writer facilitated check in group this morning. Writer opened by encouraging patients to fill out their check in sheets. Patients were conversant this morning; writer encouraged them to check in with each other and facilitated resulting discussions. Writer asked patients an icebreaker question to build rapport: what was their favorite field trip while they were in school? And facilitated sharing until the end of group.        Patient's Goal:  Participate in group therapy, complete check in sheet, build rapport and share openly with peers.     Notes:  Patient engaged well in group this morning. He filled out his check in sheet, declining any SI or thoughts of self harm. Patient identified low levels of negative emotions. He shared about his weekend, which is jam-packed with fun activities for his kids, including going to a local [employer] park on Sunday. This sparked a discussion about roller coasters that all his peers got involved in. Patient interacted well throughout and will continue with identified treatment goals in further groups.     Status After Intervention:  Improved    Participation Level: Active Listener and Interactive    Participation Quality: Appropriate, Attentive, and Sharing      Speech:  normal      Thought Process/Content: Logical      Affective Functioning: Congruent      Mood: euthymic      Level of consciousness:  Alert and Oriented x4      Response to Learning: Able to verbalize current knowledge/experience, Capable of insight, and Progressing to goal      Endings: None Reported    Modes of Intervention: Support, Socialization, and Exploration      Discipline Responsible: /Counselor      Signature:  Viola Aguilar  Eat healthy foods you enjoy. Apixaban/Eliquis DOES NOT have a special diet. Limit your alcohol intake.

## 2024-04-17 NOTE — ED ADULT NURSE NOTE - NSIMPLEMENTINTERV_GEN_ALL_ED
no
Implemented All Fall Risk Interventions:  [place] to call system. Call bell, personal items and telephone within reach. Instruct patient to call for assistance. Room bathroom lighting operational. Non-slip footwear when patient is off stretcher. Physically safe environment: no spills, clutter or unnecessary equipment. Stretcher in lowest position, wheels locked, appropriate side rails in place. Provide visual cue, wrist band, yellow gown, etc. Monitor gait and stability. Monitor for mental status changes and reorient to person, place, and time. Review medications for side effects contributing to fall risk. Reinforce activity limits and safety measures with patient and family.

## 2024-07-18 NOTE — ED ADULT NURSE NOTE - HOW OFTEN DO YOU HAVE SIX OR MORE DRINKS ON ONE OCCASION?
Detail Level: Detailed Depth Of Biopsy: dermis Was A Bandage Applied: Yes Size Of Lesion In Cm: 0.6 X Size Of Lesion In Cm: 0 Biopsy Type: H and E Biopsy Method: Dermablade Anesthesia Type: 1% lidocaine without epinephrine Anesthesia Volume In Cc: 0.5 Hemostasis: Drysol Wound Care: Vaseline Dressing: Band-Aid Destruction After The Procedure: No Type Of Destruction Used: Curettage Curettage Text: The wound bed was treated with curettage after the biopsy was performed. Cryotherapy Text: The wound bed was treated with cryotherapy after the biopsy was performed. Electrodesiccation Text: The wound bed was treated with electrodesiccation after the biopsy was performed. Electrodesiccation And Curettage Text: The wound bed was treated with electrodesiccation and curettage after the biopsy was performed. Two to three times per week Silver Nitrate Text: The wound bed was treated with silver nitrate after the biopsy was performed. Lab: [id number] Lab Facility: [id number] Consent: Written consent was obtained and risks were reviewed including but not limited to scarring, infection, bleeding, scabbing, incomplete removal, nerve damage and allergy to anesthesia. Post-Care Instructions: I reviewed with the patient in detail post-care instructions. Patient is to keep the biopsy site dry overnight, and then apply Vaseline twice daily until healed. Pt instructed to call if notices any redness, pain, or unusual symptoms. Notification Instructions: Patient will be notified of biopsy results. However, patient instructed to call the office if not contacted within 2 weeks. Billing Type: Third-Party Bill Information: Selecting Yes will display possible errors in your note based on the variables you have selected. This validation is only offered as a suggestion for you. PLEASE NOTE THAT THE VALIDATION TEXT WILL BE REMOVED WHEN YOU FINALIZE YOUR NOTE. IF YOU WANT TO FAX A PRELIMINARY NOTE YOU WILL NEED TO TOGGLE THIS TO 'NO' IF YOU DO NOT WANT IT IN YOUR FAXED NOTE. Size Of Lesion In Cm: 0.8

## 2024-09-06 NOTE — ED ADULT NURSE NOTE - NSSUHOSCREENINGYN_ED_ALL_ED
Detail Level: Detailed
Quality 226: Preventive Care And Screening: Tobacco Use: Screening And Cessation Intervention: Patient screened for tobacco use and is an ex/non-smoker
Yes - the patient is able to be screened

## 2024-10-06 NOTE — DISCHARGE NOTE PROVIDER - PROVIDER TOKENS
Visiting nurse, physical therapy.  Home care agency will call and visit time.  FREE:[LAST:[medical],FIRST:[clinic],PHONE:[(   )    -],FAX:[(   )    -],FOLLOWUP:[1 week]],PROVIDER:[TOKEN:[[id number]],FOLLOWUP:[1 week]] Visiting nurse, physical therapy.

## 2024-11-18 NOTE — CONSULT NOTE ADULT - MUSCULOSKELETAL COMMENTS
"Lexi palencia unable to stand up without assistance
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
## 2025-03-20 NOTE — ED ADULT NURSE NOTE - NSFALLRSKOUTCOME_ED_ALL_ED
I added labs on and I will let you know      >4269-1713 IU per day    Get 1200 mg at least of calcium through diet     Walking is weight bearing activity  Add a weighted vest      Biking is not      Yoga, janak        We will start zolendronic acid therapy  Once yearly x 3 years            Universal Safety Interventions

## 2025-04-21 NOTE — H&P ADULT - BIRTH SEX
Received call from patient's daughter Nahomi with several concerns they would like to make Dr. Johnson aware of. He has had some trouble urinating over the weekend as well. He is compliant with his flomax. She states that he is urinating but has some trouble starting his stream. He also doesn't feel that his bladder is full. He has been very uncomfortable and SOB despite home health draining pleurx catheter. Per daughter, when he ambulates or lays down, O2 saturation dips into the 80's. He has borrowed oxygen supplies from a family member and is using 2L nasal cannula. This helps him feel more comfortable and he is able to rest. She also states that he is more fatigued and weak than he was previously.   
Male

## 2025-04-28 NOTE — PHYSICAL THERAPY INITIAL EVALUATION ADULT - IMPAIRMENTS FOUND, PT EVAL
Detail Level: Generalized Detail Level: Detailed aerobic capacity/endurance/ergonomics and body mechanics/gait, locomotion, and balance/muscle strength/poor safety awareness

## 2025-04-30 NOTE — PROGRESS NOTE ADULT - ASSESSMENT
This is a 66yo male whose PMH includes CHF, pulmonary embolus and alcohol abuse presents to the ER due to progressive inability to ambulate. Separately still uses alcohol but not interested in a detox program.       NAUSEA vomiting possibly related to gastritis  started on Zofran  will add Protonix     dysphagia   speech consulted      sever Ambulatory dysfunction etiology ??  - physiatry recommending STR  - PT open  pending TSH,B12,VIT D     Acute alcohol abuse.   - thiamine, Folic acid and MV   - librium taper   -detox appreciated  - CATCH team to follow    # CHF (congestive heart failure)  - Per EMR, HFpEF (chronic)  - monitor i and o     # pulmonary embolism  - continue home eliquis    # HTN (hypertension)  - on losartan, lopressor  - monitor bp    # possible thiamine and folate deficiency   continue supplement        DVT PPX: eliquis from home  GI PPX:protonix   Code status: FULL CODE   Apixaban/Eliquis increases your risk for bleeding. Notify your doctor if you experience any of the following side effects: bleeding, coughing or vomiting blood, red or black stool, unexpected pain or swelling, itching or hives, chest pain, chest tightness, trouble breathing, changes in how much or how often you urinate, red or pink urine, numbness or tingling in your feet, or unusual muscle weakness. When Apixaban/Eliquis is taken with other medicines, they can affect how it works. Taking other medications such as aspirin, blood thinners, nonsteroidal anti-inflammatories, and medications that treat depression can increase your risk of bleeding. It is very important to tell your health care provider about all of the other medicines, including over-the-counter medications, herbs, and vitamins you are taking. DO NOT start, stop, or change the dosage of any medicine, including over-the-counter medicines, vitamins, and herbal products without your doctor’s approval. Any products containing aspirin or are nonsteroidal anti-inflammatories lessen the blood’s ability to form clots and add to the effect of Apixaban/Eliquis. Never take aspirin or medicines that contain aspirin without speaking to your doctor.
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
